# Patient Record
Sex: MALE | Race: WHITE | NOT HISPANIC OR LATINO | Employment: OTHER | URBAN - METROPOLITAN AREA
[De-identification: names, ages, dates, MRNs, and addresses within clinical notes are randomized per-mention and may not be internally consistent; named-entity substitution may affect disease eponyms.]

---

## 2017-01-25 ENCOUNTER — HOSPITAL ENCOUNTER (OUTPATIENT)
Dept: RADIOLOGY | Facility: MEDICAL CENTER | Age: 82
End: 2017-01-25
Attending: FAMILY MEDICINE
Payer: MEDICARE

## 2017-01-25 DIAGNOSIS — M79.671 RIGHT FOOT PAIN: ICD-10-CM

## 2017-01-25 PROCEDURE — 73630 X-RAY EXAM OF FOOT: CPT | Mod: RT

## 2017-02-08 ENCOUNTER — HOSPITAL ENCOUNTER (OUTPATIENT)
Dept: LAB | Facility: MEDICAL CENTER | Age: 82
End: 2017-02-08
Attending: NURSE PRACTITIONER
Payer: MEDICARE

## 2017-02-08 ENCOUNTER — HOSPITAL ENCOUNTER (OUTPATIENT)
Dept: LAB | Facility: MEDICAL CENTER | Age: 82
End: 2017-02-08
Attending: FAMILY MEDICINE
Payer: MEDICARE

## 2017-02-08 LAB
25(OH)D3 SERPL-MCNC: 34 NG/ML (ref 30–100)
25(OH)D3 SERPL-MCNC: 36 NG/ML (ref 30–100)
ALBUMIN SERPL BCP-MCNC: 3.9 G/DL (ref 3.2–4.9)
ALBUMIN SERPL BCP-MCNC: 3.9 G/DL (ref 3.2–4.9)
ALBUMIN/GLOB SERPL: 1.1 G/DL
ALP SERPL-CCNC: 61 U/L (ref 30–99)
ALT SERPL-CCNC: 12 U/L (ref 2–50)
ANION GAP SERPL CALC-SCNC: 9 MMOL/L (ref 0–11.9)
APPEARANCE UR: CLEAR
AST SERPL-CCNC: 14 U/L (ref 12–45)
BASOPHILS # BLD AUTO: 0.02 K/UL (ref 0–0.12)
BASOPHILS # BLD AUTO: 0.03 K/UL (ref 0–0.12)
BASOPHILS NFR BLD AUTO: 0.3 % (ref 0–1.8)
BASOPHILS NFR BLD AUTO: 0.5 % (ref 0–1.8)
BILIRUB SERPL-MCNC: 0.6 MG/DL (ref 0.1–1.5)
BILIRUB UR QL STRIP.AUTO: NEGATIVE
BUN SERPL-MCNC: 43 MG/DL (ref 8–22)
BUN SERPL-MCNC: 44 MG/DL (ref 8–22)
CALCIUM SERPL-MCNC: 8.7 MG/DL (ref 8.5–10.5)
CALCIUM SERPL-MCNC: 8.7 MG/DL (ref 8.5–10.5)
CHLORIDE SERPL-SCNC: 109 MMOL/L (ref 96–112)
CHLORIDE SERPL-SCNC: 110 MMOL/L (ref 96–112)
CHOLEST SERPL-MCNC: 111 MG/DL (ref 100–199)
CO2 SERPL-SCNC: 19 MMOL/L (ref 20–33)
CO2 SERPL-SCNC: 20 MMOL/L (ref 20–33)
COLOR UR AUTO: ABNORMAL
CREAT SERPL-MCNC: 2.29 MG/DL (ref 0.5–1.4)
CREAT SERPL-MCNC: 2.3 MG/DL (ref 0.5–1.4)
CREAT UR-MCNC: 104.4 MG/DL
EOSINOPHIL # BLD: 0.16 K/UL (ref 0–0.51)
EOSINOPHIL # BLD: 0.16 K/UL (ref 0–0.51)
EOSINOPHIL NFR BLD AUTO: 2.7 % (ref 0–6.9)
EOSINOPHIL NFR BLD AUTO: 2.7 % (ref 0–6.9)
EPITHELIAL CELLS 1715: NORMAL /HPF
ERYTHROCYTE [DISTWIDTH] IN BLOOD BY AUTOMATED COUNT: 50.4 FL (ref 35.9–50)
ERYTHROCYTE [DISTWIDTH] IN BLOOD BY AUTOMATED COUNT: 51 FL (ref 35.9–50)
GLOBULIN SER CALC-MCNC: 3.4 G/DL (ref 1.9–3.5)
GLUCOSE SERPL-MCNC: 118 MG/DL (ref 65–99)
GLUCOSE SERPL-MCNC: 120 MG/DL (ref 65–99)
GLUCOSE UR STRIP.AUTO-MCNC: NEGATIVE MG/DL
HCT VFR BLD AUTO: 36.2 % (ref 42–52)
HCT VFR BLD AUTO: 36.3 % (ref 42–52)
HDLC SERPL-MCNC: 29 MG/DL
HGB BLD-MCNC: 11.3 G/DL (ref 14–18)
HGB BLD-MCNC: 11.5 G/DL (ref 14–18)
IMM GRANULOCYTES # BLD AUTO: 0.02 K/UL (ref 0–0.11)
IMM GRANULOCYTES # BLD AUTO: 0.03 K/UL (ref 0–0.11)
IMM GRANULOCYTES NFR BLD AUTO: 0.3 % (ref 0–0.9)
IMM GRANULOCYTES NFR BLD AUTO: 0.5 % (ref 0–0.9)
KETONES UR STRIP.AUTO-MCNC: NEGATIVE MG/DL
LDLC SERPL CALC-MCNC: 57 MG/DL
LEUKOCYTE ESTERASE UR QL STRIP.AUTO: NEGATIVE
LYMPHOCYTES # BLD: 0.93 K/UL (ref 1–4.8)
LYMPHOCYTES # BLD: 0.94 K/UL (ref 1–4.8)
LYMPHOCYTES NFR BLD AUTO: 15.5 % (ref 22–41)
LYMPHOCYTES NFR BLD AUTO: 15.9 % (ref 22–41)
MCH RBC QN AUTO: 30.1 PG (ref 27–33)
MCH RBC QN AUTO: 30.3 PG (ref 27–33)
MCHC RBC AUTO-ENTMCNC: 31.2 G/DL (ref 33.7–35.3)
MCHC RBC AUTO-ENTMCNC: 31.7 G/DL (ref 33.7–35.3)
MCV RBC AUTO: 95.8 FL (ref 81.4–97.8)
MCV RBC AUTO: 96.3 FL (ref 81.4–97.8)
MICRO URNS: ABNORMAL
MONOCYTES # BLD: 0.46 K/UL (ref 0–0.85)
MONOCYTES # BLD: 0.48 K/UL (ref 0–0.85)
MONOCYTES NFR BLD AUTO: 7.7 % (ref 0–13.4)
MONOCYTES NFR BLD AUTO: 8.1 % (ref 0–13.4)
NEUTROPHILS # BLD: 4.29 K/UL (ref 1.82–7.42)
NEUTROPHILS # BLD: 4.4 K/UL (ref 1.82–7.42)
NEUTROPHILS NFR BLD AUTO: 72.5 % (ref 44–72)
NEUTROPHILS NFR BLD AUTO: 73.3 % (ref 44–72)
NITRITE UR QL STRIP.AUTO: NEGATIVE
NRBC # BLD AUTO: 0 K/UL
NRBC # BLD AUTO: 0 K/UL
NRBC BLD-RTO: 0 /100 WBC
NRBC BLD-RTO: 0 /100 WBC
PH UR: 6 [PH]
PHOSPHATE SERPL-MCNC: 3.4 MG/DL (ref 2.5–4.5)
PLATELET # BLD AUTO: 123 K/UL (ref 164–446)
PLATELET # BLD AUTO: 125 K/UL (ref 164–446)
PMV BLD AUTO: 11.2 FL (ref 9–12.9)
PMV BLD AUTO: 11.4 FL (ref 9–12.9)
POTASSIUM SERPL-SCNC: 4.8 MMOL/L (ref 3.6–5.5)
POTASSIUM SERPL-SCNC: 4.8 MMOL/L (ref 3.6–5.5)
PROT 24H UR-MRATE: 43.1 MG/DL (ref 0–15)
PROT SERPL-MCNC: 7.3 G/DL (ref 6–8.2)
PROT UR QL STRIP: 30 MG/DL
PROT/CREAT UR: 413 MG/G (ref 15–68)
RBC # BLD AUTO: 3.76 M/UL (ref 4.7–6.1)
RBC # BLD AUTO: 3.79 M/UL (ref 4.7–6.1)
RBC UR QL AUTO: NEGATIVE
SODIUM SERPL-SCNC: 138 MMOL/L (ref 135–145)
SODIUM SERPL-SCNC: 139 MMOL/L (ref 135–145)
SP GR UR STRIP.AUTO: 1.01
TRIGL SERPL-MCNC: 124 MG/DL (ref 0–149)
TSH SERPL DL<=0.005 MIU/L-ACNC: 2.51 UIU/ML (ref 0.3–3.7)
URATE SERPL-MCNC: 7.7 MG/DL (ref 2.5–8.3)
WBC # BLD AUTO: 5.9 K/UL (ref 4.8–10.8)
WBC # BLD AUTO: 6 K/UL (ref 4.8–10.8)

## 2017-02-08 PROCEDURE — 84156 ASSAY OF PROTEIN URINE: CPT

## 2017-02-08 PROCEDURE — 36415 COLL VENOUS BLD VENIPUNCTURE: CPT

## 2017-02-08 PROCEDURE — 84550 ASSAY OF BLOOD/URIC ACID: CPT

## 2017-02-08 PROCEDURE — 82570 ASSAY OF URINE CREATININE: CPT

## 2017-02-08 PROCEDURE — 85025 COMPLETE CBC W/AUTO DIFF WBC: CPT

## 2017-02-08 PROCEDURE — 80069 RENAL FUNCTION PANEL: CPT

## 2017-02-08 PROCEDURE — 81001 URINALYSIS AUTO W/SCOPE: CPT

## 2017-02-08 PROCEDURE — 82306 VITAMIN D 25 HYDROXY: CPT

## 2017-04-27 ENCOUNTER — RESOLUTE PROFESSIONAL BILLING HOSPITAL PROF FEE (OUTPATIENT)
Dept: HOSPITALIST | Facility: MEDICAL CENTER | Age: 82
End: 2017-04-27
Payer: MEDICARE

## 2017-04-27 ENCOUNTER — APPOINTMENT (OUTPATIENT)
Dept: RADIOLOGY | Facility: MEDICAL CENTER | Age: 82
DRG: 392 | End: 2017-04-27
Attending: EMERGENCY MEDICINE
Payer: MEDICARE

## 2017-04-27 ENCOUNTER — APPOINTMENT (OUTPATIENT)
Dept: RADIOLOGY | Facility: MEDICAL CENTER | Age: 82
DRG: 392 | End: 2017-04-27
Attending: HOSPITALIST
Payer: MEDICARE

## 2017-04-27 ENCOUNTER — HOSPITAL ENCOUNTER (INPATIENT)
Facility: MEDICAL CENTER | Age: 82
LOS: 4 days | DRG: 392 | End: 2017-05-01
Attending: EMERGENCY MEDICINE | Admitting: HOSPITALIST
Payer: MEDICARE

## 2017-04-27 PROBLEM — N17.9 ACUTE ON CHRONIC RENAL FAILURE (HCC): Status: ACTIVE | Noted: 2017-04-27

## 2017-04-27 PROBLEM — K52.9 ACUTE COLITIS: Status: ACTIVE | Noted: 2017-04-27

## 2017-04-27 PROBLEM — R79.89 TROPONIN LEVEL ELEVATED: Status: ACTIVE | Noted: 2017-04-27

## 2017-04-27 PROBLEM — N18.9 ACUTE ON CHRONIC RENAL FAILURE (HCC): Status: ACTIVE | Noted: 2017-04-27

## 2017-04-27 LAB
ALBUMIN SERPL BCP-MCNC: 3.9 G/DL (ref 3.2–4.9)
ALBUMIN/GLOB SERPL: 1.3 G/DL
ALP SERPL-CCNC: 53 U/L (ref 30–99)
ALT SERPL-CCNC: 8 U/L (ref 2–50)
ANION GAP SERPL CALC-SCNC: 12 MMOL/L (ref 0–11.9)
ANISOCYTOSIS BLD QL SMEAR: ABNORMAL
APTT PPP: 28.3 SEC (ref 24.7–36)
AST SERPL-CCNC: 11 U/L (ref 12–45)
BASOPHILS # BLD AUTO: 0 % (ref 0–1.8)
BASOPHILS # BLD: 0 K/UL (ref 0–0.12)
BILIRUB SERPL-MCNC: 0.6 MG/DL (ref 0.1–1.5)
BNP SERPL-MCNC: 1912 PG/ML (ref 0–100)
BUN SERPL-MCNC: 49 MG/DL (ref 8–22)
BURR CELLS BLD QL SMEAR: NORMAL
CALCIUM SERPL-MCNC: 8.9 MG/DL (ref 8.5–10.5)
CHLORIDE SERPL-SCNC: 109 MMOL/L (ref 96–112)
CO2 SERPL-SCNC: 17 MMOL/L (ref 20–33)
CREAT SERPL-MCNC: 2.55 MG/DL (ref 0.5–1.4)
EKG IMPRESSION: NORMAL
EOSINOPHIL # BLD AUTO: 0 K/UL (ref 0–0.51)
EOSINOPHIL NFR BLD: 0 % (ref 0–6.9)
ERYTHROCYTE [DISTWIDTH] IN BLOOD BY AUTOMATED COUNT: 52.9 FL (ref 35.9–50)
GFR SERPL CREATININE-BSD FRML MDRD: 24 ML/MIN/1.73 M 2
GLOBULIN SER CALC-MCNC: 3.1 G/DL (ref 1.9–3.5)
GLUCOSE SERPL-MCNC: 179 MG/DL (ref 65–99)
HCT VFR BLD AUTO: 25.2 % (ref 42–52)
HGB BLD-MCNC: 7.9 G/DL (ref 14–18)
INR PPP: 1.15 (ref 0.87–1.13)
LACTATE BLD-SCNC: 2.8 MMOL/L (ref 0.5–2)
LIPASE SERPL-CCNC: 11 U/L (ref 11–82)
LYMPHOCYTES # BLD AUTO: 0.74 K/UL (ref 1–4.8)
LYMPHOCYTES NFR BLD: 2.6 % (ref 22–41)
MANUAL DIFF BLD: NORMAL
MCH RBC QN AUTO: 29.9 PG (ref 27–33)
MCHC RBC AUTO-ENTMCNC: 31.3 G/DL (ref 33.7–35.3)
MCV RBC AUTO: 95.5 FL (ref 81.4–97.8)
MICROCYTES BLD QL SMEAR: ABNORMAL
MONOCYTES # BLD AUTO: 1 K/UL (ref 0–0.85)
MONOCYTES NFR BLD AUTO: 3.5 % (ref 0–13.4)
MORPHOLOGY BLD-IMP: NORMAL
NEUTROPHILS # BLD AUTO: 26.76 K/UL (ref 1.82–7.42)
NEUTROPHILS NFR BLD: 93.9 % (ref 44–72)
NRBC # BLD AUTO: 0 K/UL
NRBC BLD AUTO-RTO: 0 /100 WBC
OVALOCYTES BLD QL SMEAR: NORMAL
PLATELET # BLD AUTO: 215 K/UL (ref 164–446)
PLATELET BLD QL SMEAR: NORMAL
PMV BLD AUTO: 10.3 FL (ref 9–12.9)
POIKILOCYTOSIS BLD QL SMEAR: NORMAL
POTASSIUM SERPL-SCNC: 4.9 MMOL/L (ref 3.6–5.5)
PROT SERPL-MCNC: 7 G/DL (ref 6–8.2)
PROTHROMBIN TIME: 15.1 SEC (ref 12–14.6)
RBC # BLD AUTO: 2.64 M/UL (ref 4.7–6.1)
RBC BLD AUTO: PRESENT
SODIUM SERPL-SCNC: 138 MMOL/L (ref 135–145)
TROPONIN I SERPL-MCNC: 0.41 NG/ML (ref 0–0.04)
WBC # BLD AUTO: 28.5 K/UL (ref 4.8–10.8)

## 2017-04-27 PROCEDURE — 83690 ASSAY OF LIPASE: CPT

## 2017-04-27 PROCEDURE — 85007 BL SMEAR W/DIFF WBC COUNT: CPT

## 2017-04-27 PROCEDURE — A9270 NON-COVERED ITEM OR SERVICE: HCPCS | Performed by: HOSPITALIST

## 2017-04-27 PROCEDURE — 83880 ASSAY OF NATRIURETIC PEPTIDE: CPT

## 2017-04-27 PROCEDURE — 96365 THER/PROPH/DIAG IV INF INIT: CPT

## 2017-04-27 PROCEDURE — 93005 ELECTROCARDIOGRAM TRACING: CPT | Performed by: EMERGENCY MEDICINE

## 2017-04-27 PROCEDURE — 99285 EMERGENCY DEPT VISIT HI MDM: CPT

## 2017-04-27 PROCEDURE — 93005 ELECTROCARDIOGRAM TRACING: CPT

## 2017-04-27 PROCEDURE — 700111 HCHG RX REV CODE 636 W/ 250 OVERRIDE (IP): Performed by: HOSPITALIST

## 2017-04-27 PROCEDURE — 700105 HCHG RX REV CODE 258: Performed by: EMERGENCY MEDICINE

## 2017-04-27 PROCEDURE — 80053 COMPREHEN METABOLIC PANEL: CPT

## 2017-04-27 PROCEDURE — 85730 THROMBOPLASTIN TIME PARTIAL: CPT

## 2017-04-27 PROCEDURE — 84484 ASSAY OF TROPONIN QUANT: CPT

## 2017-04-27 PROCEDURE — 96367 TX/PROPH/DG ADDL SEQ IV INF: CPT

## 2017-04-27 PROCEDURE — A9270 NON-COVERED ITEM OR SERVICE: HCPCS | Performed by: EMERGENCY MEDICINE

## 2017-04-27 PROCEDURE — 85610 PROTHROMBIN TIME: CPT

## 2017-04-27 PROCEDURE — 770020 HCHG ROOM/CARE - TELE (206)

## 2017-04-27 PROCEDURE — 71010 DX-CHEST-PORTABLE (1 VIEW): CPT

## 2017-04-27 PROCEDURE — C9113 INJ PANTOPRAZOLE SODIUM, VIA: HCPCS | Performed by: INTERNAL MEDICINE

## 2017-04-27 PROCEDURE — 700102 HCHG RX REV CODE 250 W/ 637 OVERRIDE(OP): Performed by: EMERGENCY MEDICINE

## 2017-04-27 PROCEDURE — 83605 ASSAY OF LACTIC ACID: CPT

## 2017-04-27 PROCEDURE — 74176 CT ABD & PELVIS W/O CONTRAST: CPT

## 2017-04-27 PROCEDURE — 700111 HCHG RX REV CODE 636 W/ 250 OVERRIDE (IP): Performed by: EMERGENCY MEDICINE

## 2017-04-27 PROCEDURE — 96361 HYDRATE IV INFUSION ADD-ON: CPT

## 2017-04-27 PROCEDURE — 700101 HCHG RX REV CODE 250: Performed by: EMERGENCY MEDICINE

## 2017-04-27 PROCEDURE — 700111 HCHG RX REV CODE 636 W/ 250 OVERRIDE (IP): Performed by: INTERNAL MEDICINE

## 2017-04-27 PROCEDURE — 85027 COMPLETE CBC AUTOMATED: CPT

## 2017-04-27 PROCEDURE — 700102 HCHG RX REV CODE 250 W/ 637 OVERRIDE(OP): Performed by: HOSPITALIST

## 2017-04-27 PROCEDURE — 700105 HCHG RX REV CODE 258: Performed by: HOSPITALIST

## 2017-04-27 PROCEDURE — 99223 1ST HOSP IP/OBS HIGH 75: CPT | Performed by: HOSPITALIST

## 2017-04-27 PROCEDURE — 36415 COLL VENOUS BLD VENIPUNCTURE: CPT

## 2017-04-27 RX ORDER — ALLOPURINOL 100 MG/1
50 TABLET ORAL DAILY
Status: DISCONTINUED | OUTPATIENT
Start: 2017-04-28 | End: 2017-05-01 | Stop reason: HOSPADM

## 2017-04-27 RX ORDER — FERROUS SULFATE 325(65) MG
325 TABLET ORAL
Status: DISCONTINUED | OUTPATIENT
Start: 2017-04-28 | End: 2017-04-27

## 2017-04-27 RX ORDER — CLOPIDOGREL BISULFATE 75 MG/1
75 TABLET ORAL EVERY MORNING
COMMUNITY
End: 2017-11-21 | Stop reason: SDUPTHER

## 2017-04-27 RX ORDER — SODIUM CHLORIDE 9 MG/ML
500 INJECTION, SOLUTION INTRAVENOUS
Status: DISCONTINUED | OUTPATIENT
Start: 2017-04-27 | End: 2017-05-01 | Stop reason: HOSPADM

## 2017-04-27 RX ORDER — OMEPRAZOLE 20 MG/1
20 CAPSULE, DELAYED RELEASE ORAL DAILY
Status: DISCONTINUED | OUTPATIENT
Start: 2017-04-27 | End: 2017-04-27

## 2017-04-27 RX ORDER — AMOXICILLIN 250 MG
2 CAPSULE ORAL 2 TIMES DAILY
Status: DISCONTINUED | OUTPATIENT
Start: 2017-04-27 | End: 2017-05-01 | Stop reason: HOSPADM

## 2017-04-27 RX ORDER — POLYETHYLENE GLYCOL 3350 17 G/17G
1 POWDER, FOR SOLUTION ORAL
Status: DISCONTINUED | OUTPATIENT
Start: 2017-04-27 | End: 2017-05-01 | Stop reason: HOSPADM

## 2017-04-27 RX ORDER — SODIUM CHLORIDE 9 MG/ML
30 INJECTION, SOLUTION INTRAVENOUS
Status: DISCONTINUED | OUTPATIENT
Start: 2017-04-27 | End: 2017-05-01 | Stop reason: HOSPADM

## 2017-04-27 RX ORDER — ATORVASTATIN CALCIUM 10 MG/1
5 TABLET, FILM COATED ORAL DAILY
Status: DISCONTINUED | OUTPATIENT
Start: 2017-04-28 | End: 2017-05-01 | Stop reason: HOSPADM

## 2017-04-27 RX ORDER — SODIUM CHLORIDE 9 MG/ML
1000 INJECTION, SOLUTION INTRAVENOUS ONCE
Status: COMPLETED | OUTPATIENT
Start: 2017-04-27 | End: 2017-04-27

## 2017-04-27 RX ORDER — ONDANSETRON 2 MG/ML
4 INJECTION INTRAMUSCULAR; INTRAVENOUS EVERY 4 HOURS PRN
Status: DISCONTINUED | OUTPATIENT
Start: 2017-04-27 | End: 2017-05-01 | Stop reason: HOSPADM

## 2017-04-27 RX ORDER — ENALAPRIL MALEATE 10 MG/1
10 TABLET ORAL DAILY
Status: DISCONTINUED | OUTPATIENT
Start: 2017-04-28 | End: 2017-05-01 | Stop reason: HOSPADM

## 2017-04-27 RX ORDER — AZITHROMYCIN 500 MG/1
500 INJECTION, POWDER, LYOPHILIZED, FOR SOLUTION INTRAVENOUS ONCE
Status: COMPLETED | OUTPATIENT
Start: 2017-04-27 | End: 2017-04-27

## 2017-04-27 RX ORDER — CEFTRIAXONE 2 G/1
2 INJECTION, POWDER, FOR SOLUTION INTRAMUSCULAR; INTRAVENOUS ONCE
Status: COMPLETED | OUTPATIENT
Start: 2017-04-27 | End: 2017-04-27

## 2017-04-27 RX ORDER — TERAZOSIN 5 MG/1
5 CAPSULE ORAL DAILY
Status: DISCONTINUED | OUTPATIENT
Start: 2017-04-28 | End: 2017-05-01 | Stop reason: HOSPADM

## 2017-04-27 RX ORDER — BISACODYL 10 MG
10 SUPPOSITORY, RECTAL RECTAL
Status: DISCONTINUED | OUTPATIENT
Start: 2017-04-27 | End: 2017-05-01 | Stop reason: HOSPADM

## 2017-04-27 RX ORDER — SODIUM CHLORIDE 9 MG/ML
INJECTION, SOLUTION INTRAVENOUS CONTINUOUS
Status: DISCONTINUED | OUTPATIENT
Start: 2017-04-27 | End: 2017-05-01 | Stop reason: HOSPADM

## 2017-04-27 RX ORDER — PANTOPRAZOLE SODIUM 40 MG/10ML
40 INJECTION, POWDER, LYOPHILIZED, FOR SOLUTION INTRAVENOUS 2 TIMES DAILY
Status: DISCONTINUED | OUTPATIENT
Start: 2017-04-27 | End: 2017-05-01 | Stop reason: HOSPADM

## 2017-04-27 RX ORDER — ONDANSETRON 4 MG/1
4 TABLET, ORALLY DISINTEGRATING ORAL EVERY 4 HOURS PRN
Status: DISCONTINUED | OUTPATIENT
Start: 2017-04-27 | End: 2017-05-01 | Stop reason: HOSPADM

## 2017-04-27 RX ADMIN — CEFTRIAXONE SODIUM 2 G: 2 INJECTION, POWDER, FOR SOLUTION INTRAMUSCULAR; INTRAVENOUS at 16:10

## 2017-04-27 RX ADMIN — STANDARDIZED SENNA CONCENTRATE AND DOCUSATE SODIUM 2 TABLET: 8.6; 5 TABLET, FILM COATED ORAL at 20:35

## 2017-04-27 RX ADMIN — ASPIRIN 325 MG: 325 TABLET, DELAYED RELEASE ORAL at 15:00

## 2017-04-27 RX ADMIN — PIPERACILLIN SODIUM AND TAZOBACTAM SODIUM 3.38 G: 3; .375 INJECTION, POWDER, FOR SOLUTION INTRAVENOUS at 23:36

## 2017-04-27 RX ADMIN — AZITHROMYCIN FOR INJECTION INJECTION, POWDER, LYOPHILIZED, FOR SOLUTION 500 MG: 500 INJECTION INTRAVENOUS at 17:00

## 2017-04-27 RX ADMIN — SODIUM CHLORIDE: 9 INJECTION, SOLUTION INTRAVENOUS at 18:00

## 2017-04-27 RX ADMIN — OMEPRAZOLE 20 MG: 20 CAPSULE, DELAYED RELEASE ORAL at 20:34

## 2017-04-27 RX ADMIN — METRONIDAZOLE 500 MG: 500 INJECTION, SOLUTION INTRAVENOUS at 18:45

## 2017-04-27 RX ADMIN — PANTOPRAZOLE SODIUM 40 MG: 40 INJECTION, POWDER, FOR SOLUTION INTRAVENOUS at 23:36

## 2017-04-27 RX ADMIN — PIPERACILLIN SODIUM AND TAZOBACTAM SODIUM 3.38 G: 3; .375 INJECTION, POWDER, FOR SOLUTION INTRAVENOUS at 20:35

## 2017-04-27 RX ADMIN — SODIUM CHLORIDE: 9 INJECTION, SOLUTION INTRAVENOUS at 20:35

## 2017-04-27 RX ADMIN — SODIUM CHLORIDE 1000 ML: 9 INJECTION, SOLUTION INTRAVENOUS at 14:15

## 2017-04-27 ASSESSMENT — COPD QUESTIONNAIRES
HAVE YOU SMOKED AT LEAST 100 CIGARETTES IN YOUR ENTIRE LIFE: YES
COPD SCREENING SCORE: 5
DO YOU EVER COUGH UP ANY MUCUS OR PHLEGM?: NO/ONLY WITH OCCASIONAL COLDS OR INFECTIONS
DURING THE PAST 4 WEEKS HOW MUCH DID YOU FEEL SHORT OF BREATH: SOME OF THE TIME

## 2017-04-27 ASSESSMENT — LIFESTYLE VARIABLES: EVER_SMOKED: YES

## 2017-04-27 NOTE — IP AVS SNAPSHOT
Rep Access Code: Activation code not generated  Current Rep Status: Active    [facility] [employer] [facility]  A secure, online tool to manage your health information     [employer]® is a secure, online tool that connects you to your personalized health information from the privacy of your home -- day or night - making it very easy for you to manage your healthcare. Once the activation process is completed, you can even access your medical information using the Rep tiffany, which is available for free in the Apple Tiffany store or Google Play store.     Rep provides the following levels of access (as shown below):   My Chart Features   [facility] Primary Care Doctor [facility]  Specialists [facility]  Urgent  Care Non-[facility]  Primary Care  Doctor   Email your healthcare team securely and privately 24/7 X X X X   Manage appointments: schedule your next appointment; view details of past/upcoming appointments X      Request prescription refills. X      View recent personal medical records, including lab and immunizations X X X X   View health record, including health history, allergies, medications X X X X   Read reports about your outpatient visits, procedures, consult and ER notes X X X X   See your discharge summary, which is a recap of your hospital and/or ER visit that includes your diagnosis, lab results, and care plan. X X       How to register for Rep:  1. Go to  [email or web address].  2. Click on the Sign Up Now box, which takes you to the New Member Sign Up page. You will need to provide the following information:  a. Enter your Rep Access Code exactly as it appears at the top of this page. (You will not need to use this code after you’ve completed the sign-up process. If you do not sign up before the expiration date, you must request a new code.)   b. Enter your date of birth.   c. Enter your home email address.   d. Click Submit, and follow the next screen’s instructions.  3. Create a Rep ID. This will  be your [employer] login ID and cannot be changed, so think of one that is secure and easy to remember.  4. Create a [employer] password. You can change your password at any time.  5. Enter your Password Reset Question and Answer. This can be used at a later time if you forget your password.   6. Enter your e-mail address. This allows you to receive e-mail notifications when new information is available in [employer].  7. Click Sign Up. You can now view your health information.    For assistance activating your [employer] account, call [phone]

## 2017-04-27 NOTE — IP AVS SNAPSHOT
" <p align=\"LEFT\"><IMG SRC=\"//[email or web address]$/[email or web address]\" alt=\"Image\" WIDTH=\"50%\" HEIGHT=\"200\" BORDER=\"\"></p>                   Name:Lex Harden  Medical Record Number:[id number]  CSN: [id number]    YOB: 1933   Age: 83 y.o.  Sex: male  HT:1.753 m (5' 9\") WT: 85.4 kg (188 lb 4.4 oz)          Admit Date: 4/27/2017     Discharge Date:   Today's Date: 5/1/2017  Attending Doctor:  Lisa Shin M.D.                  Allergies:  Nkda          Your appointments     May 11, 2017  9:45 AM   Heart Failure New with Donny Castillo M.D.   [facility] (--)    [address], [address]  [facility] [id number] [phone]              Follow-up Information     1. Follow up with Kirill Fabian D.O.. Go on 5/3/2017.    Specialty:  Family Medicine    Why:  Please arrive at 1:30 pm for an appointment. Thank you.     Contact information    [address]  [address]  [facility] [address]  [phone]           Medication List      Take these Medications        Instructions    allopurinol 100 MG Tabs   Commonly known as:  ZYLOPRIM    Take 100 mg by mouth every day.   Dose:  100 mg       atorvastatin 10 MG Tabs   Commonly known as:  LIPITOR    Take 5 mg by mouth every day.   Dose:  5 mg       carvedilol 3.125 MG Tabs   Commonly known as:  COREG    Take 1 Tab by mouth 2 times a day, with meals. Check BP before taking medication. Hold if SBP < 100, DBP < 60, HR < 55   Dose:  3.125 mg       ciprofloxacin 250 MG Tabs   Commonly known as:  CIPRO    Take 1 Tab by mouth every 24 hours for 6 days.   Dose:  250 mg       clopidogrel 75 MG Tabs   Commonly known as:  PLAVIX    Take 75 mg by mouth every morning.   Dose:  75 mg       enalapril 10 MG Tabs   Commonly known as:  VASOTEC    Take 10 mg by mouth every day.   Dose:  10 mg       Iron 325 (65 FE) MG Tabs    Take 1 Tab by mouth every morning.   Dose:  1 Tab       metronidazole 500 MG Tabs   Commonly known as:  FLAGYL    Take 1 Tab by mouth every 8 hours " for 6 days.   Dose:  500 mg       terazosin 5 MG Caps   Commonly known as:  HYTRIN    Take 5 mg by mouth every day.   Dose:  5 mg       verapamil  MG Tbcr   Commonly known as:  CALAN-SR    Take 180 mg by mouth every day.   Dose:  180 mg       vitamin D 2000 UNIT Tabs    Take 4,000 Units by mouth every day.   Dose:  4000 Units

## 2017-04-27 NOTE — CONSULTS
Cardiology Consult Note:    LatishaJaileneAditi Lopez  Date & Time note created:    4/27/2017   4:22 PM     Referring MD:  Dr. Bailey/ER    Patient ID:   Name:             Lex Harden     YOB: 1933  Age:                 83 y.o.  male   MRN:               [id number]                                                             Chief Complaint:      Elevated Trop    History of Present Illness:    82 y/o retired  with long EtOH (recently quitted) and 40 yr smoking (quitted 30 yrs ago, ave 1-1.5 ppd) c/o not feeling well, weakness and found to have leukocytosis, infection and elevated Trop 0.41; Denies CP, palpitation but increased SOB and MARMOLEJO; Poor memory attributed to his older age; h/o DM--not tx'd, TIA    Review of Systems:      Constitutional: Denies fevers, Denies weight changes  Eyes: Denies changes in vision, no eye pain  Ears/Nose/Throat/Mouth: Denies nasal congestion or sore throat   Cardiovascular: - chest pain, - palpitations   Respiratory: + shortness of breath , Denies cough  Gastrointestinal/Hepatic: Denies abdominal pain, nausea, vomiting, diarrhea, constipation or GI bleeding   Genitourinary: Denies dysuria or frequency  Musculoskeletal/Rheum: Denies  joint pain and swelling   Skin: Denies rash  Neurological: Denies headache, confusion, memory loss or focal weakness/parasthesias  Psychiatric: denies mood disorder   Endocrine: Alva thyroid problems  Heme/Oncology/Lymph Nodes: Denies enlarged lymph nodes, denies brusing or known bleeding disorder  All other systems were reviewed and are negative (AMA/CMS criteria)                Past Medical History:   Past Medical History   Diagnosis Date   • Hypercholesteremia    • Diabetes    • Hypertension    • PVD (peripheral vascular disease) (CMS-HCC)    • AVM (arteriovenous malformation) of colon    • Other and unspecified angina pectoris (CMS-HCC)    • Back pain    • Unspecified cataract      right eye    • Chronic airway obstruction, not  elsewhere classified (CMS-HCC)    • Infectious disease      There are no hospital problems to display for this patient.      Past Surgical History:  Past Surgical History   Procedure Laterality Date   • Colonoscopy with apc  9/28/2010     Performed by EDMUND CHENG at [facility]   • Gastroscopy with apc  9/28/2010     Performed by EDMUND CHENG at [facility]   • Colonoscopy with apc  4/21/2011     Performed by EDMUND CHENG at [facility]   • Gastroscopy-endo  4/21/2011     Performed by EDMUND CHENG at [facility]   • Gastroscopy-endo  5/1/2011     Performed by HOLLY VASQUEZ at ENDOSCOPY [facility]   • Colonoscopy - endo  5/1/2011     Performed by HOLLY VASQUEZ at ENDOSCOPY [facility]   • 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 at ENDOSCOPY [facility]   • Colonoscopy with apc  11/1/2014     Performed by Eddie Levin M.D. at ENDOSCOPY [facility]   • Colonoscopy  6/17/2015     Procedure: COLONOSCOPY;  Surgeon: Eddie Levin M.D.;  Location: SURGERY [facility];  Service:    • Gastroscopy  6/17/2015     Procedure: GASTROSCOPY W/APC;  Surgeon: Eddie Levin M.D.;  Location: [facility];  Service:        Hospital Medications:    Current facility-administered medications:   •  cefTRIAXone (ROCEPHIN) injection 2 g, 2 g, Intravenous, Once, Jose Juan Alarcon M.D., 2 g at 04/27/17 1610  •  azithromycin (ZITHROMAX) injection 500 mg, 500 mg, Intravenous, Once, Jose Juan Alarcon M.D.  •  metronidazole (FLAGYL) IVPB 500 mg, 500 mg, Intravenous, Once, Jose Juan Alarcon M.D.    Current outpatient prescriptions:   •  enalapril (VASOTEC) 10 MG TABS, Take 10 mg by mouth 2 times a day., Disp: , Rfl:   •  verapamil ER (CALAN-SR) 180 MG TBCR, Take 180 mg by mouth every day., Disp: , Rfl:   •  allopurinol  "(ZYLOPRIM) 100 MG TABS, Take 100 mg by mouth every day., Disp: , Rfl:   •  atorvastatin (LIPITOR) 10 MG TABS, Take 5 mg by mouth every evening., Disp: , Rfl:   •  Cholecalciferol (VITAMIN D) 2000 UNIT TABS, Take 2,000 Units by mouth every day., Disp: , Rfl:   •  Ferrous Sulfate (IRON) 325 (65 FE) MG TABS, Take 1 Tab by mouth 2 Times a Day., Disp: , Rfl:   •  terazosin (HYTRIN) 5 MG CAPS, Take 5 mg by mouth every evening., Disp: , Rfl:   •  omeprazole (PRILOSEC) 20 MG CPDR, Take 1 Cap by mouth 2 times a day. daily, Disp: 30 Cap, Rfl: 0    Current Outpatient Medications:    (Not in a hospital admission)    Medication Allergy:  Allergies   Allergen Reactions   • Nkda [No Known Drug Allergy]        Family History:  Family History   Problem Relation Age of Onset   • Non-contributory Neg Hx      \"unknown\"       Social History:  Social History     Social History   • Marital Status: Single     Spouse Name: N/A   • Number of Children: N/A   • Years of Education: N/A     Occupational History   • Not on file.     Social History Main Topics   • Smoking status: Former Smoker   • Smokeless tobacco: Not on file      Comment: Hx smoking x 40 yrs. Quit 1983   • Alcohol Use: 7.0 oz/week     14 drink(s) per week      Comment: Daily   • Drug Use: No   • Sexual Activity: Not on file     Other Topics Concern   • Not on file     Social History Narrative         Physical Exam:  Vitals  Weight/BMI: There is no weight on file to calculate BMI.  Blood pressure 121/53, pulse 81, temperature 35.9 °C (96.6 °F), resp. rate 18, height 1.753 m (5' 9\"), SpO2 94 %.  Filed Vitals:    04/27/17 1430 04/27/17 1500 04/27/17 1530 04/27/17 1600   BP:       Pulse: 85 87 85 81   Temp:       Resp: 19 18 20 18   Height:       SpO2: 95% 96% 94% 94%     Oxygen Therapy:  Pulse Oximetry: 94 %, O2 Delivery: None (Room Air)  General Appearance:  Obese;  Well developed, Well nourished, No acute distress, Non-toxic appearance.   HENT:  Normocephalic, Atraumatic, " Oropharynx moist mucous membranes, Dentition: , Nose normal.    Eyes:  PERRLA, EOMI, Conjunctiva normal, No discharge.  Neck:  Normal range of motion, No cervical tenderness, Supple, No stridor, no JVD .  No thyromegaly.  No carotid bruit.  Cardiovascular:  Normal heart rate, Normal rhythm,  S1, S2, no S3,  S4; No gallops; No murmurs, No rubs, .   Extremitites with intact distal pulses, no cyanosis, clubbing or edema.  No heaves, thrills, HJR;  Peripheral pulses: carotid 2+, brachial 2+, radial 2+, ulnar 2+, femoral 2+, popliteal 2+, PT 2+, DP 2+;  Lungs:  Respiratory effort is normal. Normal breath sounds, breath sounds clear to auscultation bilaterally,  no rales, no rhonchi, no wheezing.   Abdomen: Bowel sounds normal, Soft, No tenderness, No guarding, No rebound, No masses, No hepatosplenomegaly.  Skin: Warm, Dry, No erythema, No rash, no induration or crepitus.  Neurologic: Alert & oriented x 3, Normal motor function, Normal sensory function, No focal deficits noted, cranial nerves II through XII are normal,  Poor recent memory  Psychiatric: Affect normal, Judgment normal, Mood normal.      MDM (Data Review):     Records reviewed and summarized in current documentation    Lab Data Review:  Recent Results (from the past 24 hour(s))   EKG (NOW)    Collection Time: 17 12:05 PM   Result Value Ref Range    Report       [facility] Emergency Dept.    Test Date:  2017  Pt Name:    MAHIN CENTENO                 Department: ER  MRN:        [id number]                      Room:  Gender:     M                            Technician: [id number]  :        1933                   Requested By:ER TRIAGE PROTOCOL  Order #:    [id number]                    Reading MD: EDGAR LUTZ MD    Measurements  Intervals                                Axis  Rate:       92                           P:          0  KS:         150                          QRS:        -10  QRSD:       160                           T:          98  QT:         420  QTc:        520    Interpretive Statements  SINUS RHYTHM  LEFT BUNDLE BRANCH BLOCK  ARTIFACT IN LEAD(S) II,III,aVR,aVL,aVF  Compared to ECG 07/27/2015 13:14:54  Sinus bradycardia no longer present    Electronically Signed On 4- 14:41:49 PDT by EDGAR LUTZ MD     CBC WITH DIFFERENTIAL    Collection Time: 04/27/17  1:51 PM   Result Value Ref Range    WBC 28.5 (H) 4.8 - 10.8 K/uL    RBC 2.64 (L) 4.70 - 6.10 M/uL    Hemoglobin 7.9 (L) 14.0 - 18.0 g/dL    Hematocrit 25.2 (L) 42.0 - 52.0 %    MCV 95.5 81.4 - 97.8 fL    MCH 29.9 27.0 - 33.0 pg    MCHC 31.3 (L) 33.7 - 35.3 g/dL    RDW 52.9 (H) 35.9 - 50.0 fL    Platelet Count 215 164 - 446 K/uL    MPV 10.3 9.0 - 12.9 fL    Nucleated RBC 0.00 /100 WBC    NRBC (Absolute) 0.00 K/uL    Neutrophils-Polys 93.90 (H) 44.00 - 72.00 %    Lymphocytes 2.60 (L) 22.00 - 41.00 %    Monocytes 3.50 0.00 - 13.40 %    Eosinophils 0.00 0.00 - 6.90 %    Basophils 0.00 0.00 - 1.80 %    Neutrophils (Absolute) 26.76 (H) 1.82 - 7.42 K/uL    Lymphs (Absolute) 0.74 (L) 1.00 - 4.80 K/uL    Monos (Absolute) 1.00 (H) 0.00 - 0.85 K/uL    Eos (Absolute) 0.00 0.00 - 0.51 K/uL    Baso (Absolute) 0.00 0.00 - 0.12 K/uL    Anisocytosis 1+     Microcytosis 1+    COMP METABOLIC PANEL    Collection Time: 04/27/17  1:51 PM   Result Value Ref Range    Sodium 138 135 - 145 mmol/L    Potassium 4.9 3.6 - 5.5 mmol/L    Chloride 109 96 - 112 mmol/L    Co2 17 (L) 20 - 33 mmol/L    Anion Gap 12.0 (H) 0.0 - 11.9    Glucose 179 (H) 65 - 99 mg/dL    Bun 49 (H) 8 - 22 mg/dL    Creatinine 2.55 (H) 0.50 - 1.40 mg/dL    Calcium 8.9 8.5 - 10.5 mg/dL    AST(SGOT) 11 (L) 12 - 45 U/L    ALT(SGPT) 8 2 - 50 U/L    Alkaline Phosphatase 53 30 - 99 U/L    Total Bilirubin 0.6 0.1 - 1.5 mg/dL    Albumin 3.9 3.2 - 4.9 g/dL    Total Protein 7.0 6.0 - 8.2 g/dL    Globulin 3.1 1.9 - 3.5 g/dL    A-G Ratio 1.3 g/dL   LIPASE    Collection Time: 04/27/17  1:51 PM   Result Value Ref Range    Lipase 11 11  - 82 U/L   TROPONIN    Collection Time: 04/27/17  1:51 PM   Result Value Ref Range    Troponin I 0.41 (H) 0.00 - 0.04 ng/mL   PROTHROMBIN TIME    Collection Time: 04/27/17  1:51 PM   Result Value Ref Range    PT 15.1 (H) 12.0 - 14.6 sec    INR 1.15 (H) 0.87 - 1.13   APTT    Collection Time: 04/27/17  1:51 PM   Result Value Ref Range    APTT 28.3 24.7 - 36.0 sec   BTYPE NATRIURETIC PEPTIDE    Collection Time: 04/27/17  1:51 PM   Result Value Ref Range    B Natriuretic Peptide 1912 (H) 0 - 100 pg/mL   ESTIMATED GFR    Collection Time: 04/27/17  1:51 PM   Result Value Ref Range    GFR If  29 (A) >60 mL/min/1.73 m 2    GFR If Non  24 (A) >60 mL/min/1.73 m 2   DIFFERENTIAL MANUAL    Collection Time: 04/27/17  1:51 PM   Result Value Ref Range    Manual Diff Status PERFORMED    PERIPHERAL SMEAR REVIEW    Collection Time: 04/27/17  1:51 PM   Result Value Ref Range    Peripheral Smear Review see below    PLATELET ESTIMATE    Collection Time: 04/27/17  1:51 PM   Result Value Ref Range    Plt Estimation Normal    MORPHOLOGY    Collection Time: 04/27/17  1:51 PM   Result Value Ref Range    RBC Morphology Present     Poikilocytosis 1+     Ovalocytes 1+     Echinocytes 1+        Imaging/Procedures Review:    Chest Xray:  Reviewed  11/16/2014 Ech:   Technically difficult study.   Normal left ventricular systolic function.  Left ventricular ejection fraction is 60% to 65%.  Diastolic function is present.  Mild mitral regurgitation.  Mild aortic stenosis.  Mild tricuspid regurgitation.  EKG:   As in HPI. SR LBBB and lateral minima; ST depression    MDM (Assessment and Plan):     Elevated Trop likely from infection and increased demand  LBBB is not new but lateral ST depression noted  Rec: Echo and consider ischemia w/u when infection is treated  CV risks discussed with him  Pls trend EKG and Trop  Will follow  Thx

## 2017-04-27 NOTE — IP AVS SNAPSHOT
" Home Care Instructions                                                                                                                  Name:Lex Harden  Medical Record Number:[id number]  CSN: [id number]    YOB: 1933   Age: 83 y.o.  Sex: male  HT:1.753 m (5' 9\") WT: 85.4 kg (188 lb 4.4 oz)          Admit Date: 4/27/2017     Discharge Date:   Today's Date: 5/1/2017  Attending Doctor:  Lisa Shin M.D.                  Allergies:  Nkda            Discharge Instructions       Discharge Instructions    Discharged to home by car with friend. Discharged via wheelchair, hospital escort: Yes.  Special equipment needed: Not Applicable    Be sure to schedule a follow-up appointment with your primary care doctor or any specialists as instructed.     Discharge Plan:   Influenza Vaccine Indication: Patient Refuses    I understand that a diet low in cholesterol, fat, and sodium is recommended for good health. Unless I have been given specific instructions below for another diet, I accept this instruction as my diet prescription.   Other diet: Diabetic diet    Special Instructions:   HF Patient Discharge Instructions  · Monitor your weight daily, and maintain a weight chart, to track your weight changes.   · Activity as tolerated, unless your Doctor has ordered otherwise. Other activity order: Activity as tolerated.  · Follow a low fat, low cholesterol, low salt diet unless instructed otherwise by your Doctor. Read the labels on the back of food products and track your intake of fat, cholesterol and salt.   · Fluid Restriction No. If a Fluid Restriction has been ordered by your Doctor, measure fluids with a measuring cup to ensure that you are not exceeding the restriction.   · No smoking.  · Oxygen No. If your Doctor has ordered that you wear Oxygen at home, it is important to wear it as ordered.  · Did you receive an explanation from staff on the importance of taking each of your medications and why it is necessary to " keep taking them unless your doctor says to stop? Yes  · Were all of your questions answered about how to manage your heart failure and what to do if you have increased signs and symptoms after you go home? Yes  · Do you feel like your heart failure care team involved you in the care treatment plan and allowed you to make decisions regarding your care while in the hospital and addressed any discharge needs you might have? Yes    See the educational handout provided at discharge for more information on monitoring your daily weight, activity and diet. This also explains more about Heart Failure, symptoms of a flare-up and some of the tests that you have undergone.     Warning Signs of a Flare-Up include:  · Swelling in the ankles or lower legs.  · Shortness of breath, while at rest, or while doing normal activities.   · Shortness of breath at night when in bed, or coughing in bed.   · Requiring more pillows to sleep at night, or needing to sit up at night to sleep.  · Feeling weak, dizzy or fatigued.     When to call your Doctor:  · Call [facility] seven days a week from 8:00 a.m. to 8:00 p.m. for medical questions [phone].  · Call your Primary Care Physician or Cardiologist if:   · You experience any pain radiating to your jaw or neck.  · You have any difficulty breathing.  · You experience weight gain of 3 lbs in a day or 5 lbs in a week.   · You feel any palpitations or irregular heartbeats.  · You become dizzy or lose consciousness.   If you have had an angiogram or had a pacemaker or AICD placed, and experience:  · Bleeding, drainage or swelling at the surgical / puncture site.  · Fever greater than 100.0 F  · Shock from internal defibrillator.  · Cool and / or numb extremities.      · Is patient discharged on Warfarin / Coumadin?   No     · Is patient Post Blood Transfusion?  Yes  POST BLOOD TRANSFUSION INFORMATION (ADULT)    The purpose of blood transfusion is to correct anemia, low levels of  blood clotting factors or to correct acute blood loss.   Blood transfusion is very safe but occasionally unexpected adverse reactions do occur. Most adverse reactions occur during transfusion, within one to two days following transfusion or one to two weeks following transfusion. Some adverse reactions can occur one to six months after transfusion and some even years later.             If any of the symptoms listed below happen to you during your transfusion,                                 please notify your nurse immediately.   · Fever or Chills  · Flushing of the Face  · Hives, rash or itching  · Difficulty in breathing or shortness of breath  · Pain or oozing of blood from the IV needle site  · Low back pain  · Nausea or vomiting  · Weakness or fainting  · Chest pain  · Blood in the urine  · Decreased frequency of urination    The above symptoms may also occur within 24 to 48 after transfusion; if so, notify your physician.     · Yellowing of the skin    This can happen one to six months after transfusion; if so, notify your physician    Depression / Suicide Risk    As you are discharged from this [facility] facility, it is important to learn how to keep safe from harming yourself.    Recognize the warning signs:  · Abrupt changes in personality, positive or negative- including increase in energy   · Giving away possessions  · Change in eating patterns- significant weight changes-  positive or negative  · Change in sleeping patterns- unable to sleep or sleeping all the time   · Unwillingness or inability to communicate  · Depression  · Unusual sadness, discouragement and loneliness  · Talk of wanting to die  · Neglect of personal appearance   · Rebelliousness- reckless behavior  · Withdrawal from people/activities they love  · Confusion- inability to concentrate     If you or a loved one observes any of these behaviors or has concerns about self-harm, here's what you can do:  · Talk about it- your feelings  and reasons for harming yourself  · Remove any means that you might use to hurt yourself (examples: pills, rope, extension cords, firearm)  · Get professional help from the community (Mental Health, Substance Abuse, psychological counseling)  · Do not be alone:Call your Safe Contact- someone whom you trust who will be there for you.  · Call your local CRISIS HOTLINE 276-1304 or 650-612-9613  · Call your local Children's Mobile Crisis Response Team [place] (282) 141-7243 or wwwGolden Gekko  · Call the toll free National Suicide Prevention Hotlines   · National Suicide Prevention Lifeline 782-740-ZXTK (1561)  · [employer] Hope Line Network 800-SUICIDE (125-1085)              Heart Failure  Heart failure is a condition in which the heart has trouble pumping blood. This means your heart does not pump blood efficiently for your body to work well. In some cases of heart failure, fluid may back up into your lungs or you may have swelling (edema) in your lower legs. Heart failure is usually a long-term (chronic) condition. It is important for you to take good care of yourself and follow your health care provider's treatment plan.  CAUSES   Some health conditions can cause heart failure. Those health conditions include:  · High blood pressure (hypertension). Hypertension causes the heart muscle to work harder than normal. When pressure in the blood vessels is high, the heart needs to pump (contract) with more force in order to circulate blood throughout the body. High blood pressure eventually causes the heart to become stiff and weak.  · Coronary artery disease (CAD). CAD is the buildup of cholesterol and fat (plaque) in the arteries of the heart. The blockage in the arteries deprives the heart muscle of oxygen and blood. This can cause chest pain and may lead to a heart attack. High blood pressure can also contribute to CAD.  · Heart attack (myocardial infarction). A heart attack occurs when one or more arteries  in the heart become blocked. The loss of oxygen damages the muscle tissue of the heart. When this happens, part of the heart muscle dies. The injured tissue does not contract as well and weakens the heart's ability to pump blood.  · Abnormal heart valves. When the heart valves do not open and close properly, it can cause heart failure. This makes the heart muscle pump harder to keep the blood flowing.  · Heart muscle disease (cardiomyopathy or myocarditis). Heart muscle disease is damage to the heart muscle from a variety of causes. These can include drug or alcohol abuse, infections, or unknown reasons. These can increase the risk of heart failure.  · Lung disease. Lung disease makes the heart work harder because the lungs do not work properly. This can cause a strain on the heart, leading it to fail.  · Diabetes. Diabetes increases the risk of heart failure. High blood sugar contributes to high fat (lipid) levels in the blood. Diabetes can also cause slow damage to tiny blood vessels that carry important nutrients to the heart muscle. When the heart does not get enough oxygen and food, it can cause the heart to become weak and stiff. This leads to a heart that does not contract efficiently.  · Other conditions can contribute to heart failure. These include abnormal heart rhythms, thyroid problems, and low blood counts (anemia).  Certain unhealthy behaviors can increase the risk of heart failure, including:  · Being overweight.  · Smoking or chewing tobacco.  · Eating foods high in fat and cholesterol.  · Abusing illicit drugs or alcohol.  · Lacking physical activity.  SYMPTOMS   Heart failure symptoms may vary and can be hard to detect. Symptoms may include:  · Shortness of breath with activity, such as climbing stairs.  · Persistent cough.  · Swelling of the feet, ankles, legs, or abdomen.  · Unexplained weight gain.  · Difficulty breathing when lying flat (orthopnea).  · Waking from sleep because of the need to  sit up and get more air.  · Rapid heartbeat.  · Fatigue and loss of energy.  · Feeling light-headed, dizzy, or close to fainting.  · Loss of appetite.  · Nausea.  · Increased urination during the night (nocturia).  DIAGNOSIS   A diagnosis of heart failure is based on your history, symptoms, physical examination, and diagnostic tests. Diagnostic tests for heart failure may include:  · Echocardiography.  · Electrocardiography.  · Chest X-ray.  · Blood tests.  · Exercise stress test.  · Cardiac angiography.  · Radionuclide scans.  TREATMENT   Treatment is aimed at managing the symptoms of heart failure. Medicines, behavioral changes, or surgical intervention may be necessary to treat heart failure.  · Medicines to help treat heart failure may include:  · Angiotensin-converting enzyme (ACE) inhibitors. This type of medicine blocks the effects of a blood protein called angiotensin-converting enzyme. ACE inhibitors relax (dilate) the blood vessels and help lower blood pressure.  · Angiotensin receptor blockers (ARBs). This type of medicine blocks the actions of a blood protein called angiotensin. Angiotensin receptor blockers dilate the blood vessels and help lower blood pressure.  · Water pills (diuretics). Diuretics cause the kidneys to remove salt and water from the blood. The extra fluid is removed through urination. This loss of extra fluid lowers the volume of blood the heart pumps.  · Beta blockers. These prevent the heart from beating too fast and improve heart muscle strength.  · Digitalis. This increases the force of the heartbeat.  · Healthy behavior changes include:  · Obtaining and maintaining a healthy weight.  · Stopping smoking or chewing tobacco.  · Eating heart-healthy foods.  · Limiting or avoiding alcohol.  · Stopping illicit drug use.  · Physical activity as directed by your health care provider.  · Surgical treatment for heart failure may include:  · A procedure to open blocked arteries, repair  damaged heart valves, or remove damaged heart muscle tissue.  · A pacemaker to improve heart muscle function and control certain abnormal heart rhythms.  · An internal cardioverter defibrillator to treat certain serious abnormal heart rhythms.  · A left ventricular assist device (LVAD) to assist the pumping ability of the heart.  HOME CARE INSTRUCTIONS   · Take medicines only as directed by your health care provider. Medicines are important in reducing the workload of your heart, slowing the progression of heart failure, and improving your symptoms.  · Do not stop taking your medicine unless directed by your health care provider.  · Do not skip any dose of medicine.  · Refill your prescriptions before you run out of medicine. Your medicines are needed every day.  · Engage in moderate physical activity if directed by your health care provider. Moderate physical activity can benefit some people. The elderly and people with severe heart failure should consult with a health care provider for physical activity recommendations.  · Eat heart-healthy foods. Food choices should be free of trans fat and low in saturated fat, cholesterol, and salt (sodium). Healthy choices include fresh or frozen fruits and vegetables, fish, lean meats, legumes, fat-free or low-fat dairy products, and whole grain or high fiber foods. Talk to a dietitian to learn more about heart-healthy foods.  · Limit sodium if directed by your health care provider. Sodium restriction may reduce symptoms of heart failure in some people. Talk to a dietitian to learn more about heart-healthy seasonings.  · Use healthy cooking methods. Healthy cooking methods include roasting, grilling, broiling, baking, poaching, steaming, or stir-frying. Talk to a dietitian to learn more about healthy cooking methods.  · Limit fluids if directed by your health care provider. Fluid restriction may reduce symptoms of heart failure in some people.  · Weigh yourself every day.  Daily weights are important in the early recognition of excess fluid. You should weigh yourself every morning after you urinate and before you eat breakfast. Wear the same amount of clothing each time you weigh yourself. Record your daily weight. Provide your health care provider with your weight record.  · Monitor and record your blood pressure if directed by your health care provider.  · Check your pulse if directed by your health care provider.  · Lose weight if directed by your health care provider. Weight loss may reduce symptoms of heart failure in some people.  · Stop smoking or chewing tobacco. Nicotine makes your heart work harder by causing your blood vessels to constrict. Do not use nicotine gum or patches before talking to your health care provider.  · Keep all follow-up visits as directed by your health care provider. This is important.  · Limit alcohol intake to no more than 1 drink per day for nonpregnant women and 2 drinks per day for men. One drink equals 12 ounces of beer, 5 ounces of wine, or 1½ ounces of hard liquor. Drinking more than that is harmful to your heart. Tell your health care provider if you drink alcohol several times a week. Talk with your health care provider about whether alcohol is safe for you. If your heart has already been damaged by alcohol or you have severe heart failure, drinking alcohol should be stopped completely.  · Stop illicit drug use.  · Stay up-to-date with immunizations. It is especially important to prevent respiratory infections through current pneumococcal and influenza immunizations.  · Manage other health conditions such as hypertension, diabetes, thyroid disease, or abnormal heart rhythms as directed by your health care provider.  · Learn to manage stress.  · Plan rest periods when fatigued.  · Learn strategies to manage high temperatures. If the weather is extremely hot:  · Avoid vigorous physical activity.  · Use air conditioning or fans or seek a cooler  location.  · Avoid caffeine and alcohol.  · Wear loose-fitting, lightweight, and light-colored clothing.  · Learn strategies to manage cold temperatures. If the weather is extremely cold:  · Avoid vigorous physical activity.  · Layer clothes.  · Wear mittens or gloves, a hat, and a scarf when going outside.  · Avoid alcohol.  · Obtain ongoing education and support as needed.  · Participate in or seek rehabilitation as needed to maintain or improve independence and quality of life.  SEEK MEDICAL CARE IF:   · You have a rapid weight gain.  · You have increasing shortness of breath that is unusual for you.  · You are unable to participate in your usual physical activities.  · You tire easily.  · You cough more than normal, especially with physical activity.  · You have any or more swelling in areas such as your hands, feet, ankles, or abdomen.  · You are unable to sleep because it is hard to breathe.  · You feel like your heart is beating fast (palpitations).  · You become dizzy or light-headed upon standing up.  SEEK IMMEDIATE MEDICAL CARE IF:   · You have difficulty breathing.  · There is a change in mental status such as decreased alertness or difficulty with concentration.  · You have a pain or discomfort in your chest.  · You have an episode of fainting (syncope).  MAKE SURE YOU:   · Understand these instructions.  · Will watch your condition.  · Will get help right away if you are not doing well or get worse.     This information is not intended to replace advice given to you by your health care provider. Make sure you discuss any questions you have with your health care provider.     Document Released: 12/18/2006 Document Revised: 05/03/2016 Document Reviewed: 01/17/2014  [employer] Interactive Patient Education ©2016 [employer]              Colitis  Colitis is inflammation of the colon. Colitis can be a short-term or long-standing (chronic) illness. Crohn's disease and ulcerative colitis are 2 types of colitis  which are chronic. They usually require lifelong treatment.  CAUSES   There are many different causes of colitis, including:  · Viruses.  · Germs (bacteria).  · Medicine reactions.  SYMPTOMS   · Diarrhea.  · Intestinal bleeding.  · Pain.  · Fever.  · Throwing up (vomiting).  · Tiredness (fatigue).  · Weight loss.  · Bowel blockage.  DIAGNOSIS   The diagnosis of colitis is based on examination and stool or blood tests. X-rays, CT scan, and colonoscopy may also be needed.  TREATMENT   Treatment may include:  · Fluids given through the vein (intravenously).  · Bowel rest (nothing to eat or drink for a period of time).  · Medicine for pain and diarrhea.  · Medicines (antibiotics) that kill germs.  · Cortisone medicines.  · Surgery.  HOME CARE INSTRUCTIONS   · Get plenty of rest.  · Drink enough water and fluids to keep your urine clear or pale yellow.  · Eat a well-balanced diet.  · Call your caregiver for follow-up as recommended.  SEEK IMMEDIATE MEDICAL CARE IF:   · You develop chills.  · You have an oral temperature above 102° F (38.9° C), not controlled by medicine.  · You have extreme weakness, fainting, or dehydration.  · You have repeated vomiting.  · You develop severe belly (abdominal) pain or are passing bloody or tarry stools.  MAKE SURE YOU:   · Understand these instructions.  · Will watch your condition.  · Will get help right away if you are not doing well or get worse.     This information is not intended to replace advice given to you by your health care provider. Make sure you discuss any questions you have with your health care provider.     Document Released: 01/25/2006 Document Revised: 03/11/2013 Document Reviewed: 04/11/2016  [employer] Interactive Patient Education ©2016 [employer]      Your appointments     May 11, 2017  9:45 AM   Heart Failure New with Donny Castillo M.D.   [facility] (--)    [address], [facility] [address]  [place] [address] [phone]               Follow-up Information     1. Follow up with Kirill Fabian D.O.. Go on 5/3/2017.    Specialty:  Family Medicine    Why:  Please arrive at 1:30 pm for an appointment. Thank you.     Contact information    [address]  [address]  [place] [address]  [phone]           Discharge Medication Instructions:    Below are the medications your physician expects you to take upon discharge:    Review all your home medications and newly ordered medications with your doctor and/or pharmacist. Follow medication instructions as directed by your doctor and/or pharmacist.    Please keep your medication list with you and share with your physician.               Medication List      START taking these medications        Instructions    Morning Afternoon Evening Bedtime    carvedilol 3.125 MG Tabs   Commonly known as:  COREG        Take 1 Tab by mouth 2 times a day, with meals. Check BP before taking medication. Hold if SBP < 100, DBP < 60, HR < 55   Dose:  3.125 mg                        ciprofloxacin 250 MG Tabs   Last time this was given:  250 mg on 5/1/2017  9:22 AM   Commonly known as:  CIPRO        Take 1 Tab by mouth every 24 hours for 6 days.   Dose:  250 mg                        metronidazole 500 MG Tabs   Last time this was given:  500 mg on 5/1/2017  6:02 AM   Commonly known as:  FLAGYL        Take 1 Tab by mouth every 8 hours for 6 days.   Dose:  500 mg                          CONTINUE taking these medications        Instructions    Morning Afternoon Evening Bedtime    allopurinol 100 MG Tabs   Last time this was given:  50 mg on 5/1/2017  9:21 AM   Commonly known as:  ZYLOPRIM        Take 100 mg by mouth every day.   Dose:  100 mg                        atorvastatin 10 MG Tabs   Last time this was given:  5 mg on 5/1/2017  9:20 AM   Commonly known as:  LIPITOR        Take 5 mg by mouth every day.   Dose:  5 mg                        clopidogrel 75 MG Tabs   Commonly known as:  PLAVIX        Take 75 mg by  mouth every morning.   Dose:  75 mg                        enalapril 10 MG Tabs   Last time this was given:  10 mg on 5/1/2017  9:20 AM   Commonly known as:  VASOTEC        Take 10 mg by mouth every day.   Dose:  10 mg                        Iron 325 (65 FE) MG Tabs        Take 1 Tab by mouth every morning.   Dose:  1 Tab                        terazosin 5 MG Caps   Last time this was given:  5 mg on 5/1/2017  9:19 AM   Commonly known as:  HYTRIN        Take 5 mg by mouth every day.   Dose:  5 mg                        verapamil  MG Tbcr   Last time this was given:  180 mg on 5/1/2017  9:21 AM   Commonly known as:  CALAN-SR        Take 180 mg by mouth every day.   Dose:  180 mg                        vitamin D 2000 UNIT Tabs        Take 4,000 Units by mouth every day.   Dose:  4000 Units                             Where to Get Your Medications      These medications were sent to [id number] - [place] - [address] [address]     Phone:  [phone]    - carvedilol 3.125 MG Tabs  - ciprofloxacin 250 MG Tabs  - metronidazole 500 MG Tabs            Instructions           Diet / Nutrition:    Follow any diet instructions given to you by your doctor or the dietician, including how much salt (sodium) you are allowed each day.    If you are overweight, talk to your doctor about a weight reduction plan.    Activity:    Remain physically active following your doctor's instructions about exercise and activity.    Rest often.     Any time you become even a little tired or short of breath, SIT DOWN and rest.    Worsening Symptoms:    Report any of the following signs and symptoms to the doctor's office immediately:    *Pain of jaw, arm, or neck  *Chest pain not relieved by medication                               *Dizziness or loss of consciousness  *Difficulty breathing even when at rest   *More tired than usual                                       *Bleeding drainage or swelling of  surgical site  *Swelling of feet, ankles, legs or stomach                 *Fever (>100ºF)  *Pink or blood tinged sputum  *Weight gain (3lbs/day or 5lbs /week)           *Shock from internal defibrillator (if applicable)  *Palpitations or irregular heartbeats                *Cool and/or numb extremities    Stroke Awareness    Common Risk Factors for Stroke include:    Age  Atrial Fibrillation  Carotid Artery Stenosis  Diabetes Mellitus  Excessive alcohol consumption  High blood pressure  Overweight   Physical inactivity  Smoking    Warning signs and symptoms of a stroke include:    *Sudden numbness or weakness of the face, arm or leg (especially on one side of the body).  *Sudden confusion, trouble speaking or understanding.  *Sudden trouble seeing in one or both eyes.  *Sudden trouble walking, dizziness, loss of balance or coordination.Sudden severe headache with no known cause.    It is very important to get treatment quickly when a stroke occurs. If you experience any of the above warning signs, call 911 immediately.                   Disclaimer         Quit Smoking / Tobacco Use:    I understand the use of any tobacco products increases my chance of suffering from future heart disease or stroke and could cause other illnesses which may shorten my life. Quitting the use of tobacco products is the single most important thing I can do to improve my health. For further information on smoking / tobacco cessation call a Toll Free Quit Line at 1-159.129.6464 (*[employer] [place]) or 1-394.484.8344 (American [employer] Association) or you can access the web based program at www.lungusa.org.    [place] Tobacco Users Help Line:  (792) 955-3987       Toll Free: 1-860.459.4391  Quit Tobacco Program [facility] (101)206-6890    Crisis Hotline:    [place] Crisis Hotline:  4-363-YABPEEK or 1-166.415.5803    [place] Crisis Hotline:    1-752.325.1541 or 093-261-1711    Discharge Survey:   Thank you for  choosing [facility]. We hope we did everything we could to make your hospital stay a pleasant one. You may be receiving a phone survey and we would appreciate your time and participation in answering the questions. Your input is very valuable to us in our efforts to improve our service to our patients and their families.        My signature on this form indicates that:    1. I have reviewed and understand the above information.  2. My questions regarding this information have been answered to my satisfaction.  3. I have formulated a plan with my discharge nurse to obtain my prescribed medications for home.                  Disclaimer         __________________________________                     __________       ________                       Patient Signature                                                 Date                    Time

## 2017-04-27 NOTE — ED NOTES
"Pt to triage c/o SOB \"for a while\" and N/V. Pt now with increased weakness and non radiating chest pain. Pt placed on new home medication with no improvement. Pt able to speak in full sentences.   Pt advised to return to triage nurse for any changes or concerns.   "

## 2017-04-27 NOTE — IP AVS SNAPSHOT
5/1/2017    Lex Harden  [address]  [address]  [facility] [address]    Dear Lex:    [facility] wants to ensure your discharge home is safe and you or your loved ones have had all of your questions answered regarding your care after you leave the hospital.    Below is a list of resources and contact information should you have any questions regarding your hospital stay, follow-up instructions, or active medical symptoms.    Questions or Concerns Regarding… Contact   Medical Questions Related to Your Discharge  (7 days a week, 8am-5pm) Contact a Nurse Care Coordinator   [phone]   Medical Questions Not Related to Your Discharge  (24 hours a day / 7 days a week)  Contact the Nurse Health Line   [phone]    Medications or Discharge Instructions Refer to your discharge packet   or contact your [facility] Primary Care Provider   [phone]   Follow-up Appointment(s) Schedule your appointment via [employer]   or contact Scheduling [phone]   Billing Review your statement via [employer]  or contact Billing [phone]   Medical Records Review your records via [employer]   or contact Medical Records [phone]     You may receive a telephone call within two days of discharge. This call is to make certain you understand your discharge instructions and have the opportunity to have any questions answered. You can also easily access your medical information, test results and upcoming appointments via the [employer] free online health management tool. You can learn more and sign up at [employer]. For assistance setting up your [employer] account, please call [phone].    Once again, we want to ensure your discharge home is safe and that you have a clear understanding of any next steps in your care. If you have any questions or concerns, please do not hesitate to contact us, we are here for you. Thank you for choosing [facility] for your healthcare needs.    Sincerely,    Your [facility] Healthcare Team

## 2017-04-27 NOTE — ED NOTES
Family states they are going home and will check back in on patient. PT resting in bed watching tv. Antibiotics infusing. No distress observed. Denies needs at this time.

## 2017-04-27 NOTE — ED PROVIDER NOTES
ED Provider Note    CHIEF COMPLAINT  Chief Complaint   Patient presents with   • Shortness of Breath   • Chest Pain       HPI  Lex Harden is a 83 y.o. male who presents for evaluation of several symptoms including chest discomfort, abdominal pain primarily right mid abdomen with nausea. The patient has been accepted past medical history as listed below including hypertension diabetes. He has chronic renal insufficiency as well. He is followed by Dr. Barbosa. He is no history of any known coronary artery disease or abdominal surgical history. He has not been feeling well for about 24 hours. He primarily reports nausea, right mid abdominal pain. He reports some chest pressure as well. Nonradiating. He has prior history of left bundle branch block. Nothing seems to make it better or worse    REVIEW OF SYSTEMS  See HPI for further details. Positive for abdominal pain nausea chest pain All other systems are negative.     PAST MEDICAL HISTORY  Past Medical History   Diagnosis Date   • Hypercholesteremia    • Diabetes    • Hypertension    • PVD (peripheral vascular disease) (CMS-[facility])    • AVM (arteriovenous malformation) of colon    • Other and unspecified angina pectoris (CMS-HCC)    • Back pain    • Unspecified cataract      right eye    • Chronic airway obstruction, not elsewhere classified (CMS-HCC)    • Infectious disease        FAMILY HISTORY  No history of bleeding disorder    SOCIAL HISTORY  Social History     Social History   • Marital Status: Single     Spouse Name: N/A   • Number of Children: N/A   • Years of Education: N/A     Social History Main Topics   • Smoking status: Former Smoker   • Smokeless tobacco: None      Comment: Hx smoking x 40 yrs. Quit 1983   • Alcohol Use: 7.0 oz/week     14 drink(s) per week      Comment: Daily   • Drug Use: No   • Sexual Activity: Not Asked     Other Topics Concern   • None     Social History Narrative    former smoker no IV drugs    SURGICAL HISTORY  Past Surgical  History   Procedure Laterality Date   • Colonoscopy with apc  9/28/2010     Performed by EDMUND CHENG at [facility]   • Gastroscopy with apc  9/28/2010     Performed by EDMUND CHENG at [facility]   • Colonoscopy with apc  4/21/2011     Performed by EDMUND CHENG at [facility]   • Gastroscopy-endo  4/21/2011     Performed by EDMUND CHENG at [facility]   • Gastroscopy-endo  5/1/2011     Performed by HOLLY VASQUEZ at ENDOSCOPY [facility]   • Colonoscopy - endo  5/1/2011     Performed by HOLLY VASQUEZ at ENDOSCOPY [facility]S   • 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 at ENDOSCOPY [facility]   • Colonoscopy with apc  11/1/2014     Performed by Eddie Levin M.D. at ENDOSCOPY [facility]   • Colonoscopy  6/17/2015     Procedure: COLONOSCOPY;  Surgeon: Eddie Levin M.D.;  Location: SURGERY [facility];  Service:    • Gastroscopy  6/17/2015     Procedure: GASTROSCOPY W/APC;  Surgeon: Eddie Levin M.D.;  Location: SURGERY [facility];  Service:        CURRENT MEDICATIONS  Home Medications     Reviewed by Daniella Lantigua R.N. (Registered Nurse) on 04/27/17 at 1618  Med List Status: Partial    Medication Last Dose Status    allopurinol (ZYLOPRIM) 100 MG TABS 7/27/2015 Active    atorvastatin (LIPITOR) 10 MG TABS 7/27/2015 Active    Cholecalciferol (VITAMIN D) 2000 UNIT TABS 7/27/2015 Active    enalapril (VASOTEC) 10 MG TABS 7/27/2015 Active    Ferrous Sulfate (IRON) 325 (65 FE) MG TABS 7/27/2015 Active    omeprazole (PRILOSEC) 20 MG CPDR 7/27/2015 Active    terazosin (HYTRIN) 5 MG CAPS 7/27/2015 Active    verapamil ER (CALAN-SR) 180 MG TBCR 7/27/2015 Active              Current facility-administered medications:   •  cefTRIAXone (ROCEPHIN) injection 2 g, 2 g, Intravenous, Once, Jose Juan Alarcon,  "M.D., 2 g at 04/27/17 1610  •  azithromycin (ZITHROMAX) injection 500 mg, 500 mg, Intravenous, Once, Jose Juan Alarcon M.D.  •  metronidazole (FLAGYL) IVPB 500 mg, 500 mg, Intravenous, Once, Jose Juan Alarcon M.D.    Current outpatient prescriptions:   •  enalapril (VASOTEC) 10 MG TABS, Take 10 mg by mouth 2 times a day., Disp: , Rfl:   •  verapamil ER (CALAN-SR) 180 MG TBCR, Take 180 mg by mouth every day., Disp: , Rfl:   •  allopurinol (ZYLOPRIM) 100 MG TABS, Take 100 mg by mouth every day., Disp: , Rfl:   •  atorvastatin (LIPITOR) 10 MG TABS, Take 5 mg by mouth every evening., Disp: , Rfl:   •  Cholecalciferol (VITAMIN D) 2000 UNIT TABS, Take 2,000 Units by mouth every day., Disp: , Rfl:   •  Ferrous Sulfate (IRON) 325 (65 FE) MG TABS, Take 1 Tab by mouth 2 Times a Day., Disp: , Rfl:   •  terazosin (HYTRIN) 5 MG CAPS, Take 5 mg by mouth every evening., Disp: , Rfl:   •  omeprazole (PRILOSEC) 20 MG CPDR, Take 1 Cap by mouth 2 times a day. daily, Disp: 30 Cap, Rfl: 0      ALLERGIES  Allergies   Allergen Reactions   • Nkda [No Known Drug Allergy]        PHYSICAL EXAM  VITAL SIGNS: /53 mmHg  Pulse 81  Temp(Src) 35.9 °C (96.6 °F)  Resp 18  Ht 1.753 m (5' 9\")  SpO2 94% Room air O2: 98    Constitutional: Ill-appearing  HENT: Normocephalic, Atraumatic, Bilateral external ears normal, Oropharynx moist, No oral exudates, Nose normal.   Eyes: PERRLA, EOMI, Conjunctiva normal, No discharge.   Neck: Normal range of motion, No tenderness, Supple, No stridor.   Cardiovascular: Normal heart rate, Normal rhythm, No murmurs, No rubs, No gallops.   Thorax & Lungs: Bibasilar rhonchi, No chest tenderness.   Abdomen: Bowel sounds normal, Soft, diffuse mid epigastric and slightly right-sided abdominal tenderness rectal exam is negative for any lesions there is black stool strongly Hemoccult positive  Skin: Warm, Dry, No erythema, No rash.   Back: No tenderness, No CVA tenderness.   Extremities: Intact distal pulses, No edema, No " tenderness, No cyanosis, No clubbing.   Musculoskeletal: Good range of motion in all major joints. No tenderness to palpation or major deformities noted.   Neurologic: Alert & oriented x 3, Normal motor function, Normal sensory function, No focal deficits noted.   Psychiatric: Affect normal, Judgment normal, Mood normal.     EKG  Interpretation by me left bundle branch block which has been seen on prior tracings. There is some new subtle ST depression in the lateral leads. No acute ST segment elevation. No pathological T-wave inversions     RADIOLOGY/PROCEDURES  CT-ABDOMEN-PELVIS W/O   Final Result         1. Long segment of wall thickening from the ascending colon to the hepatic flexure, concerning for colitis.      2. Mild to moderate bilateral pleural effusions, right more than left.      3. Nodular opacity in the right lower lobe, measuring 1.4 cm, could relate to infection or malignancy. Surrounding patchy opacities in the right lower lobe could relate to infection or lymphangitic spread. Further evaluation with PET/CT after treatment    is recommended.      4. Bilateral pleural plaques.      DX-CHEST-PORTABLE (1 VIEW)   Final Result         1. Low lung volume with hypoventilatory change.        Results for orders placed or performed during the hospital encounter of 04/27/17   CBC WITH DIFFERENTIAL   Result Value Ref Range    WBC 28.5 (H) 4.8 - 10.8 K/uL    RBC 2.64 (L) 4.70 - 6.10 M/uL    Hemoglobin 7.9 (L) 14.0 - 18.0 g/dL    Hematocrit 25.2 (L) 42.0 - 52.0 %    MCV 95.5 81.4 - 97.8 fL    MCH 29.9 27.0 - 33.0 pg    MCHC 31.3 (L) 33.7 - 35.3 g/dL    RDW 52.9 (H) 35.9 - 50.0 fL    Platelet Count 215 164 - 446 K/uL    MPV 10.3 9.0 - 12.9 fL    Nucleated RBC 0.00 /100 WBC    NRBC (Absolute) 0.00 K/uL    Neutrophils-Polys 93.90 (H) 44.00 - 72.00 %    Lymphocytes 2.60 (L) 22.00 - 41.00 %    Monocytes 3.50 0.00 - 13.40 %    Eosinophils 0.00 0.00 - 6.90 %    Basophils 0.00 0.00 - 1.80 %    Neutrophils (Absolute)  26.76 (H) 1.82 - 7.42 K/uL    Lymphs (Absolute) 0.74 (L) 1.00 - 4.80 K/uL    Monos (Absolute) 1.00 (H) 0.00 - 0.85 K/uL    Eos (Absolute) 0.00 0.00 - 0.51 K/uL    Baso (Absolute) 0.00 0.00 - 0.12 K/uL    Anisocytosis 1+     Microcytosis 1+    COMP METABOLIC PANEL   Result Value Ref Range    Sodium 138 135 - 145 mmol/L    Potassium 4.9 3.6 - 5.5 mmol/L    Chloride 109 96 - 112 mmol/L    Co2 17 (L) 20 - 33 mmol/L    Anion Gap 12.0 (H) 0.0 - 11.9    Glucose 179 (H) 65 - 99 mg/dL    Bun 49 (H) 8 - 22 mg/dL    Creatinine 2.55 (H) 0.50 - 1.40 mg/dL    Calcium 8.9 8.5 - 10.5 mg/dL    AST(SGOT) 11 (L) 12 - 45 U/L    ALT(SGPT) 8 2 - 50 U/L    Alkaline Phosphatase 53 30 - 99 U/L    Total Bilirubin 0.6 0.1 - 1.5 mg/dL    Albumin 3.9 3.2 - 4.9 g/dL    Total Protein 7.0 6.0 - 8.2 g/dL    Globulin 3.1 1.9 - 3.5 g/dL    A-G Ratio 1.3 g/dL   LIPASE   Result Value Ref Range    Lipase 11 11 - 82 U/L   TROPONIN   Result Value Ref Range    Troponin I 0.41 (H) 0.00 - 0.04 ng/mL   PROTHROMBIN TIME   Result Value Ref Range    PT 15.1 (H) 12.0 - 14.6 sec    INR 1.15 (H) 0.87 - 1.13   APTT   Result Value Ref Range    APTT 28.3 24.7 - 36.0 sec   BTYPE NATRIURETIC PEPTIDE   Result Value Ref Range    B Natriuretic Peptide 1912 (H) 0 - 100 pg/mL   ESTIMATED GFR   Result Value Ref Range    GFR If  29 (A) >60 mL/min/1.73 m 2    GFR If Non  24 (A) >60 mL/min/1.73 m 2   DIFFERENTIAL MANUAL   Result Value Ref Range    Manual Diff Status PERFORMED    PERIPHERAL SMEAR REVIEW   Result Value Ref Range    Peripheral Smear Review see below    PLATELET ESTIMATE   Result Value Ref Range    Plt Estimation Normal    MORPHOLOGY   Result Value Ref Range    RBC Morphology Present     Poikilocytosis 1+     Ovalocytes 1+     Echinocytes 1+    EKG (NOW)   Result Value Ref Range    Report       [facility]    Test Date:  2017-04-27  Pt Name:    MAHIN CENTENO                 Department: ER  MRN:         [id number]                      Room:  Gender:     M                            Technician: [id number]  :        [id number]                   Requested By:ER TRIAGE PROTOCOL  Order #:    [id number]                    Reading MD: EDGAR LUTZ MD    Measurements  Intervals                                Axis  Rate:       92                           P:          0  FL:         150                          QRS:        -10  QRSD:       160                          T:          98  QT:         420  QTc:        520    Interpretive Statements  SINUS RHYTHM  LEFT BUNDLE BRANCH BLOCK  ARTIFACT IN LEAD(S) II,III,aVR,aVL,aVF  Compared to ECG 2015 13:14:54  Sinus bradycardia no longer present    Electronically Signed On 2017 14:41:49 PDT by EDGAR LUTZ MD          COURSE & MEDICAL DECISION MAKING  Pertinent Labs & Imaging studies reviewed. (See chart for details)  Patient presents here ill appearing. Numerous complaints including shortness of breath cough chest pain and abdominal pain. Extensive workup was performed including blood tests chest x-ray CT scan. Although his workup here strongly suggests that he has colitis as well as possible early pneumonia. He also had chest pain and has an indeterminate troponin with slight elevation as well as some new EKG changes. Emergent consultation with cardiology was obtained with Dr. Lopez. She was given aspirin as well as broad-spectrum IV antibiotics to cover for pulmonary as well as intra-abdominal source of infection. He has leukocytosis with bandemia. The patient is in guarded condition. He was given IV fluid resuscitation. He has stable chronic kidney disease and likely this is not worsened. He'll be admitted to internal medicine with cardiology consultation. No clear indication for surgical consultation for the colitis at this time as he does not have evidence of peritonitis or a surgical lesion on CT scan    CRITICAL CARE TIME:    The patient required approximately 33  minutes worth of critical care time. This excludes any procedures. This includes time spent directly at caring for the patient, making critical medical decisions, involving consultants and speaking with the family.    FINAL IMPRESSION  1. Chest pain with indeterminate troponin  2. Acute colitis, infective  3. Chronic kidney disease  4. Pleural effusions possible pneumonia  5. GI bleed  6. Anemia    Electronically signed by: Jose Juan Alarcon, 4/27/2017 2:06 PM

## 2017-04-28 ENCOUNTER — APPOINTMENT (OUTPATIENT)
Dept: RADIOLOGY | Facility: MEDICAL CENTER | Age: 82
DRG: 392 | End: 2017-04-28
Attending: HOSPITALIST
Payer: MEDICARE

## 2017-04-28 PROBLEM — E11.9 TYPE 2 DIABETES MELLITUS WITHOUT COMPLICATION (HCC): Status: ACTIVE | Noted: 2017-04-28

## 2017-04-28 PROBLEM — N40.0 BPH (BENIGN PROSTATIC HYPERPLASIA): Status: ACTIVE | Noted: 2017-04-28

## 2017-04-28 PROBLEM — D72.829 LEUKOCYTOSIS: Status: ACTIVE | Noted: 2017-04-28

## 2017-04-28 LAB
ABO GROUP BLD: NORMAL
ANION GAP SERPL CALC-SCNC: 8 MMOL/L (ref 0–11.9)
BARCODED ABORH UBTYP: 9500
BARCODED PRD CODE UBPRD: NORMAL
BARCODED UNIT NUM UBUNT: NORMAL
BLD GP AB SCN SERPL QL: NORMAL
BUN SERPL-MCNC: 49 MG/DL (ref 8–22)
CALCIUM SERPL-MCNC: 7.9 MG/DL (ref 8.5–10.5)
CHLORIDE SERPL-SCNC: 113 MMOL/L (ref 96–112)
CO2 SERPL-SCNC: 17 MMOL/L (ref 20–33)
COMPONENT R 8504R: NORMAL
CREAT SERPL-MCNC: 2.62 MG/DL (ref 0.5–1.4)
ERYTHROCYTE [DISTWIDTH] IN BLOOD BY AUTOMATED COUNT: 52.4 FL (ref 35.9–50)
ERYTHROCYTE [DISTWIDTH] IN BLOOD BY AUTOMATED COUNT: 54.1 FL (ref 35.9–50)
EST. AVERAGE GLUCOSE BLD GHB EST-MCNC: 103 MG/DL
GFR SERPL CREATININE-BSD FRML MDRD: 23 ML/MIN/1.73 M 2
GLUCOSE BLD-MCNC: 101 MG/DL (ref 65–99)
GLUCOSE BLD-MCNC: 117 MG/DL (ref 65–99)
GLUCOSE BLD-MCNC: 121 MG/DL (ref 65–99)
GLUCOSE SERPL-MCNC: 126 MG/DL (ref 65–99)
HBA1C MFR BLD: 5.2 % (ref 0–5.6)
HCT VFR BLD AUTO: 21.9 % (ref 42–52)
HCT VFR BLD AUTO: 25.7 % (ref 42–52)
HCT VFR BLD AUTO: 27 % (ref 42–52)
HGB BLD-MCNC: 6.7 G/DL (ref 14–18)
HGB BLD-MCNC: 8.1 G/DL (ref 14–18)
HGB BLD-MCNC: 8.5 G/DL (ref 14–18)
LACTATE BLD-SCNC: 1.9 MMOL/L (ref 0.5–2)
LV EJECT FRACT  99904: 30
LV EJECT FRACT MOD 4C 99902: 30.77
MCH RBC QN AUTO: 29.4 PG (ref 27–33)
MCH RBC QN AUTO: 30.4 PG (ref 27–33)
MCHC RBC AUTO-ENTMCNC: 30.6 G/DL (ref 33.7–35.3)
MCHC RBC AUTO-ENTMCNC: 31.5 G/DL (ref 33.7–35.3)
MCV RBC AUTO: 96.1 FL (ref 81.4–97.8)
MCV RBC AUTO: 96.4 FL (ref 81.4–97.8)
PLATELET # BLD AUTO: 177 K/UL (ref 164–446)
PLATELET # BLD AUTO: 187 K/UL (ref 164–446)
PMV BLD AUTO: 10.1 FL (ref 9–12.9)
PMV BLD AUTO: 10.5 FL (ref 9–12.9)
POTASSIUM SERPL-SCNC: 4.6 MMOL/L (ref 3.6–5.5)
PRODUCT TYPE UPROD: NORMAL
RBC # BLD AUTO: 2.28 M/UL (ref 4.7–6.1)
RBC # BLD AUTO: 2.8 M/UL (ref 4.7–6.1)
RH BLD: NORMAL
SODIUM SERPL-SCNC: 138 MMOL/L (ref 135–145)
TROPONIN I SERPL-MCNC: 2.31 NG/ML (ref 0–0.04)
TROPONIN I SERPL-MCNC: 2.47 NG/ML (ref 0–0.04)
TROPONIN I SERPL-MCNC: 2.73 NG/ML (ref 0–0.04)
UNIT STATUS USTAT: NORMAL
WBC # BLD AUTO: 20.7 K/UL (ref 4.8–10.8)
WBC # BLD AUTO: 20.8 K/UL (ref 4.8–10.8)

## 2017-04-28 PROCEDURE — 85014 HEMATOCRIT: CPT

## 2017-04-28 PROCEDURE — 84484 ASSAY OF TROPONIN QUANT: CPT | Mod: 91

## 2017-04-28 PROCEDURE — 700101 HCHG RX REV CODE 250: Performed by: INTERNAL MEDICINE

## 2017-04-28 PROCEDURE — 82962 GLUCOSE BLOOD TEST: CPT | Mod: 91

## 2017-04-28 PROCEDURE — 86850 RBC ANTIBODY SCREEN: CPT

## 2017-04-28 PROCEDURE — 86923 COMPATIBILITY TEST ELECTRIC: CPT

## 2017-04-28 PROCEDURE — 93306 TTE W/DOPPLER COMPLETE: CPT | Mod: 26 | Performed by: INTERNAL MEDICINE

## 2017-04-28 PROCEDURE — C9113 INJ PANTOPRAZOLE SODIUM, VIA: HCPCS | Performed by: INTERNAL MEDICINE

## 2017-04-28 PROCEDURE — 86901 BLOOD TYPING SEROLOGIC RH(D): CPT

## 2017-04-28 PROCEDURE — 700102 HCHG RX REV CODE 250 W/ 637 OVERRIDE(OP): Performed by: INTERNAL MEDICINE

## 2017-04-28 PROCEDURE — A9270 NON-COVERED ITEM OR SERVICE: HCPCS | Performed by: HOSPITALIST

## 2017-04-28 PROCEDURE — 86900 BLOOD TYPING SEROLOGIC ABO: CPT

## 2017-04-28 PROCEDURE — 83605 ASSAY OF LACTIC ACID: CPT

## 2017-04-28 PROCEDURE — 30233N1 TRANSFUSION OF NONAUTOLOGOUS RED BLOOD CELLS INTO PERIPHERAL VEIN, PERCUTANEOUS APPROACH: ICD-10-PCS | Performed by: INTERNAL MEDICINE

## 2017-04-28 PROCEDURE — 80048 BASIC METABOLIC PNL TOTAL CA: CPT

## 2017-04-28 PROCEDURE — 700102 HCHG RX REV CODE 250 W/ 637 OVERRIDE(OP): Performed by: HOSPITALIST

## 2017-04-28 PROCEDURE — 85027 COMPLETE CBC AUTOMATED: CPT

## 2017-04-28 PROCEDURE — 36415 COLL VENOUS BLD VENIPUNCTURE: CPT

## 2017-04-28 PROCEDURE — 700111 HCHG RX REV CODE 636 W/ 250 OVERRIDE (IP): Performed by: INTERNAL MEDICINE

## 2017-04-28 PROCEDURE — 770020 HCHG ROOM/CARE - TELE (206)

## 2017-04-28 PROCEDURE — P9016 RBC LEUKOCYTES REDUCED: HCPCS

## 2017-04-28 PROCEDURE — 36430 TRANSFUSION BLD/BLD COMPNT: CPT

## 2017-04-28 PROCEDURE — 93306 TTE W/DOPPLER COMPLETE: CPT

## 2017-04-28 PROCEDURE — 51798 US URINE CAPACITY MEASURE: CPT

## 2017-04-28 PROCEDURE — 99232 SBSQ HOSP IP/OBS MODERATE 35: CPT | Performed by: INTERNAL MEDICINE

## 2017-04-28 PROCEDURE — 85018 HEMOGLOBIN: CPT

## 2017-04-28 PROCEDURE — 700105 HCHG RX REV CODE 258: Performed by: HOSPITALIST

## 2017-04-28 PROCEDURE — 83036 HEMOGLOBIN GLYCOSYLATED A1C: CPT

## 2017-04-28 PROCEDURE — A9270 NON-COVERED ITEM OR SERVICE: HCPCS | Performed by: INTERNAL MEDICINE

## 2017-04-28 RX ORDER — SODIUM CHLORIDE 9 MG/ML
INJECTION, SOLUTION INTRAVENOUS
Status: ACTIVE
Start: 2017-04-28 | End: 2017-04-28

## 2017-04-28 RX ORDER — METRONIDAZOLE 500 MG/1
500 TABLET ORAL EVERY 8 HOURS
Status: DISCONTINUED | OUTPATIENT
Start: 2017-04-28 | End: 2017-05-01 | Stop reason: HOSPADM

## 2017-04-28 RX ORDER — CIPROFLOXACIN 2 MG/ML
200 INJECTION, SOLUTION INTRAVENOUS EVERY 24 HOURS
Status: DISCONTINUED | OUTPATIENT
Start: 2017-04-28 | End: 2017-04-28

## 2017-04-28 RX ORDER — TAMSULOSIN HYDROCHLORIDE 0.4 MG/1
0.4 CAPSULE ORAL
Status: DISCONTINUED | OUTPATIENT
Start: 2017-04-28 | End: 2017-04-29

## 2017-04-28 RX ORDER — ALPRAZOLAM 0.25 MG/1
0.25 TABLET ORAL
Status: DISCONTINUED | OUTPATIENT
Start: 2017-04-28 | End: 2017-05-01 | Stop reason: HOSPADM

## 2017-04-28 RX ORDER — CIPROFLOXACIN 500 MG/1
250 TABLET, FILM COATED ORAL EVERY 24 HOURS
Status: DISCONTINUED | OUTPATIENT
Start: 2017-04-29 | End: 2017-05-01 | Stop reason: HOSPADM

## 2017-04-28 RX ADMIN — INSULIN LISPRO 1 UNITS: 100 INJECTION, SOLUTION INTRAVENOUS; SUBCUTANEOUS at 21:05

## 2017-04-28 RX ADMIN — TAMSULOSIN HYDROCHLORIDE 0.4 MG: 0.4 CAPSULE ORAL at 08:53

## 2017-04-28 RX ADMIN — CIPROFLOXACIN 200 MG: 2 INJECTION, SOLUTION INTRAVENOUS at 09:56

## 2017-04-28 RX ADMIN — VERAPAMIL HYDROCHLORIDE 180 MG: 180 TABLET, FILM COATED, EXTENDED RELEASE ORAL at 13:13

## 2017-04-28 RX ADMIN — METRONIDAZOLE 500 MG: 500 INJECTION, SOLUTION INTRAVENOUS at 13:18

## 2017-04-28 RX ADMIN — METRONIDAZOLE 500 MG: 500 INJECTION, SOLUTION INTRAVENOUS at 08:50

## 2017-04-28 RX ADMIN — PANTOPRAZOLE SODIUM 40 MG: 40 INJECTION, POWDER, FOR SOLUTION INTRAVENOUS at 08:53

## 2017-04-28 RX ADMIN — STANDARDIZED SENNA CONCENTRATE AND DOCUSATE SODIUM 2 TABLET: 8.6; 5 TABLET, FILM COATED ORAL at 20:59

## 2017-04-28 RX ADMIN — ATORVASTATIN CALCIUM 5 MG: 10 TABLET, FILM COATED ORAL at 08:54

## 2017-04-28 RX ADMIN — ALLOPURINOL 50 MG: 100 TABLET ORAL at 08:53

## 2017-04-28 RX ADMIN — SODIUM CHLORIDE: 9 INJECTION, SOLUTION INTRAVENOUS at 13:18

## 2017-04-28 RX ADMIN — PANTOPRAZOLE SODIUM 40 MG: 40 INJECTION, POWDER, FOR SOLUTION INTRAVENOUS at 20:58

## 2017-04-28 RX ADMIN — TERAZOSIN HYDROCHLORIDE ANHYDROUS 5 MG: 5 CAPSULE ORAL at 08:53

## 2017-04-28 RX ADMIN — METRONIDAZOLE 500 MG: 500 TABLET ORAL at 20:59

## 2017-04-28 RX ADMIN — ENALAPRIL MALEATE 10 MG: 10 TABLET ORAL at 13:14

## 2017-04-28 ASSESSMENT — ENCOUNTER SYMPTOMS
PALPITATIONS: 0
FEVER: 0
DEPRESSION: 0
GASTROINTESTINAL NEGATIVE: 1
ABDOMINAL PAIN: 0
VOMITING: 0
BLOOD IN STOOL: 0
HEADACHES: 0
COUGH: 0
DIARRHEA: 0
WEAKNESS: 1
CHILLS: 0
CARDIOVASCULAR NEGATIVE: 1
NAUSEA: 0
CONSTITUTIONAL NEGATIVE: 1
RESPIRATORY NEGATIVE: 1
CONSTIPATION: 0
SHORTNESS OF BREATH: 0
SPUTUM PRODUCTION: 0

## 2017-04-28 ASSESSMENT — PAIN SCALES - GENERAL
PAINLEVEL_OUTOF10: 0

## 2017-04-28 ASSESSMENT — LIFESTYLE VARIABLES
ALCOHOL_USE: NO
DO YOU DRINK ALCOHOL: NO

## 2017-04-28 NOTE — CONSULTS
DATE OF SERVICE:  04/27/2017    REFERRING PHYSICIAN:  Jose Juan Alarcon MD    CONSULTING PHYSICIAN:  Dr. Kenan Erickson.    HISTORY OF PRESENT ILLNESS:  This patient developed problems with chest pain   and abdominal pain and sought attention in the emergency room.  He has a prior   history of GI bleeding and has had treatment multiple times, the last one in   2015 with both a colonoscopy and an upper endoscopy with argon plasma   coagulator treatment of arteriovenous malformations.  The patient is a poor   historian.  Per the emergency room doctor he has been having black tarry bowel   movements, but the patient has been taking oral iron.  The patient denies any   overt GI bleeding upon my questioning.  He has felt nauseated, but has not   vomited.  He developed chest pain and pain in the right lower quadrant today   and sought attention in the emergency room.  He denies any significant change   in his bowel habits and his weight has been stable.  The patient does have a   history of peripheral vascular disease and used to smoke a pack and a half a   day, but stopped 30 years ago.  He has a prior history of alcoholism, but   states he recently stopped drinking.    ALLERGIES:  No known allergies.    MEDICATIONS:  Zosyn, Prilosec, iron, Lipitor, Vasotec, Hytrin and verapamil.    PAST MEDICAL HISTORY:  1.  Elevated cholesterol level.  2.  Diabetes mellitus.  3.  Hypertension.  4.  Peripheral vascular disease.  5.  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  6.  Chronic obstructive pulmonary disease.  7.  Chronic renal insufficiency.    SOCIAL HISTORY:  The patient has a history of heavy alcohol usage.  He states   he recently stopped.  He used to smoke a pack and a half a day of cigarettes,   but stopped this approximately 30 years ago.    FAMILY HISTORY:  Noncontributory.    REVIEW OF SYSTEMS MEDICAL HISTORY:  Noncontributory.    PHYSICAL  EXAMINATION:  VITAL SIGNS:  Blood pressure 140/90, pulse is 88, respiratory rate is 18, and   temperature is afebrile.  SKIN:  No stigmata of chronic liver disease.  HEENT:  Head normocephalic.  Eyes are nonicteric.  NECK:  Supple.  LYMPH NODES:  Negative supraclavicular or cervical and axillary.  CARDIOVASCULAR:  Regular rate and rhythm.  LUNGS:  Occasional rhonchi with moderate air movement, decreased breath sounds   at both bases.  ABDOMEN:  Normoactive bowel sounds.  Mildly tender in the right lower quadrant   without rebound tenderness or guarding and there is no organomegaly.  EXTREMITIES:  Moderate degenerative changes.    LABORATORY DATA:  A B natriuretic peptide is 1912, lactic acid elevated at   2.8.  Hematocrit is 25.2, white blood cell count 28.5, platelet count is   215,000.  Troponin 0.41.  Sodium 138, potassium 4.9, creatinine 2.55, glucose   179.  Liver function tests are within normal limits.  Lipase is 11, INR is   1.15, PTT is 28.3.    IMAGING:  A noncontrast CT scan shows a distended gallbladder and   diverticulosis and a significant length of this colon is thickened from the   ascending colon extending into when including the hepatic flexure as well as   severe atherosclerotic disease of the abdominal aorta and its branches,   bilateral moderate pleural effusions and a 1.4 nodular opacity in the right   lower lobe.    IMPRESSION AND RECOMMENDATIONS:  1.  Abdominal pain, he is having pain in the right lower quadrant which   corresponds roughly to the area involved on the CT scan.  The potential of   ischemia needs to be entertained.  That location could indicate an embolism to   a branch of his superior mesenteric artery, given his significant peripheral   vascular disease as well as the severe atherosclerosis noted in his aorta and   its peripheral branches.  The patient has been placed on antibiotics to cover   that possibility.  However, at this point in time, he seems to be doing well,    contrast evaluation of his blood vessels is not possible given his compromised   renal function and he is improving symptomatically.  2.  Questionable gastrointestinal bleed.  He has a history of arteriovenous   malformations and he is sick and then I ruled out myocardial infarction   protocol.  We will place him on IV Protonix and stop his iron so as to not   confuse the picture with black stools and see how he does.  He is too ill to   consider endoscopic evaluation at this point in time.  3.  Anemia, possibly a component due to acute gastrointestinal blood loss.  We   will see how this plays out.  4.  Leukocytosis.  This could most likely be compatible with ischemic bowel   disease, although an infectious etiology, particularly given the lung   abnormality needs to be considered.  5.  Peripheral vascular disease, consideration of ischemic bowel is strong.  6.  Chest pain for which he has had an elevated lactic acid level and B   natriuretic peptide and is currently in a rule out myocardial infarction   protocol.  7.  History of colonic and gastric arteriovenous malformations.  If he is   actively bleeding these could be playing a significant component as to the   origin of bleeding.       ____________________________________     Kenan Erickson MD    JSTUART / DILLON    DD:  04/27/2017 21:59:25  DT:  04/27/2017 22:23:49    D#:  [id number]  Job#:  [id number]

## 2017-04-28 NOTE — DISCHARGE PLANNING
Care Transition Team Assessment    Information Source  Orientation : Oriented x 4  Information Given By: Patient  Informant's Name: Lex  Who is responsible for making decisions for patient? : Patient    Elopement Risk  Legal Hold: No  Ambulatory or Self Mobile in Wheelchair: Yes  Disoriented: No  Psychiatric Symptoms: None  History of Wandering: No  Elopement this Admit: No  Vocalizing Wanting to Leave: No  Displays Behaviors, Body Language Wanting to Leave: No-Not at Risk for Elopement  Elopement Risk: Not at Risk for Elopement    Interdisciplinary Discharge Planning  Primary Care Physician: Dr. Fabian  Lives with - Patient's Self Care Capacity: Alone and Able to Care For Self  Support Systems: Family Member(s)  Do You Take your Prescribed Medications Regularly: Yes  Able to Return to Previous ADL's: Yes  Mobility Issues: No  Prior Services: Housekeeping / Homemaker Services  Patient Expects to be Discharged to:: Home  Assistance Needed: No  Durable Medical Equipment: Not Applicable    Discharge Preparedness  What is your plan after discharge?: Home with help  What are your discharge supports?:  (Step Daughter)  Prior Functional Level: Ambulatory, Drives Self, Independent with Activities of Daily Living, Independent with Medication Management  Difficulity with ADLs: None  Difficulity with IADLs: None    Functional Assesment  Prior Functional Level: Ambulatory, Drives Self, Independent with Activities of Daily Living, Independent with Medication Management    Finances  Financial Barriers to Discharge: No  Prescription Coverage: Yes (Mail order or [facility] on [address])    Psychological Assessment  History of Substance Abuse: None    Discharge Risks or Barriers  Discharge risks or barriers?: No    Anticipated Discharge Information  Anticipated discharge disposition: Home  Discharge Address: [address] [place] [address] Demian [place] [id number]  Discharge Contact Phone Number: [phone]

## 2017-04-28 NOTE — PROGRESS NOTES
Gastroenterology Progress Note     Author: Jose Laguerre   Date & Time Created: 4/28/2017 2:14 PM    Interval History:  Problem: RLQ pain and colitis on CT scan.  S: Denies abd pain currently. States it only hurts when someone pushes on his abdomen. His memory is poor but he says he came in last night for chest pain, back pain and abdominal pain. His VS look good.     Review of Systems:  Review of Systems   Constitutional: Negative.    Respiratory: Negative.    Cardiovascular: Negative.    Gastrointestinal: Negative.        Physical Exam:  Physical Exam   Constitutional: He appears well-developed and well-nourished.   Cardiovascular: Normal rate, regular rhythm and normal heart sounds.    Pulmonary/Chest: Effort normal and breath sounds normal.   Abdominal: Soft. Bowel sounds are normal. He exhibits no distension. There is tenderness. There is no rebound and no guarding.   Mild RLQ tenderness. No guarding or rebound.    Musculoskeletal: Normal range of motion. He exhibits no edema or tenderness.   Skin: Skin is warm and dry.   Psychiatric: He has a normal mood and affect.   Poor memory        Labs:        Invalid input(s): [id number]  Recent Labs      04/27/17   1351  04/28/17   0816  04/28/17   1303   TROPONINI  0.41*  2.47*  2.73*   BNPBTYPENAT  1912*   --    --      Recent Labs      04/27/17   1351  04/28/17   0020   SODIUM  138  138   POTASSIUM  4.9  4.6   CHLORIDE  109  113*   CO2  17*  17*   BUN  49*  49*   CREATININE  2.55*  2.62*   CALCIUM  8.9  7.9*     Recent Labs      04/27/17   1351  04/28/17   0020   ALTSGPT  8   --    ASTSGOT  11*   --    ALKPHOSPHAT  53   --    TBILIRUBIN  0.6   --    LIPASE  11   --    GLUCOSE  179*  126*     Recent Labs      04/27/17   1351  04/28/17   0020  04/28/17   0816   RBC  2.64*  2.28*  2.80*   HEMOGLOBIN  7.9*  6.7*  8.5*   HEMATOCRIT  25.2*  21.9*  27.0*   PLATELETCT  215  187  177   PROTHROMBTM  15.1*   --    --    APTT  28.3   --    --    INR  1.15*   --    --       Recent Labs      17   1351  17   0020  17   0816   WBC  28.5*  20.8*  20.7*   NEUTSPOLYS  93.90*   --    --    LYMPHOCYTES  2.60*   --    --    MONOCYTES  3.50   --    --    EOSINOPHILS  0.00   --    --    BASOPHILS  0.00   --    --    ASTSGOT  11*   --    --    ALTSGPT  8   --    --    ALKPHOSPHAT  53   --    --    TBILIRUBIN  0.6   --    --      Hemodynamics:  Temp (24hrs), Av.4 °C (97.6 °F), Min:36.1 °C (96.9 °F), Max:36.7 °C (98.1 °F)  Temperature: 36.3 °C (97.3 °F)  Pulse  Av.2  Min: 77  Max: 92Heart Rate (Monitored): 82  Blood Pressure : 128/58 mmHg, NIBP: (!) 95/49 mmHg     Respiratory:    Respiration: 18, Pulse Oximetry: 93 %, O2 Daily Delivery Respiratory : Nasal Cannula     Work Of Breathing / Effort: Mild  RUL Breath Sounds: Clear, RML Breath Sounds: Diminished, RLL Breath Sounds: Diminished, MILES Breath Sounds: Clear, LLL Breath Sounds: Diminished  Fluids:    Intake/Output Summary (Last 24 hours) at 17 1414  Last data filed at 17 0800   Gross per 24 hour   Intake    280 ml   Output   1050 ml   Net   -770 ml     Weight: 79.2 kg (174 lb 9.7 oz)  GI/Nutrition:  Orders Placed This Encounter   Procedures   • Diet Order     Standing Status: Standing      Number of Occurrences: 1      Standing Expiration Date:      Order Specific Question:  Diet:     Answer:  Clear Liquid [10]     Medical Decision Making, by Problem:  Active Hospital Problems    Diagnosis   • COPD (chronic obstructive pulmonary disease) (CMS-HCC) [J44.9]   • HTN (hypertension) [I10]   • Dyslipidemia [E78.5]   • Leukocytosis [D72.829]   • Type 2 diabetes mellitus without complication (HCC) [E11.9]   • BPH (benign prostatic hyperplasia) [N40.0]   • Acute on chronic renal failure (CMS-HCC) [N17.9, N18.9]   • Troponin level elevated [R79.89]   • Acute colitis [K52.9]   Impression:  1. RLQ pain and asc colon colitis on CT. Given his vascular disease he may have had some ischemia or an embolism.   2. Vascular  disease  3. Leukocytosis  4. CAD  5. COPD   6. Dementia  7. CKD    Plan:  1. Reg diet and observe. CT angio not an option due to CKD. If he gets more symptomatic MRA would be an option.   Will follow.   Labs reviewed and Medications reviewed

## 2017-04-28 NOTE — PROGRESS NOTES
2RN SKIN CHECK    Completed skin check with ESTRADA Wilson. All skin is intact, no concerns. Pt able to change position on his own.

## 2017-04-28 NOTE — ED NOTES
Pt requesting juice, phone, magazines, assisted turning tv on. Pt inquiring about when he will get bed. Pt informed time unknown of when bed will be assigned.

## 2017-04-28 NOTE — PROGRESS NOTES
Lab called with critical result of 2.47 troponin at 0915. Critical lab result read back to lab.   Asuncion Thompson, cardiology APN, notified of critical lab result at 0920.

## 2017-04-28 NOTE — PROGRESS NOTES
"Interval History:  82 y/o retired  with long EtOH (recently quitted) and 40 yr smoking (quitted 30 yrs ago, ave 1-1.5 ppd) c/o not feeling well, weakness and found to have leukocytosis, infection and elevated Trop 0.41; Denies CP, palpitation but increased SOB and MARMOLEJO; Poor memory attributed to his older age; h/o DM--not tx'd, TIA  Likely demand Trop elevation  He remembers my name today!    Physical Exam   Blood pressure 128/58, pulse 77, temperature 36.3 °C (97.3 °F), resp. rate 18, height 1.753 m (5' 9\"), weight 79.2 kg (174 lb 9.7 oz), SpO2 93 %.    Constitutional:  He appears well-developed.   HENT: Normocephalic and atraumatic. No scleral icterus.   Neck: No JVD present.   Cardiovascular: Normal rate. Exam reveals no gallop and no friction rub. No murmur heard.   Pulmonary/Chest: CTAB coarse   Abdominal: S/NT/ND BS+   Musculoskeletal: He exhibits no edema. Pulses present.  Skin: Skin is warm and dry.       Intake/Output Summary (Last 24 hours) at 04/28/17 1253  Last data filed at 04/28/17 0800   Gross per 24 hour   Intake    280 ml   Output   1050 ml   Net   -770 ml       LABS:  Lab Results   Component Value Date/Time    CHOLESTEROL, 02/08/2017 07:49 AM    LDL 57 02/08/2017 07:49 AM    HDL 29* 02/08/2017 07:49 AM    TRIGLYCERIDES 124 02/08/2017 07:49 AM       Lab Results   Component Value Date/Time    WBC 20.7* 04/28/2017 08:16 AM    RBC 2.80* 04/28/2017 08:16 AM    HEMOGLOBIN 8.5* 04/28/2017 08:16 AM    HEMATOCRIT 27.0* 04/28/2017 08:16 AM    MCV 96.4 04/28/2017 08:16 AM    NEUTROPHILS-POLYS 93.90* 04/27/2017 01:51 PM    LYMPHOCYTES 2.60* 04/27/2017 01:51 PM    MONOCYTES 3.50 04/27/2017 01:51 PM    EOSINOPHILS 0.00 04/27/2017 01:51 PM    BASOPHILS 0.00 04/27/2017 01:51 PM    HYPOCHROMIA 1+ 04/30/2011 12:45 PM    ANISOCYTOSIS 1+ 04/27/2017 01:51 PM     Lab Results   Component Value Date/Time    SODIUM 138 04/28/2017 12:20 AM    POTASSIUM 4.6 04/28/2017 12:20 AM    CHLORIDE 113* 04/28/2017 " 12:20 AM    CO2 17* 04/28/2017 12:20 AM    GLUCOSE 126* 04/28/2017 12:20 AM    BUN 49* 04/28/2017 12:20 AM    CREATININE 2.62* 04/28/2017 12:20 AM     Lab Results   Component Value Date    HBA1C 6.4* 07/11/2016      Lab Results   Component Value Date/Time    ALKALINE PHOSPHATASE 53 04/27/2017 01:51 PM    AST(SGOT) 11* 04/27/2017 01:51 PM    ALT(SGPT) 8 04/27/2017 01:51 PM    TOTAL BILIRUBIN 0.6 04/27/2017 01:51 PM      Lab Results   Component Value Date/Time    B NATRIURETIC PEPTIDE 1912* 04/27/2017 01:51 PM      No results found for: TSH  Lab Results   Component Value Date/Time    PT 15.1* 04/27/2017 01:51 PM    INR 1.15* 04/27/2017 01:51 PM    Results for TARYN MAHIN (MRN [id number]) as of 4/28/2017 12:56   Ref. Range 4/27/2017 13:51 4/27/2017 14:14 4/27/2017 15:21 4/27/2017 20:10 4/28/2017 00:16 4/28/2017 00:20 4/28/2017 05:34 4/28/2017 08:16   Lactic Acid Latest Ref Range: 0.5-2.0 mmol/L    2.8 (H)  1.9     Troponin I Latest Ref Range: 0.00-0.04 ng/mL 0.41 (H)       2.47 (H)   B Natriuretic Peptide Latest Ref Range: 0-100 pg/mL 1912 (H)          Glucose - Accu-Ck Latest Ref Range: 65-99 mg/dL       121 (H)        Medications reviewed    Imaging reviewed    ECHO(pdg):    Impressions:Recommendations:  Elevated Trop likely from infection and increased demand; with colitis on flagyl/cipro  LBBB is not new but lateral ST depression noted  Rec: Echo and consider ischemia w/u when infection is treated  CV risks discussed with him  Pls trend EKG and Trop  Will follow  Thx

## 2017-04-28 NOTE — PROGRESS NOTES
Assessment completed. Patient A&O x 4. Pt has no complaints of pain at this time. POC discussed, HGB back to normal at 8.5 after one unit of blood last night. IV ABX and fluids infusing. Pt tolerating clear liquids. Abd firm and distended, pt had to be straight cathed last night for >500 mL of urine retained. Bladder scan order received from Dr. Shin, pending scan. Family at bedside. Call light & bedside table within reach. Bed locked and in lowest position. Bed alarm on and fall precautions in place. Hourly rounding in place.

## 2017-04-28 NOTE — PROGRESS NOTES
Report received from ED. VSS. Pt transferred to bed and explained unit protocols. Meds administered. Call bell within reach. Bed alarm on. Will continue to monitor.

## 2017-04-28 NOTE — RESPIRATORY CARE
COPD EDUCATION by COPD CLINICAL EDUCATOR  4/28/2017 at 6:39 AM by Rachel Paul     Patient reviewed by COPD education team. Patient does not qualify for COPD program.

## 2017-04-28 NOTE — PROGRESS NOTES
Hospital Medicine Progress Note, Adult, Complex               Author: Lisa Shin Date & Time created: 4/28/2017  9:23 AM     Interval History:  ID: 84 y/o M with PMHx: BPH, gout, DM type II here with colitis on flagyl/cipro, elevated trop likely secondary to demand ischemia    4/28 - Pt's hb dropped overnight and transfused 1 U. Hb 8.5 after transfusion. Patient's troponin increased, cardiology aware.     Review of Systems:  Review of Systems   Constitutional: Negative for fever and chills.   Respiratory: Negative for cough, sputum production and shortness of breath.    Cardiovascular: Negative for chest pain, palpitations and leg swelling.   Gastrointestinal: Negative for nausea, vomiting, abdominal pain, diarrhea, constipation and blood in stool.   Genitourinary: Negative for dysuria, urgency and hematuria.   Neurological: Positive for weakness. Negative for headaches.   Psychiatric/Behavioral: Negative for depression.       Physical Exam:  Physical Exam   Constitutional: He is oriented to person, place, and time. He appears well-developed and well-nourished.   HENT:   Head: Normocephalic and atraumatic.   Eyes: Conjunctivae and EOM are normal.   Neck: Normal range of motion. Neck supple.   Cardiovascular: Normal rate.  Exam reveals no gallop and no friction rub.    No murmur heard.  Pulmonary/Chest: Effort normal. No respiratory distress. He has no wheezes. He has no rales.   Decreased breath sounds bilateral bases   Abdominal: Soft. Bowel sounds are normal. He exhibits no distension. There is no tenderness. There is no rebound.   Musculoskeletal: He exhibits no edema.   Lymphadenopathy:     He has no cervical adenopathy.   Neurological: He is alert and oriented to person, place, and time.   Skin: There is pallor.   Nursing note and vitals reviewed.      Labs:        Invalid input(s): [id number]  Recent Labs      04/27/17   1351  04/28/17   0816   TROPONINI  0.41*  2.47*   BNPBTYPENAT  1912*   --      Recent  Labs      17   1351  17   0020   SODIUM  138  138   POTASSIUM  4.9  4.6   CHLORIDE  109  113*   CO2  17*  17*   BUN  49*  49*   CREATININE  2.55*  2.62*   CALCIUM  8.9  7.9*     Recent Labs      17   1351  17   0020   ALTSGPT  8   --    ASTSGOT  11*   --    ALKPHOSPHAT  53   --    TBILIRUBIN  0.6   --    LIPASE  11   --    GLUCOSE  179*  126*     Recent Labs      17   1351  17   0020  17   0816   RBC  2.64*  2.28*  2.80*   HEMOGLOBIN  7.9*  6.7*  8.5*   HEMATOCRIT  25.2*  21.9*  27.0*   PLATELETCT  215  187  177   PROTHROMBTM  15.1*   --    --    APTT  28.3   --    --    INR  1.15*   --    --      Recent Labs      17   1351  17   0020  17   0816   WBC  28.5*  20.8*  20.7*   NEUTSPOLYS  93.90*   --    --    LYMPHOCYTES  2.60*   --    --    MONOCYTES  3.50   --    --    EOSINOPHILS  0.00   --    --    BASOPHILS  0.00   --    --    ASTSGOT  11*   --    --    ALTSGPT  8   --    --    ALKPHOSPHAT  53   --    --    TBILIRUBIN  0.6   --    --            Hemodynamics:  Temp (24hrs), Av.4 °C (97.5 °F), Min:35.9 °C (96.6 °F), Max:36.7 °C (98.1 °F)  Temperature: 36.1 °C (96.9 °F)  Pulse  Av.5  Min: 77  Max: 92Heart Rate (Monitored): 82  Blood Pressure : 138/65 mmHg, NIBP: (!) 95/49 mmHg     Respiratory:    Respiration: 20, Pulse Oximetry: 97 %, O2 Daily Delivery Respiratory : Nasal Cannula     Work Of Breathing / Effort: Mild  RUL Breath Sounds: Clear, RML Breath Sounds: Diminished, RLL Breath Sounds: Diminished, MILES Breath Sounds: Clear, LLL Breath Sounds: Diminished  Fluids:    Intake/Output Summary (Last 24 hours) at 17  Last data filed at 17 0800   Gross per 24 hour   Intake    280 ml   Output   1050 ml   Net   -770 ml     Weight: 79.2 kg (174 lb 9.7 oz)  GI/Nutrition:  Orders Placed This Encounter   Procedures   • Diet Order     Standing Status: Standing      Number of Occurrences: 1      Standing Expiration Date:      Order Specific  Question:  Diet:     Answer:  Clear Liquid [10]     Medical Decision Making, by Problem:  Active Hospital Problems    Diagnosis   • COPD (chronic obstructive pulmonary disease) (CMS-HCC) [J44.9]  - no acute exacerbation  - on RT protocol     • HTN (hypertension) [I10]  - cont vasotec, verapamil     • Dyslipidemia [E78.5]  - cont lipitor     • Leukocytosis [D72.829]  - improving, 2/2 to colitis      • Type 2 diabetes mellitus without complication (HCC) [E11.9]  - A1c 6.4 July 2016, repeat ordered  - pt on ISS      • BPH (benign prostatic hyperplasia) [N40.0]  - cont terazosin and flomax     • Acute on chronic renal failure (CMS-HCC) [N17.9, N18.9]  - case discussed with Dr. Rae, who recommends monitoring at present time  - avoid nephrotoxins     • Troponin level elevated [R79.89] - increasing   - cards following  - monitor on tele   - likely 2/2 to demand ischemia     • Acute colitis [K52.9]  - on cipro/flagyl, dc zosyn  - blood cxs pending   - GI following       Gout  - cont allopurinol    GERD  - cont protonix    Normocytic Anemia  - s/p IU PRBC, monitor H&H closely   - transfuse PRN if Hb < 7     Plan of care discussed with patient, bedside RN and at multidisciplinary rounds    Labs reviewed and Medications reviewed  Owens catheter: No Owens      DVT Prophylaxis: Contraindicated - High bleeding risk    Ulcer prophylaxis: Yes  Antibiotics: Treating active infection/contamination beyond 24 hours perioperative coverage

## 2017-04-28 NOTE — H&P
PRIMARY CARE PHYSICIAN:  Dr. Fabian.    REASON FOR EVALUATION:  Shortness of breath, abdominal pain, nausea, vomiting.    CONSULTANTS ON CASE:  Dr Lopez of cardiology and GI consultant.    HISTORY OF PRESENT ILLNESS:  An 83-year old male came in with multiple   complaints including shortness of breath, worse with exertion.  He said he has   not been feeling well for the last 24 hours.  He has been short of breath and   shortness of breath worse with exertion.  He denied chest pain to me.  He is   also nauseous, but no vomiting.  He is also complaining of abdominal pain in   the right lower quadrant, intensity of 5/10, a dull pain, intermittent, not   affected by food intake or by bowel movements.  Denies any fever or chills.    Patient was seen and evaluated in the emergency room and his workup revealed   evidence of colitis and bilateral pleural effusions.  He does have history of   chronic kidney disease.  Patient referred to me for further care and   management.  Cardiology has been consulted.  GI has been consulted.    PAST MEDICAL HISTORY:  Includes hyperlipidemia, diabetes, peripheral vascular   disease, AVM.    PAST SURGICAL HISTORY:  He has had repeated colonoscopies in the past.    SOCIAL HISTORY:  Ex-smoker, does not drink, does not use illicit drugs.  Lives   here in [place].    FAMILY HISTORY:  No family history of cancer or stroke in the family.    ALLERGIES:  None.    OUTPATIENT MEDICATIONS:  Allopurinol 100 mg daily, Lipitor 10 mg daily,   vitamin D 2000 units daily, Vasotec 10 mg daily, ferrous sulfate 325 daily,   Prilosec 20 mg daily, _____ 5 mg daily, Calan  mg daily.    REVIEW OF SYSTEMS:  Positive for nausea, but no vomiting.  No fever, no   chills, generalized malaise.  Shortness of breath, no chest pain.  Abdominal   pain as above.  No diarrhea, constipation.  No leg swelling.  No focal   deficits.  No skin rash.  Other review of systems otherwise negative.    PHYSICAL EXAMINATION:  VITAL  SIGNS:  Blood pressure 121/53, respiration is 18, pulse is 81,   temperature 36.  GENERAL:  He is an elderly male, looks his stated age, not in acute distress.  HEENT:  Sclerae anicteric.  NECK:  No JVD noted.  Neck is supple.  HEART:  S1, S2, regular rate, no murmurs appreciated.  LUNGS:  He has decreased breath sounds at the bases bilaterally.  No rales or   wheezing noted.  ABDOMEN:  Soft, nondistended.  Some mild tenderness noted in the right lower   quadrant.  No masses appreciated.  Positive bowel sounds.  EXTREMITIES:  No edema noted.  No cyanosis, clubbing.  NEUROLOGIC:  Alert, awake, oriented.  No focal deficits.    LABORATORY DATA:  White count of 28, hemoglobin of 7.9, hematocrit 25,   platelets 215.  Sodium 138, potassium 4.9, glucose 179, BUN of 49, creatinine   of 2.8.  BNP is 1900.  Troponin I 0.41.  CAT scan of abdomen and pelvis   showing a large segment of ascending colon, diffuse thickening,   mild-to-moderate bilateral pleural effusions, right greater than left, nodular   opacity in right lower lobe 1.4 cm.  EKG reviewed by me shows left   bundle-branch block, normal sinus rhythm.    IMPRESSION:  1.  Acute colitis.  2.  Diabetes mellitus type 2.  3.  Hypertension.  4.  Acute on chronic kidney disease.  5.  Bilateral pleural effusions.  6.  Abnormal EKG.  7.  Abnormal troponin.  8.  Acute gastrointestinal bleed.  9.  Pulmonary nodule.    PLAN:  Patient will be admitted, monitored on telemetry, troponins x3.    Cardiology has been consulted.  Continue patient's holding aspirin and NSAIDs   because the patient is actively bleeding.  Patient has got evidence of anemia.    If hemoglobin drops less than 7, we will transfuse PRBCs.  Hydration with   saline.  Repeat labs in a.m., CBC, BMP.  If patient's kidney function gets   worse, Dr. Barbosa of nephrology should be consulted.  Patient will be on a   clear liquid diet and fingersticks with sliding scale insulin.  Antibiotics of   Zosyn 3 g q. 6 hours.   GI prophylaxis with Protonix 40 mg q. 12 hours.    Thoracentesis of the right pleural effusion has been ordered to make sure that   it is without any evidence of malignant cells.  Patient should have a   followup x-ray, status post paracentesis and will need to follow up in regards   to the nodule in his lung.  We anticipated 2 midnights stay for this patient.       ____________________________________     MD PENNY BLAKE / DILLON    DD:  04/27/2017 21:46:51  DT:  04/27/2017 22:50:13    D#:  [id number]  Job#:  [id number]

## 2017-04-29 LAB
ANION GAP SERPL CALC-SCNC: 9 MMOL/L (ref 0–11.9)
BASOPHILS # BLD AUTO: 0.2 % (ref 0–1.8)
BASOPHILS # BLD: 0.03 K/UL (ref 0–0.12)
BUN SERPL-MCNC: 53 MG/DL (ref 8–22)
CALCIUM SERPL-MCNC: 7.6 MG/DL (ref 8.5–10.5)
CHLORIDE SERPL-SCNC: 115 MMOL/L (ref 96–112)
CO2 SERPL-SCNC: 16 MMOL/L (ref 20–33)
CREAT SERPL-MCNC: 2.83 MG/DL (ref 0.5–1.4)
EOSINOPHIL # BLD AUTO: 0.08 K/UL (ref 0–0.51)
EOSINOPHIL NFR BLD: 0.6 % (ref 0–6.9)
ERYTHROCYTE [DISTWIDTH] IN BLOOD BY AUTOMATED COUNT: 53.8 FL (ref 35.9–50)
GFR SERPL CREATININE-BSD FRML MDRD: 21 ML/MIN/1.73 M 2
GLUCOSE BLD-MCNC: 107 MG/DL (ref 65–99)
GLUCOSE BLD-MCNC: 108 MG/DL (ref 65–99)
GLUCOSE BLD-MCNC: 115 MG/DL (ref 65–99)
GLUCOSE BLD-MCNC: 134 MG/DL (ref 65–99)
GLUCOSE BLD-MCNC: 184 MG/DL (ref 65–99)
GLUCOSE SERPL-MCNC: 125 MG/DL (ref 65–99)
HCT VFR BLD AUTO: 25.3 % (ref 42–52)
HGB BLD-MCNC: 8 G/DL (ref 14–18)
IMM GRANULOCYTES # BLD AUTO: 0.07 K/UL (ref 0–0.11)
IMM GRANULOCYTES NFR BLD AUTO: 0.5 % (ref 0–0.9)
LYMPHOCYTES # BLD AUTO: 0.79 K/UL (ref 1–4.8)
LYMPHOCYTES NFR BLD: 5.6 % (ref 22–41)
MCH RBC QN AUTO: 30.4 PG (ref 27–33)
MCHC RBC AUTO-ENTMCNC: 31.6 G/DL (ref 33.7–35.3)
MCV RBC AUTO: 96.2 FL (ref 81.4–97.8)
MONOCYTES # BLD AUTO: 0.71 K/UL (ref 0–0.85)
MONOCYTES NFR BLD AUTO: 5.1 % (ref 0–13.4)
NEUTROPHILS # BLD AUTO: 12.33 K/UL (ref 1.82–7.42)
NEUTROPHILS NFR BLD: 88 % (ref 44–72)
NRBC # BLD AUTO: 0 K/UL
NRBC BLD AUTO-RTO: 0 /100 WBC
PLATELET # BLD AUTO: 167 K/UL (ref 164–446)
PMV BLD AUTO: 10 FL (ref 9–12.9)
POTASSIUM SERPL-SCNC: 4.3 MMOL/L (ref 3.6–5.5)
RBC # BLD AUTO: 2.63 M/UL (ref 4.7–6.1)
SODIUM SERPL-SCNC: 140 MMOL/L (ref 135–145)
WBC # BLD AUTO: 14 K/UL (ref 4.8–10.8)

## 2017-04-29 PROCEDURE — 36415 COLL VENOUS BLD VENIPUNCTURE: CPT

## 2017-04-29 PROCEDURE — 770020 HCHG ROOM/CARE - TELE (206)

## 2017-04-29 PROCEDURE — 700111 HCHG RX REV CODE 636 W/ 250 OVERRIDE (IP): Performed by: INTERNAL MEDICINE

## 2017-04-29 PROCEDURE — 80048 BASIC METABOLIC PNL TOTAL CA: CPT

## 2017-04-29 PROCEDURE — 700102 HCHG RX REV CODE 250 W/ 637 OVERRIDE(OP): Performed by: INTERNAL MEDICINE

## 2017-04-29 PROCEDURE — 99233 SBSQ HOSP IP/OBS HIGH 50: CPT | Performed by: INTERNAL MEDICINE

## 2017-04-29 PROCEDURE — 51798 US URINE CAPACITY MEASURE: CPT

## 2017-04-29 PROCEDURE — 85025 COMPLETE CBC W/AUTO DIFF WBC: CPT

## 2017-04-29 PROCEDURE — A9270 NON-COVERED ITEM OR SERVICE: HCPCS | Performed by: HOSPITALIST

## 2017-04-29 PROCEDURE — 82962 GLUCOSE BLOOD TEST: CPT | Mod: 91

## 2017-04-29 PROCEDURE — 700102 HCHG RX REV CODE 250 W/ 637 OVERRIDE(OP): Performed by: HOSPITALIST

## 2017-04-29 PROCEDURE — A9270 NON-COVERED ITEM OR SERVICE: HCPCS | Performed by: INTERNAL MEDICINE

## 2017-04-29 PROCEDURE — 700105 HCHG RX REV CODE 258: Performed by: HOSPITALIST

## 2017-04-29 PROCEDURE — C9113 INJ PANTOPRAZOLE SODIUM, VIA: HCPCS | Performed by: INTERNAL MEDICINE

## 2017-04-29 RX ORDER — TAMSULOSIN HYDROCHLORIDE 0.4 MG/1
0.8 CAPSULE ORAL
Status: DISCONTINUED | OUTPATIENT
Start: 2017-04-30 | End: 2017-05-01 | Stop reason: HOSPADM

## 2017-04-29 RX ADMIN — TAMSULOSIN HYDROCHLORIDE 0.4 MG: 0.4 CAPSULE ORAL at 08:00

## 2017-04-29 RX ADMIN — ATORVASTATIN CALCIUM 5 MG: 10 TABLET, FILM COATED ORAL at 07:59

## 2017-04-29 RX ADMIN — METRONIDAZOLE 500 MG: 500 TABLET ORAL at 13:52

## 2017-04-29 RX ADMIN — METRONIDAZOLE 500 MG: 500 TABLET ORAL at 21:46

## 2017-04-29 RX ADMIN — PANTOPRAZOLE SODIUM 40 MG: 40 INJECTION, POWDER, FOR SOLUTION INTRAVENOUS at 20:28

## 2017-04-29 RX ADMIN — VERAPAMIL HYDROCHLORIDE 180 MG: 180 TABLET, FILM COATED, EXTENDED RELEASE ORAL at 08:00

## 2017-04-29 RX ADMIN — METRONIDAZOLE 500 MG: 500 TABLET ORAL at 06:02

## 2017-04-29 RX ADMIN — SODIUM CHLORIDE: 9 INJECTION, SOLUTION INTRAVENOUS at 11:50

## 2017-04-29 RX ADMIN — SODIUM CHLORIDE: 9 INJECTION, SOLUTION INTRAVENOUS at 00:30

## 2017-04-29 RX ADMIN — CIPROFLOXACIN 250 MG: 500 TABLET, FILM COATED ORAL at 08:00

## 2017-04-29 RX ADMIN — ALLOPURINOL 50 MG: 100 TABLET ORAL at 08:00

## 2017-04-29 RX ADMIN — PANTOPRAZOLE SODIUM 40 MG: 40 INJECTION, POWDER, FOR SOLUTION INTRAVENOUS at 07:59

## 2017-04-29 RX ADMIN — TERAZOSIN HYDROCHLORIDE ANHYDROUS 5 MG: 5 CAPSULE ORAL at 08:00

## 2017-04-29 RX ADMIN — ENALAPRIL MALEATE 10 MG: 10 TABLET ORAL at 08:00

## 2017-04-29 ASSESSMENT — ENCOUNTER SYMPTOMS
FEVER: 0
HEADACHES: 0
ABDOMINAL PAIN: 0
CARDIOVASCULAR NEGATIVE: 1
SHORTNESS OF BREATH: 0
COUGH: 0
BLOOD IN STOOL: 0
VOMITING: 0
CONSTITUTIONAL NEGATIVE: 1
DEPRESSION: 0
GASTROINTESTINAL NEGATIVE: 1
DIARRHEA: 0
CONSTIPATION: 0
SPUTUM PRODUCTION: 0
CHILLS: 0
NAUSEA: 0
WEAKNESS: 1
PALPITATIONS: 0
RESPIRATORY NEGATIVE: 1

## 2017-04-29 ASSESSMENT — LIFESTYLE VARIABLES
EVER_SMOKED: YES
ALCOHOL_USE: NO

## 2017-04-29 ASSESSMENT — PAIN SCALES - GENERAL
PAINLEVEL_OUTOF10: 0

## 2017-04-29 NOTE — PROGRESS NOTES
Gastroenterology Progress Note     Author: Jose Laguerre   Date & Time Created: 4/29/2017 2:53 PM    Interval History:  Problem: recent lower abdominal pain and colitis on CT  S: Denies pain today. Tolerating regular diet.     Review of Systems:  Review of Systems   Constitutional: Negative.    Respiratory: Negative.    Cardiovascular: Negative.    Gastrointestinal: Negative.        Physical Exam:  Physical Exam   Constitutional: He is oriented to person, place, and time. He appears well-developed and well-nourished.   Cardiovascular: Normal rate, regular rhythm and normal heart sounds.    Pulmonary/Chest: Effort normal and breath sounds normal.   Abdominal: Soft. Bowel sounds are normal.   Musculoskeletal: Normal range of motion. He exhibits no edema or tenderness.   Neurological: He is alert and oriented to person, place, and time.   Skin: Skin is warm and dry.   Psychiatric: He has a normal mood and affect.       Labs:        Invalid input(s): [id number]  Recent Labs      04/27/17   1351  04/28/17   0816  04/28/17   1303  04/28/17   2037   TROPONINI  0.41*  2.47*  2.73*  2.31*   BNPBTYPENAT  1912*   --    --    --      Recent Labs      04/27/17   1351  04/28/17   0020  04/29/17   0042   SODIUM  138  138  140   POTASSIUM  4.9  4.6  4.3   CHLORIDE  109  113*  115*   CO2  17*  17*  16*   BUN  49*  49*  53*   CREATININE  2.55*  2.62*  2.83*   CALCIUM  8.9  7.9*  7.6*     Recent Labs      04/27/17   1351  04/28/17   0020  04/29/17   0042   ALTSGPT  8   --    --    ASTSGOT  11*   --    --    ALKPHOSPHAT  53   --    --    TBILIRUBIN  0.6   --    --    LIPASE  11   --    --    GLUCOSE  179*  126*  125*     Recent Labs      04/27/17   1351  04/28/17   0020  04/28/17   0816  04/28/17   1703  04/29/17   0042   RBC  2.64*  2.28*  2.80*   --   2.63*   HEMOGLOBIN  7.9*  6.7*  8.5*  8.1*  8.0*   HEMATOCRIT  25.2*  21.9*  27.0*  25.7*  25.3*   PLATELETCT  215  187  177   --   167   PROTHROMBTM  15.1*   --    --    --     --    APTT  28.3   --    --    --    --    INR  1.15*   --    --    --    --      Recent Labs      17   1351  17   0020  17   0816  17   0042   WBC  28.5*  20.8*  20.7*  14.0*   NEUTSPOLYS  93.90*   --    --   88.00*   LYMPHOCYTES  2.60*   --    --   5.60*   MONOCYTES  3.50   --    --   5.10   EOSINOPHILS  0.00   --    --   0.60   BASOPHILS  0.00   --    --   0.20   ASTSGOT  11*   --    --    --    ALTSGPT  8   --    --    --    ALKPHOSPHAT  53   --    --    --    TBILIRUBIN  0.6   --    --    --      Hemodynamics:  Temp (24hrs), Av.8 °C (98.2 °F), Min:36.2 °C (97.2 °F), Max:37.1 °C (98.7 °F)  Temperature: 36.6 °C (97.8 °F)  Pulse  Av.7  Min: 56  Max: 92   Blood Pressure : 132/70 mmHg     Respiratory:    Respiration: 16, Pulse Oximetry: 95 %     Work Of Breathing / Effort: Mild  RUL Breath Sounds: Clear, RML Breath Sounds: Diminished, RLL Breath Sounds: Diminished, MILES Breath Sounds: Clear, LLL Breath Sounds: Diminished  Fluids:    Intake/Output Summary (Last 24 hours) at 17 1453  Last data filed at 17 0600   Gross per 24 hour   Intake   1340 ml   Output    425 ml   Net    915 ml     Weight: 85 kg (187 lb 6.3 oz)  GI/Nutrition:  Orders Placed This Encounter   Procedures   • Diet Order     Standing Status: Standing      Number of Occurrences: 1      Standing Expiration Date:      Order Specific Question:  Diet:     Answer:  Diabetic [3]     Medical Decision Making, by Problem:  Active Hospital Problems    Diagnosis   • COPD (chronic obstructive pulmonary disease) (CMS-HCC) [J44.9]   • HTN (hypertension) [I10]   • Dyslipidemia [E78.5]   • Leukocytosis [D72.829]   • Type 2 diabetes mellitus without complication (HCC) [E11.9]   • BPH (benign prostatic hyperplasia) [N40.0]   • Acute on chronic renal failure (CMS-HCC) [N17.9, N18.9]   • Troponin level elevated [R79.89]   • Acute colitis [K52.9]   Impression:  1. Right sided colitis. Suspect he suffered an ischemic event. No sx  currently.     Plan:  GI signing off. Call PRN.     Labs reviewed and Medications reviewed

## 2017-04-29 NOTE — PROGRESS NOTES
Hospital Medicine Progress Note, Adult, Complex               Author: Lisa Shin Date & Time created: 4/29/2017  9:40 AM     Interval History:  ID: 84 y/o M with PMHx: BPH, gout, DM type II here with colitis on flagyl/cipro, elevated trop likely secondary to demand ischemia    4/28 - Pt's hb dropped overnight and transfused 1 U. Hb 8.5 after transfusion. Patient's troponin increased, cardiology aware.     4/29 - Pt up in bed this am. Patient retained urine yesterday evening 400cc and was straight cath once.     Review of Systems:  Review of Systems   Constitutional: Negative for fever and chills.   Respiratory: Negative for cough, sputum production and shortness of breath.    Cardiovascular: Negative for chest pain, palpitations and leg swelling.   Gastrointestinal: Negative for nausea, vomiting, abdominal pain, diarrhea, constipation and blood in stool.   Genitourinary: Negative for dysuria, urgency and hematuria.   Neurological: Positive for weakness. Negative for headaches.   Psychiatric/Behavioral: Negative for depression.       Physical Exam:  Physical Exam   Constitutional: He is oriented to person, place, and time. He appears well-developed and well-nourished.   HENT:   Head: Normocephalic and atraumatic.   Eyes: Conjunctivae and EOM are normal.   Neck: Normal range of motion. Neck supple.   Cardiovascular: Normal rate.  Exam reveals no gallop and no friction rub.    No murmur heard.  Pulmonary/Chest: Effort normal. No respiratory distress. He has no wheezes. He has no rales.   Decreased breath sounds bilateral bases   Abdominal: Soft. Bowel sounds are normal. He exhibits no distension. There is no tenderness. There is no rebound.   Musculoskeletal: He exhibits no edema.   Lymphadenopathy:     He has no cervical adenopathy.   Neurological: He is alert and oriented to person, place, and time.   Skin: There is pallor.   Nursing note and vitals reviewed.      Labs:        Invalid input(s): [id number]  Recent  Labs      17   1351  17   0816  17   1303  17   2037   TROPONINI  0.41*  2.47*  2.73*  2.31*   BNPBTYPENAT  1912*   --    --    --      Recent Labs      17   1351  17   0020  17   0042   SODIUM  138  138  140   POTASSIUM  4.9  4.6  4.3   CHLORIDE  109  113*  115*   CO2  17*  17*  16*   BUN  49*  49*  53*   CREATININE  2.55*  2.62*  2.83*   CALCIUM  8.9  7.9*  7.6*     Recent Labs      17   13517   0020  17   0042   ALTSGPT  8   --    --    ASTSGOT  11*   --    --    ALKPHOSPHAT  53   --    --    TBILIRUBIN  0.6   --    --    LIPASE  11   --    --    GLUCOSE  179*  126*  125*     Recent Labs      17   1351  17   0020  17   0816  17   1703  17   0042   RBC  2.64*  2.28*  2.80*   --   2.63*   HEMOGLOBIN  7.9*  6.7*  8.5*  8.1*  8.0*   HEMATOCRIT  25.2*  21.9*  27.0*  25.7*  25.3*   PLATELETCT  215  187  177   --   167   PROTHROMBTM  15.1*   --    --    --    --    APTT  28.3   --    --    --    --    INR  1.15*   --    --    --    --      Recent Labs      17   13517   0020  17   0816  17   0042   WBC  28.5*  20.8*  20.7*  14.0*   NEUTSPOLYS  93.90*   --    --   88.00*   LYMPHOCYTES  2.60*   --    --   5.60*   MONOCYTES  3.50   --    --   5.10   EOSINOPHILS  0.00   --    --   0.60   BASOPHILS  0.00   --    --   0.20   ASTSGOT  11*   --    --    --    ALTSGPT  8   --    --    --    ALKPHOSPHAT  53   --    --    --    TBILIRUBIN  0.6   --    --    --            Hemodynamics:  Temp (24hrs), Av.7 °C (98.1 °F), Min:36.2 °C (97.2 °F), Max:37.1 °C (98.7 °F)  Temperature: 36.2 °C (97.2 °F)  Pulse  Av.8  Min: 56  Max: 92   Blood Pressure : 133/68 mmHg     Respiratory:    Respiration: 18, Pulse Oximetry: 96 %     Work Of Breathing / Effort: Mild  RUL Breath Sounds: Clear, RML Breath Sounds: Diminished, RLL Breath Sounds: Diminished, MILES Breath Sounds: Clear, LLL Breath Sounds:  Diminished  Fluids:    Intake/Output Summary (Last 24 hours) at 04/29/17 0940  Last data filed at 04/29/17 0600   Gross per 24 hour   Intake   1340 ml   Output    425 ml   Net    915 ml     Weight: 85 kg (187 lb 6.3 oz)  GI/Nutrition:  Orders Placed This Encounter   Procedures   • Diet Order     Standing Status: Standing      Number of Occurrences: 1      Standing Expiration Date:      Order Specific Question:  Diet:     Answer:  Clear Liquid [10]     Medical Decision Making, by Problem:  Active Hospital Problems    Diagnosis   • COPD (chronic obstructive pulmonary disease) (CMS-HCC) [J44.9]  - no acute exacerbation  - on RT protocol     • HTN (hypertension) [I10]  - cont vasotec, verapamil     • Dyslipidemia [E78.5]  - cont lipitor     • Leukocytosis [D72.829]  - improving, 2/2 to colitis      • Type 2 diabetes mellitus without complication (HCC) [E11.9]  - A1c 6.4 July 2016, A1c pending   - pt on ISS      • BPH (benign prostatic hyperplasia) [N40.0]  - cont terazosin and flomax     • Acute on chronic renal failure (CMS-HCC) [N17.9, N18.9]  - case discussed with Dr. Rae, who recommends monitoring at present time  - avoid nephrotoxins       • Troponin level elevated [R79.89] - increasing   - cards following  - monitor on tele   - likely 2/2 to demand ischemia  - will need cardiac work up once infection resolved      • Acute colitis [K52.9]  - on cipro/flagyl, dc zosyn  - blood cxs NGTD   - GI following  - advance diet as tolerated       Gout  - cont allopurinol    GERD  - cont protonix    Normocytic Anemia - stable   - s/p IU PRBC, monitor H&H closely   - transfuse PRN if Hb < 7     Right Lung Nodule  - CT abd showed 1.5 cm RLL lung nodule, will need further work up once renal function improves     Plan of care discussed with patient, bedside RN.     Labs reviewed and Medications reviewed  Owens catheter: No Owens      DVT Prophylaxis: Contraindicated - High bleeding risk    Ulcer prophylaxis: Yes  Antibiotics:  Treating active infection/contamination beyond 24 hours perioperative coverage

## 2017-04-29 NOTE — PROGRESS NOTES
"Interval History:  84 y/o retired  with long EtOH (recently quitted) and 40 yr smoking (quitted 30 yrs ago, ave 1-1.5 ppd) c/o not feeling well, weakness and found to have leukocytosis, infection and elevated Trop 0.41; Denies CP, palpitation but increased SOB and MARMOLEJO; Poor memory attributed to his older age; h/o DM--not tx'd, TIA  Likely demand Trop elevation    4/28/2017 Echo:   Severe biventricular dysfunction. Left ventricular ejection fraction is   visually estimated to be 30%.  Global hypokinesis.  Reduced right ventricular systolic function.  Biatrial enlargement.  Right ventricular systolic pressure is estimated to be 35 mmHg.  Moderate mitral regurgitation due to annular dilation.  Cardiologist on case notified at time of reading.  There has been a   decrease in the LVEF from the study of 2014.    Physical Exam   Blood pressure 123/56, pulse 76, temperature 37 °C (98.6 °F), resp. rate 18, height 1.753 m (5' 9\"), weight 79.2 kg (174 lb 9.7 oz), SpO2 91 %.    Constitutional:  He appears well-developed.   HENT: Normocephalic and atraumatic. No scleral icterus.   Neck: No JVD present.   Cardiovascular: Normal rate. RRR; Exam reveals no gallop and no friction rub. 1-2/6 SE murmur heard at MLSB.   Pulmonary/Chest: CTAB coarse   Abdominal: S/NT/ND BS+   Musculoskeletal: He exhibits no edema. Pulses present.  Skin: Skin is warm and dry.       Intake/Output Summary (Last 24 hours) at 04/28/17 2000  Last data filed at 04/28/17 1700   Gross per 24 hour   Intake    280 ml   Output   1475 ml   Net  -1195 ml       LABS:  Lab Results   Component Value Date/Time    CHOLESTEROL, 02/08/2017 07:49 AM    LDL 57 02/08/2017 07:49 AM    HDL 29* 02/08/2017 07:49 AM    TRIGLYCERIDES 124 02/08/2017 07:49 AM       Lab Results   Component Value Date/Time    WBC 20.7* 04/28/2017 08:16 AM    RBC 2.80* 04/28/2017 08:16 AM    HEMOGLOBIN 8.1* 04/28/2017 05:03 PM    HEMATOCRIT 25.7* 04/28/2017 05:03 PM    MCV 96.4 " 04/28/2017 08:16 AM    NEUTROPHILS-POLYS 93.90* 04/27/2017 01:51 PM    LYMPHOCYTES 2.60* 04/27/2017 01:51 PM    MONOCYTES 3.50 04/27/2017 01:51 PM    EOSINOPHILS 0.00 04/27/2017 01:51 PM    BASOPHILS 0.00 04/27/2017 01:51 PM    HYPOCHROMIA 1+ 04/30/2011 12:45 PM    ANISOCYTOSIS 1+ 04/27/2017 01:51 PM     Lab Results   Component Value Date/Time    SODIUM 138 04/28/2017 12:20 AM    POTASSIUM 4.6 04/28/2017 12:20 AM    CHLORIDE 113* 04/28/2017 12:20 AM    CO2 17* 04/28/2017 12:20 AM    GLUCOSE 126* 04/28/2017 12:20 AM    BUN 49* 04/28/2017 12:20 AM    CREATININE 2.62* 04/28/2017 12:20 AM     Lab Results   Component Value Date    HBA1C 5.2 04/28/2017      Lab Results   Component Value Date/Time    ALKALINE PHOSPHATASE 53 04/27/2017 01:51 PM    AST(SGOT) 11* 04/27/2017 01:51 PM    ALT(SGPT) 8 04/27/2017 01:51 PM    TOTAL BILIRUBIN 0.6 04/27/2017 01:51 PM      Lab Results   Component Value Date/Time    B NATRIURETIC PEPTIDE 1912* 04/27/2017 01:51 PM      No results found for: TSH  Lab Results   Component Value Date/Time    PT 15.1* 04/27/2017 01:51 PM    INR 1.15* 04/27/2017 01:51 PM        Medications reviewed    Imaging reviewed    ECHO(see above):    Impressions:Recommendations:  Elevated Trop likely from infection and increased demand; with colitis on flagyl/cipro  LBBB is not new but lateral ST depression noted  Abn Echo : CM, LVEF 30%,  consider ischemia w/u when infection is treated  CV risks discussed with him  Pls trend EKG and Trop  Ischemic work up when condition improves  Will follow  Thx

## 2017-04-29 NOTE — CARE PLAN
Problem: Safety  Goal: Will remain free from falls  Outcome: PROGRESSING AS EXPECTED  High fall risks in place, call light in reach, frequent checks    Problem: Knowledge Deficit  Goal: Knowledge of disease process/condition, treatment plan, diagnostic tests, and medications will improve  Outcome: PROGRESSING AS EXPECTED  Update white board each shift, include patient in poc, reorient as needed

## 2017-04-29 NOTE — PROGRESS NOTES
Updated Salty Shiv APN of patient being SOB. lungs clear,02 sats 91% on RA, placed on 1liter via nc for comfort sats up to 93-94%. Also /53. trops 2.31, and HBG of 8.1.  New orders given.

## 2017-04-29 NOTE — PROGRESS NOTES
Assessment completed. Patient A&O x 4. Pt has no complaints of pain at this time. POC discussed, NS infusing at 100 mL/hr. Per Dr. Shin, okay to advance diet as tolerated. Call light & bedside table within reach. Bed locked and in lowest position. Bed alarm on and fall precautions in place. Hourly rounding in place.

## 2017-04-30 LAB
ANION GAP SERPL CALC-SCNC: 10 MMOL/L (ref 0–11.9)
BASOPHILS # BLD AUTO: 0.2 % (ref 0–1.8)
BASOPHILS # BLD: 0.02 K/UL (ref 0–0.12)
BUN SERPL-MCNC: 52 MG/DL (ref 8–22)
C DIFF DNA SPEC QL NAA+PROBE: NEGATIVE
C DIFF TOX GENS STL QL NAA+PROBE: NEGATIVE
CALCIUM SERPL-MCNC: 7.3 MG/DL (ref 8.5–10.5)
CHLORIDE SERPL-SCNC: 115 MMOL/L (ref 96–112)
CO2 SERPL-SCNC: 15 MMOL/L (ref 20–33)
CREAT SERPL-MCNC: 2.86 MG/DL (ref 0.5–1.4)
EOSINOPHIL # BLD AUTO: 0.28 K/UL (ref 0–0.51)
EOSINOPHIL NFR BLD: 2.9 % (ref 0–6.9)
ERYTHROCYTE [DISTWIDTH] IN BLOOD BY AUTOMATED COUNT: 54.4 FL (ref 35.9–50)
GFR SERPL CREATININE-BSD FRML MDRD: 21 ML/MIN/1.73 M 2
GLUCOSE BLD-MCNC: 100 MG/DL (ref 65–99)
GLUCOSE BLD-MCNC: 108 MG/DL (ref 65–99)
GLUCOSE BLD-MCNC: 111 MG/DL (ref 65–99)
GLUCOSE BLD-MCNC: 95 MG/DL (ref 65–99)
GLUCOSE SERPL-MCNC: 95 MG/DL (ref 65–99)
HCT VFR BLD AUTO: 26.9 % (ref 42–52)
HGB BLD-MCNC: 7.9 G/DL (ref 14–18)
IMM GRANULOCYTES # BLD AUTO: 0.04 K/UL (ref 0–0.11)
IMM GRANULOCYTES NFR BLD AUTO: 0.4 % (ref 0–0.9)
LYMPHOCYTES # BLD AUTO: 0.81 K/UL (ref 1–4.8)
LYMPHOCYTES NFR BLD: 8.4 % (ref 22–41)
MCH RBC QN AUTO: 29 PG (ref 27–33)
MCHC RBC AUTO-ENTMCNC: 29.4 G/DL (ref 33.7–35.3)
MCV RBC AUTO: 98.9 FL (ref 81.4–97.8)
MONOCYTES # BLD AUTO: 0.57 K/UL (ref 0–0.85)
MONOCYTES NFR BLD AUTO: 5.9 % (ref 0–13.4)
NEUTROPHILS # BLD AUTO: 7.97 K/UL (ref 1.82–7.42)
NEUTROPHILS NFR BLD: 82.2 % (ref 44–72)
NRBC # BLD AUTO: 0 K/UL
NRBC BLD AUTO-RTO: 0 /100 WBC
PLATELET # BLD AUTO: 183 K/UL (ref 164–446)
PMV BLD AUTO: 10.5 FL (ref 9–12.9)
POTASSIUM SERPL-SCNC: 4.1 MMOL/L (ref 3.6–5.5)
RBC # BLD AUTO: 2.72 M/UL (ref 4.7–6.1)
SODIUM SERPL-SCNC: 140 MMOL/L (ref 135–145)
WBC # BLD AUTO: 9.7 K/UL (ref 4.8–10.8)

## 2017-04-30 PROCEDURE — 80048 BASIC METABOLIC PNL TOTAL CA: CPT

## 2017-04-30 PROCEDURE — A9270 NON-COVERED ITEM OR SERVICE: HCPCS | Performed by: HOSPITALIST

## 2017-04-30 PROCEDURE — A9270 NON-COVERED ITEM OR SERVICE: HCPCS | Performed by: INTERNAL MEDICINE

## 2017-04-30 PROCEDURE — 85025 COMPLETE CBC W/AUTO DIFF WBC: CPT

## 2017-04-30 PROCEDURE — 770020 HCHG ROOM/CARE - TELE (206)

## 2017-04-30 PROCEDURE — 700111 HCHG RX REV CODE 636 W/ 250 OVERRIDE (IP): Performed by: INTERNAL MEDICINE

## 2017-04-30 PROCEDURE — 700102 HCHG RX REV CODE 250 W/ 637 OVERRIDE(OP): Performed by: HOSPITALIST

## 2017-04-30 PROCEDURE — 51798 US URINE CAPACITY MEASURE: CPT

## 2017-04-30 PROCEDURE — 82962 GLUCOSE BLOOD TEST: CPT | Mod: 91

## 2017-04-30 PROCEDURE — 87493 C DIFF AMPLIFIED PROBE: CPT

## 2017-04-30 PROCEDURE — 36415 COLL VENOUS BLD VENIPUNCTURE: CPT

## 2017-04-30 PROCEDURE — C9113 INJ PANTOPRAZOLE SODIUM, VIA: HCPCS | Performed by: INTERNAL MEDICINE

## 2017-04-30 PROCEDURE — 700102 HCHG RX REV CODE 250 W/ 637 OVERRIDE(OP): Performed by: INTERNAL MEDICINE

## 2017-04-30 PROCEDURE — 99232 SBSQ HOSP IP/OBS MODERATE 35: CPT | Performed by: INTERNAL MEDICINE

## 2017-04-30 RX ADMIN — PANTOPRAZOLE SODIUM 40 MG: 40 INJECTION, POWDER, FOR SOLUTION INTRAVENOUS at 21:39

## 2017-04-30 RX ADMIN — PANTOPRAZOLE SODIUM 40 MG: 40 INJECTION, POWDER, FOR SOLUTION INTRAVENOUS at 08:14

## 2017-04-30 RX ADMIN — TAMSULOSIN HYDROCHLORIDE 0.8 MG: 0.4 CAPSULE ORAL at 08:09

## 2017-04-30 RX ADMIN — METRONIDAZOLE 500 MG: 500 TABLET ORAL at 21:38

## 2017-04-30 RX ADMIN — ALLOPURINOL 50 MG: 100 TABLET ORAL at 08:12

## 2017-04-30 RX ADMIN — METRONIDAZOLE 500 MG: 500 TABLET ORAL at 06:06

## 2017-04-30 RX ADMIN — CIPROFLOXACIN 250 MG: 500 TABLET, FILM COATED ORAL at 08:11

## 2017-04-30 RX ADMIN — ENALAPRIL MALEATE 10 MG: 10 TABLET ORAL at 08:11

## 2017-04-30 RX ADMIN — ATORVASTATIN CALCIUM 5 MG: 10 TABLET, FILM COATED ORAL at 08:10

## 2017-04-30 RX ADMIN — TERAZOSIN HYDROCHLORIDE ANHYDROUS 5 MG: 5 CAPSULE ORAL at 08:11

## 2017-04-30 RX ADMIN — VERAPAMIL HYDROCHLORIDE 180 MG: 180 TABLET, FILM COATED, EXTENDED RELEASE ORAL at 08:12

## 2017-04-30 RX ADMIN — METRONIDAZOLE 500 MG: 500 TABLET ORAL at 15:45

## 2017-04-30 ASSESSMENT — ENCOUNTER SYMPTOMS
CONSTIPATION: 0
COUGH: 0
SHORTNESS OF BREATH: 0
FEVER: 0
ABDOMINAL PAIN: 0
CHILLS: 0
DIARRHEA: 0
WEAKNESS: 1
VOMITING: 0
DEPRESSION: 0
NAUSEA: 0
HEADACHES: 0
PALPITATIONS: 0
SPUTUM PRODUCTION: 0
BLOOD IN STOOL: 0

## 2017-04-30 ASSESSMENT — PAIN SCALES - GENERAL
PAINLEVEL_OUTOF10: 0

## 2017-04-30 NOTE — PROGRESS NOTES
"Interval History:  84 y/o retired  with long EtOH (recently quitted) and 40 yr smoking (quitted 30 yrs ago, ave 1-1.5 ppd) c/o not feeling well, weakness and found to have leukocytosis, infection and elevated Trop 0.41; Denies CP, palpitation but increased SOB and MARMOLEJO; Poor memory attributed to his older age; h/o DM--not tx'd, TIA  Likely demand Trop elevation    4/28/2017 Echo:    Severe biventricular dysfunction. Left ventricular ejection fraction is   visually estimated to be 30%.  Global hypokinesis.  Reduced right ventricular systolic function.  Biatrial enlargement.  Right ventricular systolic pressure is estimated to be 35 mmHg.  Moderate mitral regurgitation due to annular dilation.  Cardiologist on case notified at time of reading.  There has been a   decrease in the LVEF from the study of 2014.    Physical Exam   Blood pressure 133/68, pulse 70, temperature 36.6 °C (97.9 °F), resp. rate 20, height 1.753 m (5' 9\"), weight 84 kg (185 lb 3 oz), SpO2 96 %.    Constitutional:  He appears well-developed.   HENT: Normocephalic and atraumatic. No scleral icterus.   Neck: No JVD present.   Cardiovascular: Normal rate. RRR; Exam reveals no gallop and no friction rub. 1-2/6 SE murmur heard at MLSB.     Pulmonary/Chest: CTAB coarse   Abdominal: S/NT/ND BS+   Musculoskeletal: He exhibits no edema. Pulses present.  Skin: Skin is warm and dry.       Intake/Output Summary (Last 24 hours) at 04/30/17 0852  Last data filed at 04/29/17 1800   Gross per 24 hour   Intake      0 ml   Output    500 ml   Net   -500 ml       LABS:  Lab Results   Component Value Date/Time    CHOLESTEROL, 02/08/2017 07:49 AM    LDL 57 02/08/2017 07:49 AM    HDL 29* 02/08/2017 07:49 AM    TRIGLYCERIDES 124 02/08/2017 07:49 AM       Lab Results   Component Value Date/Time    WBC 9.7 04/30/2017 02:23 AM    RBC 2.72* 04/30/2017 02:23 AM    HEMOGLOBIN 7.9* 04/30/2017 02:23 AM    HEMATOCRIT 26.9* 04/30/2017 02:23 AM    MCV 98.9* " 04/30/2017 02:23 AM    NEUTROPHILS-POLYS 82.20* 04/30/2017 02:23 AM    LYMPHOCYTES 8.40* 04/30/2017 02:23 AM    MONOCYTES 5.90 04/30/2017 02:23 AM    EOSINOPHILS 2.90 04/30/2017 02:23 AM    BASOPHILS 0.20 04/30/2017 02:23 AM    HYPOCHROMIA 1+ 04/30/2011 12:45 PM    ANISOCYTOSIS 1+ 04/27/2017 01:51 PM     Lab Results   Component Value Date/Time    SODIUM 140 04/30/2017 02:24 AM    POTASSIUM 4.1 04/30/2017 02:24 AM    CHLORIDE 115* 04/30/2017 02:24 AM    CO2 15* 04/30/2017 02:24 AM    GLUCOSE 95 04/30/2017 02:24 AM    BUN 52* 04/30/2017 02:24 AM    CREATININE 2.86* 04/30/2017 02:24 AM     Lab Results   Component Value Date    HBA1C 5.2 04/28/2017      Lab Results   Component Value Date/Time    ALKALINE PHOSPHATASE 53 04/27/2017 01:51 PM    AST(SGOT) 11* 04/27/2017 01:51 PM    ALT(SGPT) 8 04/27/2017 01:51 PM    TOTAL BILIRUBIN 0.6 04/27/2017 01:51 PM      Lab Results   Component Value Date/Time    B NATRIURETIC PEPTIDE 1912* 04/27/2017 01:51 PM      No results found for: TSH  Lab Results   Component Value Date/Time    PT 15.1* 04/27/2017 01:51 PM    INR 1.15* 04/27/2017 01:51 PM        Medications reviewed    Imaging reviewed    ECHO(see above):    Impressions:Recommendations:  Elevated Trop likely from infection and increased demand; with colitis on flagyl/cipro  LBBB is not new but lateral ST depression noted  Abn Echo : CM, LVEF 30%,  consider ischemia w/u when infection is treated  CV risks discussed with him  He also agrees to [place] study screening    Ischemic work up when condition improves or as outpatient as he prefers  Will follow  Thx

## 2017-04-30 NOTE — PROGRESS NOTES
Hospital Medicine Progress Note, Adult, Complex               Author: Lisa Shin Date & Time created: 4/30/2017  9:39 AM     Interval History:  ID: 84 y/o M with PMHx: BPH, gout, DM type II here with colitis on flagyl/cipro, elevated trop likely secondary to demand ischemia    4/28 - Pt's hb dropped overnight and transfused 1 U. Hb 8.5 after transfusion. Patient's troponin increased, cardiology aware.     4/29 - Pt up in bed this am. Patient retained urine yesterday evening 400cc and was straight cath once.     4/30 - Pt states he had loose watery stool yesterday evening. Pt also states he does not have a lot of appetite this morning and ate only 1/2 of his english muffin and coffee this morning. Pt informed to let nursing staff know if he has diarrhea today, will order C. Diff PCR.     Review of Systems:  Review of Systems   Constitutional: Negative for fever and chills.   Respiratory: Negative for cough, sputum production and shortness of breath.    Cardiovascular: Negative for chest pain, palpitations and leg swelling.   Gastrointestinal: Negative for nausea, vomiting, abdominal pain, diarrhea, constipation and blood in stool.   Genitourinary: Negative for dysuria, urgency and hematuria.   Neurological: Positive for weakness. Negative for headaches.   Psychiatric/Behavioral: Negative for depression.       Physical Exam:  Physical Exam   Constitutional: He is oriented to person, place, and time. He appears well-developed and well-nourished.   HENT:   Head: Normocephalic and atraumatic.   Eyes: Conjunctivae and EOM are normal.   Neck: Normal range of motion. Neck supple.   Cardiovascular: Normal rate.  Exam reveals no gallop and no friction rub.    No murmur heard.  Pulmonary/Chest: Effort normal. No respiratory distress. He has no wheezes. He has no rales.   Decreased breath sounds bilateral bases   Abdominal: Soft. Bowel sounds are normal. He exhibits no distension. There is no tenderness. There is no rebound.    Musculoskeletal: He exhibits no edema.   Lymphadenopathy:     He has no cervical adenopathy.   Neurological: He is alert and oriented to person, place, and time.   Skin: There is pallor.   Nursing note and vitals reviewed.      Labs:        Invalid input(s): [id number]  Recent Labs      04/27/17   1351  04/28/17   0816  04/28/17   1303  04/28/17   2037   TROPONINI  0.41*  2.47*  2.73*  2.31*   BNPBTYPENAT  1912*   --    --    --      Recent Labs      04/28/17   0020  04/29/17 0042 04/30/17 0224   SODIUM  138  140  140   POTASSIUM  4.6  4.3  4.1   CHLORIDE  113*  115*  115*   CO2  17*  16*  15*   BUN  49*  53*  52*   CREATININE  2.62*  2.83*  2.86*   CALCIUM  7.9*  7.6*  7.3*     Recent Labs      04/27/17   1351  04/28/17   0020  04/29/17 0042 04/30/17 0224   ALTSGPT  8   --    --    --    ASTSGOT  11*   --    --    --    ALKPHOSPHAT  53   --    --    --    TBILIRUBIN  0.6   --    --    --    LIPASE  11   --    --    --    GLUCOSE  179*  126*  125*  95     Recent Labs      04/27/17   1351   04/28/17   0816  04/28/17   1703  04/29/17 0042 04/30/17 0223   RBC  2.64*   < >  2.80*   --   2.63*  2.72*   HEMOGLOBIN  7.9*   < >  8.5*  8.1*  8.0*  7.9*   HEMATOCRIT  25.2*   < >  27.0*  25.7*  25.3*  26.9*   PLATELETCT  215   < >  177   --   167  183   PROTHROMBTM  15.1*   --    --    --    --    --    APTT  28.3   --    --    --    --    --    INR  1.15*   --    --    --    --    --     < > = values in this interval not displayed.     Recent Labs      04/27/17   1351   04/28/17   0816  04/29/17 0042 04/30/17 0223   WBC  28.5*   < >  20.7*  14.0*  9.7   NEUTSPOLYS  93.90*   --    --   88.00*  82.20*   LYMPHOCYTES  2.60*   --    --   5.60*  8.40*   MONOCYTES  3.50   --    --   5.10  5.90   EOSINOPHILS  0.00   --    --   0.60  2.90   BASOPHILS  0.00   --    --   0.20  0.20   ASTSGOT  11*   --    --    --    --    ALTSGPT  8   --    --    --    --    ALKPHOSPHAT  53   --    --    --    --    TBILIRUBIN   0.6   --    --    --    --     < > = values in this interval not displayed.           Hemodynamics:  Temp (24hrs), Av.7 °C (98.1 °F), Min:36.5 °C (97.7 °F), Max:36.9 °C (98.4 °F)  Temperature: 36.7 °C (98.1 °F)  Pulse  Av.8  Min: 55  Max: 92   Blood Pressure : 136/74 mmHg     Respiratory:    Respiration: 18, Pulse Oximetry: 92 %     Work Of Breathing / Effort: Mild  RUL Breath Sounds: Clear, RML Breath Sounds: Clear, RLL Breath Sounds: Clear, MILES Breath Sounds: Clear, LLL Breath Sounds: Clear  Fluids:    Intake/Output Summary (Last 24 hours) at 17 0939  Last data filed at 17 1800   Gross per 24 hour   Intake      0 ml   Output    500 ml   Net   -500 ml     Weight: 84 kg (185 lb 3 oz)  GI/Nutrition:  Orders Placed This Encounter   Procedures   • Diet Order     Standing Status: Standing      Number of Occurrences: 1      Standing Expiration Date:      Order Specific Question:  Diet:     Answer:  Diabetic [3]     Medical Decision Making, by Problem:  Active Hospital Problems    Diagnosis   • COPD (chronic obstructive pulmonary disease) (CMS-HCC) [J44.9]  - no acute exacerbation  - on RT protocol     • HTN (hypertension) [I10]  - cont vasotec, verapamil     • Dyslipidemia [E78.5]  - cont lipitor     • Leukocytosis [D72.829]  - improving, 2/2 to colitis      • Type 2 diabetes mellitus without complication (HCC) [E11.9]  - A1c 6.2016, A1c pending   - pt on ISS      • BPH (benign prostatic hyperplasia) [N40.0]  - cont terazosin and flomax     • Acute on chronic renal failure (CMS-HCC) [N17.9, N18.9]  - case discussed with Dr. Rae, who recommends monitoring at present time  - avoid nephrotoxins       • Troponin level elevated [R79.89]   - cards following  - monitor on tele   - likely 2/2 to demand ischemia  - will need cardiac work up once infection resolved      • Acute colitis [K52.9]  - on cipro/flagyl, dc zosyn  - blood cxs NGTD   - GI signed off   - advance diet as tolerated        Gout  - cont allopurinol    GERD  - cont protonix    Normocytic Anemia - stable   - s/p IU PRBC, monitor H&H closely   - transfuse PRN if Hb < 7     Right Lung Nodule  - CT abd showed 1.5 cm RLL lung nodule, will need further work up once renal function improves     Diarrhea   - 1 episode yesterday, if continues to have further episodes today, will place pt on contact precautions and order C. Diff PCR.     Plan of care discussed with patient, bedside RN.     Labs reviewed and Medications reviewed  Owens catheter: No Owens      DVT Prophylaxis: Contraindicated - High bleeding risk    Ulcer prophylaxis: Yes  Antibiotics: Treating active infection/contamination beyond 24 hours perioperative coverage

## 2017-04-30 NOTE — CARE PLAN
Problem: Safety  Goal: Will remain free from injury  Outcome: PROGRESSING AS EXPECTED  Pt uses call bell and accepts assistance appropriately. Treaded socks on, bed in low locked position, call bell in reach.    Problem: Urinary Elimination:  Goal: Ability to reestablish a normal urinary elimination pattern will improve  Outcome: PROGRESSING AS EXPECTED  Q6 bladder scans and I&O cath as needed. Pt states that he has retention issues at home and often self-caths at home. Per MD note, pt will need outpatient urology followup. Already on tamulosin.

## 2017-04-30 NOTE — PROGRESS NOTES
Bedside report completed. Pt resting quietly in bed. Denies complaints. Bed in low, locked position, call bell in reach. Discussed importance of calling for help before ambulating. Pt verbalizes understanding.

## 2017-05-01 ENCOUNTER — PATIENT OUTREACH (OUTPATIENT)
Dept: HEALTH INFORMATION MANAGEMENT | Facility: OTHER | Age: 82
End: 2017-05-01

## 2017-05-01 VITALS
TEMPERATURE: 98.3 F | DIASTOLIC BLOOD PRESSURE: 80 MMHG | HEIGHT: 69 IN | BODY MASS INDEX: 27.89 KG/M2 | HEART RATE: 86 BPM | OXYGEN SATURATION: 95 % | SYSTOLIC BLOOD PRESSURE: 152 MMHG | RESPIRATION RATE: 18 BRPM | WEIGHT: 188.27 LBS

## 2017-05-01 PROBLEM — N18.9 ACUTE ON CHRONIC RENAL FAILURE (HCC): Status: RESOLVED | Noted: 2017-04-27 | Resolved: 2017-05-01

## 2017-05-01 PROBLEM — N17.9 ACUTE ON CHRONIC RENAL FAILURE (HCC): Status: RESOLVED | Noted: 2017-04-27 | Resolved: 2017-05-01

## 2017-05-01 PROBLEM — R79.89 TROPONIN LEVEL ELEVATED: Status: RESOLVED | Noted: 2017-04-27 | Resolved: 2017-05-01

## 2017-05-01 PROBLEM — K52.9 ACUTE COLITIS: Status: RESOLVED | Noted: 2017-04-27 | Resolved: 2017-05-01

## 2017-05-01 PROBLEM — D72.829 LEUKOCYTOSIS: Status: RESOLVED | Noted: 2017-04-28 | Resolved: 2017-05-01

## 2017-05-01 LAB
ANION GAP SERPL CALC-SCNC: 8 MMOL/L (ref 0–11.9)
BASOPHILS # BLD AUTO: 0.4 % (ref 0–1.8)
BASOPHILS # BLD: 0.03 K/UL (ref 0–0.12)
BUN SERPL-MCNC: 51 MG/DL (ref 8–22)
CALCIUM SERPL-MCNC: 7.8 MG/DL (ref 8.5–10.5)
CHLORIDE SERPL-SCNC: 115 MMOL/L (ref 96–112)
CO2 SERPL-SCNC: 16 MMOL/L (ref 20–33)
CREAT SERPL-MCNC: 2.7 MG/DL (ref 0.5–1.4)
EOSINOPHIL # BLD AUTO: 0.18 K/UL (ref 0–0.51)
EOSINOPHIL NFR BLD: 2.3 % (ref 0–6.9)
ERYTHROCYTE [DISTWIDTH] IN BLOOD BY AUTOMATED COUNT: 51.3 FL (ref 35.9–50)
GFR SERPL CREATININE-BSD FRML MDRD: 23 ML/MIN/1.73 M 2
GLUCOSE BLD-MCNC: 92 MG/DL (ref 65–99)
GLUCOSE BLD-MCNC: 98 MG/DL (ref 65–99)
GLUCOSE SERPL-MCNC: 96 MG/DL (ref 65–99)
HCT VFR BLD AUTO: 26 % (ref 42–52)
HGB BLD-MCNC: 8.1 G/DL (ref 14–18)
IMM GRANULOCYTES # BLD AUTO: 0.06 K/UL (ref 0–0.11)
IMM GRANULOCYTES NFR BLD AUTO: 0.8 % (ref 0–0.9)
LYMPHOCYTES # BLD AUTO: 0.67 K/UL (ref 1–4.8)
LYMPHOCYTES NFR BLD: 8.6 % (ref 22–41)
MCH RBC QN AUTO: 29.7 PG (ref 27–33)
MCHC RBC AUTO-ENTMCNC: 31.2 G/DL (ref 33.7–35.3)
MCV RBC AUTO: 95.2 FL (ref 81.4–97.8)
MONOCYTES # BLD AUTO: 0.51 K/UL (ref 0–0.85)
MONOCYTES NFR BLD AUTO: 6.6 % (ref 0–13.4)
NEUTROPHILS # BLD AUTO: 6.3 K/UL (ref 1.82–7.42)
NEUTROPHILS NFR BLD: 81.3 % (ref 44–72)
NRBC # BLD AUTO: 0 K/UL
NRBC BLD AUTO-RTO: 0 /100 WBC
PLATELET # BLD AUTO: 183 K/UL (ref 164–446)
PMV BLD AUTO: 10 FL (ref 9–12.9)
POTASSIUM SERPL-SCNC: 4.1 MMOL/L (ref 3.6–5.5)
RBC # BLD AUTO: 2.73 M/UL (ref 4.7–6.1)
SODIUM SERPL-SCNC: 139 MMOL/L (ref 135–145)
WBC # BLD AUTO: 7.8 K/UL (ref 4.8–10.8)

## 2017-05-01 PROCEDURE — 36415 COLL VENOUS BLD VENIPUNCTURE: CPT

## 2017-05-01 PROCEDURE — 82962 GLUCOSE BLOOD TEST: CPT | Mod: 91

## 2017-05-01 PROCEDURE — C9113 INJ PANTOPRAZOLE SODIUM, VIA: HCPCS | Performed by: INTERNAL MEDICINE

## 2017-05-01 PROCEDURE — A9270 NON-COVERED ITEM OR SERVICE: HCPCS | Performed by: HOSPITALIST

## 2017-05-01 PROCEDURE — 51798 US URINE CAPACITY MEASURE: CPT

## 2017-05-01 PROCEDURE — 85025 COMPLETE CBC W/AUTO DIFF WBC: CPT

## 2017-05-01 PROCEDURE — 700102 HCHG RX REV CODE 250 W/ 637 OVERRIDE(OP): Performed by: HOSPITALIST

## 2017-05-01 PROCEDURE — A9270 NON-COVERED ITEM OR SERVICE: HCPCS | Performed by: INTERNAL MEDICINE

## 2017-05-01 PROCEDURE — 700102 HCHG RX REV CODE 250 W/ 637 OVERRIDE(OP): Performed by: INTERNAL MEDICINE

## 2017-05-01 PROCEDURE — 80048 BASIC METABOLIC PNL TOTAL CA: CPT

## 2017-05-01 PROCEDURE — 99239 HOSP IP/OBS DSCHRG MGMT >30: CPT | Performed by: INTERNAL MEDICINE

## 2017-05-01 PROCEDURE — 700111 HCHG RX REV CODE 636 W/ 250 OVERRIDE (IP): Performed by: INTERNAL MEDICINE

## 2017-05-01 RX ORDER — METRONIDAZOLE 500 MG/1
500 TABLET ORAL EVERY 8 HOURS
Qty: 18 TAB | Refills: 0 | Status: SHIPPED | OUTPATIENT
Start: 2017-05-01 | End: 2017-05-07

## 2017-05-01 RX ORDER — CIPROFLOXACIN 250 MG/1
250 TABLET, FILM COATED ORAL EVERY 24 HOURS
Qty: 6 TAB | Refills: 0 | Status: SHIPPED | OUTPATIENT
Start: 2017-05-01 | End: 2017-05-07

## 2017-05-01 RX ORDER — CARVEDILOL 3.12 MG/1
3.12 TABLET ORAL 2 TIMES DAILY WITH MEALS
Qty: 60 TAB | Refills: 0 | Status: SHIPPED | OUTPATIENT
Start: 2017-05-01 | End: 2017-05-11

## 2017-05-01 RX ADMIN — ALLOPURINOL 50 MG: 100 TABLET ORAL at 09:21

## 2017-05-01 RX ADMIN — METRONIDAZOLE 500 MG: 500 TABLET ORAL at 06:02

## 2017-05-01 RX ADMIN — VERAPAMIL HYDROCHLORIDE 180 MG: 180 TABLET, FILM COATED, EXTENDED RELEASE ORAL at 09:21

## 2017-05-01 RX ADMIN — ATORVASTATIN CALCIUM 5 MG: 10 TABLET, FILM COATED ORAL at 09:20

## 2017-05-01 RX ADMIN — PANTOPRAZOLE SODIUM 40 MG: 40 INJECTION, POWDER, FOR SOLUTION INTRAVENOUS at 09:00

## 2017-05-01 RX ADMIN — ENALAPRIL MALEATE 10 MG: 10 TABLET ORAL at 09:20

## 2017-05-01 RX ADMIN — TAMSULOSIN HYDROCHLORIDE 0.8 MG: 0.4 CAPSULE ORAL at 09:21

## 2017-05-01 RX ADMIN — CIPROFLOXACIN 250 MG: 500 TABLET, FILM COATED ORAL at 09:22

## 2017-05-01 RX ADMIN — TERAZOSIN HYDROCHLORIDE ANHYDROUS 5 MG: 5 CAPSULE ORAL at 09:19

## 2017-05-01 ASSESSMENT — PAIN SCALES - GENERAL
PAINLEVEL_OUTOF10: 0

## 2017-05-01 NOTE — CARE PLAN
Problem: Safety  Goal: Will remain free from falls  Outcome: PROGRESSING AS EXPECTED  Pt calls appropriately, fall precautions in place, bed alarm on, call light within reach, treaded slipper socks on pt, bed locked and in lowest position.    Problem: Knowledge Deficit  Goal: Knowledge of disease process/condition, treatment plan, diagnostic tests, and medications will improve  Outcome: PROGRESSING AS EXPECTED  POC discussed, all questions answered, no other concerns at this time.

## 2017-05-01 NOTE — PROGRESS NOTES
Assumed care of patient. Bedside report received from ESTRADA Nj. Updated on POC, call light within reach and fall precautions in place. Bed locked and in lowest position. Patient instructed to call for assistance before getting out of bed. All questions answered, no other needs at this time. MAR, labs, orders reviewed.

## 2017-05-01 NOTE — CARE PLAN
Problem: Knowledge Deficit  Goal: Knowledge of disease process/condition, treatment plan, diagnostic tests, and medications will improve  Outcome: PROGRESSING AS EXPECTED  Patient educated regarding medications, procedures and plan of care. Education provided on CHF along with education packet for CHF.

## 2017-05-01 NOTE — DISCHARGE SUMMARY
CHIEF COMPLAINT ON ADMISSION  Chief Complaint   Patient presents with   • Shortness of Breath   • Chest Pain       CODE STATUS  Full Code    HPI & HOSPITAL COURSE  Please refer to H&P dictated by Dr. Blood. This is a 83 y.o. year old male here with colitis. Patient was started on Ciprofloxacin and Flagyl.  Patient's CT abdomen showed long segment of wall thickening from the ascending colon to the hepatic flexure.  He was noted to have elevated troponin and admitted to telemetry floor. Patient did not have any acute events on tele monitor. Cardiology (Dr. Lopez) was consulted and recommends outpatient work up for his elevated troponin after resolution of his infection. Patient had an echocardiogram which showed ejection fraction of 30%, global hypokinesis, RVSP 35 mmHg, moderate mitral regurgitation. Patient was continued on vasotec, started on coreg 3.125 mg BID. Patient was provided with heart failure education and scheduled to follow up in heart failure clinic. Patient was screened for pioneer study by cardiology and did not meet criteria. Patient on CT abdomen was also found to have 1.4 cm right lower lobe lung nodule. Patient was informed of this finding and was advised to follow up as outpatient for further imaging study. Patient also had acute on chronic renal failure which improved during his hospital course.     Therefore, he is discharged in good and stable condition with close outpatient follow-up.    SPECIFIC OUTPATIENT FOLLOW-UP  PCP in 1-2 weeks.  Cardiology in 2-3 weeks     DISCHARGE PROBLEM LIST  Active Problems:    HTN (hypertension) POA: Yes    COPD (chronic obstructive pulmonary disease) (CMS-HCC) POA: Yes    Dyslipidemia POA: Yes    Type 2 diabetes mellitus without complication (HCC) POA: Yes    BPH (benign prostatic hyperplasia) POA: Yes  Resolved Problems:    Acute on chronic renal failure (CMS-HCC) POA: Yes    Troponin level elevated POA: Yes    Acute colitis POA: Yes    Leukocytosis POA:  Yes      FOLLOW UP    Kirill Fabian D.O.  [address]  [address]  [place] [address]  [phone]    Schedule an appointment as soon as possible for a visit in 2 weeks  Hospital follow-up appointment with PCP      MEDICATIONS ON DISCHARGE   Lex Harden   Home Medication Instructions SOM:[id number]    Printed on:05/01/17 0983   Medication Information                      allopurinol (ZYLOPRIM) 100 MG TABS  Take 100 mg by mouth every day.             atorvastatin (LIPITOR) 10 MG TABS  Take 5 mg by mouth every day.             Cholecalciferol (VITAMIN D) 2000 UNIT TABS  Take 4,000 Units by mouth every day.             ciprofloxacin (CIPRO) 250 MG Tab  Take 1 Tab by mouth every 24 hours for 6 days.             clopidogrel (PLAVIX) 75 MG Tab  Take 75 mg by mouth every morning.             enalapril (VASOTEC) 10 MG TABS  Take 10 mg by mouth every day.             Ferrous Sulfate (IRON) 325 (65 FE) MG TABS  Take 1 Tab by mouth every morning.             metronidazole (FLAGYL) 500 MG Tab  Take 1 Tab by mouth every 8 hours for 6 days.             terazosin (HYTRIN) 5 MG CAPS  Take 5 mg by mouth every day.             verapamil ER (CALAN-SR) 180 MG TBCR  Take 180 mg by mouth every day.                 DIET  Orders Placed This Encounter   Procedures   • Diet Order     Standing Status: Standing      Number of Occurrences: 1      Standing Expiration Date:      Order Specific Question:  Diet:     Answer:  Diabetic [3]       ACTIVITY  As tolerated.      CONSULTATIONS  Dr. Lopez - Cardiology    PROCEDURES  None     LABORATORY  Lab Results   Component Value Date/Time    SODIUM 139 05/01/2017 02:01 AM    POTASSIUM 4.1 05/01/2017 02:01 AM    CHLORIDE 115* 05/01/2017 02:01 AM    CO2 16* 05/01/2017 02:01 AM    GLUCOSE 96 05/01/2017 02:01 AM    BUN 51* 05/01/2017 02:01 AM    CREATININE 2.70* 05/01/2017 02:01 AM        Lab Results   Component Value Date/Time    WBC 7.8 05/01/2017 02:01 AM    HEMOGLOBIN 8.1* 05/01/2017 02:01 AM     HEMATOCRIT 26.0* 05/01/2017 02:01 AM    PLATELET COUNT 183 05/01/2017 02:01 AM        Total time of the discharge process exceeds 38 minutes.

## 2017-05-01 NOTE — DISCHARGE INSTRUCTIONS
Discharge Instructions    Discharged to home by car with friend. Discharged via wheelchair, hospital escort: Yes.  Special equipment needed: Not Applicable    Be sure to schedule a follow-up appointment with your primary care doctor or any specialists as instructed.     Discharge Plan:   Influenza Vaccine Indication: Patient Refuses    I understand that a diet low in cholesterol, fat, and sodium is recommended for good health. Unless I have been given specific instructions below for another diet, I accept this instruction as my diet prescription.   Other diet: Diabetic diet    Special Instructions:   HF Patient Discharge Instructions  · Monitor your weight daily, and maintain a weight chart, to track your weight changes.   · Activity as tolerated, unless your Doctor has ordered otherwise. Other activity order: Activity as tolerated.  · Follow a low fat, low cholesterol, low salt diet unless instructed otherwise by your Doctor. Read the labels on the back of food products and track your intake of fat, cholesterol and salt.   · Fluid Restriction No. If a Fluid Restriction has been ordered by your Doctor, measure fluids with a measuring cup to ensure that you are not exceeding the restriction.   · No smoking.  · Oxygen No. If your Doctor has ordered that you wear Oxygen at home, it is important to wear it as ordered.  · Did you receive an explanation from staff on the importance of taking each of your medications and why it is necessary to keep taking them unless your doctor says to stop? Yes  · Were all of your questions answered about how to manage your heart failure and what to do if you have increased signs and symptoms after you go home? Yes  · Do you feel like your heart failure care team involved you in the care treatment plan and allowed you to make decisions regarding your care while in the hospital and addressed any discharge needs you might have? Yes    See the educational handout provided at discharge for  more information on monitoring your daily weight, activity and diet. This also explains more about Heart Failure, symptoms of a flare-up and some of the tests that you have undergone.     Warning Signs of a Flare-Up include:  · Swelling in the ankles or lower legs.  · Shortness of breath, while at rest, or while doing normal activities.   · Shortness of breath at night when in bed, or coughing in bed.   · Requiring more pillows to sleep at night, or needing to sit up at night to sleep.  · Feeling weak, dizzy or fatigued.     When to call your Doctor:  · Call [facility] seven days a week from 8:00 a.m. to 8:00 p.m. for medical questions [phone].  · Call your Primary Care Physician or Cardiologist if:   · You experience any pain radiating to your jaw or neck.  · You have any difficulty breathing.  · You experience weight gain of 3 lbs in a day or 5 lbs in a week.   · You feel any palpitations or irregular heartbeats.  · You become dizzy or lose consciousness.   If you have had an angiogram or had a pacemaker or AICD placed, and experience:  · Bleeding, drainage or swelling at the surgical / puncture site.  · Fever greater than 100.0 F  · Shock from internal defibrillator.  · Cool and / or numb extremities.      · Is patient discharged on Warfarin / Coumadin?   No     · Is patient Post Blood Transfusion?  Yes  POST BLOOD TRANSFUSION INFORMATION (ADULT)    The purpose of blood transfusion is to correct anemia, low levels of blood clotting factors or to correct acute blood loss.   Blood transfusion is very safe but occasionally unexpected adverse reactions do occur. Most adverse reactions occur during transfusion, within one to two days following transfusion or one to two weeks following transfusion. Some adverse reactions can occur one to six months after transfusion and some even years later.             If any of the symptoms listed below happen to you during your transfusion,                                  please notify your nurse immediately.   · Fever or Chills  · Flushing of the Face  · Hives, rash or itching  · Difficulty in breathing or shortness of breath  · Pain or oozing of blood from the IV needle site  · Low back pain  · Nausea or vomiting  · Weakness or fainting  · Chest pain  · Blood in the urine  · Decreased frequency of urination    The above symptoms may also occur within 24 to 48 after transfusion; if so, notify your physician.     · Yellowing of the skin    This can happen one to six months after transfusion; if so, notify your physician    Depression / Suicide Risk    As you are discharged from this [facility] facility, it is important to learn how to keep safe from harming yourself.    Recognize the warning signs:  · Abrupt changes in personality, positive or negative- including increase in energy   · Giving away possessions  · Change in eating patterns- significant weight changes-  positive or negative  · Change in sleeping patterns- unable to sleep or sleeping all the time   · Unwillingness or inability to communicate  · Depression  · Unusual sadness, discouragement and loneliness  · Talk of wanting to die  · Neglect of personal appearance   · Rebelliousness- reckless behavior  · Withdrawal from people/activities they love  · Confusion- inability to concentrate     If you or a loved one observes any of these behaviors or has concerns about self-harm, here's what you can do:  · Talk about it- your feelings and reasons for harming yourself  · Remove any means that you might use to hurt yourself (examples: pills, rope, extension cords, firearm)  · Get professional help from the community (Mental Health, Substance Abuse, psychological counseling)  · Do not be alone:Call your Safe Contact- someone whom you trust who will be there for you.  · Call your local CRISIS HOTLINE 152-9686 or 929-136-3243  · Call your local Children's Mobile Crisis Response Team [place] (856) 267-6957 or  www.1Mind  · Call the toll free National Suicide Prevention Hotlines   · National Suicide Prevention Lifeline 507-666-TQED (4119)  · [employer] Interactive Hope Line Network 800-SUICIDE (617-9881)              Heart Failure  Heart failure is a condition in which the heart has trouble pumping blood. This means your heart does not pump blood efficiently for your body to work well. In some cases of heart failure, fluid may back up into your lungs or you may have swelling (edema) in your lower legs. Heart failure is usually a long-term (chronic) condition. It is important for you to take good care of yourself and follow your health care provider's treatment plan.  CAUSES   Some health conditions can cause heart failure. Those health conditions include:  · High blood pressure (hypertension). Hypertension causes the heart muscle to work harder than normal. When pressure in the blood vessels is high, the heart needs to pump (contract) with more force in order to circulate blood throughout the body. High blood pressure eventually causes the heart to become stiff and weak.  · Coronary artery disease (CAD). CAD is the buildup of cholesterol and fat (plaque) in the arteries of the heart. The blockage in the arteries deprives the heart muscle of oxygen and blood. This can cause chest pain and may lead to a heart attack. High blood pressure can also contribute to CAD.  · Heart attack (myocardial infarction). A heart attack occurs when one or more arteries in the heart become blocked. The loss of oxygen damages the muscle tissue of the heart. When this happens, part of the heart muscle dies. The injured tissue does not contract as well and weakens the heart's ability to pump blood.  · Abnormal heart valves. When the heart valves do not open and close properly, it can cause heart failure. This makes the heart muscle pump harder to keep the blood flowing.  · Heart muscle disease (cardiomyopathy or myocarditis). Heart muscle disease is  damage to the heart muscle from a variety of causes. These can include drug or alcohol abuse, infections, or unknown reasons. These can increase the risk of heart failure.  · Lung disease. Lung disease makes the heart work harder because the lungs do not work properly. This can cause a strain on the heart, leading it to fail.  · Diabetes. Diabetes increases the risk of heart failure. High blood sugar contributes to high fat (lipid) levels in the blood. Diabetes can also cause slow damage to tiny blood vessels that carry important nutrients to the heart muscle. When the heart does not get enough oxygen and food, it can cause the heart to become weak and stiff. This leads to a heart that does not contract efficiently.  · Other conditions can contribute to heart failure. These include abnormal heart rhythms, thyroid problems, and low blood counts (anemia).  Certain unhealthy behaviors can increase the risk of heart failure, including:  · Being overweight.  · Smoking or chewing tobacco.  · Eating foods high in fat and cholesterol.  · Abusing illicit drugs or alcohol.  · Lacking physical activity.  SYMPTOMS   Heart failure symptoms may vary and can be hard to detect. Symptoms may include:  · Shortness of breath with activity, such as climbing stairs.  · Persistent cough.  · Swelling of the feet, ankles, legs, or abdomen.  · Unexplained weight gain.  · Difficulty breathing when lying flat (orthopnea).  · Waking from sleep because of the need to sit up and get more air.  · Rapid heartbeat.  · Fatigue and loss of energy.  · Feeling light-headed, dizzy, or close to fainting.  · Loss of appetite.  · Nausea.  · Increased urination during the night (nocturia).  DIAGNOSIS   A diagnosis of heart failure is based on your history, symptoms, physical examination, and diagnostic tests. Diagnostic tests for heart failure may include:  · Echocardiography.  · Electrocardiography.  · Chest X-ray.  · Blood tests.  · Exercise stress  test.  · Cardiac angiography.  · Radionuclide scans.  TREATMENT   Treatment is aimed at managing the symptoms of heart failure. Medicines, behavioral changes, or surgical intervention may be necessary to treat heart failure.  · Medicines to help treat heart failure may include:  · Angiotensin-converting enzyme (ACE) inhibitors. This type of medicine blocks the effects of a blood protein called angiotensin-converting enzyme. ACE inhibitors relax (dilate) the blood vessels and help lower blood pressure.  · Angiotensin receptor blockers (ARBs). This type of medicine blocks the actions of a blood protein called angiotensin. Angiotensin receptor blockers dilate the blood vessels and help lower blood pressure.  · Water pills (diuretics). Diuretics cause the kidneys to remove salt and water from the blood. The extra fluid is removed through urination. This loss of extra fluid lowers the volume of blood the heart pumps.  · Beta blockers. These prevent the heart from beating too fast and improve heart muscle strength.  · Digitalis. This increases the force of the heartbeat.  · Healthy behavior changes include:  · Obtaining and maintaining a healthy weight.  · Stopping smoking or chewing tobacco.  · Eating heart-healthy foods.  · Limiting or avoiding alcohol.  · Stopping illicit drug use.  · Physical activity as directed by your health care provider.  · Surgical treatment for heart failure may include:  · A procedure to open blocked arteries, repair damaged heart valves, or remove damaged heart muscle tissue.  · A pacemaker to improve heart muscle function and control certain abnormal heart rhythms.  · An internal cardioverter defibrillator to treat certain serious abnormal heart rhythms.  · A left ventricular assist device (LVAD) to assist the pumping ability of the heart.  HOME CARE INSTRUCTIONS   · Take medicines only as directed by your health care provider. Medicines are important in reducing the workload of your heart,  slowing the progression of heart failure, and improving your symptoms.  · Do not stop taking your medicine unless directed by your health care provider.  · Do not skip any dose of medicine.  · Refill your prescriptions before you run out of medicine. Your medicines are needed every day.  · Engage in moderate physical activity if directed by your health care provider. Moderate physical activity can benefit some people. The elderly and people with severe heart failure should consult with a health care provider for physical activity recommendations.  · Eat heart-healthy foods. Food choices should be free of trans fat and low in saturated fat, cholesterol, and salt (sodium). Healthy choices include fresh or frozen fruits and vegetables, fish, lean meats, legumes, fat-free or low-fat dairy products, and whole grain or high fiber foods. Talk to a dietitian to learn more about heart-healthy foods.  · Limit sodium if directed by your health care provider. Sodium restriction may reduce symptoms of heart failure in some people. Talk to a dietitian to learn more about heart-healthy seasonings.  · Use healthy cooking methods. Healthy cooking methods include roasting, grilling, broiling, baking, poaching, steaming, or stir-frying. Talk to a dietitian to learn more about healthy cooking methods.  · Limit fluids if directed by your health care provider. Fluid restriction may reduce symptoms of heart failure in some people.  · Weigh yourself every day. Daily weights are important in the early recognition of excess fluid. You should weigh yourself every morning after you urinate and before you eat breakfast. Wear the same amount of clothing each time you weigh yourself. Record your daily weight. Provide your health care provider with your weight record.  · Monitor and record your blood pressure if directed by your health care provider.  · Check your pulse if directed by your health care provider.  · Lose weight if directed by your  health care provider. Weight loss may reduce symptoms of heart failure in some people.  · Stop smoking or chewing tobacco. Nicotine makes your heart work harder by causing your blood vessels to constrict. Do not use nicotine gum or patches before talking to your health care provider.  · Keep all follow-up visits as directed by your health care provider. This is important.  · Limit alcohol intake to no more than 1 drink per day for nonpregnant women and 2 drinks per day for men. One drink equals 12 ounces of beer, 5 ounces of wine, or 1½ ounces of hard liquor. Drinking more than that is harmful to your heart. Tell your health care provider if you drink alcohol several times a week. Talk with your health care provider about whether alcohol is safe for you. If your heart has already been damaged by alcohol or you have severe heart failure, drinking alcohol should be stopped completely.  · Stop illicit drug use.  · Stay up-to-date with immunizations. It is especially important to prevent respiratory infections through current pneumococcal and influenza immunizations.  · Manage other health conditions such as hypertension, diabetes, thyroid disease, or abnormal heart rhythms as directed by your health care provider.  · Learn to manage stress.  · Plan rest periods when fatigued.  · Learn strategies to manage high temperatures. If the weather is extremely hot:  · Avoid vigorous physical activity.  · Use air conditioning or fans or seek a cooler location.  · Avoid caffeine and alcohol.  · Wear loose-fitting, lightweight, and light-colored clothing.  · Learn strategies to manage cold temperatures. If the weather is extremely cold:  · Avoid vigorous physical activity.  · Layer clothes.  · Wear mittens or gloves, a hat, and a scarf when going outside.  · Avoid alcohol.  · Obtain ongoing education and support as needed.  · Participate in or seek rehabilitation as needed to maintain or improve independence and quality of  life.  SEEK MEDICAL CARE IF:   · You have a rapid weight gain.  · You have increasing shortness of breath that is unusual for you.  · You are unable to participate in your usual physical activities.  · You tire easily.  · You cough more than normal, especially with physical activity.  · You have any or more swelling in areas such as your hands, feet, ankles, or abdomen.  · You are unable to sleep because it is hard to breathe.  · You feel like your heart is beating fast (palpitations).  · You become dizzy or light-headed upon standing up.  SEEK IMMEDIATE MEDICAL CARE IF:   · You have difficulty breathing.  · There is a change in mental status such as decreased alertness or difficulty with concentration.  · You have a pain or discomfort in your chest.  · You have an episode of fainting (syncope).  MAKE SURE YOU:   · Understand these instructions.  · Will watch your condition.  · Will get help right away if you are not doing well or get worse.     This information is not intended to replace advice given to you by your health care provider. Make sure you discuss any questions you have with your health care provider.     Document Released: 12/18/2006 Document Revised: 05/03/2016 Document Reviewed: 01/17/2014  [employer] Patient Education ©2016 [employer]              Colitis  Colitis is inflammation of the colon. Colitis can be a short-term or long-standing (chronic) illness. Crohn's disease and ulcerative colitis are 2 types of colitis which are chronic. They usually require lifelong treatment.  CAUSES   There are many different causes of colitis, including:  · Viruses.  · Germs (bacteria).  · Medicine reactions.  SYMPTOMS   · Diarrhea.  · Intestinal bleeding.  · Pain.  · Fever.  · Throwing up (vomiting).  · Tiredness (fatigue).  · Weight loss.  · Bowel blockage.  DIAGNOSIS   The diagnosis of colitis is based on examination and stool or blood tests. X-rays, CT scan, and colonoscopy may also be  needed.  TREATMENT   Treatment may include:  · Fluids given through the vein (intravenously).  · Bowel rest (nothing to eat or drink for a period of time).  · Medicine for pain and diarrhea.  · Medicines (antibiotics) that kill germs.  · Cortisone medicines.  · Surgery.  HOME CARE INSTRUCTIONS   · Get plenty of rest.  · Drink enough water and fluids to keep your urine clear or pale yellow.  · Eat a well-balanced diet.  · Call your caregiver for follow-up as recommended.  SEEK IMMEDIATE MEDICAL CARE IF:   · You develop chills.  · You have an oral temperature above 102° F (38.9° C), not controlled by medicine.  · You have extreme weakness, fainting, or dehydration.  · You have repeated vomiting.  · You develop severe belly (abdominal) pain or are passing bloody or tarry stools.  MAKE SURE YOU:   · Understand these instructions.  · Will watch your condition.  · Will get help right away if you are not doing well or get worse.     This information is not intended to replace advice given to you by your health care provider. Make sure you discuss any questions you have with your health care provider.     Document Released: 01/25/2006 Document Revised: 03/11/2013 Document Reviewed: 04/11/2016  [employer] Interactive Patient Education ©2016 [employer]

## 2017-05-02 ENCOUNTER — PATIENT OUTREACH (OUTPATIENT)
Dept: HEALTH INFORMATION MANAGEMENT | Facility: OTHER | Age: 82
End: 2017-05-02

## 2017-05-02 NOTE — PROGRESS NOTES
· Chart reviewed.  Patient was discharged from [facility] on 5/1/17 and will be followed by [facility] s/p hospital discharge.  No discharge outreach phone call made to patient.

## 2017-05-03 ENCOUNTER — PATIENT OUTREACH (OUTPATIENT)
Dept: HEALTH INFORMATION MANAGEMENT | Facility: OTHER | Age: 82
End: 2017-05-03

## 2017-05-08 ENCOUNTER — HOSPITAL ENCOUNTER (OUTPATIENT)
Dept: LAB | Facility: MEDICAL CENTER | Age: 82
End: 2017-05-08
Attending: FAMILY MEDICINE
Payer: MEDICARE

## 2017-05-08 LAB
ALBUMIN SERPL BCP-MCNC: 3.3 G/DL (ref 3.2–4.9)
ALBUMIN/GLOB SERPL: 1.1 G/DL
ALP SERPL-CCNC: 44 U/L (ref 30–99)
ALT SERPL-CCNC: 9 U/L (ref 2–50)
ANION GAP SERPL CALC-SCNC: 10 MMOL/L (ref 0–11.9)
AST SERPL-CCNC: 13 U/L (ref 12–45)
BASOPHILS # BLD AUTO: 0.4 % (ref 0–1.8)
BASOPHILS # BLD: 0.03 K/UL (ref 0–0.12)
BILIRUB SERPL-MCNC: 0.6 MG/DL (ref 0.1–1.5)
BUN SERPL-MCNC: 31 MG/DL (ref 8–22)
CALCIUM SERPL-MCNC: 8.8 MG/DL (ref 8.5–10.5)
CHLORIDE SERPL-SCNC: 112 MMOL/L (ref 96–112)
CO2 SERPL-SCNC: 18 MMOL/L (ref 20–33)
CREAT SERPL-MCNC: 2.35 MG/DL (ref 0.5–1.4)
EOSINOPHIL # BLD AUTO: 0.09 K/UL (ref 0–0.51)
EOSINOPHIL NFR BLD: 1.2 % (ref 0–6.9)
ERYTHROCYTE [DISTWIDTH] IN BLOOD BY AUTOMATED COUNT: 52.4 FL (ref 35.9–50)
GFR SERPL CREATININE-BSD FRML MDRD: 27 ML/MIN/1.73 M 2
GLOBULIN SER CALC-MCNC: 3.1 G/DL (ref 1.9–3.5)
GLUCOSE SERPL-MCNC: 126 MG/DL (ref 65–99)
HCT VFR BLD AUTO: 31 % (ref 42–52)
HGB BLD-MCNC: 9.5 G/DL (ref 14–18)
IMM GRANULOCYTES # BLD AUTO: 0.03 K/UL (ref 0–0.11)
IMM GRANULOCYTES NFR BLD AUTO: 0.4 % (ref 0–0.9)
LYMPHOCYTES # BLD AUTO: 0.66 K/UL (ref 1–4.8)
LYMPHOCYTES NFR BLD: 8.9 % (ref 22–41)
MCH RBC QN AUTO: 29 PG (ref 27–33)
MCHC RBC AUTO-ENTMCNC: 30.6 G/DL (ref 33.7–35.3)
MCV RBC AUTO: 94.5 FL (ref 81.4–97.8)
MONOCYTES # BLD AUTO: 0.53 K/UL (ref 0–0.85)
MONOCYTES NFR BLD AUTO: 7.1 % (ref 0–13.4)
NEUTROPHILS # BLD AUTO: 6.1 K/UL (ref 1.82–7.42)
NEUTROPHILS NFR BLD: 82 % (ref 44–72)
NRBC # BLD AUTO: 0 K/UL
NRBC BLD AUTO-RTO: 0 /100 WBC
PLATELET # BLD AUTO: 167 K/UL (ref 164–446)
PMV BLD AUTO: 11 FL (ref 9–12.9)
POTASSIUM SERPL-SCNC: 4.6 MMOL/L (ref 3.6–5.5)
PROT SERPL-MCNC: 6.4 G/DL (ref 6–8.2)
RBC # BLD AUTO: 3.28 M/UL (ref 4.7–6.1)
SODIUM SERPL-SCNC: 140 MMOL/L (ref 135–145)
WBC # BLD AUTO: 7.4 K/UL (ref 4.8–10.8)

## 2017-05-08 PROCEDURE — 36415 COLL VENOUS BLD VENIPUNCTURE: CPT

## 2017-05-08 PROCEDURE — 85025 COMPLETE CBC W/AUTO DIFF WBC: CPT

## 2017-05-08 PROCEDURE — 80053 COMPREHEN METABOLIC PANEL: CPT

## 2017-05-11 ENCOUNTER — OFFICE VISIT (OUTPATIENT)
Dept: CARDIOLOGY | Facility: MEDICAL CENTER | Age: 82
End: 2017-05-11
Payer: MEDICARE

## 2017-05-11 VITALS
HEIGHT: 69 IN | HEART RATE: 62 BPM | DIASTOLIC BLOOD PRESSURE: 60 MMHG | BODY MASS INDEX: 26.72 KG/M2 | WEIGHT: 180.4 LBS | OXYGEN SATURATION: 92 % | SYSTOLIC BLOOD PRESSURE: 116 MMHG

## 2017-05-11 DIAGNOSIS — R06.09 DYSPNEA ON EXERTION: ICD-10-CM

## 2017-05-11 DIAGNOSIS — I50.9 HEART FAILURE, NYHA CLASS 3 (HCC): ICD-10-CM

## 2017-05-11 DIAGNOSIS — I73.9 PAD (PERIPHERAL ARTERY DISEASE) (HCC): ICD-10-CM

## 2017-05-11 DIAGNOSIS — E78.5 DYSLIPIDEMIA: ICD-10-CM

## 2017-05-11 DIAGNOSIS — N18.30 CKD (CHRONIC KIDNEY DISEASE) STAGE 3, GFR 30-59 ML/MIN (HCC): ICD-10-CM

## 2017-05-11 DIAGNOSIS — I50.20 ACC/AHA STAGE C SYSTOLIC HEART FAILURE (HCC): ICD-10-CM

## 2017-05-11 PROCEDURE — 4040F PNEUMOC VAC/ADMIN/RCVD: CPT | Mod: 8P | Performed by: INTERNAL MEDICINE

## 2017-05-11 PROCEDURE — G8598 ASA/ANTIPLAT THER USED: HCPCS | Performed by: INTERNAL MEDICINE

## 2017-05-11 PROCEDURE — 99214 OFFICE O/P EST MOD 30 MIN: CPT | Mod: 25 | Performed by: INTERNAL MEDICINE

## 2017-05-11 PROCEDURE — G8432 DEP SCR NOT DOC, RNG: HCPCS | Performed by: INTERNAL MEDICINE

## 2017-05-11 PROCEDURE — 1036F TOBACCO NON-USER: CPT | Performed by: INTERNAL MEDICINE

## 2017-05-11 PROCEDURE — 94620 PR PULMONARY STRESS TESTING,SIMPLE: CPT | Performed by: INTERNAL MEDICINE

## 2017-05-11 PROCEDURE — G8419 CALC BMI OUT NRM PARAM NOF/U: HCPCS | Performed by: INTERNAL MEDICINE

## 2017-05-11 PROCEDURE — 1111F DSCHRG MED/CURRENT MED MERGE: CPT | Performed by: INTERNAL MEDICINE

## 2017-05-11 PROCEDURE — 1101F PT FALLS ASSESS-DOCD LE1/YR: CPT | Mod: 8P | Performed by: INTERNAL MEDICINE

## 2017-05-11 RX ORDER — CARVEDILOL 6.25 MG/1
6.25 TABLET ORAL 2 TIMES DAILY WITH MEALS
Qty: 60 TAB | Refills: 11 | Status: SHIPPED | OUTPATIENT
Start: 2017-05-11 | End: 2017-05-19 | Stop reason: SDUPTHER

## 2017-05-11 RX ORDER — VERAPAMIL HYDROCHLORIDE 200 MG/1
CAPSULE, EXTENDED RELEASE ORAL
COMMUNITY
Start: 2017-04-12 | End: 2017-05-11

## 2017-05-11 RX ORDER — TIOTROPIUM BROMIDE 18 UG/1
CAPSULE ORAL; RESPIRATORY (INHALATION)
COMMUNITY
Start: 2017-05-08 | End: 2017-11-15

## 2017-05-11 ASSESSMENT — ENCOUNTER SYMPTOMS
PALPITATIONS: 0
CLAUDICATION: 1
MYALGIAS: 0
WEIGHT LOSS: 0
DIZZINESS: 0
BLURRED VISION: 0
COUGH: 0
LOSS OF CONSCIOUSNESS: 0
EYE DISCHARGE: 0
BRUISES/BLEEDS EASILY: 0
SHORTNESS OF BREATH: 1
BLOOD IN STOOL: 0
ORTHOPNEA: 0
VOMITING: 0
PND: 0
FEVER: 0
FALLS: 0
DEPRESSION: 0
HALLUCINATIONS: 0
EYE PAIN: 0
CHILLS: 0
DOUBLE VISION: 0
SENSORY CHANGE: 0
HEADACHES: 0
SPEECH CHANGE: 0
NAUSEA: 0
ABDOMINAL PAIN: 0

## 2017-05-11 ASSESSMENT — MINNESOTA LIVING WITH HEART FAILURE QUESTIONNAIRE (MLHF)
DIFFICULTY GOING AWAY FROM HOME: 2
DIFFICULTY WORKING TO EARN A LIVING: 0
FEELING LIKE A BURDEN TO FAMILY AND FRIENDS: 2
MAKING YOU SHORT OF BREATH: 5
DIFFICULTY TO CONCENTRATE OR REMEMBERING THINGS: 1
COSTING YOU MONEY FOR MEDICAL CARE: 3
TOTAL_SCORE: 42
EATING LESS FOODS YOU LIKE: 4
MAKING YOU FEEL DEPRESSED: 3
WALKING ABOUT OR CLIMBING STAIRS DIFFICULT: 2
DIFFICULTY SOCIALIZING WITH FAMILY OR FRIENDS: 3
SWELLING IN ANKLES OR LEGS: 0
LOSS OF SELF CONTROL IN YOUR LIFE: 4
HAVING TO SIT OR LIE DOWN DURING THE DAY: 1
GIVING YOU SIDE EFFECTS FROM TREATMENTS: 0
DIFFICULTY WITH RECREATIONAL PASTIMES, SPORTS, HOBBIES: 2
MAKING YOU STAY IN A HOSPITAL: 0
WORKING AROUND THE HOUSE OR YARD DIFFICULT: 2
TIRED, FATIGUED OR LOW ON ENERGY: 5
DIFFICULTY SLEEPING WELL AT NIGHT: 3
DIFFICULTY WITH SEXUAL ACTIVITIES: 0
MAKING YOU WORRY: 0

## 2017-05-11 ASSESSMENT — NEW YORK HEART ASSOCIATION (NYHA) CLASSIFICATION: NYHA FUNCTIONAL CLASS: CLASS III

## 2017-05-11 ASSESSMENT — 6 MINUTE WALK TEST (6MWT): TOTAL DISTANCE WALKED (METERS): 76.2

## 2017-05-11 NOTE — Clinical Note
[facility]   [address], [address]  [place] [address]  Phone: [phone]  Fax: [phone]              Lex Harden  7/21/1933    Encounter Date: 5/11/2017    Donny Castillo M.D.          PROGRESS NOTE:  Subjective:   Lex Harden is a 83 y.o. male who presents today for cardiac care and evaluation in our heart failure clinic after his recent hospital stay. Patient was in the hospital because of colitis. He was incidentally found to have left ventricular systolic function of 30% on his transthoracic echocardiogram. He was followed in our practice in the past with Dr. Hong.    Patient was able to complete 76 m during his 6 minute walk test. his O2 saturation at baseline was 92% and at the end of the test, the O2 saturation was 92%. he reported 2 level of dyspnea on Pedro scale.    He does not have symptom at rest but does get winded with daily living activities. He also has claudication.    His creatinine is 2.35.    Past Medical History   Diagnosis Date   • Hypercholesteremia    • Diabetes    • Hypertension    • PVD (peripheral vascular disease) (CMS-HCC)    • AVM (arteriovenous malformation) of colon    • Other and unspecified angina pectoris    • Back pain    • Unspecified cataract      right eye    • Chronic airway obstruction, not elsewhere classified    • Infectious disease      Past Surgical History   Procedure Laterality Date   • Colonoscopy with apc  9/28/2010     Performed by EDMUND CHENG at SURGERY [facility]   • Gastroscopy with apc  9/28/2010     Performed by EDMUND CHENG at SURGERY [facility]   • Colonoscopy with apc  4/21/2011     Performed by EDMUND CHENG at SURGERY [facility]   • Gastroscopy-endo  4/21/2011     Performed by EDMUND CHENG at SURGERY [facility]   • Gastroscopy-endo  5/1/2011     Performed by HOLLY VASQUEZ at ENDOSCOPY [facility] ORS   • Colonoscopy - endo  5/1/2011    "Performed by HOLLY VASQUEZ at ENDOSCOPY [facility]   • 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 at ENDOSCOPY [facility]   • Colonoscopy with apc  11/1/2014     Performed by Eddie Levin M.D. at ENDOSCOPY [facility]   • Colonoscopy  6/17/2015     Procedure: COLONOSCOPY;  Surgeon: Eddie Levin M.D.;  Location: SURGERY [facility];  Service:    • Gastroscopy  6/17/2015     Procedure: GASTROSCOPY W/APC;  Surgeon: Eddie Levin M.D.;  Location: SURGERY [facility];  Service:      Family History   Problem Relation Age of Onset   • Non-contributory Neg Hx      \"unknown\"     History   Smoking status   • Former Smoker   Smokeless tobacco   • Not on file     Comment: Hx smoking x 40 yrs. Quit 1983     Allergies   Allergen Reactions   • Nkda [No Known Drug Allergy]      Outpatient Encounter Prescriptions as of 5/11/2017   Medication Sig Dispense Refill   • SPIRIVA HANDIHALER 18 MCG Cap      • carvedilol (COREG) 6.25 MG Tab Take 1 Tab by mouth 2 times a day, with meals. 60 Tab 11   • clopidogrel (PLAVIX) 75 MG Tab Take 75 mg by mouth every morning.     • enalapril (VASOTEC) 10 MG TABS Take 10 mg by mouth every day.     • allopurinol (ZYLOPRIM) 100 MG TABS Take 100 mg by mouth every day.     • atorvastatin (LIPITOR) 10 MG TABS Take 5 mg by mouth every day.     • Cholecalciferol (VITAMIN D) 2000 UNIT TABS Take 4,000 Units by mouth every day.     • Ferrous Sulfate (IRON) 325 (65 FE) MG TABS Take 1 Tab by mouth every morning.     • terazosin (HYTRIN) 5 MG CAPS Take 5 mg by mouth every day.     • cimetidine (TAGAMET) 200 MG tablet      • [DISCONTINUED] Verapamil HCl  MG CAPSULE SR 24 HR      • [DISCONTINUED] carvedilol (COREG) 3.125 MG Tab Take 1 Tab by mouth 2 times a day, with meals. Check BP before taking medication. Hold if SBP < 100, DBP < 60, HR < 55 60 Tab 0   • [DISCONTINUED] verapamil " "ER (CALAN-SR) 180 MG TBCR Take 180 mg by mouth every day.       No facility-administered encounter medications on file as of 5/11/2017.     Review of Systems   Constitutional: Negative for fever, chills, weight loss and malaise/fatigue.   HENT: Negative for ear discharge, ear pain, hearing loss and nosebleeds.    Eyes: Negative for blurred vision, double vision, pain and discharge.   Respiratory: Positive for shortness of breath. Negative for cough.    Cardiovascular: Positive for claudication. Negative for chest pain, palpitations, orthopnea, leg swelling and PND.   Gastrointestinal: Negative for nausea, vomiting, abdominal pain, blood in stool and melena.   Genitourinary: Negative for dysuria and hematuria.   Musculoskeletal: Negative for myalgias, joint pain and falls.   Skin: Negative for itching and rash.   Neurological: Negative for dizziness, sensory change, speech change, loss of consciousness and headaches.   Endo/Heme/Allergies: Negative for environmental allergies. Does not bruise/bleed easily.   Psychiatric/Behavioral: Negative for depression, suicidal ideas and hallucinations.        Objective:   /60 mmHg  Pulse 62  Ht 1.753 m (5' 9.02\")  Wt 81.829 kg (180 lb 6.4 oz)  BMI 26.63 kg/m2  SpO2 92%    Physical Exam   Constitutional: He is oriented to person, place, and time. No distress.   HENT:   Head: Normocephalic and atraumatic.   Eyes: EOM are normal.   Neck: Normal range of motion. No JVD present.   Cardiovascular: Normal rate, regular rhythm, normal heart sounds and intact distal pulses.  Exam reveals no gallop and no friction rub.    No murmur heard.  Bilateral femoral pulses are 2+, bilateral dorsalis pedis pulses are 2+, bilateral posterior tibialis pulses are 2+.   Pulmonary/Chest: No respiratory distress. He has no wheezes. He has no rales. He exhibits no tenderness.   Abdominal: Soft. Bowel sounds are normal. There is no tenderness. There is no rebound and no guarding.   The is no " presence of abdominal bruits   Musculoskeletal: Normal range of motion. He exhibits no edema or tenderness.   Neurological: He is alert and oriented to person, place, and time.   Skin: Skin is warm and dry.   Psychiatric: He has a normal mood and affect.   Nursing note and vitals reviewed.      Assessment:     1. ACC/AHA stage C systolic heart failure (CMS-[facility], [facility])  CT PULMONARY STRESS TESTING,SIMPLE    carvedilol (COREG) 6.25 MG Tab   2. Heart failure, NYHA class 3 (CMS-HCC)  CT PULMONARY STRESS TESTING,SIMPLE    carvedilol (COREG) 6.25 MG Tab   3. PAD (peripheral artery disease) (CMS-HCC)  CT PULMONARY STRESS TESTING,SIMPLE    carvedilol (COREG) 6.25 MG Tab   4. Dyslipidemia  CT PULMONARY STRESS TESTING,SIMPLE    carvedilol (COREG) 6.25 MG Tab   5. CKD (chronic kidney disease) stage 3, GFR 30-59 ml/min  CT PULMONARY STRESS TESTING,SIMPLE    carvedilol (COREG) 6.25 MG Tab   6. Dyspnea on exertion  CT PULMONARY STRESS TESTING,SIMPLE    carvedilol (COREG) 6.25 MG Tab       Medical Decision Making:  Today's Assessment / Status / Plan:     Today, based on physical examination findings, patient is euvolemic. No JVD, lungs are clear to auscultation, no pitting edema in bilateral lower extremities, no ascites.    At this time, patient has multiple comorbidities. I did go over the overall prognosis along with treatment plan. I presented to the patient that at this time, the next step would be to do an invasive coronary angiogram to assess for coronary ischemia. However, with his current renal function being not optimal, he will be at risk of developing permanent renal damage and dialysis. Therefore, after talking about risk and benefits, patient has agreed to approach conservative medical management at this time. No invasive coronary angiogram is going to be done for now.    We will increase carvedilol to 6.25 twice a day. Continue enalapril 10 mg. No spironolactone because of renal insufficiency and risks of  hyperkalemia.    He will come back to our heart failure clinic and be part of our program in one week for follow-up and meds titration. Consider increasing his carvedilol to 12.5 twice a day at next visit if it's okay.      Kirill Fabian D.O.  [address]   [address]  [place] [address]  VIA Facsimile: [phone]

## 2017-05-11 NOTE — MR AVS SNAPSHOT
"Lex Harden   2017 9:45 AM   Office Visit   MRN: [id number]    Department:  [facility]   Dept Phone:  [phone]    Description:  Male : 1933   Provider:  Donny Castillo M.D.           Reason for Visit     New Patient           Allergies as of 2017     Allergen Noted Reactions    Nkda [No Known Drug Allergy] 2007         You were diagnosed with     ACC/AHA stage C systolic heart failure (CMS-HCC)   [4178569]       Heart failure, NYHA class 3 (CMS-HCC)   [038088]       PAD (peripheral artery disease) (CMS-HCC)   [307755]       Dyslipidemia   [320470]       CKD (chronic kidney disease) stage 3, GFR 30-59 ml/min   [303954]       Dyspnea on exertion   [152485]         Vital Signs     Blood Pressure Pulse Height Weight Body Mass Index Oxygen Saturation    116/60 mmHg 62 1.753 m (5' 9.02\") 81.829 kg (180 lb 6.4 oz) 26.63 kg/m2 92%    Smoking Status                   Former Smoker           Basic Information     Date Of Birth Sex Race Ethnicity Preferred Language    1933 Male White Non- English      Problem List              ICD-10-CM Priority Class Noted - Resolved    Acute on Chronic blood loss anemia D50.0 High  2011 - Present    HTN (hypertension) I10 Medium  2011 - Present    Dyslipidemia E78.5 Low  2011 - Present    DM (diabetes mellitus) (CMS-HCC) E11.9 Medium  2011 - Present    HEMORRHOIDS    2011 - Present    Diverticulosis K57.90   2011 - Present    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 Q27.33 High  10/31/2014 - Present    CKD (chronic kidney disease) stage 3, GFR 30-59 ml/min N18.3 Medium  10/31/2014 - Present    COPD (chronic obstructive pulmonary disease) (CMS-HCC) J44.9 Medium  10/31/2014 - Present    Peripheral vascular disease (CMS-HCC) I73.9   10/31/2014 - Present    Angina pectoris (CMS-HCC) I20.9 High  2014 - Present    Left carotid stenosis I65.22   2014 - Present    Subclavian artery stenosis, " left (CMS-HCC) I77.1   11/5/2014 - Present    Aortic valve stenosis I35.0   11/5/2014 - Present    GIB (gastrointestinal bleeding) K92.2   6/16/2015 - Present    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   6/16/2015 - Present    Hypotension I95.9   6/16/2015 - Present    Acute blood loss anemia D62   6/17/2015 - Present    Type 2 diabetes mellitus without complication (HCC) E11.9   4/28/2017 - Present    BPH (benign prostatic hyperplasia) N40.0   4/28/2017 - Present      Health Maintenance        Date Due Completion Dates    DIABETES MONOFILAMENT / LE EXAM 1/21/1934 ---    RETINAL SCREENING 7/21/1951 ---    IMM DTaP/Tdap/Td Vaccine (1 - Tdap) 7/21/1952 ---    IMM ZOSTER VACCINE 7/21/1993 ---    IMM PNEUMOCOCCAL 65+ (ADULT) HIGH/HIGHEST RISK SERIES (1 of 2 - PCV13) 7/21/1998 ---    URINE ACR / MICROALBUMIN 8/15/2014 8/15/2013    A1C SCREENING 10/28/2017 4/28/2017, 7/11/2016, 2/21/2014, 7/19/2012, 9/10/2011, 12/31/2007    FASTING LIPID PROFILE 2/8/2018 2/8/2017, 1/12/2015, 7/10/2013, 9/10/2011, 12/31/2007    SERUM CREATININE 5/8/2018 5/8/2017, 5/1/2017, 4/30/2017, 4/29/2017, 4/28/2017, 4/27/2017, 2/8/2017, 2/8/2017, 10/19/2016, 7/14/2016, 7/11/2016, 2/23/2016, 11/2/2015, 8/5/2015, 7/27/2015, 6/23/2015, 6/19/2015, 6/18/2015, 6/17/2015, 6/16/2015, 6/10/2015, 4/8/2015, 1/9/2015, 11/19/2014, 11/18/2014, 11/17/2014, 11/16/2014, 11/15/2014, 11/2/2014, 11/1/2014, 10/31/2014, 10/28/2014, 6/3/2014, 4/17/2014, 2/21/2014, 2/19/2014, 12/31/2013, 12/18/2013, 11/13/2013, 9/30/2013, 8/15/2013, 7/15/2013, 7/10/2013, 7/19/2012, 9/10/2011, 5/2/2011, 4/30/2011, 4/30/2011, 4/21/2011, 2/16/2011, 8/27/2010, 12/31/2007    COLONOSCOPY 6/17/2025 6/17/2015, 11/1/2014, 5/1/2011, 4/21/2011, 9/28/2010            Current Immunizations     No immunizations on file.      Below and/or attached are the medications your provider expects you to take. Review all of your home medications and newly ordered medications with your provider and/or  pharmacist. Follow medication instructions as directed by your provider and/or pharmacist. Please keep your medication list with you and share with your provider. Update the information when medications are discontinued, doses are changed, or new medications (including over-the-counter products) are added; and carry medication information at all times in the event of emergency situations     Allergies:  NKDA - (reactions not documented)               Medications  Valid as of: May 11, 2017 - 10:31 AM    Generic Name Brand Name Tablet Size Instructions for use    Allopurinol (Tab) ZYLOPRIM 100 MG Take 100 mg by mouth every day.        Atorvastatin Calcium (Tab) LIPITOR 10 MG Take 5 mg by mouth every day.        Carvedilol (Tab) COREG 6.25 MG Take 1 Tab by mouth 2 times a day, with meals.        Cholecalciferol (Tab) vitamin D 2000 UNIT Take 4,000 Units by mouth every day.        Cimetidine (Tab) TAGAMET 200 MG         Clopidogrel Bisulfate (Tab) PLAVIX 75 MG Take 75 mg by mouth every morning.        Enalapril Maleate (Tab) VASOTEC 10 MG Take 10 mg by mouth every day.        Ferrous Sulfate (Tab) Iron 325 (65 FE) MG Take 1 Tab by mouth every morning.        Terazosin HCl (Cap) HYTRIN 5 MG Take 5 mg by mouth every day.        Tiotropium Bromide Monohydrate (Cap) SPIRIVA HANDIHALER 18 MCG         .                 Medicines prescribed today were sent to:     [facility] [address] [place] [address] - [address] [facility]    [address] [place] [address]    Phone: [phone] Fax: [phone]    Open 24 Hours?: No      Medication refill instructions:       If your prescription bottle indicates you have medication refills left, it is not necessary to call your provider’s office. Please contact your pharmacy and they will refill your medication.    If your prescription bottle indicates you do not have any refills left, you may request refills at any time through one of the following ways: The online [employer] system (except Urgent  Care), by calling your provider’s office, or by asking your pharmacy to contact your provider’s office with a refill request. Medication refills are processed only during regular business hours and may not be available until the next business day. Your provider may request additional information or to have a follow-up visit with you prior to refilling your medication.   *Please Note: Medication refills are assigned a new Rx number when refilled electronically. Your pharmacy may indicate that no refills were authorized even though a new prescription for the same medication is available at the pharmacy. Please request the medicine by name with the pharmacy before contacting your provider for a refill.           [employer] Access Code: Activation code not generated  Current [employer] Status: Active

## 2017-05-11 NOTE — PROGRESS NOTES
Subjective:   Lex Harden is a 83 y.o. male who presents today for cardiac care and evaluation in our heart failure clinic after his recent hospital stay. Patient was in the hospital because of colitis. He was incidentally found to have left ventricular systolic function of 30% on his transthoracic echocardiogram. He was followed in our practice in the past with Dr. Hong.    Patient was able to complete 76 m during his 6 minute walk test. his O2 saturation at baseline was 92% and at the end of the test, the O2 saturation was 92%. he reported 2 level of dyspnea on Pedro scale.    He does not have symptom at rest but does get winded with daily living activities. He also has claudication.    His creatinine is 2.35.    Past Medical History   Diagnosis Date   • Hypercholesteremia    • Diabetes    • Hypertension    • PVD (peripheral vascular disease) (CMS-[facility])    • AVM (arteriovenous malformation) of colon    • Other and unspecified angina pectoris    • Back pain    • Unspecified cataract      right eye    • Chronic airway obstruction, not elsewhere classified    • Infectious disease      Past Surgical History   Procedure Laterality Date   • Colonoscopy with apc  9/28/2010     Performed by EDMUND CHENG at SURGERY [facility]   • Gastroscopy with apc  9/28/2010     Performed by EDMUND CHENG at SURGERY [facility]   • Colonoscopy with apc  4/21/2011     Performed by EDMUND CHENG at SURGERY [facility]   • Gastroscopy-endo  4/21/2011     Performed by EDMUND CHENG at SURGERY [facility]   • Gastroscopy-endo  5/1/2011     Performed by HOLLY VASQUEZ at ENDOSCOPY [facility]   • Colonoscopy - endo  5/1/2011     Performed by HOLLY VASQUEZ at ENDOSCOPY [facility]   • 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 at ENDOSCOPY [facility]   • Colonoscopy with apc  11/1/2014      "Performed by Eddie Levin M.D. at ENDOSCOPY [facility]   • Colonoscopy  6/17/2015     Procedure: COLONOSCOPY;  Surgeon: Eddie Levin M.D.;  Location: SURGERY [facility];  Service:    • Gastroscopy  6/17/2015     Procedure: GASTROSCOPY W/APC;  Surgeon: Eddie Levin M.D.;  Location: SURGERY [facility];  Service:      Family History   Problem Relation Age of Onset   • Non-contributory Neg Hx      \"unknown\"     History   Smoking status   • Former Smoker   Smokeless tobacco   • Not on file     Comment: Hx smoking x 40 yrs. Quit 1983     Allergies   Allergen Reactions   • Nkda [No Known Drug Allergy]      Outpatient Encounter Prescriptions as of 5/11/2017   Medication Sig Dispense Refill   • SPIRIVA HANDIHALER 18 MCG Cap      • carvedilol (COREG) 6.25 MG Tab Take 1 Tab by mouth 2 times a day, with meals. 60 Tab 11   • clopidogrel (PLAVIX) 75 MG Tab Take 75 mg by mouth every morning.     • enalapril (VASOTEC) 10 MG TABS Take 10 mg by mouth every day.     • allopurinol (ZYLOPRIM) 100 MG TABS Take 100 mg by mouth every day.     • atorvastatin (LIPITOR) 10 MG TABS Take 5 mg by mouth every day.     • Cholecalciferol (VITAMIN D) 2000 UNIT TABS Take 4,000 Units by mouth every day.     • Ferrous Sulfate (IRON) 325 (65 FE) MG TABS Take 1 Tab by mouth every morning.     • terazosin (HYTRIN) 5 MG CAPS Take 5 mg by mouth every day.     • cimetidine (TAGAMET) 200 MG tablet      • [DISCONTINUED] Verapamil HCl  MG CAPSULE SR 24 HR      • [DISCONTINUED] carvedilol (COREG) 3.125 MG Tab Take 1 Tab by mouth 2 times a day, with meals. Check BP before taking medication. Hold if SBP < 100, DBP < 60, HR < 55 60 Tab 0   • [DISCONTINUED] verapamil ER (CALAN-SR) 180 MG TBCR Take 180 mg by mouth every day.       No facility-administered encounter medications on file as of 5/11/2017.     Review of Systems   Constitutional: Negative for fever, chills, weight loss and malaise/fatigue.   HENT: Negative for " "ear discharge, ear pain, hearing loss and nosebleeds.    Eyes: Negative for blurred vision, double vision, pain and discharge.   Respiratory: Positive for shortness of breath. Negative for cough.    Cardiovascular: Positive for claudication. Negative for chest pain, palpitations, orthopnea, leg swelling and PND.   Gastrointestinal: Negative for nausea, vomiting, abdominal pain, blood in stool and melena.   Genitourinary: Negative for dysuria and hematuria.   Musculoskeletal: Negative for myalgias, joint pain and falls.   Skin: Negative for itching and rash.   Neurological: Negative for dizziness, sensory change, speech change, loss of consciousness and headaches.   Endo/Heme/Allergies: Negative for environmental allergies. Does not bruise/bleed easily.   Psychiatric/Behavioral: Negative for depression, suicidal ideas and hallucinations.        Objective:   /60 mmHg  Pulse 62  Ht 1.753 m (5' 9.02\")  Wt 81.829 kg (180 lb 6.4 oz)  BMI 26.63 kg/m2  SpO2 92%    Physical Exam   Constitutional: He is oriented to person, place, and time. No distress.   HENT:   Head: Normocephalic and atraumatic.   Eyes: EOM are normal.   Neck: Normal range of motion. No JVD present.   Cardiovascular: Normal rate, regular rhythm, normal heart sounds and intact distal pulses.  Exam reveals no gallop and no friction rub.    No murmur heard.  Bilateral femoral pulses are 2+, bilateral dorsalis pedis pulses are 2+, bilateral posterior tibialis pulses are 2+.   Pulmonary/Chest: No respiratory distress. He has no wheezes. He has no rales. He exhibits no tenderness.   Abdominal: Soft. Bowel sounds are normal. There is no tenderness. There is no rebound and no guarding.   The is no presence of abdominal bruits   Musculoskeletal: Normal range of motion. He exhibits no edema or tenderness.   Neurological: He is alert and oriented to person, place, and time.   Skin: Skin is warm and dry.   Psychiatric: He has a normal mood and affect. "   Nursing note and vitals reviewed.      Assessment:     1. ACC/AHA stage C systolic heart failure (CMS-HCC)  NC PULMONARY STRESS TESTING,SIMPLE    carvedilol (COREG) 6.25 MG Tab   2. Heart failure, NYHA class 3 (CMS-HCC)  NC PULMONARY STRESS TESTING,SIMPLE    carvedilol (COREG) 6.25 MG Tab   3. PAD (peripheral artery disease) (CMS-HCC)  NC PULMONARY STRESS TESTING,SIMPLE    carvedilol (COREG) 6.25 MG Tab   4. Dyslipidemia  NC PULMONARY STRESS TESTING,SIMPLE    carvedilol (COREG) 6.25 MG Tab   5. CKD (chronic kidney disease) stage 3, GFR 30-59 ml/min  NC PULMONARY STRESS TESTING,SIMPLE    carvedilol (COREG) 6.25 MG Tab   6. Dyspnea on exertion  NC PULMONARY STRESS TESTING,SIMPLE    carvedilol (COREG) 6.25 MG Tab       Medical Decision Making:  Today's Assessment / Status / Plan:     Today, based on physical examination findings, patient is euvolemic. No JVD, lungs are clear to auscultation, no pitting edema in bilateral lower extremities, no ascites.    At this time, patient has multiple comorbidities. I did go over the overall prognosis along with treatment plan. I presented to the patient that at this time, the next step would be to do an invasive coronary angiogram to assess for coronary ischemia. However, with his current renal function being not optimal, he will be at risk of developing permanent renal damage and dialysis. Therefore, after talking about risk and benefits, patient has agreed to approach conservative medical management at this time. No invasive coronary angiogram is going to be done for now.    We will increase carvedilol to 6.25 twice a day. Continue enalapril 10 mg. No spironolactone because of renal insufficiency and risks of hyperkalemia.    He will come back to our heart failure clinic and be part of our program in one week for follow-up and meds titration. Consider increasing his carvedilol to 12.5 twice a day at next visit if it's okay.

## 2017-05-12 NOTE — PROGRESS NOTES
"Heart Failure New Appointment     6MWT- 76.2 meters, stopped after 2:04 due to extreme SOB and musculoskeletal pain.  MLWHF- 42    OP Heart Failure  Vitals  Appointment Type: Heart Failure New  Weight: 81.829 kg (180 lb 6.4 oz)  How Weight Obtained: Stand Up Scale  Height: 175.3 cm (5' 9.02\")  BMI (Calculated): 26.63  Blood Pressure : 116/60 mmHg  Pulse: 62    System Assessment  NYHA Functional Class Assessment: Class III  ACC/AHA HF Stage: C    Smoking Hx  Have you Ever Smoked: Yes  Have you Smoked in the Last 12 Mos: No  Confirm Quit Date:  (Patient unsure-\"more than 20 years\")     Alcohol Hx  Do you Drink?: No    Illicit Drug Hx  Illicit Drug History: No    Social Hx  Social History: Lives with Spouse  Level of Support: Excellent  Advance Directives: Began discussion, Paperwork provided    Education  Symptoms: Verbalizes understanding  Weighing: Needs Reinforcement  Weight Gain Response: Verbalizes Understanding   Recording Data: Verbalizes understanding  Teach Back Failures: Teach Back Successful  Compliance: Patient is Compliant     Medications  Medication Reconciliation : Complete  Medication Counseling Provided: Verbalizes Understanding  Able to Accurately Identify Medication Indications: Yes  Medication Discrepancies: None  Is Patient on an Evidence Based Beta Blocker: Yes  Is Patient on ACE-1 or ARB: Yes    Dietary Assessment  Food Labels: Verbalizes Understanding  Foods High in Sodium: Verbalizes Understanding  Daily Sodium Intake: Verbalizes Understanding  Diet: Verbalizes Understanding  Food Preparation: Verbalizes Understanding  Eating Out Plan: Verbalizes understanding  Healthier Options: Verbalizes Understanding  Fluid Restriction: Not Applicable    MN Living with Heart Failure  Swelling in Ankles or Legs: 0  Having to Sit or Lie Down During the Day: 1  Walking About or Climbing Stairs Difficult: 2  Working Around the House or Yard Difficult: 2  Difficulty Going Away from Home: 2  Difficulty Sleeping " Well at Night: 3  Difficulty Socializing with Family or Friends: 3  Difficulty Working to Earn a Livin  Difficulty with Recreational Pastimes, Sports, Hobbies: 2  Difficulty with Sexual Activities: 0  Eating Less Foods You Like: 4  Making you Short of Breath: 5  Tired, Fatigued or Low on Energy: 5  Making you Stay in a Hospital: 0  Costing you Money for Medical Care?: 3  Giving you Side Effects from Treatments: 0  Feeling like a [place] to Family and Friends: 2  Loss of Self Control in your Life: 4  Making You Worry: 0  Difficulty to Concentrate or Remembering Things: 1  Making you Feel Depressed: 3  MLHF Total Score : 42    6 Minute Walk Test  Baseline to end of test: 2.04  Total meters walked: 76.2     Education Narrative  Reviewed anatomy and physiology of heart failure with patient, wife, and daughter. Went over heart failure worksheet and patient's individual HF diagnosis, EF, risk factors, general medication classes and indications, as well as personal goals.  Goals: Patient's personal goal is to increase his exercise tolerance.    Discussed daily weights, sodium restriction, worsening signs and symptoms to report to physician, heart medications, and importance of adherence to medication regimen. Patient reports he eats very well and did engage in a walking regimen before he went into the hospital. He was only able to walk for 2:08 minutes. We discussed safely increasing his exercise by walking around the house, starting with two minutes-his tolerance during the walk test. Patient and family v/u.    Reviewed dietary handouts, advance directive planning handout, and informed patient of HF new appointment follow-up phone call. Patient states he does not cook with salt and eats very healthy food-his family members endorse his good eating habits.     Invited patient and family members/friends to HF support group and encouraged patient to call Heart Failure clinic during normal business hours with any  questions.        Patient and family members state full understanding of all information given.     ADITI Vang RN  [id number]

## 2017-05-19 ENCOUNTER — OFFICE VISIT (OUTPATIENT)
Dept: CARDIOLOGY | Facility: MEDICAL CENTER | Age: 82
End: 2017-05-19
Payer: MEDICARE

## 2017-05-19 VITALS
HEIGHT: 69 IN | DIASTOLIC BLOOD PRESSURE: 62 MMHG | BODY MASS INDEX: 26.51 KG/M2 | HEART RATE: 68 BPM | WEIGHT: 179 LBS | SYSTOLIC BLOOD PRESSURE: 122 MMHG | OXYGEN SATURATION: 94 %

## 2017-05-19 DIAGNOSIS — I73.9 PAD (PERIPHERAL ARTERY DISEASE) (HCC): ICD-10-CM

## 2017-05-19 DIAGNOSIS — E78.5 DYSLIPIDEMIA: ICD-10-CM

## 2017-05-19 DIAGNOSIS — I50.20 ACC/AHA STAGE C SYSTOLIC HEART FAILURE (HCC): ICD-10-CM

## 2017-05-19 DIAGNOSIS — I50.9 HEART FAILURE, NYHA CLASS 3 (HCC): ICD-10-CM

## 2017-05-19 DIAGNOSIS — N18.30 CKD (CHRONIC KIDNEY DISEASE) STAGE 3, GFR 30-59 ML/MIN (HCC): ICD-10-CM

## 2017-05-19 PROCEDURE — 99214 OFFICE O/P EST MOD 30 MIN: CPT | Performed by: NURSE PRACTITIONER

## 2017-05-19 PROCEDURE — G8432 DEP SCR NOT DOC, RNG: HCPCS | Performed by: NURSE PRACTITIONER

## 2017-05-19 PROCEDURE — 1111F DSCHRG MED/CURRENT MED MERGE: CPT | Performed by: NURSE PRACTITIONER

## 2017-05-19 PROCEDURE — 4040F PNEUMOC VAC/ADMIN/RCVD: CPT | Mod: 8P | Performed by: NURSE PRACTITIONER

## 2017-05-19 PROCEDURE — G8598 ASA/ANTIPLAT THER USED: HCPCS | Performed by: NURSE PRACTITIONER

## 2017-05-19 PROCEDURE — 1101F PT FALLS ASSESS-DOCD LE1/YR: CPT | Mod: 8P | Performed by: NURSE PRACTITIONER

## 2017-05-19 PROCEDURE — G8419 CALC BMI OUT NRM PARAM NOF/U: HCPCS | Performed by: NURSE PRACTITIONER

## 2017-05-19 PROCEDURE — 1036F TOBACCO NON-USER: CPT | Performed by: NURSE PRACTITIONER

## 2017-05-19 RX ORDER — CARVEDILOL 12.5 MG/1
12.5 TABLET ORAL 2 TIMES DAILY WITH MEALS
Qty: 60 TAB | Refills: 11 | Status: SHIPPED | OUTPATIENT
Start: 2017-05-19 | End: 2017-07-06 | Stop reason: SDUPTHER

## 2017-05-19 RX ORDER — CARVEDILOL 3.12 MG/1
TABLET ORAL
COMMUNITY
Start: 2017-05-01 | End: 2017-05-19

## 2017-05-19 ASSESSMENT — ENCOUNTER SYMPTOMS
DIZZINESS: 1
SHORTNESS OF BREATH: 1
FEVER: 0
ABDOMINAL PAIN: 0
PND: 1
CLAUDICATION: 0
MYALGIAS: 0
PALPITATIONS: 0
ORTHOPNEA: 0
COUGH: 0

## 2017-05-19 NOTE — Clinical Note
[facility]   [address], [facility] [address]  [place] [address]  Phone: [phone]  Fax: [phone]              Lex Harden  7/21/1933    Encounter Date: 5/19/2017    NAILA Read          PROGRESS NOTE:  Subjective:   Lex Harden is a 83 y.o. male who presents today for follow up on his heart failure.    Patient of Dr. Castillo. Patient was last seen 5/11/17. His medications were adjusted. Carvedilol was increased to 6.25 mg BID. Pt was continued on Enalapril 20 mg daily. Pt  Has been tolerating the dose increase. Pt continues to have SOB with ADLs and exertion. Pt did have an episode of PND last night. Otherwise, pt denies chest pain, shortness of breath at rest, palpitations, dizziness/lightheadedness, orthopnea, or Edema. Pt does not weigh himself regularly.     Additonally, patient has the following medical problems:    -HTN: on enalapril 20 mg daily, Carvedilol 6.25 mg daily    -DLD: taking Atorvastatin    -CKD stage 3: Followed by Neph    -COPD: using inhalers    Past Medical History   Diagnosis Date   • Hypercholesteremia    • Diabetes    • Hypertension    • PVD (peripheral vascular disease) (CMS-HCC)    • AVM (arteriovenous malformation) of colon    • Other and unspecified angina pectoris    • Back pain    • Unspecified cataract      right eye    • Chronic airway obstruction, not elsewhere classified    • Infectious disease      Past Surgical History   Procedure Laterality Date   • Colonoscopy with apc  9/28/2010     Performed by EDMUND CHENG at SURGERY [facility]   • Gastroscopy with apc  9/28/2010     Performed by EDMUND CHENG at SURGERY [facility] ORS   • Colonoscopy with apc  4/21/2011     Performed by EDMUND CHENG at SURGERY [facility] ORS   • Gastroscopy-endo  4/21/2011     Performed by EDMUND CHENG at SURGERY [facility]   • Gastroscopy-endo  5/1/2011     Performed by HOLLY VASQUEZ at ENDOSCOPY  "[facility] ORS   • Colonoscopy - endo  5/1/2011     Performed by HOLLY VASQUEZ at ENDOSCOPY [facility]   • 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 at ENDOSCOPY [facility]   • Colonoscopy with apc  11/1/2014     Performed by Eddie Levin M.D. at ENDOSCOPY [facility]   • Colonoscopy  6/17/2015     Procedure: COLONOSCOPY;  Surgeon: Eddie Levin M.D.;  Location: SURGERY [facility];  Service:    • Gastroscopy  6/17/2015     Procedure: GASTROSCOPY W/APC;  Surgeon: Eddie Levin M.D.;  Location: SURGERY [facility];  Service:      Family History   Problem Relation Age of Onset   • Non-contributory Neg Hx      \"unknown\"     History   Smoking status   • Former Smoker   Smokeless tobacco   • Not on file     Comment: Hx smoking x 40 yrs. Quit 1983     Allergies   Allergen Reactions   • Nkda [No Known Drug Allergy]      Outpatient Encounter Prescriptions as of 5/19/2017   Medication Sig Dispense Refill   • SPIRIVA HANDIHALER 18 MCG Cap      • carvedilol (COREG) 6.25 MG Tab Take 1 Tab by mouth 2 times a day, with meals. 60 Tab 11   • clopidogrel (PLAVIX) 75 MG Tab Take 75 mg by mouth every morning.     • enalapril (VASOTEC) 10 MG TABS Take 10 mg by mouth every day.     • allopurinol (ZYLOPRIM) 100 MG TABS Take 100 mg by mouth every day.     • atorvastatin (LIPITOR) 10 MG TABS Take 5 mg by mouth every day.     • Cholecalciferol (VITAMIN D) 2000 UNIT TABS Take 4,000 Units by mouth every day.     • Ferrous Sulfate (IRON) 325 (65 FE) MG TABS Take 1 Tab by mouth every morning.     • terazosin (HYTRIN) 5 MG CAPS Take 5 mg by mouth every day.     • carvedilol (COREG) 3.125 MG Tab      • cimetidine (TAGAMET) 200 MG tablet        No facility-administered encounter medications on file as of 5/19/2017.     Review of Systems   Constitutional: Negative for fever and malaise/fatigue.   Respiratory: " "Positive for shortness of breath. Negative for cough.    Cardiovascular: Positive for PND. Negative for chest pain, palpitations, orthopnea, claudication and leg swelling.   Gastrointestinal: Negative for abdominal pain.   Musculoskeletal: Negative for myalgias.   Neurological: Positive for dizziness (Occ ).   All other systems reviewed and are negative.       Objective:   /62 mmHg  Pulse 68  Ht 1.753 m (5' 9.02\")  Wt 81.194 kg (179 lb)  BMI 26.42 kg/m2  SpO2 94%    Physical Exam   Constitutional: He is oriented to person, place, and time. He appears well-developed and well-nourished.   HENT:   Head: Normocephalic and atraumatic.   Eyes: EOM are normal.   Neck: Normal range of motion. Neck supple. No JVD present.   Cardiovascular: Normal rate, regular rhythm, normal heart sounds and intact distal pulses.    No murmur heard.  Pulmonary/Chest: Effort normal and breath sounds normal. No respiratory distress. He has no wheezes. He has no rales.   Abdominal: Soft. Bowel sounds are normal.   Musculoskeletal: Normal range of motion. He exhibits no edema.   Neurological: He is alert and oriented to person, place, and time.   Skin: Skin is warm and dry.   Psychiatric: He has a normal mood and affect.   Nursing note and vitals reviewed.    Lab Results   Component Value Date/Time    CHOLESTEROL, 02/08/2017 07:49 AM    LDL 57 02/08/2017 07:49 AM    HDL 29* 02/08/2017 07:49 AM    TRIGLYCERIDES 124 02/08/2017 07:49 AM       Lab Results   Component Value Date/Time    SODIUM 140 05/08/2017 07:44 AM    POTASSIUM 4.6 05/08/2017 07:44 AM    CHLORIDE 112 05/08/2017 07:44 AM    CO2 18* 05/08/2017 07:44 AM    GLUCOSE 126* 05/08/2017 07:44 AM    BUN 31* 05/08/2017 07:44 AM    CREATININE 2.35* 05/08/2017 07:44 AM     Lab Results   Component Value Date/Time    ALKALINE PHOSPHATASE 44 05/08/2017 07:44 AM    AST(SGOT) 13 05/08/2017 07:44 AM    ALT(SGPT) 9 05/08/2017 07:44 AM    TOTAL BILIRUBIN 0.6 05/08/2017 07:44 AM      "   Transthoracic Echo Report 11/16/14  Technically difficult study.   Normal left ventricular systolic function.  Left ventricular ejection fraction is 60% to 65%.  Diastolic function is present.  Mild mitral regurgitation.  Mild aortic stenosis.  Mild tricuspid regurgitation.      Transthoracic Echo Report 4/28/17  Severe biventricular dysfunction. Left ventricular ejection fraction is   visually estimated to be 30%.  Global hypokinesis.  Reduced right ventricular systolic function.  Biatrial enlargement.  Right ventricular systolic pressure is estimated to be 35 mmHg.  Moderate mitral regurgitation due to annular dilation.  Cardiologist on case notified at time of reading.  There has been a   decrease in the LVEF from the study of 2014.      Assessment:     1. ACC/AHA stage C systolic heart failure (CMS-HCC)  carvedilol (COREG) 12.5 MG Tab   2. Heart failure, NYHA class 3 (CMS-HCC)  carvedilol (COREG) 12.5 MG Tab   3. Dyslipidemia  carvedilol (COREG) 12.5 MG Tab   4. CKD (chronic kidney disease) stage 3, GFR 30-59 ml/min  carvedilol (COREG) 12.5 MG Tab   5. PAD (peripheral artery disease) (CMS-HCC)  carvedilol (COREG) 12.5 MG Tab     Medical Decision Making:  Today's Assessment / Status / Plan:   1. HFrEF, Stage C ,class 3, EF 30%: Based on physical examination findings, patient is euvolemic. No JVD, lungs are clear to auscultation, no pitting edema in bilateral lower extremities, no ascites.  -Increase carvedilol to 12.5 mg BID  -Continue enalapril 20 mg Daily  -No kevyn due kidney disease  -Reinforced s/sx of worsening heart failure with patient and weight monitoring. Pt verbalizes understanding. Pt to call office or RTC if present.   -Patient to monitor weights at home daily. Pt to call office if weight increasing >3 lbs in 1 day or >5 lbs in 1 week.     2. DLD: LDL 57 2/8/17  -Continue Atorvastatin 5 mg daily    3. CKD:  -Continue follow-up with nephrology    4. PVD:  -Continue atorvastatin  -Continue walking  as tolerated    FU in clinic in one week. Sooner if needed.    Patient verbalizes understanding and agrees with the plan of care.     Collaborating MD: MD Kirill Arzola D.O.  [address]   [address]  [place] [address]  VIA Facsimile: [phone]

## 2017-05-19 NOTE — MR AVS SNAPSHOT
"Lex Harden   2017 9:20 AM   Office Visit   MRN: [id number]    Department:  [facility]   Dept Phone:  [phone]    Description:  Male : 1933   Provider:  NAILA Read           Reason for Visit     Follow-Up           Allergies as of 2017     Allergen Noted Reactions    Nkda [No Known Drug Allergy] 2007         You were diagnosed with     ACC/AHA stage C systolic heart failure (CMS-HCC)   [9131650]       Heart failure, NYHA class 3 (CMS-HCC)   [658660]       PAD (peripheral artery disease) (CMS-HCC)   [840537]       Dyslipidemia   [695058]       CKD (chronic kidney disease) stage 3, GFR 30-59 ml/min   [738277]       Dyspnea on exertion   [920816]         Vital Signs     Blood Pressure Pulse Height Weight Body Mass Index Oxygen Saturation    122/62 mmHg 68 1.753 m (5' 9.02\") 81.194 kg (179 lb) 26.42 kg/m2 94%    Smoking Status                   Former Smoker           Basic Information     Date Of Birth Sex Race Ethnicity Preferred Language    1933 Male White Non- English      Your appointments     May 30, 2017  8:20 AM   Heart Failure Established with NAILA Read   [facility] (--)    [address]  [facility] [address] [phone]              Problem List              ICD-10-CM Priority Class Noted - Resolved    Acute on Chronic blood loss anemia D50.0 High  2011 - Present    HTN (hypertension) I10 Medium  2011 - Present    Dyslipidemia E78.5 Low  2011 - Present    DM (diabetes mellitus) (CMS-HCC) E11.9 Medium  2011 - Present    HEMORRHOIDS    2011 - Present    Diverticulosis K57.90   2011 - Present    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 Q27.33 High  10/31/2014 - Present    CKD (chronic kidney disease) stage 3, GFR 30-59 ml/min N18.3 Medium  10/31/2014 - Present    COPD (chronic obstructive pulmonary disease) (CMS-HCC) J44.9 Medium  10/31/2014 - " Present    Peripheral vascular disease (CMS-HCC) I73.9   10/31/2014 - Present    Angina pectoris (CMS-HCC) I20.9 High  11/5/2014 - Present    Left carotid stenosis I65.22   11/5/2014 - Present    Subclavian artery stenosis, left (CMS-HCC) I77.1   11/5/2014 - Present    Aortic valve stenosis I35.0   11/5/2014 - Present    GIB (gastrointestinal bleeding) K92.2   6/16/2015 - Present    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   6/16/2015 - Present    Hypotension I95.9   6/16/2015 - Present    Acute blood loss anemia D62   6/17/2015 - Present    Type 2 diabetes mellitus without complication (HCC) E11.9   4/28/2017 - Present    BPH (benign prostatic hyperplasia) N40.0   4/28/2017 - Present      Health Maintenance        Date Due Completion Dates    DIABETES MONOFILAMENT / LE EXAM 1/[birth year]/1934 ---    RETINAL SCREENING 7/21/1951 ---    IMM DTaP/Tdap/Td Vaccine (1 - Tdap) 7/21/1952 ---    IMM ZOSTER VACCINE 7/21/1993 ---    IMM PNEUMOCOCCAL 65+ (ADULT) HIGH/HIGHEST RISK SERIES (1 of 2 - PCV13) 7/21/1998 ---    URINE ACR / MICROALBUMIN 8/15/2014 8/15/2013    A1C SCREENING 10/28/2017 4/28/2017, 7/11/2016, 2/21/2014, 7/19/2012, 9/10/2011, 12/31/2007    FASTING LIPID PROFILE 2/8/2018 2/8/2017, 1/12/2015, 7/10/2013, 9/10/2011, 12/31/2007    SERUM CREATININE 5/8/2018 5/8/2017, 5/1/2017, 4/30/2017, 4/29/2017, 4/28/2017, 4/27/2017, 2/8/2017, 2/8/2017, 10/19/2016, 7/14/2016, 7/11/2016, 2/23/2016, 11/2/2015, 8/5/2015, 7/27/2015, 6/23/2015, 6/19/2015, 6/18/2015, 6/17/2015, 6/16/2015, 6/10/2015, 4/8/2015, 1/9/2015, 11/19/2014, 11/18/2014, 11/17/2014, 11/16/2014, 11/15/2014, 11/2/2014, 11/1/2014, 10/31/2014, 10/28/2014, 6/3/2014, 4/17/2014, 2/21/2014, 2/19/2014, 12/31/2013, 12/18/2013, 11/13/2013, 9/30/2013, 8/15/2013, 7/15/2013, 7/10/2013, 7/19/2012, 9/10/2011, 5/2/2011, 4/30/2011, 4/30/2011, 4/21/2011, 2/16/2011, 8/27/2010, 12/31/2007    COLONOSCOPY 6/17/2025 6/17/2015, 11/1/2014, 5/1/2011, 4/21/2011, 9/28/2010            Current  Immunizations     No immunizations on file.      Below and/or attached are the medications your provider expects you to take. Review all of your home medications and newly ordered medications with your provider and/or pharmacist. Follow medication instructions as directed by your provider and/or pharmacist. Please keep your medication list with you and share with your provider. Update the information when medications are discontinued, doses are changed, or new medications (including over-the-counter products) are added; and carry medication information at all times in the event of emergency situations     Allergies:  NKDA - (reactions not documented)               Medications  Valid as of: May 19, 2017 -  9:52 AM    Generic Name Brand Name Tablet Size Instructions for use    Allopurinol (Tab) ZYLOPRIM 100 MG Take 100 mg by mouth every day.        Atorvastatin Calcium (Tab) LIPITOR 10 MG Take 5 mg by mouth every day.        Carvedilol (Tab) COREG 12.5 MG Take 1 Tab by mouth 2 times a day, with meals.        Cholecalciferol (Tab) vitamin D 2000 UNIT Take 4,000 Units by mouth every day.        Cimetidine (Tab) TAGAMET 200 MG         Clopidogrel Bisulfate (Tab) PLAVIX 75 MG Take 75 mg by mouth every morning.        Enalapril Maleate (Tab) VASOTEC 10 MG Take 20 mg by mouth every day.        Ferrous Sulfate (Tab) Iron 325 (65 FE) MG Take 1 Tab by mouth every morning.        Terazosin HCl (Cap) HYTRIN 5 MG Take 5 mg by mouth every day.        Tiotropium Bromide Monohydrate (Cap) SPIRIVA HANDIHALER 18 MCG         .                 Medicines prescribed today were sent to:     [facility] # [facility] [place] - [address] [facility]    [address] [place] [address]    Phone: [phone] Fax: [phone]    Open 24 Hours?: No      Medication refill instructions:       If your prescription bottle indicates you have medication refills left, it is not necessary to call your provider’s office. Please contact your pharmacy and they  will refill your medication.    If your prescription bottle indicates you do not have any refills left, you may request refills at any time through one of the following ways: The online [employer] system (except Urgent Care), by calling your provider’s office, or by asking your pharmacy to contact your provider’s office with a refill request. Medication refills are processed only during regular business hours and may not be available until the next business day. Your provider may request additional information or to have a follow-up visit with you prior to refilling your medication.   *Please Note: Medication refills are assigned a new Rx number when refilled electronically. Your pharmacy may indicate that no refills were authorized even though a new prescription for the same medication is available at the pharmacy. Please request the medicine by name with the pharmacy before contacting your provider for a refill.        Instructions    Increasing carvedilol to 12.5 Mg twice a day          Mendor Access Code: Activation code not generated  Current Mendor Status: Active

## 2017-05-19 NOTE — PROGRESS NOTES
Subjective:   Lex Harden is a 83 y.o. male who presents today for follow up on his heart failure.    Patient of Dr. Castillo. Patient was last seen 5/11/17. His medications were adjusted. Carvedilol was increased to 6.25 mg BID. Pt was continued on Enalapril 20 mg daily. Pt  Has been tolerating the dose increase. Pt continues to have SOB with ADLs and exertion. Pt did have an episode of PND last night. Otherwise, pt denies chest pain, shortness of breath at rest, palpitations, dizziness/lightheadedness, orthopnea, or Edema. Pt does not weigh himself regularly.     Additonally, patient has the following medical problems:    -HTN: on enalapril 20 mg daily, Carvedilol 6.25 mg daily    -DLD: taking Atorvastatin    -CKD stage 3: Followed by Neph    -COPD: using inhalers    Past Medical History   Diagnosis Date   • Hypercholesteremia    • Diabetes    • Hypertension    • PVD (peripheral vascular disease) (CMS-HCC)    • AVM (arteriovenous malformation) of colon    • Other and unspecified angina pectoris    • Back pain    • Unspecified cataract      right eye    • Chronic airway obstruction, not elsewhere classified    • Infectious disease      Past Surgical History   Procedure Laterality Date   • Colonoscopy with apc  9/28/2010     Performed by EDMUND CHENG at SURGERY [facility]S   • Gastroscopy with apc  9/28/2010     Performed by EDMUND CHENG at SURGERY [facility]   • Colonoscopy with apc  4/21/2011     Performed by EDMUND CHENG at SURGERY [facility]   • Gastroscopy-endo  4/21/2011     Performed by EDMUND CHENG at SURGERY [facility]   • Gastroscopy-endo  5/1/2011     Performed by HOLLY VASQUEZ at ENDOSCOPY [facility] ORS   • Colonoscopy - endo  5/1/2011     Performed by HOLLY VASQUEZ at ENDOSCOPY [facility] ORS   • 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 at ENDOSCOPY [facility]   • Colonoscopy with apc  11/1/2014     Performed by Eddie Levin M.D. at ENDOSCOPY [facility]   • Colonoscopy  6/17/2015     Procedure: COLONOSCOPY;  Surgeon: Eddie Levin M.D.;  Location: SURGERY [facility];  Service:    • Gastroscopy  6/17/2015     Procedure: GASTROSCOPY W/APC;  Surgeon: Eddie Levin M.D.;  Location: SURGERY [facility];  Service:      Family History   Problem Relation Age of Onset   • Non-contributory Neg Hx      \"unknown\"     History   Smoking status   • Former Smoker   Smokeless tobacco   • Not on file     Comment: Hx smoking x 40 yrs. Quit 1983     Allergies   Allergen Reactions   • Nkda [No Known Drug Allergy]      Outpatient Encounter Prescriptions as of 5/19/2017   Medication Sig Dispense Refill   • SPIRIVA HANDIHALER 18 MCG Cap      • carvedilol (COREG) 6.25 MG Tab Take 1 Tab by mouth 2 times a day, with meals. 60 Tab 11   • clopidogrel (PLAVIX) 75 MG Tab Take 75 mg by mouth every morning.     • enalapril (VASOTEC) 10 MG TABS Take 10 mg by mouth every day.     • allopurinol (ZYLOPRIM) 100 MG TABS Take 100 mg by mouth every day.     • atorvastatin (LIPITOR) 10 MG TABS Take 5 mg by mouth every day.     • Cholecalciferol (VITAMIN D) 2000 UNIT TABS Take 4,000 Units by mouth every day.     • Ferrous Sulfate (IRON) 325 (65 FE) MG TABS Take 1 Tab by mouth every morning.     • terazosin (HYTRIN) 5 MG CAPS Take 5 mg by mouth every day.     • carvedilol (COREG) 3.125 MG Tab      • cimetidine (TAGAMET) 200 MG tablet        No facility-administered encounter medications on file as of 5/19/2017.     Review of Systems   Constitutional: Negative for fever and malaise/fatigue.   Respiratory: Positive for shortness of breath. Negative for cough.    Cardiovascular: Positive for PND. Negative for chest pain, palpitations, orthopnea, claudication and leg swelling.   Gastrointestinal: Negative for abdominal pain.   Musculoskeletal: Negative for myalgias. " "  Neurological: Positive for dizziness (Occ ).   All other systems reviewed and are negative.       Objective:   /62 mmHg  Pulse 68  Ht 1.753 m (5' 9.02\")  Wt 81.194 kg (179 lb)  BMI 26.42 kg/m2  SpO2 94%    Physical Exam   Constitutional: He is oriented to person, place, and time. He appears well-developed and well-nourished.   HENT:   Head: Normocephalic and atraumatic.   Eyes: EOM are normal.   Neck: Normal range of motion. Neck supple. No JVD present.   Cardiovascular: Normal rate, regular rhythm, normal heart sounds and intact distal pulses.    No murmur heard.  Pulmonary/Chest: Effort normal and breath sounds normal. No respiratory distress. He has no wheezes. He has no rales.   Abdominal: Soft. Bowel sounds are normal.   Musculoskeletal: Normal range of motion. He exhibits no edema.   Neurological: He is alert and oriented to person, place, and time.   Skin: Skin is warm and dry.   Psychiatric: He has a normal mood and affect.   Nursing note and vitals reviewed.    Lab Results   Component Value Date/Time    CHOLESTEROL, 02/08/2017 07:49 AM    LDL 57 02/08/2017 07:49 AM    HDL 29* 02/08/2017 07:49 AM    TRIGLYCERIDES 124 02/08/2017 07:49 AM       Lab Results   Component Value Date/Time    SODIUM 140 05/08/2017 07:44 AM    POTASSIUM 4.6 05/08/2017 07:44 AM    CHLORIDE 112 05/08/2017 07:44 AM    CO2 18* 05/08/2017 07:44 AM    GLUCOSE 126* 05/08/2017 07:44 AM    BUN 31* 05/08/2017 07:44 AM    CREATININE 2.35* 05/08/2017 07:44 AM     Lab Results   Component Value Date/Time    ALKALINE PHOSPHATASE 44 05/08/2017 07:44 AM    AST(SGOT) 13 05/08/2017 07:44 AM    ALT(SGPT) 9 05/08/2017 07:44 AM    TOTAL BILIRUBIN 0.6 05/08/2017 07:44 AM      Transthoracic Echo Report 11/16/14  Technically difficult study.   Normal left ventricular systolic function.  Left ventricular ejection fraction is 60% to 65%.  Diastolic function is present.  Mild mitral regurgitation.  Mild aortic stenosis.  Mild tricuspid " regurgitation.      Transthoracic Echo Report 4/28/17  Severe biventricular dysfunction. Left ventricular ejection fraction is   visually estimated to be 30%.  Global hypokinesis.  Reduced right ventricular systolic function.  Biatrial enlargement.  Right ventricular systolic pressure is estimated to be 35 mmHg.  Moderate mitral regurgitation due to annular dilation.  Cardiologist on case notified at time of reading.  There has been a   decrease in the LVEF from the study of 2014.      Assessment:     1. ACC/AHA stage C systolic heart failure (CMS-[facility])  carvedilol (COREG) 12.5 MG Tab   2. Heart failure, NYHA class 3 (CMS-[facility])  carvedilol (COREG) 12.5 MG Tab   3. Dyslipidemia  carvedilol (COREG) 12.5 MG Tab   4. CKD (chronic kidney disease) stage 3, GFR 30-59 ml/min  carvedilol (COREG) 12.5 MG Tab   5. PAD (peripheral artery disease) (CMS-HCC)  carvedilol (COREG) 12.5 MG Tab     Medical Decision Making:  Today's Assessment / Status / Plan:   1. HFrEF, Stage C ,class 3, EF 30%: Based on physical examination findings, patient is euvolemic. No JVD, lungs are clear to auscultation, no pitting edema in bilateral lower extremities, no ascites.  -Increase carvedilol to 12.5 mg BID  -Continue enalapril 20 mg Daily  -No kevyn due kidney disease  -Reinforced s/sx of worsening heart failure with patient and weight monitoring. Pt verbalizes understanding. Pt to call office or RTC if present.   -Patient to monitor weights at home daily. Pt to call office if weight increasing >3 lbs in 1 day or >5 lbs in 1 week.     2. DLD: LDL 57 2/8/17  -Continue Atorvastatin 5 mg daily    3. CKD:  -Continue follow-up with nephrology    4. PVD:  -Continue atorvastatin  -Continue walking as tolerated    FU in clinic in one week. Sooner if needed.    Patient verbalizes understanding and agrees with the plan of care.     Collaborating MD: Toro Castillo MD

## 2017-05-22 ENCOUNTER — TELEPHONE (OUTPATIENT)
Dept: CARDIOLOGY | Facility: MEDICAL CENTER | Age: 82
End: 2017-05-22

## 2017-05-22 NOTE — TELEPHONE ENCOUNTER
Spoke with patient who reports that his right ankle edema has resolved.  He reports weight at 177#, not on diuretics.  SOB after his spiriva wears off late in the day.  Has not SOB currently.  He will monitor his symptoms and call if returns.  To maintain follow up with Shahida Serna as scheduled 5/30/17.

## 2017-05-22 NOTE — TELEPHONE ENCOUNTER
----- Message from Yoselin Ferrari sent at 5/22/2017  8:20 AM PDT -----  Contact: [phone]  Patient is having some right ankle swelling and some shortness of breath. Is in no pain. Would like to know what to do.

## 2017-05-30 ENCOUNTER — OFFICE VISIT (OUTPATIENT)
Dept: CARDIOLOGY | Facility: MEDICAL CENTER | Age: 82
End: 2017-05-30
Payer: MEDICARE

## 2017-05-30 VITALS
HEIGHT: 69 IN | DIASTOLIC BLOOD PRESSURE: 80 MMHG | WEIGHT: 175 LBS | OXYGEN SATURATION: 97 % | BODY MASS INDEX: 25.92 KG/M2 | HEART RATE: 59 BPM | SYSTOLIC BLOOD PRESSURE: 116 MMHG

## 2017-05-30 DIAGNOSIS — I10 ESSENTIAL HYPERTENSION: ICD-10-CM

## 2017-05-30 DIAGNOSIS — N18.30 CKD (CHRONIC KIDNEY DISEASE) STAGE 3, GFR 30-59 ML/MIN (HCC): ICD-10-CM

## 2017-05-30 DIAGNOSIS — I50.9 HEART FAILURE, NYHA CLASS 2 (HCC): ICD-10-CM

## 2017-05-30 DIAGNOSIS — I73.9 PERIPHERAL VASCULAR DISEASE (HCC): ICD-10-CM

## 2017-05-30 DIAGNOSIS — E78.5 DYSLIPIDEMIA: ICD-10-CM

## 2017-05-30 DIAGNOSIS — I50.20 ACC/AHA STAGE C SYSTOLIC HEART FAILURE (HCC): ICD-10-CM

## 2017-05-30 PROCEDURE — 1036F TOBACCO NON-USER: CPT | Performed by: NURSE PRACTITIONER

## 2017-05-30 PROCEDURE — 1111F DSCHRG MED/CURRENT MED MERGE: CPT | Performed by: NURSE PRACTITIONER

## 2017-05-30 PROCEDURE — 99214 OFFICE O/P EST MOD 30 MIN: CPT | Performed by: NURSE PRACTITIONER

## 2017-05-30 PROCEDURE — G8598 ASA/ANTIPLAT THER USED: HCPCS | Performed by: NURSE PRACTITIONER

## 2017-05-30 PROCEDURE — 4040F PNEUMOC VAC/ADMIN/RCVD: CPT | Mod: 8P | Performed by: NURSE PRACTITIONER

## 2017-05-30 PROCEDURE — G8419 CALC BMI OUT NRM PARAM NOF/U: HCPCS | Performed by: NURSE PRACTITIONER

## 2017-05-30 PROCEDURE — G8432 DEP SCR NOT DOC, RNG: HCPCS | Performed by: NURSE PRACTITIONER

## 2017-05-30 PROCEDURE — 1101F PT FALLS ASSESS-DOCD LE1/YR: CPT | Mod: 8P | Performed by: NURSE PRACTITIONER

## 2017-05-30 RX ORDER — FUROSEMIDE 20 MG/1
10 TABLET ORAL
Qty: 30 TAB | Refills: 1 | Status: ON HOLD | OUTPATIENT
Start: 2017-05-30 | End: 2017-12-13

## 2017-05-30 RX ORDER — FINASTERIDE 5 MG/1
TABLET, FILM COATED ORAL
COMMUNITY
Start: 2017-05-24 | End: 2017-12-11

## 2017-05-30 ASSESSMENT — ENCOUNTER SYMPTOMS
SHORTNESS OF BREATH: 0
ABDOMINAL PAIN: 0
FEVER: 0
CLAUDICATION: 1
MYALGIAS: 0
PND: 0
PALPITATIONS: 0
COUGH: 0
ORTHOPNEA: 1
DIZZINESS: 0

## 2017-05-30 NOTE — Clinical Note
[facility]   [address]  [place] [address]  Phone: [phone]  Fax: [phone]              Lex Harden  7/21/1933    Encounter Date: 5/30/2017    NAILA Read          PROGRESS NOTE:  Subjective:   Lex Harden is a 83 y.o. male who presents today for follow up on his heart failure.    Patient of Dr. Castillo. Patient was last seen 5/19/17. His medications were adjusted. Carvedilol was increased to 12.5 mg BID. Pt was continued on Enalapril 20 mg daily. Pt  Has been tolerating the dose increase. Pt has had a decrease of SOB with ADLs, but continues to have shortness of breath with exertion. Patient does complain of an occasional dry cough and has had to add A pillow at night for sleep. Otherwise, pt denies chest pain, shortness of breath at rest, palpitations, dizziness/lightheadedness, PND. Patient has started weighing himself and his weight is approximately 174 pounds.     Additonally, patient has the following medical problems:    -HTN: on enalapril 20 mg daily, Carvedilol 12.5 mg BID    -DLD: taking Atorvastatin    -CKD stage 3: Followed by Neph    -COPD: using inhalers    Past Medical History   Diagnosis Date   • Hypercholesteremia    • Diabetes    • Hypertension    • PVD (peripheral vascular disease) (CMS-HCC)    • AVM (arteriovenous malformation) of colon    • Other and unspecified angina pectoris    • Back pain    • Unspecified cataract      right eye    • Chronic airway obstruction, not elsewhere classified    • Infectious disease      Past Surgical History   Procedure Laterality Date   • Colonoscopy with apc  9/28/2010     Performed by EDMUND CHENG at SURGERY [facility]   • Gastroscopy with apc  9/28/2010     Performed by EDMUND CHENG at SURGERY [facility]   • Colonoscopy with apc  4/21/2011     Performed by EDMUND CHENG at [facility] ORS   • Gastroscopy-endo  4/21/2011     Performed by  "EDMUND CHENG at SURGERY [facility] ORS   • Gastroscopy-endo  5/1/2011     Performed by HOLLY VASQUEZ at ENDOSCOPY [facility]   • Colonoscopy - endo  5/1/2011     Performed by HOLLY VASQUEZ at ENDOSCOPY [facility]   • 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 at ENDOSCOPY [facility]   • Colonoscopy with apc  11/1/2014     Performed by Eddie Levin M.D. at ENDOSCOPY [facility]   • Colonoscopy  6/17/2015     Procedure: COLONOSCOPY;  Surgeon: Eddie Levin M.D.;  Location: SURGERY [facility];  Service:    • Gastroscopy  6/17/2015     Procedure: GASTROSCOPY W/APC;  Surgeon: Eddie Levin M.D.;  Location: SURGERY [facility];  Service:      Family History   Problem Relation Age of Onset   • Non-contributory Neg Hx      \"unknown\"     History   Smoking status   • Former Smoker   • Quit date: 01/01/1982   Smokeless tobacco   • Not on file     Comment: Hx smoking x 40 yrs. Quit 1983     Allergies   Allergen Reactions   • Nkda [No Known Drug Allergy]      Outpatient Encounter Prescriptions as of 5/30/2017   Medication Sig Dispense Refill   • finasteride (PROSCAR) 5 MG Tab      • carvedilol (COREG) 12.5 MG Tab Take 1 Tab by mouth 2 times a day, with meals. 60 Tab 11   • SPIRIVA HANDIHALER 18 MCG Cap      • clopidogrel (PLAVIX) 75 MG Tab Take 75 mg by mouth every morning.     • enalapril (VASOTEC) 10 MG TABS Take 20 mg by mouth every day.     • allopurinol (ZYLOPRIM) 100 MG TABS Take 100 mg by mouth every day.     • atorvastatin (LIPITOR) 10 MG TABS Take 5 mg by mouth every day.     • Cholecalciferol (VITAMIN D) 2000 UNIT TABS Take 4,000 Units by mouth every day.     • Ferrous Sulfate (IRON) 325 (65 FE) MG TABS Take 1 Tab by mouth every morning.     • terazosin (HYTRIN) 5 MG CAPS Take 5 mg by mouth every day.     • Ciprofloxacin (CIPRO PO) Take  by mouth.     • cimetidine " "(TAGAMET) 200 MG tablet        No facility-administered encounter medications on file as of 5/30/2017.     Review of Systems   Constitutional: Negative for fever and malaise/fatigue.   Respiratory: Negative for cough and shortness of breath.    Cardiovascular: Positive for orthopnea (2 pillows), claudication and leg swelling. Negative for chest pain, palpitations and PND.   Gastrointestinal: Negative for abdominal pain.   Musculoskeletal: Negative for myalgias.   Neurological: Negative for dizziness.   All other systems reviewed and are negative.       Objective:   /80 mmHg  Pulse 59  Ht 1.753 m (5' 9.02\")  Wt 79.379 kg (175 lb)  BMI 25.83 kg/m2  SpO2 97%    Physical Exam   Constitutional: He is oriented to person, place, and time. He appears well-developed and well-nourished.   HENT:   Head: Normocephalic and atraumatic.   Eyes: EOM are normal.   Neck: Normal range of motion. Neck supple. JVD (mild) present.   Cardiovascular: Normal rate, regular rhythm, normal heart sounds and intact distal pulses.    No murmur heard.  Pulmonary/Chest: Effort normal and breath sounds normal. No respiratory distress. He has no wheezes. He has no rales.   Abdominal: Soft. Bowel sounds are normal.   Musculoskeletal: Normal range of motion. He exhibits edema (2+ Right Ankle Edema, Trace Left Ankle edema).   Neurological: He is alert and oriented to person, place, and time.   Skin: Skin is warm and dry.   Psychiatric: He has a normal mood and affect.   Nursing note and vitals reviewed.    Lab Results   Component Value Date/Time    CHOLESTEROL, 02/08/2017 07:49 AM    LDL 57 02/08/2017 07:49 AM    HDL 29* 02/08/2017 07:49 AM    TRIGLYCERIDES 124 02/08/2017 07:49 AM       Lab Results   Component Value Date/Time    SODIUM 140 05/08/2017 07:44 AM    POTASSIUM 4.6 05/08/2017 07:44 AM    CHLORIDE 112 05/08/2017 07:44 AM    CO2 18* 05/08/2017 07:44 AM    GLUCOSE 126* 05/08/2017 07:44 AM    BUN 31* 05/08/2017 07:44 AM    " CREATININE 2.35* 05/08/2017 07:44 AM     Lab Results   Component Value Date/Time    ALKALINE PHOSPHATASE 44 05/08/2017 07:44 AM    AST(SGOT) 13 05/08/2017 07:44 AM    ALT(SGPT) 9 05/08/2017 07:44 AM    TOTAL BILIRUBIN 0.6 05/08/2017 07:44 AM      Transthoracic Echo Report 11/16/14  Technically difficult study.   Normal left ventricular systolic function.  Left ventricular ejection fraction is 60% to 65%.  Diastolic function is present.  Mild mitral regurgitation.  Mild aortic stenosis.  Mild tricuspid regurgitation.      Transthoracic Echo Report 4/28/17  Severe biventricular dysfunction. Left ventricular ejection fraction is   visually estimated to be 30%.  Global hypokinesis.  Reduced right ventricular systolic function.  Biatrial enlargement.  Right ventricular systolic pressure is estimated to be 35 mmHg.  Moderate mitral regurgitation due to annular dilation.  Cardiologist on case notified at time of reading.  There has been a   decrease in the LVEF from the study of 2014.      Assessment:     1. ACC/AHA stage C systolic heart failure (CMS-[facility])  BASIC METABOLIC PANEL    furosemide (LASIX) 20 MG Tab   2. Heart failure, NYHA class 2 (CMS-[facility])  BASIC METABOLIC PANEL    furosemide (LASIX) 20 MG Tab   3. Essential hypertension     4. Dyslipidemia     5. Peripheral vascular disease (CMS-[facility])     6. CKD (chronic kidney disease) stage 3, GFR 30-59 ml/min       Medical Decision Making:  Today's Assessment / Status / Plan:   1. HFrEF, Stage C ,class 2, EF 30%: Pt does have some mild volume overload.  -Take one dose of furosemide 10 mg today, and wear compression stockings, elevate LE  -Continue carvedilol 12.5 mg BID  -Continue enalapril 20 mg Daily  -BMP in 1 week  -No kevyn due kidney disease  -Reinforced s/sx of worsening heart failure with patient and weight monitoring. Pt verbalizes understanding. Pt to call office or RTC if present.   -Patient to monitor weights at home daily. Pt to call office if weight increasing  >3 lbs in 1 day or >5 lbs in 1 week.     2. Hypertension: Stable  -Per above    3. DLD: LDL 57 2/8/17  -Continue Atorvastatin 5 mg daily    4. PVD:  -Continue atorvastatin, plavix  -Continue walking as tolerated    5. CKD:  -Continue follow-up with nephrology    FU in clinic in one week with BMP. Sooner if needed.    Patient verbalizes understanding and agrees with the plan of care.     Collaborating MD: MD Kirill Anderson, D.OGino  [address]  [facility] [address]  VIA Facsimile: [phone]

## 2017-05-30 NOTE — MR AVS SNAPSHOT
"Lex Harden   2017 8:20 AM   Office Visit   MRN: [id number]    Department:  [facility]   Dept Phone:  [phone]    Description:  Male : 1933   Provider:  NAILA Read           Reason for Visit     Follow-Up           Allergies as of 2017     Allergen Noted Reactions    Nkda [No Known Drug Allergy] 2007         You were diagnosed with     ACC/AHA stage C systolic heart failure (CMS-HCC)   [7591298]       Heart failure, NYHA class 2 (CMS-HCC)   [531101]         Vital Signs     Blood Pressure Pulse Height Weight Body Mass Index Oxygen Saturation    116/80 mmHg 59 1.753 m (5' 9.02\") 79.379 kg (175 lb) 25.83 kg/m2 97%    Smoking Status                   Former Smoker           Basic Information     Date Of Birth Sex Race Ethnicity Preferred Language    1933 Male White Non- English      Problem List              ICD-10-CM Priority Class Noted - Resolved    Acute on Chronic blood loss anemia D50.0 High  2011 - Present    HTN (hypertension) I10 Medium  2011 - Present    Dyslipidemia E78.5 Low  2011 - Present    DM (diabetes mellitus) (CMS-HCC) E11.9 Medium  2011 - Present    HEMORRHOIDS    2011 - Present    Diverticulosis K57.90   2011 - Present    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 Q27.33 High  10/31/2014 - Present    CKD (chronic kidney disease) stage 3, GFR 30-59 ml/min N18.3 Medium  10/31/2014 - Present    COPD (chronic obstructive pulmonary disease) (CMS-HCC) J44.9 Medium  10/31/2014 - Present    Peripheral vascular disease (CMS-HCC) I73.9   10/31/2014 - Present    Angina pectoris (CMS-HCC) I20.9 High  2014 - Present    Left carotid stenosis I65.22   2014 - Present    Subclavian artery stenosis, left (CMS-HCC) I77.1   2014 - Present    Aortic valve stenosis I35.0   2014 - Present    GIB (gastrointestinal bleeding) K92.2   2015 - Present    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  "  6/16/2015 - Present    Hypotension I95.9   6/16/2015 - Present    Acute blood loss anemia D62   6/17/2015 - Present    Type 2 diabetes mellitus without complication (HCC) E11.9   4/28/2017 - Present    BPH (benign prostatic hyperplasia) N40.0   4/28/2017 - Present      Health Maintenance        Date Due Completion Dates    DIABETES MONOFILAMENT / LE EXAM 1/21/1934 ---    RETINAL SCREENING 7/21/1951 ---    IMM DTaP/Tdap/Td Vaccine (1 - Tdap) 7/21/1952 ---    IMM ZOSTER VACCINE 7/21/1993 ---    IMM PNEUMOCOCCAL 65+ (ADULT) HIGH/HIGHEST RISK SERIES (1 of 2 - PCV13) 7/21/1998 ---    URINE ACR / MICROALBUMIN 8/15/2014 8/15/2013    A1C SCREENING 10/28/2017 4/28/2017, 7/11/2016, 2/21/2014, 7/19/2012, 9/10/2011, 12/31/2007    FASTING LIPID PROFILE 2/8/2018 2/8/2017, 1/12/2015, 7/10/2013, 9/10/2011, 12/31/2007    SERUM CREATININE 5/8/2018 5/8/2017, 5/1/2017, 4/30/2017, 4/29/2017, 4/28/2017, 4/27/2017, 2/8/2017, 2/8/2017, 10/19/2016, 7/14/2016, 7/11/2016, 2/23/2016, 11/2/2015, 8/5/2015, 7/27/2015, 6/23/2015, 6/19/2015, 6/18/2015, 6/17/2015, 6/16/2015, 6/10/2015, 4/8/2015, 1/9/2015, 11/19/2014, 11/18/2014, 11/17/2014, 11/16/2014, 11/15/2014, 11/2/2014, 11/1/2014, 10/31/2014, 10/28/2014, 6/3/2014, 4/17/2014, 2/21/2014, 2/19/2014, 12/31/2013, 12/18/2013, 11/13/2013, 9/30/2013, 8/15/2013, 7/15/2013, 7/10/2013, 7/19/2012, 9/10/2011, 5/2/2011, 4/30/2011, 4/30/2011, 4/21/2011, 2/16/2011, 8/27/2010, 12/31/2007    COLONOSCOPY 6/17/2025 6/17/2015, 11/1/2014, 5/1/2011, 4/21/2011, 9/28/2010            Current Immunizations     No immunizations on file.      Below and/or attached are the medications your provider expects you to take. Review all of your home medications and newly ordered medications with your provider and/or pharmacist. Follow medication instructions as directed by your provider and/or pharmacist. Please keep your medication list with you and share with your provider. Update the information when medications are  discontinued, doses are changed, or new medications (including over-the-counter products) are added; and carry medication information at all times in the event of emergency situations     Allergies:  NKDA - (reactions not documented)               Medications  Valid as of: May 30, 2017 -  8:59 AM    Generic Name Brand Name Tablet Size Instructions for use    Allopurinol (Tab) ZYLOPRIM 100 MG Take 100 mg by mouth every day.        Atorvastatin Calcium (Tab) LIPITOR 10 MG Take 5 mg by mouth every day.        Carvedilol (Tab) COREG 12.5 MG Take 1 Tab by mouth 2 times a day, with meals.        Cholecalciferol (Tab) vitamin D 2000 UNIT Take 4,000 Units by mouth every day.        Clopidogrel Bisulfate (Tab) PLAVIX 75 MG Take 75 mg by mouth every morning.        Enalapril Maleate (Tab) VASOTEC 10 MG Take 20 mg by mouth every day.        Ferrous Sulfate (Tab) Iron 325 (65 FE) MG Take 1 Tab by mouth every morning.        Finasteride (Tab) PROSCAR 5 MG         Furosemide (Tab) LASIX 20 MG Take 0.5 Tabs by mouth 1 time daily as needed.        Terazosin HCl (Cap) HYTRIN 5 MG Take 5 mg by mouth every day.        Tiotropium Bromide Monohydrate (Cap) SPIRIVA HANDIHALER 18 MCG         .                 Medicines prescribed today were sent to:     [facility] [address] [place] [address] [address] [facility]    [address] [place] [address]    Phone: [phone] Fax: [phone]    Open 24 Hours?: No      Medication refill instructions:       If your prescription bottle indicates you have medication refills left, it is not necessary to call your provider’s office. Please contact your pharmacy and they will refill your medication.    If your prescription bottle indicates you do not have any refills left, you may request refills at any time through one of the following ways: The online [employer] system (except Urgent Care), by calling your provider’s office, or by asking your pharmacy to contact your provider’s office with a refill request.  Medication refills are processed only during regular business hours and may not be available until the next business day. Your provider may request additional information or to have a follow-up visit with you prior to refilling your medication.   *Please Note: Medication refills are assigned a new Rx number when refilled electronically. Your pharmacy may indicate that no refills were authorized even though a new prescription for the same medication is available at the pharmacy. Please request the medicine by name with the pharmacy before contacting your provider for a refill.        Your To Do List     Future Labs/Procedures Complete By Expires    BASIC METABOLIC PANEL  As directed 5/30/2018      Instructions    Take 10 mg of lasix today x1   Use compression stocking          MyChart Access Code: Activation code not generated  Current [employer] Status: Active

## 2017-05-30 NOTE — Clinical Note
[facility]   [address]  [place] [address]  Phone: [phone]  Fax: [phone]              Lex Harden  7/21/1933    Encounter Date: 5/30/2017    NAILA Read          PROGRESS NOTE:  Subjective:   Lex Harden is a 83 y.o. male who presents today for follow up on his heart failure.    Patient of Dr. Castillo. Patient was last seen 5/19/17. His medications were adjusted. Carvedilol was increased to 12.5 mg BID. Pt was continued on Enalapril 20 mg daily. Pt  Has been tolerating the dose increase. Pt has had a decrease of SOB with ADLs, but continues to have shortness of breath with exertion. Patient does complain of an occasional dry cough and has had to add A pillow at night for sleep. Otherwise, pt denies chest pain, shortness of breath at rest, palpitations, dizziness/lightheadedness, PND. Patient has started weighing himself and his weight is approximately 174 pounds.     Additonally, patient has the following medical problems:    -HTN: on enalapril 20 mg daily, Carvedilol 12.5 mg BID    -DLD: taking Atorvastatin    -CKD stage 3: Followed by Neph    -COPD: using inhalers    Past Medical History   Diagnosis Date   • Hypercholesteremia    • Diabetes    • Hypertension    • PVD (peripheral vascular disease) (CMS-HCC)    • AVM (arteriovenous malformation) of colon    • Other and unspecified angina pectoris    • Back pain    • Unspecified cataract      right eye    • Chronic airway obstruction, not elsewhere classified    • Infectious disease      Past Surgical History   Procedure Laterality Date   • Colonoscopy with apc  9/28/2010     Performed by EDMUND CHENG at SURGERY [facility] ORS   • Gastroscopy with apc  9/28/2010     Performed by EDMUND CHENG at SURGERY [facility] ORS   • Colonoscopy with apc  4/21/2011     Performed by EDMUND CHENG at [facility] ORS   • Gastroscopy-endo  4/21/2011     Performed by  "EDMUND CHENG at SURGERY [facility] ORS   • Gastroscopy-endo  5/1/2011     Performed by HOLLY VASQUEZ at ENDOSCOPY [facility] ORS   • Colonoscopy - endo  5/1/2011     Performed by HOLLY VASQUEZ at ENDOSCOPY [facility] ORS   • 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 at ENDOSCOPY [facility]   • Colonoscopy with apc  11/1/2014     Performed by Eddie Levin M.D. at ENDOSCOPY [facility]   • Colonoscopy  6/17/2015     Procedure: COLONOSCOPY;  Surgeon: Eddie Levin M.D.;  Location: SURGERY [facility];  Service:    • Gastroscopy  6/17/2015     Procedure: GASTROSCOPY W/APC;  Surgeon: Eddie Levin M.D.;  Location: SURGERY [facility];  Service:      Family History   Problem Relation Age of Onset   • Non-contributory Neg Hx      \"unknown\"     History   Smoking status   • Former Smoker   • Quit date: 01/01/1982   Smokeless tobacco   • Not on file     Comment: Hx smoking x 40 yrs. Quit 1983     Allergies   Allergen Reactions   • Nkda [No Known Drug Allergy]      Outpatient Encounter Prescriptions as of 5/30/2017   Medication Sig Dispense Refill   • finasteride (PROSCAR) 5 MG Tab      • carvedilol (COREG) 12.5 MG Tab Take 1 Tab by mouth 2 times a day, with meals. 60 Tab 11   • SPIRIVA HANDIHALER 18 MCG Cap      • clopidogrel (PLAVIX) 75 MG Tab Take 75 mg by mouth every morning.     • enalapril (VASOTEC) 10 MG TABS Take 20 mg by mouth every day.     • allopurinol (ZYLOPRIM) 100 MG TABS Take 100 mg by mouth every day.     • atorvastatin (LIPITOR) 10 MG TABS Take 5 mg by mouth every day.     • Cholecalciferol (VITAMIN D) 2000 UNIT TABS Take 4,000 Units by mouth every day.     • Ferrous Sulfate (IRON) 325 (65 FE) MG TABS Take 1 Tab by mouth every morning.     • terazosin (HYTRIN) 5 MG CAPS Take 5 mg by mouth every day.     • Ciprofloxacin (CIPRO PO) Take  by mouth.     • cimetidine " "(TAGAMET) 200 MG tablet        No facility-administered encounter medications on file as of 5/30/2017.     Review of Systems   Constitutional: Negative for fever and malaise/fatigue.   Respiratory: Negative for cough and shortness of breath.    Cardiovascular: Positive for orthopnea (2 pillows), claudication and leg swelling. Negative for chest pain, palpitations and PND.   Gastrointestinal: Negative for abdominal pain.   Musculoskeletal: Negative for myalgias.   Neurological: Negative for dizziness.   All other systems reviewed and are negative.       Objective:   /80 mmHg  Pulse 59  Ht 1.753 m (5' 9.02\")  Wt 79.379 kg (175 lb)  BMI 25.83 kg/m2  SpO2 97%    Physical Exam   Constitutional: He is oriented to person, place, and time. He appears well-developed and well-nourished.   HENT:   Head: Normocephalic and atraumatic.   Eyes: EOM are normal.   Neck: Normal range of motion. Neck supple. JVD (mild) present.   Cardiovascular: Normal rate, regular rhythm, normal heart sounds and intact distal pulses.    No murmur heard.  Pulmonary/Chest: Effort normal and breath sounds normal. No respiratory distress. He has no wheezes. He has no rales.   Abdominal: Soft. Bowel sounds are normal.   Musculoskeletal: Normal range of motion. He exhibits edema (2+ Right Ankle Edema, Trace Left Ankle edema).   Neurological: He is alert and oriented to person, place, and time.   Skin: Skin is warm and dry.   Psychiatric: He has a normal mood and affect.   Nursing note and vitals reviewed.    Lab Results   Component Value Date/Time    CHOLESTEROL, 02/08/2017 07:49 AM    LDL 57 02/08/2017 07:49 AM    HDL 29* 02/08/2017 07:49 AM    TRIGLYCERIDES 124 02/08/2017 07:49 AM       Lab Results   Component Value Date/Time    SODIUM 140 05/08/2017 07:44 AM    POTASSIUM 4.6 05/08/2017 07:44 AM    CHLORIDE 112 05/08/2017 07:44 AM    CO2 18* 05/08/2017 07:44 AM    GLUCOSE 126* 05/08/2017 07:44 AM    BUN 31* 05/08/2017 07:44 AM    " CREATININE 2.35* 05/08/2017 07:44 AM     Lab Results   Component Value Date/Time    ALKALINE PHOSPHATASE 44 05/08/2017 07:44 AM    AST(SGOT) 13 05/08/2017 07:44 AM    ALT(SGPT) 9 05/08/2017 07:44 AM    TOTAL BILIRUBIN 0.6 05/08/2017 07:44 AM      Transthoracic Echo Report 11/16/14  Technically difficult study.   Normal left ventricular systolic function.  Left ventricular ejection fraction is 60% to 65%.  Diastolic function is present.  Mild mitral regurgitation.  Mild aortic stenosis.  Mild tricuspid regurgitation.      Transthoracic Echo Report 4/28/17  Severe biventricular dysfunction. Left ventricular ejection fraction is   visually estimated to be 30%.  Global hypokinesis.  Reduced right ventricular systolic function.  Biatrial enlargement.  Right ventricular systolic pressure is estimated to be 35 mmHg.  Moderate mitral regurgitation due to annular dilation.  Cardiologist on case notified at time of reading.  There has been a   decrease in the LVEF from the study of 2014.      Assessment:     1. ACC/AHA stage C systolic heart failure (CMS-[facility])  BASIC METABOLIC PANEL    furosemide (LASIX) 20 MG Tab   2. Heart failure, NYHA class 2 (CMS-[facility])  BASIC METABOLIC PANEL    furosemide (LASIX) 20 MG Tab   3. Essential hypertension     4. Dyslipidemia     5. Peripheral vascular disease (CMS-[facility])     6. CKD (chronic kidney disease) stage 3, GFR 30-59 ml/min       Medical Decision Making:  Today's Assessment / Status / Plan:   1. HFrEF, Stage C ,class 2, EF 30%: Pt does have some mild volume overload.  -Take one dose of furosemide 10 mg today, and wear compression stockings, elevate LE  -Continue carvedilol 12.5 mg BID  -Continue enalapril 20 mg Daily  -BMP in 1 week  -No kevyn due kidney disease  -Reinforced s/sx of worsening heart failure with patient and weight monitoring. Pt verbalizes understanding. Pt to call office or RTC if present.   -Patient to monitor weights at home daily. Pt to call office if weight increasing  >3 lbs in 1 day or >5 lbs in 1 week.     2. Hypertension: Stable  -Per above    3. DLD: LDL 57 2/8/17  -Continue Atorvastatin 5 mg daily    4. PVD:  -Continue atorvastatin, plavix  -Continue walking as tolerated    5. CKD:  -Continue follow-up with nephrology    FU in clinic in one week with BMP. Sooner if needed.    Patient verbalizes understanding and agrees with the plan of care.     Collaborating MD: MD Kirill Anderson, D.OGino  [address]  [facility] [place] [address]  VIA Facsimile: [phone]

## 2017-05-30 NOTE — PROGRESS NOTES
Subjective:   Lex Harden is a 83 y.o. male who presents today for follow up on his heart failure.    Patient of Dr. Castillo. Patient was last seen 5/19/17. His medications were adjusted. Carvedilol was increased to 12.5 mg BID. Pt was continued on Enalapril 20 mg daily. Pt  Has been tolerating the dose increase. Pt has had a decrease of SOB with ADLs, but continues to have shortness of breath with exertion. Patient does complain of an occasional dry cough and has had to add A pillow at night for sleep. Otherwise, pt denies chest pain, shortness of breath at rest, palpitations, dizziness/lightheadedness, PND. Patient has started weighing himself and his weight is approximately 174 pounds.     Additonally, patient has the following medical problems:    -HTN: on enalapril 20 mg daily, Carvedilol 12.5 mg BID    -DLD: taking Atorvastatin    -CKD stage 3: Followed by Neph    -COPD: using inhalers    Past Medical History   Diagnosis Date   • Hypercholesteremia    • Diabetes    • Hypertension    • PVD (peripheral vascular disease) (CMS-HCC)    • AVM (arteriovenous malformation) of colon    • Other and unspecified angina pectoris    • Back pain    • Unspecified cataract      right eye    • Chronic airway obstruction, not elsewhere classified    • Infectious disease      Past Surgical History   Procedure Laterality Date   • Colonoscopy with apc  9/28/2010     Performed by EDMUND CHENG at SURGERY [facility] ORS   • Gastroscopy with apc  9/28/2010     Performed by EDMUND CHENG at SURGERY [facility]   • Colonoscopy with apc  4/21/2011     Performed by EDMUND CHENG at SURGERY [facility] ORS   • Gastroscopy-endo  4/21/2011     Performed by EDMUND CHENG at SURGERY [facility] ORS   • Gastroscopy-endo  5/1/2011     Performed by HOLLY VASQUEZ at ENDOSCOPY [facility] ORS   • Colonoscopy - endo  5/1/2011     Performed by HOLLY VASQUEZ at ENDOSCOPY [facility] ORS   • Aortofemoral  "bypass  1991     Both legs   • Gastroscopy with apc  11/1/2014     Performed by Eddie Levin M.D. at ENDOSCOPY [facility]   • Colonoscopy with apc  11/1/2014     Performed by Eddie Levin M.D. at ENDOSCOPY [facility]   • Colonoscopy  6/17/2015     Procedure: COLONOSCOPY;  Surgeon: Eddie Levin M.D.;  Location: SURGERY [facility];  Service:    • Gastroscopy  6/17/2015     Procedure: GASTROSCOPY W/APC;  Surgeon: Eddie Levin M.D.;  Location: SURGERY [facility];  Service:      Family History   Problem Relation Age of Onset   • Non-contributory Neg Hx      \"unknown\"     History   Smoking status   • Former Smoker   • Quit date: 01/01/1982   Smokeless tobacco   • Not on file     Comment: Hx smoking x 40 yrs. Quit 1983     Allergies   Allergen Reactions   • Nkda [No Known Drug Allergy]      Outpatient Encounter Prescriptions as of 5/30/2017   Medication Sig Dispense Refill   • finasteride (PROSCAR) 5 MG Tab      • carvedilol (COREG) 12.5 MG Tab Take 1 Tab by mouth 2 times a day, with meals. 60 Tab 11   • SPIRIVA HANDIHALER 18 MCG Cap      • clopidogrel (PLAVIX) 75 MG Tab Take 75 mg by mouth every morning.     • enalapril (VASOTEC) 10 MG TABS Take 20 mg by mouth every day.     • allopurinol (ZYLOPRIM) 100 MG TABS Take 100 mg by mouth every day.     • atorvastatin (LIPITOR) 10 MG TABS Take 5 mg by mouth every day.     • Cholecalciferol (VITAMIN D) 2000 UNIT TABS Take 4,000 Units by mouth every day.     • Ferrous Sulfate (IRON) 325 (65 FE) MG TABS Take 1 Tab by mouth every morning.     • terazosin (HYTRIN) 5 MG CAPS Take 5 mg by mouth every day.     • Ciprofloxacin (CIPRO PO) Take  by mouth.     • cimetidine (TAGAMET) 200 MG tablet        No facility-administered encounter medications on file as of 5/30/2017.     Review of Systems   Constitutional: Negative for fever and malaise/fatigue.   Respiratory: Negative for cough and shortness of breath.    Cardiovascular: Positive " "for orthopnea (2 pillows), claudication and leg swelling. Negative for chest pain, palpitations and PND.   Gastrointestinal: Negative for abdominal pain.   Musculoskeletal: Negative for myalgias.   Neurological: Negative for dizziness.   All other systems reviewed and are negative.       Objective:   /80 mmHg  Pulse 59  Ht 1.753 m (5' 9.02\")  Wt 79.379 kg (175 lb)  BMI 25.83 kg/m2  SpO2 97%    Physical Exam   Constitutional: He is oriented to person, place, and time. He appears well-developed and well-nourished.   HENT:   Head: Normocephalic and atraumatic.   Eyes: EOM are normal.   Neck: Normal range of motion. Neck supple. JVD (mild) present.   Cardiovascular: Normal rate, regular rhythm, normal heart sounds and intact distal pulses.    No murmur heard.  Pulmonary/Chest: Effort normal and breath sounds normal. No respiratory distress. He has no wheezes. He has no rales.   Abdominal: Soft. Bowel sounds are normal.   Musculoskeletal: Normal range of motion. He exhibits edema (2+ Right Ankle Edema, Trace Left Ankle edema).   Neurological: He is alert and oriented to person, place, and time.   Skin: Skin is warm and dry.   Psychiatric: He has a normal mood and affect.   Nursing note and vitals reviewed.    Lab Results   Component Value Date/Time    CHOLESTEROL, 02/08/2017 07:49 AM    LDL 57 02/08/2017 07:49 AM    HDL 29* 02/08/2017 07:49 AM    TRIGLYCERIDES 124 02/08/2017 07:49 AM       Lab Results   Component Value Date/Time    SODIUM 140 05/08/2017 07:44 AM    POTASSIUM 4.6 05/08/2017 07:44 AM    CHLORIDE 112 05/08/2017 07:44 AM    CO2 18* 05/08/2017 07:44 AM    GLUCOSE 126* 05/08/2017 07:44 AM    BUN 31* 05/08/2017 07:44 AM    CREATININE 2.35* 05/08/2017 07:44 AM     Lab Results   Component Value Date/Time    ALKALINE PHOSPHATASE 44 05/08/2017 07:44 AM    AST(SGOT) 13 05/08/2017 07:44 AM    ALT(SGPT) 9 05/08/2017 07:44 AM    TOTAL BILIRUBIN 0.6 05/08/2017 07:44 AM      Transthoracic Echo Report " 11/16/14  Technically difficult study.   Normal left ventricular systolic function.  Left ventricular ejection fraction is 60% to 65%.  Diastolic function is present.  Mild mitral regurgitation.  Mild aortic stenosis.  Mild tricuspid regurgitation.      Transthoracic Echo Report 4/28/17  Severe biventricular dysfunction. Left ventricular ejection fraction is   visually estimated to be 30%.  Global hypokinesis.  Reduced right ventricular systolic function.  Biatrial enlargement.  Right ventricular systolic pressure is estimated to be 35 mmHg.  Moderate mitral regurgitation due to annular dilation.  Cardiologist on case notified at time of reading.  There has been a   decrease in the LVEF from the study of 2014.      Assessment:     1. ACC/AHA stage C systolic heart failure (CMS-[facility])  BASIC METABOLIC PANEL    furosemide (LASIX) 20 MG Tab   2. Heart failure, NYHA class 2 (CMS-[facility])  BASIC METABOLIC PANEL    furosemide (LASIX) 20 MG Tab   3. Essential hypertension     4. Dyslipidemia     5. Peripheral vascular disease (CMS-[facility])     6. CKD (chronic kidney disease) stage 3, GFR 30-59 ml/min       Medical Decision Making:  Today's Assessment / Status / Plan:   1. HFrEF, Stage C ,class 2, EF 30%: Pt does have some mild volume overload.  -Take one dose of furosemide 10 mg today, and wear compression stockings, elevate LE  -Continue carvedilol 12.5 mg BID  -Continue enalapril 20 mg Daily  -BMP in 1 week  -No kevyn due kidney disease  -Reinforced s/sx of worsening heart failure with patient and weight monitoring. Pt verbalizes understanding. Pt to call office or RTC if present.   -Patient to monitor weights at home daily. Pt to call office if weight increasing >3 lbs in 1 day or >5 lbs in 1 week.     2. Hypertension: Stable  -Per above    3. DLD: LDL 57 2/8/17  -Continue Atorvastatin 5 mg daily    4. PVD:  -Continue atorvastatin, plavix  -Continue walking as tolerated    5. CKD:  -Continue follow-up with nephrology    FU in  clinic in one week with BMP. Sooner if needed.    Patient verbalizes understanding and agrees with the plan of care.     Collaborating MD: Los Bhakta MD

## 2017-06-01 ENCOUNTER — HOSPITAL ENCOUNTER (OUTPATIENT)
Dept: LAB | Facility: MEDICAL CENTER | Age: 82
End: 2017-06-01
Attending: NURSE PRACTITIONER
Payer: MEDICARE

## 2017-06-01 DIAGNOSIS — I50.20 ACC/AHA STAGE C SYSTOLIC HEART FAILURE (HCC): ICD-10-CM

## 2017-06-01 DIAGNOSIS — I50.9 HEART FAILURE, NYHA CLASS 2 (HCC): ICD-10-CM

## 2017-06-01 LAB
ANION GAP SERPL CALC-SCNC: 9 MMOL/L (ref 0–11.9)
BUN SERPL-MCNC: 31 MG/DL (ref 8–22)
CALCIUM SERPL-MCNC: 8.7 MG/DL (ref 8.5–10.5)
CHLORIDE SERPL-SCNC: 107 MMOL/L (ref 96–112)
CO2 SERPL-SCNC: 23 MMOL/L (ref 20–33)
CREAT SERPL-MCNC: 2.02 MG/DL (ref 0.5–1.4)
GFR SERPL CREATININE-BSD FRML MDRD: 32 ML/MIN/1.73 M 2
GLUCOSE SERPL-MCNC: 149 MG/DL (ref 65–99)
POTASSIUM SERPL-SCNC: 5.1 MMOL/L (ref 3.6–5.5)
SODIUM SERPL-SCNC: 139 MMOL/L (ref 135–145)

## 2017-06-01 PROCEDURE — 36415 COLL VENOUS BLD VENIPUNCTURE: CPT

## 2017-06-01 PROCEDURE — 80048 BASIC METABOLIC PNL TOTAL CA: CPT

## 2017-06-06 ENCOUNTER — OFFICE VISIT (OUTPATIENT)
Dept: CARDIOLOGY | Facility: MEDICAL CENTER | Age: 82
End: 2017-06-06
Payer: MEDICARE

## 2017-06-06 VITALS
BODY MASS INDEX: 25.33 KG/M2 | SYSTOLIC BLOOD PRESSURE: 110 MMHG | WEIGHT: 171 LBS | HEART RATE: 56 BPM | HEIGHT: 69 IN | DIASTOLIC BLOOD PRESSURE: 80 MMHG | OXYGEN SATURATION: 96 %

## 2017-06-06 DIAGNOSIS — E78.5 DYSLIPIDEMIA: ICD-10-CM

## 2017-06-06 DIAGNOSIS — I73.9 PERIPHERAL VASCULAR DISEASE (HCC): ICD-10-CM

## 2017-06-06 DIAGNOSIS — I50.20 ACC/AHA STAGE C SYSTOLIC HEART FAILURE (HCC): ICD-10-CM

## 2017-06-06 DIAGNOSIS — I50.9 HEART FAILURE, NYHA CLASS 2 (HCC): ICD-10-CM

## 2017-06-06 DIAGNOSIS — N18.30 CKD (CHRONIC KIDNEY DISEASE) STAGE 3, GFR 30-59 ML/MIN (HCC): ICD-10-CM

## 2017-06-06 DIAGNOSIS — I10 ESSENTIAL HYPERTENSION: ICD-10-CM

## 2017-06-06 PROCEDURE — 1036F TOBACCO NON-USER: CPT | Performed by: NURSE PRACTITIONER

## 2017-06-06 PROCEDURE — G8432 DEP SCR NOT DOC, RNG: HCPCS | Performed by: NURSE PRACTITIONER

## 2017-06-06 PROCEDURE — G8598 ASA/ANTIPLAT THER USED: HCPCS | Performed by: NURSE PRACTITIONER

## 2017-06-06 PROCEDURE — G8419 CALC BMI OUT NRM PARAM NOF/U: HCPCS | Performed by: NURSE PRACTITIONER

## 2017-06-06 PROCEDURE — 99214 OFFICE O/P EST MOD 30 MIN: CPT | Performed by: NURSE PRACTITIONER

## 2017-06-06 PROCEDURE — 4040F PNEUMOC VAC/ADMIN/RCVD: CPT | Mod: 8P | Performed by: NURSE PRACTITIONER

## 2017-06-06 PROCEDURE — 1101F PT FALLS ASSESS-DOCD LE1/YR: CPT | Mod: 8P | Performed by: NURSE PRACTITIONER

## 2017-06-06 ASSESSMENT — ENCOUNTER SYMPTOMS
ABDOMINAL PAIN: 0
SHORTNESS OF BREATH: 0
COUGH: 1
PND: 0
MYALGIAS: 0
PALPITATIONS: 0
CLAUDICATION: 1
FEVER: 0
ORTHOPNEA: 1
DIZZINESS: 0

## 2017-06-06 NOTE — MR AVS SNAPSHOT
"Lex Harden   2017 9:20 AM   Office Visit   MRN: [id number]    Department:  [facility]   Dept Phone:  [phone]    Description:  Male : 1933   Provider:  NAILA Read           Reason for Visit     Follow-Up           Allergies as of 2017     Allergen Noted Reactions    Nkda [No Known Drug Allergy] 2007         Vital Signs     Blood Pressure Pulse Height Weight Body Mass Index Oxygen Saturation    110/80 mmHg 56 1.753 m (5' 9.02\") 77.565 kg (171 lb) 25.24 kg/m2 96%    Smoking Status                   Former Smoker           Basic Information     Date Of Birth Sex Race Ethnicity Preferred Language    1933 Male White Non- English      Your appointments     2017 11:00 AM   Heart Failure Established with NAILA Read   [facility] (--)    [address], [facility] [address] [facility] [address]  [facility] [id number] [phone]              Problem List              ICD-10-CM Priority Class Noted - Resolved    Acute on Chronic blood loss anemia D50.0 High  2011 - Present    HTN (hypertension) I10 Medium  2011 - Present    Dyslipidemia E78.5 Low  2011 - Present    DM (diabetes mellitus) (CMS-HCC) E11.9 Medium  2011 - Present    HEMORRHOIDS    2011 - Present    Diverticulosis K57.90   2011 - Present    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 Q27.33 High  10/31/2014 - Present    CKD (chronic kidney disease) stage 3, GFR 30-59 ml/min N18.3 Medium  10/31/2014 - Present    COPD (chronic obstructive pulmonary disease) (CMS-HCC) J44.9 Medium  10/31/2014 - Present    Peripheral vascular disease (CMS-HCC) I73.9   10/31/2014 - Present    Angina pectoris (CMS-HCC) I20.9 High  2014 - Present    Left carotid stenosis I65.22   2014 - Present    Subclavian artery stenosis, left (CMS-HCC) I77.1   2014 - Present    Aortic valve stenosis I35.0   2014 - Present    GIB (gastrointestinal " bleeding) K92.2   6/16/2015 - Present    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   6/16/2015 - Present    Hypotension I95.9   6/16/2015 - Present    Acute blood loss anemia D62   6/17/2015 - Present    Type 2 diabetes mellitus without complication (HCC) E11.9   4/28/2017 - Present    BPH (benign prostatic hyperplasia) N40.0   4/28/2017 - Present      Health Maintenance        Date Due Completion Dates    DIABETES MONOFILAMENT / LE EXAM 1/21/1934 ---    RETINAL SCREENING 7/21/1951 ---    IMM DTaP/Tdap/Td Vaccine (1 - Tdap) 7/21/1952 ---    IMM ZOSTER VACCINE 7/21/1993 ---    IMM PNEUMOCOCCAL 65+ (ADULT) HIGH/HIGHEST RISK SERIES (1 of 2 - PCV13) 7/21/1998 ---    URINE ACR / MICROALBUMIN 8/15/2014 8/15/2013    A1C SCREENING 10/28/2017 4/28/2017, 7/11/2016, 2/21/2014, 7/19/2012, 9/10/2011, 12/31/2007    FASTING LIPID PROFILE 2/8/2018 2/8/2017, 1/12/2015, 7/10/2013, 9/10/2011, 12/31/2007    SERUM CREATININE 6/1/2018 6/1/2017, 5/8/2017, 5/1/2017, 4/30/2017, 4/29/2017, 4/28/2017, 4/27/2017, 2/8/2017, 2/8/2017, 10/19/2016, 7/14/2016, 7/11/2016, 2/23/2016, 11/2/2015, 8/5/2015, 7/27/2015, 6/23/2015, 6/19/2015, 6/18/2015, 6/17/2015, 6/16/2015, 6/10/2015, 4/8/2015, 1/9/2015, 11/19/2014, 11/18/2014, 11/17/2014, 11/16/2014, 11/15/2014, 11/2/2014, 11/1/2014, 10/31/2014, 10/28/2014, 6/3/2014, 4/17/2014, 2/21/2014, 2/19/2014, 12/31/2013, 12/18/2013, 11/13/2013, 9/30/2013, 8/15/2013, 7/15/2013, 7/10/2013, 7/19/2012, 9/10/2011, 5/2/2011, 4/30/2011, 4/30/2011, 4/21/2011, 2/16/2011, 8/27/2010, 12/31/2007    COLONOSCOPY 6/17/2025 6/17/2015, 11/1/2014, 5/1/2011, 4/21/2011, 9/28/2010            Current Immunizations     No immunizations on file.      Below and/or attached are the medications your provider expects you to take. Review all of your home medications and newly ordered medications with your provider and/or pharmacist. Follow medication instructions as directed by your provider and/or pharmacist. Please keep your medication  list with you and share with your provider. Update the information when medications are discontinued, doses are changed, or new medications (including over-the-counter products) are added; and carry medication information at all times in the event of emergency situations     Allergies:  NKDA - (reactions not documented)               Medications  Valid as of: June 06, 2017 -  9:54 AM    Generic Name Brand Name Tablet Size Instructions for use    Allopurinol (Tab) ZYLOPRIM 100 MG Take 100 mg by mouth every day.        Atorvastatin Calcium (Tab) LIPITOR 10 MG Take 5 mg by mouth every day.        Carvedilol (Tab) COREG 12.5 MG Take 1 Tab by mouth 2 times a day, with meals.        Cholecalciferol (Tab) vitamin D 2000 UNIT Take 4,000 Units by mouth every day.        Clopidogrel Bisulfate (Tab) PLAVIX 75 MG Take 75 mg by mouth every morning.        Enalapril Maleate (Tab) VASOTEC 10 MG Take 20 mg by mouth every day.        Ferrous Sulfate (Tab) Iron 325 (65 FE) MG Take 1 Tab by mouth every morning.        Finasteride (Tab) PROSCAR 5 MG         Furosemide (Tab) LASIX 20 MG Take 0.5 Tabs by mouth 1 time daily as needed.        Terazosin HCl (Cap) HYTRIN 5 MG Take 5 mg by mouth every day.        Tiotropium Bromide Monohydrate (Cap) SPIRIVA HANDIHALER 18 MCG         .                 Medicines prescribed today were sent to:     [facility] [place] - [address]    [address] [address]    Phone: [phone] Fax: [phone]    Open 24 Hours?: No      Medication refill instructions:       If your prescription bottle indicates you have medication refills left, it is not necessary to call your provider’s office. Please contact your pharmacy and they will refill your medication.    If your prescription bottle indicates you do not have any refills left, you may request refills at any time through one of the following ways: The online [employer] system (except Urgent Care), by calling your provider’s  office, or by asking your pharmacy to contact your provider’s office with a refill request. Medication refills are processed only during regular business hours and may not be available until the next business day. Your provider may request additional information or to have a follow-up visit with you prior to refilling your medication.   *Please Note: Medication refills are assigned a new Rx number when refilled electronically. Your pharmacy may indicate that no refills were authorized even though a new prescription for the same medication is available at the pharmacy. Please request the medicine by name with the pharmacy before contacting your provider for a refill.           [address] Access Code: Activation code not generated  Current [address] Status: Active

## 2017-06-06 NOTE — PROGRESS NOTES
Subjective:   Lex Harden is a 83 y.o. male who presents today for his heart failure.    Patient of Dr. Castillo. Patient was last seen 5/30/17. Pt had some mild volume overload and took 1 dose of lasix. His LE edema has improved with lasix and use of his compression stockings. Pt continues on Carvedilol 12.5 mg BID and Enalapril 20 mg daily. Pt continues to feel improvement of SOB with ADLs, but continues to have some shortness of breath with exertion. Patient continues to have a dry cough when he reclines in his chair at night and uses 2 pillows at night for sleep to prevent the cough. Otherwise, pt denies chest pain, shortness of breath at rest, palpitations, dizziness/lightheadedness, PND.     Patient weight has been stable at 174 pounds.     Additonally, patient has the following medical problems:    -HTN: on enalapril 20 mg daily, Carvedilol 12.5 mg BID    -DLD: taking Atorvastatin    -CKD stage 3: Followed by Neph    -COPD: using inhalers    Past Medical History   Diagnosis Date   • Hypercholesteremia    • Diabetes    • Hypertension    • PVD (peripheral vascular disease) (CMS-[facility])    • AVM (arteriovenous malformation) of colon    • Other and unspecified angina pectoris    • Back pain    • Unspecified cataract      right eye    • Chronic airway obstruction, not elsewhere classified    • Infectious disease      Past Surgical History   Procedure Laterality Date   • Colonoscopy with apc  9/28/2010     Performed by EDMUND CHENG at SURGERY [facility] ORS   • Gastroscopy with apc  9/28/2010     Performed by EDMUND CHENG at SURGERY [facility] ORS   • Colonoscopy with apc  4/21/2011     Performed by EDMUND CHENG at SURGERY [facility] ORS   • Gastroscopy-endo  4/21/2011     Performed by EDMUND CHENG at SURGERY [facility] ORS   • Gastroscopy-endo  5/1/2011     Performed by HOLLY VASQUEZ at ENDOSCOPY [facility]S   • Colonoscopy - endo  5/1/2011     Performed by CHRISTINA  "HOLLY BARBER at ENDOSCOPY [facility] ORS   • 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 at ENDOSCOPY [facility]   • Colonoscopy with apc  11/1/2014     Performed by Eddie Levin M.D. at ENDOSCOPY [facility]   • Colonoscopy  6/17/2015     Procedure: COLONOSCOPY;  Surgeon: Eddie Levin M.D.;  Location: SURGERY [facility];  Service:    • Gastroscopy  6/17/2015     Procedure: GASTROSCOPY W/APC;  Surgeon: Eddie Levin M.D.;  Location: SURGERY [facility];  Service:      Family History   Problem Relation Age of Onset   • Non-contributory Neg Hx      \"unknown\"     History   Smoking status   • Former Smoker   • Quit date: 01/01/1982   Smokeless tobacco   • Not on file     Comment: Hx smoking x 40 yrs. Quit 1983     Allergies   Allergen Reactions   • Nkda [No Known Drug Allergy]      Outpatient Encounter Prescriptions as of 6/6/2017   Medication Sig Dispense Refill   • finasteride (PROSCAR) 5 MG Tab      • furosemide (LASIX) 20 MG Tab Take 0.5 Tabs by mouth 1 time daily as needed. 30 Tab 1   • carvedilol (COREG) 12.5 MG Tab Take 1 Tab by mouth 2 times a day, with meals. 60 Tab 11   • SPIRIVA HANDIHALER 18 MCG Cap      • clopidogrel (PLAVIX) 75 MG Tab Take 75 mg by mouth every morning.     • enalapril (VASOTEC) 10 MG TABS Take 20 mg by mouth every day.     • allopurinol (ZYLOPRIM) 100 MG TABS Take 100 mg by mouth every day.     • atorvastatin (LIPITOR) 10 MG TABS Take 5 mg by mouth every day.     • Cholecalciferol (VITAMIN D) 2000 UNIT TABS Take 4,000 Units by mouth every day.     • Ferrous Sulfate (IRON) 325 (65 FE) MG TABS Take 1 Tab by mouth every morning.     • terazosin (HYTRIN) 5 MG CAPS Take 5 mg by mouth every day.       No facility-administered encounter medications on file as of 6/6/2017.     Review of Systems   Constitutional: Negative for fever and malaise/fatigue.   Respiratory: Positive for " "cough (dry). Negative for shortness of breath.    Cardiovascular: Positive for orthopnea (2 pillows) and claudication. Negative for chest pain, palpitations, leg swelling and PND.   Gastrointestinal: Negative for abdominal pain.   Musculoskeletal: Negative for myalgias.   Neurological: Negative for dizziness.   All other systems reviewed and are negative.       Objective:   /80 mmHg  Pulse 56  Ht 1.753 m (5' 9.02\")  Wt 77.565 kg (171 lb)  BMI 25.24 kg/m2  SpO2 96%    Physical Exam   Constitutional: He is oriented to person, place, and time. He appears well-developed and well-nourished.   HENT:   Head: Normocephalic and atraumatic.   Right Ear: Tympanic membrane, external ear and ear canal normal.   Left Ear: Tympanic membrane, external ear and ear canal normal.   Mouth/Throat: No oropharyngeal exudate.   Wears bilateral Hearing aids   Eyes: EOM are normal.   Neck: Normal range of motion. Neck supple. No JVD present.   Cardiovascular: Normal rate, regular rhythm and intact distal pulses.    Murmur heard.  Pulmonary/Chest: Effort normal and breath sounds normal. No respiratory distress. He has no wheezes. He has no rales.   Abdominal: Soft. Bowel sounds are normal.   Musculoskeletal: Normal range of motion. He exhibits edema (Trace Right LE edema-non pitting).   Neurological: He is alert and oriented to person, place, and time.   Skin: Skin is warm and dry.   Psychiatric: He has a normal mood and affect.   Nursing note and vitals reviewed.    Lab Results   Component Value Date/Time    CHOLESTEROL, 02/08/2017 07:49 AM    LDL 57 02/08/2017 07:49 AM    HDL 29* 02/08/2017 07:49 AM    TRIGLYCERIDES 124 02/08/2017 07:49 AM       Lab Results   Component Value Date/Time    SODIUM 139 06/01/2017 10:02 AM    POTASSIUM 5.1 06/01/2017 10:02 AM    CHLORIDE 107 06/01/2017 10:02 AM    CO2 23 06/01/2017 10:02 AM    GLUCOSE 149* 06/01/2017 10:02 AM    BUN 31* 06/01/2017 10:02 AM    CREATININE 2.02* 06/01/2017 10:02 " AM     Lab Results   Component Value Date/Time    ALKALINE PHOSPHATASE 44 05/08/2017 07:44 AM    AST(SGOT) 13 05/08/2017 07:44 AM    ALT(SGPT) 9 05/08/2017 07:44 AM    TOTAL BILIRUBIN 0.6 05/08/2017 07:44 AM      Transthoracic Echo Report 11/16/14  Technically difficult study.   Normal left ventricular systolic function.  Left ventricular ejection fraction is 60% to 65%.  Diastolic function is present.  Mild mitral regurgitation.  Mild aortic stenosis.  Mild tricuspid regurgitation.      Transthoracic Echo Report 4/28/17  Severe biventricular dysfunction. Left ventricular ejection fraction is   visually estimated to be 30%.  Global hypokinesis.  Reduced right ventricular systolic function.  Biatrial enlargement.  Right ventricular systolic pressure is estimated to be 35 mmHg.  Moderate mitral regurgitation due to annular dilation.  Cardiologist on case notified at time of reading.  There has been a   decrease in the LVEF from the study of 2014.      Assessment:     1. ACC/AHA stage C systolic heart failure (CMS-[facility])     2. Heart failure, NYHA class 2 (CMS-[facility])     3. Essential hypertension     4. Dyslipidemia     5. Peripheral vascular disease (CMS-[facility])     6. CKD (chronic kidney disease) stage 3, GFR 30-59 ml/min         Medical Decision Making:  Today's Assessment / Status / Plan:   1. HFrEF, Stage C ,class 2, EF 30%: Based on physical examination findings, patient is euvolemic. No JVD, lungs are clear to auscultation, no pitting edema in bilateral lower extremities, no ascites.  -furosemide 10 mg today as needed and continue to wear compression stockings, elevate LE  -Continue carvedilol 12.5 mg BID  -Continue enalapril 20 mg Daily  -No kevyn due kidney disease  -Reinforced s/sx of worsening heart failure with patient and weight monitoring. Pt verbalizes understanding. Pt to call office or RTC if present.   -Patient to monitor weights at home daily. Pt to call office if weight increasing >3 lbs in 1 day or >5 lbs  in 1 week.     2. Hypertension: Stable  -Per above    3. DLD: LDL 57 2/8/17  -Continue Atorvastatin 5 mg daily    4. PVD:  -Continue atorvastatin, plavix  -Continue walking as tolerated    5. CKD:  -Continue follow-up with nephrology      6. Cough:   -Will have patient follow up with PCP to evaluate for other causes of Cough before considering changing enalapril. Pt states he has been taking this for many years without problems. Pt states cough started after hospital stay.      FU in clinic in 1 month. Sooner if needed.    Patient verbalizes understanding and agrees with the plan of care.     Collaborating MD: Toro Castillo MD

## 2017-06-06 NOTE — Clinical Note
[facility]   [address]  [place] [address]  Phone: [phone]  Fax: [phone]              Lex Harden  7/21/1933    Encounter Date: 6/6/2017    NAILA Read          PROGRESS NOTE:  Subjective:   Lex Harden is a 83 y.o. male who presents today for his heart failure.    Patient of Dr. Castillo. Patient was last seen 5/30/17. Pt had some mild volume overload and took 1 dose of lasix. His LE edema has improved with lasix and use of his compression stockings. Pt continues on Carvedilol 12.5 mg BID and Enalapril 20 mg daily. Pt continues to feel improvement of SOB with ADLs, but continues to have some shortness of breath with exertion. Patient continues to have a dry cough when he reclines in his chair at night and uses 2 pillows at night for sleep to prevent the cough. Otherwise, pt denies chest pain, shortness of breath at rest, palpitations, dizziness/lightheadedness, PND.     Patient weight has been stable at 174 pounds.     Additonally, patient has the following medical problems:    -HTN: on enalapril 20 mg daily, Carvedilol 12.5 mg BID    -DLD: taking Atorvastatin    -CKD stage 3: Followed by Neph    -COPD: using inhalers    Past Medical History   Diagnosis Date   • Hypercholesteremia    • Diabetes    • Hypertension    • PVD (peripheral vascular disease) (CMS-H[facility])    • AVM (arteriovenous malformation) of colon    • Other and unspecified angina pectoris    • Back pain    • Unspecified cataract      right eye    • Chronic airway obstruction, not elsewhere classified    • Infectious disease      Past Surgical History   Procedure Laterality Date   • Colonoscopy with apc  9/28/2010     Performed by EDMUND CHENG at SURGERY [facility]S   • Gastroscopy with apc  9/28/2010     Performed by EDMUND CHENG at SURGERY [facility] ORS   • Colonoscopy with apc  4/21/2011     Performed by EDMUND CHENG at [facility] ORS   •  "Gastroscopy-endo  4/21/2011     Performed by EDMUND CHENG at SURGERY [facility]   • Gastroscopy-endo  5/1/2011     Performed by HOLLY VASQUEZ at ENDOSCOPY [facility] ORS   • Colonoscopy - endo  5/1/2011     Performed by HOLLY VASQUEZ at ENDOSCOPY [facility] ORS   • 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 at ENDOSCOPY [facility]   • Colonoscopy with apc  11/1/2014     Performed by Eddie Levin M.D. at ENDOSCOPY [facility]   • Colonoscopy  6/17/2015     Procedure: COLONOSCOPY;  Surgeon: Eddie Levin M.D.;  Location: [facility];  Service:    • Gastroscopy  6/17/2015     Procedure: GASTROSCOPY W/APC;  Surgeon: Eddie Levin M.D.;  Location: SURGERY [facility];  Service:      Family History   Problem Relation Age of Onset   • Non-contributory Neg Hx      \"unknown\"     History   Smoking status   • Former Smoker   • Quit date: 01/01/1982   Smokeless tobacco   • Not on file     Comment: Hx smoking x 40 yrs. Quit 1983     Allergies   Allergen Reactions   • Nkda [No Known Drug Allergy]      Outpatient Encounter Prescriptions as of 6/6/2017   Medication Sig Dispense Refill   • finasteride (PROSCAR) 5 MG Tab      • furosemide (LASIX) 20 MG Tab Take 0.5 Tabs by mouth 1 time daily as needed. 30 Tab 1   • carvedilol (COREG) 12.5 MG Tab Take 1 Tab by mouth 2 times a day, with meals. 60 Tab 11   • SPIRIVA HANDIHALER 18 MCG Cap      • clopidogrel (PLAVIX) 75 MG Tab Take 75 mg by mouth every morning.     • enalapril (VASOTEC) 10 MG TABS Take 20 mg by mouth every day.     • allopurinol (ZYLOPRIM) 100 MG TABS Take 100 mg by mouth every day.     • atorvastatin (LIPITOR) 10 MG TABS Take 5 mg by mouth every day.     • Cholecalciferol (VITAMIN D) 2000 UNIT TABS Take 4,000 Units by mouth every day.     • Ferrous Sulfate (IRON) 325 (65 FE) MG TABS Take 1 Tab by mouth every " "morning.     • terazosin (HYTRIN) 5 MG CAPS Take 5 mg by mouth every day.       No facility-administered encounter medications on file as of 6/6/2017.     Review of Systems   Constitutional: Negative for fever and malaise/fatigue.   Respiratory: Positive for cough (dry). Negative for shortness of breath.    Cardiovascular: Positive for orthopnea (2 pillows) and claudication. Negative for chest pain, palpitations, leg swelling and PND.   Gastrointestinal: Negative for abdominal pain.   Musculoskeletal: Negative for myalgias.   Neurological: Negative for dizziness.   All other systems reviewed and are negative.       Objective:   /80 mmHg  Pulse 56  Ht 1.753 m (5' 9.02\")  Wt 77.565 kg (171 lb)  BMI 25.24 kg/m2  SpO2 96%    Physical Exam   Constitutional: He is oriented to person, place, and time. He appears well-developed and well-nourished.   HENT:   Head: Normocephalic and atraumatic.   Right Ear: Tympanic membrane, external ear and ear canal normal.   Left Ear: Tympanic membrane, external ear and ear canal normal.   Mouth/Throat: No oropharyngeal exudate.   Wears bilateral Hearing aids   Eyes: EOM are normal.   Neck: Normal range of motion. Neck supple. No JVD present.   Cardiovascular: Normal rate, regular rhythm and intact distal pulses.    Murmur heard.  Pulmonary/Chest: Effort normal and breath sounds normal. No respiratory distress. He has no wheezes. He has no rales.   Abdominal: Soft. Bowel sounds are normal.   Musculoskeletal: Normal range of motion. He exhibits edema (Trace Right LE edema-non pitting).   Neurological: He is alert and oriented to person, place, and time.   Skin: Skin is warm and dry.   Psychiatric: He has a normal mood and affect.   Nursing note and vitals reviewed.    Lab Results   Component Value Date/Time    CHOLESTEROL, 02/08/2017 07:49 AM    LDL 57 02/08/2017 07:49 AM    HDL 29* 02/08/2017 07:49 AM    TRIGLYCERIDES 124 02/08/2017 07:49 AM       Lab Results   Component " Value Date/Time    SODIUM 139 06/01/2017 10:02 AM    POTASSIUM 5.1 06/01/2017 10:02 AM    CHLORIDE 107 06/01/2017 10:02 AM    CO2 23 06/01/2017 10:02 AM    GLUCOSE 149* 06/01/2017 10:02 AM    BUN 31* 06/01/2017 10:02 AM    CREATININE 2.02* 06/01/2017 10:02 AM     Lab Results   Component Value Date/Time    ALKALINE PHOSPHATASE 44 05/08/2017 07:44 AM    AST(SGOT) 13 05/08/2017 07:44 AM    ALT(SGPT) 9 05/08/2017 07:44 AM    TOTAL BILIRUBIN 0.6 05/08/2017 07:44 AM      Transthoracic Echo Report 11/16/14  Technically difficult study.   Normal left ventricular systolic function.  Left ventricular ejection fraction is 60% to 65%.  Diastolic function is present.  Mild mitral regurgitation.  Mild aortic stenosis.  Mild tricuspid regurgitation.      Transthoracic Echo Report 4/28/17  Severe biventricular dysfunction. Left ventricular ejection fraction is   visually estimated to be 30%.  Global hypokinesis.  Reduced right ventricular systolic function.  Biatrial enlargement.  Right ventricular systolic pressure is estimated to be 35 mmHg.  Moderate mitral regurgitation due to annular dilation.  Cardiologist on case notified at time of reading.  There has been a   decrease in the LVEF from the study of 2014.      Assessment:     1. ACC/AHA stage C systolic heart failure (CMS-[facility])     2. Heart failure, NYHA class 2 (CMS-[facility])     3. Essential hypertension     4. Dyslipidemia     5. Peripheral vascular disease (CMS-[facility])     6. CKD (chronic kidney disease) stage 3, GFR 30-59 ml/min         Medical Decision Making:  Today's Assessment / Status / Plan:   1. HFrEF, Stage C ,class 2, EF 30%: Based on physical examination findings, patient is euvolemic. No JVD, lungs are clear to auscultation, no pitting edema in bilateral lower extremities, no ascites.  -furosemide 10 mg today as needed and continue to wear compression stockings, elevate LE  -Continue carvedilol 12.5 mg BID  -Continue enalapril 20 mg Daily  -No kevyn due kidney  disease  -Reinforced s/sx of worsening heart failure with patient and weight monitoring. Pt verbalizes understanding. Pt to call office or RTC if present.   -Patient to monitor weights at home daily. Pt to call office if weight increasing >3 lbs in 1 day or >5 lbs in 1 week.     2. Hypertension: Stable  -Per above    3. DLD: LDL 57 2/8/17  -Continue Atorvastatin 5 mg daily    4. PVD:  -Continue atorvastatin, plavix  -Continue walking as tolerated    5. CKD:  -Continue follow-up with nephrology      6. Cough:   -Will have patient follow up with PCP to evaluate for other causes of Cough before considering changing enalapril. Pt states he has been taking this for many years without problems. Pt states cough started after hospital stay.      FU in clinic in 1 month. Sooner if needed.    Patient verbalizes understanding and agrees with the plan of care.     Collaborating MD: MD Kirill Arzola, D.OGino  [address]  [address]  [place] [place] [address]  VIA Facsimile: [phone]

## 2017-06-09 ENCOUNTER — HOSPITAL ENCOUNTER (OUTPATIENT)
Dept: LAB | Facility: MEDICAL CENTER | Age: 82
End: 2017-06-09
Attending: PHYSICIAN ASSISTANT
Payer: MEDICARE

## 2017-06-09 ENCOUNTER — HOSPITAL ENCOUNTER (OUTPATIENT)
Dept: LAB | Facility: MEDICAL CENTER | Age: 82
End: 2017-06-09
Attending: FAMILY MEDICINE
Payer: MEDICARE

## 2017-06-09 LAB
ALBUMIN SERPL BCP-MCNC: 3.7 G/DL (ref 3.2–4.9)
ALBUMIN/GLOB SERPL: 0.9 G/DL
ALP SERPL-CCNC: 65 U/L (ref 30–99)
ALT SERPL-CCNC: 17 U/L (ref 2–50)
ANION GAP SERPL CALC-SCNC: 9 MMOL/L (ref 0–11.9)
ANISOCYTOSIS BLD QL SMEAR: ABNORMAL
AST SERPL-CCNC: 19 U/L (ref 12–45)
BASOPHILS # BLD AUTO: 0.7 % (ref 0–1.8)
BASOPHILS # BLD: 0.05 K/UL (ref 0–0.12)
BILIRUB SERPL-MCNC: 0.6 MG/DL (ref 0.1–1.5)
BUN SERPL-MCNC: 39 MG/DL (ref 8–22)
CALCIUM SERPL-MCNC: 9 MG/DL (ref 8.5–10.5)
CHLORIDE SERPL-SCNC: 108 MMOL/L (ref 96–112)
CO2 SERPL-SCNC: 24 MMOL/L (ref 20–33)
COMMENT 1642: NORMAL
CREAT SERPL-MCNC: 2.02 MG/DL (ref 0.5–1.4)
EOSINOPHIL # BLD AUTO: 0.18 K/UL (ref 0–0.51)
EOSINOPHIL NFR BLD: 2.5 % (ref 0–6.9)
ERYTHROCYTE [DISTWIDTH] IN BLOOD BY AUTOMATED COUNT: 50.4 FL (ref 35.9–50)
GFR SERPL CREATININE-BSD FRML MDRD: 32 ML/MIN/1.73 M 2
GLOBULIN SER CALC-MCNC: 4 G/DL (ref 1.9–3.5)
GLUCOSE SERPL-MCNC: 121 MG/DL (ref 65–99)
HCT VFR BLD AUTO: 38.4 % (ref 42–52)
HGB BLD-MCNC: 11.5 G/DL (ref 14–18)
IMM GRANULOCYTES # BLD AUTO: 0.03 K/UL (ref 0–0.11)
IMM GRANULOCYTES NFR BLD AUTO: 0.4 % (ref 0–0.9)
LYMPHOCYTES # BLD AUTO: 1.17 K/UL (ref 1–4.8)
LYMPHOCYTES NFR BLD: 16.1 % (ref 22–41)
MCH RBC QN AUTO: 27.2 PG (ref 27–33)
MCHC RBC AUTO-ENTMCNC: 29.9 G/DL (ref 33.7–35.3)
MCV RBC AUTO: 90.8 FL (ref 81.4–97.8)
MONOCYTES # BLD AUTO: 0.55 K/UL (ref 0–0.85)
MONOCYTES NFR BLD AUTO: 7.6 % (ref 0–13.4)
MORPHOLOGY BLD-IMP: NORMAL
NEUTROPHILS # BLD AUTO: 5.29 K/UL (ref 1.82–7.42)
NEUTROPHILS NFR BLD: 72.7 % (ref 44–72)
NRBC # BLD AUTO: 0 K/UL
NRBC BLD AUTO-RTO: 0 /100 WBC
PLATELET # BLD AUTO: 202 K/UL (ref 164–446)
PLATELET BLD QL SMEAR: NORMAL
PMV BLD AUTO: 11.7 FL (ref 9–12.9)
POIKILOCYTOSIS BLD QL SMEAR: NORMAL
POTASSIUM SERPL-SCNC: 4.8 MMOL/L (ref 3.6–5.5)
PROT SERPL-MCNC: 7.7 G/DL (ref 6–8.2)
PSA SERPL-MCNC: 37.32 NG/ML (ref 0–4)
RBC # BLD AUTO: 4.23 M/UL (ref 4.7–6.1)
RBC BLD AUTO: PRESENT
SODIUM SERPL-SCNC: 141 MMOL/L (ref 135–145)
WBC # BLD AUTO: 7.3 K/UL (ref 4.8–10.8)

## 2017-06-09 PROCEDURE — 85025 COMPLETE CBC W/AUTO DIFF WBC: CPT

## 2017-06-09 PROCEDURE — 80053 COMPREHEN METABOLIC PANEL: CPT

## 2017-06-23 ENCOUNTER — HOSPITAL ENCOUNTER (OUTPATIENT)
Dept: LAB | Facility: MEDICAL CENTER | Age: 82
End: 2017-06-23
Attending: PHYSICIAN ASSISTANT
Payer: MEDICARE

## 2017-06-23 PROCEDURE — 84153 ASSAY OF PSA TOTAL: CPT

## 2017-06-23 PROCEDURE — 87186 SC STD MICRODIL/AGAR DIL: CPT

## 2017-06-23 PROCEDURE — 87086 URINE CULTURE/COLONY COUNT: CPT

## 2017-06-23 PROCEDURE — 84154 ASSAY OF PSA FREE: CPT

## 2017-06-23 PROCEDURE — 87077 CULTURE AEROBIC IDENTIFY: CPT

## 2017-06-25 LAB
BACTERIA UR CULT: ABNORMAL
PSA FREE MFR SERPL: 26 %
PSA FREE SERPL-MCNC: 10.5 NG/ML
PSA SERPL-MCNC: 40.6 NG/ML (ref 0–4)
SIGNIFICANT IND 70042: ABNORMAL
SITE SITE: ABNORMAL
SOURCE SOURCE: ABNORMAL

## 2017-07-06 ENCOUNTER — OFFICE VISIT (OUTPATIENT)
Dept: CARDIOLOGY | Facility: MEDICAL CENTER | Age: 82
End: 2017-07-06
Payer: MEDICARE

## 2017-07-06 VITALS
HEIGHT: 69 IN | DIASTOLIC BLOOD PRESSURE: 78 MMHG | HEART RATE: 58 BPM | WEIGHT: 166 LBS | OXYGEN SATURATION: 96 % | SYSTOLIC BLOOD PRESSURE: 120 MMHG | BODY MASS INDEX: 24.59 KG/M2

## 2017-07-06 DIAGNOSIS — N18.30 CKD (CHRONIC KIDNEY DISEASE) STAGE 3, GFR 30-59 ML/MIN (HCC): ICD-10-CM

## 2017-07-06 DIAGNOSIS — I10 ESSENTIAL HYPERTENSION: ICD-10-CM

## 2017-07-06 DIAGNOSIS — I50.20 ACC/AHA STAGE C SYSTOLIC HEART FAILURE (HCC): ICD-10-CM

## 2017-07-06 DIAGNOSIS — I73.9 PAD (PERIPHERAL ARTERY DISEASE) (HCC): ICD-10-CM

## 2017-07-06 DIAGNOSIS — E78.5 DYSLIPIDEMIA: ICD-10-CM

## 2017-07-06 DIAGNOSIS — I50.9 HEART FAILURE, NYHA CLASS 2 (HCC): ICD-10-CM

## 2017-07-06 PROCEDURE — 99214 OFFICE O/P EST MOD 30 MIN: CPT | Performed by: NURSE PRACTITIONER

## 2017-07-06 RX ORDER — CARVEDILOL 12.5 MG/1
12.5 TABLET ORAL 2 TIMES DAILY WITH MEALS
Qty: 180 TAB | Refills: 3 | Status: SHIPPED | OUTPATIENT
Start: 2017-07-06 | End: 2017-11-21 | Stop reason: SDUPTHER

## 2017-07-06 ASSESSMENT — ENCOUNTER SYMPTOMS
FEVER: 0
ABDOMINAL PAIN: 0
SHORTNESS OF BREATH: 0
ORTHOPNEA: 0
COUGH: 0
PALPITATIONS: 0
MYALGIAS: 0
PND: 0
DIZZINESS: 0
CLAUDICATION: 1

## 2017-07-06 NOTE — PROGRESS NOTES
Subjective:   Lex Harden is a 83 y.o. male who presents today for his heart failure with his Step-daughter, Rhiannon and Ex-wife, Kecia.    Patient of Dr. Castillo. Patient was last seen 6/6/17. Since his last visit, his lower extremity swelling, orthopnea and his dry cough have resolved. Patient states he is feeling well, he no longer has shortness of breath with ADLs and on he is able to walk up a flight of stairs without shortness of breath as well. Patient continues to be limited with exertion/ambulating for longer distances due to his claudication. Pt continues on Carvedilol 12.5 mg BID and Enalapril 20 mg daily. Otherwise, pt denies chest pain, shortness of breath at rest, palpitations, dizziness/lightheadedness, PND.     Patient weight has been decreasing now at 166 pounds.     Additonally, patient has the following medical problems:    -HTN: on enalapril 20 mg daily, Carvedilol 12.5 mg BID    -DLD: taking Atorvastatin    -CKD stage 3: Followed by Neph    -COPD: using inhalers    Past Medical History   Diagnosis Date   • Hypercholesteremia    • Diabetes    • Hypertension    • PVD (peripheral vascular disease) (CMS-HCC)    • AVM (arteriovenous malformation) of colon    • Other and unspecified angina pectoris    • Back pain    • Unspecified cataract      right eye    • Chronic airway obstruction, not elsewhere classified    • Infectious disease      Past Surgical History   Procedure Laterality Date   • Colonoscopy with apc  9/28/2010     Performed by EDMUND CHENG at SURGERY [facility] ORS   • Gastroscopy with apc  9/28/2010     Performed by EDMUND CHENG at SURGERY [facility] ORS   • Colonoscopy with apc  4/21/2011     Performed by EDMUND CHENG at SURGERY [facility] ORS   • Gastroscopy-endo  4/21/2011     Performed by EDMUND CHENG at SURGERY [facility] ORS   • Gastroscopy-endo  5/1/2011     Performed by HOLLY VASQUEZ at ENDOSCOPY [facility] ORS   • Colonoscopy - endo   "5/1/2011     Performed by HOLLY VASQUEZ at ENDOSCOPY [facility]   • 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 at ENDOSCOPY [facility]   • Colonoscopy with apc  11/1/2014     Performed by Eddie Levin M.D. at ENDOSCOPY [facility]   • Colonoscopy  6/17/2015     Procedure: COLONOSCOPY;  Surgeon: Eddie Levin M.D.;  Location: SURGERY [facility];  Service:    • Gastroscopy  6/17/2015     Procedure: GASTROSCOPY W/APC;  Surgeon: Eddie Levin M.D.;  Location: SURGERY [facility];  Service:      Family History   Problem Relation Age of Onset   • Non-contributory Neg Hx      \"unknown\"     History   Smoking status   • Former Smoker   • Quit date: 01/01/1982   Smokeless tobacco   • Not on file     Comment: Hx smoking x 40 yrs. Quit 1983     Allergies   Allergen Reactions   • Nkda [No Known Drug Allergy]      Outpatient Encounter Prescriptions as of 7/6/2017   Medication Sig Dispense Refill   • finasteride (PROSCAR) 5 MG Tab      • furosemide (LASIX) 20 MG Tab Take 0.5 Tabs by mouth 1 time daily as needed. 30 Tab 1   • carvedilol (COREG) 12.5 MG Tab Take 1 Tab by mouth 2 times a day, with meals. 60 Tab 11   • SPIRIVA HANDIHALER 18 MCG Cap      • clopidogrel (PLAVIX) 75 MG Tab Take 75 mg by mouth every morning.     • enalapril (VASOTEC) 10 MG TABS Take 20 mg by mouth every day.     • allopurinol (ZYLOPRIM) 100 MG TABS Take 100 mg by mouth every day.     • atorvastatin (LIPITOR) 10 MG TABS Take 5 mg by mouth every day.     • Cholecalciferol (VITAMIN D) 2000 UNIT TABS Take 4,000 Units by mouth every day.     • Ferrous Sulfate (IRON) 325 (65 FE) MG TABS Take 1 Tab by mouth every morning.     • terazosin (HYTRIN) 5 MG CAPS Take 5 mg by mouth every day.       No facility-administered encounter medications on file as of 7/6/2017.     Review of Systems   Constitutional: Negative for fever and " "malaise/fatigue.   Respiratory: Negative for cough and shortness of breath.    Cardiovascular: Positive for claudication. Negative for chest pain, palpitations, orthopnea, leg swelling and PND.   Gastrointestinal: Negative for abdominal pain.   Musculoskeletal: Negative for myalgias.   Neurological: Negative for dizziness.   All other systems reviewed and are negative.       Objective:   /74 mmHg  Pulse 58  Ht 1.753 m (5' 9\")  Wt 75.297 kg (166 lb)  BMI 24.50 kg/m2  SpO2 96%    Physical Exam   Constitutional: He is oriented to person, place, and time. He appears well-developed and well-nourished.   HENT:   Head: Normocephalic and atraumatic.   Right Ear: Tympanic membrane, external ear and ear canal normal.   Left Ear: Tympanic membrane, external ear and ear canal normal.   Mouth/Throat: No oropharyngeal exudate.   Wears bilateral Hearing aids   Eyes: EOM are normal.   Neck: Normal range of motion. Neck supple. No JVD present.   Cardiovascular: Normal rate, regular rhythm and intact distal pulses.    Murmur heard.  Pulmonary/Chest: Effort normal and breath sounds normal. No respiratory distress. He has no wheezes. He has no rales.   Abdominal: Soft. Bowel sounds are normal.   Musculoskeletal: Normal range of motion. He exhibits no edema.   Neurological: He is alert and oriented to person, place, and time.   Skin: Skin is warm and dry.   Psychiatric: He has a normal mood and affect.   Nursing note and vitals reviewed.    Lab Results   Component Value Date/Time    CHOLESTEROL, 02/08/2017 07:49 AM    LDL 57 02/08/2017 07:49 AM    HDL 29* 02/08/2017 07:49 AM    TRIGLYCERIDES 124 02/08/2017 07:49 AM       Lab Results   Component Value Date/Time    SODIUM 141 06/09/2017 02:07 PM    POTASSIUM 4.8 06/09/2017 02:07 PM    CHLORIDE 108 06/09/2017 02:07 PM    CO2 24 06/09/2017 02:07 PM    GLUCOSE 121* 06/09/2017 02:07 PM    BUN 39* 06/09/2017 02:07 PM    CREATININE 2.02* 06/09/2017 02:07 PM     Lab Results "   Component Value Date/Time    ALKALINE PHOSPHATASE 65 06/09/2017 02:07 PM    AST(SGOT) 19 06/09/2017 02:07 PM    ALT(SGPT) 17 06/09/2017 02:07 PM    TOTAL BILIRUBIN 0.6 06/09/2017 02:07 PM      Transthoracic Echo Report 11/16/14  Technically difficult study.   Normal left ventricular systolic function.  Left ventricular ejection fraction is 60% to 65%.  Diastolic function is present.  Mild mitral regurgitation.  Mild aortic stenosis.  Mild tricuspid regurgitation.      Transthoracic Echo Report 4/28/17  Severe biventricular dysfunction. Left ventricular ejection fraction is   visually estimated to be 30%.  Global hypokinesis.  Reduced right ventricular systolic function.  Biatrial enlargement.  Right ventricular systolic pressure is estimated to be 35 mmHg.  Moderate mitral regurgitation due to annular dilation.  Cardiologist on case notified at time of reading.  There has been a   decrease in the LVEF from the study of 2014.      Assessment:     1. ACC/AHA stage C systolic heart failure (CMS-[facility])  carvedilol (COREG) 12.5 MG Tab    ECHOCARDIOGRAM LTD W/O CONT   2. Heart failure, NYHA class 2 (CMS-[facility])  ECHOCARDIOGRAM LTD W/O CONT   3. Essential hypertension     4. Dyslipidemia  carvedilol (COREG) 12.5 MG Tab    ECHOCARDIOGRAM LTD W/O CONT   5. PAD (peripheral artery disease) (CMS-[facility])  carvedilol (COREG) 12.5 MG Tab    ECHOCARDIOGRAM LTD W/O CONT   6. CKD (chronic kidney disease) stage 3, GFR 30-59 ml/min  carvedilol (COREG) 12.5 MG Tab       Medical Decision Making:  Today's Assessment / Status / Plan:   1. HFrEF, Stage C ,class 1-2, EF 30%: Based on physical examination findings, patient is euvolemic. No JVD, lungs are clear to auscultation, no pitting edema in bilateral lower extremities, no ascites.  -furosemide 10 mg today as needed and continue to wear compression stockings, elevate LE  -Continue carvedilol 12.5 mg BID  -Continue enalapril 20 mg Daily  -No kevyn due kidney disease  -Repeat echocardiogram in  one month  -Reinforced s/sx of worsening heart failure with patient and weight monitoring. Pt verbalizes understanding. Pt to call office or RTC if present.   -Patient to monitor weights at home daily. Pt to call office if weight increasing >3 lbs in 1 day or >5 lbs in 1 week.     2. Hypertension: Stable  -Per above    3. DLD: LDL 57 2/8/17  -Continue Atorvastatin 5 mg daily    4. PVD:  -Continue atorvastatin, plavix  -Continue walking as tolerated  -Followed by Dr. Garcia    5. CKD:  -Continue follow-up with nephrology, Dr. Familia IBARRA in clinic in 1 month with Dr. Castillo with Echo. Sooner if needed.    Patient verbalizes understanding and agrees with the plan of care.     Collaborating MD: Toro Castillo MD

## 2017-07-06 NOTE — MR AVS SNAPSHOT
"Lex Harden   2017 11:00 AM   Office Visit   MRN: [id number]    Department:  [facility]   Dept Phone:  [phone]    Description:  Male : 1933   Provider:  NAILA Read           Reason for Visit     Follow-Up           Allergies as of 2017     Allergen Noted Reactions    Nkda [No Known Drug Allergy] 2007         You were diagnosed with     ACC/AHA stage C systolic heart failure (CMS-HCC)   [2822355]       Heart failure, NYHA class 2 (CMS-HCC)   [956306]       Essential hypertension   [4753442]       Dyslipidemia   [341165]       PAD (peripheral artery disease) (CMS-HCC)   [856730]       CKD (chronic kidney disease) stage 3, GFR 30-59 ml/min   [102197]         Vital Signs     Blood Pressure Pulse Height Weight Body Mass Index Oxygen Saturation    120/78 mmHg 58 1.753 m (5' 9.02\") 75.297 kg (166 lb) 24.50 kg/m2 96%    Smoking Status                   Former Smoker           Basic Information     Date Of Birth Sex Race Ethnicity Preferred Language    1933 Male White Non- English      Problem List              ICD-10-CM Priority Class Noted - Resolved    Acute on Chronic blood loss anemia D50.0 High  2011 - Present    HTN (hypertension) I10 Medium  2011 - Present    Dyslipidemia E78.5 Low  2011 - Present    DM (diabetes mellitus) (CMS-HCC) E11.9 Medium  2011 - Present    HEMORRHOIDS    2011 - Present    Diverticulosis K57.90   2011 - Present    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 Q27.33 High  10/31/2014 - Present    CKD (chronic kidney disease) stage 3, GFR 30-59 ml/min N18.3 Medium  10/31/2014 - Present    COPD (chronic obstructive pulmonary disease) (CMS-HCC) J44.9 Medium  10/31/2014 - Present    Peripheral vascular disease (CMS-HCC) I73.9   10/31/2014 - Present    Angina pectoris (CMS-HCC) I20.9 High  2014 - Present    Left carotid stenosis I65.22   2014 - Present    Subclavian artery stenosis, left " (CMS-HCC) I77.1   11/5/2014 - Present    Aortic valve stenosis I35.0   11/5/2014 - Present    GIB (gastrointestinal bleeding) K92.2   6/16/2015 - Present    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   6/16/2015 - Present    Hypotension I95.9   6/16/2015 - Present    Acute blood loss anemia D62   6/17/2015 - Present    Type 2 diabetes mellitus without complication (HCC) E11.9   4/28/2017 - Present    BPH (benign prostatic hyperplasia) N40.0   4/28/2017 - Present      Health Maintenance        Date Due Completion Dates    DIABETES MONOFILAMENT / LE EXAM 1/21/1934 ---    RETINAL SCREENING 7/21/1951 ---    IMM DTaP/Tdap/Td Vaccine (1 - Tdap) 7/21/1952 ---    IMM ZOSTER VACCINE 7/21/1993 ---    IMM PNEUMOCOCCAL 65+ (ADULT) HIGH/HIGHEST RISK SERIES (1 of 2 - PCV13) 7/21/1998 ---    IMM INFLUENZA (1) 9/1/2017 ---    A1C SCREENING 10/28/2017 4/28/2017, 7/11/2016, 2/21/2014, 7/19/2012, 9/10/2011, 12/31/2007    FASTING LIPID PROFILE 2/8/2018 2/8/2017, 1/12/2015, 7/10/2013, 9/10/2011, 12/31/2007    URINE ACR / MICROALBUMIN 2/8/2018 2/8/2017, 10/19/2016, 7/11/2016, 2/23/2016, 11/2/2015, 8/5/2015, 6/10/2015, 2/21/2014, 12/31/2013, 11/13/2013, 9/30/2013, 8/15/2013    SERUM CREATININE 6/9/2018 6/9/2017, 6/1/2017, 5/8/2017, 5/1/2017, 4/30/2017, 4/29/2017, 4/28/2017, 4/27/2017, 2/8/2017, 2/8/2017, 10/19/2016, 7/14/2016, 7/11/2016, 2/23/2016, 11/2/2015, 8/5/2015, 7/27/2015, 6/23/2015, 6/19/2015, 6/18/2015, 6/17/2015, 6/16/2015, 6/10/2015, 4/8/2015, 1/9/2015, 11/19/2014, 11/18/2014, 11/17/2014, 11/16/2014, 11/15/2014, 11/2/2014, 11/1/2014, 10/31/2014, 10/28/2014, 6/3/2014, 4/17/2014, 2/21/2014, 2/19/2014, 12/31/2013, 12/18/2013, 11/13/2013, 9/30/2013, 8/15/2013, 7/15/2013, 7/10/2013, 7/19/2012, 9/10/2011, 5/2/2011, 4/30/2011, 4/30/2011, 4/21/2011, 2/16/2011, 8/27/2010, 12/31/2007    COLONOSCOPY 6/17/2025 6/17/2015, 11/1/2014, 5/1/2011, 4/21/2011, 9/28/2010            Current Immunizations     No immunizations on file.      Below  and/or attached are the medications your provider expects you to take. Review all of your home medications and newly ordered medications with your provider and/or pharmacist. Follow medication instructions as directed by your provider and/or pharmacist. Please keep your medication list with you and share with your provider. Update the information when medications are discontinued, doses are changed, or new medications (including over-the-counter products) are added; and carry medication information at all times in the event of emergency situations     Allergies:  NKDA - (reactions not documented)               Medications  Valid as of: July 06, 2017 -  3:05 PM    Generic Name Brand Name Tablet Size Instructions for use    Allopurinol (Tab) ZYLOPRIM 100 MG Take 100 mg by mouth every day.        Atorvastatin Calcium (Tab) LIPITOR 10 MG Take 5 mg by mouth every day.        Carvedilol (Tab) COREG 12.5 MG Take 1 Tab by mouth 2 times a day, with meals.        Cholecalciferol (Tab) vitamin D 2000 UNIT Take 4,000 Units by mouth every day.        Clopidogrel Bisulfate (Tab) PLAVIX 75 MG Take 75 mg by mouth every morning.        Enalapril Maleate (Tab) VASOTEC 10 MG Take 20 mg by mouth every day.        Ferrous Sulfate (Tab) Iron 325 (65 FE) MG Take 1 Tab by mouth every morning.        Finasteride (Tab) PROSCAR 5 MG         Furosemide (Tab) LASIX 20 MG Take 0.5 Tabs by mouth 1 time daily as needed.        Terazosin HCl (Cap) HYTRIN 5 MG Take 5 mg by mouth every day.        Tiotropium Bromide Monohydrate (Cap) SPIRIVA HANDIHALER 18 MCG         .                 Medicines prescribed today were sent to:     [id number] - [place] - [address]    [address] [address]    Phone: [phone] Fax: [phone]    Open 24 Hours?: No    [id number] - [place], [place] - [address]    [address] [facility] [place] [id number]    Phone: [phone] Fax:  [phone]    Open 24 Hours?: No      Medication refill instructions:       If your prescription bottle indicates you have medication refills left, it is not necessary to call your provider’s office. Please contact your pharmacy and they will refill your medication.    If your prescription bottle indicates you do not have any refills left, you may request refills at any time through one of the following ways: The online [employer] system (except Urgent Care), by calling your provider’s office, or by asking your pharmacy to contact your provider’s office with a refill request. Medication refills are processed only during regular business hours and may not be available until the next business day. Your provider may request additional information or to have a follow-up visit with you prior to refilling your medication.   *Please Note: Medication refills are assigned a new Rx number when refilled electronically. Your pharmacy may indicate that no refills were authorized even though a new prescription for the same medication is available at the pharmacy. Please request the medicine by name with the pharmacy before contacting your provider for a refill.        Your To Do List     Future Labs/Procedures Complete By [employer] W/O CONT  As directed 7/6/2018         [employer] Access Code: Activation code not generated  Current [employer] Status: Active

## 2017-07-06 NOTE — Clinical Note
[facility]   [address], [address]  [place] [address]  Phone: [phone]  Fax: [phone]              Lex Harden  7/21/1933    Encounter Date: 7/6/2017    NAILA Read          PROGRESS NOTE:  Subjective:   Lex Harden is a 83 y.o. male who presents today for his heart failure with his Step-daughter, Rhiannon and Ex-wife, Kecia.    Patient of Dr. Castillo. Patient was last seen 6/6/17. Since his last visit, his lower extremity swelling, orthopnea and his dry cough have resolved. Patient states he is feeling well, he no longer has shortness of breath with ADLs and on he is able to walk up a flight of stairs without shortness of breath as well. Patient continues to be limited with exertion/ambulating for longer distances due to his claudication. Pt continues on Carvedilol 12.5 mg BID and Enalapril 20 mg daily. Otherwise, pt denies chest pain, shortness of breath at rest, palpitations, dizziness/lightheadedness, PND.     Patient weight has been decreasing now at 166 pounds.     Additonally, patient has the following medical problems:    -HTN: on enalapril 20 mg daily, Carvedilol 12.5 mg BID    -DLD: taking Atorvastatin    -CKD stage 3: Followed by Neph    -COPD: using inhalers    Past Medical History   Diagnosis Date   • Hypercholesteremia    • Diabetes    • Hypertension    • PVD (peripheral vascular disease) (CMS-HCC)    • AVM (arteriovenous malformation) of colon    • Other and unspecified angina pectoris    • Back pain    • Unspecified cataract      right eye    • Chronic airway obstruction, not elsewhere classified    • Infectious disease      Past Surgical History   Procedure Laterality Date   • Colonoscopy with apc  9/28/2010     Performed by EDMUND CHENG at SURGERY [facility]S   • Gastroscopy with apc  9/28/2010     Performed by EDMUND CHENG at [facility]   • Colonoscopy with apc  4/21/2011     Performed by PROSPER  "EDMUND SINGH at SURGERY [facility]   • Gastroscopy-endo  4/21/2011     Performed by EDMUND CHENG at SURGERY [facility]   • Gastroscopy-endo  5/1/2011     Performed by HOLLY VASQUEZ at ENDOSCOPY [facility]   • Colonoscopy - endo  5/1/2011     Performed by HOLLY VASQUEZ at ENDOSCOPY [facility]   • 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 at ENDOSCOPY [facility]   • Colonoscopy with apc  11/1/2014     Performed by Eddie Levin M.D. at ENDOSCOPY [facility]   • Colonoscopy  6/17/2015     Procedure: COLONOSCOPY;  Surgeon: Eddie Levin M.D.;  Location: [facility];  Service:    • Gastroscopy  6/17/2015     Procedure: GASTROSCOPY W/APC;  Surgeon: Eddie Levin M.D.;  Location: SURGERY [facility];  Service:      Family History   Problem Relation Age of Onset   • Non-contributory Neg Hx      \"unknown\"     History   Smoking status   • Former Smoker   • Quit date: 01/01/1982   Smokeless tobacco   • Not on file     Comment: Hx smoking x 40 yrs. Quit 1983     Allergies   Allergen Reactions   • Nkda [No Known Drug Allergy]      Outpatient Encounter Prescriptions as of 7/6/2017   Medication Sig Dispense Refill   • finasteride (PROSCAR) 5 MG Tab      • furosemide (LASIX) 20 MG Tab Take 0.5 Tabs by mouth 1 time daily as needed. 30 Tab 1   • carvedilol (COREG) 12.5 MG Tab Take 1 Tab by mouth 2 times a day, with meals. 60 Tab 11   • SPIRIVA HANDIHALER 18 MCG Cap      • clopidogrel (PLAVIX) 75 MG Tab Take 75 mg by mouth every morning.     • enalapril (VASOTEC) 10 MG TABS Take 20 mg by mouth every day.     • allopurinol (ZYLOPRIM) 100 MG TABS Take 100 mg by mouth every day.     • atorvastatin (LIPITOR) 10 MG TABS Take 5 mg by mouth every day.     • Cholecalciferol (VITAMIN D) 2000 UNIT TABS Take 4,000 Units by mouth every day.     • Ferrous Sulfate (IRON) 325 (65 FE) " "MG TABS Take 1 Tab by mouth every morning.     • terazosin (HYTRIN) 5 MG CAPS Take 5 mg by mouth every day.       No facility-administered encounter medications on file as of 7/6/2017.     Review of Systems   Constitutional: Negative for fever and malaise/fatigue.   Respiratory: Negative for cough and shortness of breath.    Cardiovascular: Positive for claudication. Negative for chest pain, palpitations, orthopnea, leg swelling and PND.   Gastrointestinal: Negative for abdominal pain.   Musculoskeletal: Negative for myalgias.   Neurological: Negative for dizziness.   All other systems reviewed and are negative.       Objective:   /74 mmHg  Pulse 58  Ht 1.753 m (5' 9\")  Wt 75.297 kg (166 lb)  BMI 24.50 kg/m2  SpO2 96%    Physical Exam   Constitutional: He is oriented to person, place, and time. He appears well-developed and well-nourished.   HENT:   Head: Normocephalic and atraumatic.   Right Ear: Tympanic membrane, external ear and ear canal normal.   Left Ear: Tympanic membrane, external ear and ear canal normal.   Mouth/Throat: No oropharyngeal exudate.   Wears bilateral Hearing aids   Eyes: EOM are normal.   Neck: Normal range of motion. Neck supple. No JVD present.   Cardiovascular: Normal rate, regular rhythm and intact distal pulses.    Murmur heard.  Pulmonary/Chest: Effort normal and breath sounds normal. No respiratory distress. He has no wheezes. He has no rales.   Abdominal: Soft. Bowel sounds are normal.   Musculoskeletal: Normal range of motion. He exhibits no edema.   Neurological: He is alert and oriented to person, place, and time.   Skin: Skin is warm and dry.   Psychiatric: He has a normal mood and affect.   Nursing note and vitals reviewed.    Lab Results   Component Value Date/Time    CHOLESTEROL, 02/08/2017 07:49 AM    LDL 57 02/08/2017 07:49 AM    HDL 29* 02/08/2017 07:49 AM    TRIGLYCERIDES 124 02/08/2017 07:49 AM       Lab Results   Component Value Date/Time    SODIUM 141 " 06/09/2017 02:07 PM    POTASSIUM 4.8 06/09/2017 02:07 PM    CHLORIDE 108 06/09/2017 02:07 PM    CO2 24 06/09/2017 02:07 PM    GLUCOSE 121* 06/09/2017 02:07 PM    BUN 39* 06/09/2017 02:07 PM    CREATININE 2.02* 06/09/2017 02:07 PM     Lab Results   Component Value Date/Time    ALKALINE PHOSPHATASE 65 06/09/2017 02:07 PM    AST(SGOT) 19 06/09/2017 02:07 PM    ALT(SGPT) 17 06/09/2017 02:07 PM    TOTAL BILIRUBIN 0.6 06/09/2017 02:07 PM      Transthoracic Echo Report 11/16/14  Technically difficult study.   Normal left ventricular systolic function.  Left ventricular ejection fraction is 60% to 65%.  Diastolic function is present.  Mild mitral regurgitation.  Mild aortic stenosis.  Mild tricuspid regurgitation.      Transthoracic Echo Report 4/28/17  Severe biventricular dysfunction. Left ventricular ejection fraction is   visually estimated to be 30%.  Global hypokinesis.  Reduced right ventricular systolic function.  Biatrial enlargement.  Right ventricular systolic pressure is estimated to be 35 mmHg.  Moderate mitral regurgitation due to annular dilation.  Cardiologist on case notified at time of reading.  There has been a   decrease in the LVEF from the study of 2014.      Assessment:     1. ACC/AHA stage C systolic heart failure (CMS-[facility])  carvedilol (COREG) 12.5 MG Tab    ECHOCARDIOGRAM LTD W/O CONT   2. Heart failure, NYHA class 2 (CMS-[facility])  ECHOCARDIOGRAM LTD W/O CONT   3. Essential hypertension     4. Dyslipidemia  carvedilol (COREG) 12.5 MG Tab    ECHOCARDIOGRAM LTD W/O CONT   5. PAD (peripheral artery disease) (CMS-[facility])  carvedilol (COREG) 12.5 MG Tab    ECHOCARDIOGRAM LTD W/O CONT   6. CKD (chronic kidney disease) stage 3, GFR 30-59 ml/min  carvedilol (COREG) 12.5 MG Tab       Medical Decision Making:  Today's Assessment / Status / Plan:   1. HFrEF, Stage C ,class 1-2, EF 30%: Based on physical examination findings, patient is euvolemic. No JVD, lungs are clear to auscultation, no pitting edema in bilateral  lower extremities, no ascites.  -furosemide 10 mg today as needed and continue to wear compression stockings, elevate LE  -Continue carvedilol 12.5 mg BID  -Continue enalapril 20 mg Daily  -No kevyn due kidney disease  -Repeat echocardiogram in one month  -Reinforced s/sx of worsening heart failure with patient and weight monitoring. Pt verbalizes understanding. Pt to call office or RTC if present.   -Patient to monitor weights at home daily. Pt to call office if weight increasing >3 lbs in 1 day or >5 lbs in 1 week.     2. Hypertension: Stable  -Per above    3. DLD: LDL 57 2/8/17  -Continue Atorvastatin 5 mg daily    4. PVD:  -Continue atorvastatin, plavix  -Continue walking as tolerated  -Followed by Dr. Garcia    5. CKD:  -Continue follow-up with nephrology, Dr. Familia IBARRA in clinic in 1 month with Dr. Castillo with Echo. Sooner if needed.    Patient verbalizes understanding and agrees with the plan of care.     Collaborating MD: MD Kirill Arzola, D.OGino  [address]  [address]  [place] [facility] [address]  VIA Facsimile: [phone]

## 2017-07-08 ENCOUNTER — HOSPITAL ENCOUNTER (OUTPATIENT)
Dept: LAB | Facility: MEDICAL CENTER | Age: 82
End: 2017-07-08
Attending: PHYSICIAN ASSISTANT
Payer: MEDICARE

## 2017-07-08 PROCEDURE — 87106 FUNGI IDENTIFICATION YEAST: CPT

## 2017-07-08 PROCEDURE — 87086 URINE CULTURE/COLONY COUNT: CPT

## 2017-07-14 LAB
BACTERIA UR CULT: ABNORMAL
BACTERIA UR CULT: ABNORMAL
SIGNIFICANT IND 70042: ABNORMAL
SOURCE SOURCE: ABNORMAL

## 2017-07-28 ENCOUNTER — HOSPITAL ENCOUNTER (OUTPATIENT)
Dept: LAB | Facility: MEDICAL CENTER | Age: 82
End: 2017-07-28
Attending: INTERNAL MEDICINE
Payer: MEDICARE

## 2017-07-28 ENCOUNTER — HOSPITAL ENCOUNTER (OUTPATIENT)
Dept: CARDIOLOGY | Facility: MEDICAL CENTER | Age: 82
End: 2017-07-28
Attending: NURSE PRACTITIONER
Payer: MEDICARE

## 2017-07-28 DIAGNOSIS — I50.20 ACC/AHA STAGE C SYSTOLIC HEART FAILURE (HCC): ICD-10-CM

## 2017-07-28 DIAGNOSIS — I50.9 HEART FAILURE, NYHA CLASS 2 (HCC): ICD-10-CM

## 2017-07-28 DIAGNOSIS — E78.5 DYSLIPIDEMIA: ICD-10-CM

## 2017-07-28 DIAGNOSIS — I73.9 PAD (PERIPHERAL ARTERY DISEASE) (HCC): ICD-10-CM

## 2017-07-28 LAB
ALBUMIN SERPL BCP-MCNC: 3.3 G/DL (ref 3.2–4.9)
BUN SERPL-MCNC: 35 MG/DL (ref 8–22)
CALCIUM SERPL-MCNC: 9 MG/DL (ref 8.5–10.5)
CHLORIDE SERPL-SCNC: 107 MMOL/L (ref 96–112)
CO2 SERPL-SCNC: 26 MMOL/L (ref 20–33)
CREAT SERPL-MCNC: 2.03 MG/DL (ref 0.5–1.4)
GFR SERPL CREATININE-BSD FRML MDRD: 31 ML/MIN/1.73 M 2
GLUCOSE SERPL-MCNC: 87 MG/DL (ref 65–99)
LV EJECT FRACT  99904: 25
LV EJECT FRACT MOD 2C 99903: 34.53
LV EJECT FRACT MOD 4C 99902: 31.06
LV EJECT FRACT MOD BP 99901: 33.73
PHOSPHATE SERPL-MCNC: 3.4 MG/DL (ref 2.5–4.5)
POTASSIUM SERPL-SCNC: 4.7 MMOL/L (ref 3.6–5.5)
SODIUM SERPL-SCNC: 139 MMOL/L (ref 135–145)

## 2017-07-28 PROCEDURE — 93306 TTE W/DOPPLER COMPLETE: CPT | Mod: 26 | Performed by: INTERNAL MEDICINE

## 2017-07-28 PROCEDURE — 93306 TTE W/DOPPLER COMPLETE: CPT

## 2017-07-31 ENCOUNTER — PATIENT OUTREACH (OUTPATIENT)
Dept: HEALTH INFORMATION MANAGEMENT | Facility: OTHER | Age: 82
End: 2017-07-31

## 2017-07-31 NOTE — PROGRESS NOTES
Lex Harden was discharged 5/01/17 from [facility] with a LACE score of 79 and a diagnosis of Shortness of breath; unspecified abdominal pain; nausea w/ vomiting; hypertension; COPD; hyperlipidemia. IHD patient advocate was able to engage with patient once and then through his support person for the remainder of 30 days as requested by patient. He was discharged home with instructions to follow up with his PCP within 1-2 weeks; and with a cardiologist within 2-3 weeks. Patient admitted understanding and compliance of D/C Summary. Patient lives alone but has a couple who supports him, and he has daily contact with. His meds have been picked up and taken with no adverse reactions. He is able to drive himself most of the time as needed. IHD advocate was able to help patient with appointments and helped him find two dentists to help him with his broken tooth.   Patient appointments as follows:   • 05/03 - Dr. [facility]    • 05/11 - Dr. Donny Castillo - Cardiology - [facility]    • 05/15 - Dr. Kirill [facility] - [facility]    • 05/19 - ELISABETH S[facility] - Cardiology - Kept   • 05/23 - Dr. Kirill [facility] - Kept    • 05/30 - [facility]ABETH [facility] - Cardiology - Kept            06/06 - [facility] - Scheduled

## 2017-08-16 ENCOUNTER — HOSPITAL ENCOUNTER (OUTPATIENT)
Dept: LAB | Facility: MEDICAL CENTER | Age: 82
End: 2017-08-16
Attending: PHYSICIAN ASSISTANT
Payer: MEDICARE

## 2017-08-16 ENCOUNTER — HOSPITAL ENCOUNTER (OUTPATIENT)
Dept: RADIOLOGY | Facility: MEDICAL CENTER | Age: 82
End: 2017-08-16
Attending: FAMILY MEDICINE
Payer: MEDICARE

## 2017-08-16 DIAGNOSIS — J98.4 LUNG DISEASE: ICD-10-CM

## 2017-08-16 PROCEDURE — 87086 URINE CULTURE/COLONY COUNT: CPT

## 2017-08-18 LAB
BACTERIA UR CULT: NORMAL
SIGNIFICANT IND 70042: NORMAL
SITE SITE: NORMAL
SOURCE SOURCE: NORMAL

## 2017-08-23 ENCOUNTER — OFFICE VISIT (OUTPATIENT)
Dept: CARDIOLOGY | Facility: MEDICAL CENTER | Age: 82
End: 2017-08-23
Payer: MEDICARE

## 2017-08-23 VITALS
HEIGHT: 69 IN | SYSTOLIC BLOOD PRESSURE: 150 MMHG | OXYGEN SATURATION: 96 % | WEIGHT: 170.2 LBS | DIASTOLIC BLOOD PRESSURE: 108 MMHG | HEART RATE: 47 BPM | BODY MASS INDEX: 25.21 KG/M2

## 2017-08-23 DIAGNOSIS — I50.20 ACC/AHA STAGE C SYSTOLIC HEART FAILURE (HCC): ICD-10-CM

## 2017-08-23 DIAGNOSIS — I73.9 PERIPHERAL VASCULAR DISEASE (HCC): ICD-10-CM

## 2017-08-23 DIAGNOSIS — I50.23 NYHA CLASS 2 ACUTE ON CHRONIC SYSTOLIC HEART FAILURE (HCC): ICD-10-CM

## 2017-08-23 DIAGNOSIS — I35.0 SEVERE AORTIC STENOSIS: ICD-10-CM

## 2017-08-23 DIAGNOSIS — I77.1 SUBCLAVIAN ARTERY STENOSIS, LEFT (HCC): ICD-10-CM

## 2017-08-23 PROCEDURE — 99497 ADVNCD CARE PLAN 30 MIN: CPT | Performed by: INTERNAL MEDICINE

## 2017-08-23 PROCEDURE — 99215 OFFICE O/P EST HI 40 MIN: CPT | Mod: 25 | Performed by: INTERNAL MEDICINE

## 2017-08-23 RX ORDER — TAMSULOSIN HYDROCHLORIDE 0.4 MG/1
0.4 CAPSULE ORAL
COMMUNITY
End: 2020-01-01

## 2017-08-23 ASSESSMENT — MINNESOTA LIVING WITH HEART FAILURE QUESTIONNAIRE (MLHF)
DIFFICULTY SLEEPING WELL AT NIGHT: 0
HAVING TO SIT OR LIE DOWN DURING THE DAY: 0
FEELING LIKE A BURDEN TO FAMILY AND FRIENDS: 0
MAKING YOU WORRY: 1
DIFFICULTY WITH SEXUAL ACTIVITIES: 0
TIRED, FATIGUED OR LOW ON ENERGY: 0
MAKING YOU FEEL DEPRESSED: 0
SWELLING IN ANKLES OR LEGS: 0
COSTING YOU MONEY FOR MEDICAL CARE: 3
DIFFICULTY TO CONCENTRATE OR REMEMBERING THINGS: 3
DIFFICULTY GOING AWAY FROM HOME: 0
TOTAL_SCORE: 8
DIFFICULTY SOCIALIZING WITH FAMILY OR FRIENDS: 0
GIVING YOU SIDE EFFECTS FROM TREATMENTS: 0
WORKING AROUND THE HOUSE OR YARD DIFFICULT: 0
DIFFICULTY WITH RECREATIONAL PASTIMES, SPORTS, HOBBIES: 0
MAKING YOU STAY IN A HOSPITAL: 0
LOSS OF SELF CONTROL IN YOUR LIFE: 0
WALKING ABOUT OR CLIMBING STAIRS DIFFICULT: 0
DIFFICULTY WORKING TO EARN A LIVING: 0
EATING LESS FOODS YOU LIKE: 0
MAKING YOU SHORT OF BREATH: 1

## 2017-08-23 ASSESSMENT — ENCOUNTER SYMPTOMS
NAUSEA: 0
HEADACHES: 0
CHILLS: 0
EYE DISCHARGE: 0
PND: 0
BRUISES/BLEEDS EASILY: 0
DIZZINESS: 0
DOUBLE VISION: 0
SPEECH CHANGE: 0
WEIGHT LOSS: 0
ORTHOPNEA: 0
VOMITING: 0
SENSORY CHANGE: 0
BLURRED VISION: 0
PALPITATIONS: 0
EYE PAIN: 0
DEPRESSION: 0
FEVER: 0
LOSS OF CONSCIOUSNESS: 0
ABDOMINAL PAIN: 0
COUGH: 0
HALLUCINATIONS: 0
SHORTNESS OF BREATH: 1
CLAUDICATION: 1
MYALGIAS: 0
BLOOD IN STOOL: 0
FALLS: 0

## 2017-08-23 ASSESSMENT — NEW YORK HEART ASSOCIATION (NYHA) CLASSIFICATION: NYHA FUNCTIONAL CLASS: CLASS II

## 2017-08-23 NOTE — MR AVS SNAPSHOT
"Lex Harden   2017 11:15 AM   Office Visit   MRN: [id number]    Department:  [facility]   Dept Phone:  [phone]    Description:  Male : 1933   Provider:  Donny Castillo M.D.           Reason for Visit     Follow-Up pt did not want to do 6 minute walk states has issues with legs      Allergies as of 2017     Allergen Noted Reactions    Nkda [No Known Drug Allergy] 2007         You were diagnosed with     ACC/AHA stage C systolic heart failure (CMS-HCC)   [6961150]       NYHA class 2 acute on chronic systolic heart failure (CMS-HCC)   [3834099]         Vital Signs     Blood Pressure Pulse Height Weight Body Mass Index Oxygen Saturation    150/108 mmHg 47 1.753 m (5' 9.02\") 77.202 kg (170 lb 3.2 oz) 25.12 kg/m2 96%    Smoking Status                   Former Smoker           Basic Information     Date Of Birth Sex Race Ethnicity Preferred Language    1933 Male White Non- English      Your appointments     2017 10:15 AM   Heart Failure Established with Donny Castillo M.D.   [facility] (--)    [address], [address]  [facility] [address] [phone]              Problem List              ICD-10-CM Priority Class Noted - Resolved    Acute on Chronic blood loss anemia D50.0 High  2011 - Present    HTN (hypertension) I10 Medium  2011 - Present    Dyslipidemia E78.5 Low  2011 - Present    DM (diabetes mellitus) (CMS-HCC) E11.9 Medium  2011 - Present    HEMORRHOIDS    2011 - Present    Diverticulosis K57.90   2011 - Present    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 Q27.33 High  10/31/2014 - Present    CKD (chronic kidney disease) stage 3, GFR 30-59 ml/min N18.3 Medium  10/31/2014 - Present    COPD (chronic obstructive pulmonary disease) (CMS-HCC) J44.9 Medium  10/31/2014 - Present    Peripheral vascular disease (CMS-HCC) I73.9   10/31/2014 - Present    Angina pectoris " (CMS-HCC) I20.9 High  11/5/2014 - Present    Left carotid stenosis I65.22   11/5/2014 - Present    Subclavian artery stenosis, left (CMS-HCC) I77.1   11/5/2014 - Present    Aortic valve stenosis I35.0   11/5/2014 - Present    GIB (gastrointestinal bleeding) K92.2   6/16/2015 - Present    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   6/16/2015 - Present    Hypotension I95.9   6/16/2015 - Present    Acute blood loss anemia D62   6/17/2015 - Present    Type 2 diabetes mellitus without complication (HCC) E11.9   4/28/2017 - Present    BPH (benign prostatic hyperplasia) N40.0   4/28/2017 - Present    ACC/AHA stage C systolic heart failure (CMS-HCC) I50.20   8/23/2017 - Present      Health Maintenance        Date Due Completion Dates    DIABETES MONOFILAMENT / LE EXAM 1/[birth year]/1934 ---    RETINAL SCREENING 7/21/1951 ---    IMM DTaP/Tdap/Td Vaccine (1 - Tdap) 7/21/1952 ---    IMM ZOSTER VACCINE 7/21/1993 ---    IMM PNEUMOCOCCAL 65+ (ADULT) HIGH/HIGHEST RISK SERIES (1 of 2 - PCV13) 7/21/1998 ---    IMM INFLUENZA (1) 9/1/2017 ---    A1C SCREENING 10/28/2017 4/28/2017, 7/11/2016, 2/21/2014, 7/19/2012, 9/10/2011, 12/31/2007    FASTING LIPID PROFILE 2/8/2018 2/8/2017, 1/12/2015, 7/10/2013, 7/19/2012, 9/10/2011, 12/31/2007    URINE ACR / MICROALBUMIN 2/8/2018 2/8/2017, 10/19/2016, 7/11/2016, 2/23/2016, 11/2/2015, 8/5/2015, 6/10/2015, 2/21/2014, 12/31/2013, 11/13/2013, 9/30/2013, 8/15/2013    SERUM CREATININE 7/28/2018 7/28/2017, 6/9/2017, 6/1/2017, 5/8/2017, 5/1/2017, 4/30/2017, 4/29/2017, 4/28/2017, 4/27/2017, 2/8/2017, 2/8/2017, 10/19/2016, 7/14/2016, 7/11/2016, 2/23/2016, 11/2/2015, 8/5/2015, 7/27/2015, 6/23/2015, 6/19/2015, 6/18/2015, 6/17/2015, 6/16/2015, 6/10/2015, 4/8/2015, 1/9/2015, 11/19/2014, 11/18/2014, 11/17/2014, 11/16/2014, 11/15/2014, 11/2/2014, 11/1/2014, 10/31/2014, 10/28/2014, 6/3/2014, 4/17/2014, 2/21/2014, 2/19/2014, 12/31/2013, 12/18/2013, 11/13/2013, 9/30/2013, 8/15/2013, 7/15/2013, 7/10/2013, 7/19/2012,  9/10/2011, 5/2/2011, 4/30/2011, 4/30/2011, 4/21/2011, 2/16/2011, 8/27/2010, 12/31/2007    COLONOSCOPY 6/17/2025 6/17/2015, 11/1/2014, 5/1/2011, 4/21/2011, 9/28/2010            Current Immunizations     No immunizations on file.      Below and/or attached are the medications your provider expects you to take. Review all of your home medications and newly ordered medications with your provider and/or pharmacist. Follow medication instructions as directed by your provider and/or pharmacist. Please keep your medication list with you and share with your provider. Update the information when medications are discontinued, doses are changed, or new medications (including over-the-counter products) are added; and carry medication information at all times in the event of emergency situations     Allergies:  NKDA - (reactions not documented)               Medications  Valid as of: August 23, 2017 - 11:48 AM    Generic Name Brand Name Tablet Size Instructions for use    Allopurinol (Tab) ZYLOPRIM 100 MG Take 100 mg by mouth every day.        Atorvastatin Calcium (Tab) LIPITOR 10 MG Take 5 mg by mouth every day.        Carvedilol (Tab) COREG 12.5 MG Take 1 Tab by mouth 2 times a day, with meals.        Cholecalciferol (Tab) vitamin D 2000 UNIT Take 4,000 Units by mouth every day.        Clopidogrel Bisulfate (Tab) PLAVIX 75 MG Take 75 mg by mouth every morning.        Enalapril Maleate (Tab) VASOTEC 10 MG Take 20 mg by mouth every day.        Ferrous Sulfate (Tab) Iron 325 (65 Fe) MG Take 1 Tab by mouth every morning.        Finasteride (Tab) PROSCAR 5 MG         Furosemide (Tab) LASIX 20 MG Take 0.5 Tabs by mouth 1 time daily as needed.        Tamsulosin HCl (Cap) FLOMAX 0.4 MG Take 0.4 mg by mouth ONE-HALF HOUR AFTER BREAKFAST.        Terazosin HCl (Cap) HYTRIN 5 MG Take 5 mg by mouth every day.        Tiotropium Bromide Monohydrate (Cap) SPIRIVA HANDIHALER 18 MCG         .                 Medicines prescribed today were sent  to:     [id number] - [place], [place] - [address]    [address] [place] [address]    Phone: [phone] Fax: [phone]    Open 24 Hours?: No    [facility] [place] [id number] - [place], [place] - [address]    [address] [facility] [facility] [place] [address]    Phone: [phone] Fax: [phone]    Open 24 Hours?: No      Medication refill instructions:       If your prescription bottle indicates you have medication refills left, it is not necessary to call your provider’s office. Please contact your pharmacy and they will refill your medication.    If your prescription bottle indicates you do not have any refills left, you may request refills at any time through one of the following ways: The online [employer] system (except Urgent Care), by calling your provider’s office, or by asking your pharmacy to contact your provider’s office with a refill request. Medication refills are processed only during regular business hours and may not be available until the next business day. Your provider may request additional information or to have a follow-up visit with you prior to refilling your medication.   *Please Note: Medication refills are assigned a new Rx number when refilled electronically. Your pharmacy may indicate that no refills were authorized even though a new prescription for the same medication is available at the pharmacy. Please request the medicine by name with the pharmacy before contacting your provider for a refill.           User[employer] Access Code: Activation code not generated  Current [employer] Status: Active

## 2017-08-23 NOTE — Clinical Note
[facility]   [address], [address]  [place] [address]  Phone: [phone]  Fax: [phone]              Lex Harden  7/21/1933    Encounter Date: 8/23/2017    Donny Castillo M.D.          PROGRESS NOTE:  Subjective:   Lex Harden is a 84 y.o. male who presents today for cardiac care and evaluation in our heart failure clinic after his recent hospital stay. Patient was in the hospital because of colitis. He was incidentally found to have left ventricular systolic function of 30% on his transthoracic echocardiogram. He was followed in our practice in the past with Dr. Hong.    Patient was able to complete 76 m during his 6 minute walk test. his O2 saturation at baseline was 92% and at the end of the test, the O2 saturation was 92%. he reported 2 level of dyspnea on Pedro scale.    His exercise activity is severely limited by claudication. He does have extensive PAD. He does have vascular surgery care.    Transthoracic echocardiogram showed left ventricular systolic function to be lower at 25%. He also has evidence of low flow, low gradient severe AS.    Past Medical History   Diagnosis Date   • Hypercholesteremia    • Diabetes    • Hypertension    • PVD (peripheral vascular disease) (CMS-[facility])    • AVM (arteriovenous malformation) of colon    • Other and unspecified angina pectoris    • Back pain    • Unspecified cataract      right eye    • Chronic airway obstruction, not elsewhere classified    • Infectious disease      Past Surgical History   Procedure Laterality Date   • Colonoscopy with apc  9/28/2010     Performed by EDMUND CHENG at SURGERY [facility] ORS   • Gastroscopy with apc  9/28/2010     Performed by EDMUND CHENG at SURGERY [facility] ORS   • Colonoscopy with apc  4/21/2011     Performed by EDMUND CHENG at [facility] ORS   • Gastroscopy-endo  4/21/2011     Performed by EDMUND CHENG at [facility]  "ORS   • Gastroscopy-endo  5/1/2011     Performed by HOLLY VASQUEZ at ENDOSCOPY [facility] ORS   • Colonoscopy - endo  5/1/2011     Performed by HOLLY VASQUEZ at ENDOSCOPY [facility] ORS   • 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 at ENDOSCOPY [facility] ORS   • Colonoscopy with apc  11/1/2014     Performed by Eddie Levin M.D. at ENDOSCOPY [facility] ORS   • Colonoscopy  6/17/2015     Procedure: COLONOSCOPY;  Surgeon: Eddie Levin M.D.;  Location: SURGERY [facility];  Service:    • Gastroscopy  6/17/2015     Procedure: GASTROSCOPY W/APC;  Surgeon: Eddie Levin M.D.;  Location: SURGERY [facility];  Service:      Family History   Problem Relation Age of Onset   • Non-contributory Neg Hx      \"unknown\"     History   Smoking status   • Former Smoker   • Quit date: 01/01/1982   Smokeless tobacco   • Never Used     Comment: Hx smoking x 40 yrs. Quit 1983     Allergies   Allergen Reactions   • Nkda [No Known Drug Allergy]      Outpatient Encounter Prescriptions as of 8/23/2017   Medication Sig Dispense Refill   • tamsulosin (FLOMAX) 0.4 MG capsule Take 0.4 mg by mouth ONE-HALF HOUR AFTER BREAKFAST.     • carvedilol (COREG) 12.5 MG Tab Take 1 Tab by mouth 2 times a day, with meals. 180 Tab 3   • finasteride (PROSCAR) 5 MG Tab      • furosemide (LASIX) 20 MG Tab Take 0.5 Tabs by mouth 1 time daily as needed. 30 Tab 1   • SPIRIVA HANDIHALER 18 MCG Cap      • clopidogrel (PLAVIX) 75 MG Tab Take 75 mg by mouth every morning.     • enalapril (VASOTEC) 10 MG TABS Take 20 mg by mouth every day.     • allopurinol (ZYLOPRIM) 100 MG TABS Take 100 mg by mouth every day.     • atorvastatin (LIPITOR) 10 MG TABS Take 5 mg by mouth every day.     • Cholecalciferol (VITAMIN D) 2000 UNIT TABS Take 4,000 Units by mouth every day.     • Ferrous Sulfate (IRON) 325 (65 FE) MG TABS Take 1 Tab by mouth every " "morning.     • terazosin (HYTRIN) 5 MG CAPS Take 5 mg by mouth every day.       No facility-administered encounter medications on file as of 8/23/2017.     Review of Systems   Constitutional: Negative for fever, chills, weight loss and malaise/fatigue.   HENT: Negative for ear discharge, ear pain, hearing loss and nosebleeds.    Eyes: Negative for blurred vision, double vision, pain and discharge.   Respiratory: Positive for shortness of breath. Negative for cough.    Cardiovascular: Positive for claudication. Negative for chest pain, palpitations, orthopnea, leg swelling and PND.   Gastrointestinal: Negative for nausea, vomiting, abdominal pain, blood in stool and melena.   Genitourinary: Negative for dysuria and hematuria.   Musculoskeletal: Negative for myalgias, joint pain and falls.   Skin: Negative for itching and rash.   Neurological: Negative for dizziness, sensory change, speech change, loss of consciousness and headaches.   Endo/Heme/Allergies: Negative for environmental allergies. Does not bruise/bleed easily.   Psychiatric/Behavioral: Negative for depression, suicidal ideas and hallucinations.        Objective:   /108 mmHg  Pulse 47  Ht 1.753 m (5' 9.02\")  Wt 77.202 kg (170 lb 3.2 oz)  BMI 25.12 kg/m2  SpO2 96%    Physical Exam   Constitutional: He is oriented to person, place, and time. No distress.   HENT:   Head: Normocephalic and atraumatic.   Eyes: EOM are normal.   Neck: Normal range of motion. No JVD present.   Cardiovascular: Normal rate, regular rhythm, normal heart sounds and intact distal pulses.  Exam reveals no gallop and no friction rub.    No murmur heard.  Bilateral femoral pulses are 2+, bilateral dorsalis pedis pulses are 2+, bilateral posterior tibialis pulses are 2+.   Pulmonary/Chest: No respiratory distress. He has no wheezes. He has no rales. He exhibits no tenderness.   Abdominal: Soft. Bowel sounds are normal. There is no tenderness. There is no rebound and no " guarding.   The is no presence of abdominal bruits   Musculoskeletal: Normal range of motion. He exhibits no edema or tenderness.   Neurological: He is alert and oriented to person, place, and time.   Skin: Skin is warm and dry.   Psychiatric: He has a normal mood and affect.   Nursing note and vitals reviewed.      Assessment:     1. ACC/AHA stage C systolic heart failure (CMS-[facility])     2. NYHA class 2 acute on chronic systolic heart failure (CMS-[facility])     3. Severe aortic stenosis     4. Peripheral vascular disease (CMS-[facility])     5. Subclavian artery stenosis, left (CMS-[facility])         Medical Decision Making:  Today's Assessment / Status / Plan:     Today, based on physical examination findings, patient is euvolemic. No JVD, lungs are clear to auscultation, no pitting edema in bilateral lower extremities, no ascites.    I thoroughly discussed with the patient about the plan of care. I thoroughly discussed the risks and benefits. At this time, patient does not want to have any invasive procedure or surgery to be done. He wants conservative medical management. I informed him about the risks of sudden cardiac death. I informed him that without fixing the aortic valve, most likely his cardiac function were not improved significantly. Patient accepts that. He will fill out his POLST form.    Cont current medications at current dose.     Will monitor volume status.      Kirill Fabian D.O.  [address]   [address]  [place] [address]  VIA Facsimile: [phone]

## 2017-08-23 NOTE — PROGRESS NOTES
Subjective:   Lex Harden is a 84 y.o. male who presents today for cardiac care and evaluation in our heart failure clinic after his recent hospital stay. Patient was in the hospital because of colitis. He was incidentally found to have left ventricular systolic function of 30% on his transthoracic echocardiogram. He was followed in our practice in the past with Dr. Hong.    Patient was able to complete 76 m during his 6 minute walk test. his O2 saturation at baseline was 92% and at the end of the test, the O2 saturation was 92%. he reported 2 level of dyspnea on Pedro scale.    His exercise activity is severely limited by claudication. He does have extensive PAD. He does have vascular surgery care.    Transthoracic echocardiogram showed left ventricular systolic function to be lower at 25%. He also has evidence of low flow, low gradient severe AS.    Past Medical History   Diagnosis Date   • Hypercholesteremia    • Diabetes    • Hypertension    • PVD (peripheral vascular disease) (CMS-[facility])    • AVM (arteriovenous malformation) of colon    • Other and unspecified angina pectoris    • Back pain    • Unspecified cataract      right eye    • Chronic airway obstruction, not elsewhere classified    • Infectious disease      Past Surgical History   Procedure Laterality Date   • Colonoscopy with apc  9/28/2010     Performed by EDMUND CHENG at SURGERY [facility] ORS   • Gastroscopy with apc  9/28/2010     Performed by EDMUND CHENG at SURGERY [facility] ORS   • Colonoscopy with apc  4/21/2011     Performed by EDMUND CHENG at SURGERY [facility] ORS   • Gastroscopy-endo  4/21/2011     Performed by EDMUND CHENG at SURGERY [facility] ORS   • Gastroscopy-endo  5/1/2011     Performed by HOLLY VASQUEZ at ENDOSCOPY [facility] ORS   • Colonoscopy - endo  5/1/2011     Performed by HOLLY VASQUEZ at ENDOSCOPY [facility] ORS   • Aortofemoral bypass  1991     Both legs   •  "Gastroscopy with apc  11/1/2014     Performed by Eddie Levin M.D. at ENDOSCOPY [facility] [place]   • Colonoscopy with apc  11/1/2014     Performed by Eddie Levin M.D. at ENDOSCOPY [facility] [place]   • Colonoscopy  6/17/2015     Procedure: COLONOSCOPY;  Surgeon: Eddie Levin M.D.;  Location: SURGERY [facility];  Service:    • Gastroscopy  6/17/2015     Procedure: GASTROSCOPY W/APC;  Surgeon: Eddie Levin M.D.;  Location: SURGERY [facility];  Service:      Family History   Problem Relation Age of Onset   • Non-contributory Neg Hx      \"unknown\"     History   Smoking status   • Former Smoker   • Quit date: 01/01/1982   Smokeless tobacco   • Never Used     Comment: Hx smoking x 40 yrs. Quit 1983     Allergies   Allergen Reactions   • Nkda [No Known Drug Allergy]      Outpatient Encounter Prescriptions as of 8/23/2017   Medication Sig Dispense Refill   • tamsulosin (FLOMAX) 0.4 MG capsule Take 0.4 mg by mouth ONE-HALF HOUR AFTER BREAKFAST.     • carvedilol (COREG) 12.5 MG Tab Take 1 Tab by mouth 2 times a day, with meals. 180 Tab 3   • finasteride (PROSCAR) 5 MG Tab      • furosemide (LASIX) 20 MG Tab Take 0.5 Tabs by mouth 1 time daily as needed. 30 Tab 1   • SPIRIVA HANDIHALER 18 MCG Cap      • clopidogrel (PLAVIX) 75 MG Tab Take 75 mg by mouth every morning.     • enalapril (VASOTEC) 10 MG TABS Take 20 mg by mouth every day.     • allopurinol (ZYLOPRIM) 100 MG TABS Take 100 mg by mouth every day.     • atorvastatin (LIPITOR) 10 MG TABS Take 5 mg by mouth every day.     • Cholecalciferol (VITAMIN D) 2000 UNIT TABS Take 4,000 Units by mouth every day.     • Ferrous Sulfate (IRON) 325 (65 FE) MG TABS Take 1 Tab by mouth every morning.     • terazosin (HYTRIN) 5 MG CAPS Take 5 mg by mouth every day.       No facility-administered encounter medications on file as of 8/23/2017.     Review of Systems   Constitutional: Negative for fever, chills, weight loss and malaise/fatigue. " "  HENT: Negative for ear discharge, ear pain, hearing loss and nosebleeds.    Eyes: Negative for blurred vision, double vision, pain and discharge.   Respiratory: Positive for shortness of breath. Negative for cough.    Cardiovascular: Positive for claudication. Negative for chest pain, palpitations, orthopnea, leg swelling and PND.   Gastrointestinal: Negative for nausea, vomiting, abdominal pain, blood in stool and melena.   Genitourinary: Negative for dysuria and hematuria.   Musculoskeletal: Negative for myalgias, joint pain and falls.   Skin: Negative for itching and rash.   Neurological: Negative for dizziness, sensory change, speech change, loss of consciousness and headaches.   Endo/Heme/Allergies: Negative for environmental allergies. Does not bruise/bleed easily.   Psychiatric/Behavioral: Negative for depression, suicidal ideas and hallucinations.        Objective:   /108 mmHg  Pulse 47  Ht 1.753 m (5' 9.02\")  Wt 77.202 kg (170 lb 3.2 oz)  BMI 25.12 kg/m2  SpO2 96%    Physical Exam   Constitutional: He is oriented to person, place, and time. No distress.   HENT:   Head: Normocephalic and atraumatic.   Eyes: EOM are normal.   Neck: Normal range of motion. No JVD present.   Cardiovascular: Normal rate, regular rhythm, normal heart sounds and intact distal pulses.  Exam reveals no gallop and no friction rub.    No murmur heard.  Bilateral femoral pulses are 2+, bilateral dorsalis pedis pulses are 2+, bilateral posterior tibialis pulses are 2+.   Pulmonary/Chest: No respiratory distress. He has no wheezes. He has no rales. He exhibits no tenderness.   Abdominal: Soft. Bowel sounds are normal. There is no tenderness. There is no rebound and no guarding.   The is no presence of abdominal bruits   Musculoskeletal: Normal range of motion. He exhibits no edema or tenderness.   Neurological: He is alert and oriented to person, place, and time.   Skin: Skin is warm and dry.   Psychiatric: He has a normal " mood and affect.   Nursing note and vitals reviewed.      Assessment:     1. ACC/AHA stage C systolic heart failure (CMS-[facility])     2. NYHA class 2 acute on chronic systolic heart failure (CMS-[facility])     3. Severe aortic stenosis     4. Peripheral vascular disease (CMS-[facility])     5. Subclavian artery stenosis, left (CMS-[facility])         Medical Decision Making:  Today's Assessment / Status / Plan:     Today, based on physical examination findings, patient is euvolemic. No JVD, lungs are clear to auscultation, no pitting edema in bilateral lower extremities, no ascites.    I thoroughly discussed with the patient about the plan of care. I thoroughly discussed the risks and benefits. At this time, patient does not want to have any invasive procedure or surgery to be done. He wants conservative medical management. I informed him about the risks of sudden cardiac death. I informed him that without fixing the aortic valve, most likely his cardiac function were not improved significantly. Patient accepts that. He will fill out his POLST form. I spent 30 minutes talking about end of life issues with him.    Cont current medications at current dose.     Will monitor volume status.

## 2017-08-24 NOTE — PROGRESS NOTES
"Reevaluation of 6MWT/MLWHF Questionnaire    Date: 2017  6MWT: 76.2 meters  MLWHF: 42    Date: 2017  6MWT: refused to walk, states \"problem with his legs\"  MLWHF: 8    OP Heart Failure  Vitals  Appointment Type: Heart Failure Established  Weight: 77.202 kg (170 lb 3.2 oz)  Height: 175.3 cm (5' 9.02\")  BMI (Calculated): 25.12  Blood Pressure : 150/108 mmHg  Pulse: (!) 47    System Assessment  NYHA Functional Class Assessment: Class II  ACC/AHA HF Stage: C    Smoking Hx  Have you Ever Smoked: Yes  Have you Smoked in the Last 12 Mos: No  Confirm Quit Date: 82     Alcohol Hx  Do you Drink?: No     Illicit Drug Hx  Illicit Drug History: No    [place] Living with Heart Failure  Swelling in Ankles or Legs: 0  Having to Sit or Lie Down During the Day: 0  Walking About or Climbing Stairs Difficult: 0  Working Around the House or Yard Difficult: 0  Difficulty Going Away from Home: 0  Difficulty Sleeping Well at Night: 0  Difficulty Socializing with Family or Friends: 0  Difficulty Working to Earn a Livin  Difficulty with Recreational Pastimes, Sports, Hobbies: 0  Difficulty with Sexual Activities: 0  Eating Less Foods You Like: 0  Making you Short of Breath: 1  Tired, Fatigued or Low on Energy: 0  Making you Stay in a Hospital: 0  Costing you Money for Medical Care?: 3  Giving you Side Effects from Treatments: 0  Feeling like a [place] to Family and Friends: 0  Loss of Self Control in your Life: 0  Making You Worry: 1  Difficulty to Concentrate or Remembering Things: 3  Making you Feel Depressed: 0  MLF Total Score : 8    6 Minute Walk Test  Baseline to end of test:  (refused to walk-\"problem with his legs\")      **Patient wished to fill out the POLST, but wanted to take the paper home so he can look it over before he has the doctor sign it. Gave patient a blank POLST form and gave the instruction page. Patient to fill out the form and bring it to the office to be scanned into his chart.     ADITI Vang " ESTRADA  [phone]

## 2017-10-20 ASSESSMENT — ENCOUNTER SYMPTOMS
CHEST TIGHTNESS: 0
HEMOPTYSIS: 0
SHORTNESS OF BREATH: 0
RESPIRATORY SYMPTOMS COMMENTS: NO
WHEEZING: 0
DYSPNEA AT REST: 0

## 2017-10-26 ENCOUNTER — OFFICE VISIT (OUTPATIENT)
Dept: PULMONOLOGY | Facility: HOSPICE | Age: 82
End: 2017-10-26
Payer: MEDICARE

## 2017-10-26 VITALS
RESPIRATION RATE: 16 BRPM | DIASTOLIC BLOOD PRESSURE: 74 MMHG | WEIGHT: 177.8 LBS | SYSTOLIC BLOOD PRESSURE: 102 MMHG | OXYGEN SATURATION: 96 % | BODY MASS INDEX: 26.33 KG/M2 | HEART RATE: 56 BPM | HEIGHT: 69 IN | TEMPERATURE: 97.7 F

## 2017-10-26 DIAGNOSIS — R91.1 INCIDENTAL LUNG NODULE, GREATER THAN OR EQUAL TO 8MM: ICD-10-CM

## 2017-10-26 PROCEDURE — 99204 OFFICE O/P NEW MOD 45 MIN: CPT | Performed by: INTERNAL MEDICINE

## 2017-10-26 RX ORDER — AMOXICILLIN AND CLAVULANATE POTASSIUM 500; 125 MG/1; MG/1
TABLET, FILM COATED ORAL
COMMUNITY
Start: 2017-08-25 | End: 2017-11-15

## 2017-10-26 RX ORDER — DOXYCYCLINE 100 MG/1
CAPSULE ORAL
COMMUNITY
Start: 2017-09-12 | End: 2017-12-11

## 2017-10-26 RX ORDER — CIPROFLOXACIN 500 MG/1
TABLET, FILM COATED ORAL
COMMUNITY
Start: 2017-10-02 | End: 2017-12-11

## 2017-10-26 RX ORDER — TERAZOSIN 10 MG/1
CAPSULE ORAL
COMMUNITY
Start: 2017-08-21 | End: 2017-12-11

## 2017-10-26 NOTE — PROGRESS NOTES
Lex Harden is a 84 y.o. male here for lung nodule.  Patient was referred by Dr Fabian.    History of Present Illness:    The patient is an 84-year-old male who has history of aortic stenosis, heart failure, diabetes and COPD. He quit smoking in 1984. He smoked 2 packs a day for 40 years. She was found to have a right lower lobe nodule on the CT scan of the chest and comes in for evaluation. He denies any symptoms such as cough or congestion or hemoptysis. He denies any dyspnea on exertion or shortness of breath. She takes a Spiriva inhaler. He denies any wheezing or chest tightness. 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 I reviewed the CT scan of the chest and he has a 2.8 cm peripheral nodule in the right lower lobe and is also a groundglass nodule in the right upper lobe measuring 1.7 cm. Otherwise no evidence of any hilar or mediastinal adenopathy. 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 There does not appear to be any asbestosis or focal thickening to suggest mesothelioma.    Constitutional:  Negative for fever, chills, sweats, and fatigue.  Eyes:  Negative for eye pain and visual changes.  HENT:  Negative for tinnitus and hoarse voice.  Cardiovascular:  Negative for chest pain, leg swelling, syncope and orthopnea.  Respiratory:  See HPI for pertinent negatives  Sleep:  Negative for somnolence, loud snoring, sleep disturbance due to breathing, insomnia.  Gastrointestinal:  Negative for dysphagia, nausea and abdominal pain.  Heme/lymph:  Denies easy bruising, blood clots.  Musculoskeletal:  Negative for arthralgias, sore muscles and back pain.  Skin:  Negative for rash and color change.  Neurological:  Negative for headaches, lightheadedness and weakness.  Psychiatric:  Denies depression.    Current Outpatient Prescriptions   Medication Sig Dispense Refill   • tamsulosin (FLOMAX) 0.4 MG capsule  Take 0.4 mg by mouth ONE-HALF HOUR AFTER BREAKFAST.     • carvedilol (COREG) 12.5 MG Tab Take 1 Tab by mouth 2 times a day, with meals. 180 Tab 3   • finasteride (PROSCAR) 5 MG Tab      • furosemide (LASIX) 20 MG Tab Take 0.5 Tabs by mouth 1 time daily as needed. 30 Tab 1   • SPIRIVA HANDIHALER 18 MCG Cap      • clopidogrel (PLAVIX) 75 MG Tab Take 75 mg by mouth every morning.     • enalapril (VASOTEC) 10 MG TABS Take 20 mg by mouth every day.     • allopurinol (ZYLOPRIM) 100 MG TABS Take 100 mg by mouth every day.     • atorvastatin (LIPITOR) 10 MG TABS Take 5 mg by mouth every day.     • Cholecalciferol (VITAMIN D) 2000 UNIT TABS Take 4,000 Units by mouth every day.     • Ferrous Sulfate (IRON) 325 (65 FE) MG TABS Take 1 Tab by mouth every morning.     • terazosin (HYTRIN) 5 MG CAPS Take 5 mg by mouth every day.     • amoxicillin-clavulanate (AUGMENTIN) 500-125 MG Tab      • ciprofloxacin (CIPRO) 500 MG Tab      • doxycycline (MONODOX) 100 MG capsule      • terazosin (HYTRIN) 10 MG capsule        No current facility-administered medications for this visit.        Social History   Substance Use Topics   • Smoking status: Former Smoker     Packs/day: 2.00     Years: 40.00     Quit date: 1/1/1982   • Smokeless tobacco: Never Used      Comment: Hx smoking x 40 yrs. Quit 1983   • Alcohol use No       Past Medical History:   Diagnosis Date   • AVM (arteriovenous malformation) of colon    • Back pain    • Chickenpox    • Chronic airway obstruction, not elsewhere classified    • Diabetes    • Diphtheria    • Hypercholesteremia    • Hypertension    • Infectious disease    • Other and unspecified angina pectoris    • PVD (peripheral vascular disease) (CMS-HCC)    • Unspecified cataract     right eye        Past Surgical History:   Procedure Laterality Date   • COLONOSCOPY  6/17/2015    Procedure: COLONOSCOPY;  Surgeon: Eddie Levin M.D.;  Location: SURGERY [facility];  Service:    • GASTROSCOPY  6/17/2015     "Procedure: GASTROSCOPY W/APC;  Surgeon: Eddie Levin M.D.;  Location: SURGERY [facility];  Service:    • GASTROSCOPY WITH APC  11/1/2014    Performed by Eddie Levin M.D. at ENDOSCOPY [facility]   • COLONOSCOPY WITH APC  11/1/2014    Performed by Eddie Levin M.D. at ENDOSCOPY [facility]   • GASTROSCOPY-ENDO  5/1/2011    Performed by HOLLY VASQUEZ at ENDOSCOPY [facility]   • COLONOSCOPY - ENDO  5/1/2011    Performed by HOLLY VASQUEZ at ENDOSCOPY [facility]   • COLONOSCOPY WITH APC  4/21/2011    Performed by EDMUND CHENG at [facility]   • GASTROSCOPY-ENDO  4/21/2011    Performed by EDMUND CHENG at [facility]   • COLONOSCOPY WITH APC  9/28/2010    Performed by EDMUND CHENG at [facility]   • GASTROSCOPY WITH APC  9/28/2010    Performed by EDMUND CHENG at [facility]   • AORTOFEMORAL BYPASS  1991    Both legs       Allergies:  Nkda [no known drug allergy]    Family History   Problem Relation Age of Onset   • Non-contributory Neg Hx      \"unknown\"       Physical Examination    Vitals:    10/26/17 1256   Height: 1.753 m (5' 9\")   Weight: 80.6 kg (177 lb 12.8 oz)   Weight % change since last entry.: 0 %   BP: 102/74   Pulse: (!) 56   BMI (Calculated): 26.26   Resp: 16   Temp: 36.5 °C (97.7 °F)   O2 sat % room air: 96 %       Physical Exam:  Constitutional:  Well developed and well nourished.  Head:  Normocephalic and atraumatic.  Nose:  Nose normal.  Mouth/Throat:  Oropharynx is clear and moist, no lesions.    Eyes:  Conjunctivae and EOM are normal.  Pupils are equal, round, and reactive to light.  Neck:  Normal range of motion.  Supple.  No JVD. No tracheal deviation.  No thyromegally  Cardiovascular:  Normal rate, regular rhythm, normal heart sounds and intact distal pulses.  Pulmonary/Chest:  No accessory muscle use.  No wheezing, rales or rhonchi.  No dullness to " percussion, tenderness or deformity.  Abdominal:  Soft.  No ascites.  No Hepatosplenomegally.  Non tender.  Musculoskeletal.  Normal range of motion.  No muscular atrophy.  Lymphadenopathy:  No cervical or supraclavicular adenopathy  Neurological:  Alert and oriented.  Cranial nerves intact.  No focal deficits  Skin:  No rashes or ulcers.  Psyciatric:  Normal mood and affect.      Assessment and Plan:  1. Lung nodule < 6cm on CT  The patient is a 2.8 cm peripheral nodule with irregular borders in the peripheral right lower lobe. I recommended a lung biopsy. This can best be accomplished with a CT-guided needle core biopsy. I have also scheduled a PET scan. We'll plan to see him back in 3 weeks to go over the results.  - CT-NEEDLE BX-LUNG-MEDIASTINUM RIGHT; Future  - GU-WYDWI-XMUCY BASE TO MID-THIGH; Future    2. Incidental lung nodule, greater than or equal to 8mm  The patient has a 1.7 cm groundglass nodule in the right upper lobe. This is concerning for a low-grade adenocarcinoma. We will await the results of the PET scan.    3. COPD  The patient has a history of smoking (80 pack year). He is on a Spiriva inhaler. His lungs are clear on exam today and there is no wheezing. His oxygen saturation is 96% on room air. If the biopsies consistent with cancer then he should have pulmonary function testing.    4. 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          Followup Return in about 3 weeks (around 11/16/2017) for follow up visit with Kostas Conley MD.

## 2017-11-03 ENCOUNTER — TELEPHONE (OUTPATIENT)
Dept: PULMONOLOGY | Facility: HOSPICE | Age: 82
End: 2017-11-03

## 2017-11-03 ENCOUNTER — HOSPITAL ENCOUNTER (OUTPATIENT)
Dept: RADIOLOGY | Facility: MEDICAL CENTER | Age: 82
End: 2017-11-03
Attending: INTERNAL MEDICINE
Payer: MEDICARE

## 2017-11-03 PROCEDURE — A9552 F18 FDG: HCPCS

## 2017-11-03 NOTE — TELEPHONE ENCOUNTER
I called [phone] to get Lex scheduled for his Lung Bx. I was informed that they were trying to schedule him but due to the fact that he takes Plavix they need cardiology clearance for him to be off of it for 5 days. They call me when they schedule the Bx and I will call the Pt to schedule 6 week follow up as close as I can to 11/16/17. Pt is aware of reason for waiting

## 2017-11-15 ENCOUNTER — APPOINTMENT (OUTPATIENT)
Dept: ADMISSIONS | Facility: MEDICAL CENTER | Age: 82
End: 2017-11-15
Attending: INTERNAL MEDICINE
Payer: MEDICARE

## 2017-11-15 ENCOUNTER — HOSPITAL ENCOUNTER (OUTPATIENT)
Dept: LAB | Facility: MEDICAL CENTER | Age: 82
End: 2017-11-15
Attending: FAMILY MEDICINE
Payer: MEDICARE

## 2017-11-15 DIAGNOSIS — Z01.812 PRE-OPERATIVE LABORATORY EXAMINATION: ICD-10-CM

## 2017-11-15 LAB
ALBUMIN SERPL BCP-MCNC: 3.6 G/DL (ref 3.2–4.9)
ALBUMIN/GLOB SERPL: 1 G/DL
ALP SERPL-CCNC: 56 U/L (ref 30–99)
ALT SERPL-CCNC: 19 U/L (ref 2–50)
ANION GAP SERPL CALC-SCNC: 8 MMOL/L (ref 0–11.9)
AST SERPL-CCNC: 18 U/L (ref 12–45)
BASOPHILS # BLD AUTO: 0.4 % (ref 0–1.8)
BASOPHILS # BLD: 0.03 K/UL (ref 0–0.12)
BILIRUB SERPL-MCNC: 0.6 MG/DL (ref 0.1–1.5)
BUN SERPL-MCNC: 57 MG/DL (ref 8–22)
CALCIUM SERPL-MCNC: 9.1 MG/DL (ref 8.5–10.5)
CHLORIDE SERPL-SCNC: 107 MMOL/L (ref 96–112)
CHOLEST SERPL-MCNC: 119 MG/DL (ref 100–199)
CK SERPL-CCNC: 42 U/L (ref 0–154)
CO2 SERPL-SCNC: 22 MMOL/L (ref 20–33)
CREAT SERPL-MCNC: 2.54 MG/DL (ref 0.5–1.4)
EOSINOPHIL # BLD AUTO: 0.18 K/UL (ref 0–0.51)
EOSINOPHIL NFR BLD: 2.5 % (ref 0–6.9)
ERYTHROCYTE [DISTWIDTH] IN BLOOD BY AUTOMATED COUNT: 51.4 FL (ref 35.9–50)
GFR SERPL CREATININE-BSD FRML MDRD: 24 ML/MIN/1.73 M 2
GLOBULIN SER CALC-MCNC: 3.5 G/DL (ref 1.9–3.5)
GLUCOSE SERPL-MCNC: 140 MG/DL (ref 65–99)
HCT VFR BLD AUTO: 38.7 % (ref 42–52)
HDLC SERPL-MCNC: 39 MG/DL
HGB BLD-MCNC: 12.5 G/DL (ref 14–18)
IMM GRANULOCYTES # BLD AUTO: 0.05 K/UL (ref 0–0.11)
IMM GRANULOCYTES NFR BLD AUTO: 0.7 % (ref 0–0.9)
INR PPP: 1.13 (ref 0.87–1.13)
LDLC SERPL CALC-MCNC: 52 MG/DL
LYMPHOCYTES # BLD AUTO: 0.96 K/UL (ref 1–4.8)
LYMPHOCYTES NFR BLD: 13.4 % (ref 22–41)
MCH RBC QN AUTO: 30.4 PG (ref 27–33)
MCHC RBC AUTO-ENTMCNC: 32.3 G/DL (ref 33.7–35.3)
MCV RBC AUTO: 94.2 FL (ref 81.4–97.8)
MONOCYTES # BLD AUTO: 0.63 K/UL (ref 0–0.85)
MONOCYTES NFR BLD AUTO: 8.8 % (ref 0–13.4)
NEUTROPHILS # BLD AUTO: 5.3 K/UL (ref 1.82–7.42)
NEUTROPHILS NFR BLD: 74.2 % (ref 44–72)
NRBC # BLD AUTO: 0 K/UL
NRBC BLD AUTO-RTO: 0 /100 WBC
PLATELET # BLD AUTO: 127 K/UL (ref 164–446)
PMV BLD AUTO: 11.4 FL (ref 9–12.9)
POTASSIUM SERPL-SCNC: 4.6 MMOL/L (ref 3.6–5.5)
PROT SERPL-MCNC: 7.1 G/DL (ref 6–8.2)
PROTHROMBIN TIME: 14.2 SEC (ref 12–14.6)
RBC # BLD AUTO: 4.11 M/UL (ref 4.7–6.1)
SODIUM SERPL-SCNC: 137 MMOL/L (ref 135–145)
TRIGL SERPL-MCNC: 141 MG/DL (ref 0–149)
WBC # BLD AUTO: 7.2 K/UL (ref 4.8–10.8)

## 2017-11-15 PROCEDURE — 80061 LIPID PANEL: CPT

## 2017-11-15 PROCEDURE — 36415 COLL VENOUS BLD VENIPUNCTURE: CPT

## 2017-11-15 PROCEDURE — 85610 PROTHROMBIN TIME: CPT

## 2017-11-15 PROCEDURE — 82550 ASSAY OF CK (CPK): CPT

## 2017-11-15 PROCEDURE — 80053 COMPREHEN METABOLIC PANEL: CPT

## 2017-11-15 PROCEDURE — 85025 COMPLETE CBC W/AUTO DIFF WBC: CPT

## 2017-11-21 ENCOUNTER — OFFICE VISIT (OUTPATIENT)
Dept: CARDIOLOGY | Facility: MEDICAL CENTER | Age: 82
End: 2017-11-21
Payer: MEDICARE

## 2017-11-21 ENCOUNTER — HOSPITAL ENCOUNTER (OUTPATIENT)
Dept: LAB | Facility: MEDICAL CENTER | Age: 82
End: 2017-11-21
Attending: UROLOGY
Payer: MEDICARE

## 2017-11-21 VITALS
HEIGHT: 69 IN | WEIGHT: 180 LBS | BODY MASS INDEX: 26.66 KG/M2 | OXYGEN SATURATION: 98 % | HEART RATE: 55 BPM | DIASTOLIC BLOOD PRESSURE: 84 MMHG | SYSTOLIC BLOOD PRESSURE: 160 MMHG

## 2017-11-21 DIAGNOSIS — I73.9 PAD (PERIPHERAL ARTERY DISEASE) (HCC): ICD-10-CM

## 2017-11-21 DIAGNOSIS — I50.20 ACC/AHA STAGE C SYSTOLIC HEART FAILURE (HCC): ICD-10-CM

## 2017-11-21 DIAGNOSIS — I51.89 LEFT VENTRICULAR SYSTOLIC DYSFUNCTION, NYHA CLASS 2: ICD-10-CM

## 2017-11-21 DIAGNOSIS — N18.30 CKD (CHRONIC KIDNEY DISEASE) STAGE 3, GFR 30-59 ML/MIN (HCC): ICD-10-CM

## 2017-11-21 DIAGNOSIS — E78.5 DYSLIPIDEMIA: ICD-10-CM

## 2017-11-21 LAB — PSA SERPL-MCNC: 27.99 NG/ML (ref 0–4)

## 2017-11-21 PROCEDURE — 99214 OFFICE O/P EST MOD 30 MIN: CPT | Performed by: INTERNAL MEDICINE

## 2017-11-21 PROCEDURE — 36415 COLL VENOUS BLD VENIPUNCTURE: CPT

## 2017-11-21 PROCEDURE — 84153 ASSAY OF PSA TOTAL: CPT

## 2017-11-21 RX ORDER — ENALAPRIL MALEATE 10 MG/1
20 TABLET ORAL 2 TIMES DAILY
Qty: 180 TAB | Refills: 3 | Status: SHIPPED | OUTPATIENT
Start: 2017-11-21 | End: 2018-08-14 | Stop reason: SDUPTHER

## 2017-11-21 RX ORDER — ATORVASTATIN CALCIUM 10 MG/1
5 TABLET, FILM COATED ORAL DAILY
Qty: 90 TAB | Refills: 3 | Status: SHIPPED | OUTPATIENT
Start: 2017-11-21 | End: 2017-12-11

## 2017-11-21 RX ORDER — CARVEDILOL 12.5 MG/1
12.5 TABLET ORAL 2 TIMES DAILY WITH MEALS
Qty: 180 TAB | Refills: 3 | Status: SHIPPED | OUTPATIENT
Start: 2017-11-21 | End: 2018-07-20 | Stop reason: SDUPTHER

## 2017-11-21 RX ORDER — CLOPIDOGREL BISULFATE 75 MG/1
75 TABLET ORAL EVERY MORNING
Qty: 90 TAB | Refills: 3 | Status: ON HOLD | OUTPATIENT
Start: 2017-11-21 | End: 2018-01-02

## 2017-11-21 ASSESSMENT — ENCOUNTER SYMPTOMS
DIZZINESS: 0
SHORTNESS OF BREATH: 1
MEMORY LOSS: 0
PALPITATIONS: 0
BRUISES/BLEEDS EASILY: 0
MYALGIAS: 0
FEVER: 0
COUGH: 0
DIAPHORESIS: 0
SENSORY CHANGE: 0
DEPRESSION: 0
HEADACHES: 0
DOUBLE VISION: 0
ABDOMINAL PAIN: 0
FALLS: 0
BLURRED VISION: 0

## 2017-11-21 NOTE — PROGRESS NOTES
Subjective:   Lex Harden is a 84 y.o. male who presents today for cardiac care and evaluation in our heart failure clinic after his recent hospital stay. Patient was in the hospital because of colitis. He was incidentally found to have left ventricular systolic function of 30% on his transthoracic echocardiogram. He was followed in our practice in the past with Dr. Hong.     Patient was able to complete 76 m during his 6 minute walk test. his O2 saturation at baseline was 92% and at the end of the test, the O2 saturation was 92%. he reported 2 level of dyspnea on Pedro scale.     His exercise activity is severely limited by claudication. He does have extensive PAD. He does have vascular surgery care.     Transthoracic echocardiogram showed left ventricular systolic function to be lower at 25%. He also has evidence of low flow, low gradient severe AS.    At the last visit, patient did not want to have any further invasive cardiac procedures surgery. He is feeling fine overall. He doesn't want to go through the troubles per his request. Therefore, we employed conservative management.    Past Medical History:   Diagnosis Date   • AVM (arteriovenous malformation) of colon    • Back pain    • Chickenpox    • Chronic airway obstruction, not elsewhere classified    • Congestive heart failure (CMS-[facility])    • Diabetes    • Diphtheria    • Emphysema of lung (CMS-[facility])    • Hypercholesteremia    • Hypertension    • Infectious disease    • Other and unspecified angina pectoris    • PVD (peripheral vascular disease) (CMS-[facility])    • Snoring    • Stroke (CMS-[facility]) 2015   • Unspecified cataract     right eye    • Urinary bladder disorder    • Urinary incontinence      Past Surgical History:   Procedure Laterality Date   • COLONOSCOPY  6/17/2015    Procedure: COLONOSCOPY;  Surgeon: Eddie Levin M.D.;  Location: SURGERY [facility];  Service:    • GASTROSCOPY  6/17/2015    Procedure: GASTROSCOPY W/APC;  Surgeon: Eddie AYON  "ROGERIO Levin;  Location: [facility];  Service:    • GASTROSCOPY WITH APC  11/1/2014    Performed by Eddie Levin M.D. at ENDOSCOPY [facility]   • COLONOSCOPY WITH APC  11/1/2014    Performed by Eddie Levin M.D. at ENDOSCOPY [facility]   • GASTROSCOPY-ENDO  5/1/2011    Performed by HOLLY VASQUEZ at ENDOSCOPY [facility]   • COLONOSCOPY - ENDO  5/1/2011    Performed by HOLLY VASQUEZ at ENDOSCOPY [facility]   • COLONOSCOPY WITH APC  4/21/2011    Performed by EDMUND CHENG at SURGERY [facility]   • GASTROSCOPY-ENDO  4/21/2011    Performed by EDMUND CHENG at [facility]   • COLONOSCOPY WITH APC  9/28/2010    Performed by EDMUND CHENG at [facility]   • GASTROSCOPY WITH APC  9/28/2010    Performed by EDMUND CHENG at [facility]   • AORTOFEMORAL BYPASS  1991    Both legs     Family History   Problem Relation Age of Onset   • Non-contributory Neg Hx      \"unknown\"     History   Smoking Status   • Former Smoker   • Packs/day: 2.00   • Years: 40.00   • Quit date: 1/1/1982   Smokeless Tobacco   • Never Used     Comment: Hx smoking x 40 yrs. Quit 1983     Allergies   Allergen Reactions   • Nkda [No Known Drug Allergy]      Outpatient Encounter Prescriptions as of 11/21/2017   Medication Sig Dispense Refill   • Tiotropium Bromide Monohydrate (SPIRIVA HANDIHALER INH) Inhale  by mouth.     • carvedilol (COREG) 12.5 MG Tab Take 1 Tab by mouth 2 times a day, with meals. 180 Tab 3   • enalapril (VASOTEC) 10 MG Tab Take 2 Tabs by mouth 2 times a day. 180 Tab 3   • clopidogrel (PLAVIX) 75 MG Tab Take 1 Tab by mouth every morning. 90 Tab 3   • atorvastatin (LIPITOR) 10 MG Tab Take 0.5 Tabs by mouth every day. 90 Tab 3   • ciprofloxacin (CIPRO) 500 MG Tab      • terazosin (HYTRIN) 10 MG capsule      • tamsulosin (FLOMAX) 0.4 MG capsule Take 0.4 mg by mouth ONE-HALF HOUR AFTER BREAKFAST.     • " "finasteride (PROSCAR) 5 MG Tab      • furosemide (LASIX) 20 MG Tab Take 0.5 Tabs by mouth 1 time daily as needed. 30 Tab 1   • allopurinol (ZYLOPRIM) 100 MG TABS Take 100 mg by mouth every day.     • Cholecalciferol (VITAMIN D) 2000 UNIT TABS Take 4,000 Units by mouth every day.     • Ferrous Sulfate (IRON) 325 (65 FE) MG TABS Take 1 Tab by mouth every morning.     • doxycycline (MONODOX) 100 MG capsule      • [DISCONTINUED] carvedilol (COREG) 12.5 MG Tab Take 1 Tab by mouth 2 times a day, with meals. 180 Tab 3   • [DISCONTINUED] clopidogrel (PLAVIX) 75 MG Tab Take 75 mg by mouth every morning.     • [DISCONTINUED] enalapril (VASOTEC) 10 MG TABS Take 20 mg by mouth 2 times a day.     • [DISCONTINUED] atorvastatin (LIPITOR) 10 MG TABS Take 5 mg by mouth every day.     • terazosin (HYTRIN) 5 MG CAPS Take 5 mg by mouth every day.       No facility-administered encounter medications on file as of 11/21/2017.      Review of Systems   Constitutional: Negative for diaphoresis and fever.   HENT: Negative for nosebleeds.    Eyes: Negative for blurred vision and double vision.   Respiratory: Positive for shortness of breath. Negative for cough.    Cardiovascular: Negative for chest pain and palpitations.   Gastrointestinal: Negative for abdominal pain.   Genitourinary: Negative for dysuria and frequency.   Musculoskeletal: Negative for falls and myalgias.   Skin: Negative for rash.   Neurological: Negative for dizziness, sensory change and headaches.   Endo/Heme/Allergies: Does not bruise/bleed easily.   Psychiatric/Behavioral: Negative for depression and memory loss.        Objective:   /84   Pulse (!) 55   Ht 1.753 m (5' 9\")   Wt 81.6 kg (180 lb)   SpO2 98%   BMI 26.58 kg/m²     Physical Exam   Constitutional: He is oriented to person, place, and time. No distress.   HENT:   Head: Normocephalic and atraumatic.   Eyes: EOM are normal.   Neck: Normal range of motion. No JVD present.   Cardiovascular: Normal rate " and regular rhythm.  Exam reveals no gallop and no friction rub.    Murmur heard.  there is 2-3/6 systolic murmur heard best at the right border of the aortic valve area. The murmur does not radiate to the carotids.     Pulmonary/Chest: No respiratory distress. He has no wheezes. He has no rales. He exhibits no tenderness.   Abdominal: Soft. Bowel sounds are normal. There is no tenderness. There is no rebound and no guarding.   The is no presence of abdominal bruits   Musculoskeletal: Normal range of motion.   Neurological: He is alert and oriented to person, place, and time.   Skin: Skin is warm and dry.   Psychiatric: He has a normal mood and affect.   Nursing note and vitals reviewed.      Assessment:     1. ACC/AHA stage C systolic heart failure (CMS-[facility])  carvedilol (COREG) 12.5 MG Tab   2. PAD (peripheral artery disease) (CMS-[facility])  carvedilol (COREG) 12.5 MG Tab   3. Dyslipidemia  carvedilol (COREG) 12.5 MG Tab   4. CKD (chronic kidney disease) stage 3, GFR 30-59 ml/min  carvedilol (COREG) 12.5 MG Tab   5. Left ventricular systolic dysfunction, NYHA class 2         Medical Decision Making:  Today's Assessment / Status / Plan:   No change in medical therapy.  No further cardiac testing or intervention.  Patient understands his disease process.  He understands the risks.    I will see patient back in clinic with lab tests and studies results in 6 months.    I thank you Dr. Fabian for referring patient to our Cardiology Clinic today.

## 2017-11-21 NOTE — LETTER
[facility]   [address], [address]  [place] [address]  Phone: [phone]  Fax: [phone]              Lex Harden  7/21/1933    Encounter Date: 11/21/2017    Donny Castillo M.D.          PROGRESS NOTE:  Subjective:   Lex Harden is a 84 y.o. male who presents today for cardiac care and evaluation in our heart failure clinic after his recent hospital stay. Patient was in the hospital because of colitis. He was incidentally found to have left ventricular systolic function of 30% on his transthoracic echocardiogram. He was followed in our practice in the past with Dr. Hong.     Patient was able to complete 76 m during his 6 minute walk test. his O2 saturation at baseline was 92% and at the end of the test, the O2 saturation was 92%. he reported 2 level of dyspnea on Pedro scale.     His exercise activity is severely limited by claudication. He does have extensive PAD. He does have vascular surgery care.     Transthoracic echocardiogram showed left ventricular systolic function to be lower at 25%. He also has evidence of low flow, low gradient severe AS.    At the last visit, patient did not want to have any further invasive cardiac procedures surgery. He is feeling fine overall. He doesn't want to go through the troubles per his request. Therefore, we employed conservative management.    Past Medical History:   Diagnosis Date   • AVM (arteriovenous malformation) of colon    • Back pain    • Chickenpox    • Chronic airway obstruction, not elsewhere classified    • Congestive heart failure (CMS-HCC)    • Diabetes    • Diphtheria    • Emphysema of lung (CMS-HCC)    • Hypercholesteremia    • Hypertension    • Infectious disease    • Other and unspecified angina pectoris    • PVD (peripheral vascular disease) (CMS-[facility])    • Snoring    • Stroke (CMS-HCC) 2015   • Unspecified cataract     right eye    • Urinary bladder disorder    • Urinary incontinence      Past  "Surgical History:   Procedure Laterality Date   • COLONOSCOPY  6/17/2015    Procedure: COLONOSCOPY;  Surgeon: Eddie Levin M.D.;  Location: SURGERY [facility];  Service:    • GASTROSCOPY  6/17/2015    Procedure: GASTROSCOPY W/APC;  Surgeon: Eddie Levin M.D.;  Location: SURGERY [facility];  Service:    • GASTROSCOPY WITH APC  11/1/2014    Performed by Eddie Levin M.D. at ENDOSCOPY [facility]   • COLONOSCOPY WITH APC  11/1/2014    Performed by Eddie Levin M.D. at ENDOSCOPY [facility]   • GASTROSCOPY-ENDO  5/1/2011    Performed by HOLLY VASQUEZ at ENDOSCOPY [facility]   • COLONOSCOPY - ENDO  5/1/2011    Performed by HOLLY VASQUEZ at ENDOSCOPY [facility]   • COLONOSCOPY WITH APC  4/21/2011    Performed by EDMUND CHENG at [facility]   • GASTROSCOPY-ENDO  4/21/2011    Performed by EDMUND CHENG at [facility]   • COLONOSCOPY WITH APC  9/28/2010    Performed by EDMUND CHENG at [facility]   • GASTROSCOPY WITH APC  9/28/2010    Performed by EDMUND CHENG at [facility]   • AORTOFEMORAL BYPASS  1991    Both legs     Family History   Problem Relation Age of Onset   • Non-contributory Neg Hx      \"unknown\"     History   Smoking Status   • Former Smoker   • Packs/day: 2.00   • Years: 40.00   • Quit date: 1/1/1982   Smokeless Tobacco   • Never Used     Comment: Hx smoking x 40 yrs. Quit 1983     Allergies   Allergen Reactions   • Nkda [No Known Drug Allergy]      Outpatient Encounter Prescriptions as of 11/21/2017   Medication Sig Dispense Refill   • Tiotropium Bromide Monohydrate (SPIRIVA HANDIHALER INH) Inhale  by mouth.     • carvedilol (COREG) 12.5 MG Tab Take 1 Tab by mouth 2 times a day, with meals. 180 Tab 3   • enalapril (VASOTEC) 10 MG Tab Take 2 Tabs by mouth 2 times a day. 180 Tab 3   • clopidogrel (PLAVIX) 75 MG Tab Take 1 Tab by mouth every morning. 90 " "Tab 3   • atorvastatin (LIPITOR) 10 MG Tab Take 0.5 Tabs by mouth every day. 90 Tab 3   • ciprofloxacin (CIPRO) 500 MG Tab      • terazosin (HYTRIN) 10 MG capsule      • tamsulosin (FLOMAX) 0.4 MG capsule Take 0.4 mg by mouth ONE-HALF HOUR AFTER BREAKFAST.     • finasteride (PROSCAR) 5 MG Tab      • furosemide (LASIX) 20 MG Tab Take 0.5 Tabs by mouth 1 time daily as needed. 30 Tab 1   • allopurinol (ZYLOPRIM) 100 MG TABS Take 100 mg by mouth every day.     • Cholecalciferol (VITAMIN D) 2000 UNIT TABS Take 4,000 Units by mouth every day.     • Ferrous Sulfate (IRON) 325 (65 FE) MG TABS Take 1 Tab by mouth every morning.     • doxycycline (MONODOX) 100 MG capsule      • [DISCONTINUED] carvedilol (COREG) 12.5 MG Tab Take 1 Tab by mouth 2 times a day, with meals. 180 Tab 3   • [DISCONTINUED] clopidogrel (PLAVIX) 75 MG Tab Take 75 mg by mouth every morning.     • [DISCONTINUED] enalapril (VASOTEC) 10 MG TABS Take 20 mg by mouth 2 times a day.     • [DISCONTINUED] atorvastatin (LIPITOR) 10 MG TABS Take 5 mg by mouth every day.     • terazosin (HYTRIN) 5 MG CAPS Take 5 mg by mouth every day.       No facility-administered encounter medications on file as of 11/21/2017.      Review of Systems   Constitutional: Negative for diaphoresis and fever.   HENT: Negative for nosebleeds.    Eyes: Negative for blurred vision and double vision.   Respiratory: Positive for shortness of breath. Negative for cough.    Cardiovascular: Negative for chest pain and palpitations.   Gastrointestinal: Negative for abdominal pain.   Genitourinary: Negative for dysuria and frequency.   Musculoskeletal: Negative for falls and myalgias.   Skin: Negative for rash.   Neurological: Negative for dizziness, sensory change and headaches.   Endo/Heme/Allergies: Does not bruise/bleed easily.   Psychiatric/Behavioral: Negative for depression and memory loss.        Objective:   /84   Pulse (!) 55   Ht 1.753 m (5' 9\")   Wt 81.6 kg (180 lb)   SpO2 " 98%   BMI 26.58 kg/m²      Physical Exam   Constitutional: He is oriented to person, place, and time. No distress.   HENT:   Head: Normocephalic and atraumatic.   Eyes: EOM are normal.   Neck: Normal range of motion. No JVD present.   Cardiovascular: Normal rate and regular rhythm.  Exam reveals no gallop and no friction rub.    Murmur heard.  there is 2-3/6 systolic murmur heard best at the right border of the aortic valve area. The murmur does not radiate to the carotids.     Pulmonary/Chest: No respiratory distress. He has no wheezes. He has no rales. He exhibits no tenderness.   Abdominal: Soft. Bowel sounds are normal. There is no tenderness. There is no rebound and no guarding.   The is no presence of abdominal bruits   Musculoskeletal: Normal range of motion.   Neurological: He is alert and oriented to person, place, and time.   Skin: Skin is warm and dry.   Psychiatric: He has a normal mood and affect.   Nursing note and vitals reviewed.      Assessment:     1. ACC/AHA stage C systolic heart failure (CMS-[facility])  carvedilol (COREG) 12.5 MG Tab   2. PAD (peripheral artery disease) (CMS-[facility])  carvedilol (COREG) 12.5 MG Tab   3. Dyslipidemia  carvedilol (COREG) 12.5 MG Tab   4. CKD (chronic kidney disease) stage 3, GFR 30-59 ml/min  carvedilol (COREG) 12.5 MG Tab   5. Left ventricular systolic dysfunction, NYHA class 2         Medical Decision Making:  Today's Assessment / Status / Plan:   No change in medical therapy.  No further cardiac testing or intervention.  Patient understands his disease process.  He understands the risks.    I will see patient back in clinic with lab tests and studies results in 6 months.    I thank you Dr. Fabian for referring patient to our Cardiology Clinic today.          Kirill Fabian D.O.  [address]  [place] [address]  VIA Facsimile: [phone]

## 2017-11-22 ENCOUNTER — TELEPHONE (OUTPATIENT)
Dept: PULMONOLOGY | Facility: HOSPICE | Age: 82
End: 2017-11-22

## 2017-11-27 ENCOUNTER — HOSPITAL ENCOUNTER (EMERGENCY)
Facility: MEDICAL CENTER | Age: 82
End: 2017-11-27
Payer: MEDICARE

## 2017-11-27 ENCOUNTER — APPOINTMENT (OUTPATIENT)
Dept: RADIOLOGY | Facility: MEDICAL CENTER | Age: 82
End: 2017-11-27
Attending: INTERNAL MEDICINE
Payer: MEDICARE

## 2017-11-27 ENCOUNTER — HOSPITAL ENCOUNTER (OUTPATIENT)
Facility: MEDICAL CENTER | Age: 82
End: 2017-11-27
Attending: INTERNAL MEDICINE | Admitting: INTERNAL MEDICINE
Payer: MEDICARE

## 2017-11-27 ENCOUNTER — APPOINTMENT (OUTPATIENT)
Dept: RADIOLOGY | Facility: MEDICAL CENTER | Age: 82
End: 2017-11-27
Attending: RADIOLOGY
Payer: MEDICARE

## 2017-11-27 VITALS
OXYGEN SATURATION: 100 % | SYSTOLIC BLOOD PRESSURE: 192 MMHG | WEIGHT: 178.57 LBS | HEART RATE: 50 BPM | TEMPERATURE: 96.7 F | BODY MASS INDEX: 26.45 KG/M2 | RESPIRATION RATE: 13 BRPM | HEIGHT: 69 IN | DIASTOLIC BLOOD PRESSURE: 75 MMHG

## 2017-11-27 VITALS
RESPIRATION RATE: 16 BRPM | WEIGHT: 176.37 LBS | BODY MASS INDEX: 26.12 KG/M2 | TEMPERATURE: 96.8 F | HEIGHT: 69 IN | OXYGEN SATURATION: 100 % | SYSTOLIC BLOOD PRESSURE: 100 MMHG | DIASTOLIC BLOOD PRESSURE: 50 MMHG | HEART RATE: 55 BPM

## 2017-11-27 PROCEDURE — 88342 IMHCHEM/IMCYTCHM 1ST ANTB: CPT

## 2017-11-27 PROCEDURE — 88341 IMHCHEM/IMCYTCHM EA ADD ANTB: CPT

## 2017-11-27 PROCEDURE — 160002 HCHG RECOVERY MINUTES (STAT)

## 2017-11-27 PROCEDURE — 88305 TISSUE EXAM BY PATHOLOGIST: CPT

## 2017-11-27 PROCEDURE — 302449 STATCHG TRIAGE ONLY (STATISTIC)

## 2017-11-27 PROCEDURE — 700111 HCHG RX REV CODE 636 W/ 250 OVERRIDE (IP)

## 2017-11-27 PROCEDURE — 71010 DX-CHEST-PORTABLE (1 VIEW): CPT

## 2017-11-27 PROCEDURE — 32405 CT-BIOPSY-LUNG/MEDIASTINUM: CPT | Mod: RT

## 2017-11-27 RX ORDER — MIDAZOLAM HYDROCHLORIDE 1 MG/ML
.5-2 INJECTION INTRAMUSCULAR; INTRAVENOUS PRN
Status: ACTIVE | OUTPATIENT
Start: 2017-11-27 | End: 2017-11-27

## 2017-11-27 RX ORDER — SODIUM CHLORIDE 9 MG/ML
500 INJECTION, SOLUTION INTRAVENOUS
Status: ACTIVE | OUTPATIENT
Start: 2017-11-27 | End: 2017-11-27

## 2017-11-27 RX ORDER — HYDROMORPHONE HYDROCHLORIDE 2 MG/ML
0.5 INJECTION, SOLUTION INTRAMUSCULAR; INTRAVENOUS; SUBCUTANEOUS
Status: DISCONTINUED | OUTPATIENT
Start: 2017-11-27 | End: 2017-11-27 | Stop reason: HOSPADM

## 2017-11-27 RX ORDER — OXYCODONE HYDROCHLORIDE 5 MG/1
5 TABLET ORAL
Status: DISCONTINUED | OUTPATIENT
Start: 2017-11-27 | End: 2017-11-27 | Stop reason: HOSPADM

## 2017-11-27 RX ORDER — MIDAZOLAM HYDROCHLORIDE 1 MG/ML
INJECTION INTRAMUSCULAR; INTRAVENOUS
Status: COMPLETED
Start: 2017-11-27 | End: 2017-11-27

## 2017-11-27 RX ORDER — HYDRALAZINE HYDROCHLORIDE 20 MG/ML
10 INJECTION INTRAMUSCULAR; INTRAVENOUS ONCE
Status: COMPLETED | OUTPATIENT
Start: 2017-11-27 | End: 2017-11-27

## 2017-11-27 RX ORDER — ONDANSETRON 2 MG/ML
4 INJECTION INTRAMUSCULAR; INTRAVENOUS PRN
Status: ACTIVE | OUTPATIENT
Start: 2017-11-27 | End: 2017-11-27

## 2017-11-27 RX ORDER — SODIUM CHLORIDE 9 MG/ML
INJECTION, SOLUTION INTRAVENOUS
Status: DISCONTINUED | OUTPATIENT
Start: 2017-11-27 | End: 2017-11-27 | Stop reason: HOSPADM

## 2017-11-27 RX ORDER — OXYCODONE HYDROCHLORIDE 5 MG/1
10 TABLET ORAL
Status: DISCONTINUED | OUTPATIENT
Start: 2017-11-27 | End: 2017-11-27 | Stop reason: HOSPADM

## 2017-11-27 RX ORDER — NALOXONE HYDROCHLORIDE 0.4 MG/ML
INJECTION, SOLUTION INTRAMUSCULAR; INTRAVENOUS; SUBCUTANEOUS
Status: COMPLETED
Start: 2017-11-27 | End: 2017-11-27

## 2017-11-27 RX ORDER — HYDRALAZINE HYDROCHLORIDE 20 MG/ML
INJECTION INTRAMUSCULAR; INTRAVENOUS
Status: COMPLETED
Start: 2017-11-27 | End: 2017-11-27

## 2017-11-27 RX ADMIN — MIDAZOLAM 1 MG: 1 INJECTION INTRAMUSCULAR; INTRAVENOUS at 11:21

## 2017-11-27 RX ADMIN — HYDRALAZINE HYDROCHLORIDE 10 MG: 20 INJECTION INTRAMUSCULAR; INTRAVENOUS at 11:53

## 2017-11-27 RX ADMIN — MIDAZOLAM HYDROCHLORIDE 1 MG: 1 INJECTION INTRAMUSCULAR; INTRAVENOUS at 11:27

## 2017-11-27 RX ADMIN — MIDAZOLAM HYDROCHLORIDE 1 MG: 1 INJECTION INTRAMUSCULAR; INTRAVENOUS at 11:21

## 2017-11-27 RX ADMIN — SODIUM CHLORIDE: 9 INJECTION, SOLUTION INTRAVENOUS at 09:33

## 2017-11-27 ASSESSMENT — PAIN SCALES - GENERAL
PAINLEVEL_OUTOF10: 0

## 2017-11-27 NOTE — OR SURGEON
Immediate Post- Operative Note        PostOp Diagnosis: RUL LUNG MASS    Procedure(s):CT BX    Estimated Blood Loss: Less than 5 ml        Complications: None            11/27/2017     11:40 AM     Philip Ellis

## 2017-11-27 NOTE — OR NURSING
1159 patient arrived from cath lab s/p right lung biopsy, heimlich valve to right chest side dressing clean, vss, patient denies pain, s.o.b, vss, plan of care discussed with patient agreeable.  1400 patient tolerating oral fluids and snacks, ok to dc patient after 2nd x-ray per Dr garrido, patient to follow up with x-ray tomorrow at 0900.  1452 discharge instructions given to patient, patient verbalize understanding of the orders, iv discontinued tip intact, prior to dc vss, no c/o pain, s.o.b,   1500 patient escorted via w/c with all his personal belongings. Patient and family knows to follow up with x-ray tomorrow at 0900, patient dcd with heimlich valve. Written instructions on how to take care of heimlich given to patient.

## 2017-11-27 NOTE — PROGRESS NOTES
CTCT Procedure Note:    Dr. Ellis performed right lung biopsy.  The pt tolerated the procedure well, vital signs stable; ETCo2 baseline 27, with consistent waveform during the procedure.  percustay  applied to right posterior thorax, CDI and soft.  Report given to Fer DORADO.  Pt transported to PPU.  Cores x 3 from right lung biopsy.     Chest Tube  [employer] APDL 8F x 25cm.  REF: [id number].  LOT:  [id number]

## 2017-11-27 NOTE — DISCHARGE INSTRUCTIONS
ACTIVITY: Rest and take it easy for the first 24 hours.  A responsible adult is recommended to remain with you during that time.  It is normal to feel sleepy.  We encourage you to not do anything that requires balance, judgment or coordination.    MILD FLU-LIKE SYMPTOMS ARE NORMAL. YOU MAY EXPERIENCE GENERALIZED MUSCLE ACHES, THROAT IRRITATION, HEADACHE AND/OR SOME NAUSEA.    FOR 24 HOURS DO NOT:  Drive, operate machinery or run household appliances.  Drink beer or alcoholic beverages.   Make important decisions or sign legal documents.    SPECIAL INSTRUCTIONS: follow up with primary care physician as needed  If you experience chest pain, s.o.b, call 911 return to ER   Follow up with Dr garrido call with any questions [phone] up xray tomorrow at 0900  Continue with your home medication  Heimlich Valve Home Care  A Heimlich valve is a device that helps to drain extra air and fluid from the chest cavity. It connects to a chest tube that is inserted into the area surrounding your lungs (pleural space). The valve lets the extra air out of the chest cavity and stops new air from getting in through the chest tube. The other end of the Heimlich valve is sometimes connected to a drainage bag or container used to collect fluid that drains from the chest cavity.   A Heimlich valve is often used to manage pneumothorax, a condition in which extra air leaks into the pleural space and prevents the lungs from fully expanding. This valve may also be used to help drain any leakage of air or fluid after surgery involving the lungs.   If you are going home with a chest tube and Heimlich valve in place, follow the instructions below.  HOW TO CARE FOR THE VALVE AND CHEST TUBE  · Keep the valve and tube secured to your skin as directed by your health care provider. This prevents kinks from developing in the tube and prevents the tube from being pulled out of place.  · Make sure the valve opening is always clear so that air can  escape. Do not block the opening with anything (such as tape) that would stop the flow of air. If the valve is connected to a drainage bag or container, make sure the air vent for the bag or container does not become blocked.    · Keep in mind that you will hear the valve make noises as air or fluid passes through it.  · Wear loose, comfortable clothing.    · Remove or change bandages (dressings) around the insertion site only as directed by your health care provider.  · Gently clean the skin near the insertion site with mild soap and water as directed by your health care provider. Avoid powders, creams, and strong soaps.  · Do not take baths, swim, or use a hot tub until your health care provider approves. Take showers instead. When you shower, make sure the tube, valve, and dressings are covered with a waterproof covering.  · If you have a drainage bag or container, follow your health care provider's instructions for keeping track of the amount of fluid that drains. You may be asked to write down the day, time, and amount of fluid each time you empty the drainage bag or container.  · Empty the fluid from the drainage bag or container into the toilet as directed by your health care provider.  · Limit activities as directed by your health care provider. You may need to avoid strenuous activity and heavy lifting for several weeks.  · Avoid air travel until your health care provider says it is okay.    · Keep all follow-up visits as directed by your health care provider. This is important.    SEEK MEDICAL CARE IF:  · You are having trouble caring for your chest tube and Heimlich valve.    · Your Heimlich valve becomes disconnected from the chest tube. If this occurs, reconnect the valve and call your health care provider.  · Your chest tube comes loose or gets pulled out.    · You have a fever.    · You have chills.    · You have drainage, redness, swelling, or pain at the tube insertion site.    · You have pain,  pressure, or cramping in the chest.    · You have continued shortness of breath.    · You have other new symptoms.    SEEK IMMEDIATE MEDICAL CARE IF:  · You have sudden, severe chest pain.    · You have trouble breathing.       This information is not intended to replace advice given to you by your health care provider. Make sure you discuss any questions you have with your health care provider.     Document Released: 07/15/2015 Document Reviewed: 07/15/2015  [employer] Interactive Patient Education ©2016 [employer].      DIET: To avoid nausea, slowly advance diet as tolerated, avoiding spicy or greasy foods for the first day.  Add more substantial food to your diet according to your physician's instructions.  Babies can be fed formula or breast milk as soon as they are hungry.  INCREASE FLUIDS AND FIBER TO AVOID CONSTIPATION.    SURGICAL DRESSING/BATHING: keep band aid clean dry intact for 24 hours.    FOLLOW-UP APPOINTMENT:  A follow-up appointment should be arranged with your doctor in [phone]; call to schedule.    You should CALL YOUR PHYSICIAN if you develop:  Fever greater than 101 degrees F.  Pain not relieved by medication, or persistent nausea or vomiting.  Excessive bleeding (blood soaking through dressing) or unexpected drainage from the wound.  Extreme redness or swelling around the incision site, drainage of pus or foul smelling drainage.  Inability to urinate or empty your bladder within 8 hours.  Problems with breathing or chest pain.    You should call 911 if you develop problems with breathing or chest pain.  If you are unable to contact your doctor or surgical center, you should go to the nearest emergency room or urgent care center.  Physician's telephone #: [phone]    If any questions arise, call your doctor.  If your doctor is not available, please feel free to call the Surgical Center at [phone].  The Center is open Monday through Friday from 7AM to 7PM.  You can also call the HEALTH  HOTLINE open 24 hours/day, 7 days/week and speak to a nurse at [phone], or toll free at [phone].    A registered nurse may call you a few days after your surgery to see how you are doing after your procedure.    MEDICATIONS: Resume taking daily medication.  Take prescribed pain medication with food.  If no medication is prescribed, you may take non-aspirin pain medication if needed.  PAIN MEDICATION CAN BE VERY CONSTIPATING.  Take a stool softener or laxative such as senokot, pericolace, or milk of magnesia if needed.  Needle Biopsy of Lung, Care After  Refer to this sheet in the next few weeks. These instructions provide you with information on caring for yourself after your procedure. Your health care provider may also give you more specific instructions. Your treatment has been planned according to current medical practices, but problems sometimes occur. Call your health care provider if you have any problems or questions after your procedure.  WHAT TO EXPECT AFTER THE PROCEDURE  · A bandage will be applied over the area where the needle was inserted. You may be asked to apply pressure to the bandage for several minutes to ensure there is minimal bleeding.  · In most cases, you can leave when your needle biopsy procedure is completed. Do not drive yourself home. Someone else should take you home.  · If you received an IV sedative or general anesthetic, you will be taken to a comfortable place to relax while the medicine wears off.  · If you have upcoming travel scheduled, talk to your health care provider about when it is safe to travel by air after the procedure.  HOME CARE INSTRUCTIONS  · Expect to take it easy for the rest of the day.  · Protect the area where you received the needle biopsy by keeping the bandage in place for as long as instructed.  · You may feel some mild pain or discomfort in the area, but this should stop in a day or two.  · Take medicines only as directed by your health care  provider.  SEEK MEDICAL CARE IF:   · You have pain at the biopsy site that worsens or is not helped by medicine.  · You have swelling or drainage at the needle biopsy site.  · You have a fever.  SEEK IMMEDIATE MEDICAL CARE IF:   · You have new or worsening shortness of breath.  · You have chest pain.  · You are coughing up blood.  · You have bleeding that does not stop with pressure or a bandage.  · You develop light-headedness or fainting.     This information is not intended to replace advice given to you by your health care provider. Make sure you discuss any questions you have with your health care provider.     Document Released: 10/14/2008 Document Revised: 01/08/2016 Document Reviewed: 05/12/2014  Monitor Interactive Patient Education ©2016 Elsevier Inc.      If your physician has prescribed pain medication that includes Acetaminophen (Tylenol), do not take additional Acetaminophen (Tylenol) while taking the prescribed medication.    Depression / Suicide Risk    As you are discharged from this [facility] facility, it is important to learn how to keep safe from harming yourself.    Recognize the warning signs:  · Abrupt changes in personality, positive or negative- including increase in energy   · Giving away possessions  · Change in eating patterns- significant weight changes-  positive or negative  · Change in sleeping patterns- unable to sleep or sleeping all the time   · Unwillingness or inability to communicate  · Depression  · Unusual sadness, discouragement and loneliness  · Talk of wanting to die  · Neglect of personal appearance   · Rebelliousness- reckless behavior  · Withdrawal from people/activities they love  · Confusion- inability to concentrate     If you or a loved one observes any of these behaviors or has concerns about self-harm, here's what you can do:  · Talk about it- your feelings and reasons for harming yourself  · Remove any means that you might use to hurt yourself (examples:  pills, rope, extension cords, firearm)  · Get professional help from the community (Mental Health, Substance Abuse, psychological counseling)  · Do not be alone:Call your Safe Contact- someone whom you trust who will be there for you.  · Call your local CRISIS HOTLINE 542-3403 or 731-346-8347  · Call your local Children's Mobile Crisis Response Team [place] (772) 743-7087 or www.Tellyo  · Call the toll free National Suicide Prevention Hotlines   · National Suicide Prevention Lifeline 147-019-BIZM (1511)  · National Hope Line Network 800-SUICIDE (123-7766)

## 2017-11-28 ENCOUNTER — HOSPITAL ENCOUNTER (OUTPATIENT)
Dept: RADIOLOGY | Facility: MEDICAL CENTER | Age: 82
End: 2017-11-28
Attending: RADIOLOGY
Payer: MEDICARE

## 2017-11-28 DIAGNOSIS — J95.811 PNEUMOTHORAX, POST BIOPSY, RIGHT: ICD-10-CM

## 2017-11-28 DIAGNOSIS — J95.811 POSTPROCEDURAL PNEUMOTHORAX: ICD-10-CM

## 2017-11-28 PROCEDURE — 71010 DX-CHEST-LIMITED (1 VIEW): CPT

## 2017-11-28 PROCEDURE — 71010 DX-CHEST-PORTABLE (1 VIEW): CPT

## 2017-11-28 NOTE — ED NOTES
Pt ambulates to room, gait steady.  C/C increased blood in drainage tube (R chest).  Pt had lung biopsy today (Dr Ellis).  Told to expect a small amount of bloody drainage, but woke up tonight with drain overfilling.  Pt was getting biopsy to investigate spot seen on xray.  Pt denies increased pain or SOB.  Plan of care provided.

## 2017-11-28 NOTE — ED NOTES
PT decided to leave ED - has an appointment with Dr Ellis tomorrow am.  Encouraged pt to stay and be seen in ED - explained risks, etc.  Pt leaving [facility]BS.

## 2017-11-28 NOTE — OR SURGEON
Immediate Post- Operative Note        PostOp Diagnosis: RIGHT PTX POST CT GUIDED LUNG BX 11-27-17. PTX ESSENTIALLY RESOLVED POST OVERNIGHT PIGTAIL CHEST TUBE/HEIMLICH      Procedure(s): REMOVAL OF R 8F PIGTAIL CHEST TUBE    CXR'S BEFORE AND AFTER CLAMPING TUBE FOR 1.5 HOURS REVIEWED. NO RECURRENT PTX.     CHEST TUBE CUT AND REMOVED WITHOUT DIFFICULTY. STERILE DRESSING APPLIED.       Estimated Blood Loss: Less than 5 ml        Complications: None            11/28/2017     11:39 AM     Eddie Barr

## 2017-11-29 ENCOUNTER — TELEPHONE (OUTPATIENT)
Dept: PULMONOLOGY | Facility: HOSPICE | Age: 82
End: 2017-11-29

## 2017-11-29 DIAGNOSIS — C34.31 MALIGNANT NEOPLASM OF LOWER LOBE OF RIGHT LUNG (HCC): ICD-10-CM

## 2017-11-29 NOTE — TELEPHONE ENCOUNTER
----- Message from Kostas Conley M.D. sent at 11/29/2017 12:48 PM PST -----  Please cancel his appt tomorrow.  He is going to see Radiation Oncology  ----- Message -----  From: Intf, Lab  Sent: 11/28/2017   7:39 PM  To: Kostas Conley M.D.

## 2017-11-29 NOTE — PROGRESS NOTES
Called and gave results of lung bx.  He as adenocarcinoma.  He is refusing surgery (and may not be a candidate).  He is requesting radiation therapy and I have referred him to radiation oncology.

## 2017-12-04 ENCOUNTER — HOSPITAL ENCOUNTER (OUTPATIENT)
Facility: MEDICAL CENTER | Age: 82
End: 2017-12-04
Attending: PHYSICIAN ASSISTANT
Payer: MEDICARE

## 2017-12-04 LAB — CYTOLOGY REG CYTOL: NORMAL

## 2017-12-04 PROCEDURE — 88112 CYTOPATH CELL ENHANCE TECH: CPT

## 2017-12-04 PROCEDURE — 87086 URINE CULTURE/COLONY COUNT: CPT

## 2017-12-04 PROCEDURE — 82565 ASSAY OF CREATININE: CPT

## 2017-12-04 PROCEDURE — 84520 ASSAY OF UREA NITROGEN: CPT

## 2017-12-05 LAB
BUN SERPL-MCNC: 65 MG/DL (ref 8–22)
CREAT SERPL-MCNC: 2.75 MG/DL (ref 0.5–1.4)
GFR SERPL CREATININE-BSD FRML MDRD: 22 ML/MIN/1.73 M 2

## 2017-12-07 ENCOUNTER — TELEPHONE (OUTPATIENT)
Dept: PULMONOLOGY | Facility: HOSPICE | Age: 82
End: 2017-12-07

## 2017-12-07 LAB
BACTERIA UR CULT: NORMAL
SIGNIFICANT IND 70042: NORMAL
SOURCE SOURCE: NORMAL

## 2017-12-07 NOTE — TELEPHONE ENCOUNTER
"Who's calling me regarding \"radation therapy\"? Patient calling. I called Carmen at radiation therapy and she asked that I fax a referral to her and she will call and get patient scheduled. Request completed and confirmation received.   "

## 2017-12-11 ENCOUNTER — APPOINTMENT (OUTPATIENT)
Dept: RADIOLOGY | Facility: MEDICAL CENTER | Age: 82
DRG: 065 | End: 2017-12-11
Attending: EMERGENCY MEDICINE
Payer: MEDICARE

## 2017-12-11 ENCOUNTER — HOSPITAL ENCOUNTER (INPATIENT)
Facility: MEDICAL CENTER | Age: 82
LOS: 2 days | DRG: 065 | End: 2017-12-13
Attending: EMERGENCY MEDICINE | Admitting: INTERNAL MEDICINE
Payer: MEDICARE

## 2017-12-11 ENCOUNTER — RESOLUTE PROFESSIONAL BILLING HOSPITAL PROF FEE (OUTPATIENT)
Dept: HOSPITALIST | Facility: MEDICAL CENTER | Age: 82
End: 2017-12-11
Payer: MEDICARE

## 2017-12-11 DIAGNOSIS — I77.1 SUBCLAVIAN ARTERY STENOSIS, LEFT (HCC): ICD-10-CM

## 2017-12-11 DIAGNOSIS — E78.49 OTHER HYPERLIPIDEMIA: ICD-10-CM

## 2017-12-11 DIAGNOSIS — I10 HYPERTENSION, UNSPECIFIED TYPE: ICD-10-CM

## 2017-12-11 DIAGNOSIS — I63.013 CEREBROVASCULAR ACCIDENT (CVA) DUE TO BILATERAL THROMBOSIS OF VERTEBRAL ARTERIES (HCC): ICD-10-CM

## 2017-12-11 DIAGNOSIS — I73.9 PERIPHERAL VASCULAR DISEASE (HCC): ICD-10-CM

## 2017-12-11 DIAGNOSIS — J42 CHRONIC BRONCHITIS, UNSPECIFIED CHRONIC BRONCHITIS TYPE (HCC): ICD-10-CM

## 2017-12-11 DIAGNOSIS — I63.89 CEREBROVASCULAR ACCIDENT (CVA) DUE TO OTHER MECHANISM (HCC): ICD-10-CM

## 2017-12-11 DIAGNOSIS — I63.9 CEREBROVASCULAR ACCIDENT (CVA), UNSPECIFIED MECHANISM (HCC): ICD-10-CM

## 2017-12-11 LAB
ALBUMIN SERPL BCP-MCNC: 3.6 G/DL (ref 3.2–4.9)
ALBUMIN/GLOB SERPL: 1.1 G/DL
ALP SERPL-CCNC: 56 U/L (ref 30–99)
ALT SERPL-CCNC: 36 U/L (ref 2–50)
ANION GAP SERPL CALC-SCNC: 8 MMOL/L (ref 0–11.9)
APTT PPP: 32.1 SEC (ref 24.7–36)
AST SERPL-CCNC: 31 U/L (ref 12–45)
BASOPHILS # BLD AUTO: 0.4 % (ref 0–1.8)
BASOPHILS # BLD: 0.03 K/UL (ref 0–0.12)
BILIRUB SERPL-MCNC: 0.6 MG/DL (ref 0.1–1.5)
BUN SERPL-MCNC: 50 MG/DL (ref 8–22)
CALCIUM SERPL-MCNC: 9.5 MG/DL (ref 8.5–10.5)
CHLORIDE SERPL-SCNC: 113 MMOL/L (ref 96–112)
CO2 SERPL-SCNC: 17 MMOL/L (ref 20–33)
CREAT SERPL-MCNC: 2.4 MG/DL (ref 0.5–1.4)
EOSINOPHIL # BLD AUTO: 0.14 K/UL (ref 0–0.51)
EOSINOPHIL NFR BLD: 2 % (ref 0–6.9)
ERYTHROCYTE [DISTWIDTH] IN BLOOD BY AUTOMATED COUNT: 46.9 FL (ref 35.9–50)
GFR SERPL CREATININE-BSD FRML MDRD: 26 ML/MIN/1.73 M 2
GLOBULIN SER CALC-MCNC: 3.2 G/DL (ref 1.9–3.5)
GLUCOSE SERPL-MCNC: 97 MG/DL (ref 65–99)
HCT VFR BLD AUTO: 36.1 % (ref 42–52)
HGB BLD-MCNC: 11.9 G/DL (ref 14–18)
IMM GRANULOCYTES # BLD AUTO: 0.03 K/UL (ref 0–0.11)
IMM GRANULOCYTES NFR BLD AUTO: 0.4 % (ref 0–0.9)
INR PPP: 1.14 (ref 0.87–1.13)
LYMPHOCYTES # BLD AUTO: 0.86 K/UL (ref 1–4.8)
LYMPHOCYTES NFR BLD: 12.3 % (ref 22–41)
MCH RBC QN AUTO: 30 PG (ref 27–33)
MCHC RBC AUTO-ENTMCNC: 33 G/DL (ref 33.7–35.3)
MCV RBC AUTO: 90.9 FL (ref 81.4–97.8)
MONOCYTES # BLD AUTO: 0.42 K/UL (ref 0–0.85)
MONOCYTES NFR BLD AUTO: 6 % (ref 0–13.4)
NEUTROPHILS # BLD AUTO: 5.53 K/UL (ref 1.82–7.42)
NEUTROPHILS NFR BLD: 78.9 % (ref 44–72)
NRBC # BLD AUTO: 0 K/UL
NRBC BLD AUTO-RTO: 0 /100 WBC
PLATELET # BLD AUTO: 118 K/UL (ref 164–446)
PMV BLD AUTO: 11.6 FL (ref 9–12.9)
POTASSIUM SERPL-SCNC: 5.3 MMOL/L (ref 3.6–5.5)
PROT SERPL-MCNC: 6.8 G/DL (ref 6–8.2)
PROTHROMBIN TIME: 14.3 SEC (ref 12–14.6)
RBC # BLD AUTO: 3.97 M/UL (ref 4.7–6.1)
SODIUM SERPL-SCNC: 138 MMOL/L (ref 135–145)
TROPONIN I SERPL-MCNC: <0.01 NG/ML (ref 0–0.04)
WBC # BLD AUTO: 7 K/UL (ref 4.8–10.8)

## 2017-12-11 PROCEDURE — 93005 ELECTROCARDIOGRAM TRACING: CPT | Performed by: EMERGENCY MEDICINE

## 2017-12-11 PROCEDURE — 85610 PROTHROMBIN TIME: CPT

## 2017-12-11 PROCEDURE — A9270 NON-COVERED ITEM OR SERVICE: HCPCS | Performed by: INTERNAL MEDICINE

## 2017-12-11 PROCEDURE — 700117 HCHG RX CONTRAST REV CODE 255: Performed by: EMERGENCY MEDICINE

## 2017-12-11 PROCEDURE — 70498 CT ANGIOGRAPHY NECK: CPT

## 2017-12-11 PROCEDURE — 80053 COMPREHEN METABOLIC PANEL: CPT

## 2017-12-11 PROCEDURE — 85730 THROMBOPLASTIN TIME PARTIAL: CPT

## 2017-12-11 PROCEDURE — 70450 CT HEAD/BRAIN W/O DYE: CPT

## 2017-12-11 PROCEDURE — 99223 1ST HOSP IP/OBS HIGH 75: CPT | Performed by: INTERNAL MEDICINE

## 2017-12-11 PROCEDURE — 0042T CT-CEREBRAL PERFUSION ANALYSIS: CPT

## 2017-12-11 PROCEDURE — 770020 HCHG ROOM/CARE - TELE (206)

## 2017-12-11 PROCEDURE — 99285 EMERGENCY DEPT VISIT HI MDM: CPT

## 2017-12-11 PROCEDURE — 83036 HEMOGLOBIN GLYCOSYLATED A1C: CPT

## 2017-12-11 PROCEDURE — 70496 CT ANGIOGRAPHY HEAD: CPT

## 2017-12-11 PROCEDURE — A9270 NON-COVERED ITEM OR SERVICE: HCPCS | Performed by: SPECIALIST

## 2017-12-11 PROCEDURE — 85025 COMPLETE CBC W/AUTO DIFF WBC: CPT

## 2017-12-11 PROCEDURE — 84484 ASSAY OF TROPONIN QUANT: CPT

## 2017-12-11 PROCEDURE — 700102 HCHG RX REV CODE 250 W/ 637 OVERRIDE(OP): Performed by: INTERNAL MEDICINE

## 2017-12-11 PROCEDURE — 700102 HCHG RX REV CODE 250 W/ 637 OVERRIDE(OP): Performed by: SPECIALIST

## 2017-12-11 PROCEDURE — 71010 DX-CHEST-PORTABLE (1 VIEW): CPT

## 2017-12-11 RX ORDER — ATORVASTATIN CALCIUM 10 MG/1
10 TABLET, FILM COATED ORAL EVERY EVENING
COMMUNITY
End: 2018-01-04 | Stop reason: ALTCHOICE

## 2017-12-11 RX ORDER — TIOTROPIUM BROMIDE 18 UG/1
1 CAPSULE ORAL; RESPIRATORY (INHALATION)
Status: DISCONTINUED | OUTPATIENT
Start: 2017-12-12 | End: 2017-12-13 | Stop reason: HOSPADM

## 2017-12-11 RX ORDER — ASPIRIN 81 MG/1
324 TABLET, CHEWABLE ORAL DAILY
Status: DISCONTINUED | OUTPATIENT
Start: 2017-12-11 | End: 2017-12-13 | Stop reason: HOSPADM

## 2017-12-11 RX ORDER — ASPIRIN 325 MG
325 TABLET ORAL DAILY
Status: DISCONTINUED | OUTPATIENT
Start: 2017-12-11 | End: 2017-12-13 | Stop reason: HOSPADM

## 2017-12-11 RX ORDER — POLYETHYLENE GLYCOL 3350 17 G/17G
1 POWDER, FOR SOLUTION ORAL
Status: DISCONTINUED | OUTPATIENT
Start: 2017-12-11 | End: 2017-12-13 | Stop reason: HOSPADM

## 2017-12-11 RX ORDER — HEPARIN SODIUM 5000 [USP'U]/ML
5000 INJECTION, SOLUTION INTRAVENOUS; SUBCUTANEOUS EVERY 12 HOURS
Status: DISCONTINUED | OUTPATIENT
Start: 2017-12-12 | End: 2017-12-12

## 2017-12-11 RX ORDER — AMOXICILLIN 250 MG
2 CAPSULE ORAL 2 TIMES DAILY
Status: DISCONTINUED | OUTPATIENT
Start: 2017-12-11 | End: 2017-12-13 | Stop reason: HOSPADM

## 2017-12-11 RX ORDER — TERAZOSIN 10 MG/1
10 CAPSULE ORAL NIGHTLY
COMMUNITY
End: 2018-06-12 | Stop reason: SDUPTHER

## 2017-12-11 RX ORDER — FINASTERIDE 5 MG/1
5 TABLET, FILM COATED ORAL DAILY
Status: DISCONTINUED | OUTPATIENT
Start: 2017-12-12 | End: 2017-12-13 | Stop reason: HOSPADM

## 2017-12-11 RX ORDER — ALLOPURINOL 100 MG/1
100 TABLET ORAL DAILY
Status: DISCONTINUED | OUTPATIENT
Start: 2017-12-12 | End: 2017-12-13 | Stop reason: HOSPADM

## 2017-12-11 RX ORDER — ASPIRIN 300 MG/1
300 SUPPOSITORY RECTAL DAILY
Status: DISCONTINUED | OUTPATIENT
Start: 2017-12-11 | End: 2017-12-13 | Stop reason: HOSPADM

## 2017-12-11 RX ORDER — CLOPIDOGREL BISULFATE 75 MG/1
75 TABLET ORAL EVERY MORNING
Status: DISCONTINUED | OUTPATIENT
Start: 2017-12-12 | End: 2017-12-13 | Stop reason: HOSPADM

## 2017-12-11 RX ORDER — ATORVASTATIN CALCIUM 10 MG/1
10 TABLET, FILM COATED ORAL EVERY EVENING
Status: DISCONTINUED | OUTPATIENT
Start: 2017-12-11 | End: 2017-12-13 | Stop reason: HOSPADM

## 2017-12-11 RX ORDER — BISACODYL 10 MG
10 SUPPOSITORY, RECTAL RECTAL
Status: DISCONTINUED | OUTPATIENT
Start: 2017-12-11 | End: 2017-12-13 | Stop reason: HOSPADM

## 2017-12-11 RX ORDER — FINASTERIDE 5 MG/1
5 TABLET, FILM COATED ORAL DAILY
COMMUNITY
End: 2020-01-01

## 2017-12-11 RX ORDER — FERROUS SULFATE 325(65) MG
325 TABLET ORAL EVERY MORNING
Status: DISCONTINUED | OUTPATIENT
Start: 2017-12-12 | End: 2017-12-13 | Stop reason: HOSPADM

## 2017-12-11 RX ADMIN — ATORVASTATIN CALCIUM 10 MG: 10 TABLET, FILM COATED ORAL at 21:59

## 2017-12-11 RX ADMIN — IOHEXOL: 350 INJECTION, SOLUTION INTRAVENOUS at 18:03

## 2017-12-11 RX ADMIN — ASPIRIN 325 MG: 325 TABLET, COATED ORAL at 21:58

## 2017-12-11 RX ADMIN — IOHEXOL: 350 INJECTION, SOLUTION INTRAVENOUS at 18:05

## 2017-12-11 ASSESSMENT — COPD QUESTIONNAIRES
DO YOU EVER COUGH UP ANY MUCUS OR PHLEGM?: NO/ONLY WITH OCCASIONAL COLDS OR INFECTIONS
COPD SCREENING SCORE: 2
DURING THE PAST 4 WEEKS HOW MUCH DID YOU FEEL SHORT OF BREATH: NONE/LITTLE OF THE TIME
HAVE YOU SMOKED AT LEAST 100 CIGARETTES IN YOUR ENTIRE LIFE: NO/DON'T KNOW

## 2017-12-11 ASSESSMENT — PATIENT HEALTH QUESTIONNAIRE - PHQ9
SUM OF ALL RESPONSES TO PHQ9 QUESTIONS 1 AND 2: 0
2. FEELING DOWN, DEPRESSED, IRRITABLE, OR HOPELESS: NOT AT ALL
SUM OF ALL RESPONSES TO PHQ QUESTIONS 1-9: 0
1. LITTLE INTEREST OR PLEASURE IN DOING THINGS: NOT AT ALL

## 2017-12-11 ASSESSMENT — PAIN SCALES - GENERAL
PAINLEVEL_OUTOF10: 0

## 2017-12-11 ASSESSMENT — COGNITIVE AND FUNCTIONAL STATUS - GENERAL
CLIMB 3 TO 5 STEPS WITH RAILING: TOTAL
STANDING UP FROM CHAIR USING ARMS: TOTAL
SUGGESTED CMS G CODE MODIFIER MOBILITY: CL
HELP NEEDED FOR BATHING: A LOT
SUGGESTED CMS G CODE MODIFIER DAILY ACTIVITY: CK
WALKING IN HOSPITAL ROOM: TOTAL
DRESSING REGULAR LOWER BODY CLOTHING: A LITTLE
MOBILITY SCORE: 11
DRESSING REGULAR UPPER BODY CLOTHING: A LITTLE
EATING MEALS: A LITTLE
DAILY ACTIVITIY SCORE: 16
MOVING FROM LYING ON BACK TO SITTING ON SIDE OF FLAT BED: A LOT
PERSONAL GROOMING: A LOT
MOVING TO AND FROM BED TO CHAIR: A LITTLE
TOILETING: A LITTLE
TURNING FROM BACK TO SIDE WHILE IN FLAT BAD: A LITTLE

## 2017-12-11 ASSESSMENT — LIFESTYLE VARIABLES
ALCOHOL_USE: NO
EVER_SMOKED: NEVER
EVER_SMOKED: YES

## 2017-12-11 NOTE — ED NOTES
Pt presents to ED with CC of stroke like symptoms. Per pt he woke up this morning with right sided weakness, slurring and right facial drooping. EMS was called, at that time pt was feeling better.  This afternoon pt was taking a nap and woke up with same symptoms as this morning.    Pt is AOx4, slurred speech, weakness on right side and right facial drooping.  Family at bedside.

## 2017-12-12 ENCOUNTER — APPOINTMENT (OUTPATIENT)
Dept: RADIOLOGY | Facility: MEDICAL CENTER | Age: 82
DRG: 065 | End: 2017-12-12
Attending: SPECIALIST
Payer: MEDICARE

## 2017-12-12 ENCOUNTER — APPOINTMENT (OUTPATIENT)
Dept: RADIATION ONCOLOGY | Facility: MEDICAL CENTER | Age: 82
End: 2017-12-12
Attending: RADIOLOGY
Payer: MEDICARE

## 2017-12-12 PROBLEM — C34.90 LUNG CANCER (HCC): Status: ACTIVE | Noted: 2017-12-12

## 2017-12-12 PROBLEM — I42.9 CARDIOMYOPATHY (HCC): Status: ACTIVE | Noted: 2017-12-12

## 2017-12-12 PROBLEM — D69.6 THROMBOCYTOPENIA (HCC): Status: ACTIVE | Noted: 2017-12-12

## 2017-12-12 PROBLEM — I50.42 CHRONIC COMBINED SYSTOLIC AND DIASTOLIC CHF (CONGESTIVE HEART FAILURE) (HCC): Status: ACTIVE | Noted: 2017-12-12

## 2017-12-12 PROBLEM — E78.5 HYPERLIPIDEMIA: Status: ACTIVE | Noted: 2017-12-12

## 2017-12-12 PROBLEM — N18.4 CKD (CHRONIC KIDNEY DISEASE) STAGE 4, GFR 15-29 ML/MIN (HCC): Status: ACTIVE | Noted: 2017-12-12

## 2017-12-12 PROBLEM — I69.391 DYSPHAGIA DUE TO RECENT CEREBROVASCULAR ACCIDENT (CVA): Status: ACTIVE | Noted: 2017-12-12

## 2017-12-12 LAB
ANION GAP SERPL CALC-SCNC: 8 MMOL/L (ref 0–11.9)
BUN SERPL-MCNC: 48 MG/DL (ref 8–22)
CALCIUM SERPL-MCNC: 8.6 MG/DL (ref 8.5–10.5)
CHLORIDE SERPL-SCNC: 113 MMOL/L (ref 96–112)
CHOLEST SERPL-MCNC: 91 MG/DL (ref 100–199)
CO2 SERPL-SCNC: 16 MMOL/L (ref 20–33)
CREAT SERPL-MCNC: 2.4 MG/DL (ref 0.5–1.4)
ERYTHROCYTE [DISTWIDTH] IN BLOOD BY AUTOMATED COUNT: 46.2 FL (ref 35.9–50)
EST. AVERAGE GLUCOSE BLD GHB EST-MCNC: 131 MG/DL
GFR SERPL CREATININE-BSD FRML MDRD: 26 ML/MIN/1.73 M 2
GLUCOSE SERPL-MCNC: 163 MG/DL (ref 65–99)
HBA1C MFR BLD: 6.2 % (ref 0–5.6)
HCT VFR BLD AUTO: 33.2 % (ref 42–52)
HDLC SERPL-MCNC: 30 MG/DL
HGB BLD-MCNC: 11.3 G/DL (ref 14–18)
LDLC SERPL CALC-MCNC: 37 MG/DL
LV EJECT FRACT  99904: 45
LV EJECT FRACT MOD 2C 99903: 40.42
LV EJECT FRACT MOD 4C 99902: 52.87
LV EJECT FRACT MOD BP 99901: 47.15
MCH RBC QN AUTO: 30.7 PG (ref 27–33)
MCHC RBC AUTO-ENTMCNC: 34 G/DL (ref 33.7–35.3)
MCV RBC AUTO: 90.2 FL (ref 81.4–97.8)
PLATELET # BLD AUTO: 98 K/UL (ref 164–446)
PMV BLD AUTO: 10.9 FL (ref 9–12.9)
POTASSIUM SERPL-SCNC: 4.4 MMOL/L (ref 3.6–5.5)
RBC # BLD AUTO: 3.68 M/UL (ref 4.7–6.1)
SODIUM SERPL-SCNC: 137 MMOL/L (ref 135–145)
TRIGL SERPL-MCNC: 121 MG/DL (ref 0–149)
WBC # BLD AUTO: 5.6 K/UL (ref 4.8–10.8)

## 2017-12-12 PROCEDURE — 700102 HCHG RX REV CODE 250 W/ 637 OVERRIDE(OP): Performed by: SPECIALIST

## 2017-12-12 PROCEDURE — G8997 SWALLOW GOAL STATUS: HCPCS | Mod: CH

## 2017-12-12 PROCEDURE — 92610 EVALUATE SWALLOWING FUNCTION: CPT

## 2017-12-12 PROCEDURE — A9270 NON-COVERED ITEM OR SERVICE: HCPCS | Performed by: SPECIALIST

## 2017-12-12 PROCEDURE — G8978 MOBILITY CURRENT STATUS: HCPCS | Mod: CK

## 2017-12-12 PROCEDURE — 93306 TTE W/DOPPLER COMPLETE: CPT

## 2017-12-12 PROCEDURE — G8996 SWALLOW CURRENT STATUS: HCPCS | Mod: CJ

## 2017-12-12 PROCEDURE — G8988 SELF CARE GOAL STATUS: HCPCS | Mod: CI

## 2017-12-12 PROCEDURE — 99223 1ST HOSP IP/OBS HIGH 75: CPT | Performed by: RADIOLOGY

## 2017-12-12 PROCEDURE — 97162 PT EVAL MOD COMPLEX 30 MIN: CPT

## 2017-12-12 PROCEDURE — G8979 MOBILITY GOAL STATUS: HCPCS | Mod: CI

## 2017-12-12 PROCEDURE — 80048 BASIC METABOLIC PNL TOTAL CA: CPT

## 2017-12-12 PROCEDURE — 36415 COLL VENOUS BLD VENIPUNCTURE: CPT

## 2017-12-12 PROCEDURE — G8987 SELF CARE CURRENT STATUS: HCPCS | Mod: CK

## 2017-12-12 PROCEDURE — 700102 HCHG RX REV CODE 250 W/ 637 OVERRIDE(OP): Performed by: INTERNAL MEDICINE

## 2017-12-12 PROCEDURE — 302128 INFUSION PUMP W/POLE: Performed by: FAMILY MEDICINE

## 2017-12-12 PROCEDURE — 70551 MRI BRAIN STEM W/O DYE: CPT

## 2017-12-12 PROCEDURE — A9270 NON-COVERED ITEM OR SERVICE: HCPCS | Performed by: INTERNAL MEDICINE

## 2017-12-12 PROCEDURE — 93306 TTE W/DOPPLER COMPLETE: CPT | Mod: 26 | Performed by: INTERNAL MEDICINE

## 2017-12-12 PROCEDURE — 97166 OT EVAL MOD COMPLEX 45 MIN: CPT

## 2017-12-12 PROCEDURE — 770020 HCHG ROOM/CARE - TELE (206)

## 2017-12-12 PROCEDURE — 99233 SBSQ HOSP IP/OBS HIGH 50: CPT | Performed by: FAMILY MEDICINE

## 2017-12-12 PROCEDURE — 85027 COMPLETE CBC AUTOMATED: CPT

## 2017-12-12 PROCEDURE — 80061 LIPID PANEL: CPT

## 2017-12-12 RX ADMIN — ALLOPURINOL 100 MG: 100 TABLET ORAL at 09:35

## 2017-12-12 RX ADMIN — CLOPIDOGREL 75 MG: 75 TABLET, FILM COATED ORAL at 09:35

## 2017-12-12 RX ADMIN — ATORVASTATIN CALCIUM 10 MG: 10 TABLET, FILM COATED ORAL at 21:21

## 2017-12-12 RX ADMIN — VITAMIN D, TAB 1000IU (100/BT) 4000 UNITS: 25 TAB at 09:35

## 2017-12-12 RX ADMIN — ASPIRIN 325 MG: 325 TABLET, COATED ORAL at 09:33

## 2017-12-12 RX ADMIN — FINASTERIDE 5 MG: 5 TABLET, FILM COATED ORAL at 09:35

## 2017-12-12 RX ADMIN — Medication 325 MG: at 09:35

## 2017-12-12 ASSESSMENT — ENCOUNTER SYMPTOMS
BLURRED VISION: 0
WEAKNESS: 1
FOCAL WEAKNESS: 1
NAUSEA: 0
CHILLS: 0
FEVER: 0
ABDOMINAL PAIN: 0
SORE THROAT: 0
SHORTNESS OF BREATH: 0
COUGH: 0
DIARRHEA: 0
WHEEZING: 0
SPEECH CHANGE: 1
VOMITING: 0
HEARTBURN: 0
DIZZINESS: 0

## 2017-12-12 ASSESSMENT — PAIN SCALES - GENERAL
PAINLEVEL_OUTOF10: 0
PAINLEVEL_OUTOF10: 0

## 2017-12-12 ASSESSMENT — COGNITIVE AND FUNCTIONAL STATUS - GENERAL
STANDING UP FROM CHAIR USING ARMS: A LITTLE
SUGGESTED CMS G CODE MODIFIER MOBILITY: CK
DAILY ACTIVITIY SCORE: 16
DRESSING REGULAR UPPER BODY CLOTHING: A LOT
SUGGESTED CMS G CODE MODIFIER DAILY ACTIVITY: CK
MOVING FROM LYING ON BACK TO SITTING ON SIDE OF FLAT BED: UNABLE
TOILETING: A LITTLE
EATING MEALS: A LITTLE
HELP NEEDED FOR BATHING: A LITTLE
WALKING IN HOSPITAL ROOM: A LITTLE
PERSONAL GROOMING: A LITTLE
MOBILITY SCORE: 17
CLIMB 3 TO 5 STEPS WITH RAILING: A LOT
DRESSING REGULAR LOWER BODY CLOTHING: A LOT

## 2017-12-12 ASSESSMENT — GAIT ASSESSMENTS
DISTANCE (FEET): 150
ASSISTIVE DEVICE: HAND HELD ASSIST
DEVIATION: ATAXIC;DECREASED BASE OF SUPPORT
GAIT LEVEL OF ASSIST: MINIMAL ASSIST

## 2017-12-12 ASSESSMENT — ACTIVITIES OF DAILY LIVING (ADL): TOILETING: INDEPENDENT

## 2017-12-12 NOTE — DISCHARGE PLANNING
New stroke with ongoing medical management as well anticipate therapy need. Please consider PT/OT evaluations as medically appropriate. Please consider a PMR referral to assist with discharge planning.

## 2017-12-12 NOTE — ED PROVIDER NOTES
"ED Provider Note    CHIEF COMPLAINT  Chief Complaint   Patient presents with   • Possible Stroke       GIANNA Harden is a 84 y.o. male who presentsTo the emergency department complaining of right-sided weakness and difficulty speaking. The patient has had several episodes today the 1st was at 8 AM but he went back to bed and he feels that the symptoms resolved. Symptoms returned around noon today and then once again resolved spontaneously and finally returned again at around 2:30 in the afternoon with weakness of the right arm and the right leg and right sided facial droop and slurring of the speech. The patient also recently had lung biopsy 10 date is ago on the right side and was diagnosed with adenocarcinoma and the patient tells me that the cancer is \"metastatic\" but I don't know the extent of the metastases. The patient also has a history of GI bleeding secondary to AVM of the colon but his wife says it's been a couple of years since he has had any bleeding. The patient takes Plavix.    REVIEW OF SYSTEMS no chest pain no difficulty breathing no left side involvement. All other systems negative    PAST MEDICAL HISTORY  Past Medical History:   Diagnosis Date   • AVM (arteriovenous malformation) of colon    • Back pain    • Chickenpox    • Chronic airway obstruction, not elsewhere classified    • Congestive heart failure (CMS-[facility])    • Diabetes    • Diphtheria    • Emphysema of lung (CMS-[facility])    • Hypercholesteremia    • Hypertension    • Infectious disease    • Other and unspecified angina pectoris    • PVD (peripheral vascular disease) (CMS-[facility])    • Snoring    • Stroke (CMS-[facility]) 2015   • Unspecified cataract     right eye    • Urinary bladder disorder    • Urinary incontinence        FAMILY HISTORY  Family History   Problem Relation Age of Onset   • Non-contributory Neg Hx      \"unknown\"       SOCIAL HISTORY  Social History     Social History   • Marital status: Single     Spouse name: N/A   • Number of " children: N/A   • Years of education: N/A     Social History Main Topics   • Smoking status: Former Smoker     Packs/day: 2.00     Years: 40.00     Quit date: 1/1/1982   • Smokeless tobacco: Never Used      Comment: Hx smoking x 40 yrs. Quit 1983   • Alcohol use No   • Drug use: No   • Sexual activity: Not on file     Other Topics Concern   • Not on file     Social History Narrative   • No narrative on file       SURGICAL HISTORY  Past Surgical History:   Procedure Laterality Date   • COLONOSCOPY  6/17/2015    Procedure: COLONOSCOPY;  Surgeon: Eddie Levin M.D.;  Location: SURGERY [facility];  Service:    • GASTROSCOPY  6/17/2015    Procedure: GASTROSCOPY W/APC;  Surgeon: Eddie Levin M.D.;  Location: SURGERY [facility];  Service:    • GASTROSCOPY WITH APC  11/1/2014    Performed by Eddie Levin M.D. at ENDOSCOPY [facility]   • COLONOSCOPY WITH APC  11/1/2014    Performed by Eddie Levin M.D. at ENDOSCOPY [facility]   • GASTROSCOPY-ENDO  5/1/2011    Performed by HOLLY VASQUEZ at ENDOSCOPY [facility]   • COLONOSCOPY - ENDO  5/1/2011    Performed by HOLLY VASQUEZ at ENDOSCOPY [facility]   • COLONOSCOPY WITH APC  4/21/2011    Performed by EDMUND CHENG at [facility]   • GASTROSCOPY-ENDO  4/21/2011    Performed by EDMUND CHENG at SURGERY [facility]   • COLONOSCOPY WITH APC  9/28/2010    Performed by EDMUND CHENG at [facility]   • GASTROSCOPY WITH APC  9/28/2010    Performed by EDMUND CHENG at SURGERY [facility]   • AORTOFEMORAL BYPASS  1991    Both legs       CURRENT MEDICATIONS  Home Medications     Reviewed by Yg Deras (Pharmacy Tech) on 12/11/17 at 1828  Med List Status: Complete   Medication Last Dose Status   allopurinol (ZYLOPRIM) 100 MG TABS 12/11/2017 Active   atorvastatin (LIPITOR) 10 MG Tab 12/10/2017 Active   carvedilol (COREG) 12.5 MG Tab 12/11/2017  "Active   Cholecalciferol (VITAMIN D) 2000 UNIT TABS 12/11/2017 Active   clopidogrel (PLAVIX) 75 MG Tab 12/11/2017 Active   enalapril (VASOTEC) 10 MG Tab 12/11/2017 Active   Ferrous Sulfate (IRON) 325 (65 FE) MG TABS 12/11/2017 Active   finasteride (PROSCAR) 5 MG Tab 12/11/2017 Active   furosemide (LASIX) 20 MG Tab 11/27/2017 Active   tamsulosin (FLOMAX) 0.4 MG capsule 12/11/2017 Active   terazosin (HYTRIN) 10 MG capsule 12/10/2017 Active   Tiotropium Bromide Monohydrate (SPIRIVA HANDIHALER INH) 12/11/2017 Active                ALLERGIES  Allergies   Allergen Reactions   • Nkda [No Known Drug Allergy]        PHYSICAL EXAM  VITAL SIGNS: BP (!) 210/80 Comment: manual; rn notified  Pulse (!) 59   Temp 36.1 °C (96.9 °F)   Resp 16   Ht 1.753 m (5' 9\")   Wt 80.4 kg (177 lb 4 oz)   SpO2 97%   BMI 26.18 kg/m²    Oxygen saturation is interpreted asAdequate  Constitutional: Awake. He does not appear distressed or toxic  HENT: Mucous membranes are moist and throat clear  Eyes: Pupils round extraocular motion present  Neck: Trachea midline no JVD  Cardiovascular: Regular rate and rhythm  Lungs: Clear and equal bilaterally  Abdomen/Back: Soft nontender nondistended no peritoneal findings  Skin: Warm and dry  Musculoskeletal: No acute bony deformity  Neurologic: The patient is awake and verbal he is having some mild dysarthria. He has a slight right-sided facial droop at the corner of the mouth. The patient has generalized weakness of the right arm he is able to lift it up in the air but cannot keep it in the air and similar findings of the right leg.    Laboratory  A CBC shows white blood count of 7.0 hemoglobin is adequate 11.9, troponin is normal INR is 1.14, complete metabolic panel shows a slightly low bicarb of 17 with an elevated BUN of 50 and elevated creatinine of 2.40    EKG interpretation  A 12-lead EKG shows sinus rhythm 75 bpm there is wide QRS complex consistent with left bundle branch block pattern findings " are similar to a cardiogram from April 27, 2017    Radiology  CT-CTA HEAD WITH & W/O-POST PROCESS   Final Result      No evidence of large vessel occlusion.      Coarse calcified plaque in bilateral intracranial internal carotid arteries      DX-CHEST-PORTABLE (1 VIEW)   Final Result         No acute cardiac or pulmonary abnormality is identified.      CT-CTA NECK WITH & W/O-POST PROCESSING   Final Result      1.  Significant calcified atherosclerotic plaque is noted in the aorta and its branch vessels.      2.  50% to 70% diameter reduction stenosis noted in the internal carotid arteries bilaterally.      3.  90% diameter reduction stenosis in the left subclavian artery.      CT-CEREBRAL PERFUSION ANALYSIS   Final Result      1.  CT perfusion examination over the limited section of brain reveals 0.0 mL of brain parenchyma has less than 30% of cerebral blood flow (CBF).      2.  Please note that the cerebral perfusion was performed on the limited brain tissue around the basal ganglia region. Infarct/ischemia outside the CT perfusion sections can be missed in this study.      CT-HEAD W/O   Final Result         NO ACUTE ABNORMALITIES ARE NOTED ON CT SCAN OF THE HEAD.      Findings are consistent with atrophy.  Decreased attenuation in the periventricular white matter likely indicates microvascular ischemic disease.      Echocardiogram Comp W/O cont    (Results Pending)   MR-BRAIN-W/O    (Results Pending)     MEDICAL DECISION MAKING and DISPOSITION  The patient was made a stroke alert patient. The patient was seen immediately by Dr. Kong. The patient does have symptoms of stroke although the timing of the symptoms is variable he is given a slightly different timeframe to nursing staff and to me. In addition the patient has multiple comorbidities and is therefore not a candidate for thrombolytic therapy. The patient will be admitted to the hospital for further care of reviewed the case with the  hospitalist.    IMPRESSION  1. CVA      Electronically signed by: Cornell Gonzalez, 12/11/2017 10:14 PM

## 2017-12-12 NOTE — CONSULTS
DATE OF SERVICE:  12/11/2017    CHIEF COMPLAINT:  Code stroke.    HISTORY OF PRESENT ILLNESS:  I am asked by Dr. Cornell Gonzalez in [facility] Emergency   Room to see the patient, an 84-year-old gentleman who was brought in by   paramedics earlier today.  The patient awakened and felt fine.  At about 8:30,   he developed difficulty manipulating with his right hand when he was using   computer.  He went to sleep and woke up at 10:30 and felt normal.  Following   that, he again developed right-sided weakness.  He again went to sleep and   awakened about 2:30, feeling as if his right arm and leg were weak.  His   speech was slurred according to his family and he presented to the emergency   room.  CT brain scan by my unofficial reading does not show an acute   hemorrhage or an acute stroke.    The patient states that he recently had a lung biopsy on November 27 and was   diagnosed with adenocarcinoma of the lung based on a 2.8 cm peripheral nodule   in the right lower lobe.    PAST MEDICAL HISTORY:  1.  Recent diagnosis of adenocarcinoma of the lung.  2.  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  3.  Chronic obstructive pulmonary disease.  4.  Hypertension.  5.  History of congestive heart failure.  6.  Peripheral vascular disease, status post endoscopic vascular procedures.  7.  Type 2 diabetes in the past.    MEDICATIONS:  Flomax, Coreg, Proscar, Lasix, Plavix, Vasotec, Zyloprim,   Lipitor, iron, Hytrin.    ALLERGIES:  NKDA.    SOCIAL HISTORY:  He is an ex-smoker.    FAMILY HISTORY:  Noncontributory.    REVIEW OF SYSTEMS:  As above.    PHYSICAL EXAMINATION:  VITAL SIGNS:  Blood pressure 163/66.  GENERAL:  Patient is a cooperative gentleman who is alert.  His speech is   fluent, but dysarthric.  His mental status is grossly intact.  HEENT:  Pupils to be equal, round, reactive.  EOMs are full.  Visual fields   are full on confrontation testing.  NECK:  Supple.  NEUROLOGIC:  He has a right hemiparesis.  He has weakness of  the arm and the   leg.  Left side is intact.  He has symmetric sensation.  Reflexes are slightly   increased on the left compared to the right with a right upgoing and left   downgoing toe.    IMPRESSION:  The patient is an 84-year-old gentleman whom I am asked to see by   Dr. Cornell Gonzalez in [facility] Emergency Room.  The patient developed a right   hemiparesis, which was fluctuating earlier today.  The patient at the   emergency room has an NIH stroke scale of approximately 5.  This may be a   small vessel event.  He is not a tissue plasminogen activator, i.e. alteplase   candidate due to the fact that he has a recent diagnosis of adenocarcinoma of   the lung and he has had a clonic hemorrhage from an AVM of the colon in the   past.  He may be a candidate for thrombectomy if he has a penumbra and a clot.    His CT angiogram is pending.  He will be admitted to the stroke unit and   monitored.  He will be started on antiplatelet drugs, I started him on aspirin   on this occasional, although he was on Plavix before.    Thank you for this consultation.       ____________________________________     G MD TIMO YARBROUGH / DILLON    DD:  12/11/2017 17:54:53  DT:  12/11/2017 20:55:57    D#:  [id number]  Job#:  [id number]

## 2017-12-12 NOTE — ED NOTES
Patient to red 12. Agree with triage note. Slurred speech and right side weakenss noted. JEAN PAUL. ERP to see.

## 2017-12-12 NOTE — DISCHARGE PLANNING
Aware of PMR referral from Dr. Spicer. Pt currently undergoing medical workup for CVA. MRI pending today. TCC will follow for MRI results and Radiation Oncology plan. No Physiatry consult per protocol at this time. Will monitor for imaging results.

## 2017-12-12 NOTE — RESPIRATORY CARE
COPD EDUCATION by COPD CLINICAL EDUCATOR  12/12/2017 at 7:38 AM by Desiree Glasgow     Patient reviewed by COPD education team. Patient does not qualify for COPD program at this time

## 2017-12-12 NOTE — DISCHARGE PLANNING
Transitional Care Navigator:    Pt is appropriate candidate for acute rehabilitation therapies.  Order obtained for rehab consult.  SW is aware.

## 2017-12-12 NOTE — PROGRESS NOTES
2 RN skin check completed with ESTRADA Francois.    Generalized fragile skin.  Bilat arm bruising along forearms and hands.  Red and blanching ears (pt wears glasses and is on room air).  Bilat old scars on inner thighs.  Bilat calloused, dry, and flaky feet.

## 2017-12-12 NOTE — CARE PLAN
Problem: Communication  Goal: The ability to communicate needs accurately and effectively will improve  Pt able to communicate needs effectively, no aphasia noted. Intermittently slurs speech, but still comprehensible.    Problem: Mobility  Goal: Risk for activity intolerance will decrease    Intervention: Encourage patient to increase activity level in collaboration with Interdisciplinary Team  PT/OT consult already ordered to assess for mobility and strength needs.

## 2017-12-12 NOTE — CONSULTS
RADIATION ONCOLOGY CONSULT    DATE OF SERVICE: 12/12/2017    IDENTIFICATION:   Stage IA ([id number]) non-small cell lung carcinoma, adenocarcinoma, of the right lower lobe of lung     HISTORY OF PRESENT ILLNESS: I had the pleasure of seeing Mr. Harden today in consultation at the request of Dr. Conley for his newly diagnosed lung cancer. I was originally scheduled to see Mr. Harden today in consultation as an outpatient in the radiation oncology department. Unfortunately he suffered an acute event yesterday requiring hospitalization. He had asked whether I would be able to see him as an inpatient to help facilitate his care for his known lung cancer. Patient began to experience acute right sided weakness and dysarthria. As a part of his workup thus far this event appears most consistent with a small vessel event. Notably he does have significant carotid stenosis as well. He is scheduled for an MRI of the brain later today to rule out small volume malignancy. Relating to his lung cancer however his CT scan revealed an 1.8 cm right upper lobe lung lesion as well as a 2.4 cm right lower lobe lesion. His PET scan confirmed only uptake in the right lower lobe lesion without any hilar mediastinal or right lower lobe uptake. Biopsy of the right lower lobe lesion confirmed a non-small cell lung carcinoma with adenocarcinoma histology. Even before this event it was recognized patient has a notable medical history significant for aortic stenosis, heart failure, diabetes mellitus, COPD, and tobacco abuse. Patient smoked roughly 2 packs of cigarettes per day for nearly 40 years but fortunately quit in 1984. I've been asked to see him for consideration of radiosurgery for medically inoperable early stage lung cancer.    PAST MEDICAL HISTORY:   Past Medical History:   Diagnosis Date   • AVM (arteriovenous malformation) of colon    • Back pain    • Chickenpox    • Chronic airway obstruction, not elsewhere classified    • Congestive  heart failure (CMS-HCC)    • Diabetes    • Diphtheria    • Emphysema of lung (CMS-HCC)    • Hypercholesteremia    • Hypertension    • Infectious disease    • Other and unspecified angina pectoris    • PVD (peripheral vascular disease) (CMS-HCC)    • Snoring    • Stroke (CMS-HCC) 2015   • Unspecified cataract     right eye    • Urinary bladder disorder    • Urinary incontinence        PAST SURGICAL HISTORY:  Past Surgical History:   Procedure Laterality Date   • COLONOSCOPY  6/17/2015    Procedure: COLONOSCOPY;  Surgeon: Eddie Levin M.D.;  Location: SURGERY [facility];  Service:    • GASTROSCOPY  6/17/2015    Procedure: GASTROSCOPY W/APC;  Surgeon: Eddie Levin M.D.;  Location: SURGERY [facility];  Service:    • GASTROSCOPY WITH APC  11/1/2014    Performed by Eddie Levin M.D. at ENDOSCOPY [facility]   • COLONOSCOPY WITH APC  11/1/2014    Performed by Eddie Levin M.D. at ENDOSCOPY [facility]   • GASTROSCOPY-ENDO  5/1/2011    Performed by HOLLY VASQUEZ at ENDOSCOPY [facility]   • COLONOSCOPY - ENDO  5/1/2011    Performed by HOLLY VASQUEZ at ENDOSCOPY [facility]   • COLONOSCOPY WITH APC  4/21/2011    Performed by EDMUND CHENG at [facility]   • GASTROSCOPY-ENDO  4/21/2011    Performed by EDMUND CHENG at [facility]   • COLONOSCOPY WITH APC  9/28/2010    Performed by EDMUND CHENG at SURGERY [facility]   • GASTROSCOPY WITH APC  9/28/2010    Performed by EDMUND CHENG at [facility]   • AORTOFEMORAL BYPASS  1991    Both legs       CURRENT MEDICATIONS:  Current Facility-Administered Medications   Medication Dose Route Frequency Provider Last Rate Last Dose   • aspirin (ASA) tablet 325 mg  325 mg Oral DAILY TOMMY Kong M.D.   325 mg at 12/12/17 0933    Or   • aspirin (ASA) chewable tab 324 mg  324 mg Oral DAILY TOMMY Kong M.D.        Or   • aspirin (ASA) suppository  "300 mg  300 mg Rectal DAILY TOMMY Kong M.D.       • allopurinol (ZYLOPRIM) tablet 100 mg  100 mg Oral DAILY Zachariah Prince M.D.   100 mg at 12/12/17 0935   • atorvastatin (LIPITOR) tablet 10 mg  10 mg Oral Q EVENING Zachariah Prince M.D.   10 mg at 12/11/17 2159   • vitamin D (cholecalciferol) tablet 4,000 Units  4,000 Units Oral DAILY Zachariah Prince M.D.   4,000 Units at 12/12/17 0935   • clopidogrel (PLAVIX) tablet 75 mg  75 mg Oral QAM Zachariah Prince M.D.   75 mg at 12/12/17 0935   • ferrous sulfate tablet 325 mg  325 mg Oral QAM Zachariah Prince M.D.   325 mg at 12/12/17 0935   • finasteride (PROSCAR) tablet 5 mg  5 mg Oral DAILY Zachariah Prince M.D.   5 mg at 12/12/17 0935   • senna-docusate (PERICOLACE or SENOKOT S) 8.6-50 MG per tablet 2 Tab  2 Tab Oral BID Zachariah Prince M.D.   Stopped at 12/11/17 2123    And   • polyethylene glycol/lytes (MIRALAX) PACKET 1 Packet  1 Packet Oral QDAY PRN Zachariah Prince M.D.        And   • magnesium hydroxide (MILK OF MAGNESIA) suspension 30 mL  30 mL Oral QDAY PRN Zachariah Prince M.D.        And   • bisacodyl (DULCOLAX) suppository 10 mg  10 mg Rectal QDAY PRN Zachariah Prince M.D.       • tiotropium (SPIRIVA) 18 MCG inhalation capsule 1 Cap  1 Cap Inhalation QDAILY (RT) Zachariah Prince M.D.       • Respiratory Care per Protocol   Nebulization Continuous RT Zachariah Prince M.D.           ALLERGIES:    Nkda [no known drug allergy]    FAMILY HISTORY:    Family History   Problem Relation Age of Onset   • Non-contributory Neg Hx      \"unknown\"       SOCIAL HISTORY:    Social History   Substance Use Topics   • Smoking status: Former Smoker     Packs/day: 2.00     Years: 40.00     Quit date: 1/1/1982   • Smokeless tobacco: Never Used      Comment: Hx smoking x 40 yrs. Quit 1983   • Alcohol use No     REVIEW OF SYSTEMS:  A complete review of systems was completed in patient's chart on 12/12/2017.  All are negative with relationship to this diagnosis " with the exception of:  Persistent dysarthria and right-sided weakness    PHYSICAL EXAM:    2= Ambulatory and capable of all self care, but unable to carry out any work activities.  Up and about more than 50% of waking hours.    GENERAL: No apparent distress.  HEENT:  Pupils are equal, round, and reactive to light.  Extraocular muscles   are intact. Sclerae nonicteric.  Conjunctivae pink.  Oral cavity, tongue   protrudes midline.   NECK:  Supple without evidence of thyromegaly.  NODES:  No peripheral adenopathy of the neck, supraclavicular fossa or axillae   bilaterally.  LUNGS:  Clear to ascultation bilaterally   HEART:  Regular rate and rhythm.  No murmur appreciated  ABDOMEN:  Soft. No evidence of hepatosplenomegaly.  Positive bowel sounds.  EXTREMITIES:  Without Edema.  NEUROLOGIC: Right sided weakness of the upper and lower extremities, dysarthria    IMPRESSION:   Stage IA ([id number]) non-small cell lung carcinoma, adenocarcinoma, of the right lower lobe of lung     RECOMMENDATIONS:   I discussed the diagnosis, prognosis, and treatment options over a 1 hr 10 min time period, 95% of that time dedicated to ongoing treatment management. Patient is having his brain MRI scan later today. We will 1st ensure that there is no brain metastasis contributing into his current neurologic changes. Assuming no metastasis we went on to discuss the management of early stage medically inoperable lung cancer. Patient's acute event compounded with his past medical history make him medically inoperable. Therefore we discussed stereotactic body radiosurgery for stage I lung cancer. I anticipate 5 fraction radiosurgery to a total dose of 6000 cGy targeted at the right lower lobe lesion. Once his MRI scan is deemed clear I will bring him up a card for simulation to proceed with radiosurgery.    We discussed the risks, benefits and side effects of treatment and the patient is amenable to treatment.  If patient has any questions or  concerns, he should feel free to contact me.    Thank you for the opportunity to participate in his care.  If any questions or comments, please do not hesitate in calling.

## 2017-12-12 NOTE — RESPIRATORY CARE
COPD EDUCATION by COPD CLINICAL EDUCATOR  12/12/2017 at 1:13 PM by Desiree Glasgow     Patient reviewed by COPD education team. Patient does not qualify for COPD program at this time

## 2017-12-12 NOTE — PROGRESS NOTES
Ashley Mercedes Fall Risk Assessment:     Last Known Fall: No falls  Mobility: Immobilized/requires assist of one person  Medications: Cardiovascular or central nervous system meds  Mental Status/LOC/Awareness: Awake, alert, and oriented to date, place, and person  Toileting Needs: Use of assistive device (Bedside commode, bedpan, urinal)  Volume/Electrolyte Status: No problems  Communication/Sensory: Visual (Glasses)/hearing deficit  Behavior: Appropriate behavior  Ashley Mercedes Fall Risk Total: 9  Fall Risk Level: LOW RISK    Universal Fall Precautions:  call light/belongings in reach, bed in low position and locked, siderails up x 2, adequate lighting, use non-slip footwear, clutter free and spill free environment, educate on level of risk, educate to call for assistance    Fall Risk Level Interventions:   TRIAL (TELE 8, NEURO, MED SANA 5) Low Fall Risk Interventions  Place yellow fall risk ID band on patient: completed  Provide patient/family education based on risk assessment: completed  Educate patient/family to call staff for assistance when getting out of bed: completed  Place fall precaution signage outside patient door: completed      Patient Specific Interventions:     Medication: review medications with patient and family  Mental Status/LOC/Awareness: reinforce falls education, check on patient hourly, utilize bed/chair fall alarm and reinforce the use of call light  Toileting: instruct patient/family on the need to call for assistance when toileting and do not leave patient unattended in bathroom/refer to toileting scripting  Volume/Electrolyte Status: ensure patient remains hydrated  Communication/Sensory: update plan of care on whiteboard, ensure proper positioning when transferrng/ambulating, ensure patient has glasses/contacts and hearing aids/dentures and have patients with hearing deficit repeat information back to you to ensure proper understanding  Behavioral: administer medication as ordered and  instruct/reinforce fall program rationale  Mobility: utilize bed/chair fall alarm, ensure bed is locked and in lowest position, provide appropriate assistive device and instruct patient to exit bed on their strongest side

## 2017-12-12 NOTE — PROGRESS NOTES
[facility] Hospitalist Progress Note    Date of Service: 2017    Chief Complaint  84 y.o. male admitted 2017 with Stroke.    Interval Problem Update  Stroke - residual right-sided weakness  HTN - not controlled  Diabetes - controlled  Lung CA - recent diagnosis    Consultants/Specialty  Neuro - Luann    Disposition  Rehab vs SNF        Review of Systems   Constitutional: Positive for malaise/fatigue. Negative for chills and fever.   HENT: Negative for hearing loss and sore throat.    Eyes: Negative for blurred vision.   Respiratory: Negative for cough, shortness of breath and wheezing.    Gastrointestinal: Negative for abdominal pain, diarrhea, heartburn, nausea and vomiting.   Genitourinary: Negative for dysuria.   Neurological: Positive for speech change, focal weakness and weakness. Negative for dizziness.      Physical Exam  Laboratory/Imaging   Hemodynamics  Temp (24hrs), Av.3 °C (97.4 °F), Min:36.1 °C (96.9 °F), Max:36.8 °C (98.2 °F)   Temperature: 36.7 °C (98.1 °F)  Pulse  Av.5  Min: 55  Max: 77 Heart Rate (Monitored): (!) 59  Blood Pressure : (!) 187/82, NIBP: (!) 184/77      Respiratory      Respiration: 16, Pulse Oximetry: 98 %, O2 Daily Delivery Respiratory : Room Air with O2 Available        RUL Breath Sounds: Diminished, RML Breath Sounds: Diminished, RLL Breath Sounds: Diminished, MILES Breath Sounds: Diminished, LLL Breath Sounds: Diminished    Fluids    Intake/Output Summary (Last 24 hours) at 17 1446  Last data filed at 17 1400   Gross per 24 hour   Intake                0 ml   Output              600 ml   Net             -600 ml       Nutrition  Orders Placed This Encounter   Procedures   • DIET ORDER     Standing Status:   Standing     Number of Occurrences:   1     Order Specific Question:   Diet:     Answer:   Cardiac [6]     Order Specific Question:   Texture/Fiber modifications:     Answer:   Dysphagia 2(Pureed/Chopped)specify fluid consistency(question 6) [2]      Order Specific Question:   Consistency/Fluid modifications:     Answer:   Nectar Thick [2]     Physical Exam   Constitutional: He is oriented to person, place, and time. He appears well-developed and well-nourished.   HENT:   Head: Normocephalic and atraumatic.   Eyes: Conjunctivae and EOM are normal. Pupils are equal, round, and reactive to light.   Neck: No tracheal deviation present. No thyromegaly present.   Cardiovascular: Normal rate and regular rhythm.    Pulmonary/Chest: Effort normal and breath sounds normal.   Abdominal: Soft. Bowel sounds are normal. He exhibits no distension. There is no tenderness.   Lymphadenopathy:     He has no cervical adenopathy.   Neurological: He is alert and oriented to person, place, and time.   Mild right facial droop  Mild dysarthria  MMT LUE and LLE 5/5, RUE 2-3/5, RLE 2/5      Skin: Skin is warm and dry.   Nursing note and vitals reviewed.      Recent Labs      12/11/17 1647 12/12/17 0225   WBC  7.0  5.6   RBC  3.97*  3.68*   HEMOGLOBIN  11.9*  11.3*   HEMATOCRIT  36.1*  33.2*   MCV  90.9  90.2   MCH  30.0  30.7   MCHC  33.0*  34.0   RDW  46.9  46.2   PLATELETCT  118*  98*   MPV  11.6  10.9     Recent Labs      12/11/17   1647  12/12/17 0225   SODIUM  138  137   POTASSIUM  5.3  4.4   CHLORIDE  113*  113*   CO2  17*  16*   GLUCOSE  97  163*   BUN  50*  48*   CREATININE  2.40*  2.40*   CALCIUM  9.5  8.6     Recent Labs      12/11/17 1647   APTT  32.1   INR  1.14*         Recent Labs      12/12/17 0225   TRIGLYCERIDE  121   HDL  30*   LDL  37          Assessment/Plan     * CVA (cerebral vascular accident) (CMS-[facility])   Assessment & Plan    ASA, Lipitor  PT, OT, Speech  For MRI        Dysphagia due to recent cerebrovascular accident (CVA)- (present on admission)   Assessment & Plan    Dysphagia 2, NTL   Speech to follow        Thrombocytopenia (CMS-HCC)   Assessment & Plan    Follow cbc, hold heparin        Lung cancer (CMS-HCC)- (present on admission)   Assessment &  Plan    Radiation Oncology        Cardiomyopathy (CMS-HCC)- (present on admission)   Assessment & Plan    Resume Coreg soon        Chronic combined systolic and diastolic CHF (congestive heart failure) (CMS-HCC)- (present on admission)   Assessment & Plan    Resume Coreg soon        CKD (chronic kidney disease) stage 4, GFR 15-29 ml/min (CMS-HCC)- (present on admission)   Assessment & Plan    Follow bmp          Subclavian artery stenosis, left (CMS-HCC)- (present on admission)   Assessment & Plan    Plavix  ffup with Vascular surgery as outpt        Carotid artery stenosis- (present on admission)   Assessment & Plan    Plavix        Peripheral vascular disease (CMS-HCC)- (present on admission)   Assessment & Plan    Plavix        COPD (chronic obstructive pulmonary disease) (CMS-HCC)- (present on admission)   Assessment & Plan    Spiriva, RT protocol        DM (diabetes mellitus) (CMS-HCC)- (present on admission)   Assessment & Plan    Diet controlled?  check [id number]        HTN (hypertension)- (present on admission)   Assessment & Plan    Permissive HTN, resume Coreg soon        Dyslipidemia- (present on admission)   Assessment & Plan    Lipitor            Reviewed items::  Radiology images reviewed, Medications reviewed, Labs reviewed and EKG reviewed  Owens catheter::  No Owens  DVT prophylaxis - mechanical:  SCDs  Ulcer Prophylaxis::  No

## 2017-12-12 NOTE — PROGRESS NOTES
Neurology Progress Note               Author: TOMMY Martinley Date & Time created: 2017  8:26 AM     Interval History:  No new events, has had persistent R weakness.  No sensory loss.  Has moderate bilateral carotid stenosis, no intracranial clot.    Review of Systems:  ROS    Physical Exam:  Physical Exam   Neurological:   Alert, speech is dysarthric.  PERRL, full EOM, full VF.  R arm is 2/5, weaker distally, R leg 3/5.  L side is intact.  Sensation is =, DTR incr R>L.       Labs:        Invalid input(s): [id number]  Recent Labs      17   TROPONINI  <0.01     Recent Labs      17   SODIUM  138  137   POTASSIUM  5.3  4.4   CHLORIDE  113*  113*   CO2  17*  16*   BUN  50*  48*   CREATININE  2.40*  2.40*   CALCIUM  9.5  8.6     Recent Labs      17   ALTSGPT  36   --    ASTSGOT  31   --    ALKPHOSPHAT  56   --    TBILIRUBIN  0.6   --    GLUCOSE  97  163*     Recent Labs      177  17   RBC  3.97*  3.68*   HEMOGLOBIN  11.9*  11.3*   HEMATOCRIT  36.1*  33.2*   PLATELETCT  118*  98*   PROTHROMBTM  14.3   --    APTT  32.1   --    INR  1.14*   --      Recent Labs      17   WBC  7.0  5.6   NEUTSPOLYS  78.90*   --    LYMPHOCYTES  12.30*   --    MONOCYTES  6.00   --    EOSINOPHILS  2.00   --    BASOPHILS  0.40   --    ASTSGOT  31   --    ALTSGPT  36   --    ALKPHOSPHAT  56   --    TBILIRUBIN  0.6   --      Recent Labs      17   SODIUM  138  137   POTASSIUM  5.3  4.4   CHLORIDE  113*  113*   CO2  17*  16*   GLUCOSE  97  163*   BUN  50*  48*   CREATININE  2.40*  2.40*   CALCIUM  9.5  8.6     Hemodynamics:  Temp (24hrs), Av.3 °C (97.3 °F), Min:36.1 °C (96.9 °F), Max:36.8 °C (98.2 °F)  Temperature: 36.8 °C (98.2 °F)  Pulse  Av  Min: 55  Max: 77Heart Rate (Monitored): (!) 59  Blood Pressure : (!) 193/82 (rn notified), NIBP: (!) 184/77     Respiratory:     Respiration: 18, Pulse Oximetry: 97 %, O2 Daily Delivery Respiratory : Room Air with O2 Available           Fluids:  No intake or output data in the 24 hours ending 12/12/17 0826  Weight: 80.4 kg (177 lb 4 oz)  GI/Nutrition:  Orders Placed This Encounter   Procedures   • DIET ORDER     Standing Status:   Standing     Number of Occurrences:   1     Order Specific Question:   Diet:     Answer:   Cardiac [6]     Medical Decision Making, by Problem:  Active Hospital Problems    Diagnosis   • *CVA (cerebral vascular accident) (CMS-HCC) [I63.9]   • COPD (chronic obstructive pulmonary disease) (CMS-HCC) [J44.9]   • Hyperlipidemia [E78.5]   • Subclavian artery stenosis, left (CMS-HCC) [I77.1]       Plan:  Appears most consistent with a small vessel event.  Await MRI.  Start therapy, likely a good rehab candidate.  Continue aspirin.    Quality-Core Measures

## 2017-12-12 NOTE — PROGRESS NOTES
NIHSS    1a. Level of Consciousness  0 Alert. Keenly Responsive  1 Not Alert but arousable by minor stimulation  2 Not Alert. Requires repeated stimulation  3 Responds only w reflex motor/autonomic effects or totally unresponsive  Score:0  1b. Level of Consciousness: ask month and age  0 Answers both questions correctly  1 Answers one question correctly  2 Answers neither question correctly  Score:0  1c. Level of Consciousness: ask to open and close eyes/make a fist with non-paretic hand  0 Performs both tasks correctly  1 Performs one task correctly  2 Performs neither task correctly  Score:0  2. Best gaze: Horizontal eye movement   0 Normal   1 Partial gaze palsy. Forced deviation or total palsy not present   2 Forced deviation. Not overcome by oculo-cephalic maneuver   Score:0  3. Visual Fields   0 No loss   1 Partial hemianopia   2 Complete hemianopia   3 Bilateral hemianopia. Blindness   Score:0  4. Facial Palsy   0 Normal symmetrical movements   1 Minor paralysis   2 Partial paralysis   3 Complete paralysis   Score:0  5. Motor Arm Right and Left   0 No drift   1 Drift but does not hit bed   2 Some effort against gravity. Hits bed   3 No effort against gravity   4 No movement   UN Amputation/joint fusion/explain   Score R:2   Score L:0  6. Motor Leg: Right and Left   0 No drift   1 Drift but does not hit bed   2 Some effort against gravity. Hits bed   3 No effort against gravity   4 No movement   UN Amputation/joint fusion/explain   Score R :2   Score L :0  7. Limb Ataxia. Finger to nose/ shin-heel   0 Absent   1  Present in one limb   2 Present in two limbs   UN Amputation/joint fusion explain   Score:0  8. Sensory. Pinprick   0 Normal. No sensory loss   1 Mild to moderate sensory loss   2 Severe or total sensory loss   Score:0  9. Best language. Show the kitchen/cookie picture   0 No aphasia   1 Mild to moderate aphasia   2 Severe aphasia   3 Mute. Global aphasia   Score:0  10. Dysarthria.  Show the reading  list   0 Normal   1 Mild to moderate dysarthria   2 Severe dysarthria   3 Intubated or other physical barrier   Score:1  11. Extinction and Inattention   0 No abnormality   1 Extinction to simultaneous stimulation   2 Profound [place]-inattention or extinction   Score:0    TOTAL: 5    Presumed Mechanism by TOAST:    ___Large Artery    __x_Small Vessel (Lacunar)    ___Cardioembolic    ___ Other (Sickle Cell Disease, Vasculitis, Hypercoagulable State, etc)    ___Unknown

## 2017-12-12 NOTE — H&P
Hospital Medicine History and Physical    Date of Service  12/12/2017    Chief Complaint  Chief Complaint   Patient presents with   • Possible Stroke       History of Presenting Illness  84 y.o. male who presented 12/11/2017 with R sided weakness. He is not a good histroian and is somewhat dysarthric. Basically at early moning he noticed mild symptoms of R sided weakness but seem to be intermittent and resolving until 230PM, he had R arm weakness and R leg weakness. He did not fall but felt his R leg is stiff. He also had slight slurring of speech and slight facial droop on the right. He did not complain of vision loss.   At the ED, he is afebrile, hemodynamically stable. Head CT and CTA done see results below. Dr. Kong consulted, not a tPA candidate  When I saw him at the ED, he is in no acute distress. Mild R hemiparesis and R  weakness. Slight facial droop and tongue deviation to the right. Slight dysarthria. Seems to have a R wrist drop. Trace lower ext edema.  Primary Care Physician  Kirill Fabian D.O.    Consultants  Neurology    Code Status  full    Review of Systems  Review of Systems   Unable to perform ROS: Mental acuity        Past Medical History  Past Medical History:   Diagnosis Date   • Stroke (CMS-HCC) 2015   • AVM (arteriovenous malformation) of colon    • Back pain    • Chickenpox    • Chronic airway obstruction, not elsewhere classified    • Congestive heart failure (CMS-HCC)    • Diabetes    • Diphtheria    • Emphysema of lung (CMS-HCC)    • Hypercholesteremia    • Hypertension    • Infectious disease    • Other and unspecified angina pectoris    • PVD (peripheral vascular disease) (CMS-HCC)    • Snoring    • Unspecified cataract     right eye    • Urinary bladder disorder    • Urinary incontinence        Surgical History  Past Surgical History:   Procedure Laterality Date   • COLONOSCOPY  6/17/2015    Procedure: COLONOSCOPY;  Surgeon: Eddie Levin M.D.;  Location: SURGERY [facility]  [facility];  Service:    • GASTROSCOPY  6/17/2015    Procedure: GASTROSCOPY W/APC;  Surgeon: Eddie Levin M.D.;  Location: SURGERY [facility];  Service:    • GASTROSCOPY WITH APC  11/1/2014    Performed by Eddie Levin M.D. at ENDOSCOPY [facility]   • COLONOSCOPY WITH APC  11/1/2014    Performed by Eddie Levin M.D. at ENDOSCOPY [facility]   • GASTROSCOPY-ENDO  5/1/2011    Performed by HOLLY VASQUEZ at ENDOSCOPY [facility]   • COLONOSCOPY - ENDO  5/1/2011    Performed by HOLLY VASQUEZ at ENDOSCOPY [facility]   • COLONOSCOPY WITH APC  4/21/2011    Performed by EDMUND CHENG at [facility]   • GASTROSCOPY-ENDO  4/21/2011    Performed by EDMUND CHENG at [facility]   • COLONOSCOPY WITH APC  9/28/2010    Performed by EDMUND CHENG at [facility]   • GASTROSCOPY WITH APC  9/28/2010    Performed by EDMUND CHENG at [facility]   • AORTOFEMORAL BYPASS  1991    Both legs       Medications  No current facility-administered medications on file prior to encounter.      Current Outpatient Prescriptions on File Prior to Encounter   Medication Sig Dispense Refill   • Tiotropium Bromide Monohydrate (SPIRIVA HANDIHALER INH) Inhale  by mouth.     • carvedilol (COREG) 12.5 MG Tab Take 1 Tab by mouth 2 times a day, with meals. 180 Tab 3   • enalapril (VASOTEC) 10 MG Tab Take 2 Tabs by mouth 2 times a day. 180 Tab 3   • clopidogrel (PLAVIX) 75 MG Tab Take 1 Tab by mouth every morning. 90 Tab 3   • tamsulosin (FLOMAX) 0.4 MG capsule Take 0.4 mg by mouth ONE-HALF HOUR AFTER BREAKFAST.     • furosemide (LASIX) 20 MG Tab Take 0.5 Tabs by mouth 1 time daily as needed. 30 Tab 1   • allopurinol (ZYLOPRIM) 100 MG TABS Take 100 mg by mouth every day.     • Cholecalciferol (VITAMIN D) 2000 UNIT TABS Take 4,000 Units by mouth every day.     • Ferrous Sulfate (IRON) 325 (65 FE) MG TABS Take 1 Tab by mouth  "every morning.         Family History  Family History   Problem Relation Age of Onset   • Non-contributory Neg Hx      \"unknown\"       Social History  Social History   Substance Use Topics   • Smoking status: Former Smoker     Packs/day: 2.00     Years: 40.00     Quit date: 1982   • Smokeless tobacco: Never Used      Comment: Hx smoking x 40 yrs. Quit    • Alcohol use No       Allergies  Allergies   Allergen Reactions   • Nkda [No Known Drug Allergy]         Physical Exam  Laboratory   Hemodynamics  Temp (24hrs), Av.2 °C (97.1 °F), Min:36.1 °C (96.9 °F), Max:36.3 °C (97.4 °F)   Temperature: 36.1 °C (97 °F)  Pulse  Av.9  Min: 55  Max: 77 Heart Rate (Monitored): (!) 59  Blood Pressure : (!) 224/82 (manual; rn notified), NIBP: (!) 184/77      Respiratory      Respiration: 15, Pulse Oximetry: 98 %, O2 Daily Delivery Respiratory : Room Air with O2 Available             Physical Exam   Constitutional: He appears well-developed and well-nourished.   HENT:   Head: Normocephalic and atraumatic.   Eyes: Conjunctivae and EOM are normal. No scleral icterus.   Neck: Normal range of motion. Neck supple.   Cardiovascular: Normal rate and regular rhythm.  Exam reveals no gallop and no friction rub.    No murmur heard.  Pulmonary/Chest: Effort normal and breath sounds normal. No respiratory distress. He has no wheezes. He has no rales.   Abdominal: Soft. Bowel sounds are normal. He exhibits no distension. There is no tenderness. There is no rebound and no guarding.   Musculoskeletal: He exhibits no edema (trace) or tenderness.   Neurological: He is alert.   Mild R hemiparesis and R  weakness. Slight facial droop and tongue deviation to the right. Slight dysarthria. Seems to have a R wrist drop. Trace lower ext edema.  Mild cognitive deficits   Skin: Skin is warm.   Psychiatric: He has a normal mood and affect. His behavior is normal.       Recent Labs      17   1647   WBC  7.0   RBC  3.97*   HEMOGLOBIN  " 11.9*   HEMATOCRIT  36.1*   MCV  90.9   MCH  30.0   MCHC  33.0*   RDW  46.9   PLATELETCT  118*   MPV  11.6     Recent Labs      12/11/17   1647   SODIUM  138   POTASSIUM  5.3   CHLORIDE  113*   CO2  17*   GLUCOSE  97   BUN  50*   CREATININE  2.40*   CALCIUM  9.5     Recent Labs      12/11/17   1647   ALTSGPT  36   ASTSGOT  31   ALKPHOSPHAT  56   TBILIRUBIN  0.6   GLUCOSE  97     Recent Labs      12/11/17   1647   APTT  32.1   INR  1.14*             Lab Results   Component Value Date    TROPONINI <0.01 12/11/2017     Urinalysis:    Lab Results  Component Value Date/Time   SPECGRAVITY 1.014 02/08/2017 0748   GLUCOSEUR Negative 02/08/2017 0748   KETONES Negative 02/08/2017 0748   NITRITE Negative 02/08/2017 0748   WBCURINE 0-2 (A) 07/11/2016 1053   RBCURINE 0-2 (A) 07/11/2016 1053   BACTERIA Few (A) 11/02/2015 1344   EPITHELCELL Few 02/08/2017 0748        Imaging  Ct-cta Head With & W/o-post Process    Result Date: 12/11/2017 12/11/2017 5:16 PM HISTORY/REASON FOR EXAM:  Neurological Deficit Right-sided weakness, slurring, right facial droop TECHNIQUE/EXAM DESCRIPTION: CT angiogram of the [place] without and with contrast.  Initial precontrast images were obtained of the head from the skull base through the vertex.  Postcontrast images were obtained of the [place] following the power injection of nonionic contrast at 5.0 mL/sec. Thin-section helical images were obtained with overlapping reconstruction interval. Coronal and sagittal multiplanar volume reformats were generated.  3D angiographic images were reviewed on PACS.  Maximum intensity projection (MIP) images were generated and reviewed. 60 mL of Omnipaque 350 nonionic contrast was injected intravenously. Low dose optimization technique was utilized for this CT exam including automated exposure control and adjustment of the mA and/or kV according to patient size. COMPARISON:  None. FINDINGS: The posterior circulation shows the distal vertebral  arteries to be patent. The vertebrobasilar confluence is intact. The basilar artery is patent. No aneurysm or occlusive lesion is evident. The anterior circulation shows no stenotic or occlusive lesion. No aneurysm is evident about the [place]. There is coarse calcified plaque in the intracranial portions of the internal carotid arteries bilaterally     No evidence of large vessel occlusion. Coarse calcified plaque in bilateral intracranial internal carotid arteries    Ct-cta Neck With & W/o-post Processing    Result Date: 12/11/2017 12/11/2017 5:16 PM HISTORY/REASON FOR EXAM:  Right-sided weakness and facial droop with slurring of words TECHNIQUE/EXAM DESCRIPTION: CT angiogram of the neck with contrast. Postcontrast images were obtained of the neck from the great vessels through the skull base following the power injection of nonionic contrast at 5.0 mL/sec. Thin-section helical images were obtained with overlapping reconstruction interval. Coronal and oblique multiplanar volume reformats were generated. Cervical internal carotid artery percent stenosis is calculated using the standard method according to the NASCET criteria wherein a segment of uniform caliber mid or distal cervical internal carotid is used as the reference denominator. 3D angiographic images were reviewed on PACS.  Maximum intensity projection (MIP) images were generated and reviewed 60 mL of Omnipaque 350 nonionic contrast was injected intravenously. Low dose optimization technique was utilized for this CT exam including automated exposure control and adjustment of the mA and/or kV according to patient size. COMPARISON:  None. FINDINGS: Calcified atherosclerotic plaque is noted in the aorta and its branch vessels. No arterial occlusion is appreciated. There is 90% diameter reduction stenosis of the left subclavian artery. There is significant atherosclerotic plaque which is mainly calcified in the carotid bulb and internal carotid  artery on the right side. There is narrowing of the proximal right ICA with diameter reduction between 50 and 70%. Calcified atherosclerotic plaque is noted in the left carotid bulb and proximal ICA. There is narrowing of the proximal ICA with diameter reduction of approximately 50-70% range. The cervical vertebral arteries are normal bilaterally. The neck soft tissues and lung apices in the field of view are unremarkable.     1.  Significant calcified atherosclerotic plaque is noted in the aorta and its branch vessels. 2.  50% to 70% diameter reduction stenosis noted in the internal carotid arteries bilaterally. 3.  90% diameter reduction stenosis in the left subclavian artery.    Ct-head W/o    Result Date: 12/11/2017 12/11/2017 5:15 PM HISTORY/REASON FOR EXAM:  Right-sided weakness slurred speech and right facial drooping. TECHNIQUE/EXAM DESCRIPTION AND NUMBER OF VIEWS: CT of the head without contrast. Contiguous 5 mm axial sections were obtained from the skull base through the vertex. Up to date radiation dose reduction adjustments have been utilized to meet ALARA standards for radiation dose reduction. COMPARISON:  None available FINDINGS: There is no evidence of extra-axial hemorrhage. No intra-axial hemorrhage is noted. There is no brain swelling or edema. No mass effect or midline shift is noted. Ventricles and sulci appear prominent.  There is decreased attenuation in the periventricular white matter.     NO ACUTE ABNORMALITIES ARE NOTED ON CT SCAN OF THE HEAD. Findings are consistent with atrophy.  Decreased attenuation in the periventricular white matter likely indicates microvascular ischemic disease.    Ct-needle Bx-lung-mediastinum Right    Result Date: 11/27/2017 11/27/2017 11:01 AM HISTORY/REASON FOR EXAM:  2.8 cm right upper lobe peripheral nodule. TECHNIQUE/EXAM DESCRIPTION: Right upper lobe lung nodule biopsy with CT guidance and CT-guided placement of right chest tube. The biopsy/drainage was  done utilizing low dose optimization CT techniques including auto modulation for  imaging and low mA CT/fluoroscopy mode for the procedure. PROCEDURE: Informed consent was obtained. Localizing CT images were obtained with the patient in prone position. The skin was prepped with Betadine and draped in a sterile fashion. Following local anesthesia with 1% Lidocaine, a 17 G guiding needle was placed and needle position confirmed with CT. Using coaxial technique, an 18 G core biopsy needle was placed into the target lesion in the right upper lobe and needle position confirmed with CT. A total of 3 samples were obtained in this fashion and submitted in formalin. The guiding needle was removed and limited CT images obtained through the biopsy site show a moderate size right-sided pneumothorax. Subsequently an Amplatz guidewire was advanced through the introducer cannula in the right upper hemithorax. Next an 8 Wolof locking loop all purpose drain was advanced into the pleural space in the right upper chest and attached to a Heimlich valve. CT images demonstrated the locking loop catheter to be in good position. The patient tolerated the procedure well. COMPARISON: Recent PET/CT scan FINDINGS: The localizing CT images show satisfactory needle position within the target lesion. Further termination CT images demonstrate the locking loop catheter to be well positioned in the pleural space of the right upper posterior chest.     1.  CT guided right upper lobe lung biopsy. 2.  A follow up chest radiograph is forthcoming in one hours. 3. CT-guided chest tube placement for treatment of postbiopsy pneumothorax.     Dx-chest-limited (1 View)    Result Date: 11/28/2017 11/28/2017 10:37 AM HISTORY/REASON FOR EXAM:  Right-sided chest tube. Pneumothorax. Right chest tube clamped TECHNIQUE/EXAM DESCRIPTION AND NUMBER OF VIEWS: Single portable view of the chest. COMPARISON: Exam from 9:07 AM FINDINGS: Right-sided pigtail catheter  remains in place. There is again a trace, barely perceptible right apical pneumothorax. There appears stable. Bilateral calcified pleural plaques are once again noted.     Stable to slightly decreased trace right apical pneumothorax with chest tube in place.    Dx-chest-portable (1 View)    Result Date: 12/11/2017 12/11/2017 5:30 PM HISTORY/REASON FOR EXAM:  Right-sided weakness and slurring of speech TECHNIQUE/EXAM DESCRIPTION AND NUMBER OF VIEWS: Single portable view of the chest. COMPARISON: 11/28/2017 FINDINGS: Right-sided chest tube is no longer identified. Heart size is mildly enlarged. No focal infiltrates or consolidations are identified in the lungs. No pleural fluid collections are identified. No pneumothorax is appreciated.     No acute cardiac or pulmonary abnormality is identified.    Dx-chest-portable (1 View)    Result Date: 11/28/2017 11/28/2017 8:49 AM HISTORY/REASON FOR EXAM:  Right-sided chest tube for pneumothorax after lung biopsy. TECHNIQUE/EXAM DESCRIPTION AND NUMBER OF VIEWS: Single portable view of the chest. COMPARISON: November 27, 2017 FINDINGS: Right pigtail catheter remains in place. A tiny right apical pneumothorax identified. Bilateral calcified pleural plaques noted. There is right basal consolidation with elevation right hemidiaphragm.     Trace right apical pneumothorax with chest tube in place.    Dx-chest-portable (1 View)    Result Date: 11/27/2017 11/27/2017 2:16 PM HISTORY/REASON FOR EXAM: Post right lung biopsy, follow-up pneumothorax TECHNIQUE/EXAM DESCRIPTION AND NUMBER OF VIEWS: Single portable view of the chest. COMPARISON: 1 view chest 11/27/2017 1317 hours FINDINGS: Bilateral airspace process are present. Cardiac silhouette: enlarged. Right chest tube is present. There are multiple bilateral pleural plaque. Pleura: There are no pleural effusion or pneumothoraces. Osseous structures: No significant bony abnormality is present.     1.  No pneumothorax identified 2.   Multiple bilateral pleural plaque 3.  Right chest tube present    Dx-chest-portable (1 View)    Result Date: 11/27/2017 11/27/2017 12:57 PM HISTORY/REASON FOR EXAM: Post right-sided lung biopsy. TECHNIQUE/EXAM DESCRIPTION AND NUMBER OF VIEWS: Single portable view of the chest. COMPARISON: Chest CT scan from the same date. FINDINGS: There is a small bore right chest tube present. No evidence of pneumothorax. There are patchy bilateral infiltrates. There are calcified pleural plaques present bilaterally. There is no pleural effusion. The heart is enlarged.     1.  Small bore right chest tube present. No evidence of pneumothorax. 2.  Cardiomegaly. 3.  Patchy bilateral infiltrates and calcified pleural plaques.    Ct-cerebral Perfusion Analysis    Result Date: 12/11/2017 12/11/2017 5:24 PM HISTORY/REASON FOR EXAM:  Right-sided weakness and facial droop with slurring of words. TECHNIQUE/EXAM DESCRIPTION AND NUMBER OF VIEWS: CT Cerebral Perfusion Analysis. The study was performed on a 128 slice [employer] Multidetector CT scanner. Perfusion data and corresponding time-activity curves are processed and displayed as color-coded maps in the axial plane for Cerebral Blood Flow (CBF), Cerebral Blood Volume  (CBV), T Max and Mean Transit Time (MTT) and are post processed on the Ischemia view-RAPID virtual . 40 mL of Omnipaque 350 nonionic contrast was injected intravenously. Low dose optimization technique was utilized for this CT exam including automated exposure control and adjustment of the mA and/or kV according to patient size. COMPARISON:  None. FINDINGS: CT perfusion examination over the limited sections of brain reveal that 0.0 mL of brain parenchyma has less than 30% of cerebral blood flow (CBF < 30%).     1.  CT perfusion examination over the limited section of brain reveals 0.0 mL of brain parenchyma has less than 30% of cerebral blood flow (CBF). 2.  Please note that the cerebral perfusion was performed  on the limited brain tissue around the basal ganglia region. Infarct/ischemia outside the CT perfusion sections can be missed in this study.     Assessment/Plan     I anticipate this patient will require at least two midnights for appropriate medical management, necessitating inpatient admission.    * CVA (cerebral vascular accident) (CMS-HCC)   Assessment & Plan    Presented with R sided weakness, dysarthria.  Seen by Dr. Kong, not tPA candidate  Head CT no bleed, CTA unremarkable [place] of [place] but has carotid disease  Telermetry, neuro-checks, MRI brain, echo, ASA, statin and ordered lipid panel, PT/OT/ST          COPD (chronic obstructive pulmonary disease) (CMS-HCC)- (present on admission)   Assessment & Plan    Not currently exacerbating  Resp and O2 per protocol        Hyperlipidemia   Assessment & Plan    Continue statin        Subclavian artery stenosis, left (CMS-HCC)- (present on admission)   Assessment & Plan    Seen by Dr. Rizvi, was given Plavix            VTE prophylaxis: Pharmacologic as per Neurology.    I spent 72 minutes, reviewing the chart, notes, vitals, labs, imaging, ordering labs, evaluating Lex Harden for assessment, enacting the plan above. 50% of the time was spent in counseling Lex Harden and answering questions. Discussed with ED physician. Medical decision making is therefore complex. Time was devoted to counseling and coordinating care including review of records, pertinent lab data and studies, as well as discussing diagnostic evaluation and work up, planned therapeutic interventions and future disposition of care. Where indicated, the assessment and plan reflect discussion of patient with consultants, other healthcare providers, family members, and additional research needed to obtain further information in formulating the plan of care for Lex Harden.

## 2017-12-12 NOTE — ED NOTES
Med rec updated and complete.  Allergies reviewed.  Met with with pt /family at bedside and  Dicussed current medications and last doses taken.  Last antibiotic use was in October.

## 2017-12-12 NOTE — THERAPY
"Occupational Therapy Evaluation completed.   Functional Status:  Min/mod A with ADLs and txfs  Plan of Care: Will benefit from Occupational Therapy 4 times per week  Discharge Recommendations:  Equipment: Will Continue to Assess for Equipment Needs. Post-acute therapy Discharge to a transitional care facility for continued skilled therapy services.    See \"Rehab Therapy-Acute\" Patient Summary Report for complete documentation.    "

## 2017-12-12 NOTE — THERAPY
"Physical Therapy Evaluation completed.   Bed Mobility:  Supine to Sit: Contact Guard Assist  Transfers: Sit to Stand: Minimal Assist  Gait: Level Of Assist: Minimal Assist with HHA      Plan of Care: Will benefit from Physical Therapy 4 times per week  Discharge Recommendations: Equipment: Will Continue to Assess for Equipment Needs. Post-acute therapy Discharge to a transitional care facility for continued skilled therapy services.    Pt presents with decreased functional mobility most likely due to impaired R LE motor planning, impaired R LE sensation and possibly proprioception, and occasional R foot drag during swing. Formally, pt does not test below a 4-/5 to R pretibials, but functionally strength appears impaired. During gait, pt required HHA to remain upright and steady. He tended to scissor R LE with poor L LE support. Pt appears to have impaired safety awareness.  He will benefit from further acute skilled PT services to improve functional mobility and could benefit from placement for intensive multidisciplinary skilled therapy services upon DC.     See \"Rehab Therapy-Acute\" Patient Summary Report for complete documentation.     "

## 2017-12-12 NOTE — DISCHARGE PLANNING
Care Transition Team Assessment    IHD met with pt at bedside. Pt currently lives alone at home, but states that he has local family that provide support. He does not have DME, home O2, or HHC at this time. He states that he will have his daughter provide transportation home upon d/c.     Information Source  Orientation : Oriented x 4  Information Given By: Patient  Informant's Name: Lex   Who is responsible for making decisions for patient? : Patient         Elopement Risk  Legal Hold: No  Ambulatory or Self Mobile in Wheelchair: Yes  Disoriented: No  Psychiatric Symptoms: None  History of Wandering: No  Elopement this Admit: No  Vocalizing Wanting to Leave: No  Displays Behaviors, Body Language Wanting to Leave: No-Not at Risk for Elopement  Elopement Risk: Not at Risk for Elopement    Interdisciplinary Discharge Planning  Does Admitting Nurse Feel This Could be a Complex Discharge?: No  Primary Care Physician: Kirill Fabian, DO  Lives with - Patient's Self Care Capacity: Alone and Able to Care For Self  Support Systems: Children, Family Member(s) (daughter, ex-wife, grandkids)  Housing / Facility: 2 Story Apartment / Condo  Do You Take your Prescribed Medications Regularly: Yes  Able to Return to Previous ADL's: Future Time w/Therapy  Mobility Issues: Yes  Prior Services: None  Patient Expects to be Discharged to:: Back to his condo  Assistance Needed: Yes  Durable Medical Equipment: Not Applicable    Discharge Preparedness  What is your plan after discharge?: Home with help  What are your discharge supports?: Child, Other (comment) (ex-wife)  Prior Functional Level: Ambulatory, Drives Self, Independent with Activities of Daily Living, Independent with Medication Management  Difficulity with ADLs: None  Difficulity with IADLs: None    Functional Assesment  Prior Functional Level: Ambulatory, Drives Self, Independent with Activities of Daily Living, Independent with Medication Management    Finances  Financial  Barriers to Discharge: No  Prescription Coverage: Yes ([employer] in [place])    Vision / Hearing Impairment  Vision Impairment : Yes  Right Eye Vision: Impaired, Wears Glasses  Left Eye Vision: Impaired, Wears Glasses  Hearing Impairment : Yes  Hearing Impairment: Both Ears, Hearing Device(s) Available  Does Pt Need Special Equipment for the Hearing Impaired?: No    Values / Beliefs / Concerns  Values / Beliefs Concerns : No  Special Hospitalization Concerns: No         Domestic Abuse  Have you ever been the victim of abuse or violence?: No  Physical Abuse or Sexual Abuse: No  Verbal Abuse or Emotional Abuse: No    Psychological Assessment  History of Substance Abuse: None  History of Psychiatric Problems: No  Non-compliant with Treatment: No    Discharge Risks or Barriers  Discharge risks or barriers?: No    Anticipated Discharge Information  Anticipated discharge disposition: Home  Discharge Address: [facility]  Discharge Contact Phone Number: [phone]

## 2017-12-12 NOTE — THERAPY
"Speech Language Therapy Clinical Swallow Evaluation completed.  Functional Status: Patient seen for swallow evaluation this date.  Patient awake, alert and oriented in all spheres. He is presenting with right side facial droop, right tongue deviation on protrusion, right side palatal weakness and fluent, but mildly dysarthric speech.  Patient is left handed.  Presentation of PO consisted of ice, nectars, thin liquids, purees, soft solids and solids.  On thin liquids, patient with mild right side anterior spillage and delayed throat clearing following swallow.  He had 3 coughing episodes (2 from straw and 1 from cup sip) all of which are consistent with possible penetration/aspiration.  On mixed consistencies and solids, there was intermittent delayed throat clearing noted. Ice, nectars, purees and soft solids were consumed without any overt S/Sx of aspiration noted. At this time, would recommend modification of diet to dysphagia II with nectar thick liquids and close monitoring for any S/Sx of aspiration.  SLP will follow for diet upgrade and dysphagia therapy.  Patient would also benefit from a speech/language/cognitive evaluation.      Recommendations - Diet:  Dysphagia II diet with nectar thick liquids (cardiac as previously ordered)                          Strategies: Monitor during meals, No Straws and Head of Bed at 90 Degrees                          Medication Administration:  Float whole with puree or whole with nectar thick liquid wash  Plan of Care: Will benefit from Speech Therapy 5 times per week  Post-Acute Therapy: Discharge to a transitional care facility for continued skilled therapy services.    See \"Rehab Therapy-Acute\" Patient Summary Report for complete documentation.       "

## 2017-12-12 NOTE — PROGRESS NOTES
Notified hospitalist (Dr. Balbuena) of pt's BP of 224/82. Pt is asymptomatic. No new orders received. Per Dr. Balbuena, if pt's systolic BP is consistently at 220, notify MD again.

## 2017-12-12 NOTE — PROGRESS NOTES
Ashley Mercedes Fall Risk Assessment:     Last Known Fall: No falls  Mobility: Immobilized/requires assist of one person  Medications: Cardiovascular or central nervous system meds  Mental Status/LOC/Awareness: Awake, alert, and oriented to date, place, and person  Toileting Needs: Use of assistive device (Bedside commode, bedpan, urinal)  Volume/Electrolyte Status: No problems  Communication/Sensory: Visual (Glasses)/hearing deficit  Behavior: Behavioral noncompliance with instruction  Ashley Mercedes Fall Risk Total: 11  Fall Risk Level: MODERATE RISK    Universal Fall Precautions:  call light/belongings in reach, bed in low position and locked, siderails up x 2, use non-slip footwear, adequate lighting, clutter free and spill free environment, educate on level of risk, educate to call for assistance, wheelchairs and assistive devices out of sight    Fall Risk Level Interventions:   TRIAL (Con[facility] 8, NEURO, MED SANA 5) Low Fall Risk Interventions  Place yellow fall risk ID band on patient: completed  Provide patient/family education based on risk assessment: completed  Educate patient/family to call staff for assistance when getting out of bed: completed  Place fall precaution signage outside patient door: completedTRIAL (Conergy 8, NEURO, MED SANA 5) Moderate Fall Risk Interventions  Place yellow fall risk ID band on patient: completed  Provide patient/family education based on risk assessment : completed  Educate patient/family to call staff for assistance when getting out of bed: completed  Place fall precaution signage outside patient door: completed  Utilize bed/chair fall alarm: completed     Patient Specific Interventions:     Medication: review medications with patient and family  Mental Status/LOC/Awareness: not applicable  Toileting: monitor intake and output/use of appropriate interventions  Volume/Electrolyte Status: monitor abnormal lab values  Communication/Sensory: update plan of care on  whiteboard  Behavioral: encourage patient to voice feelings and engage patient in daily activities  Mobility: utilize bed/chair fall alarm and ensure bed is locked and in lowest position

## 2017-12-12 NOTE — PROGRESS NOTES
Received report from ESTRADA Mckenna (ER). Pt arrived to unit via [facility] with pt's belongings with Zoll monitor on. A&O x 4. No c/o pain. Pt transferred from [facility] to hospital bed via slideboard with 2 assists, tolerated well. Tele box on pt, Mika (CCT) confirmed from monitor room. Per Mika, pt's rhythm is sinus bradycardia with HR of 58. Pt oriented to call light and unit, verbalized understanding. Call light and bedside table within reach. Assessment completed. Will continue to monitor.

## 2017-12-13 ENCOUNTER — HOSPITAL ENCOUNTER (INPATIENT)
Facility: REHABILITATION | Age: 82
LOS: 14 days | DRG: 057 | End: 2017-12-27
Attending: PHYSICAL MEDICINE & REHABILITATION | Admitting: PHYSICAL MEDICINE & REHABILITATION
Payer: MEDICARE

## 2017-12-13 ENCOUNTER — RESOLUTE PROFESSIONAL BILLING HOSPITAL PROF FEE (OUTPATIENT)
Dept: PHYSICAL MEDICINE AND REHAB | Facility: REHABILITATION | Age: 82
End: 2017-12-13
Payer: MEDICARE

## 2017-12-13 VITALS
HEIGHT: 69 IN | SYSTOLIC BLOOD PRESSURE: 193 MMHG | TEMPERATURE: 97.3 F | RESPIRATION RATE: 18 BRPM | HEART RATE: 66 BPM | DIASTOLIC BLOOD PRESSURE: 91 MMHG | OXYGEN SATURATION: 96 % | BODY MASS INDEX: 27.27 KG/M2 | WEIGHT: 184.08 LBS

## 2017-12-13 PROBLEM — I63.9 LEFT SIDED CEREBRAL HEMISPHERE CEREBROVASCULAR ACCIDENT (CVA) (HCC): Status: ACTIVE | Noted: 2017-12-13

## 2017-12-13 PROBLEM — E53.8 VITAMIN B12 DEFICIENCY: Status: ACTIVE | Noted: 2017-12-13

## 2017-12-13 LAB
ANION GAP SERPL CALC-SCNC: 9 MMOL/L (ref 0–11.9)
BASOPHILS # BLD AUTO: 0.6 % (ref 0–1.8)
BASOPHILS # BLD: 0.04 K/UL (ref 0–0.12)
BUN SERPL-MCNC: 50 MG/DL (ref 8–22)
CALCIUM SERPL-MCNC: 8.7 MG/DL (ref 8.5–10.5)
CHLORIDE SERPL-SCNC: 113 MMOL/L (ref 96–112)
CO2 SERPL-SCNC: 16 MMOL/L (ref 20–33)
CREAT SERPL-MCNC: 2.32 MG/DL (ref 0.5–1.4)
EOSINOPHIL # BLD AUTO: 0.22 K/UL (ref 0–0.51)
EOSINOPHIL NFR BLD: 3.2 % (ref 0–6.9)
ERYTHROCYTE [DISTWIDTH] IN BLOOD BY AUTOMATED COUNT: 44.9 FL (ref 35.9–50)
GFR SERPL CREATININE-BSD FRML MDRD: 27 ML/MIN/1.73 M 2
GLUCOSE SERPL-MCNC: 109 MG/DL (ref 65–99)
HCT VFR BLD AUTO: 34.6 % (ref 42–52)
HGB BLD-MCNC: 11.6 G/DL (ref 14–18)
IMM GRANULOCYTES # BLD AUTO: 0.04 K/UL (ref 0–0.11)
IMM GRANULOCYTES NFR BLD AUTO: 0.6 % (ref 0–0.9)
LYMPHOCYTES # BLD AUTO: 0.98 K/UL (ref 1–4.8)
LYMPHOCYTES NFR BLD: 14.5 % (ref 22–41)
MCH RBC QN AUTO: 30 PG (ref 27–33)
MCHC RBC AUTO-ENTMCNC: 33.5 G/DL (ref 33.7–35.3)
MCV RBC AUTO: 89.4 FL (ref 81.4–97.8)
MONOCYTES # BLD AUTO: 0.56 K/UL (ref 0–0.85)
MONOCYTES NFR BLD AUTO: 8.3 % (ref 0–13.4)
NEUTROPHILS # BLD AUTO: 4.93 K/UL (ref 1.82–7.42)
NEUTROPHILS NFR BLD: 72.8 % (ref 44–72)
NRBC # BLD AUTO: 0 K/UL
NRBC BLD AUTO-RTO: 0 /100 WBC
PLATELET # BLD AUTO: 112 K/UL (ref 164–446)
PMV BLD AUTO: 11.5 FL (ref 9–12.9)
POTASSIUM SERPL-SCNC: 4.6 MMOL/L (ref 3.6–5.5)
RBC # BLD AUTO: 3.87 M/UL (ref 4.7–6.1)
SODIUM SERPL-SCNC: 138 MMOL/L (ref 135–145)
TSH SERPL DL<=0.005 MIU/L-ACNC: 3.98 UIU/ML (ref 0.3–3.7)
VIT B12 SERPL-MCNC: 188 PG/ML (ref 211–911)
WBC # BLD AUTO: 6.8 K/UL (ref 4.8–10.8)

## 2017-12-13 PROCEDURE — 80048 BASIC METABOLIC PNL TOTAL CA: CPT

## 2017-12-13 PROCEDURE — 85025 COMPLETE CBC W/AUTO DIFF WBC: CPT

## 2017-12-13 PROCEDURE — 700102 HCHG RX REV CODE 250 W/ 637 OVERRIDE(OP): Performed by: FAMILY MEDICINE

## 2017-12-13 PROCEDURE — 82607 VITAMIN B-12: CPT

## 2017-12-13 PROCEDURE — 700102 HCHG RX REV CODE 250 W/ 637 OVERRIDE(OP): Performed by: PHYSICAL MEDICINE & REHABILITATION

## 2017-12-13 PROCEDURE — 99239 HOSP IP/OBS DSCHRG MGMT >30: CPT | Performed by: FAMILY MEDICINE

## 2017-12-13 PROCEDURE — 700102 HCHG RX REV CODE 250 W/ 637 OVERRIDE(OP): Performed by: SPECIALIST

## 2017-12-13 PROCEDURE — 770010 HCHG ROOM/CARE - REHAB SEMI PRIVAT*

## 2017-12-13 PROCEDURE — A9270 NON-COVERED ITEM OR SERVICE: HCPCS | Performed by: FAMILY MEDICINE

## 2017-12-13 PROCEDURE — 84443 ASSAY THYROID STIM HORMONE: CPT

## 2017-12-13 PROCEDURE — 700111 HCHG RX REV CODE 636 W/ 250 OVERRIDE (IP): Performed by: FAMILY MEDICINE

## 2017-12-13 PROCEDURE — 94760 N-INVAS EAR/PLS OXIMETRY 1: CPT

## 2017-12-13 PROCEDURE — 92526 ORAL FUNCTION THERAPY: CPT

## 2017-12-13 PROCEDURE — A9270 NON-COVERED ITEM OR SERVICE: HCPCS | Performed by: INTERNAL MEDICINE

## 2017-12-13 PROCEDURE — 36415 COLL VENOUS BLD VENIPUNCTURE: CPT

## 2017-12-13 PROCEDURE — A9270 NON-COVERED ITEM OR SERVICE: HCPCS | Performed by: SPECIALIST

## 2017-12-13 PROCEDURE — 700102 HCHG RX REV CODE 250 W/ 637 OVERRIDE(OP): Performed by: INTERNAL MEDICINE

## 2017-12-13 PROCEDURE — A9270 NON-COVERED ITEM OR SERVICE: HCPCS | Performed by: PHYSICAL MEDICINE & REHABILITATION

## 2017-12-13 RX ORDER — HYDRALAZINE HYDROCHLORIDE 25 MG/1
25 TABLET, FILM COATED ORAL EVERY 8 HOURS PRN
Status: DISCONTINUED | OUTPATIENT
Start: 2017-12-13 | End: 2017-12-27 | Stop reason: HOSPADM

## 2017-12-13 RX ORDER — AMOXICILLIN 250 MG
2 CAPSULE ORAL 2 TIMES DAILY
Status: DISCONTINUED | OUTPATIENT
Start: 2017-12-13 | End: 2017-12-18

## 2017-12-13 RX ORDER — ASPIRIN 300 MG/1
300 SUPPOSITORY RECTAL DAILY
Status: CANCELLED | OUTPATIENT
Start: 2017-12-14

## 2017-12-13 RX ORDER — ENALAPRIL MALEATE 5 MG/1
10 TABLET ORAL TWICE DAILY
Status: DISCONTINUED | OUTPATIENT
Start: 2017-12-13 | End: 2017-12-27

## 2017-12-13 RX ORDER — POLYETHYLENE GLYCOL 3350 17 G/17G
1 POWDER, FOR SOLUTION ORAL
Status: DISCONTINUED | OUTPATIENT
Start: 2017-12-13 | End: 2017-12-18

## 2017-12-13 RX ORDER — FERROUS SULFATE 325(65) MG
325 TABLET ORAL EVERY MORNING
Status: DISCONTINUED | OUTPATIENT
Start: 2017-12-14 | End: 2017-12-27 | Stop reason: HOSPADM

## 2017-12-13 RX ORDER — FINASTERIDE 5 MG/1
5 TABLET, FILM COATED ORAL DAILY
Status: DISCONTINUED | OUTPATIENT
Start: 2017-12-14 | End: 2017-12-18

## 2017-12-13 RX ORDER — ONDANSETRON 2 MG/ML
4 INJECTION INTRAMUSCULAR; INTRAVENOUS 4 TIMES DAILY PRN
Status: DISCONTINUED | OUTPATIENT
Start: 2017-12-13 | End: 2017-12-27 | Stop reason: HOSPADM

## 2017-12-13 RX ORDER — TIOTROPIUM BROMIDE 18 UG/1
1 CAPSULE ORAL; RESPIRATORY (INHALATION)
Status: CANCELLED | OUTPATIENT
Start: 2017-12-14

## 2017-12-13 RX ORDER — CHOLECALCIFEROL (VITAMIN D3) 125 MCG
1000 CAPSULE ORAL DAILY
Status: CANCELLED | OUTPATIENT
Start: 2017-12-14

## 2017-12-13 RX ORDER — ASPIRIN 81 MG/1
324 TABLET, CHEWABLE ORAL DAILY
Status: DISCONTINUED | OUTPATIENT
Start: 2017-12-14 | End: 2017-12-13

## 2017-12-13 RX ORDER — ASPIRIN 300 MG/1
300 SUPPOSITORY RECTAL DAILY
Status: DISCONTINUED | OUTPATIENT
Start: 2017-12-14 | End: 2017-12-13

## 2017-12-13 RX ORDER — ASPIRIN 325 MG
325 TABLET ORAL DAILY
Status: DISCONTINUED | OUTPATIENT
Start: 2017-12-14 | End: 2017-12-27 | Stop reason: HOSPADM

## 2017-12-13 RX ORDER — CLOPIDOGREL BISULFATE 75 MG/1
75 TABLET ORAL EVERY MORNING
Status: CANCELLED | OUTPATIENT
Start: 2017-12-14

## 2017-12-13 RX ORDER — ALLOPURINOL 100 MG/1
100 TABLET ORAL DAILY
Status: DISCONTINUED | OUTPATIENT
Start: 2017-12-14 | End: 2017-12-27 | Stop reason: HOSPADM

## 2017-12-13 RX ORDER — OXYCODONE HYDROCHLORIDE 5 MG/1
5 TABLET ORAL
Status: DISCONTINUED | OUTPATIENT
Start: 2017-12-13 | End: 2017-12-18

## 2017-12-13 RX ORDER — ASPIRIN 325 MG
325 TABLET ORAL DAILY
Qty: 100 TAB | Status: ON HOLD
Start: 2017-12-14 | End: 2018-01-03

## 2017-12-13 RX ORDER — TRAZODONE HYDROCHLORIDE 50 MG/1
50 TABLET ORAL
Status: DISCONTINUED | OUTPATIENT
Start: 2017-12-13 | End: 2017-12-27 | Stop reason: HOSPADM

## 2017-12-13 RX ORDER — TIOTROPIUM BROMIDE 18 UG/1
1 CAPSULE ORAL; RESPIRATORY (INHALATION)
Status: DISCONTINUED | OUTPATIENT
Start: 2017-12-14 | End: 2017-12-15 | Stop reason: SDDI

## 2017-12-13 RX ORDER — POLYVINYL ALCOHOL 14 MG/ML
1 SOLUTION/ DROPS OPHTHALMIC PRN
Status: DISCONTINUED | OUTPATIENT
Start: 2017-12-13 | End: 2017-12-27 | Stop reason: HOSPADM

## 2017-12-13 RX ORDER — CYANOCOBALAMIN 1000 UG/ML
1000 INJECTION, SOLUTION INTRAMUSCULAR; SUBCUTANEOUS ONCE
Status: COMPLETED | OUTPATIENT
Start: 2017-12-13 | End: 2017-12-13

## 2017-12-13 RX ORDER — FINASTERIDE 5 MG/1
5 TABLET, FILM COATED ORAL DAILY
Status: CANCELLED | OUTPATIENT
Start: 2017-12-14

## 2017-12-13 RX ORDER — ECHINACEA PURPUREA EXTRACT 125 MG
2 TABLET ORAL PRN
Status: DISCONTINUED | OUTPATIENT
Start: 2017-12-13 | End: 2017-12-27 | Stop reason: HOSPADM

## 2017-12-13 RX ORDER — FERROUS SULFATE 325(65) MG
325 TABLET ORAL EVERY MORNING
Status: CANCELLED | OUTPATIENT
Start: 2017-12-14

## 2017-12-13 RX ORDER — ALLOPURINOL 100 MG/1
100 TABLET ORAL DAILY
Status: CANCELLED | OUTPATIENT
Start: 2017-12-14

## 2017-12-13 RX ORDER — BISACODYL 10 MG
10 SUPPOSITORY, RECTAL RECTAL
Status: DISCONTINUED | OUTPATIENT
Start: 2017-12-13 | End: 2017-12-18

## 2017-12-13 RX ORDER — ACETAMINOPHEN 325 MG/1
650 TABLET ORAL EVERY 4 HOURS PRN
Status: DISCONTINUED | OUTPATIENT
Start: 2017-12-13 | End: 2017-12-27 | Stop reason: HOSPADM

## 2017-12-13 RX ORDER — ATORVASTATIN CALCIUM 10 MG/1
10 TABLET, FILM COATED ORAL EVERY EVENING
Status: DISCONTINUED | OUTPATIENT
Start: 2017-12-13 | End: 2017-12-27 | Stop reason: HOSPADM

## 2017-12-13 RX ORDER — CLOPIDOGREL BISULFATE 75 MG/1
75 TABLET ORAL EVERY MORNING
Status: DISCONTINUED | OUTPATIENT
Start: 2017-12-14 | End: 2017-12-27 | Stop reason: HOSPADM

## 2017-12-13 RX ORDER — ONDANSETRON 4 MG/1
4 TABLET, ORALLY DISINTEGRATING ORAL 4 TIMES DAILY PRN
Status: DISCONTINUED | OUTPATIENT
Start: 2017-12-13 | End: 2017-12-27 | Stop reason: HOSPADM

## 2017-12-13 RX ORDER — OXYCODONE HYDROCHLORIDE 10 MG/1
10 TABLET ORAL
Status: DISCONTINUED | OUTPATIENT
Start: 2017-12-13 | End: 2017-12-18

## 2017-12-13 RX ORDER — CHOLECALCIFEROL (VITAMIN D3) 125 MCG
1000 CAPSULE ORAL DAILY
Status: DISCONTINUED | OUTPATIENT
Start: 2017-12-13 | End: 2017-12-13 | Stop reason: HOSPADM

## 2017-12-13 RX ORDER — ASPIRIN 81 MG/1
324 TABLET, CHEWABLE ORAL DAILY
Status: CANCELLED | OUTPATIENT
Start: 2017-12-14

## 2017-12-13 RX ORDER — ALUMINA, MAGNESIA, AND SIMETHICONE 2400; 2400; 240 MG/30ML; MG/30ML; MG/30ML
20 SUSPENSION ORAL
Status: DISCONTINUED | OUTPATIENT
Start: 2017-12-13 | End: 2017-12-27 | Stop reason: HOSPADM

## 2017-12-13 RX ORDER — ASPIRIN 325 MG
325 TABLET ORAL DAILY
Status: CANCELLED | OUTPATIENT
Start: 2017-12-14

## 2017-12-13 RX ORDER — ATORVASTATIN CALCIUM 10 MG/1
10 TABLET, FILM COATED ORAL EVERY EVENING
Status: CANCELLED | OUTPATIENT
Start: 2017-12-13

## 2017-12-13 RX ADMIN — ALLOPURINOL 100 MG: 100 TABLET ORAL at 09:48

## 2017-12-13 RX ADMIN — ASPIRIN 325 MG: 325 TABLET, COATED ORAL at 09:48

## 2017-12-13 RX ADMIN — CYANOCOBALAMIN 1000 MCG: 1000 INJECTION, SOLUTION INTRAMUSCULAR; SUBCUTANEOUS at 09:47

## 2017-12-13 RX ADMIN — Medication 325 MG: at 09:48

## 2017-12-13 RX ADMIN — CLOPIDOGREL 75 MG: 75 TABLET, FILM COATED ORAL at 09:48

## 2017-12-13 RX ADMIN — VITAMIN D, TAB 1000IU (100/BT) 4000 UNITS: 25 TAB at 09:48

## 2017-12-13 RX ADMIN — CYANOCOBALAMIN TAB 500 MCG 1000 MCG: 500 TAB at 09:47

## 2017-12-13 RX ADMIN — FINASTERIDE 5 MG: 5 TABLET, FILM COATED ORAL at 09:48

## 2017-12-13 RX ADMIN — HYDRALAZINE HYDROCHLORIDE 25 MG: 25 TABLET, FILM COATED ORAL at 20:36

## 2017-12-13 RX ADMIN — ATORVASTATIN CALCIUM 10 MG: 10 TABLET, FILM COATED ORAL at 19:51

## 2017-12-13 RX ADMIN — ENALAPRIL MALEATE 10 MG: 5 TABLET ORAL at 17:56

## 2017-12-13 ASSESSMENT — ENCOUNTER SYMPTOMS
WHEEZING: 0
CHILLS: 0
FEVER: 0
HEARTBURN: 0
BLURRED VISION: 0
SORE THROAT: 0
COUGH: 0
VOMITING: 0
NAUSEA: 0
ABDOMINAL PAIN: 0
WEAKNESS: 1
SHORTNESS OF BREATH: 0
DIARRHEA: 0
FOCAL WEAKNESS: 1
DIZZINESS: 0
SPEECH CHANGE: 1

## 2017-12-13 ASSESSMENT — PAIN SCALES - GENERAL
PAINLEVEL_OUTOF10: 0

## 2017-12-13 ASSESSMENT — LIFESTYLE VARIABLES
EVER_SMOKED: YES
EVER_SMOKED: YES
ALCOHOL_USE: NO

## 2017-12-13 ASSESSMENT — PATIENT HEALTH QUESTIONNAIRE - PHQ9
2. FEELING DOWN, DEPRESSED, IRRITABLE, OR HOPELESS: NOT AT ALL
1. LITTLE INTEREST OR PLEASURE IN DOING THINGS: NOT AT ALL
SUM OF ALL RESPONSES TO PHQ9 QUESTIONS 1 AND 2: 0
SUM OF ALL RESPONSES TO PHQ QUESTIONS 1-9: 0

## 2017-12-13 NOTE — PROGRESS NOTES
Assumed care at 0700, bedside report from NOC shift RN. Patient is A&O x 4 with no signs of distress. inital assessment completed, orders reviewed, call light is with in reach, and hourly rounding initiated. POC addressed with patient, no additional questions at this time.

## 2017-12-13 NOTE — DISCHARGE SUMMARY
Hospital Medicine Discharge Note     Admit Date:  12/11/2017       Discharge Date:   12/13/2017    Attending Physician:  Bal Spicer     Diagnoses (includes active and resolved):   Principal Problem:    CVA (cerebral vascular accident) (CMS-HCC) POA: Unknown  Active Problems:    HTN (hypertension) POA: Yes    DM (diabetes mellitus) (CMS-HCC) POA: Yes    COPD (chronic obstructive pulmonary disease) (CMS-HCC) POA: Yes    Peripheral vascular disease (CMS-HCC) POA: Yes    Carotid artery stenosis POA: Yes    Subclavian artery stenosis, left (CMS-HCC) POA: Yes    CKD (chronic kidney disease) stage 4, GFR 15-29 ml/min (CMS-HCC) POA: Yes    Chronic combined systolic and diastolic CHF (congestive heart failure) (CMS-HCC) POA: Yes    Cardiomyopathy (CMS-HCC) POA: Yes    Lung cancer (CMS-HCC) POA: Yes    Thrombocytopenia (CMS-HCC) POA: No    Dysphagia due to recent cerebrovascular accident (CVA) POA: Yes    Vitamin B12 deficiency POA: Yes    Dyslipidemia POA: Yes  Resolved Problems:    * No resolved hospital problems. *      Hospital Summary (Brief Narrative):       84-year-old white male who came in with right-sided weakness, he was noted to have right-sided facial weakness and right upper extremity weakness with mild dysarthria and dysphagia. Workup showed an acute infarct on MRI also showed old infarcts. He was working Plavix, neurology recommended addition of aspirin. He does have carotid artery stenosis and subclavian artery stenosis and he can follow-up with vascular surgery for this. Patient also with recent diagnosis of lung cancer, radiation oncology has seen the patient for possible scheduling of radiation. His medical his other medical problems were not acute issues during his stay. He will need continued therapy with physical occupational and speech therapy will be discharged to a rehab facility.     Consultants:      Neurology - Luann  Radiation Oncology - Shey    Procedures:        None    Discharge  Medications:        Medication Reconciliation Completed     Medication List      START taking these medications      Instructions   aspirin 325 MG Tabs  Start taking on:  12/14/2017  Commonly known as:  ASA   Take 1 Tab by mouth every day.  Dose:  325 mg     cyanocobalamin 1000 MCG Tabs  Start taking on:  12/14/2017  Commonly known as:  VITAMIN B12   Take 1 Tab by mouth every day.  Dose:  1000 mcg        CONTINUE taking these medications      Instructions   allopurinol 100 MG Tabs  Commonly known as:  ZYLOPRIM   Take 100 mg by mouth every day.  Dose:  100 mg     atorvastatin 10 MG Tabs  Commonly known as:  LIPITOR   Take 10 mg by mouth every evening.  Dose:  10 mg     carvedilol 12.5 MG Tabs  Commonly known as:  COREG   Doctor's comments:  Increasing carvedilol to 12.5 Mg twice a day  Take 1 Tab by mouth 2 times a day, with meals.  Dose:  12.5 mg     clopidogrel 75 MG Tabs  Commonly known as:  PLAVIX   Take 1 Tab by mouth every morning.  Dose:  75 mg     enalapril 10 MG Tabs  Commonly known as:  VASOTEC   Take 2 Tabs by mouth 2 times a day.  Dose:  20 mg     finasteride 5 MG Tabs  Commonly known as:  PROSCAR   Take 5 mg by mouth every day.  Dose:  5 mg     Iron 325 (65 Fe) MG Tabs   Take 1 Tab by mouth every morning.  Dose:  1 Tab     tamsulosin 0.4 MG capsule  Commonly known as:  FLOMAX   Take 0.4 mg by mouth ONE-HALF HOUR AFTER BREAKFAST.  Dose:  0.4 mg     terazosin 10 MG capsule  Commonly known as:  HYTRIN   Take 10 mg by mouth every evening.  Dose:  10 mg     vitamin D 2000 UNIT Tabs   Take 4,000 Units by mouth every day.  Dose:  4000 Units        STOP taking these medications    furosemide 20 MG Tabs  Commonly known as:  LASIX              Disposition:  Rehab    Diet:   Dysphagia 2 nectar thick liquid    Activity:   As tolerated    Code status:  Full    Primary Care Provider:    Kirill Fabian D.O.    Follow up appointment details :        [facility]    In 4 weeks      Dale Kat M.D.  [id number] [place] [address]  [phone]          Future Appointments  Date Time Provider Department Center   1/9/2018 11:00 AM ROGERIO Alves None       Pending Studies:        None    Time spent on discharge day patient visit: 35 minutes    #################################################      Most Recent Labs:    Lab Results   Component Value Date/Time    WBC 6.8 12/13/2017 04:08 AM    RBC 3.87 (L) 12/13/2017 04:08 AM    HEMOGLOBIN 11.6 (L) 12/13/2017 04:08 AM    HEMATOCRIT 34.6 (L) 12/13/2017 04:08 AM    MCV 89.4 12/13/2017 04:08 AM    MCH 30.0 12/13/2017 04:08 AM    MCHC 33.5 (L) 12/13/2017 04:08 AM    MPV 11.5 12/13/2017 04:08 AM    NEUTSPOLYS 72.80 (H) 12/13/2017 04:08 AM    LYMPHOCYTES 14.50 (L) 12/13/2017 04:08 AM    MONOCYTES 8.30 12/13/2017 04:08 AM    EOSINOPHILS 3.20 12/13/2017 04:08 AM    BASOPHILS 0.60 12/13/2017 04:08 AM    HYPOCHROMIA 1+ 04/30/2011 12:45 PM    ANISOCYTOSIS 1+ 06/09/2017 02:07 PM      Lab Results   Component Value Date/Time    SODIUM 138 12/13/2017 04:08 AM    POTASSIUM 4.6 12/13/2017 04:08 AM    CHLORIDE 113 (H) 12/13/2017 04:08 AM    CO2 16 (L) 12/13/2017 04:08 AM    GLUCOSE 109 (H) 12/13/2017 04:08 AM    BUN 50 (H) 12/13/2017 04:08 AM    CREATININE 2.32 (H) 12/13/2017 04:08 AM    CREATININE 1.6 (H) 12/31/2007 09:30 AM      Lab Results   Component Value Date/Time    ALTSGPT 36 12/11/2017 04:47 PM    ASTSGOT 31 12/11/2017 04:47 PM    ALKPHOSPHAT 56 12/11/2017 04:47 PM    TBILIRUBIN 0.6 12/11/2017 04:47 PM    LIPASE 11 04/27/2017 01:51 PM    ALBUMIN 3.6 12/11/2017 04:47 PM    ALBUMIN 3.63 (L) 12/31/2007 01:00 PM    GLOBULIN 3.2 12/11/2017 04:47 PM    INR 1.14 (H) 12/11/2017 04:47 PM    MACROCYTOSIS 1+ 01/02/2008 05:35 AM     Lab Results   Component Value Date/Time    PROTHROMBTM 14.3 12/11/2017 04:47 PM    INR 1.14 (H) 12/11/2017 04:47 PM        Imaging/ Testing:      MR-BRAIN-W/O   Final Result         1. Age-related cerebral atrophy.      2. Mild periventricular  and pontine white matter changes consistent with chronic microvascular ischemic gliosis.      3. Acute area of infarction in the left posterior frontal periventricular white matter which extends to the adjacent sylvian fissure.      4. Small old areas of lacunar type infarction involving the left globus pallidus and right thalamus.      Echocardiogram Comp W/O cont   Final Result      CT-CTA HEAD WITH & W/O-POST PROCESS   Final Result      No evidence of large vessel occlusion.      Coarse calcified plaque in bilateral intracranial internal carotid arteries      DX-CHEST-PORTABLE (1 VIEW)   Final Result         No acute cardiac or pulmonary abnormality is identified.      CT-CTA NECK WITH & W/O-POST PROCESSING   Final Result      1.  Significant calcified atherosclerotic plaque is noted in the aorta and its branch vessels.      2.  50% to 70% diameter reduction stenosis noted in the internal carotid arteries bilaterally.      3.  90% diameter reduction stenosis in the left subclavian artery.      CT-CEREBRAL PERFUSION ANALYSIS   Final Result      1.  CT perfusion examination over the limited section of brain reveals 0.0 mL of brain parenchyma has less than 30% of cerebral blood flow (CBF).      2.  Please note that the cerebral perfusion was performed on the limited brain tissue around the basal ganglia region. Infarct/ischemia outside the CT perfusion sections can be missed in this study.      CT-HEAD W/O   Final Result         NO ACUTE ABNORMALITIES ARE NOTED ON CT SCAN OF THE HEAD.      Findings are consistent with atrophy.  Decreased attenuation in the periventricular white matter likely indicates microvascular ischemic disease.          Instructions:      The patient was instructed to return to the ER in the event of worsening symptoms. I have counseled the patient on the importance of compliance and the patient has agreed to proceed with all medical recommendations and follow up plan indicated above.   The  patient understands that all medications come with benefits and risks. Risks may include permanent injury or death and these risks can be minimized with close reassessment and monitoring.

## 2017-12-13 NOTE — PROGRESS NOTES
[facility] Hospitalist Progress Note    Date of Service: 2017    Chief Complaint  84 y.o. male admitted 2017 with Stroke.    Interval Problem Update  Stroke - residual right-sided weakness, MRI reviewed with Neurology  HTN - not controlled  Diabetes - controlled  Lung CA - recent diagnosis  Low B12    Consultants/Specialty  Neuro - [facility]  Rehab consulted    Disposition  Rehab vs SNF        Review of Systems   Constitutional: Positive for malaise/fatigue. Negative for chills and fever.   HENT: Negative for hearing loss and sore throat.    Eyes: Negative for blurred vision.   Respiratory: Negative for cough, shortness of breath and wheezing.    Gastrointestinal: Negative for abdominal pain, diarrhea, heartburn, nausea and vomiting.   Genitourinary: Negative for dysuria.   Neurological: Positive for speech change, focal weakness and weakness. Negative for dizziness.      Physical Exam  Laboratory/Imaging   Hemodynamics  Temp (24hrs), Av.5 °C (97.7 °F), Min:36.3 °C (97.3 °F), Max:37 °C (98.6 °F)   Temperature: 36.3 °C (97.3 °F)  Pulse  Av.9  Min: 55  Max: 77   Blood Pressure : 141/81      Respiratory      Respiration: 18, Pulse Oximetry: 97 %        RUL Breath Sounds: Diminished, RML Breath Sounds: Diminished, RLL Breath Sounds: Diminished, MILES Breath Sounds: Diminished, LLL Breath Sounds: Diminished    Fluids    Intake/Output Summary (Last 24 hours) at 17 1241  Last data filed at 17 1100   Gross per 24 hour   Intake                0 ml   Output             1150 ml   Net            -1150 ml       Nutrition  Orders Placed This Encounter   Procedures   • DIET ORDER     Standing Status:   Standing     Number of Occurrences:   1     Order Specific Question:   Diet:     Answer:   Cardiac [6]     Order Specific Question:   Texture/Fiber modifications:     Answer:   Dysphagia 2(Pureed/Chopped)specify fluid consistency(question 6) [2]     Order Specific Question:   Consistency/Fluid modifications:      Answer:   Nectar Thick [2]     Physical Exam   Constitutional: He is oriented to person, place, and time. He appears well-developed and well-nourished.   HENT:   Head: Normocephalic and atraumatic.   Eyes: Conjunctivae and EOM are normal. Pupils are equal, round, and reactive to light.   Neck: No tracheal deviation present. No thyromegaly present.   Cardiovascular: Normal rate and regular rhythm.    Pulmonary/Chest: Effort normal and breath sounds normal.   Abdominal: Soft. Bowel sounds are normal. He exhibits no distension. There is no tenderness.   Lymphadenopathy:     He has no cervical adenopathy.   Neurological: He is alert and oriented to person, place, and time.   Mild right facial droop  Mild dysarthria  MMT LUE and LLE 5/5, RUE 2-3/5, RLE 2/5      Skin: Skin is warm and dry.   Nursing note and vitals reviewed.      Recent Labs      12/11/17 1647 12/12/17 0225  12/13/17   0408   WBC  7.0  5.6  6.8   RBC  3.97*  3.68*  3.87*   HEMOGLOBIN  11.9*  11.3*  11.6*   HEMATOCRIT  36.1*  33.2*  34.6*   MCV  90.9  90.2  89.4   MCH  30.0  30.7  30.0   MCHC  33.0*  34.0  33.5*   RDW  46.9  46.2  44.9   PLATELETCT  118*  98*  112*   MPV  11.6  10.9  11.5     Recent Labs      12/11/17 1647 12/12/17 0225  12/13/17   0408   SODIUM  138  137  138   POTASSIUM  5.3  4.4  4.6   CHLORIDE  113*  113*  113*   CO2  17*  16*  16*   GLUCOSE  97  163*  109*   BUN  50*  48*  50*   CREATININE  2.40*  2.40*  2.32*   CALCIUM  9.5  8.6  8.7     Recent Labs      12/11/17 1647   APTT  32.1   INR  1.14*         Recent Labs      12/12/17 0225   TRIGLYCERIDE  121   HDL  30*   LDL  37          Assessment/Plan     * CVA (cerebral vascular accident) (CMS-[facility])   Assessment & Plan    ASA, Plavix, Lipitor  PT, OT, Speech          Vitamin B12 deficiency- (present on admission)   Assessment & Plan    IM B12 today, start oral b12        Dysphagia due to recent cerebrovascular accident (CVA)- (present on admission)   Assessment & Plan     Dysphagia 2, NTL   Speech to follow        Thrombocytopenia (CMS-HCC)   Assessment & Plan    Follow cbc, hold heparin        Lung cancer (CMS-HCC)- (present on admission)   Assessment & Plan    Radiation Oncology        Cardiomyopathy (CMS-HCC)- (present on admission)   Assessment & Plan    Resume Coreg soon        Chronic combined systolic and diastolic CHF (congestive heart failure) (CMS-HCC)- (present on admission)   Assessment & Plan    Resume Coreg soon        CKD (chronic kidney disease) stage 4, GFR 15-29 ml/min (CMS-HCC)- (present on admission)   Assessment & Plan    Follow bmp          Subclavian artery stenosis, left (CMS-HCC)- (present on admission)   Assessment & Plan    ASA, Plavix  ffup with Vascular surgery as outpt        Carotid artery stenosis- (present on admission)   Assessment & Plan     ASA, Plavix        Peripheral vascular disease (CMS-HCC)- (present on admission)   Assessment & Plan    ASA, Plavix        COPD (chronic obstructive pulmonary disease) (CMS-HCC)- (present on admission)   Assessment & Plan    Spiriva, RT protocol        DM (diabetes mellitus) (CMS-HCC)- (present on admission)   Assessment & Plan    Diet controlled?  check [id number]        HTN (hypertension)- (present on admission)   Assessment & Plan    Permissive HTN, resume Coreg soon        Dyslipidemia- (present on admission)   Assessment & Plan    Lipitor            Reviewed items::  Radiology images reviewed, Medications reviewed, Labs reviewed and EKG reviewed  Owens catheter::  No Owens  DVT prophylaxis - mechanical:  SCDs  Ulcer Prophylaxis::  No

## 2017-12-13 NOTE — PROGRESS NOTES
Neurology Progress Note               Author: TOMMY Kong Date & Time created: 2017  7:53 AM     Interval History:  No new events, deficit is unchanged.  MRI shows a subcortical L hemisphere small stroke.    Review of Systems:  ROS    Physical Exam:  Physical Exam   Neurological:   Alert, dysarthric.  Again has moderate right arm>leg weakness.  Sensation is intact.  DTR are increased R>L.       Labs:        Invalid input(s): [id number]  Recent Labs      17   TROPONINI  <0.01     Recent Labs      17   040   SODIUM  138  137  138   POTASSIUM  5.3  4.4  4.6   CHLORIDE  113*  113*  113*   CO2  17*  16*  16*   BUN  50*  48*  50*   CREATININE  2.40*  2.40*  2.32*   CALCIUM  9.5  8.6  8.7     Recent Labs      178   ALTSGPT  36   --    --    ASTSGOT  31   --    --    ALKPHOSPHAT  56   --    --    TBILIRUBIN  0.6   --    --    GLUCOSE  97  163*  109*     Recent Labs      17   RBC  3.97*  3.68*  3.87*   HEMOGLOBIN  11.9*  11.3*  11.6*   HEMATOCRIT  36.1*  33.2*  34.6*   PLATELETCT  118*  98*  112*   PROTHROMBTM  14.3   --    --    APTT  32.1   --    --    INR  1.14*   --    --      Recent Labs      178   WBC  7.0  5.6  6.8   NEUTSPOLYS  78.90*   --   72.80*   LYMPHOCYTES  12.30*   --   14.50*   MONOCYTES  6.00   --   8.30   EOSINOPHILS  2.00   --   3.20   BASOPHILS  0.40   --   0.60   ASTSGOT  31   --    --    ALTSGPT  36   --    --    ALKPHOSPHAT  56   --    --    TBILIRUBIN  0.6   --    --      Recent Labs      178   SODIUM  138  137  138   POTASSIUM  5.3  4.4  4.6   CHLORIDE  113*  113*  113*   CO2  17*  16*  16*   GLUCOSE  97  163*  109*   BUN  50*  48*  50*   CREATININE  2.40*  2.40*  2.32*   CALCIUM  9.5  8.6  8.7     Hemodynamics:  Temp (24hrs), Av.4 °C (97.6 °F),  Min:36.3 °C (97.3 °F), Max:36.7 °C (98.1 °F)  Temperature: 36.6 °C (97.8 °F)  Pulse  Av  Min: 55  Max: 77  Blood Pressure : 149/61     Respiratory:    Respiration: 18, Pulse Oximetry: 96 %        RUL Breath Sounds: Diminished, RML Breath Sounds: Diminished, RLL Breath Sounds: Diminished, MILES Breath Sounds: Diminished, LLL Breath Sounds: Diminished  Fluids:    Intake/Output Summary (Last 24 hours) at 17 0753  Last data filed at 17 0637   Gross per 24 hour   Intake                0 ml   Output             1100 ml   Net            -1100 ml     Weight: 83.5 kg (184 lb 1.4 oz)  GI/Nutrition:  Orders Placed This Encounter   Procedures   • DIET ORDER     Standing Status:   Standing     Number of Occurrences:   1     Order Specific Question:   Diet:     Answer:   Cardiac [6]     Order Specific Question:   Texture/Fiber modifications:     Answer:   Dysphagia 2(Pureed/Chopped)specify fluid consistency(question 6) [2]     Order Specific Question:   Consistency/Fluid modifications:     Answer:   Nectar Thick [2]     Medical Decision Making, by Problem:  Active Hospital Problems    Diagnosis   • *CVA (cerebral vascular accident) (CMS-HCC) [I63.9]   • CKD (chronic kidney disease) stage 4, GFR 15-29 ml/min (CMS-HCC) [N18.4]   • Chronic combined systolic and diastolic CHF (congestive heart failure) (CMS-HCC) [I50.42]   • Cardiomyopathy (CMS-HCC) [I42.9]   • Lung cancer (CMS-HCC) [C34.90]   • Thrombocytopenia (CMS-HCC) [D69.6]   • Dysphagia due to recent cerebrovascular accident (CVA) [I69.391]   • Subclavian artery stenosis, left (CMS-HCC) [I77.1]   • Carotid artery stenosis [I65.29]   • COPD (chronic obstructive pulmonary disease) (CMS-HCC) [J44.9]   • Peripheral vascular disease (CMS-HCC) [I73.9]   • HTN (hypertension) [I10]   • DM (diabetes mellitus) (CMS-HCC) [E11.9]   • Dyslipidemia [E78.5]       Plan:  Stroke likely small vessel.  Likely not related to carotid stenosis.  On asa and plavix, continue both for  one month then stop the asa.  Ok for rehab.    Quality-Core Measures

## 2017-12-13 NOTE — DISCHARGE PLANNING
Rehab [facility] following. Physiatry consult ordered per protocol s/p completion of MRI. Will follow for consult recommendation. Additionally, will await clarification of Radiation Oncology treatment plan.

## 2017-12-13 NOTE — CONSULTS
Medical chart review completed. Patient is a 84 y.o. year-old left male  with  a past medical history significant for  Previous stroke, urinary incontinence, AODM, emphysema , PVD, cooronary artery disease, hypercholesterolemia, CHF, back and AVM of colon admitted to [facility]  On 12/11/2017  4:03 PM  with confusion and   mild symptoms of R sided weakness but seem to be intermittent and resolving until 230PM, he had R arm weakness and R leg weakness. He did not fall but felt his R leg is stiff. He also had slight slurring of speech and slight facial droop on the right    CTA of head and neck revealed    No evidence of large vessel occlusion.    Coarse calcified plaque in bilateral intracranial internal carotid arteries    1.  Significant calcified atherosclerotic plaque is noted in the aorta and its branch vessels.    2.  50% to 70% diameter reduction stenosis noted in the internal carotid arteries bilaterally.    3.  90% diameter reduction stenosis in the left subclavian artery.      MrI of head revealed      1. Age-related cerebral atrophy.    2. Mild periventricular and pontine white matter changes consistent with chronic microvascular ischemic gliosis.    3. Acute area of infarction in the left posterior frontal periventricular white matter which extends to the adjacent sylvian fissure.    4. Small old areas of lacunar type infarction involving the left globus pallidus and right thalamus.      PM&R has been consulted to assess for rehabilitative inteventions  PMH:  Past Medical History:   Diagnosis Date   • AVM (arteriovenous malformation) of colon    • Back pain    • Chickenpox    • Chronic airway obstruction, not elsewhere classified    • Congestive heart failure (CMS-HCC)    • Diabetes    • Diphtheria    • Emphysema of lung (CMS-HCC)    • Hypercholesteremia    • Hypertension    • Infectious disease    • Other and unspecified angina pectoris    • PVD (peripheral vascular disease) (CMS-HCC)    •  Snoring    • Stroke (CMS-[facility]) 2015   • Unspecified cataract     right eye    • Urinary bladder disorder    • Urinary incontinence        PSH:  Past Surgical History:   Procedure Laterality Date   • COLONOSCOPY  6/17/2015    Procedure: COLONOSCOPY;  Surgeon: Eddie Levin M.D.;  Location: SURGERY [facility];  Service:    • GASTROSCOPY  6/17/2015    Procedure: GASTROSCOPY W/APC;  Surgeon: Eddie Levin M.D.;  Location: SURGERY [facility];  Service:    • GASTROSCOPY WITH APC  11/1/2014    Performed by Eddie Levin M.D. at ENDOSCOPY [facility]   • COLONOSCOPY WITH APC  11/1/2014    Performed by Eddie Levin M.D. at ENDOSCOPY [facility]   • GASTROSCOPY-ENDO  5/1/2011    Performed by HOLLY VASQUEZ at ENDOSCOPY [facility]   • COLONOSCOPY - ENDO  5/1/2011    Performed by HOLLY VASQUEZ at ENDOSCOPY [facility]   • COLONOSCOPY WITH APC  4/21/2011    Performed by EDMUND CHENG at SURGERY [facility]   • GASTROSCOPY-ENDO  4/21/2011    Performed by EDMUND CHENG at SURGERY [facility]   • COLONOSCOPY WITH APC  9/28/2010    Performed by EDMUND CHENG at [facility]   • GASTROSCOPY WITH APC  9/28/2010    Performed by EDMUND CHENG at [facility]   • AORTOFEMORAL BYPASS  1991    Both legs       FAMILY HISTORY:  Non-contributory    MEDICATIONS:  Current Facility-Administered Medications   Medication Dose   • cyanocobalamin (VITAMIN B-12) tablet 1,000 mcg  1,000 mcg   • aspirin (ASA) tablet 325 mg  325 mg    Or   • aspirin (ASA) chewable tab 324 mg  324 mg    Or   • aspirin (ASA) suppository 300 mg  300 mg   • allopurinol (ZYLOPRIM) tablet 100 mg  100 mg   • atorvastatin (LIPITOR) tablet 10 mg  10 mg   • vitamin D (cholecalciferol) tablet 4,000 Units  4,000 Units   • clopidogrel (PLAVIX) tablet 75 mg  75 mg   • ferrous sulfate tablet 325 mg  325 mg   • finasteride (PROSCAR) tablet 5 mg  5 mg   •  senna-docusate (PERICOLACE or SENOKOT S) 8.6-50 MG per tablet 2 Tab  2 Tab    And   • polyethylene glycol/lytes (MIRALAX) PACKET 1 Packet  1 Packet    And   • magnesium hydroxide (MILK OF MAGNESIA) suspension 30 mL  30 mL    And   • bisacodyl (DULCOLAX) suppository 10 mg  10 mg   • tiotropium (SPIRIVA) 18 MCG inhalation capsule 1 Cap  1 Cap   • Respiratory Care per Protocol         ALLERGIES:  Nkda [no known drug allergy]    PSYCHOSOCIAL HISTORY:  Living Site:  Home  Living With:  self  Caregiver's availability:  BY report children and ex-wife are available as support  Number of stairs: Full flight  Substance use history:  80 pack year smoking history  Quit 30 years ago denies drugs and alcohol      The patient presents  with cognitive deficits and functional deficits in mobility/self-cares/swallowing/speech, and Minimal  de-conditioning. Pre-morbidly, this patient lived in a two level home with Full flight steps to enter,alone and able to care for self  The patient was evaluated by acute care Physical Therapy, Occupational Therapy and Speech Language Pathology; currently requiring minimal assistance for mobility and moderate assistance for ADLs, also with ongoing cognitive, speech and swallowing deficits. The patient's current diet is Dysphagia II with NTL liquids. The patient is   An Excellent candidate for an acute inpatient rehabilitation program with a coordinated program of care at an intensity and frequency not available at a lower level of care. This recommendation is substantiated by the patient's current medical condition with intervention and assessment of medical issues requiring an acute level of care for patient's safety and maximum outcome. A coordinated program of care will be provided by an interdisciplinary team including physical therapy, occupational therapy, speech language pathology, physiatry, rehab nursing and rehab psychology. Rehab goals include improved cognition, speech and swallowing,  mobility, self-care management, strength and conditioning/endurance, pain management, bowel and bladder management, mood and affect, and safety with independent home management including caregiver training. Estimated length of stay is approximately 14  days. Rehab potential: Very Good. Disposition: to pre-morbid independent living setting with supportive care of patient's family and community resources. We will continue to follow with you in anticipation of discharge to acute inpatient rehabilitation when medically stable to do so at the discretion of him attending physician. Thank you for allowing us to participate in him care. Please call with any questions regarding this recommendation.    Allan Brady M.D.

## 2017-12-13 NOTE — DISCHARGE PLANNING
AGUS Received call from Rose Mary with [facility]. She reports they have accepted pt and can take him today if he's cleared. MD stated during rounds that pt is medically cleared for transfer to Rehab. AGUS notified MD of acceptance and requested DC Summary.

## 2017-12-13 NOTE — PROGRESS NOTES
Ashley Mercedes Fall Risk Assessment:     Last Known Fall: No falls  Mobility: Immobilized/requires assist of one person  Medications: Cardiovascular or central nervous system meds  Mental Status/LOC/Awareness: Awake, alert, and oriented to date, place, and person  Toileting Needs: Use of assistive device (Bedside commode, bedpan, urinal)  Volume/Electrolyte Status: No problems  Communication/Sensory: Visual (Glasses)/hearing deficit  Behavior: Appropriate behavior  Ashley Mercedes Fall Risk Total: 9  Fall Risk Level: LOW RISK    Universal Fall Precautions:  call light/belongings in reach, bed in low position and locked, wheelchairs and assistive devices out of sight, siderails up x 2, use non-slip footwear, adequate lighting, clutter free and spill free environment, educate on level of risk, educate to call for assistance    Fall Risk Level Interventions:   TRIAL ([employer], NEURO, MED SANA 5) Low Fall Risk Interventions  Place yellow fall risk ID band on patient: verified  Provide patient/family education based on risk assessment: completed  Educate patient/family to call staff for assistance when getting out of bed: completed  Place fall precaution signage outside patient door: verifiedTRIAL ([employer], NEURO, MED SANA 5) Moderate Fall Risk Interventions  Place yellow fall risk ID band on patient: completed  Provide patient/family education based on risk assessment : completed  Educate patient/family to call staff for assistance when getting out of bed: completed  Place fall precaution signage outside patient door: completed  Utilize bed/chair fall alarm: completed     Patient Specific Interventions:     Medication: review medications with patient and family and limit combination of prn medications  Mental Status/LOC/Awareness: reinforce falls education, check on patient hourly, utilize bed/chair fall alarm and reinforce the use of call light  Toileting: provide frquent toileting and monitor intake and output/use of  appropriate interventions  Volume/Electrolyte Status: monitor abnormal lab values  Communication/Sensory: update plan of care on whiteboard  Behavioral: encourage patient to voice feelings  Mobility: ensure bed is locked and in lowest position, provide appropriate assistive device and instruct patient to exit bed on their strongest side

## 2017-12-13 NOTE — DISCHARGE INSTRUCTIONS
Discharge Instructions    Discharged to home by medical transportation with escort. Discharged via wheelchair, hospital escort: Yes.  Special equipment needed: Not Applicable    Be sure to schedule a follow-up appointment with your primary care doctor or any specialists as instructed.     Discharge Plan:   Diet Plan: Discussed  Activity Level: Discussed  Confirmed Follow up Appointment: Patient to Call and Schedule Appointment  Confirmed Symptoms Management: Discussed  Medication Reconciliation Updated: Yes  Influenza Vaccine Indication: Patient Refuses    I understand that a diet low in cholesterol, fat, and sodium is recommended for good health. Unless I have been given specific instructions below for another diet, I accept this instruction as my diet prescription.   Other diet: dysphagia 2 NTL    Special Instructions: None    · Is patient discharged on Warfarin / Coumadin?   No     · Is patient Post Blood Transfusion?  No    Depression / Suicide Risk    As you are discharged from this [facility] facility, it is important to learn how to keep safe from harming yourself.    Recognize the warning signs:  · Abrupt changes in personality, positive or negative- including increase in energy   · Giving away possessions  · Change in eating patterns- significant weight changes-  positive or negative  · Change in sleeping patterns- unable to sleep or sleeping all the time   · Unwillingness or inability to communicate  · Depression  · Unusual sadness, discouragement and loneliness  · Talk of wanting to die  · Neglect of personal appearance   · Rebelliousness- reckless behavior  · Withdrawal from people/activities they love  · Confusion- inability to concentrate     If you or a loved one observes any of these behaviors or has concerns about self-harm, here's what you can do:  · Talk about it- your feelings and reasons for harming yourself  · Remove any means that you might use to hurt yourself (examples: pills, rope,  extension cords, firearm)  · Get professional help from the community (Mental Health, Substance Abuse, psychological counseling)  · Do not be alone:Call your Safe Contact- someone whom you trust who will be there for you.  · Call your local CRISIS HOTLINE 292-1238 or 092-470-2800  · Call your local Children's Mobile Crisis Response Team [place] (454) 321-3221 or www.Launchups  · Call the toll free National Suicide Prevention Hotlines   · National Suicide Prevention Lifeline 250-890-LJRV (1541)  · [employer] Hope Line Network 800-SUICIDE (500-7547)    Rehabilitation After a Stroke, Adult  A stroke can cause many types of problems. The treatment of stroke involves three stages:  · Prevention.  · Treatment immediately following a stroke.  · Rehabilitation after a stroke.  HOW IS MY REHABILITATION PLAN DEVELOPED?  A detailed exam by your health care provider helps outline what problems were caused by the stroke. Your health care provider may consult specialists. The specialists may include doctors, occupational and physical therapists, and speech therapists. It is then possible to make a plan that best fits your needs.   Your evaluation might include the following:  · Evaluation of your ability to do daily activities that require using muscles, coordination, vision, reasoning, memory, and problem solving. Interviews with you and your health care provider will help determine what you could do and could not do before the stroke.  · Evaluation of your ability to do personal self-care tasks, such as dressing, grooming, and eating.  · Tests to see if there are sensory and motor changes due to the stroke, especially in the hands and legs.  WHAT ARE THE TYPES OF REHABILITATION?  Your health care provider may have you start rehabilitation right away depending on the type and severity of your stroke. Rehabilitation after a stroke is focused on getting function back and preventing another stroke. Rehabilitation might  include:   · Physical therapy. This can include help with walking, sitting, lying down, and balance. It may also be designed to help prevent shortening of the muscles (contractures) and swelling (edema).  · Occupational therapy. This therapy helps you to relearn skills needed for leading a normal life. These could include eating, using the restroom, dressing, and taking care of yourself. It helps to make you more independent.  · Vision therapy. This can help you to retrain, strengthen, and improve your vision after a stroke.  · Speech therapy. This can help to improve your speech and communication skills. It also teaches you and your family members to cope with problems of being unable to communicate.  · Cognitive therapy. This therapy can help with problems caused by lack of memory, attention, or concentration.  · Psychological or psychiatric therapy. This can help you cope with problems of frustration and emotional problems that may develop after a stroke.       This information is not intended to replace advice given to you by your health care provider. Make sure you discuss any questions you have with your health care provider.     Document Released: 01/06/2009 Document Revised: 05/03/2016 Document Reviewed: 05/22/2014  [employer] Interactive Patient Education ©2016 [email or web address]    Dysphagia Diet Level 2, Mechanically Altered  The dysphagia level 2 diet includes foods that are blended, chopped, ground, or mashed so they are easier to chew and swallow. The foods are soft, moist, and can be chopped into ¼-inch chunks (such as pancakes, pasta, and bananas).  In order to be on this diet, you must be able to chew. This diet helps you transition between the pureed textures of the dysphagia level 1 diet to more solid textures. This diet is helpful for people with mild to moderate swallowing difficulties. It reduces the risk of food getting caught in the windpipe, trachea, or lungs.   You may need help or supervision  during meals while following this diet so that you eat safely. You will be on this diet until your health care provider advances the texture of your diet.   WHAT DO I NEED TO KNOW ABOUT THIS DIET?  Foods  · You may eat foods that are soft and moist.  ¨ You may need to use a , whisk, or masher to soften some of your foods.  ¨ You can moisten foods with gravies, sauces, vegetable or fruit juice, milk, half and half, or water when blending, mashing, or grinding your foods to the right consistency.  · If you were on the dysphagia level 1 diet, you may still eat any of the foods included in that diet.  · Avoid foods that are dry, hard, sticky, chewy, coarse, and crunchy. Also avoid large cuts of food.  · Take small bites. Each bite should contain ¼ inch or less of food.  Liquids  · Avoid liquids with seeds and chunks.  · Thicken liquids, if instructed by your health care provider. Your health care provider will tell you the consistency to which you should thicken your liquids for safe swallowing. To thicken a liquid, use a commercial thickener or a thickening food (such as rice cereal or potato flakes). Ask your health care provider for specific recommendations on thickeners.  See your dietitian or health care provider regularly for help with your dietary changes.  WHAT FOODS CAN I EAT?  Grains  Store-bought soft breads that do not have nuts or seeds. Pancakes, sweet rolls, Cymro pastries, and English toast that have been moistened with syrup or sauce to form a slurry when blended. Well-cooked pasta, noodles, and bread dressing. Well-cooked noodles and pasta in sauce. Moist macaroni and cheese. Soft dumplings or spaetzle with gravy or butter. Cooked cereals (including oatmeal). Low-texture dry cereals, such as rice puff, corn, or wheat-flake cereals, with milk (if thin liquids are not allowed, make sure all of the milk is absorbed by the cereal before eating it).  Vegetables  Very soft, well-cooked vegetables in  pieces less than ½ inch in size. Cooked potatoes that are moist, not crispy, and with sauce.  Fruits  Canned or cooked fruits that are soft or moist and do not have skin or seeds. Fresh, soft bananas. Fruit juices with a small amount of pulp (if thin liquids are allowed). Gelatin or plain gelatin with canned fruit, except pineapple.  Meat and Other Protein Sources  Tender, moist meats, poultry, or fish cooked with gravy or sauce and cubed to ¼-inch bites or smaller. Ground meat. Moist meatball or meatloaf. Fish without bones. Moist casseroles without rice. Tuna, egg, or meat salad without chunks or hard-to-chew vegetables, such as celery and onions. Smooth quiche without large chunks. Scrambled, poached, or soft-cooked eggs with butter, margarine, sauce, or gravy. Tofu. Well-cooked, moistened and mashed beans, peas, baked beans, and other legumes. Casseroles without rice (such as tuna noodle casserole or soft moist meat lasagna).  Dairy  Cream cheese. Yogurt. Cottage cheese. Ask your health care provider if milk is allowed.  Sweets/Desserts  Pudding. Custard. Soft fruit pies with crust on the bottom only. Crisps and cobblers without seeds or nuts and with soft crusts. Soft, moist cakes. Icing. Pre-gelled cookies. Soft, moist cookies dunked in milk, coffee, or another liquid. Jelly. Soft, smooth chocolate bars that are easily chewed. Jams and preserves without seeds. Ask your health care provider whether you can have frozen desserts.  Fats and Oils  Butter. Margarine. Cream for cereal, depending on liquid consistency allowed. Gravy. Cream sauces. Mayonnaise. Salad dressings. Cream cheese. Cheese spreads, plain or with soft fruits or vegetables added. Sour cream. Sour cream dips with soft fruits or vegetables added. Whipped toppings.  Other  Sauces and salsas that have soft chunks that are about ½ inch or smaller.  The items listed above may not be a complete list of recommended foods or beverages. Contact your  dietitian for more options.  WHAT FOODS ARE NOT RECOMMENDED?  Grains  All breads not listed in the recommended list. Breads that are hard or have nuts or seeds. Coarse cereals. Cereals that have nuts, seeds, dried fruits, or coconut. Rice. Corn.  Vegetables  Whole, raw, frozen, or dried vegetables. Tough, fibrous, chewy, or stringy cooked vegetables, such as celery, peas, broccoli, cabbage, [place] sprouts, and asparagus. Potato skins. Potato and other vegetable chips. Fried or Vietnamese-fried potatoes. Cooked corn and peas.  Fruits  Whole raw, frozen, or dried fruits, including coconut. Pineapple. Fruits with seeds.  Meat and Other Protein Sources  Dry, tough meats, such as jamison, sausage, and hot dogs. Cheese slices and cubes. Peanut butter. Hard boiled or fried eggs. Nuts. Seeds. Pizza. Sandwiches. Dry casseroles or casseroles with rice or large chunks.  Dairy  Yogurt with nuts, seeds, or large chunks.  Sweets/Desserts  Coarse, hard, chewy, or sticky desserts. Any dessert with nuts, seeds, coconut, pineapple, or dried fruit. Ask your health care provider whether you can have frozen desserts.  Fats and Oils  Avoid fats with chunky, large textures, such as those with nuts or fruits.  Other  Soups and casseroles with large chunks.  The items listed above may not be a complete list of foods and beverages to avoid. Contact your dietitian for more information.     This information is not intended to replace advice given to you by your health care provider. Make sure you discuss any questions you have with your health care provider.     Document Released: 12/18/2006 Document Revised: 01/08/2016 Document Reviewed: 12/01/2014  [employer] Interactive Patient Education ©2016 [employer]    Clopidogrel tablets  What is this medicine?  CLOPIDOGREL (kloh PID oh grel) helps to prevent blood clots. This medicine is used to prevent heart attack, stroke, or other vascular events in people who are at high risk.  This medicine may be  used for other purposes; ask your health care provider or pharmacist if you have questions.  COMMON BRAND NAME(S): Plavix  What should I tell my health care provider before I take this medicine?  They need to know if you have any of the following conditions:  -bleeding disorder  -bleeding in the brain  -planned surgery  -stomach or intestinal ulcers  -stroke or transient ischemic attack  -an unusual or allergic reaction to clopidogrel, other medicines, foods, dyes, or preservatives  -pregnant or trying to get pregnant  -breast-feeding  How should I use this medicine?  Take this medicine by mouth with a drink of water. Follow the directions on the prescription label. You may take this medicine with or without food. Take your medicine at regular intervals. Do not take your medicine more often than directed.  Talk to your pediatrician regarding the use of this medicine in children. Special care may be needed.  Overdosage: If you think you have taken too much of this medicine contact a poison control center or emergency room at once.  NOTE: This medicine is only for you. Do not share this medicine with others.  What if I miss a dose?  If you miss a dose, take it as soon as you can. If it is almost time for your next dose, take only that dose. Do not take double or extra doses.  What may interact with this medicine?  -aspirin  -blood thinners like cilostazol, enoxaparin, ticlopidine, and warfarin  -certain medicines for depression like citalopram, fluoxetine, and fluvoxamine  -certain medicines for fungal infections like ketoconazole, fluconazole, and voriconazole  -certain medicines for HIV infection like delavirdine, efavirenz, and etravirine  -certain medicines for seizures like felbamate, oxcarbazepine, and phenytoin  -chloramphenicol  -fluvastatin  -isoniazid, INH  -medicines for inflammation like ibuprofen and naproxen  -modafinil  -nicardipine  -over-the counter supplements like echinacea, feverfew, fish oil,  garlic, howard, ginkgo, green tea, horse chestnut  -quinine  -stomach acid blockers like cimetidine, omeprazole, and esomeprazole  -tamoxifen  -tolbutamide  -topiramate  -torsemide  This list may not describe all possible interactions. Give your health care provider a list of all the medicines, herbs, non-prescription drugs, or dietary supplements you use. Also tell them if you smoke, drink alcohol, or use illegal drugs. Some items may interact with your medicine.  What should I watch for while using this medicine?  Visit your doctor or health care professional for regular check ups. Do not stop taking your medicine unless your doctor tells you to.  If you are going to have surgery or dental work, tell your doctor or health care professional that you are taking this medicine.  Certain genetic factors may reduce the effect of this medicine. Your doctor may use genetic tests to determine treatment.  What side effects may I notice from receiving this medicine?  Side effects that you should report to your doctor or health care professional as soon as possible:  -allergic reactions like skin rash, itching or hives, swelling of the face, lips, or tongue  -black, tarry stools  -blood in urine or vomit  -breathing problems  -changes in vision  -fever  -sudden weakness  -unusual bleeding or bruising  Side effects that usually do not require medical attention (report to your doctor or health care professional if they continue or are bothersome):  -constipation or diarrhea  -headache  -pain in back or joints  -stomach upset  This list may not describe all possible side effects. Call your doctor for medical advice about side effects. You may report side effects to FDA at 8-338-FDA-8289.  Where should I keep my medicine?  Keep out of the reach of children.  Store at room temperature of 59 to 86 degrees F (15 to 30 degrees C). Throw away any unused medicine after the expiration date.  NOTE: This sheet is a summary. It may not  cover all possible information. If you have questions about this medicine, talk to your doctor, pharmacist, or health care provider.  © 2014, [employer]. (6/8/2010 9:41:49 AM)

## 2017-12-13 NOTE — DISCHARGE PLANNING
Dr. Marcio Quick has accepted Lex to inpatient rehab. Transport is scheduled at 4:30p today. Nursing to call report to [phone]. Voice message to daughter Ekta Nunez. AGUS seth. Spoke with Alexa Harden by phone, provided update regarding transfer. Alexa has [facility] contact information and is planning to bring pt some clothes this evening. [facility] will follow to assist as needed with transition to [facility].

## 2017-12-13 NOTE — CARE PLAN
Problem: Knowledge Deficit  Goal: Knowledge of disease process/condition, treatment plan, diagnostic tests, and medications will improve  Outcome: PROGRESSING AS EXPECTED  Pt educated regarding plan of care for the night, activity, diet, and medications. Verbalized understanding.

## 2017-12-13 NOTE — DISCHARGE PLANNING
Thank you for the opportunity to assist with this patient as they transition to post acute services.  We are aware of the Rehab referral from Dr. Spicer.  Dr. Brady to consult this referral. Our Transitional Case Coordinator will follow. At this time patient is showing to have SCP as their coverage.   Please do not hesitate to call us if you require additional assistance my phone number is [phone] José Miguel.

## 2017-12-13 NOTE — DISCHARGE PLANNING
Received voice message from [facility] liaison Micaela providing verbal auth for inpatient rehab. Acute rehab pre-admit screen forwarded to Dr. Marcio Quick for review. AGUS Estrada aware, has requested d/c summary. Following for admission to [facility] this afternoon.

## 2017-12-13 NOTE — PREADMISSION SCREENING NOTE
Pre-Admission Screening Form    Patient Information:   Name: Lex Harden     MRN: [id number]       : 1933      Age: 84 y.o.   Gender: male      Race: White [7]       Marital Status: Single [1]  Family Contact: Alexa Harden,Lakesha Jackson        Relationship: Spouse [17]  Daughter [2]  Grandchild [6]  Home Phone: [phone]           Cell Phone:   [phone]    Advanced Directives: Yes  Code Status:  FULL  Current Attending Provider: Bal Spicer M.D.  Referring Physician: Dr. Bal Spicer      Physiatrist Consult: Dr. Allan Brady       Referral Date: 17  Primary Payor Source:  [employer]  Secondary Payor Source:      Medical Information:   Date of Admission to Acute Care [place]2017  Room Number: T811/02  Rehabilitation Diagnosis: 01.2 (R) Body Involvement (L) Brain     There is no immunization history on file for this patient.  Allergies   Allergen Reactions   • Nkda [No Known Drug Allergy]      Past Medical History:   Diagnosis Date   • AVM (arteriovenous malformation) of colon    • Back pain    • Chickenpox    • Chronic airway obstruction, not elsewhere classified    • Congestive heart failure (CMS-HCC)    • Diabetes    • Diphtheria    • Emphysema of lung (CMS-HCC)    • Hypercholesteremia    • Hypertension    • Infectious disease    • Other and unspecified angina pectoris    • PVD (peripheral vascular disease) (CMS-HCC)    • Snoring    • Stroke (CMS-HCC)    • Unspecified cataract     right eye    • Urinary bladder disorder    • Urinary incontinence      Past Surgical History:   Procedure Laterality Date   • COLONOSCOPY  2015    Procedure: COLONOSCOPY;  Surgeon: Eddie Levin M.D.;  Location: [facility];  Service:    • GASTROSCOPY  2015    Procedure: GASTROSCOPY W/APC;  Surgeon: Eddie Levin M.D.;  Location: [facility];  Service:    • GASTROSCOPY WITH APC  2014     Performed by Eddie Levin M.D. at ENDOSCOPY [facility]   • COLONOSCOPY WITH APC  11/1/2014    Performed by Eddie Levin M.D. at ENDOSCOPY [facility]   • GASTROSCOPY-ENDO  5/1/2011    Performed by HOLLY VASQUEZ at ENDOSCOPY [facility]   • COLONOSCOPY - ENDO  5/1/2011    Performed by HOLLY VASQUEZ at ENDOSCOPY [facility]   • COLONOSCOPY WITH APC  4/21/2011    Performed by EDMUND CHENG at [facility]   • GASTROSCOPY-ENDO  4/21/2011    Performed by EDMUND CHENG at [facility]   • COLONOSCOPY WITH APC  9/28/2010    Performed by EDMUND CHENG at [facility]   • GASTROSCOPY WITH APC  9/28/2010    Performed by EDMUND CHENG at [facility]   • AORTOFEMORAL BYPASS  1991    Both legs       History Leading to Admission, Conditions that Caused the Need for Rehab (CMS):     TOMMY Kong M.D. Physician Signed Neurology  Consults Date of Service: 12/11/2017  5:54 PM         []Hide copied text  []Hover for attribution information  DATE OF SERVICE:  12/11/2017     CHIEF COMPLAINT:  Code stroke.     HISTORY OF PRESENT ILLNESS:  I am asked by Dr. Cornell Gonzalez in [facility] Emergency   Room to see the patient, an 84-year-old gentleman who was brought in by   paramedics earlier today.  The patient awakened and felt fine.  At about 8:30,   he developed difficulty manipulating with his right hand when he was using   computer.  He went to sleep and woke up at 10:30 and felt normal.  Following   that, he again developed right-sided weakness.  He again went to sleep and   awakened about 2:30, feeling as if his right arm and leg were weak.  His   speech was slurred according to his family and he presented to the emergency   room.  CT brain scan by my unofficial reading does not show an acute   hemorrhage or an acute stroke.     The patient states that he recently had a lung biopsy on November 27 and was   diagnosed  with adenocarcinoma of the lung based on a 2.8 cm peripheral nodule   in the right lower lobe.     PAST MEDICAL HISTORY:  1.  Recent diagnosis of adenocarcinoma of the lung.  2.  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  3.  Chronic obstructive pulmonary disease.  4.  Hypertension.  5.  History of congestive heart failure.  6.  Peripheral vascular disease, status post endoscopic vascular procedures.  7.  Type 2 diabetes in the past.         IMPRESSION:  The patient is an 84-year-old gentleman whom I am asked to see by   Dr. Cornell Gonzalez in [facility].  The patient developed a right   hemiparesis, which was fluctuating earlier today.  The patient at the   emergency room has an NIH stroke scale of approximately 5.  This may be a   small vessel event.  He is not a tissue plasminogen activator, i.e. alteplase   candidate due to the fact that he has a recent diagnosis of adenocarcinoma of   the lung and he has had a clonic hemorrhage from an AVM of the colon in the   past.  He may be a candidate for thrombectomy if he has a penumbra and a clot.    His CT angiogram is pending.  He will be admitted to the stroke unit and   monitored.  He will be started on antiplatelet drugs, I started him on aspirin   on this occasional, although he was on Plavix before.     Thank you for this consultation.        ____________________________________     G MD Zachariah YARBROUGH M.D. Physician Signed [facility] Medicine  H&P Date of Service: 12/12/2017 12:30 AM      Expand All Collapse All    []Hide copied text  []Apolinarver for attribution information  Hospital Medicine History and Physical     Date of Service  12/12/2017     Chief Complaint      Chief Complaint   Patient presents with   • Possible Stroke         History of Presenting Illness  84 y.o. male who presented 12/11/2017 with R sided weakness. He is not a good histroian and is somewhat dysarthric. Basically at early moning he noticed mild symptoms of R  sided weakness but seem to be intermittent and resolving until 230PM, he had R arm weakness and R leg weakness. He did not fall but felt his R leg is stiff. He also had slight slurring of speech and slight facial droop on the right. He did not complain of vision loss.   At the ED, he is afebrile, hemodynamically stable. Head CT and CTA done see results below. Dr. Kong consulted, not a tPA candidate  When I saw him at the ED, he is in no acute distress. Mild R hemiparesis and R  weakness. Slight facial droop and tongue deviation to the right. Slight dysarthria. Seems to have a R wrist drop. Trace lower ext edema.           Dale Kat M.D. Physician Signed Radiation Oncology  Consults Date of Service: 12/12/2017  9:52 AM      Expand All Collapse All    []Hide copied text  []Hover for attribution information  RADIATION ONCOLOGY CONSULT     DATE OF SERVICE: 12/12/2017     IDENTIFICATION:   Stage IA ([id number]) non-small cell lung carcinoma, adenocarcinoma, of the right lower lobe of lung      HISTORY OF PRESENT ILLNESS: I had the pleasure of seeing Mr. Harden today in consultation at the request of Dr. Conley for his newly diagnosed lung cancer. I was originally scheduled to see Mr. Harden today in consultation as an outpatient in the radiation oncology department. Unfortunately he suffered an acute event yesterday requiring hospitalization. He had asked whether I would be able to see him as an inpatient to help facilitate his care for his known lung cancer. Patient began to experience acute right sided weakness and dysarthria. As a part of his workup thus far this event appears most consistent with a small vessel event. Notably he does have significant carotid stenosis as well. He is scheduled for an MRI of the brain later today to rule out small volume malignancy. Relating to his lung cancer however his CT scan revealed an 1.8 cm right upper lobe lung lesion as well as a 2.4 cm right lower lobe lesion. His  PET scan confirmed only uptake in the right lower lobe lesion without any hilar mediastinal or right lower lobe uptake. Biopsy of the right lower lobe lesion confirmed a non-small cell lung carcinoma with adenocarcinoma histology. Even before this event it was recognized patient has a notable medical history significant for aortic stenosis, heart failure, diabetes mellitus, COPD, and tobacco abuse. Patient smoked roughly 2 packs of cigarettes per day for nearly 40 years but fortunately quit in 1984. I've been asked to see him for consideration of radiosurgery for medically inoperable early stage lung cancer.     PAST MEDICAL HISTORY:       IMPRESSION:   Stage IA ([id number]) non-small cell lung carcinoma, adenocarcinoma, of the right lower lobe of lung      RECOMMENDATIONS:   I discussed the diagnosis, prognosis, and treatment options over a 1 hr 10 min time period, 95% of that time dedicated to ongoing treatment management. Patient is having his brain MRI scan later today. We will 1st ensure that there is no brain metastasis contributing into his current neurologic changes. Assuming no metastasis we went on to discuss the management of early stage medically inoperable lung cancer. Patient's acute event compounded with his past medical history make him medically inoperable. Therefore we discussed stereotactic body radiosurgery for stage I lung cancer. I anticipate 5 fraction radiosurgery to a total dose of 6000 cGy targeted at the right lower lobe lesion. Once his MRI scan is deemed clear I will bring him up a card for simulation to proceed with radiosurgery.     We discussed the risks, benefits and side effects of treatment and the patient is amenable to treatment.  If patient has any questions or concerns, he should feel free to contact me.     Thank you for the opportunity to participate in his care.  If any questions or comments, please do not hesitate in calling.       Allan Brady M.D. Physician Signed  Physical Medicine & Rehab  Consults Date of Service: 12/13/2017 10:37 AM   Consult Orders:   IP CONSULT FOR PHYSIATRY [[id number]] ordered by Bal Spicer M.D. at 12/13/17 1006      Expand All Collapse All    []Hide copied text  Medical chart review completed. Patient is a 84 y.o. year-old left male  with  a past medical history significant for  Previous stroke, urinary incontinence, AODM, emphysema , PVD, cooronary artery disease, hypercholesterolemia, CHF, back and AVM of colon admitted to [facility]  On 12/11/2017  4:03 PM  with confusion and   mild symptoms of R sided weakness but seem to be intermittent and resolving until 230PM, he had R arm weakness and R leg weakness. He did not fall but felt his R leg is stiff. He also had slight slurring of speech and slight facial droop on the right     CTA of head and neck revealed     No evidence of large vessel occlusion.   Coarse calcified plaque in bilateral intracranial internal carotid arteries     1.  Significant calcified atherosclerotic plaque is noted in the aorta and its branch vessels.    2.  50% to 70% diameter reduction stenosis noted in the internal carotid arteries bilaterally.    3.  90% diameter reduction stenosis in the left subclavian artery.        MrI of head revealed       1. Age-related cerebral atrophy.    2. Mild periventricular and pontine white matter changes consistent with chronic microvascular ischemic gliosis.    3. Acute area of infarction in the left posterior frontal periventricular white matter which extends to the adjacent sylvian fissure.    4. Small old areas of lacunar type infarction involving the left globus pallidus and right thalamus.        PM&R has been consulted to assess for rehabilitative inteventions  PMH:  Past Medical History        Past Medical History:   Diagnosis Date   • AVM (arteriovenous malformation) of colon     • Back pain     • Chickenpox     • Chronic airway obstruction, not elsewhere  classified     • Congestive heart failure (CMS-HCC)     • Diabetes     • Diphtheria     • Emphysema of lung (CMS-HCC)     • Hypercholesteremia     • Hypertension     • Infectious disease     • Other and unspecified angina pectoris     • PVD (peripheral vascular disease) (CMS-HCC)     • Snoring     • Stroke (CMS-HCC) 2015   • Unspecified cataract       right eye    • Urinary bladder disorder     • Urinary incontinence              PSH:  Past Surgical History         Past Surgical History:   Procedure Laterality Date   • COLONOSCOPY   6/17/2015     Procedure: COLONOSCOPY;  Surgeon: Eddie Levin M.D.;  Location: SURGERY [facility];  Service:    • GASTROSCOPY   6/17/2015     Procedure: GASTROSCOPY W/APC;  Surgeon: Eddie Levin M.D.;  Location: SURGERY [facility];  Service:    • GASTROSCOPY WITH APC   11/1/2014     Performed by Eddie Levin M.D. at ENDOSCOPY [facility]   • COLONOSCOPY WITH APC   11/1/2014     Performed by Eddie Levin M.D. at ENDOSCOPY [facility]   • GASTROSCOPY-ENDO   5/1/2011     Performed by HOLLY VASQUEZ at ENDOSCOPY [facility]   • COLONOSCOPY - ENDO   5/1/2011     Performed by HOLLY VASQUEZ at ENDOSCOPY [facility]   • COLONOSCOPY WITH APC   4/21/2011     Performed by EDMUND CHENG at [facility]   • GASTROSCOPY-ENDO   4/21/2011     Performed by EDMUND CHENG at [facility]   • COLONOSCOPY WITH APC   9/28/2010     Performed by EDMUND CHENG at [facility]   • GASTROSCOPY WITH APC   9/28/2010     Performed by EDMUND CHENG at [facility]   • AORTOFEMORAL BYPASS   1991     Both legs            FAMILY HISTORY:  Non-contributory     MEDICATIONS:       Current Facility-Administered Medications   Medication Dose   • cyanocobalamin (VITAMIN B-12) tablet 1,000 mcg  1,000 mcg   • aspirin (ASA) tablet 325 mg  325 mg     Or   • aspirin (ASA) chewable tab  324 mg  324 mg     Or   • aspirin (ASA) suppository 300 mg  300 mg   • allopurinol (ZYLOPRIM) tablet 100 mg  100 mg   • atorvastatin (LIPITOR) tablet 10 mg  10 mg   • vitamin D (cholecalciferol) tablet 4,000 Units  4,000 Units   • clopidogrel (PLAVIX) tablet 75 mg  75 mg   • ferrous sulfate tablet 325 mg  325 mg   • finasteride (PROSCAR) tablet 5 mg  5 mg   • senna-docusate (PERICOLACE or SENOKOT S) 8.6-50 MG per tablet 2 Tab  2 Tab     And   • polyethylene glycol/lytes (MIRALAX) PACKET 1 Packet  1 Packet     And   • magnesium hydroxide (MILK OF MAGNESIA) suspension 30 mL  30 mL     And   • bisacodyl (DULCOLAX) suppository 10 mg  10 mg   • tiotropium (SPIRIVA) 18 MCG inhalation capsule 1 Cap  1 Cap   • Respiratory Care per Protocol           ALLERGIES:  Nkda [no known drug allergy]     PSYCHOSOCIAL HISTORY:  Living Site:  Home  Living With:  self  Caregiver's availability:  BY report children and ex-wife are available as support  Number of stairs: Full flight  Substance use history:  80 pack year smoking history  Quit 30 years ago denies drugs and alcohol        The patient presents  with cognitive deficits and functional deficits in mobility/self-cares/swallowing/speech, and Minimal  de-conditioning. Pre-morbidly, this patient lived in a two level home with Full flight steps to enter,alone and able to care for self  The patient was evaluated by acute care Physical Therapy, Occupational Therapy and Speech Language Pathology; currently requiring minimal assistance for mobility and moderate assistance for ADLs, also with ongoing cognitive, speech and swallowing deficits. The patient's current diet is Dysphagia II with NTL liquids. The patient is   An Excellent candidate for an acute inpatient rehabilitation program with a coordinated program of care at an intensity and frequency not available at a lower level of care. This recommendation is substantiated by the patient's current medical condition with intervention and  assessment of medical issues requiring an acute level of care for patient's safety and maximum outcome. A coordinated program of care will be provided by an interdisciplinary team including physical therapy, occupational therapy, speech language pathology, physiatry, rehab nursing and rehab psychology. Rehab goals include improved cognition, speech and swallowing, mobility, self-care management, strength and conditioning/endurance, pain management, bowel and bladder management, mood and affect, and safety with independent home management including caregiver training. Estimated length of stay is approximately 14  days. Rehab potential: Very Good. Disposition: to pre-morbid independent living setting with supportive care of patient's family and community resources. We will continue to follow with you in anticipation of discharge to acute inpatient rehabilitation when medically stable to do so at the discretion of him attending physician. Thank you for allowing us to participate in him care. Please call with any questions regarding this recommendation.     Allan Brady M.D.        Co-morbidities: See above  Potential Risk - Complications: Cognitive Impairment, Contractures, Deep Vein Thrombosis, Dysphagia, Incontinence, Malnutrition, Pain, Paralysis, Perceptual Impairment, Pneumonia, Pressure Ulcer and Seizures  Level of Risk: High    Ongoing Medical Management Needed (Medical/Nursing Needs):   Patient Active Problem List    Diagnosis Date Noted   • CVA (cerebral vascular accident) (CMS-HCC) 12/11/2017     Priority: High   • Angina pectoris (CMS-HCC) 11/05/2014     Priority: High   • 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 10/31/2014     Priority: High   • Acute on Chronic blood loss anemia 05/01/2011     Priority: High   • Vitamin B12 deficiency 12/13/2017     Priority: Medium   • CKD (chronic kidney disease) stage 4, GFR 15-29 ml/min (CMS-HCC) 12/12/2017     Priority: Medium   • Chronic combined  systolic and diastolic CHF (congestive heart failure) (CMS-HCC) 12/12/2017     Priority: Medium   • Cardiomyopathy (CMS-HCC) 12/12/2017     Priority: Medium   • Lung cancer (CMS-HCC) 12/12/2017     Priority: Medium   • Thrombocytopenia (CMS-HCC) 12/12/2017     Priority: Medium   • Dysphagia due to recent cerebrovascular accident (CVA) 12/12/2017     Priority: Medium   • Carotid artery stenosis 11/05/2014     Priority: Medium   • Subclavian artery stenosis, left (CMS-HCC) 11/05/2014     Priority: Medium   • CKD (chronic kidney disease) stage 3, GFR 30-59 ml/min 10/31/2014     Priority: Medium   • COPD (chronic obstructive pulmonary disease) (CMS-HCC) 10/31/2014     Priority: Medium   • Peripheral vascular disease (CMS-HCC) 10/31/2014     Priority: Medium   • HTN (hypertension) 05/01/2011     Priority: Medium   • DM (diabetes mellitus) (CMS-HCC) 05/01/2011     Priority: Medium   • Dyslipidemia 05/01/2011     Priority: Low   • ACC/AHA stage C systolic heart failure (CMS-HCC) 08/23/2017   • Severe aortic stenosis 08/23/2017   • Type 2 diabetes mellitus without complication ([facility]) 04/28/2017   • BPH (benign prostatic hyperplasia) 04/28/2017   • Acute blood loss anemia 06/17/2015   • GIB (gastrointestinal bleeding) 06/16/2015   • Hypotension 06/16/2015   • Aortic valve stenosis 11/05/2014   • HEMORRHOIDS 05/01/2011   • Diverticulosis 05/01/2011     Geovanna Oro R.N. Registered Nurse Signed   Progress Notes Date of Service: 12/13/2017 10:55 AM         []Hide copied text  []Apolinarver for attribution information  Assumed care at 0700, bedside report from NOC shift RN. Patient is A&O x 4 with no signs of distress. inital assessment completed, orders reviewed, call light is with in reach, and hourly rounding initiated. POC addressed with patient, no additional questions at this time.        Current Vital Signs:   Temperature: 36.3 °C (97.3 °F) Pulse: 68 Respiration: 18 Blood Pressure : 141/81  Weight: 83.5 kg (184 lb 1.4 oz)  "Height: 175.3 cm (5' 9\")  Pulse Oximetry: 97 % O2 (LPM): 0      Completed Laboratory Reports:  Recent Labs      12/11/17   1647  12/12/17   0225  12/13/17   0408   WBC  7.0  5.6  6.8   HEMOGLOBIN  11.9*  11.3*  11.6*   HEMATOCRIT  36.1*  33.2*  34.6*   PLATELETCT  118*  98*  112*   SODIUM  138  137  138   POTASSIUM  5.3  4.4  4.6   BUN  50*  48*  50*   CREATININE  2.40*  2.40*  2.32*   ALBUMIN  3.6   --    --    GLUCOSE  97  163*  109*   INR  1.14*   --    --      Additional Labs: Not Applicable    Prior Living Situation:   Housing / Facility: 2 Story Apartment / Condo  Steps Into Home: 12  Lives with - Patient's Self Care Capacity: Alone and Able to Care For Self  Equipment Owned: None    Prior Level of Function / Living Situation:   Physical Therapy: Prior Services: None  Housing / Facility: 2 Story Apartment / Condo  Steps Into Home: 12  Equipment Owned: None  Lives with - Patient's Self Care Capacity: Alone and Able to Care For Self  Bed Mobility: Independent  Transfer Status: Independent  Ambulation: Independent  Distance Ambulation (Feet):  (unclear; pt inconsistent historian)  Assistive Devices Used: None  Stairs: Independent  Current Level of Function:   Level Of Assist: Minimal Assist  Assistive Device: Hand Held Assist  Distance (Feet): 150  Deviation: Ataxic, Decreased Base Of Support  Weight Bearing Status: FWB  Supine to Sit: Contact Guard Assist  Sit to Supine: Stand by Assist  Scooting: Stand by Assist  Sit to Stand: Minimal Assist  Bed, Chair, Wheelchair Transfer: Minimal Assist  Transfer Method:  (stand step with HHA)  Sitting in Chair: NT  Sitting Edge of Bed: found EOB  Standing: 10mins  Occupational Therapy:   Self Feeding: Independent  Grooming / Hygiene: Independent  Bathing: Independent  Dressing: Independent  Toileting: Independent  Medication Management: Independent  Laundry: Independent  Kitchen Mobility: Independent  Finances: Independent  Home Management: Independent  Shopping: " Independent  Prior Level Of Mobility: Independent Without Device in Community  Prior Services: None  Housing / Facility: 2 Story Apartment / Condo  Occupation (Pre-Hospital Vocational): Retired Due To Age (retired )  Current Level of Function:   Upper Body Dressing: Minimal Assist  Lower Body Dressing: Moderate Assist  Speech Language Pathology:   Assessment :  Patient seen for swallow evaluation this date.  Patient awake, alert and oriented in all spheres. He is presenting with right side facial droop, right tongue deviation on protrusion, right side palatal weakness and fluent, but mildly dysarthric speech.  Patient is left handed.  Presentation of PO consisted of ice, nectars, thin liquids, purees, soft solids and solids.  On thin liquids, patient with mild right side anterior spillage and delayed throat clearing following swallow.  He had 3 coughing episodes (2 from straw and 1 from cup sip) all of which are consistent with possible penetration/aspiration.  On mixed consistencies and solids, there was intermittent delayed throat clearing noted. Ice, nectars, purees and soft solids were consumed without any overt S/Sx of aspiration noted. At this time, would recommend modification of diet to dysphagia II with nectar thick liquids and close monitoring for any S/Sx of aspiration.  SLP will follow for diet upgrade and dysphagia therapy.  Patient would also benefit from a speech/language/cognitive evaluation  Problem List: Dysphagia, Dysarthia  Diet / Liquid Recommendation: Dysphagia II, Hazen Thick Liquid  Rehabilitation Prognosis/Potential: Good  Estimated Length of Stay: 14 days    Nursing:   Orientation : Oriented x 4  Continent    Scope/Intensity of Services Recommended:  Physical Therapy: 1 hr / day  5 days / week. Therapeutic Interventions Required: Maximize Endurance, Mobility, Strength and Safety  Occupational Therapy: 1 hr / day 5 days / week. Therapeutic Interventions Required: Maximize Self  Care, ADLs, IADLs and Energy Conservation  Speech & Language Pathology: 1 hr / day 5 days / week. Therapeutic Interventions Required: Maximize Cognition, Swallowing and Safety  Rehabilitation Nursin/7. Therapeutic Interventions Required: Monitor Pain, Skin, Vital Signs, Intake and Output, Labs, Safety, Aspiration Risk, Family Training and DVT Prophylaxis; ADL's Bowel & bladder regimen.  Rehabilitation Physician: 3 - 5 days / week. Therapeutic Interventions Required: Medical Management  Dietician: Consult. Therapeutic Interventions Required: Nutritional evaluation with recommendations to promote optimal health/healing.     Rehabilitation Goals and Plan (Expected frequency & duration of treatment in the IRF):   Return to the Community, Modified Independent Level of Care and Outpatient Support  Anticipated Date of Rehabilitation Admission: 17  Patient/Family oriented IRF level of care/facility/plan: Yes  Patient/Family willing to participate in IRF care/facility/plan: Yes  Patient able to tolerate IRF level of care proposed: Yes  Patient has potential to benefit IRF level of care proposed: Yes  Comments: Not Applicable    Special Needs or Precautions - Medical Necessity:  Safety Concerns/Precautions:  Fall Risk / High Risk for Falls, Balance and Cognition  Pain Management  Diet:   DIET ORDERS (Through next 24h)    Start     Ordered    17 1246  DIET ORDER  ALL MEALS     Question Answer Comment   Diet: Diabetic    Diet: Cardiac    Texture/Fiber modifications: Dysphagia 2(Pureed/Chopped)specify fluid consistency(question 6)    Consistency/Fluid modifications: Nectar Thick        17 1245          Anticipated Discharge Destination / Patient/Family Goal:  Destination: Home with Assistance Support System: Family  and Friends  Anticipated home health services: OT, PT, SLP, Nursing and Social Work  Previously used HH service/ provider: Not Applicable  Anticipated DME Needs: To be determined  Outpatient  Services: To be determined  Alternative resources to address additional identified needs:     Outpatient Radiation Oncology Mapping Appt - Per pt sometime on or about January 9, 2018  Pre-Screen Completed: 12/13/2017 1:59 PM Rose Mary Dawson R.N.

## 2017-12-13 NOTE — PROGRESS NOTES
Assumed care at 1915. Bedside report received from Day RN Geovanna. Patient's chart and MAR reviewed. 12 hour chart check complete. Patient was updated on plan of care for the night. Questions answered and concerns addressed.  Pt denies any additional needs at this time. White board updated. Call light, phone and personal belongings within reach. Bed alarm on and working appropriately. Vital signs stable

## 2017-12-13 NOTE — DISCHARGE PLANNING
F/U with client at bedside to discuss IRF referral. Explained specifics of inpatient rehab, answered question/conserns. Pt considering IRF level of care, stating he knows it would be a good plan, but hesitant to commit at time of TCC visit. Pt provided verbal auth for TCC to contact his daughter Ekta Nunez to discuss d/c option. Spent in excess of 25 minutes reviewing inpatient rehab with pt. Step son and wife present outside pt's room as TCC departing. Jose and Richard both voiced agreement with tentative plan for IRF, telling me pt was completely independent prior to admit and struggling with constraints of new deficits. They intended to reinforce positive aspects of admision to inpatient rehab. Attempted to reach daughter Ekta by phone, left message. Will follow for updates.     Pt tells me Dr. Kat has postponed Radiation Oncology mapping until Jan 9, 2018.

## 2017-12-14 ENCOUNTER — PATIENT OUTREACH (OUTPATIENT)
Dept: OTHER | Facility: MEDICAL CENTER | Age: 82
End: 2017-12-14

## 2017-12-14 LAB
25(OH)D3 SERPL-MCNC: 35 NG/ML (ref 30–100)
ALBUMIN SERPL BCP-MCNC: 3.6 G/DL (ref 3.2–4.9)
ALBUMIN/GLOB SERPL: 1.1 G/DL
ALP SERPL-CCNC: 60 U/L (ref 30–99)
ALT SERPL-CCNC: 34 U/L (ref 2–50)
ANION GAP SERPL CALC-SCNC: 10 MMOL/L (ref 0–11.9)
AST SERPL-CCNC: 26 U/L (ref 12–45)
BASOPHILS # BLD AUTO: 0.3 % (ref 0–1.8)
BASOPHILS # BLD: 0.02 K/UL (ref 0–0.12)
BILIRUB SERPL-MCNC: 0.8 MG/DL (ref 0.1–1.5)
BUN SERPL-MCNC: 55 MG/DL (ref 8–22)
CALCIUM SERPL-MCNC: 9.1 MG/DL (ref 8.5–10.5)
CHLORIDE SERPL-SCNC: 110 MMOL/L (ref 96–112)
CO2 SERPL-SCNC: 17 MMOL/L (ref 20–33)
CREAT SERPL-MCNC: 2.91 MG/DL (ref 0.5–1.4)
EOSINOPHIL # BLD AUTO: 0.2 K/UL (ref 0–0.51)
EOSINOPHIL NFR BLD: 2.9 % (ref 0–6.9)
ERYTHROCYTE [DISTWIDTH] IN BLOOD BY AUTOMATED COUNT: 45.3 FL (ref 35.9–50)
GFR SERPL CREATININE-BSD FRML MDRD: 21 ML/MIN/1.73 M 2
GLOBULIN SER CALC-MCNC: 3.2 G/DL (ref 1.9–3.5)
GLUCOSE SERPL-MCNC: 111 MG/DL (ref 65–99)
HCT VFR BLD AUTO: 36.2 % (ref 42–52)
HGB BLD-MCNC: 12.2 G/DL (ref 14–18)
IMM GRANULOCYTES # BLD AUTO: 0.03 K/UL (ref 0–0.11)
IMM GRANULOCYTES NFR BLD AUTO: 0.4 % (ref 0–0.9)
LYMPHOCYTES # BLD AUTO: 1 K/UL (ref 1–4.8)
LYMPHOCYTES NFR BLD: 14.5 % (ref 22–41)
MCH RBC QN AUTO: 30.4 PG (ref 27–33)
MCHC RBC AUTO-ENTMCNC: 33.7 G/DL (ref 33.7–35.3)
MCV RBC AUTO: 90.3 FL (ref 81.4–97.8)
MONOCYTES # BLD AUTO: 0.56 K/UL (ref 0–0.85)
MONOCYTES NFR BLD AUTO: 8.1 % (ref 0–13.4)
NEUTROPHILS # BLD AUTO: 5.09 K/UL (ref 1.82–7.42)
NEUTROPHILS NFR BLD: 73.8 % (ref 44–72)
NRBC # BLD AUTO: 0 K/UL
NRBC BLD AUTO-RTO: 0 /100 WBC
PLATELET # BLD AUTO: 101 K/UL (ref 164–446)
PMV BLD AUTO: 11.5 FL (ref 9–12.9)
POTASSIUM SERPL-SCNC: 4.7 MMOL/L (ref 3.6–5.5)
PROT SERPL-MCNC: 6.8 G/DL (ref 6–8.2)
RBC # BLD AUTO: 4.01 M/UL (ref 4.7–6.1)
SODIUM SERPL-SCNC: 137 MMOL/L (ref 135–145)
TSH SERPL DL<=0.005 MIU/L-ACNC: 3.59 UIU/ML (ref 0.3–3.7)
WBC # BLD AUTO: 6.9 K/UL (ref 4.8–10.8)

## 2017-12-14 PROCEDURE — A9270 NON-COVERED ITEM OR SERVICE: HCPCS | Performed by: PHYSICAL MEDICINE & REHABILITATION

## 2017-12-14 PROCEDURE — 36415 COLL VENOUS BLD VENIPUNCTURE: CPT

## 2017-12-14 PROCEDURE — 700102 HCHG RX REV CODE 250 W/ 637 OVERRIDE(OP): Performed by: PHYSICAL MEDICINE & REHABILITATION

## 2017-12-14 PROCEDURE — 82306 VITAMIN D 25 HYDROXY: CPT

## 2017-12-14 PROCEDURE — 99223 1ST HOSP IP/OBS HIGH 75: CPT | Performed by: PHYSICAL MEDICINE & REHABILITATION

## 2017-12-14 PROCEDURE — 97530 THERAPEUTIC ACTIVITIES: CPT

## 2017-12-14 PROCEDURE — 92610 EVALUATE SWALLOWING FUNCTION: CPT

## 2017-12-14 PROCEDURE — 92523 SPEECH SOUND LANG COMPREHEN: CPT

## 2017-12-14 PROCEDURE — 97535 SELF CARE MNGMENT TRAINING: CPT

## 2017-12-14 PROCEDURE — 97162 PT EVAL MOD COMPLEX 30 MIN: CPT

## 2017-12-14 PROCEDURE — 97167 OT EVAL HIGH COMPLEX 60 MIN: CPT

## 2017-12-14 PROCEDURE — 770010 HCHG ROOM/CARE - REHAB SEMI PRIVAT*

## 2017-12-14 PROCEDURE — 80053 COMPREHEN METABOLIC PANEL: CPT

## 2017-12-14 PROCEDURE — 85025 COMPLETE CBC W/AUTO DIFF WBC: CPT

## 2017-12-14 PROCEDURE — 84443 ASSAY THYROID STIM HORMONE: CPT

## 2017-12-14 RX ORDER — CARVEDILOL 12.5 MG/1
12.5 TABLET ORAL 2 TIMES DAILY WITH MEALS
Status: DISCONTINUED | OUTPATIENT
Start: 2017-12-14 | End: 2017-12-20

## 2017-12-14 RX ADMIN — ALLOPURINOL 100 MG: 100 TABLET ORAL at 08:37

## 2017-12-14 RX ADMIN — HYDRALAZINE HYDROCHLORIDE 25 MG: 25 TABLET, FILM COATED ORAL at 22:25

## 2017-12-14 RX ADMIN — ACETAMINOPHEN 650 MG: 325 TABLET ORAL at 20:33

## 2017-12-14 RX ADMIN — CHOLECALCIFEROL TAB 25 MCG (1000 UNIT) 4000 UNITS: 25 TAB at 08:38

## 2017-12-14 RX ADMIN — HYDRALAZINE HYDROCHLORIDE 25 MG: 25 TABLET, FILM COATED ORAL at 07:26

## 2017-12-14 RX ADMIN — CLOPIDOGREL BISULFATE 75 MG: 75 TABLET ORAL at 08:37

## 2017-12-14 RX ADMIN — FINASTERIDE 5 MG: 5 TABLET, FILM COATED ORAL at 08:37

## 2017-12-14 RX ADMIN — FERROUS SULFATE TAB 325 MG (65 MG ELEMENTAL FE) 325 MG: 325 (65 FE) TAB at 08:37

## 2017-12-14 RX ADMIN — ASPIRIN 325 MG: 325 TABLET, COATED ORAL at 08:37

## 2017-12-14 RX ADMIN — ENALAPRIL MALEATE 10 MG: 5 TABLET ORAL at 17:45

## 2017-12-14 RX ADMIN — CARVEDILOL 12.5 MG: 12.5 TABLET, FILM COATED ORAL at 17:45

## 2017-12-14 RX ADMIN — HYDRALAZINE HYDROCHLORIDE 25 MG: 25 TABLET, FILM COATED ORAL at 15:55

## 2017-12-14 RX ADMIN — ENALAPRIL MALEATE 10 MG: 5 TABLET ORAL at 05:03

## 2017-12-14 RX ADMIN — ATORVASTATIN CALCIUM 10 MG: 10 TABLET, FILM COATED ORAL at 20:30

## 2017-12-14 RX ADMIN — CARVEDILOL 12.5 MG: 12.5 TABLET, FILM COATED ORAL at 11:31

## 2017-12-14 ASSESSMENT — GAIT ASSESSMENTS
DISTANCE (FEET): 180
GAIT LEVEL OF ASSIST: CONTACT GUARD ASSIST

## 2017-12-14 ASSESSMENT — BRIEF INTERVIEW FOR MENTAL STATUS (BIMS)
WHAT MONTH IS IT: ACCURATE WITHIN 5 DAYS
INITIAL REPETITION OF BED BLUE SOCK - FIRST ATTEMPT: 3
ASKED TO RECALL BLUE: YES, NO CUE REQUIRED
WHAT YEAR IS IT: CORRECT
ASKED TO RECALL BED: YES, NO CUE REQUIRED
ASKED TO RECALL SOCK: YES, NO CUE REQUIRED
BIMS SUMMARY SCORE: 15
WHAT DAY OF THE WEEK IS IT: CORRECT

## 2017-12-14 ASSESSMENT — PAIN SCALES - GENERAL
PAINLEVEL_OUTOF10: 0
PAINLEVEL_OUTOF10: 2

## 2017-12-14 ASSESSMENT — ACTIVITIES OF DAILY LIVING (ADL): TOILETING: INDEPENDENT

## 2017-12-14 NOTE — CARE PLAN
Problem: Safety  Goal: Will remain free from falls    Intervention: Implement fall precautions  Use of call light encouraged to make needs known.Poor safety awareness.Bed in low position, non skid socks/shoes on when out of bed, side rails up x 2.Alarms in place for safety.Call light and things within reach.Will continue to monitor and assess needs and safety.      Problem: Bowel/Gastric:  Goal: Normal bowel function is maintained or improved    Intervention: Educate patient and significant other/support system about signs and symptoms of constipation and interventions to implement  Pt refused scheduled bowel medication.Continent of bowel per report.LB 12/13.Will continue to monitor and assess for s/sx of constipation.      Problem: Pain Management  Goal: Pain level will decrease to patient's comfort goal  Pt denies any pain, comfortable and no sign of acute distress noted.Repositioned with pillows for comfort.Will continue to monitor and assess pain level and medicate as needed.    Problem: Urinary Elimination:  Goal: Ability to reestablish a normal urinary elimination pattern will improve    Intervention: Assist patient to sit on commode or toilet for voiding  Assisted to the bathroom, continent of bladder.Denies any discomfort, afebrile.Will continue to monitor and assess.

## 2017-12-14 NOTE — CARE PLAN
Problem: Inadequate nutrient intake  Goal: Patient to consume greater than or equal to 50% of meals  Outcome: PROGRESSING AS EXPECTED

## 2017-12-14 NOTE — DIETARY
Nutrition Services: Consult for Supplements  84 year old male with admit dx of R body involvement L brain, left sided cerebral hemisphere cerebrovascular accident  Past Pertinent Med Hx: urinary incontinence, urinary bladder disorder, stroke, peripheral vascular disease, infectious disease, HTN, hypercholesteremia, emphysema of lung, diphtheria, DM, congestive heart failure, chronic airway obstruction, chickenpox, back pain, arteriovenous malformation of colon    Attempted to visit pt multiple times, pt was busy with other disciplines. Per chart pt PO 60% 1 meal documented.     Ht: 175.3 cm  Wt 12/13: 76.7 kg via stand up scale  BMI: 24.97  Diet: Diabetic, Cardiac, Dysphagia 2  Pertinent Labs: Glucose 111, BUN 55, Creatinine 2.91  Pertinent Meds: zyloprim, lipitor, plavix, vitamin B12, vasotec, ferrous sulfate, proscar, pericolace, vitamin D  Fluids: No IV fluids noted in MAR  Skin: No skin breakdown noted in chart  GI: Last BM 12/13    PLAN/RECOMMEND -   1) Nutrition rep to see patient for meal and snack preferences.  2) Encourage PO  3) Weekly weights to monitor fluid and nutrition status  4) Please document PO intake for each meal as percentage of meals consumed    RD following

## 2017-12-14 NOTE — PROGRESS NOTES
Patient discharged with all belongings to Rehab. RN reviewed discharge summary with pt and discussed medications and hospital stay, all questions answered. Patient left with medical transportation, via wheelchair.

## 2017-12-14 NOTE — PROGRESS NOTES
Patient admitted to facility at 1700 via w/c; accompanied by hospital transport. Patient assisted to room and positioned in bed for comfort and safety; call light within reach. Patient assisted with stowing belongings and oriented to room and facility.  Admission paperwork completed; signed copies placed in chart. Will continue to monitor.

## 2017-12-14 NOTE — IDT DISCHARGE PLANNING
CASE MANAGEMENT INITIAL ASSESSMENT    Admit Date:  12/13/2017     Patient is a  84 y.o. male transferred from [facility].  Patient has been unavailable in his room.  I have reviewed records.  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 10/31/2014     Priority: High   • Acute on Chronic blood loss anemia 05/01/2011     Priority: High   • Vitamin B12 deficiency 12/13/2017     Priority: Medium   • CKD (chronic kidney disease) stage 4, GFR 15-29 ml/min (CMS-HCC) 12/12/2017     Priority: Medium   • Chronic combined systolic and diastolic CHF (congestive heart failure) (CMS-HCC) 12/12/2017     Priority: Medium   • Cardiomyopathy (CMS-HCC) 12/12/2017     Priority: Medium   • Lung cancer (CMS-HCC) 12/12/2017     Priority: Medium   • Thrombocytopenia (CMS-HCC) 12/12/2017     Priority: Medium   • Dysphagia due to recent cerebrovascular accident (CVA) 12/12/2017     Priority: Medium   • Carotid artery stenosis 11/05/2014     Priority: Medium   • Subclavian artery stenosis, left (CMS-HCC) 11/05/2014     Priority: Medium   • CKD (chronic kidney disease) stage 3, GFR 30-59 ml/min 10/31/2014     Priority: Medium   • COPD (chronic obstructive pulmonary disease) (CMS-HCC) 10/31/2014     Priority: Medium   • Peripheral vascular disease (CMS-HCC) 10/31/2014     Priority: Medium   • HTN (hypertension) 05/01/2011     Priority: Medium   • DM (diabetes mellitus) (CMS-HCC) 05/01/2011     Priority: Medium   • Dyslipidemia 05/01/2011     Priority: Low   • Left sided cerebral hemisphere cerebrovascular accident (CVA) (CMS-HCC) 12/13/2017   • ACC/AHA stage C  systolic heart failure (CMS-HCC) 08/23/2017   • Severe aortic stenosis 08/23/2017   • Type 2 diabetes mellitus without complication ([facility]) 04/28/2017   • BPH (benign prostatic hyperplasia) 04/28/2017   • Acute blood loss anemia 06/17/2015   • GIB (gastrointestinal bleeding) 06/16/2015   • Hypotension 06/16/2015   • Aortic valve stenosis 11/05/2014   • HEMORRHOIDS 05/01/2011   • Diverticulosis 05/01/2011     Prior Living Situation:  Housing / Facility: (P) 2 Story Apartment / Condo (16 stairs to reach entrance to condo)  Lives with - Patient's Self Care Capacity: (P) Alone and Able to Care For Self    Prior Level of Function:  Medication Management: Independent  Finances: Independent  Home Management: Independent  Shopping: Independent  Prior Level Of Mobility: Independent Without Device in Community, Independent Without Device in Home  Driving / Transportation: Driving Independent    Support Systems:  Primary : Daughter is Ekta Nunez at [phone]  Other support systems: ex wife is Alexa Harden at [phone]     Previous Services Utilized:   Equipment Owned: None  Prior Services: (P) None, Home-Independent    Other Information:  Occupation (Pre-Hospital Vocational): (P) Retired Due To Age ()  Primary Payor Source: [employer]  Primary Care Practitioner : Dr. Fabian  Other MDs: Dr. Kat for Radiology    Additional Case Management Questions:  Have you ever received case management services for yourself or a family member? Unable to determine    Do you feel you have an an understanding of of what services  provide? Unable to determine    Do you have any additional questions regarding case management?    Unable to determine    Patient / Family Goal:  Patient / Family Goal: Patient lives alone but has supportive children and ex wife.  He will probably need home health.  He has follow up planned with Dr. Kat.  Case Management will follow to assist.    Plan:  1.  Continue to follow patient through hospitalization and provide discharge planning in collaboration with patient, family, physicians and ancillary services.     2. Utilize community resources to ensure a safe discharge.

## 2017-12-14 NOTE — THERAPY
Speech Therapy Initial Plan of Care Note    1) Assessment:  Patient is 84 y.o. male with a diagnosis of CVA.  Additional factors influencing patient status / progress (ie: cognitive factors, co-morbidities, social support, etc): independent PLOF, hx of stroke, dysarthria, dysphagia, impaired safety awareness and insight.  Long term and short term goals have been discussed with patient and they are in agreement.  2) Plan:  Recommend Speech Therapy  minutes per day 5-7 days per week for 2 weeks for the following treatments:  SLP Speech Language Treatment, SLP Swallowing Dysfunction Treatment, SLP Oral Pharyngeal Evaluation, SLP Video Swallow Evaluation and SLP Cognitive Skill Development.  3) Goals:  Please refer to care plan for goals.

## 2017-12-14 NOTE — PROGRESS NOTES
Patient is AOx4 can be impulsive, does well with frequent reminders to use the call light when assistance is needed. Patient denies complaints of pain and has been up participating in therapies.

## 2017-12-14 NOTE — CARE PLAN
Problem: Safety  Goal: Will remain free from falls    Intervention: Assess risk factors for falls  Patient is AOx4 does well with frequent reminders to use the call light when assistance is needed. Patient has call light close by, alarms are set on bed and w/c, shoes on when out of bed, bed in low position.      Problem: Bowel/Gastric:  Goal: Will not experience complications related to bowel motility    Intervention: Assess baseline bowel pattern  Patient encouraged throughout the day to drink plenty of fluids to promote regular bowel movements. Patient educated that movement and exercise also promote gastric motility.

## 2017-12-14 NOTE — THERAPY
"Speech Language Therapy dysphagia treatment completed.   Functional Status:  The patient was seen for dysphagia therapy this date. The patient was awake, alert and eager for therapy. The patient requested thin liquids and PO trials of thin liquids were completed. The patient exhibited consistent throat clear following thin liquid trials. NTL, purees and soft solids were consumed with no overt s/s of aspiration however, patient required mod cues to modulate bite/sip size. At this time, recommend patient continue on D2/NTL with supervision and ongoing SLP therapy.     Recommendations: 1) D2/Nectar thick liquids with supervision. 2) Cognitive/speech-language evaluation recommended.   Plan of Care: Will benefit from Speech Therapy 5 times per week  Post-Acute Therapy: Discharge to a transitional care facility for continued skilled therapy services.    See \"Rehab Therapy-Acute\" Patient Summary Report for complete documentation.     "

## 2017-12-14 NOTE — THERAPY
Occupational Therapy Initial Plan of Care Note    1) Assessment:  The patient is a 84 y.o. male with a past medical history of Previous stroke, urinary incontinence, AODM, emphysema , PVD, cooronary artery disease, hypercholesterolemia, CHF, back pain and AVM of colon admitted to [facility] On 12/11/2017 4:03 PM with confusion and mild symptoms of R sided weakness but seem to be intermittent and resolving until 230PM, he had R arm weakness and R leg weakness. He did not fall but felt his R leg is stiff. He also had slight slurring of speech and slight facial droop on the right. Patient was also evaluated by Radiation Oncology as he was supposed to follow-up as an outpatient but was evaluated inpatient for treatment course for lower lobe adenoma. Patient is scheduled for radiation in January. Pt was alert, oriented and cooperative w/ OT eval. Pt declined shower activity secondary completing shower w/ CNA at 5:00 his morning. Pt agreeable to complete shower activity tomorrow. Pt at Sup/SBA for bed mobility and Min assist for functional transfers. Right hemiplegia, RUE mildly involved w/ limitations right wrist extension and prehension/FMS. Noted generalized weakness/endurance, impulsivity and poor safety awareness. Patient Would Benefit from Inpatient Rehab Occupational Therapy to Maximize [place] with ADLs, IADLs and Functional Mobility.  Long term and short term goals have been discussed with patient and they are in agreement.  2) Plan:  Recommend Occupational Therapy  minutes per day 5-7 days per week for 2 weeks for the following treatments:  OT Self Care/ADL, OT Neuro Re-Ed/Balance, OT Therapeutic Activity, OT Evaluation and OT Therapeutic Exercise.  3) Goals:  Please refer to care plan for goals.

## 2017-12-14 NOTE — H&P
REHABILITATION HISTORY AND PHYSICAL/POST ADMISSION PHYSICAL EVALUATION    Date of Admission: 12/14/2017  10:41 AM  Lxe Harden  [id number]    [facility] Code / Diagnosis to Support: 01.2 (R) Body Involvement (L) Brain   Reason for admission: Left sided cerebral hemisphere cerebrovascular accident (CVA) (CMS-HCC)    HPI:  The patient is a 84 y.o. male with a past medical history of  Previous stroke, urinary incontinence, AODM, emphysema , PVD, cooronary artery disease, hypercholesterolemia, CHF, back pain and AVM of colon admitted to [facility]  On 12/11/2017  4:03 PM  with confusion and  mild symptoms of R sided weakness but seem to be intermittent and resolving until 230PM, he had R arm weakness and R leg weakness. He did not fall but felt his R leg is stiff. He also had slight slurring of speech and slight facial droop on the right.  CT did not show acute infarct but MRI showed acute left sided infarction in the left posterior frontal periventricular white matter which extended to the adjacent sylvian fissure.  Patient was not a tPA candidate due to timing and recent diagnosis of adenocarcinoma of the lungs and colonic hemorrhage from an AVM.  Patient was not a thrombectomy candidate either. Patient was also evaluated by Radiation Oncology as he was supposed to follow-up as an outpatient but was evaluated inpatient for treatment course for lower lobe adenoma.  Patient is scheduled for radiation in January.      REVIEW OF SYSTEMS:     Comprehensive 14 point ROS was reviewed and all were negative except as noted elsewhere in this document.       PMH:  Past Medical History:   Diagnosis Date   • AVM (arteriovenous malformation) of colon    • Back pain    • Chickenpox    • Chronic airway obstruction, not elsewhere classified    • Congestive heart failure (CMS-HCC)    • Diabetes    • Diphtheria    • Emphysema of lung (CMS-HCC)    • Hypercholesteremia    • Hypertension    • Infectious disease    • Other and unspecified  "angina pectoris    • PVD (peripheral vascular disease) (CMS-HCC)    • Snoring    • Stroke (CMS-HCC) 2015   • Unspecified cataract     right eye    • Urinary bladder disorder    • Urinary incontinence        PSH:  Past Surgical History:   Procedure Laterality Date   • COLONOSCOPY  6/17/2015    Procedure: COLONOSCOPY;  Surgeon: Eddie Levin M.D.;  Location: SURGERY [facility];  Service:    • GASTROSCOPY  6/17/2015    Procedure: GASTROSCOPY W/APC;  Surgeon: Eddie Levin M.D.;  Location: SURGERY [facility];  Service:    • GASTROSCOPY WITH APC  11/1/2014    Performed by Eddie Levin M.D. at ENDOSCOPY [facility]   • COLONOSCOPY WITH APC  11/1/2014    Performed by Eddie Levin M.D. at ENDOSCOPY [facility]   • GASTROSCOPY-ENDO  5/1/2011    Performed by HOLLY VASQUEZ at ENDOSCOPY [facility]   • COLONOSCOPY - ENDO  5/1/2011    Performed by HOLLY VASQUEZ at ENDOSCOPY [facility]   • COLONOSCOPY WITH APC  4/21/2011    Performed by EDMUND CHENG at [facility]   • GASTROSCOPY-ENDO  4/21/2011    Performed by EDMUND CHENG at [facility]   • COLONOSCOPY WITH APC  9/28/2010    Performed by EDMUND CHENG at [facility]   • GASTROSCOPY WITH APC  9/28/2010    Performed by EDMUND CHENG at [facility]   • AORTOFEMORAL BYPASS  1991    Both legs       FAMILY HISTORY:  Family History   Problem Relation Age of Onset   • Non-contributory Neg Hx      \"unknown\"   Reports no other family members with cancer     MEDICATIONS:  Current Facility-Administered Medications   Medication Dose   • aspirin (ASA) tablet 325 mg  325 mg   • allopurinol (ZYLOPRIM) tablet 100 mg  100 mg   • atorvastatin (LIPITOR) tablet 10 mg  10 mg   • clopidogrel (PLAVIX) tablet 75 mg  75 mg   • cyanocobalamin (VITAMIN B12) tablet 1,000 mcg  1,000 mcg   • ferrous sulfate tablet 325 mg  325 mg   • finasteride (PROSCAR) " tablet 5 mg  5 mg   • tiotropium (SPIRIVA) 18 MCG inhalation capsule 1 Cap  1 Cap   • vitamin D (cholecalciferol) tablet 4,000 Units  4,000 Units   • Respiratory Care per Protocol     • Pharmacy Consult Request ...Pain Management Review 1 Each  1 Each   • oxycodone immediate-release (ROXICODONE) tablet 5 mg  5 mg   • oxycodone immediate release (ROXICODONE) tablet 10 mg  10 mg   • acetaminophen (TYLENOL) tablet 650 mg  650 mg   • senna-docusate (PERICOLACE or SENOKOT S) 8.6-50 MG per tablet 2 Tab  2 Tab    And   • polyethylene glycol/lytes (MIRALAX) PACKET 1 Packet  1 Packet    And   • magnesium hydroxide (MILK OF MAGNESIA) suspension 30 mL  30 mL    And   • bisacodyl (DULCOLAX) suppository 10 mg  10 mg   • artificial tears 1.4 % ophthalmic solution 1 Drop  1 Drop   • benzocaine-menthol (CEPACOL) lozenge 1 Lozenge  1 Lozenge   • hydrALAZINE (APRESOLINE) tablet 25 mg  25 mg   • mag hydrox-al hydrox-simeth (MAALOX PLUS ES or MYLANTA DS) suspension 20 mL  20 mL   • ondansetron (ZOFRAN ODT) dispertab 4 mg  4 mg    Or   • ondansetron (ZOFRAN) syringe/vial injection 4 mg  4 mg   • trazodone (DESYREL) tablet 50 mg  50 mg   • sodium chloride (OCEAN) 0.65 % nasal spray 2 Spray  2 Spray   • enalapril (VASOTEC) tablet 10 mg  10 mg       ALLERGIES:  Nkda [no known drug allergy]    PSYCHOSOCIAL HISTORY:  Living Site:  Home  Living With:  self  Caregiver's availability:  Reports family members available  Number of stairs:  two full flights  Substance use history:  Tobacco until mid 1980s  Housing / Facility: 2 Story Apartment / Condo  Steps Into Home: 12  Lives with - Patient's Self Care Capacity: Alone and Able to Care For Self  Equipment Owned: None     Prior Level of Function / Living Situation:   Physical Therapy: Prior Services: None  Housing / Facility: 2 Story Apartment / Condo  Steps Into Home: 12  Equipment Owned: None  Lives with - Patient's Self Care Capacity: Alone and Able to Care For Self  Bed Mobility:  Independent  Transfer Status: Independent  Ambulation: Independent  Distance Ambulation (Feet):  (unclear; pt inconsistent historian)  Assistive Devices Used: None  Stairs: Independent  Current Level of Function:   Level Of Assist: Minimal Assist  Assistive Device: Hand Held Assist  Distance (Feet): 150  Deviation: Ataxic, Decreased Base Of Support  Weight Bearing Status: FWB  Supine to Sit: Contact Guard Assist  Sit to Supine: Stand by Assist  Scooting: Stand by Assist  Sit to Stand: Minimal Assist  Bed, Chair, Wheelchair Transfer: Minimal Assist  Transfer Method:  (stand step with HHA)  Sitting in Chair: NT  Sitting Edge of Bed: found EOB  Standing: 10mins  Occupational Therapy:   Self Feeding: Independent  Grooming / Hygiene: Independent  Bathing: Independent  Dressing: Independent  Toileting: Independent  Medication Management: Independent  Laundry: Independent  Kitchen Mobility: Independent  Finances: Independent  Home Management: Independent  Shopping: Independent  Prior Level Of Mobility: Independent Without Device in Community  Prior Services: None  Housing / Facility: 2 Story Apartment / Condo  Occupation (Pre-Hospital Vocational): Retired Due To Age (retired )  Current Level of Function:   Upper Body Dressing: Minimal Assist  Lower Body Dressing: Moderate Assist  Speech Language Pathology:   Assessment :  Patient seen for swallow evaluation this date.  Patient awake, alert and oriented in all spheres. He is presenting with right side facial droop, right tongue deviation on protrusion, right side palatal weakness and fluent, but mildly dysarthric speech.  Patient is left handed.  Presentation of PO consisted of ice, nectars, thin liquids, purees, soft solids and solids.  On thin liquids, patient with mild right side anterior spillage and delayed throat clearing following swallow.  He had 3 coughing episodes (2 from straw and 1 from cup sip) all of which are consistent with possible  "penetration/aspiration.  On mixed consistencies and solids, there was intermittent delayed throat clearing noted. Ice, nectars, purees and soft solids were consumed without any overt S/Sx of aspiration noted. At this time, would recommend modification of diet to dysphagia II with nectar thick liquids and close monitoring for any S/Sx of aspiration.  SLP will follow for diet upgrade and dysphagia therapy.  Patient would also benefit from a speech/language/cognitive evaluation  Problem List: Dysphagia, Dysarthia  Diet / Liquid Recommendation: Dysphagia II, [place] Thick Liquid  Rehabilitation Prognosis/Potential: Good  Estimated Length of Stay: 14 days     Nursing:   Orientation : Oriented x 4  Continent    CURRENT LEVEL OF FUNCTION:   Same as level of function prior to admission to [facility]    PHYSICAL EXAM:     VITAL SIGNS:   height is 1.753 m (5' 9\") and weight is 76.7 kg (169 lb 1.5 oz). His temperature is 36.7 °C (98 °F). His blood pressure is 128/68 and his pulse is 68. His respiration is 16 and oxygen saturation is 95%.     GENERAL: No apparent distress  HEENT: Normocephalic/atraumatic, EOMI and PERRL  CARDIAC: Regular rate and rhythm, normal S1, S2   LUNGS: Clear to auscultation   ABDOMINAL: bowel sounds present, soft, nontender and nondistended    EXTREMITIES: no contractures, spasticity, or edema.  No calf tenderness bilaterally, 2+ bilateral DP/PT pulses.  Bilateral hand intrinsic muscle wasting    NEURO:    Mental status:  A&Ox4 (person, place, date, situation) answers questions appropriately follows commands; very impulsive  Speech: Dysarthric with word finding difficulty    CRANIAL NERVES:  2,3: visual acuity grossly intact, PERRL  3,4,6: EOMI bilaterally, no nystagmus or diplopia  7: mild right facial droop  8:  Mild hearing loss bilaterally  9,10: symmetric palate elevation  11: SCM/Trapezius strength 5/5 L 4/5 R  12: tongue protrudes right    Motor:      Shoulder flexors:  Right - "  4/5, Left -  5/5  Elbow flexors:  Right -  3/5, Left -  5/5  Wrist extensors:  Right -  1/5, Left -  4/5  Elbow extensors:  Right -  3/5, Left -  5/5  Finger flexors:  Right -  3/5, Left -  5/5  Finger abductors:  Right -  1/5, Left -  5/5  Hip flexors:  Right -  4/5, Left -  5/5  Knee ext:  Right -  3/5, Left -  5/5  Dorsiflexors:  Right -  4/5, Left -  5/5  EHL:  Right -  4/5, Left -  5/5  Plantar flexors:  Right -  5/5, Left -  4/5  Sensory: intact to light touch through out    DTRs: 2+ in L biceps and patellar tendons; 3+ on R  Spencer's positive on right    RADIOLOGY:  MRI Brain  Impression         1. Age-related cerebral atrophy.    2. Mild periventricular and pontine white matter changes consistent with chronic microvascular ischemic gliosis.    3. Acute area of infarction in the left posterior frontal periventricular white matter which extends to the adjacent sylvian fissure.    4. Small old areas of lacunar type infarction involving the left globus pallidus and right thalamus.     CT Brain  Impression         NO ACUTE ABNORMALITIES ARE NOTED ON CT SCAN OF THE HEAD.     PET Scan 11/3/17  Impression       1.  2.8 cm mass in right lower pulmonary lobe is again identified. Density of the mass is slightly decreased although overall size is unchanged. There is some elevated activity with maximum of 2.16 SUV. Differential diagnosis includes lung carcinoma as   well as infectious or inflammatory process. Lesion is potentially amenable to percutaneous biopsy if indicated.    2.  1.6 cm groundglass density in right upper pulmonary lobe is again identified. There is no associated elevated activity. Differential diagnosis includes neoplasm such as adenocarcinoma in situ. Inflammatory process is also possible. Follow-up CT could   be obtained in 3 months.    3.  No other focal areas of elevated activity are noted in the neck chest abdomen or pelvis.    4.  Bilateral calcified pleural plaque is identified which could  indicate prior asbestos exposure.    5.  Renal calcifications are noted bilaterally.    6.  Calcific atherosclerosis.         LABS:  Recent Labs      12/12/17 0225 12/13/17   0408  12/14/17   0520   SODIUM  137  138  137   POTASSIUM  4.4  4.6  4.7   CHLORIDE  113*  113*  110   CO2  16*  16*  17*   GLUCOSE  163*  109*  111*   BUN  48*  50*  55*   CREATININE  2.40*  2.32*  2.91*   CALCIUM  8.6  8.7  9.1     Recent Labs      12/12/17 0225 12/13/17   0408  12/14/17   0520   WBC  5.6  6.8  6.9   RBC  3.68*  3.87*  4.01*   HEMOGLOBIN  11.3*  11.6*  12.2*   HEMATOCRIT  33.2*  34.6*  36.2*   MCV  90.2  89.4  90.3   MCH  30.7  30.0  30.4   MCHC  34.0  33.5*  33.7   RDW  46.2  44.9  45.3   PLATELETCT  98*  112*  101*   MPV  10.9  11.5  11.5     Recent Labs      12/11/17   1647   APTT  32.1   INR  1.14*         PRIMARY REHAB DIAGNOSIS:    This patient is a 84 y.o. male admitted for acute inpatient rehabilitation with Left sided cerebral hemisphere cerebrovascular accident (CVA) (CMS-[facility]).    IMPAIRMENTS:   Cognitive  ADLs/IADLs  Mobility  Speech  Swallow    SECONDARY DIAGNOSIS/MEDICAL CO-MORBIDITIES AFFECTING FUNCTION:  Aphasia  CHF (congestive heart failure)  Cognitive impairment  COPD (chronic obstructive pulmonary disease)  Coronary artery disease  Diabetes mellitus  Hypertension  Ostearthritis  AVM of Colon  Lung Adenocarcinoma    RELEVANT CHANGES SINCE PREADMISSION EVALUATION:    Status unchanged    The patient's rehabilitation potential is Good  The patient's medical prognosis is fair    PLAN:   Discussion and Recommendations:   1. The patient requires an acute inpatient rehabilitation program with a coordinated program of care at an intensity and frequency not available at a lower level of care. This recommendation is substantiated by the patient's medical physicians who recommend that the patient's intervention and assessment of medical issues needs to be done at an acute level of care for patient's safety and  maximum outcome.   2. A coordinated program of care will be supplied by an interdisciplinary team of physical therapy, occupational therapy, rehab physician, rehab nursing, and, if needed, speech therapy and rehab psychology. Rehab team presents a patient-specific rehabilitation and education program concentrating on prevention of future problems related to accessibility, mobility, skin, bowel, bladder, sexuality, and psychosocial and medical/surgical problems.   3. Need for Rehabilitation Physician: The rehab physician will be evaluating the patient on a multi-weekly basis to help coordinate the program of care. The rehab physician communicates between medical physicians, therapists, and nurses to maximize the patient's potential outcome. Specific areas in which the rehab physician will be providing daily assessment include the following:   A. Assessing the patient's heart rate and blood pressure response (vitals monitoring) to activity and making adjustments in medications or conservative measures as needed.   B. The rehab physician will be assessing the frequency at which the program can be increased to allow the patient to reach optimal functional outcome.   C. The rehab physician will also provide assessments in daily skin care, especially in light of patient's impairments in mobility.   D. The rehab physician will provide special expertise in understanding how to work with functional impairment and recommend appropriate interventions, compensatory techniques, and education that will facilitate the patient's outcome.   4. Rehab R.N.   The rehab RN will be working with patient to carry over in room mobility and activities of daily living when the patient is not in 3 hours of skilled therapy. Rehab nursing will be working in conjunction with rehab physician to address all the medical issues above and continue to assess laboratory work and discuss abnormalities with the treating physicians, assess vitals, and  response to activity, and discuss and report abnormalities with the rehab physician. Rehab RN will also continue daily skin care, supervise bladder/bowel program, instruct in medication administration, and ensure patient safety.   5. Rehab Therapy: Therapies to treat at intensity and frequency of (may change after completion of evaluation by all therapeutic disciplines):       PT:  Physical therapy to address mobility, transfer, gait training and evaluation for adaptive equipment needs 1 hour/day at least 5 days/week for the duration of the ELOS (see below)       OT:  Occupational therapy to address ADLs, self-care, home management training, functional mobility/transfers and assistive device evaluation, and community re-integration 1  hour/day at least 5 days/week for the duration of the ELOS (see below).        ST/Dysphagia:  Speech therapy to address speech, language, and cognitive deficits as well as swallowing difficulties with retraining/dysphagia management and community re-integration with comprehension, expression, cognitive training 1  hour/day at least 5 days/week for the duration of the ELOS (see below).     Medical management / Rehabilitation Issues/ Adverse Potential as part of rehabilitation plan     REHABILITATION ISSUES/ADVERSE POTENTIAL:  1.  CVA (Cerebrovascular Accident): Cont ASA/Plavix for secondary prophylaxis as well as lipid and blood pressure management. Patient demonstrates functional deficits in strength, balance, coordination, and ADL's. Patient is admitted to [facility] for comprehensive rehabilitation therapy as described below.   Rehabilitation nursing monitors bowel and bladder control, educates on medication administration, co-morbidities and monitors patient safety.    2.  Neurostimulants: None at this time but continue to assess daily for need to initiate should status change.    3.  DVT prophylaxis:  Patient is on TEDs for anticoagulation upon transfer,  ambulating sufficiently. Encourage OOB. Monitor daily for signs and symptoms of DVT including but not limited to swelling and pain to prevent the development of DVT that may interfere with therapies.    4.  GI prophylaxis:  On prilosec to prevent gastritis/dyspepsia which may interfere with therapies.    5.  Pain: No issues with pain currently / Controlled with Tylenol/Oxycodone    6.  Nutrition/Dysphagia: Dietician monitors nutrient intake, recommend supplements prn and provide nutrition education to pt/family to promote optimal nutrition for wound healing/recovery.     7.  Bladder/bowel:  Start bowel and bladder program as described below, to prevent constipation, urinary retention (which may lead to UTI), and urinary incontinence (which will impact upon pt's functional independence).   - TV Q3h while awake with post void bladder scans, I&O cath for PVRs >400  - up to commode after meal     8.  Skin/dermal ulcer prophylaxis: Monitor for new skin conditions with q.2 h. turns as required to prevent the development of skin breakdown.     9.  Cognition/Behavior:  Psychologist Dr. Sandoval provides adjustment counseling to illness and psychosocial barriers that may be potential barriers to rehabilitation.     10. Respiratory therapy: RT performs O2 management prn, breathing retraining, pulmonary hygiene and bronchospasm management prn to optimize participation in therapies.     MEDICAL CO-MORBIDITIES/ADVERSE POTENTIAL AFFECTING FUNCTION:  CVA - Patient with new left sided CVA with R sided weakness, dysphagia and dysarthria.  Patient previously with bilateral strokes but improved to baseline.    -Continue  mg, Plavix 75 mg  -Continue Good BP and DLD control (see below)    RLL Lung Adenocarcinoma - Evaluated by Radiation oncology while at [facility].  Recommending radiation therapy in January.  -Follow-up with Radiation oncology post-discharge    HTN - Patient with longstanding HTN as well as CHF.  Patient previously on  Coreg, Vasotec and Lasix.  All medications were stopped acutely for permissive hypertension. Since admission continued elevated BP  -Restart Enalapril 10 mg BID, Coreg 12.5 mg BID    DLD - Patient on statin at baseline.  Cholesterol WNL on acute check.  -Continue Atorvastatin 10 mg QHS    BPH - Patient on Proscar at baseline  -Continue Proscar 5 mg daily    COPD - No exacerbation since stroke.  Continue on home medications  -Continue Spiriva qDaily    B12 Deficiency - Found during acute stay.  -Continue 1000 mcg daily B12 supplement    Chronic Anemia - Patient with chronic anemia with known AVM in Colon on Iron supplementation  -Continue Iron supplement daily.    Gout - Pt reports being on allopurinol for years.   -Continue Alllopurinol 100 mg daily    Vitamin D Deficiency - Reported deficiency but normal on admission labs. Will continue supplementation  -Cholecalciferol 4000 U daily    DVT Prophlyaxis - Patient ambulating sufficiently.  Continue TEDs in AM    Pt was seen today for 75 min, and entire time spent in face-to-face contact was >50% in counseling and coordination of care as detailed in A/P above.        GOALS/EXPECTED LEVEL OF FUNCTION BASED ON CURRENT MEDICAL AND FUNCTIONAL STATUS (may change based on patient's medical status and rate of impairment recovery):  Transfers:   Modified Independent  Mobility/Gait:   Modified Independent  ADL's:   Modified Independent  Cognition:  [place]-Supervised  Swallowing:  Dys 1    DISPOSITION: Discharge to pre-morbid independent living setting with the supportive care of patient's family.      ELOS: 14 days

## 2017-12-15 ENCOUNTER — APPOINTMENT (OUTPATIENT)
Dept: PAIN MANAGEMENT | Facility: REHABILITATION | Age: 82
DRG: 057 | End: 2017-12-15
Attending: PHYSICAL MEDICINE & REHABILITATION
Payer: MEDICARE

## 2017-12-15 ENCOUNTER — APPOINTMENT (OUTPATIENT)
Dept: RADIOLOGY | Facility: REHABILITATION | Age: 82
DRG: 057 | End: 2017-12-15
Attending: PHYSICAL MEDICINE & REHABILITATION
Payer: MEDICARE

## 2017-12-15 PROCEDURE — 770010 HCHG ROOM/CARE - REHAB SEMI PRIVAT*

## 2017-12-15 PROCEDURE — A9270 NON-COVERED ITEM OR SERVICE: HCPCS | Performed by: PHYSICAL MEDICINE & REHABILITATION

## 2017-12-15 PROCEDURE — 99232 SBSQ HOSP IP/OBS MODERATE 35: CPT | Performed by: PHYSICAL MEDICINE & REHABILITATION

## 2017-12-15 PROCEDURE — 92611 MOTION FLUOROSCOPY/SWALLOW: CPT

## 2017-12-15 PROCEDURE — 97116 GAIT TRAINING THERAPY: CPT

## 2017-12-15 PROCEDURE — 97535 SELF CARE MNGMENT TRAINING: CPT

## 2017-12-15 PROCEDURE — 97112 NEUROMUSCULAR REEDUCATION: CPT

## 2017-12-15 PROCEDURE — 97532 HCHG COGNITIVE SKILL DEV. 15 MIN: CPT

## 2017-12-15 PROCEDURE — 92526 ORAL FUNCTION THERAPY: CPT

## 2017-12-15 PROCEDURE — 74230 X-RAY XM SWLNG FUNCJ C+: CPT

## 2017-12-15 PROCEDURE — 97110 THERAPEUTIC EXERCISES: CPT

## 2017-12-15 PROCEDURE — 700102 HCHG RX REV CODE 250 W/ 637 OVERRIDE(OP): Performed by: PHYSICAL MEDICINE & REHABILITATION

## 2017-12-15 RX ORDER — AMLODIPINE BESYLATE 5 MG/1
5 TABLET ORAL DAILY
Status: DISCONTINUED | OUTPATIENT
Start: 2017-12-15 | End: 2017-12-18

## 2017-12-15 RX ORDER — AMLODIPINE BESYLATE 5 MG/1
5 TABLET ORAL
Status: DISCONTINUED | OUTPATIENT
Start: 2017-12-15 | End: 2017-12-15

## 2017-12-15 RX ADMIN — DOCUSATE SODIUM AND SENNOSIDES 2 TABLET: 8.6; 5 TABLET, FILM COATED ORAL at 08:24

## 2017-12-15 RX ADMIN — ENALAPRIL MALEATE 10 MG: 5 TABLET ORAL at 17:43

## 2017-12-15 RX ADMIN — FERROUS SULFATE TAB 325 MG (65 MG ELEMENTAL FE) 325 MG: 325 (65 FE) TAB at 08:24

## 2017-12-15 RX ADMIN — CLOPIDOGREL BISULFATE 75 MG: 75 TABLET ORAL at 08:24

## 2017-12-15 RX ADMIN — CARVEDILOL 12.5 MG: 12.5 TABLET, FILM COATED ORAL at 17:41

## 2017-12-15 RX ADMIN — HYDRALAZINE HYDROCHLORIDE 25 MG: 25 TABLET, FILM COATED ORAL at 15:07

## 2017-12-15 RX ADMIN — CARVEDILOL 12.5 MG: 12.5 TABLET, FILM COATED ORAL at 08:24

## 2017-12-15 RX ADMIN — FINASTERIDE 5 MG: 5 TABLET, FILM COATED ORAL at 08:24

## 2017-12-15 RX ADMIN — ENALAPRIL MALEATE 10 MG: 5 TABLET ORAL at 05:44

## 2017-12-15 RX ADMIN — ATORVASTATIN CALCIUM 10 MG: 10 TABLET, FILM COATED ORAL at 20:20

## 2017-12-15 RX ADMIN — ALLOPURINOL 100 MG: 100 TABLET ORAL at 08:24

## 2017-12-15 RX ADMIN — ASPIRIN 325 MG: 325 TABLET, COATED ORAL at 08:24

## 2017-12-15 RX ADMIN — CHOLECALCIFEROL TAB 25 MCG (1000 UNIT) 4000 UNITS: 25 TAB at 08:24

## 2017-12-15 ASSESSMENT — PAIN SCALES - GENERAL
PAINLEVEL_OUTOF10: 0
PAINLEVEL_OUTOF10: 0

## 2017-12-15 ASSESSMENT — GAIT ASSESSMENTS
ASSISTIVE DEVICE: SINGLE POINT CANE
DISTANCE (FEET): 200
GAIT LEVEL OF ASSIST: CONTACT GUARD ASSIST
DEVIATION: ATAXIC;INCREASED BASE OF SUPPORT;BRADYKINETIC;SHUFFLED GAIT

## 2017-12-15 NOTE — REHAB-CM IDT TEAM NOTE
Case Management    DC Planning  DC destination/dispostion:  Return to [place] second story [facility] with 16 entry stairs to condo where he lives alone. Patient has local support from daughter, Ekta, granddaughter, Lakesha and X-wife, Alexa.    Referrals: Anticipate home health services    DC Needs: DME - TBD, Patient currently has none. Follow up appointments with PCP - Kirill Fabian MD and Radiology - Dr. Kat    Barriers to discharge:  Lives alone, flight of stairs leading to condo    Strengths: Supportive family    Section completed by:  Shira Flood CM MSW

## 2017-12-15 NOTE — PROGRESS NOTES
Patient's bed alarm sounded; when the CNA entered the room the patient was on his knees at bedside. Patient denies falling; stated he lowered himself from the edge of the bed to the floor so he could use the toilet. /77 pulse 55; prn hydralazine given. Patient refuses to have family notified; on call physician notified and received order to give hydralazine prior to Q8 hours. Patient denies pain , no sign of injury.

## 2017-12-15 NOTE — PROGRESS NOTES
Patient is AOx4 has poor safety awareness and is close to the nurses station. Patient has alarms set on bed and w/c. Patient denies complaints of pain and has been up participating in therapies.

## 2017-12-15 NOTE — CARE PLAN
Problem: Safety  Goal: Will remain free from falls    Intervention: Assess risk factors for falls  Patient is AOx4 has poor safety awareness and is impulsive. Patient is close to the nurses station, has call light close by, bed in low position, shoes on when out of bed. Patient has alarms set on bed and w/c.      Problem: Bowel/Gastric:  Goal: Will not experience complications related to bowel motility    Intervention: Assess baseline bowel pattern  Patient encouraged throughout the day to increase fluid intake to promote regular bowel movements. Patient educated regarding nectar thick liquids and safety precautions.

## 2017-12-15 NOTE — PROGRESS NOTES
Received shift report and assumed care of patient.  Patient awake, calm and stable, currently positioned in bed for comfort and safety; call light within reach. P{t sleeping comfortably in bed at this time.  Will continue to monitor.

## 2017-12-15 NOTE — PROGRESS NOTES
"Rehab Progress Note     Encounter Date: 12/15/2017    CC: L CVA with right sided weakness    Interval Events (Subjective)  Patient sitting up in bed prior to SLP. Patient reports that although nursing says he had a fall last night he did not fall. He reports the nurses were taking too long to answer his bells earlier so he decided he did not have time to wait to use the restroom. He therefore went onto his stomach and wiggled off the bed onto his knees.  He says that nursing finally came in as he was on his hands and knees.  Denies trauma or injury.  He was then helped up and went to the restroom.  Discussed at length with patient about using the call light and that therapy will work to get him safer so that he can be more independent in his room.  Denies pain at this time. Only complaint is his right limp wrist.  Denies SOB.  Denies N/V/D.    Objective:  VITAL SIGNS: /64   Pulse (!) 56   Temp 36.6 °C (97.8 °F)   Resp 18   Ht 1.753 m (5' 9\")   Wt 76.7 kg (169 lb 1.5 oz)   SpO2 94%   BMI 24.97 kg/m²   Gen: NAD, dysarthric speech  Psych: Mood and affect normal  Resp: CTAB  CV: RRR, minimal edema R LE  Abd: NTND, BS+  Neuro: AOx3,  R Wrist flexor 2/5, R wrist extensor 1/5,  Finger flexor 3/5    Recent Results (from the past 72 hour(s))   Echocardiogram Comp W/O cont    Collection Time: 12/12/17 11:30 AM   Result Value Ref Range    Eject.Frac. MOD BP 47.15     Eject.Frac. MOD 4C 52.87     Eject.Frac. MOD 2C 40.42     Left Ventrical Ejection Fraction 45    TSH    Collection Time: 12/13/17  4:08 AM   Result Value Ref Range    TSH 3.980 (H) 0.300 - 3.700 uIU/mL   VITAMIN B12    Collection Time: 12/13/17  4:08 AM   Result Value Ref Range    Vitamin B12 -True Cobalamin 188 (L) 211 - 911 pg/mL   CBC WITH DIFFERENTIAL    Collection Time: 12/13/17  4:08 AM   Result Value Ref Range    WBC 6.8 4.8 - 10.8 K/uL    RBC 3.87 (L) 4.70 - 6.10 M/uL    Hemoglobin 11.6 (L) 14.0 - 18.0 g/dL    Hematocrit 34.6 (L) 42.0 - 52.0 % "    MCV 89.4 81.4 - 97.8 fL    MCH 30.0 27.0 - 33.0 pg    MCHC 33.5 (L) 33.7 - 35.3 g/dL    RDW 44.9 35.9 - 50.0 fL    Platelet Count 112 (L) 164 - 446 K/uL    MPV 11.5 9.0 - 12.9 fL    Neutrophils-Polys 72.80 (H) 44.00 - 72.00 %    Lymphocytes 14.50 (L) 22.00 - 41.00 %    Monocytes 8.30 0.00 - 13.40 %    Eosinophils 3.20 0.00 - 6.90 %    Basophils 0.60 0.00 - 1.80 %    Immature Granulocytes 0.60 0.00 - 0.90 %    Nucleated RBC 0.00 /100 WBC    Neutrophils (Absolute) 4.93 1.82 - 7.42 K/uL    Lymphs (Absolute) 0.98 (L) 1.00 - 4.80 K/uL    Monos (Absolute) 0.56 0.00 - 0.85 K/uL    Eos (Absolute) 0.22 0.00 - 0.51 K/uL    Baso (Absolute) 0.04 0.00 - 0.12 K/uL    Immature Granulocytes (abs) 0.04 0.00 - 0.11 K/uL    NRBC (Absolute) 0.00 K/uL   BASIC METABOLIC PANEL    Collection Time: 12/13/17  4:08 AM   Result Value Ref Range    Sodium 138 135 - 145 mmol/L    Potassium 4.6 3.6 - 5.5 mmol/L    Chloride 113 (H) 96 - 112 mmol/L    Co2 16 (L) 20 - 33 mmol/L    Glucose 109 (H) 65 - 99 mg/dL    Bun 50 (H) 8 - 22 mg/dL    Creatinine 2.32 (H) 0.50 - 1.40 mg/dL    Calcium 8.7 8.5 - 10.5 mg/dL    Anion Gap 9.0 0.0 - 11.9   ESTIMATED GFR    Collection Time: 12/13/17  4:08 AM   Result Value Ref Range    GFR If  33 (A) >60 mL/min/1.73 m 2    GFR If Non  27 (A) >60 mL/min/1.73 m 2   CBC with Differential    Collection Time: 12/14/17  5:20 AM   Result Value Ref Range    WBC 6.9 4.8 - 10.8 K/uL    RBC 4.01 (L) 4.70 - 6.10 M/uL    Hemoglobin 12.2 (L) 14.0 - 18.0 g/dL    Hematocrit 36.2 (L) 42.0 - 52.0 %    MCV 90.3 81.4 - 97.8 fL    MCH 30.4 27.0 - 33.0 pg    MCHC 33.7 33.7 - 35.3 g/dL    RDW 45.3 35.9 - 50.0 fL    Platelet Count 101 (L) 164 - 446 K/uL    MPV 11.5 9.0 - 12.9 fL    Neutrophils-Polys 73.80 (H) 44.00 - 72.00 %    Lymphocytes 14.50 (L) 22.00 - 41.00 %    Monocytes 8.10 0.00 - 13.40 %    Eosinophils 2.90 0.00 - 6.90 %    Basophils 0.30 0.00 - 1.80 %    Immature Granulocytes 0.40 0.00 - 0.90 %     Nucleated RBC 0.00 /100 WBC    Neutrophils (Absolute) 5.09 1.82 - 7.42 K/uL    Lymphs (Absolute) 1.00 1.00 - 4.80 K/uL    Monos (Absolute) 0.56 0.00 - 0.85 K/uL    Eos (Absolute) 0.20 0.00 - 0.51 K/uL    Baso (Absolute) 0.02 0.00 - 0.12 K/uL    Immature Granulocytes (abs) 0.03 0.00 - 0.11 K/uL    NRBC (Absolute) 0.00 K/uL   Comp Metabolic Panel (CMP)    Collection Time: 12/14/17  5:20 AM   Result Value Ref Range    Sodium 137 135 - 145 mmol/L    Potassium 4.7 3.6 - 5.5 mmol/L    Chloride 110 96 - 112 mmol/L    Co2 17 (L) 20 - 33 mmol/L    Anion Gap 10.0 0.0 - 11.9    Glucose 111 (H) 65 - 99 mg/dL    Bun 55 (H) 8 - 22 mg/dL    Creatinine 2.91 (H) 0.50 - 1.40 mg/dL    Calcium 9.1 8.5 - 10.5 mg/dL    AST(SGOT) 26 12 - 45 U/L    ALT(SGPT) 34 2 - 50 U/L    Alkaline Phosphatase 60 30 - 99 U/L    Total Bilirubin 0.8 0.1 - 1.5 mg/dL    Albumin 3.6 3.2 - 4.9 g/dL    Total Protein 6.8 6.0 - 8.2 g/dL    Globulin 3.2 1.9 - 3.5 g/dL    A-G Ratio 1.1 g/dL   TSH with Reflex to FT4    Collection Time: 12/14/17  5:20 AM   Result Value Ref Range    TSH 3.590 0.300 - 3.700 uIU/mL   Vitamin D, 25-hydroxy (blood)    Collection Time: 12/14/17  5:20 AM   Result Value Ref Range    25-Hydroxy   Vitamin D 25 35 30 - 100 ng/mL   ESTIMATED GFR    Collection Time: 12/14/17  5:20 AM   Result Value Ref Range    GFR If African American 25 (A) >60 mL/min/1.73 m 2    GFR If Non African American 21 (A) >60 mL/min/1.73 m 2       Current Facility-Administered Medications   Medication Frequency   • carvedilol (COREG) tablet 12.5 mg BID WITH MEALS   • aspirin (ASA) tablet 325 mg DAILY   • allopurinol (ZYLOPRIM) tablet 100 mg DAILY   • atorvastatin (LIPITOR) tablet 10 mg Q EVENING   • clopidogrel (PLAVIX) tablet 75 mg QAM   • cyanocobalamin (VITAMIN B12) tablet 1,000 mcg DAILY   • ferrous sulfate tablet 325 mg QAM   • finasteride (PROSCAR) tablet 5 mg DAILY   • vitamin D (cholecalciferol) tablet 4,000 Units DAILY   • Respiratory Care per Protocol  Continuous RT   • Pharmacy Consult Request ...Pain Management Review 1 Each PRN   • oxycodone immediate-release (ROXICODONE) tablet 5 mg Q3HRS PRN   • oxycodone immediate release (ROXICODONE) tablet 10 mg Q3HRS PRN   • acetaminophen (TYLENOL) tablet 650 mg Q4HRS PRN   • senna-docusate (PERICOLACE or SENOKOT S) 8.6-50 MG per tablet 2 Tab BID    And   • polyethylene glycol/lytes (MIRALAX) PACKET 1 Packet QDAY PRN    And   • magnesium hydroxide (MILK OF MAGNESIA) suspension 30 mL QDAY PRN    And   • bisacodyl (DULCOLAX) suppository 10 mg QDAY PRN   • artificial tears 1.4 % ophthalmic solution 1 Drop PRN   • benzocaine-menthol (CEPACOL) lozenge 1 Lozenge Q2HRS PRN   • hydrALAZINE (APRESOLINE) tablet 25 mg Q8HRS PRN   • mag hydrox-al hydrox-simeth (MAALOX PLUS ES or MYLANTA DS) suspension 20 mL Q2HRS PRN   • ondansetron (ZOFRAN ODT) dispertab 4 mg 4X/DAY PRN    Or   • ondansetron (ZOFRAN) syringe/vial injection 4 mg 4X/DAY PRN   • trazodone (DESYREL) tablet 50 mg QHS PRN   • sodium chloride (OCEAN) 0.65 % nasal spray 2 Spray PRN   • enalapril (VASOTEC) tablet 10 mg TWICE DAILY       Orders Placed This Encounter   Procedures   • Diet Order     Standing Status:   Standing     Number of Occurrences:   1     Order Specific Question:   Diet:     Answer:   Diabetic [3]     Order Specific Question:   Diet:     Answer:   Cardiac [6]     Order Specific Question:   Texture/Fiber modifications:     Answer:   Dysphagia 2(Pureed/Chopped)specify fluid consistency(question 6) [2]       Assessment:  Active Hospital Problems    Diagnosis   • *Left sided cerebral hemisphere cerebrovascular accident (CVA) (CMS-HCC)   • CVA (cerebral vascular accident) (CMS-HCC)   • Acute on Chronic blood loss anemia   • Vitamin B12 deficiency   • Lung cancer (CMS-HCC)   • Cardiomyopathy (CMS-HCC)   • Thrombocytopenia (CMS-HCC)   • Dysphagia due to recent cerebrovascular accident (CVA)   • Chronic combined systolic and diastolic CHF (congestive heart  failure) (CMS-HCC)   • CKD (chronic kidney disease) stage 3, GFR 30-59 ml/min   • COPD (chronic obstructive pulmonary disease) (CMS-HCC)   • HTN (hypertension)   • DM (diabetes mellitus) (CMS-HCC)   • Dyslipidemia   • BPH (benign prostatic hyperplasia)       Medical Decision Making and Plan:  CVA - Patient with new left sided CVA with R sided weakness, dysphagia and dysarthria.  Patient previously with bilateral strokes but improved to baseline.    -Continue  mg, Plavix 75 mg  -Continue Good BP and DLD control (see below)     RLL Lung Adenocarcinoma - Evaluated by Radiation oncology while at [facility].  Recommending radiation therapy in January.  -Follow-up with Radiation oncology post-discharge     HTN - Patient with longstanding HTN as well as CHF.  Patient previously on Coreg, Vasotec and Lasix.  All medications were stopped acutely for permissive hypertension. Since admission continued elevated BP  -Restart Enalapril 10 mg BID, Coreg 12.5 mg BID. Continue to monitor as was labile prior to restarting medications.     DLD - Patient on statin at baseline.  Cholesterol WNL on acute check.  -Continue Atorvastatin 10 mg QHS     BPH - Patient on Proscar at baseline  -Continue Proscar 5 mg daily     COPD - No exacerbation since stroke.  Continue on home medications  -Patient declining Spiriva     B12 Deficiency - Found during acute stay.  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     Gout - Pt reports being on allopurinol for years.   -Continue Alllopurinol 100 mg daily     Vitamin D Deficiency - Reported deficiency but normal on admission labs. Will continue supplementation  -Cholecalciferol 4000 U daily     DVT Prophlyaxis - Patient ambulating sufficiently.  Continue TEDs in AM    Total time:  > 25 minutes.  I spent greater than 50% of the time for patient care and coordination on this  date, including unit/floor time, and face-to-face time with the patient as per assessment and plan above.  Discussed fall risk and alerting nursing first. Discussed BP management, restarted both medications yesterday should continue to monitor.    Huy Quick M.D.

## 2017-12-15 NOTE — CARE PLAN
Problem: Infection  Goal: Will remain free from infection   Outcome: PROGRESSING AS EXPECTED  Patient remains free from s/s infection; afebrile.  Will continue to monitor.      Problem: Pain Management  Goal: Pain level will decrease to patient's comfort goal  Outcome: PROGRESSING AS EXPECTED  Pt reported 2/10 pain/ discomfort in groin area; pt states its his prostate under the skin that's causing his discomfort

## 2017-12-16 PROCEDURE — A9270 NON-COVERED ITEM OR SERVICE: HCPCS | Performed by: PHYSICAL MEDICINE & REHABILITATION

## 2017-12-16 PROCEDURE — 97535 SELF CARE MNGMENT TRAINING: CPT

## 2017-12-16 PROCEDURE — 97110 THERAPEUTIC EXERCISES: CPT

## 2017-12-16 PROCEDURE — 770010 HCHG ROOM/CARE - REHAB SEMI PRIVAT*

## 2017-12-16 PROCEDURE — 97112 NEUROMUSCULAR REEDUCATION: CPT

## 2017-12-16 PROCEDURE — 97530 THERAPEUTIC ACTIVITIES: CPT

## 2017-12-16 PROCEDURE — 700102 HCHG RX REV CODE 250 W/ 637 OVERRIDE(OP): Performed by: PHYSICAL MEDICINE & REHABILITATION

## 2017-12-16 PROCEDURE — 92526 ORAL FUNCTION THERAPY: CPT

## 2017-12-16 PROCEDURE — 97116 GAIT TRAINING THERAPY: CPT

## 2017-12-16 RX ADMIN — ASPIRIN 325 MG: 325 TABLET, COATED ORAL at 09:09

## 2017-12-16 RX ADMIN — ALLOPURINOL 100 MG: 100 TABLET ORAL at 09:08

## 2017-12-16 RX ADMIN — FINASTERIDE 5 MG: 5 TABLET, FILM COATED ORAL at 09:09

## 2017-12-16 RX ADMIN — CARVEDILOL 12.5 MG: 12.5 TABLET, FILM COATED ORAL at 09:09

## 2017-12-16 RX ADMIN — DOCUSATE SODIUM AND SENNOSIDES 2 TABLET: 8.6; 5 TABLET, FILM COATED ORAL at 20:00

## 2017-12-16 RX ADMIN — CARVEDILOL 12.5 MG: 12.5 TABLET, FILM COATED ORAL at 17:52

## 2017-12-16 RX ADMIN — AMLODIPINE BESYLATE 5 MG: 5 TABLET ORAL at 09:09

## 2017-12-16 RX ADMIN — CHOLECALCIFEROL TAB 25 MCG (1000 UNIT) 4000 UNITS: 25 TAB at 09:08

## 2017-12-16 RX ADMIN — CLOPIDOGREL BISULFATE 75 MG: 75 TABLET ORAL at 09:09

## 2017-12-16 RX ADMIN — FERROUS SULFATE TAB 325 MG (65 MG ELEMENTAL FE) 325 MG: 325 (65 FE) TAB at 09:09

## 2017-12-16 RX ADMIN — ENALAPRIL MALEATE 10 MG: 5 TABLET ORAL at 05:09

## 2017-12-16 RX ADMIN — ENALAPRIL MALEATE 10 MG: 5 TABLET ORAL at 17:52

## 2017-12-16 RX ADMIN — ATORVASTATIN CALCIUM 10 MG: 10 TABLET, FILM COATED ORAL at 20:00

## 2017-12-16 RX ADMIN — DOCUSATE SODIUM AND SENNOSIDES 2 TABLET: 8.6; 5 TABLET, FILM COATED ORAL at 09:08

## 2017-12-16 ASSESSMENT — PAIN SCALES - GENERAL
PAINLEVEL_OUTOF10: 0
PAINLEVEL_OUTOF10: 0

## 2017-12-16 NOTE — CARE PLAN
Problem: Safety  Goal: Will remain free from falls  Reinforced safety education with patient, using call light and waiting for assistance before attempting to self transfer. Patient stating he does not feel like the bed alarm is appropriate, RN educated patient on need for bed alarm after last night he had a fall. Will continue to frequently round on patient throughout the night with CNA.     Problem: Bowel/Gastric:  Goal: Normal bowel function is maintained or improved  Patient having loose bowel movements, last BM 12/15, requesting bowel medications be held tonight.

## 2017-12-16 NOTE — PROGRESS NOTES
Received bedside shift report and assumed care of patient. Patient awake,calm, and stable. Currently positioned in bed for comfort and safety; call light within reach. Denies pain or discomfort at this time. Will continue to monitor.

## 2017-12-16 NOTE — REHAB-PHARMACY IDT TEAM NOTE
Pharmacy   Pharmacy  Antibiotics/Antifungals/Antivirals:  Medication:      Active Orders     None        Route:        NA  Stop Date:  NA  Reason:      NA  Antihypertensives/Cardiac:  Medication:    Orders (72h ago through future)    Start     Ordered    12/15/17 1530  amlodipine (NORVASC) tablet 5 mg  Q DAY,   Status:  Discontinued      12/15/17 1510    12/15/17 1530  amlodipine (NORVASC) tablet 5 mg  DAILY      12/15/17 1510    12/14/17 1115  carvedilol (COREG) tablet 12.5 mg  2 TIMES DAILY WITH MEALS      12/14/17 1047    12/13/17 2100  atorvastatin (LIPITOR) tablet 10 mg  EVERY EVENING      12/13/17 1656    12/13/17 1745  enalapril (VASOTEC) tablet 10 mg  TWICE DAILY      12/13/17 1720    12/13/17 1717  hydrALAZINE (APRESOLINE) tablet 25 mg  EVERY 8 HOURS PRN      12/13/17 1718        Patient Vitals for the past 24 hrs:   BP Pulse   12/15/17 1741 144/60 (!) 58   12/15/17 1400 (!) 164/60 (!) 53   12/15/17 0630 132/64 (!) 56   12/15/17 0500 (!) 163/74 64   12/15/17 0000 127/60 (!) 56   12/14/17 2330 120/59 (!) 58   12/14/17 2300 159/72 (!) 54   12/14/17 2245 140/67 (!) 54   12/14/17 2230 (!) 181/77 (!) 55   12/14/17 2225 (!) 173/77 (!) 55   12/14/17 2215 (!) 173/77 -   12/14/17 1901 148/64 (!) 58       Anticoagulation:  Medication: Aspirin,  Plavix    Other key medications: A review of the medication list reveals no issues at this time. Patient is currently on antihypertensive(s). Recommend home blood pressure monitoring/recording if antihypertensive(s) regimen(s) continue.    Section completed by: Nixno [facility]

## 2017-12-17 PROCEDURE — 97535 SELF CARE MNGMENT TRAINING: CPT

## 2017-12-17 PROCEDURE — 97530 THERAPEUTIC ACTIVITIES: CPT

## 2017-12-17 PROCEDURE — 97116 GAIT TRAINING THERAPY: CPT

## 2017-12-17 PROCEDURE — 700102 HCHG RX REV CODE 250 W/ 637 OVERRIDE(OP): Performed by: PHYSICAL MEDICINE & REHABILITATION

## 2017-12-17 PROCEDURE — 770010 HCHG ROOM/CARE - REHAB SEMI PRIVAT*

## 2017-12-17 PROCEDURE — 97110 THERAPEUTIC EXERCISES: CPT

## 2017-12-17 PROCEDURE — A9270 NON-COVERED ITEM OR SERVICE: HCPCS | Performed by: PHYSICAL MEDICINE & REHABILITATION

## 2017-12-17 PROCEDURE — 92526 ORAL FUNCTION THERAPY: CPT

## 2017-12-17 RX ADMIN — ASPIRIN 325 MG: 325 TABLET, COATED ORAL at 08:42

## 2017-12-17 RX ADMIN — ENALAPRIL MALEATE 10 MG: 5 TABLET ORAL at 17:18

## 2017-12-17 RX ADMIN — ENALAPRIL MALEATE 10 MG: 5 TABLET ORAL at 05:17

## 2017-12-17 RX ADMIN — CHOLECALCIFEROL TAB 25 MCG (1000 UNIT) 4000 UNITS: 25 TAB at 08:42

## 2017-12-17 RX ADMIN — FERROUS SULFATE TAB 325 MG (65 MG ELEMENTAL FE) 325 MG: 325 (65 FE) TAB at 08:43

## 2017-12-17 RX ADMIN — FINASTERIDE 5 MG: 5 TABLET, FILM COATED ORAL at 08:43

## 2017-12-17 RX ADMIN — CLOPIDOGREL BISULFATE 75 MG: 75 TABLET ORAL at 08:43

## 2017-12-17 RX ADMIN — ATORVASTATIN CALCIUM 10 MG: 10 TABLET, FILM COATED ORAL at 20:02

## 2017-12-17 RX ADMIN — AMLODIPINE BESYLATE 5 MG: 5 TABLET ORAL at 08:42

## 2017-12-17 RX ADMIN — ALLOPURINOL 100 MG: 100 TABLET ORAL at 08:42

## 2017-12-17 RX ADMIN — OXYCODONE HYDROCHLORIDE 5 MG: 5 TABLET ORAL at 20:02

## 2017-12-17 RX ADMIN — CARVEDILOL 12.5 MG: 12.5 TABLET, FILM COATED ORAL at 08:42

## 2017-12-17 ASSESSMENT — GAIT ASSESSMENTS
ASSISTIVE DEVICE: SINGLE POINT CANE
DEVIATION: ATAXIC;INCREASED BASE OF SUPPORT;SHUFFLED GAIT
GAIT LEVEL OF ASSIST: CONTACT GUARD ASSIST
DISTANCE (FEET): 125

## 2017-12-17 ASSESSMENT — PAIN SCALES - GENERAL
PAINLEVEL_OUTOF10: 5
PAINLEVEL_OUTOF10: 0
PAINLEVEL_OUTOF10: 0

## 2017-12-17 NOTE — PROGRESS NOTES
0715: Bedside report received, assumed care for this patient.  Patient is A&O x 4.  VSS.  Communication board updated, call light and belongings are within reach.  Alarms on bed and w/c.  Bed is in low position. Patient appears in no distress, and has no complaints of SOB and 0/10 of pain.  Will be medicated per MAR.  Plan of care discussed and agreed upon with patient.

## 2017-12-17 NOTE — CARE PLAN
Problem: Safety  Goal: Will remain free from falls  Call light within reach, needs met.  Bed in low and locked position.  Patient educated on fall risk and verbalized agreement, patient does call appropriately for toileting, nutrition, and other needs.  Patient A&O x 3 and impulsive per report but not this shift.    Alarms on the bed and wheelchair.    Problem: Bowel/Gastric:  Goal: Normal bowel function is maintained or improved  Patient is not taking scheduled bowel medications as prescribed, held senna this AM as stool was loose.  Bowel sounds normoactive all 4 quads, no distension noted and patient not c/o discomfort.  Patient having regular bowel movements and passing flatus.

## 2017-12-17 NOTE — CARE PLAN
Problem: Safety  Goal: Will remain free from falls    Intervention: Implement fall precautions  Call light with in reach. Redirection to use call light for assistance. Non skid socks. Upper siderails up x2 while in bed. Patient refusing bed alarm on, redirection with some good effect. Patient said that he is a grown up man and does not need bed alarm. S/P fall with no apparent injury. No complains of pain. No respiratory distress. HOB up 30 degrees. Will continue to monitor.

## 2017-12-17 NOTE — CARE PLAN
Problem: Safety  Goal: Will remain free from injury  Pt uses call light consistently and appropriately. Waits for assistance does not attempt self transfer this shift. Able to verbalize needs.    Problem: Infection  Goal: Will remain free from infection  Patient able to verbalize pain level and verbalize an acceptable level of pain.    Problem: Pain Management  Goal: Pain level will decrease to patient's comfort goal  Patient able to verbalize pain level and verbalize an acceptable level of pain.

## 2017-12-18 ENCOUNTER — APPOINTMENT (OUTPATIENT)
Dept: RADIOLOGY | Facility: REHABILITATION | Age: 82
DRG: 057 | End: 2017-12-18
Attending: PHYSICAL MEDICINE & REHABILITATION
Payer: MEDICARE

## 2017-12-18 ENCOUNTER — APPOINTMENT (OUTPATIENT)
Dept: PAIN MANAGEMENT | Facility: REHABILITATION | Age: 82
DRG: 057 | End: 2017-12-18
Attending: PHYSICAL MEDICINE & REHABILITATION
Payer: MEDICARE

## 2017-12-18 PROCEDURE — 97110 THERAPEUTIC EXERCISES: CPT

## 2017-12-18 PROCEDURE — 92611 MOTION FLUOROSCOPY/SWALLOW: CPT

## 2017-12-18 PROCEDURE — 770010 HCHG ROOM/CARE - REHAB SEMI PRIVAT*

## 2017-12-18 PROCEDURE — 92526 ORAL FUNCTION THERAPY: CPT

## 2017-12-18 PROCEDURE — 97112 NEUROMUSCULAR REEDUCATION: CPT

## 2017-12-18 PROCEDURE — A9270 NON-COVERED ITEM OR SERVICE: HCPCS | Performed by: PHYSICAL MEDICINE & REHABILITATION

## 2017-12-18 PROCEDURE — 97116 GAIT TRAINING THERAPY: CPT

## 2017-12-18 PROCEDURE — 97535 SELF CARE MNGMENT TRAINING: CPT

## 2017-12-18 PROCEDURE — 74230 X-RAY XM SWLNG FUNCJ C+: CPT

## 2017-12-18 PROCEDURE — 99232 SBSQ HOSP IP/OBS MODERATE 35: CPT | Performed by: PHYSICAL MEDICINE & REHABILITATION

## 2017-12-18 PROCEDURE — 700102 HCHG RX REV CODE 250 W/ 637 OVERRIDE(OP): Performed by: PHYSICAL MEDICINE & REHABILITATION

## 2017-12-18 RX ORDER — AMLODIPINE BESYLATE 5 MG/1
10 TABLET ORAL DAILY
Status: DISCONTINUED | OUTPATIENT
Start: 2017-12-19 | End: 2017-12-27 | Stop reason: HOSPADM

## 2017-12-18 RX ORDER — FINASTERIDE 5 MG/1
10 TABLET, FILM COATED ORAL DAILY
Status: DISCONTINUED | OUTPATIENT
Start: 2017-12-19 | End: 2017-12-27 | Stop reason: HOSPADM

## 2017-12-18 RX ORDER — BISACODYL 10 MG
10 SUPPOSITORY, RECTAL RECTAL
Status: DISCONTINUED | OUTPATIENT
Start: 2017-12-18 | End: 2017-12-27 | Stop reason: HOSPADM

## 2017-12-18 RX ORDER — AMOXICILLIN 250 MG
2 CAPSULE ORAL 2 TIMES DAILY PRN
Status: DISCONTINUED | OUTPATIENT
Start: 2017-12-18 | End: 2017-12-27 | Stop reason: HOSPADM

## 2017-12-18 RX ORDER — HYDROCODONE BITARTRATE AND ACETAMINOPHEN 5; 325 MG/1; MG/1
1-2 TABLET ORAL EVERY 4 HOURS PRN
Status: DISCONTINUED | OUTPATIENT
Start: 2017-12-18 | End: 2017-12-27 | Stop reason: HOSPADM

## 2017-12-18 RX ORDER — POLYETHYLENE GLYCOL 3350 17 G/17G
1 POWDER, FOR SOLUTION ORAL
Status: DISCONTINUED | OUTPATIENT
Start: 2017-12-18 | End: 2017-12-27 | Stop reason: HOSPADM

## 2017-12-18 RX ADMIN — CLOPIDOGREL BISULFATE 75 MG: 75 TABLET ORAL at 10:20

## 2017-12-18 RX ADMIN — ASPIRIN 325 MG: 325 TABLET, COATED ORAL at 10:20

## 2017-12-18 RX ADMIN — AMLODIPINE BESYLATE 5 MG: 5 TABLET ORAL at 10:18

## 2017-12-18 RX ADMIN — ALLOPURINOL 100 MG: 100 TABLET ORAL at 10:21

## 2017-12-18 RX ADMIN — CHOLECALCIFEROL TAB 25 MCG (1000 UNIT) 4000 UNITS: 25 TAB at 10:19

## 2017-12-18 RX ADMIN — ATORVASTATIN CALCIUM 10 MG: 10 TABLET, FILM COATED ORAL at 19:45

## 2017-12-18 RX ADMIN — HYDROCODONE BITARTRATE AND ACETAMINOPHEN 1 TABLET: 5; 325 TABLET ORAL at 19:45

## 2017-12-18 RX ADMIN — CARVEDILOL 12.5 MG: 12.5 TABLET, FILM COATED ORAL at 10:26

## 2017-12-18 RX ADMIN — FERROUS SULFATE TAB 325 MG (65 MG ELEMENTAL FE) 325 MG: 325 (65 FE) TAB at 10:21

## 2017-12-18 RX ADMIN — ACETAMINOPHEN 650 MG: 325 TABLET ORAL at 13:00

## 2017-12-18 RX ADMIN — ENALAPRIL MALEATE 10 MG: 5 TABLET ORAL at 17:36

## 2017-12-18 RX ADMIN — FINASTERIDE 5 MG: 5 TABLET, FILM COATED ORAL at 10:21

## 2017-12-18 RX ADMIN — ENALAPRIL MALEATE 10 MG: 5 TABLET ORAL at 05:37

## 2017-12-18 RX ADMIN — OXYCODONE HYDROCHLORIDE 10 MG: 10 TABLET ORAL at 15:13

## 2017-12-18 ASSESSMENT — GAIT ASSESSMENTS
ASSISTIVE DEVICE: QUAD CANE
DEVIATION: ATAXIC;DECREASED HEEL STRIKE
DISTANCE (FEET): 50
GAIT LEVEL OF ASSIST: CONTACT GUARD ASSIST

## 2017-12-18 ASSESSMENT — PAIN SCALES - GENERAL
PAINLEVEL_OUTOF10: 0
PAINLEVEL_OUTOF10: 7
PAINLEVEL_OUTOF10: 0
PAINLEVEL_OUTOF10: 7
PAINLEVEL_OUTOF10: 4

## 2017-12-18 NOTE — REHAB-OT IDT TEAM NOTE
Occupational Therapy   Activities of Daily Living  Eating Initial:  5 - Standby Prompting/Supervision or Set-up  Eating Current:  5 - Standby Prompting/Supervision or Set-up   Eating Description:  Modified diet, Supervision for safety, Verbal cueing, Set-up of equipment or meal/tube feeding  Grooming Initial:  5 - Standby Prompting/Supervision or Set-up  Grooming Current:  5 - Standby Prompting/Supervision or Set-up   Grooming Description:  Increased time, Supervision for safety, Set-up of equipment (Seated at sinkside for groom/hygiene routine - completed ADL w/ LUE only)  Bathing Initial:  0 - Not tested, patient refused  Bathing Current:  3 - Moderate Assistance   Bathing Description:  Grab bar, Tub bench, Long handled bath tool, Hand held shower, Increased time, Supervision for safety, Verbal cueing, Set-up of equipment, Initial preparation for task (Required assistance for dominant unaffected LUE, BLE's and CG for standing)  Upper Body Dressing Initial:  3 - Moderate Assistance  Upper Body Dressing Current:  5 - Standby Prompting/Supervision or Set-up   Upper Body Dressing Description:  Increased time, Supervision for safety, Verbal cueing, Set-up of equipment (Education for nunu dressing techniques. )  Lower Body Dressing Initial:  3 - Moderate Assistance  Lower Body Dressing Current:  5 - Standby Prompting/Supervsion or Set-up   Lower Body Dressing Description:  5 - Standby Prompting/Supervsion or Set-up  Toileting Initial:  3 - Moderate Assistance  Toileting Current:  4 - Minimal Assistance   Toileting Description:  Grab bar, Increased time, Supervision for safety, Verbal cueing  Toilet Transfer Initial:  4 - Minimal Assistance  Toilet Transfer Current:  5 - Standby Prompting/Supervision or Set-up   Toilet Transfer Description:  5 - Standby Prompting/Supervision or Set-up  Tub / Shower Transfer Initial:  0 - Not tested, patient refused  Tub / Shower Transfer Current:  4 - Minimal Assistance   Tub / Shower  Transfer Description:  Grab bar, Increased time, Supervision for safety, Verbal cueing, Set-up of equipment, Adaptive equipment (SPT to/from manual wc and shower chair via grab bar)  IADL:  TBD   Family Training/Education:  TBD   DME/DC Recommendations:  TBD     Strengths:  Alert and oriented, Independent PLOF, Making steady progress towards goals, Motivated for self care and independence, Supportive family and Willingly participates in therapeutic activities  Barriers:  Decreased endurance, Generalized weakness, Hemiplegia, Impulsive and Limited mobility     # of short term goals set= 5    # of short term goals met= 4          Occupational Therapy Goals           Problem: Dressing     Dates: Start: 12/14/17       Goal: STG-Within one week, patient will dress UB     Dates: Start: 12/18/17       Description: 1) Individualized Goal: Mod Indep to retrieve and don/doff upper body clothing  2) Interventions:  OT Self Care/ADL, OT Neuro Re-Ed/Balance, OT Therapeutic Activity, OT Evaluation and OT Therapeutic Exercise             Goal: STG-Within one week, patient will dress LB     Dates: Start: 12/18/17       Description: 1) Individualized Goal:  Mod Indep  2) Interventions:  OT Self Care/ADL, OT Neuro Re-Ed/Balance, OT Therapeutic Activity, OT Evaluation and OT Therapeutic Exercise               Problem: IADL's     Dates: Start: 12/14/17       Goal: STG-Within one week, patient will access kitchen area     Dates: Start: 12/14/17       Description: 1) Individualized Goal:  Min assist w/ AE PRN  2) Interventions:  OT Self Care/ADL, OT Neuro Re-Ed/Balance, OT Therapeutic Activity, OT Evaluation and OT Therapeutic Exercise       Note:     Goal Note filed on 12/18/17 1216 by Casey Saldivar MS,OTR/L    Goal: STG-Within one week, patient will access kitchen area  Outcome: NOT MET  Activity not completed at this time                  Problem: OT Long Term Goals     Dates: Start: 12/14/17       Goal: LTG-By discharge, patient will  complete basic self care tasks     Dates: Start: 12/14/17       Description: 1) Individualized Goal:  Mod Indep w/ AE PRN  2) Interventions:  OT Self Care/ADL, OT Neuro Re-Ed/Balance, OT Therapeutic Activity, OT Evaluation and OT Therapeutic Exercise             Goal: LTG-By discharge, patient will perform bathroom transfers     Dates: Start: 12/14/17       Description: 1) Individualized Goal:  Mod Indep w/ AE PRN  2) Interventions:  OT Self Care/ADL, OT Neuro Re-Ed/Balance, OT Therapeutic Activity, OT Evaluation and OT Therapeutic Exercise                   Section completed by:  Caesy Saldivar MS,OTR/L

## 2017-12-18 NOTE — CARE PLAN
Problem: Swallowing STGs  Goal: STG-Within one week, patient will  1) Individualized goal:  Participate in instrumental swallow assessment (MBSS) to r/o silent aspiration and readiness for diet advancement.   2) Interventions:  SLP Video Swallow Evaluation     Outcome: NOT MET  Pt to participate in a MBSS on 12/18  Goal: STG-Within one week, patient will  1) Individualized goal:  Safely swallow Dys 2/NTL with MIN cues for use of compensatory strategies.   2) Interventions:  SLP Swallowing Dysfunction Treatment     Outcome: MET Date Met: 12/18/17  Pt is currently tolerating dysphagia 2 textures and NTL's    Problem: Problem Solving STGs  Goal: STG-Within one week, patient will  1) Individualized goal:  Participate in further assessment of cognitive-linguistic skills (with goals as clinically appropriate to patient's intended discharge location).  2) Interventions:  SLP Cognitive Skill Development     Outcome: MET Date Met: 12/18/17  Cognitive evaluation completed, no further ST for cognition warranted at this time

## 2017-12-18 NOTE — CARE PLAN
Problem: Balance  Goal: STG-Within one week, patient will maintain dynamic standing  1) Individualized goal: Maintain dynamic standing balance for transfers and gait sba, one week  2) Interventions:  PT E Stim Attended, PT Gait Training, PT Therapeutic Exercises, PT Neuro Re-Ed/Balance and PT Therapeutic Activity     Outcome: NOT MET  Contact-guard assist single-point cane    Problem: Mobility  Goal: STG-Within one week, patient will ascend and descend four to six stairs  1) Individualized goal: Up/down 4-6 stairs railings, sba one week  2) Interventions:  PT E Stim Attended, PT Gait Training, PT Therapeutic Exercises, PT Neuro Re-Ed/Balance and PT Therapeutic Activity     Outcome: NOT MET  Contact-guard assist, impaired sequencing and LE impairment  Goal: STG-Within one week, patient will  1) Individualized goal:  Gait spc or no device sba 250 ft one week  2) Interventions:  PT E Stim Attended, PT Gait Training, PT Therapeutic Exercises, PT Neuro Re-Ed/Balance and PT Therapeutic Activity     Outcome: NOT MET  125 ft    Problem: Mobility Transfers  Goal: STG-Within one week, patient will transfer bed to chair  1) Individualized goal: Transfer bed to/from chair supervision one week  2) Interventions:  PT E Stim Attended, PT Gait Training, PT Therapeutic Exercises, PT Neuro Re-Ed/Balance and PT Therapeutic Activity     Outcome: NOT MET  Standby assist, verbal cues for safety and sequencing

## 2017-12-18 NOTE — PROGRESS NOTES
"Rehab Progress Note     Encounter Date: 12/18/2017    CC: L CVA with right sided weakness    Interval Events (Subjective)  Patient sitting up in bed after therapy. Patient reports that his only complaint is prostate pain. He reports this pain has been sporadic since about June. He has gone to multiple Urologist for it and tried multiple medications. He reports it does not come with defecation or urination but is random. His current pain medications including Tylenol do not control it.  Discussed alternative options.  Denies SOB.  Denies N/V/D.    IDT Team Meeting 12/18/2017    Huy CORTEZ M.D., was present and led the interdisciplinary team conference on 12/18/2017.       RN:  Diet Diabetic, Cardiac Dys 2   % Meal   70%   Pain  Prostate pain    Sleep    Bowel De   Bladder TotA, 1 accident   In's & Out's    Swallow study at 2, refuses NTL with meds    PT:  Bed Mobility Close SBA   Transfers  SBA   Mobility  SPC with gait belt   Stairs  continues cues for safety and sequencing   Impulsive and unsafe  Safety and insight are barrier    OT: Impulsive  Eating    Grooming supervis   Bathing supervi   UB Dressing superv   LB Dressing superv   Toileting superv   Shower & Transfer maxA   Kinesiotaping    SLP:  Cognitive - functional activity limited   28/30 MOCA  Swallow study today at 1400  Upgrade to thin liquids hopefully      CM:  Continues to work on disposition and DME needs.      DC/Disposition:  11/29/17 has entire set of stairs into condo    Objective:  VITAL SIGNS: /68   Pulse 64   Temp 36.4 °C (97.6 °F)   Resp 19   Ht 1.753 m (5' 9\")   Wt 76.7 kg (169 lb 1.5 oz)   SpO2 96%   BMI 24.97 kg/m²   Gen: NAD, dysarthric speech  Psych: Mood and affect normal  Resp: CTAB  CV: RRR, minimal edema R LE  Abd: NTND, BS+  Neuro: AOx3,  R Wrist flexor 2/5, R wrist extensor 1/5,  Finger flexor 3/5    No results found for this or any previous visit (from the past 72 hour(s)).    Current " Facility-Administered Medications   Medication Frequency   • amlodipine (NORVASC) tablet 5 mg DAILY   • carvedilol (COREG) tablet 12.5 mg BID WITH MEALS   • aspirin (ASA) tablet 325 mg DAILY   • allopurinol (ZYLOPRIM) tablet 100 mg DAILY   • atorvastatin (LIPITOR) tablet 10 mg Q EVENING   • clopidogrel (PLAVIX) tablet 75 mg QAM   • cyanocobalamin (VITAMIN B12) tablet 1,000 mcg DAILY   • ferrous sulfate tablet 325 mg QAM   • finasteride (PROSCAR) tablet 5 mg DAILY   • vitamin D (cholecalciferol) tablet 4,000 Units DAILY   • Respiratory Care per Protocol Continuous RT   • Pharmacy Consult Request ...Pain Management Review 1 Each PRN   • oxycodone immediate-release (ROXICODONE) tablet 5 mg Q3HRS PRN   • oxycodone immediate release (ROXICODONE) tablet 10 mg Q3HRS PRN   • acetaminophen (TYLENOL) tablet 650 mg Q4HRS PRN   • senna-docusate (PERICOLACE or SENOKOT S) 8.6-50 MG per tablet 2 Tab BID    And   • polyethylene glycol/lytes (MIRALAX) PACKET 1 Packet QDAY PRN    And   • magnesium hydroxide (MILK OF MAGNESIA) suspension 30 mL QDAY PRN    And   • bisacodyl (DULCOLAX) suppository 10 mg QDAY PRN   • artificial tears 1.4 % ophthalmic solution 1 Drop PRN   • benzocaine-menthol (CEPACOL) lozenge 1 Lozenge Q2HRS PRN   • hydrALAZINE (APRESOLINE) tablet 25 mg Q8HRS PRN   • mag hydrox-al hydrox-simeth (MAALOX PLUS ES or MYLANTA DS) suspension 20 mL Q2HRS PRN   • ondansetron (ZOFRAN ODT) dispertab 4 mg 4X/DAY PRN    Or   • ondansetron (ZOFRAN) syringe/vial injection 4 mg 4X/DAY PRN   • trazodone (DESYREL) tablet 50 mg QHS PRN   • sodium chloride (OCEAN) 0.65 % nasal spray 2 Spray PRN   • enalapril (VASOTEC) tablet 10 mg TWICE DAILY       Orders Placed This Encounter   Procedures   • Diet Order     Standing Status:   Standing     Number of Occurrences:   1     Order Specific Question:   Diet:     Answer:   Diabetic [3]     Order Specific Question:   Diet:     Answer:   Cardiac [6]     Order Specific Question:   Texture/Fiber  modifications:     Answer:   Dysphagia 2(Pureed/Chopped)specify fluid consistency(question 6) [2]     Order Specific Question:   Consistency/Fluid modifications:     Answer:   [place] Thick [2]       Assessment:  Active Hospital Problems    Diagnosis   • *Left sided cerebral hemisphere cerebrovascular accident (CVA) (CMS-[facility])   • CVA (cerebral vascular accident) (CMS-[facility])   • Acute on Chronic blood loss anemia   • Vitamin B12 deficiency   • Lung cancer (CMS-[facility])   • Cardiomyopathy (CMS-[facility])   • Thrombocytopenia (CMS-[facility])   • Dysphagia due to recent cerebrovascular accident (CVA)   • Chronic combined systolic and diastolic CHF (congestive heart failure) (CMS-[facility])   • CKD (chronic kidney disease) stage 3, GFR 30-59 ml/min   • COPD (chronic obstructive pulmonary disease) (CMS-[facility])   • HTN (hypertension)   • DM (diabetes mellitus) (CMS-[facility])   • Dyslipidemia   • BPH (benign prostatic hyperplasia)       Medical Decision Making and Plan:  CVA - Patient with new left sided CVA with R sided weakness, dysphagia and dysarthria.  Patient previously with bilateral strokes but improved to baseline.    -Continue  mg, Plavix 75 mg  -Continue Good BP and DLD control (see below)  -Repeat MBS for dysphagia on 12/18/17.     RLL Lung Adenocarcinoma - Evaluated by Radiation oncology while at [facility].  Recommending radiation therapy in January.  -Follow-up with Radiation oncology post-discharge     HTN - Patient with longstanding HTN as well as CHF.  Patient previously on Coreg, Vasotec and Lasix.  All medications were stopped acutely for permissive hypertension. Since admission continued elevated BP  -Restart Enalapril 10 mg BID, Coreg 12.5 mg BID. Continue to monitor as was labile prior to restarting medications.  -Added parameters for Coreg for HR.  Added Amlodipine and increased to 10 mg on 12/18/17     DLD - Patient on statin at baseline.  Cholesterol WNL on acute check.  -Continue Atorvastatin 10 mg QHS     BPH - Patient on  "Proscar at baseline  -Continue Proscar 10 mg daily  -\"Prostate pain\" - not responding to oxycodone will try norco. Increased proscar     Neurogenic Bowel  - Patient would like all medications made PRN.    COPD - No exacerbation since stroke.  Continue on home medications  -Patient declining Spiriva. Held     B12 Deficiency - Found during acute stay.  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     Gout - Pt reports being on allopurinol for years.   -Continue Alllopurinol 100 mg daily     Vitamin D Deficiency - Reported deficiency but normal on admission labs. Will continue supplementation  -Cholecalciferol 4000 U daily     DVT Prophlyaxis - Patient ambulating sufficiently.  Continue TEDs in AM    Total time:  > 35 minutes.  I spent greater than 50% of the time for patient care and coordination on this date, including unit/floor time, and face-to-face time with the patient as per assessment and plan above.  Continued ongoing BP and HR control.  Discussed prostate pain. Patient was discussed separately in IDT today; please see details above    Huy Quick M.D.      "

## 2017-12-18 NOTE — REHAB-NURSING IDT TEAM NOTE
Nursing   Nursing  Diet/Nutrition:  Diabetic, Cardiac, Dysphagia II and Nectar Thick Liquids  Medication Administration:  Float Whole with Puree  % consumed at meals in last 24 hours:  Consumed 60-70% of meals per documentation.  Breakfast:  70%   Lunch:  60%  Dinner:  70%   Snack schedule:  HS  Appetite:  Fair  Fluid Intake/Output in past 24 hours: In: 980 [P.O.:980]  Out: 700   Admit Weight:  Weight: 76.7 kg (169 lb 1.5 oz)  Weight Last 7 Days   [76.7 kg (169 lb 1.5 oz)] 76.7 kg (169 lb 1.5 oz) (12/13 1658)    Pain Issues:    Location:  Other (Comments) (12/17 2002) Comment: Pt states pain in his prostate  --         Severity:  Mild   Scheduled pain medications:  None     PRN pain medications used in last 24 hours:  oxycodone immediate release (ROXICODONE)    Non Pharmacologic Interventions:  emotional support, repositioned and rest  Effectiveness of pain management plan:  good=patient states acceptable comfort after interventions    Bowel:    Bowel Assist Initial Score:  4 - Minimal Assistance  Bowel Assist Current Score:  4 - Minimal Assistance  Bowl Accidents in last 7 days:     Last bowel movement: 12/17/17  Stool: Large, Loose     Usual bowel pattern:  every other day  Scheduled bowel medications:  senna-docusate (PERICOLACE or SENOKOT S)   PRN bowel medications used in last 24 hours:  None  Nursing Interventions:  Increased time, Scheduled medication, Supervision, Verbal cueing, Positioning on commode/toilet  Effectiveness of bowel program:   good=regular, predictable, controlled emptying of bowel  Bladder:    Bladder Assist Initial Score:  4 - Minimal Assistance  Bladder Assist Current Score:  1 - Total Assistance  Bladder Accidents in last 7 days:  1  PVR range for past 24-48 hours:  [90]  ()  Medications affecting bladder:  Proscar    Time void schedule/voiding pattern:  Time void every 3 hours during the day and every 4 hours at night  Interventions:  Increased time, Supervision, Verbal cueing, Urinal,  Medication  Effectiveness of bladder training:  Fair=occasional unpredictable, incontinent emptying of bladder (< 1 time/day)    Sleep/wake cycle:   Average hours slept:  Sleeps 4-6 hours without waking  Sleep medication usage:  None    Patient/Family Training/Education:  Aspiration Precautions, Diet/Nutrition, Fall Prevention and Safety    Strengths: Willingly participates in therapeutic activities, Able to follow instructions, Pleasant and cooperative and Manages pain appropriately   Barriers:   Aspiration risk, Generalized weakness and Impulsive            Nursing Problems           Problem: Bowel/Gastric:     Goal: Normal bowel function is maintained or improved     Flowsheet:     Taken at 12/17/17 2002    Last BM 12/17/17 by Joaquina Syed R.N.    Taken at 12/15/17 2020    Last BM 12/15/17 by Joaquina Syed R.N.                Goal: Will not experience complications related to bowel motility             Problem: Communication     Goal: The ability to communicate needs accurately and effectively will improve             Problem: Discharge Barriers/Planning     Goal: Patient's continuum of care needs will be met             Problem: Infection     Goal: Will remain free from infection             Problem: Knowledge Deficit     Goal: Knowledge of disease process/condition, treatment plan, diagnostic tests, and medications will improve           Goal: Knowledge of the prescribed therapeutic regimen will improve             Problem: Mobility     Goal: Risk for activity intolerance will decrease             Problem: Pain Management     Goal: Pain level will decrease to patient's comfort goal     Flowsheet:     Taken at 12/14/17 2036    Nurse Pain Scale 0 - 10  2 by Naila Temple R.N.                  Problem: Respiratory:     Goal: Respiratory status will improve             Problem: Safety     Goal: Will remain free from injury           Goal: Will remain free from falls             Problem: Urinary Elimination:      Goal: Ability to reestablish a normal urinary elimination pattern will improve             Problem: Venous Thromboembolism (VTW)/Deep Vein Thrombosis (DVT) Prevention:     Goal: Patient will participate in Venous Thrombosis (VTE)/Deep Vein Thrombosis (DVT)Prevention Measures                  Long Term Goals:   At discharge patient will be able to function safely at home and in the community with support.    Section completed by:  Joaquina Syed R.N.

## 2017-12-18 NOTE — CARE PLAN
Problem: Dressing  Goal: STG-Within one week, patient will dress UB  1) Individualized Goal:  Min assist to don/doff pull over shirt  2) Interventions: OT Self Care/ADL, OT Neuro Re-Ed/Balance, OT Therapeutic Activity, OT Evaluation and OT Therapeutic Exercise     Outcome: MET Date Met: 12/18/17  Pt at Supervision/SBA  Goal: STG-Within one week, patient will dress LB  1) Individualized Goal:  Min assist w/ AE PRN  2) Interventions:  OT Self Care/ADL, OT Neuro Re-Ed/Balance, OT Therapeutic Activity, OT Evaluation and OT Therapeutic Exercise     Outcome: MET Date Met: 12/18/17  Pt at Supervision/SBA    Problem: Toileting  Goal: STG-Within one week, patient will complete toileting tasks  1) Individualized Goal: Min assist  2) Interventions: OT Self Care/ADL, OT Neuro Re-Ed/Balance, OT Therapeutic Activity, OT Evaluation and OT Therapeutic Exercise     Outcome: MET Date Met: 12/18/17  Pt at Min assist    Problem: Functional Transfers  Goal: STG-Within one week, patient will transfer to toilet  1) Individualized Goal:  Sup/SBA w/ AE PRN  2) Interventions:  OT Self Care/ADL, OT Neuro Re-Ed/Balance, OT Therapeutic Activity, OT Evaluation and OT Therapeutic Exercise     Outcome: MET Date Met: 12/18/17  Pt at Supervision level    Problem: IADL's  Goal: STG-Within one week, patient will access kitchen area  1) Individualized Goal:  Min assist w/ AE PRN  2) Interventions:  OT Self Care/ADL, OT Neuro Re-Ed/Balance, OT Therapeutic Activity, OT Evaluation and OT Therapeutic Exercise     Outcome: NOT MET  Activity not completed at this time

## 2017-12-18 NOTE — REHAB-SLP IDT TEAM NOTE
Speech Therapy   Cognitive Linquistic Functions  Comprehension Initial:  5 - Stand-by Prompting/Supervision or Set-up  Comprehension Current:  6 - Modified Independent   Comprehension Description:  Verbal cues  Expression Initial:  5 - Stand-by Prompting/Supervision or Set-up  Expression Current:  4 - Minimal Assistance   Expression Description:  Verbal cueing  Social Interaction Initial:  6 - Modified Independent  Social Interaction Current:  6 - Modified Independent   Social Interaction Description:  Increased time, Verbal cues  Problem Solving Initial:  5 - Standby Prompting/Supervision or Set-up  Problem Solving Current:  5 - Standby Prompting/Supervision or Set-up   Problem Solving Description:  Verbal cueing, Therapy schedule  Memory Initial:  5 - Standby Prompting/Supervision or Set-up  Memory Current:  6 - Modified Independent   Memory Description:  Verbal cueing, Supervision, Therapy schedule  Executive Functioning / Organization Initial:  Supervision (5)  Executive Functioning / Organization Current:  Supervision (5)   Executive Functioning Desciption:  Verbal cues   Swallowing  Swallowing Status:  Pt is currently tolerating a dysphagia 2 diet with NTL's, Unfortunately due to technical difficulties pt's MBSS that was completed on 12/15 was not recorded.  Repeat study to be completed on 12/18.    Orders Placed This Encounter   Procedures   • Diet Order     Standing Status:   Standing     Number of Occurrences:   1     Order Specific Question:   Diet:     Answer:   Diabetic [3]     Order Specific Question:   Diet:     Answer:   Cardiac [6]     Order Specific Question:   Texture/Fiber modifications:     Answer:   Dysphagia 2(Pureed/Chopped)specify fluid consistency(question 6) [2]     Order Specific Question:   Consistency/Fluid modifications:     Answer:   Nectar Thick [2]     Behavior Modification Plan  Redirect to task/topic and Analyze tasks (break down into smaller steps)  Assistive Technology  Other:  n/a  Family Training/Education:  To be completed   DC Recommendations:   TBD, possibly outpatient ST   Strengths:  Able to follow instructions, Motivated for self care and independence, Pleasant and cooperative, Supportive family and Willingly participates in therapeutic activities  Barriers:  Aspiration risk and Other: dysarthria, poor safety awareness   # of short term goals set=3  # of short term goals met=2       Speech Therapy Problems           Problem: Expression STGs     Dates: Start: 12/18/17       Goal: STG-Within one week, patient will     Dates: Start: 12/18/17       Description: 1) Individualized goal:  Implement speech intelligibility strategies given min cues in sentences to achieve 90% intelligibility.    2) Interventions:  SLP Speech Language Treatment and SLP Self Care / ADL Training                Problem: Speech/Swallowing LTGs     Dates: Start: 12/14/17       Goal: LTG-By discharge, patient will safely swallow     Dates: Start: 12/14/17       Description: 1) Individualized goal:  The least restrictive diet independent for use of safe swallow strategies.   2) Interventions:  SLP Swallowing Dysfunction Treatment             Goal: LTG-By discharge, patient will solve complex problem     Dates: Start: 12/14/17       Description: 1) Individualized goal:  By utilizing compensatory intervention for memory and problem-solving to allow for safe completion of daily activities MOD I.  2) Interventions:  SLP Cognitive Skill Development               Problem: Swallowing STGs     Dates: Start: 12/14/17       Goal: STG-Within one week, patient will     Dates: Start: 12/14/17       Description: 1) Individualized goal:  Participate in instrumental swallow assessment (MBSS) to r/o silent aspiration and readiness for diet advancement.   2) Interventions:  SLP Video Swallow Evaluation       Note:     Goal Note filed on 12/18/17 0756 by Bahman Delgadillo MS,CCC-SLP    Goal: STG-Within one week, patient will  Outcome:  NOT MET  Pt to participate in a MBSS on 12/18                Goal: STG-Within one week, patient will safely swallow     Dates: Start: 12/18/17       Description: 1) Individualized goal:  Safely swallow Dys 3/thin liquds with MIN cues for use of compensatory strategies.   2) Interventions:  SLP Swallowing Dysfunction Treatment, SLP Oral Pharyngeal Evaluation, SLP Video Swallow Evaluation and SLP Self Care / ADL Training                     Section completed by:  Bahman Delgadillo MS,CCC-SLP

## 2017-12-18 NOTE — REHAB-PT IDT TEAM NOTE
Physical Therapy   Mobility  Bed mobility: Standby assist supine to/from sit, verbal cues for sequencing and safety  Bed /Chair/Wheelchair Transfer Initial:  4 - Minimal Assistance  Bed /Chair/Wheelchair Transfer Current:  5 - Standby/Supervision or Set-up, single-point cane   Bed/Chair/Wheelchair Transfer Description:  Increased time, Supervision for safety, Verbal cueing, Set-up of equipment  Walk Initial:  4 - Minimal Assistance  Walk Current:  4 contact-guard, verbal cues for safety, single-point cane 125 ft   Walk Description:  Assist device/equipment, Supervision for safety  Wheelchair Initial:  5 - Standby Prompting/Supervision or Set-up  Wheelchair Current:  5 - Standby Prompting/Supervision or Set-up   Wheelchair Description:   (SBA x150 ft x2 reps w/BLE propulsion, slow pace, cues to manage RLE)  Stairs Initial:  2 - Max Assistance  Stairs Current: 2 - Max Assistance, verbal cues for safety and sequencing, contact-guard gait belt for safety   Stairs Description: Extra time, Safety concerns, Verbal cueing, Supervision for safety, Ascends/descends 4 to 11 steps  Patient/Family Training/Education: In progress  DME/DC Recommendations:  TBD  Strengths:  Independent PLOF, Making steady progress towards goals, Motivated for self care and independence and Willingly participates in therapeutic activities  Barriers:   Other: hemiparesis, impaired coordination, balance, fall risk, safety awareness and insight, impulsive, impaired gait  # of short term goals set= 4  # of short term goals met=0       Physical Therapy Problems           Problem: Balance     Dates: Start: 12/14/17       Goal: STG-Within one week, patient will maintain dynamic standing     Dates: Start: 12/14/17       Description: 1) Individualized goal: Maintain dynamic standing balance for transfers and gait sba, one week  2) Interventions:  PT E Stim Attended, PT Gait Training, PT Therapeutic Exercises, PT Neuro Re-Ed/Balance and PT Therapeutic  Activity       Note:     Goal Note filed on 12/18/17 1159 by AVELINA Marie    Goal: STG-Within one week, patient will maintain dynamic standing  Outcome: NOT MET  Contact-guard assist single-point cane                  Problem: Mobility     Dates: Start: 12/14/17       Goal: STG-Within one week, patient will ascend and descend four to six stairs     Dates: Start: 12/14/17       Description: 1) Individualized goal: Up/down 4-6 stairs railings, sba one week  2) Interventions:  PT E Stim Attended, PT Gait Training, PT Therapeutic Exercises, PT Neuro Re-Ed/Balance and PT Therapeutic Activity       Note:     Goal Note filed on 12/18/17 1159 by AVELINA Marie    Goal: STG-Within one week, patient will ascend and descend four to six   stairs  Outcome: NOT MET  Contact-guard assist, impaired sequencing and LE impairment                Goal: STG-Within one week, patient will     Dates: Start: 12/14/17       Description: 1) Individualized goal:  Gait spc or no device sba 250 ft one week  2) Interventions:  PT E Stim Attended, PT Gait Training, PT Therapeutic Exercises, PT Neuro Re-Ed/Balance and PT Therapeutic Activity       Note:     Goal Note filed on 12/18/17 1159 by AVELINA Marie    Goal: STG-Within one week, patient will  Outcome: NOT MET  125 ft                  Problem: Mobility Transfers     Dates: Start: 12/14/17       Goal: STG-Within one week, patient will transfer bed to chair     Dates: Start: 12/14/17       Description: 1) Individualized goal: Transfer bed to/from chair supervision one week  2) Interventions:  PT E Stim Attended, PT Gait Training, PT Therapeutic Exercises, PT Neuro Re-Ed/Balance and PT Therapeutic Activity       Note:     Goal Note filed on 12/18/17 1159 by AVELINA Marie    Goal: STG-Within one week, patient will transfer bed to chair  Outcome: NOT MET  Standby assist, verbal cues for safety and sequencing                  Problem: PT-Long Term Goals     Dates:  Start: 12/14/17       Goal: LTG-By discharge, patient will ambulate     Dates: Start: 12/14/17       Description: 1) Individualized goal: Gait spc, household 150 ft, community 400 ft, modified independent at discharge   2) Interventions:  PT E Stim Attended, PT Gait Training, PT Therapeutic Exercises, PT Neuro Re-Ed/Balance and PT Therapeutic Activity             Goal: LTG-By discharge, patient will transfer one surface to another     Dates: Start: 12/14/17       Description: 1) Individualized goal: Transfer one surface to another spc, modified independent at discharge  2) Interventions:  PT E Stim Attended, PT Gait Training, PT Therapeutic Exercises, PT Neuro Re-Ed/Balance and PT Therapeutic Activity             Goal: LTG-By discharge, patient will ambulate up/down flight of stairs     Dates: Start: 12/14/17       Description: 1) Individualized goal: Up/down flight of stairs spc and railing modified independent at discharge  2) Interventions:  PT E Stim Attended, PT Gait Training, PT Therapeutic Exercises, PT Neuro Re-Ed/Balance and PT Therapeutic Activity             Goal: LTG-By discharge, patient will transfer in/out of a car     Dates: Start: 12/14/17       Description: 1) Individualized goal: Car transfer spc, modified independent at discharge  2) Interventions:  PT E Stim Attended, PT Gait Training, PT Therapeutic Exercises, PT Neuro Re-Ed/Balance and PT Therapeutic Activity                     Section completed by:  AVELINA Marie

## 2017-12-18 NOTE — CARE PLAN
Problem: Safety  Goal: Will remain free from injury  Outcome: PROGRESSING SLOWER THAN EXPECTED  Patient continues to not use call light for assistance when standing up to use urinal, patient ripped cord out of bed alarm. RN discussed with patient concern for safety that we keep the bed alarm on because he continues to not call for assistance and reinforced he is a greater fall risk after being given pain medication. Patient stated understanding and demonstrated how to use call light. Will continue to frequently round on patient.     Problem: Bowel/Gastric:  Goal: Normal bowel function is maintained or improved  Outcome: PROGRESSING AS EXPECTED  Patient having regular bowel movements; last BM 12/17.  Denies s/s constipation; bowel meds available if needed.  Will continue to monitor.

## 2017-12-18 NOTE — REHAB-COLLABORATIVE ONGOING IDT TEAM NOTE
Weekly Interdisciplinary Team Conference Note    Weekly Interdisciplinary Team Conference # 1  Date:  12/18/2017    Clinicians present and reporting at team conference include the following:   MD: Huy Quick MD   RN:  Milla Perez RN   PT:   Meet Perkins, PT, MPT, MEd  OT:  Casey Saldivar MS OTR/L   ST:  Bahman Delgadillo MS, CCC-SLP  CM:  NEO Ortega  REC:  None  RT:  None  RPh:  None  Other:   None  All reporting clinicians have a working knowledge of this patient's plan of care.    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 ml/min (CMS-HCC) 12/12/2017     Priority: Medium   • Chronic combined systolic and diastolic CHF (congestive heart failure) (CMS-HCC) 12/12/2017     Priority: Medium   • Cardiomyopathy (CMS-HCC) 12/12/2017     Priority: Medium   • Lung cancer (CMS-HCC) 12/12/2017     Priority: Medium   • Thrombocytopenia (CMS-HCC) 12/12/2017     Priority: Medium   • Dysphagia due to recent cerebrovascular accident (CVA) 12/12/2017     Priority: Medium   • Carotid artery stenosis 11/05/2014     Priority: Medium   • Subclavian artery stenosis, left (CMS-HCC) 11/05/2014     Priority: Medium   • CKD (chronic kidney disease) stage 3, GFR 30-59 ml/min 10/31/2014     Priority: Medium   • COPD (chronic obstructive pulmonary disease) (CMS-HCC) 10/31/2014     Priority: Medium   • Peripheral vascular disease (CMS-HCC) 10/31/2014     Priority: Medium   • HTN (hypertension) 05/01/2011     Priority: Medium   • DM (diabetes mellitus) (CMS-HCC)  05/01/2011     Priority: Medium   • Dyslipidemia 05/01/2011     Priority: Low   • Left sided cerebral hemisphere cerebrovascular accident (CVA) (CMS-HCC) 12/13/2017   • ACC/AHA stage C systolic heart failure (CMS-HCC) 08/23/2017   • Severe aortic stenosis 08/23/2017   • Type 2 diabetes mellitus without complication ([facility]) 04/28/2017   • BPH (benign prostatic hyperplasia) 04/28/2017   • Acute blood loss anemia 06/17/2015   • GIB (gastrointestinal bleeding) 06/16/2015   • Hypotension 06/16/2015   • Aortic valve stenosis 11/05/2014   • HEMORRHOIDS 05/01/2011   • Diverticulosis 05/01/2011     Results     ** No results found for the last 24 hours. **           Nursing  Diet/Nutrition:  Diabetic, Cardiac, Dysphagia II and Nectar Thick Liquids  Medication Administration:  Float Whole with Puree  % consumed at meals in last 24 hours:  Consumed 60-70% of meals per documentation.  Breakfast:  70%   Lunch:  60%  Dinner:  70%   Snack schedule:  HS  Appetite:  Fair  Fluid Intake/Output in past 24 hours: In: 980 [P.O.:980]  Out: 700   Admit Weight:  Weight: 76.7 kg (169 lb 1.5 oz)  Weight Last 7 Days   [76.7 kg (169 lb 1.5 oz)] 76.7 kg (169 lb 1.5 oz) (12/13 1658)    Pain Issues:    Location:  Other (Comments) (12/17 2002) Comment: Pt states pain in his prostate  --         Severity:  Mild   Scheduled pain medications:  None     PRN pain medications used in last 24 hours:  oxycodone immediate release (ROXICODONE)    Non Pharmacologic Interventions:  emotional support, repositioned and rest  Effectiveness of pain management plan:  good=patient states acceptable comfort after interventions    Bowel:    Bowel Assist Initial Score:  4 - Minimal Assistance  Bowel Assist Current Score:  4 - Minimal Assistance  Bowl Accidents in last 7 days:     Last bowel movement: 12/17/17  Stool: Large, Loose     Usual bowel pattern:  every other day  Scheduled bowel medications:  senna-docusate (PERICOLACE or SENOKOT S)   PRN bowel medications used  in last 24 hours:  None  Nursing Interventions:  Increased time, Scheduled medication, Supervision, Verbal cueing, Positioning on commode/toilet  Effectiveness of bowel program:   good=regular, predictable, controlled emptying of bowel  Bladder:    Bladder Assist Initial Score:  4 - Minimal Assistance  Bladder Assist Current Score:  1 - Total Assistance  Bladder Accidents in last 7 days:  1  PVR range for past 24-48 hours:  [90]  ()  Medications affecting bladder:  Proscar    Time void schedule/voiding pattern:  Time void every 3 hours during the day and every 4 hours at night  Interventions:  Increased time, Supervision, Verbal cueing, Urinal, Medication  Effectiveness of bladder training:  Fair=occasional unpredictable, incontinent emptying of bladder (< 1 time/day)    Sleep/wake cycle:   Average hours slept:  Sleeps 4-6 hours without waking  Sleep medication usage:  None    Patient/Family Training/Education:  Aspiration Precautions, Diet/Nutrition, Fall Prevention and Safety    Strengths: Willingly participates in therapeutic activities, Able to follow instructions, Pleasant and cooperative and Manages pain appropriately   Barriers:   Aspiration risk, Generalized weakness and Impulsive            Nursing Problems           Problem: Bowel/Gastric:     Goal: Normal bowel function is maintained or improved     Flowsheet:     Taken at 12/17/17 2002    Last BM 12/17/17 by Joaquina Syed R.N.    Taken at 12/15/17 2020    Last BM 12/15/17 by Joaquina Syed R.N.                Goal: Will not experience complications related to bowel motility             Problem: Communication     Goal: The ability to communicate needs accurately and effectively will improve             Problem: Discharge Barriers/Planning     Goal: Patient's continuum of care needs will be met             Problem: Infection     Goal: Will remain free from infection             Problem: Knowledge Deficit     Goal: Knowledge of disease process/condition,  treatment plan, diagnostic tests, and medications will improve           Goal: Knowledge of the prescribed therapeutic regimen will improve             Problem: Mobility     Goal: Risk for activity intolerance will decrease             Problem: Pain Management     Goal: Pain level will decrease to patient's comfort goal     Flowsheet:     Taken at 12/14/17 2036    Nurse Pain Scale 0 - 10  2 by Naila Temple R.N.                  Problem: Respiratory:     Goal: Respiratory status will improve             Problem: Safety     Goal: Will remain free from injury           Goal: Will remain free from falls             Problem: Urinary Elimination:     Goal: Ability to reestablish a normal urinary elimination pattern will improve             Problem: Venous Thromboembolism (VTW)/Deep Vein Thrombosis (DVT) Prevention:     Goal: Patient will participate in Venous Thrombosis (VTE)/Deep Vein Thrombosis (DVT)Prevention Measures                  Long Term Goals:   At discharge patient will be able to function safely at home and in the community with support.    Section completed by:  Joaquina Syed R.N.              Mobility  Bed mobility: Standby assist supine to/from sit, verbal cues for sequencing and safety  Bed /Chair/Wheelchair Transfer Initial:  4 - Minimal Assistance  Bed /Chair/Wheelchair Transfer Current:  5 - Standby/Supervision or Set-up, single-point cane   Bed/Chair/Wheelchair Transfer Description:  Increased time, Supervision for safety, Verbal cueing, Set-up of equipment  Walk Initial:  4 - Minimal Assistance  Walk Current:  4 contact-guard, verbal cues for safety, single-point cane 125 ft   Walk Description:  Assist device/equipment, Supervision for safety  Wheelchair Initial:  5 - Standby Prompting/Supervision or Set-up  Wheelchair Current:  5 - Standby Prompting/Supervision or Set-up   Wheelchair Description:   (SBA x150 ft x2 reps w/BLE propulsion, slow pace, cues to manage RLE)  Stairs Initial:  2 - Max  Assistance  Stairs Current: 2 - Max Assistance, verbal cues for safety and sequencing, contact-guard gait belt for safety   Stairs Description: Extra time, Safety concerns, Verbal cueing, Supervision for safety, Ascends/descends 4 to 11 steps  Patient/Family Training/Education: In progress  DME/DC Recommendations:  TBD  Strengths:  Independent PLOF, Making steady progress towards goals, Motivated for self care and independence and Willingly participates in therapeutic activities  Barriers:   Other: hemiparesis, impaired coordination, balance, fall risk, safety awareness and insight, impulsive, impaired gait  # of short term goals set= 4  # of short term goals met=0       Physical Therapy Problems           Problem: Balance     Dates: Start: 12/14/17       Goal: STG-Within one week, patient will maintain dynamic standing     Dates: Start: 12/14/17       Description: 1) Individualized goal: Maintain dynamic standing balance for transfers and gait sba, one week  2) Interventions:  PT E Stim Attended, PT Gait Training, PT Therapeutic Exercises, PT Neuro Re-Ed/Balance and PT Therapeutic Activity       Note:     Goal Note filed on 12/18/17 1159 by AVELINA Marie    Goal: STG-Within one week, patient will maintain dynamic standing  Outcome: NOT MET  Contact-guard assist single-point cane                  Problem: Mobility     Dates: Start: 12/14/17       Goal: STG-Within one week, patient will ascend and descend four to six stairs     Dates: Start: 12/14/17       Description: 1) Individualized goal: Up/down 4-6 stairs railings, sba one week  2) Interventions:  PT E Stim Attended, PT Gait Training, PT Therapeutic Exercises, PT Neuro Re-Ed/Balance and PT Therapeutic Activity       Note:     Goal Note filed on 12/18/17 1159 by AVELINA Marie    Goal: STG-Within one week, patient will ascend and descend four to six   stairs  Outcome: NOT MET  Contact-guard assist, impaired sequencing and LE impairment                 Goal: STG-Within one week, patient will     Dates: Start: 12/14/17       Description: 1) Individualized goal:  Gait spc or no device sba 250 ft one week  2) Interventions:  PT E Stim Attended, PT Gait Training, PT Therapeutic Exercises, PT Neuro Re-Ed/Balance and PT Therapeutic Activity       Note:     Goal Note filed on 12/18/17 1159 by AVELINA Marie    Goal: STG-Within one week, patient will  Outcome: NOT MET  125 ft                  Problem: Mobility Transfers     Dates: Start: 12/14/17       Goal: STG-Within one week, patient will transfer bed to chair     Dates: Start: 12/14/17       Description: 1) Individualized goal: Transfer bed to/from chair supervision one week  2) Interventions:  PT E Stim Attended, PT Gait Training, PT Therapeutic Exercises, PT Neuro Re-Ed/Balance and PT Therapeutic Activity       Note:     Goal Note filed on 12/18/17 1159 by AVELINA Marie    Goal: STG-Within one week, patient will transfer bed to chair  Outcome: NOT MET  Standby assist, verbal cues for safety and sequencing                  Problem: PT-Long Term Goals     Dates: Start: 12/14/17       Goal: LTG-By discharge, patient will ambulate     Dates: Start: 12/14/17       Description: 1) Individualized goal: Gait spc, household 150 ft, community 400 ft, modified independent at discharge   2) Interventions:  PT E Stim Attended, PT Gait Training, PT Therapeutic Exercises, PT Neuro Re-Ed/Balance and PT Therapeutic Activity             Goal: LTG-By discharge, patient will transfer one surface to another     Dates: Start: 12/14/17       Description: 1) Individualized goal: Transfer one surface to another spc, modified independent at discharge  2) Interventions:  PT E Stim Attended, PT Gait Training, PT Therapeutic Exercises, PT Neuro Re-Ed/Balance and PT Therapeutic Activity             Goal: LTG-By discharge, patient will ambulate up/down flight of stairs     Dates: Start: 12/14/17       Description: 1)  Individualized goal: Up/down flight of stairs spc and railing modified independent at discharge  2) Interventions:  PT E Stim Attended, PT Gait Training, PT Therapeutic Exercises, PT Neuro Re-Ed/Balance and PT Therapeutic Activity             Goal: LTG-By discharge, patient will transfer in/out of a car     Dates: Start: 12/14/17       Description: 1) Individualized goal: Car transfer spc, modified independent at discharge  2) Interventions:  PT E Stim Attended, PT Gait Training, PT Therapeutic Exercises, PT Neuro Re-Ed/Balance and PT Therapeutic Activity                     Section completed by:  AVELINA Marie       Activities of Daily Living  Eating Initial:  5 - Standby Prompting/Supervision or Set-up  Eating Current:  5 - Standby Prompting/Supervision or Set-up   Eating Description:  Modified diet, Supervision for safety, Verbal cueing, Set-up of equipment or meal/tube feeding  Grooming Initial:  5 - Standby Prompting/Supervision or Set-up  Grooming Current:  5 - Standby Prompting/Supervision or Set-up   Grooming Description:  Increased time, Supervision for safety, Set-up of equipment (Seated at sinkside for groom/hygiene routine - completed ADL w/ LUE only)  Bathing Initial:  0 - Not tested, patient refused  Bathing Current:  3 - Moderate Assistance   Bathing Description:  Grab bar, Tub bench, Long handled bath tool, Hand held shower, Increased time, Supervision for safety, Verbal cueing, Set-up of equipment, Initial preparation for task (Required assistance for dominant unaffected LUE, BLE's and CG for standing)  Upper Body Dressing Initial:  3 - Moderate Assistance  Upper Body Dressing Current:  5 - Standby Prompting/Supervision or Set-up   Upper Body Dressing Description:  Increased time, Supervision for safety, Verbal cueing, Set-up of equipment (Education for nunu dressing techniques. )  Lower Body Dressing Initial:  3 - Moderate Assistance  Lower Body Dressing Current:  5 - Standby  Prompting/Supervsion or Set-up   Lower Body Dressing Description:  5 - Standby Prompting/Supervsion or Set-up  Toileting Initial:  3 - Moderate Assistance  Toileting Current:  4 - Minimal Assistance   Toileting Description:  Grab bar, Increased time, Supervision for safety, Verbal cueing  Toilet Transfer Initial:  4 - Minimal Assistance  Toilet Transfer Current:  5 - Standby Prompting/Supervision or Set-up   Toilet Transfer Description:  5 - Standby Prompting/Supervision or Set-up  Tub / Shower Transfer Initial:  0 - Not tested, patient refused  Tub / Shower Transfer Current:  4 - Minimal Assistance   Tub / Shower Transfer Description:  Grab bar, Increased time, Supervision for safety, Verbal cueing, Set-up of equipment, Adaptive equipment (SPT to/from manual wc and shower chair via grab bar)  IADL:  TBD   Family Training/Education:  TBD   DME/DC Recommendations:  TBD     Strengths:  Alert and oriented, Independent PLOF, Making steady progress towards goals, Motivated for self care and independence, Supportive family and Willingly participates in therapeutic activities  Barriers:  Decreased endurance, Generalized weakness, Hemiplegia, Impulsive and Limited mobility     # of short term goals set= 5    # of short term goals met= 4          Occupational Therapy Goals           Problem: Dressing     Dates: Start: 12/14/17       Goal: STG-Within one week, patient will dress UB     Dates: Start: 12/18/17       Description: 1) Individualized Goal: Mod Indep to retrieve and don/doff upper body clothing  2) Interventions:  OT Self Care/ADL, OT Neuro Re-Ed/Balance, OT Therapeutic Activity, OT Evaluation and OT Therapeutic Exercise             Goal: STG-Within one week, patient will dress LB     Dates: Start: 12/18/17       Description: 1) Individualized Goal:  Mod Indep  2) Interventions:  OT Self Care/ADL, OT Neuro Re-Ed/Balance, OT Therapeutic Activity, OT Evaluation and OT Therapeutic Exercise               Problem: IADL's      Dates: Start: 12/14/17       Goal: STG-Within one week, patient will access kitchen area     Dates: Start: 12/14/17       Description: 1) Individualized Goal:  Min assist w/ AE PRN  2) Interventions:  OT Self Care/ADL, OT Neuro Re-Ed/Balance, OT Therapeutic Activity, OT Evaluation and OT Therapeutic Exercise       Note:     Goal Note filed on 12/18/17 1216 by Casey Saldivar MS,OTR/L    Goal: STG-Within one week, patient will access kitchen area  Outcome: NOT MET  Activity not completed at this time                  Problem: OT Long Term Goals     Dates: Start: 12/14/17       Goal: LTG-By discharge, patient will complete basic self care tasks     Dates: Start: 12/14/17       Description: 1) Individualized Goal:  Mod Indep w/ AE PRN  2) Interventions:  OT Self Care/ADL, OT Neuro Re-Ed/Balance, OT Therapeutic Activity, OT Evaluation and OT Therapeutic Exercise             Goal: LTG-By discharge, patient will perform bathroom transfers     Dates: Start: 12/14/17       Description: 1) Individualized Goal:  Mod Indep w/ AE PRN  2) Interventions:  OT Self Care/ADL, OT Neuro Re-Ed/Balance, OT Therapeutic Activity, OT Evaluation and OT Therapeutic Exercise                   Section completed by:  Casey Saldivar MS,OTR/L       Cognitive Linquistic Functions  Comprehension Initial:  5 - Stand-by Prompting/Supervision or Set-up  Comprehension Current:  6 - Modified Independent   Comprehension Description:  Verbal cues  Expression Initial:  5 - Stand-by Prompting/Supervision or Set-up  Expression Current:  4 - Minimal Assistance   Expression Description:  Verbal cueing  Social Interaction Initial:  6 - Modified Independent  Social Interaction Current:  6 - Modified Independent   Social Interaction Description:  Increased time, Verbal cues  Problem Solving Initial:  5 - Standby Prompting/Supervision or Set-up  Problem Solving Current:  5 - Standby Prompting/Supervision or Set-up   Problem Solving Description:  Verbal cueing,  Therapy schedule  Memory Initial:  5 - Standby Prompting/Supervision or Set-up  Memory Current:  6 - Modified Independent   Memory Description:  Verbal cueing, Supervision, Therapy schedule  Executive Functioning / Organization Initial:  Supervision (5)  Executive Functioning / Organization Current:  Supervision (5)   Executive Functioning Desciption:  Verbal cues   Swallowing  Swallowing Status:  Pt is currently tolerating a dysphagia 2 diet with NTL's, Unfortunately due to technical difficulties pt's MBSS that was completed on 12/15 was not recorded.  Repeat study to be completed on 12/18.    Orders Placed This Encounter   Procedures   • Diet Order     Standing Status:   Standing     Number of Occurrences:   1     Order Specific Question:   Diet:     Answer:   Diabetic [3]     Order Specific Question:   Diet:     Answer:   Cardiac [6]     Order Specific Question:   Texture/Fiber modifications:     Answer:   Dysphagia 2(Pureed/Chopped)specify fluid consistency(question 6) [2]     Order Specific Question:   Consistency/Fluid modifications:     Answer:   Nectar Thick [2]     Behavior Modification Plan  Redirect to task/topic and Analyze tasks (break down into smaller steps)  Assistive Technology  Other: n/a  Family Training/Education:  To be completed   DC Recommendations:   TBD, possibly outpatient ST   Strengths:  Able to follow instructions, Motivated for self care and independence, Pleasant and cooperative, Supportive family and Willingly participates in therapeutic activities  Barriers:  Aspiration risk and Other: dysarthria, poor safety awareness   # of short term goals set=3  # of short term goals met=2       Speech Therapy Problems           Problem: Expression STGs     Dates: Start: 12/18/17       Goal: STG-Within one week, patient will     Dates: Start: 12/18/17       Description: 1) Individualized goal:  Implement speech intelligibility strategies given min cues in sentences to achieve 90% intelligibility.     2) Interventions:  SLP Speech Language Treatment and SLP Self Care / ADL Training                Problem: Speech/Swallowing LTGs     Dates: Start: 12/14/17       Goal: LTG-By discharge, patient will safely swallow     Dates: Start: 12/14/17       Description: 1) Individualized goal:  The least restrictive diet independent for use of safe swallow strategies.   2) Interventions:  SLP Swallowing Dysfunction Treatment             Goal: LTG-By discharge, patient will solve complex problem     Dates: Start: 12/14/17       Description: 1) Individualized goal:  By utilizing compensatory intervention for memory and problem-solving to allow for safe completion of daily activities MOD I.  2) Interventions:  SLP Cognitive Skill Development               Problem: Swallowing STGs     Dates: Start: 12/14/17       Goal: STG-Within one week, patient will     Dates: Start: 12/14/17       Description: 1) Individualized goal:  Participate in instrumental swallow assessment (MBSS) to r/o silent aspiration and readiness for diet advancement.   2) Interventions:  SLP Video Swallow Evaluation       Note:     Goal Note filed on 12/18/17 0756 by Bahman Delgadillo MS,CCC-SLP    Goal: STG-Within one week, patient will  Outcome: NOT MET  Pt to participate in a MBSS on 12/18                Goal: STG-Within one week, patient will safely swallow     Dates: Start: 12/18/17       Description: 1) Individualized goal:  Safely swallow Dys 3/thin liquds with MIN cues for use of compensatory strategies.   2) Interventions:  SLP Swallowing Dysfunction Treatment, SLP Oral Pharyngeal Evaluation, SLP Video Swallow Evaluation and SLP Self Care / ADL Training                     Section completed by:  Bahman Delgadillo MS,CCC-SLP          REHAB-Pharmacy IDT Team Note by Nixon Harden RPH at 12/15/2017  5:59 PM  Version 1 of 1    Author:  Nixon Harden RPH Service:  (none) Author Type:  Pharmacist    Filed:  12/15/2017  6:00 PM Date of Service:  12/15/2017   5:59 PM Status:  Signed    :  Nixon Harden RPH (Pharmacist)         Pharmacy   Pharmacy  Antibiotics/Antifungals/Antivirals:  Medication:      Active Orders     None        Route:        NA  Stop Date:  NA  Reason:      NA  Antihypertensives/Cardiac:  Medication:    Orders (72h ago through future)    Start     Ordered    12/15/17 1530  amlodipine (NORVASC) tablet 5 mg  Q DAY,   Status:  Discontinued      12/15/17 1510    12/15/17 1530  amlodipine (NORVASC) tablet 5 mg  DAILY      12/15/17 1510    12/14/17 1115  carvedilol (COREG) tablet 12.5 mg  2 TIMES DAILY WITH MEALS      12/14/17 1047    12/13/17 2100  atorvastatin (LIPITOR) tablet 10 mg  EVERY EVENING      12/13/17 1656    12/13/17 1745  enalapril (VASOTEC) tablet 10 mg  TWICE DAILY      12/13/17 1720    12/13/17 1717  hydrALAZINE (APRESOLINE) tablet 25 mg  EVERY 8 HOURS PRN      12/13/17 1718        Patient Vitals for the past 24 hrs:   BP Pulse   12/15/17 1741 144/60 (!) 58   12/15/17 1400 (!) 164/60 (!) 53   12/15/17 0630 132/64 (!) 56   12/15/17 0500 (!) 163/74 64   12/15/17 0000 127/60 (!) 56   12/14/17 2330 120/59 (!) 58   12/14/17 2300 159/72 (!) 54   12/14/17 2245 140/67 (!) 54   12/14/17 2230 (!) 181/77 (!) 55   12/14/17 2225 (!) 173/77 (!) 55   12/14/17 2215 (!) 173/77 -   12/14/17 1901 148/64 (!) 58       Anticoagulation:  Medication: Aspirin,  Plavix    Other key medications: A review of the medication list reveals no issues at this time. Patient is currently on antihypertensive(s). Recommend home blood pressure monitoring/recording if antihypertensive(s) regimen(s) continue.    Section completed by: Nixon Harden RP[[email or web address]]     Attribution Key     AW.1 - Nixon Harden RPH on 12/15/2017  5:59 PM                    DC Planning  DC destination/dispostion:  Return to [place] second story [facility] with [address] entry stairs to [facility] where he lives alone. Patient has local support from daughter, Ekta, granddaughter, Lakesha and X-wife,  Alexa.    Referrals: Anticipate home health services    DC Needs: DME - TBD, Patient currently has none. Follow up appointments with PCP - Kirill Fabian MD and Radiology - Dr. Kta    Barriers to discharge:  Lives alone, flight of stairs leading to condo    Strengths: Supportive family    Section completed by:  Shira Flood CM MSW      Physician Summary  Huy Quick MD participated and led team conference discussion.

## 2017-12-19 PROCEDURE — 770010 HCHG ROOM/CARE - REHAB SEMI PRIVAT*

## 2017-12-19 PROCEDURE — A9270 NON-COVERED ITEM OR SERVICE: HCPCS | Performed by: PHYSICAL MEDICINE & REHABILITATION

## 2017-12-19 PROCEDURE — 97112 NEUROMUSCULAR REEDUCATION: CPT

## 2017-12-19 PROCEDURE — 97116 GAIT TRAINING THERAPY: CPT

## 2017-12-19 PROCEDURE — 92526 ORAL FUNCTION THERAPY: CPT

## 2017-12-19 PROCEDURE — 99232 SBSQ HOSP IP/OBS MODERATE 35: CPT | Performed by: PHYSICAL MEDICINE & REHABILITATION

## 2017-12-19 PROCEDURE — 700102 HCHG RX REV CODE 250 W/ 637 OVERRIDE(OP): Performed by: PHYSICAL MEDICINE & REHABILITATION

## 2017-12-19 PROCEDURE — 97530 THERAPEUTIC ACTIVITIES: CPT

## 2017-12-19 PROCEDURE — 97535 SELF CARE MNGMENT TRAINING: CPT

## 2017-12-19 RX ADMIN — CLOPIDOGREL BISULFATE 75 MG: 75 TABLET ORAL at 08:44

## 2017-12-19 RX ADMIN — HYDROCODONE BITARTRATE AND ACETAMINOPHEN 1 TABLET: 5; 325 TABLET ORAL at 19:36

## 2017-12-19 RX ADMIN — ASPIRIN 325 MG: 325 TABLET, COATED ORAL at 08:44

## 2017-12-19 RX ADMIN — CARVEDILOL 12.5 MG: 12.5 TABLET, FILM COATED ORAL at 08:44

## 2017-12-19 RX ADMIN — AMLODIPINE BESYLATE 10 MG: 5 TABLET ORAL at 08:44

## 2017-12-19 RX ADMIN — FERROUS SULFATE TAB 325 MG (65 MG ELEMENTAL FE) 325 MG: 325 (65 FE) TAB at 08:44

## 2017-12-19 RX ADMIN — CHOLECALCIFEROL TAB 25 MCG (1000 UNIT) 4000 UNITS: 25 TAB at 08:43

## 2017-12-19 RX ADMIN — ALLOPURINOL 100 MG: 100 TABLET ORAL at 08:43

## 2017-12-19 RX ADMIN — ENALAPRIL MALEATE 10 MG: 5 TABLET ORAL at 17:34

## 2017-12-19 RX ADMIN — ATORVASTATIN CALCIUM 10 MG: 10 TABLET, FILM COATED ORAL at 19:36

## 2017-12-19 RX ADMIN — CARVEDILOL 12.5 MG: 12.5 TABLET, FILM COATED ORAL at 17:34

## 2017-12-19 RX ADMIN — ENALAPRIL MALEATE 10 MG: 5 TABLET ORAL at 04:46

## 2017-12-19 RX ADMIN — FINASTERIDE 10 MG: 5 TABLET, FILM COATED ORAL at 08:44

## 2017-12-19 ASSESSMENT — GAIT ASSESSMENTS
ASSISTIVE DEVICE: SINGLE POINT CANE
GAIT LEVEL OF ASSIST: CONTACT GUARD ASSIST
DEVIATION: ATAXIC;DECREASED BASE OF SUPPORT;BRADYKINETIC;DECREASED HEEL STRIKE;DECREASED TOE OFF
DISTANCE (FEET): 125

## 2017-12-19 ASSESSMENT — PAIN SCALES - GENERAL
PAINLEVEL_OUTOF10: 0
PAINLEVEL_OUTOF10: 8
PAINLEVEL_OUTOF10: 4

## 2017-12-19 NOTE — CARE PLAN
Problem: Communication  Goal: The ability to communicate needs accurately and effectively will improve  Patient alert and oriented x 4, but patient is impulsive and does not consistently use his call light.

## 2017-12-19 NOTE — PROGRESS NOTES
"Rehab Progress Note     Encounter Date: 12/19/2017    CC: L CVA with right sided weakness    Interval Events (Subjective)  Patient sitting up in bed after therapy. Patient reports that he had another attack of his \"prostate pain\" last night but that the change to Norco was enough to keep the pain under control. He reports this morning he only required tylenol. Patient with increased walking this AM with PT and trialed stairs.  Per PT patient requires a lot of cues and sequencing for both up and down stairs.  Patient reports increased motion at R wrist with tape helping.  Denies SOB.  Denies N/V/D.      DC/Disposition:  11/29/17 has entire set of stairs into condo    Objective:  VITAL SIGNS: /55   Pulse (!) 55   Temp 36.4 °C (97.6 °F)   Resp 18   Ht 1.753 m (5' 9\")   Wt 76.7 kg (169 lb 1.5 oz)   SpO2 96%   BMI 24.97 kg/m²   Gen: NAD, dysarthric speech  Psych: Mood and affect normal  Resp: CTAB  CV: RRR, minimal edema R LE  Abd: NTND, BS+  Neuro: AOx3,  R Wrist flexor 3/5, R wrist extensor 3/5,  Finger flexor 3/5    No results found for this or any previous visit (from the past 72 hour(s)).    Current Facility-Administered Medications   Medication Frequency   • amlodipine (NORVASC) tablet 10 mg DAILY   • senna-docusate (PERICOLACE or SENOKOT S) 8.6-50 MG per tablet 2 Tab BID PRN    And   • polyethylene glycol/lytes (MIRALAX) PACKET 1 Packet QDAY PRN    And   • magnesium hydroxide (MILK OF MAGNESIA) suspension 30 mL QDAY PRN    And   • bisacodyl (DULCOLAX) suppository 10 mg QDAY PRN   • hydrocodone-acetaminophen (NORCO) 5-325 MG per tablet 1-2 Tab Q4HRS PRN   • finasteride (PROSCAR) tablet 10 mg DAILY   • carvedilol (COREG) tablet 12.5 mg BID WITH MEALS   • aspirin (ASA) tablet 325 mg DAILY   • allopurinol (ZYLOPRIM) tablet 100 mg DAILY   • atorvastatin (LIPITOR) tablet 10 mg Q EVENING   • clopidogrel (PLAVIX) tablet 75 mg QAM   • cyanocobalamin (VITAMIN B12) tablet 1,000 mcg DAILY   • ferrous sulfate " tablet 325 mg QAM   • vitamin D (cholecalciferol) tablet 4,000 Units DAILY   • Respiratory Care per Protocol Continuous RT   • Pharmacy Consult Request ...Pain Management Review 1 Each PRN   • acetaminophen (TYLENOL) tablet 650 mg Q4HRS PRN   • artificial tears 1.4 % ophthalmic solution 1 Drop PRN   • benzocaine-menthol (CEPACOL) lozenge 1 Lozenge Q2HRS PRN   • hydrALAZINE (APRESOLINE) tablet 25 mg Q8HRS PRN   • mag hydrox-al hydrox-simeth (MAALOX PLUS ES or MYLANTA DS) suspension 20 mL Q2HRS PRN   • ondansetron (ZOFRAN ODT) dispertab 4 mg 4X/DAY PRN    Or   • ondansetron (ZOFRAN) syringe/vial injection 4 mg 4X/DAY PRN   • trazodone (DESYREL) tablet 50 mg QHS PRN   • sodium chloride (OCEAN) 0.65 % nasal spray 2 Spray PRN   • enalapril (VASOTEC) tablet 10 mg TWICE DAILY       Orders Placed This Encounter   Procedures   • Diet Order     Standing Status:   Standing     Number of Occurrences:   1     Order Specific Question:   Diet:     Answer:   Diabetic [3]     Order Specific Question:   Diet:     Answer:   Cardiac [6]     Order Specific Question:   Texture/Fiber modifications:     Answer:   Dysphagia 2(Pureed/Chopped)specify fluid consistency(question 6) [2]     Order Specific Question:   Consistency/Fluid modifications:     Answer:   [place] Thick [2]       Assessment:  Active Hospital Problems    Diagnosis   • *Left sided cerebral hemisphere cerebrovascular accident (CVA) (CMS-[facility])   • CVA (cerebral vascular accident) (CMS-HCC)   • Acute on Chronic blood loss anemia   • Vitamin B12 deficiency   • Lung cancer (CMS-[facility])   • Cardiomyopathy (CMS-[facility])   • Thrombocytopenia (CMS-HCC)   • Dysphagia due to recent cerebrovascular accident (CVA)   • Chronic combined systolic and diastolic CHF (congestive heart failure) (CMS-HCC)   • CKD (chronic kidney disease) stage 3, GFR 30-59 ml/min   • COPD (chronic obstructive pulmonary disease) (CMS-HCC)   • HTN (hypertension)   • DM (diabetes mellitus) (CMS-HCC)   • Dyslipidemia   •  "BPH (benign prostatic hyperplasia)       Medical Decision Making and Plan:  CVA - Patient with new left sided CVA with R sided weakness, dysphagia and dysarthria.  Patient previously with bilateral strokes but improved to baseline.    -Continue  mg, Plavix 75 mg  -Continue Good BP and DLD control (see below)  -Repeat MBS for dysphagia on 12/18/17.  Will trial Dysphagia 3 with SLP     RLL Lung Adenocarcinoma - Evaluated by Radiation oncology while at [facility].  Recommending radiation therapy in January.  -Follow-up with Radiation oncology post-discharge     HTN - Patient with longstanding HTN as well as CHF.  Patient previously on Coreg, Vasotec and Lasix.  All medications were stopped acutely for permissive hypertension. Since admission continued elevated BP  -Restart Enalapril 10 mg BID, Coreg 12.5 mg BID. Continue to monitor as was labile prior to restarting medications.  -Added parameters for Coreg for HR.  Added Amlodipine and increased to 10 mg on 12/18/17. Much better control on 11/18-11/19     DLD - Patient on statin at baseline.  Cholesterol WNL on acute check.  -Continue Atorvastatin 10 mg QHS     BPH - Patient on Proscar at baseline  -Continue Proscar 10 mg daily  -\"Prostate pain\" - not responding to oxycodone will try norco. Increased proscar.  Improved     Neurogenic Bowel  - Patient would like all medications made PRN.    COPD - No exacerbation since stroke.  Continue on home medications  -Patient declining Spiriva. Held     B12 Deficiency - Found during acute stay.  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     Gout - Pt reports being on allopurinol for years.   -Continue Alllopurinol 100 mg daily     Vitamin D Deficiency - Reported deficiency but normal on admission labs. Will continue supplementation  -Cholecalciferol 4000 U daily     DVT Prophlyaxis - Patient ambulating " sufficiently.  Continue TEDs in AM    Total time:  > 25 minutes.  I spent greater than 50% of the time for patient care and coordination on this date, including unit/floor time, and face-to-face time with the patient as per assessment and plan above.  Discussed pain management, BP control and increased function in right wrist after kinesiotaping.    Huy Quick M.D.

## 2017-12-19 NOTE — CARE PLAN
Problem: Safety  Goal: Will remain free from injury  Pt can be impulsive per report. Bed alarms on. Side rails x2 secured for safety. Pt's room in close proximity to nurse's station. Emphasized the use of call light to prevent injury or falls. Hourly rounding in place.     Problem: Urinary Elimination:  Goal: Ability to reestablish a normal urinary elimination pattern will improve  Pt continent of bladder and voiding adequate amount of clear yellow urine. No incontinence or accident this shift. Latest PVR is 94 ml. Denies flank pain or dysuria when voiding. Pt taking Proscar as bladder device. Will continue to monitor.

## 2017-12-19 NOTE — DISCHARGE PLANNING
CM/AGUS met with patient after Team Conference today to relate progress and targeted discharge date of 12/29/17. Patient requesting a discharge one day sooner on 12/28/17 since he had an eye doctor appointment on 12/29. Conferred with Dr. Quick and he agreed to discharge on 12/28/17.

## 2017-12-19 NOTE — PROGRESS NOTES
1915 Received report from day shift RN and assume plan of care. Patient calm and stable. Denies any pain or discomfort at this time. Positioned comfortably in bed. Call light within reach. Safety precautions in place. For continuity of care.

## 2017-12-19 NOTE — CARE PLAN
Problem: Pain Management  Goal: Pain level will decrease to patient's comfort goal   12/18/17 1300   OTHER   Nurse Pain Scale 0 - 10  7   Comfort Goal Comfort at Rest   1300 Patient complained of his rectum and prostate area hurting.  Requested Tylenol.  Tylenol 2 tabs given per prn order.   1513 Patient states he still has pain.  Roxicodone 10 mg given per prn order.   1650 No further complaint of pain.

## 2017-12-19 NOTE — CARE PLAN
Problem: Safety  Goal: Will remain free from falls  Call light within reach, needs met.  Bed in low and locked position.  Patient educated on fall risk and verbalized agreement, patient does call appropriately for toileting, nutrition, and other needs.  Patient A&O x 4 and impulsive per report but not this shift.    Alarms on the bed and wheelchair.    Problem: Urinary Elimination:  Goal: Ability to reestablish a normal urinary elimination pattern will improve  Patient is continent.  Voiding regularly.  Patient is on bladder meds, is getting bladder scanned.  Not retaining.  No straight cath this shift or perez.

## 2017-12-20 PROCEDURE — 97535 SELF CARE MNGMENT TRAINING: CPT

## 2017-12-20 PROCEDURE — 700102 HCHG RX REV CODE 250 W/ 637 OVERRIDE(OP): Performed by: PHYSICAL MEDICINE & REHABILITATION

## 2017-12-20 PROCEDURE — 97116 GAIT TRAINING THERAPY: CPT

## 2017-12-20 PROCEDURE — 97530 THERAPEUTIC ACTIVITIES: CPT

## 2017-12-20 PROCEDURE — 92526 ORAL FUNCTION THERAPY: CPT

## 2017-12-20 PROCEDURE — 97110 THERAPEUTIC EXERCISES: CPT

## 2017-12-20 PROCEDURE — 97112 NEUROMUSCULAR REEDUCATION: CPT

## 2017-12-20 PROCEDURE — 99232 SBSQ HOSP IP/OBS MODERATE 35: CPT | Performed by: PHYSICAL MEDICINE & REHABILITATION

## 2017-12-20 PROCEDURE — A9270 NON-COVERED ITEM OR SERVICE: HCPCS | Performed by: PHYSICAL MEDICINE & REHABILITATION

## 2017-12-20 PROCEDURE — 770010 HCHG ROOM/CARE - REHAB SEMI PRIVAT*

## 2017-12-20 PROCEDURE — 92507 TX SP LANG VOICE COMM INDIV: CPT

## 2017-12-20 RX ORDER — CARVEDILOL 3.12 MG/1
6.25 TABLET ORAL 2 TIMES DAILY WITH MEALS
Status: DISCONTINUED | OUTPATIENT
Start: 2017-12-20 | End: 2017-12-27 | Stop reason: HOSPADM

## 2017-12-20 RX ADMIN — TRAZODONE HYDROCHLORIDE 50 MG: 50 TABLET ORAL at 19:09

## 2017-12-20 RX ADMIN — ENALAPRIL MALEATE 10 MG: 5 TABLET ORAL at 04:46

## 2017-12-20 RX ADMIN — ALLOPURINOL 100 MG: 100 TABLET ORAL at 08:58

## 2017-12-20 RX ADMIN — HYDROCODONE BITARTRATE AND ACETAMINOPHEN 1 TABLET: 5; 325 TABLET ORAL at 18:23

## 2017-12-20 RX ADMIN — ASPIRIN 325 MG: 325 TABLET, COATED ORAL at 08:58

## 2017-12-20 RX ADMIN — AMLODIPINE BESYLATE 10 MG: 5 TABLET ORAL at 08:58

## 2017-12-20 RX ADMIN — CHOLECALCIFEROL TAB 25 MCG (1000 UNIT) 4000 UNITS: 25 TAB at 08:58

## 2017-12-20 RX ADMIN — ATORVASTATIN CALCIUM 10 MG: 10 TABLET, FILM COATED ORAL at 19:06

## 2017-12-20 RX ADMIN — FINASTERIDE 10 MG: 5 TABLET, FILM COATED ORAL at 08:58

## 2017-12-20 RX ADMIN — ENALAPRIL MALEATE 10 MG: 5 TABLET ORAL at 17:05

## 2017-12-20 RX ADMIN — FERROUS SULFATE TAB 325 MG (65 MG ELEMENTAL FE) 325 MG: 325 (65 FE) TAB at 08:59

## 2017-12-20 RX ADMIN — CARVEDILOL 12.5 MG: 12.5 TABLET, FILM COATED ORAL at 08:58

## 2017-12-20 RX ADMIN — CLOPIDOGREL BISULFATE 75 MG: 75 TABLET ORAL at 08:58

## 2017-12-20 ASSESSMENT — GAIT ASSESSMENTS
DEVIATION: ATAXIC;DECREASED BASE OF SUPPORT;BRADYKINETIC;SHUFFLED GAIT
GAIT LEVEL OF ASSIST: CONTACT GUARD ASSIST
DISTANCE (FEET): 150
ASSISTIVE DEVICE: SINGLE POINT CANE

## 2017-12-20 ASSESSMENT — PAIN SCALES - GENERAL: PAINLEVEL_OUTOF10: 7

## 2017-12-20 NOTE — CARE PLAN
"Problem: Inadequate nutrient intake  Goal: Patient to consume greater than or equal to 50% of meals  Outcome: PROGRESSING SLOWER THAN EXPECTED  Assessment:    Additional Information: Patient was seen today at bedside.     Patient stated that he is not happy with his diet . He does not like the meal presentation and states \"it looks like a bunch of stuff fell out of a truck onto his plate.\" Patient states that his ground meat is very repetitive and he hates the texture of the pureed foods and ground meat. Patient also stated he does not like nectar thick liquids, so therefore does not drink any fluids. Patient was advanced from a dysphagia 2 to a dysphagia 3 diet today. Hopefully that will improve his intake. Patient stated he likes Mongolian-style green beans and spinach.     Patient stated that he is having regular bowel movements and voiding. He stated his pee is yellow in color. No other GI issues noted.     Patient states he gets help with feeding in t-dine. He does not think he has difficulty swallowing, however patient remains on nectar thick diet. Does not fully understand the concept of working with the SLP.     Appetite: poor   Diet: diabetic, cardiac, dysphagia 3, nectar thick   Average PO intake x 3 days: 73%     Labs: glucose 111, BUN 55, creatinine 2.91  Medications: zyloprim, norvasc, lipitor, coreg, plavix, vitamin B12, vasotec, ferrous sulfate, proscar, vitamin D3  PRN Medications: zofran PRN, bowel regimen PRN  IVF: none noted at this time     Height: 175.3 cm  Weight: 76.7 kg   IBW: 65.9 kg  BMI: 24.97 (normal)     Skin: intact  GI: BM 12/19  : noted   Vitals: bradycardia noted  I/Os: +485     Nutrient Needs:  Kcal: 1449 - 1597 kcal/day (19-21 kcal/kg) BEE = 1449 x 1.1   Protein: 77 - 92 g/day (1 - 1.2 g/kg actual body weight)  Fluid: 2000 - 2200mL/day (26-29 mL/kg)      Diagnosis: Inadequate energy/fluid intake R/T poor appetite as evidence by patient report of <50% nutrient/fluid intake    "   Intervention/ Recommendations/POC:  1. Magic cup supplements   2. Encourage adequate PO/fluid intake.  3. Continue current diet. Diet upgrades per SLP  4. Nutrition rep to see regarding food prefs/ honor within dietary restrictions (if indicated)     Monitor/Evaluation: Monitor PO intake, weight, labs, medication adjustments, skin integrity, GI function, vitals, I/Os, and overall hydration status.  Adjust nutritional POC pending clinical outcomes.    RD following weekly.   Goal: Maintain >50% oral nutrient/fluid intake to promote nutrition optimization/healing.

## 2017-12-20 NOTE — CARE PLAN
Problem: Inadequate nutrient intake  Goal: Patient to consume greater than or equal to 50% of meals  Outcome: MET Date Met: 12/20/17  Patient with adequate PO intake. He is consuming >50% of most meals since admit.

## 2017-12-20 NOTE — REHAB-DIETARY IDT TEAM NOTE
"Dietary   Nutrition       Dietary Problems           Problem: Inadequate nutrient intake     Goal: Patient to consume greater than or equal to 50% of meals               Assessment:     Additional Information: Patient was seen today at bedside.      Patient stated that he is not happy with his diet . He does not like the meal presentation and states \"it looks like a bunch of stuff fell out of a truck onto his plate.\" Patient states that his ground meat is very repetitive and he hates the texture of the pureed foods and ground meat. Patient also stated he does not like nectar thick liquids, so therefore does not drink any fluids. Patient was advanced from a dysphagia 2 to a dysphagia 3 diet today. Hopefully that will improve his intake. Patient stated he likes Latvian-style green beans and spinach.      Patient stated that he is having regular bowel movements and voiding. He stated his pee is yellow in color. No other GI issues noted.      Patient states he gets help with feeding in t-dine. He does not think he has difficulty swallowing, however patient remains on nectar thick diet. Does not fully understand the concept of working with the SLP.      Appetite: poor   Diet: diabetic, cardiac, dysphagia 3, nectar thick   Average PO intake x 3 days: 73%      Labs: glucose 111, BUN 55, creatinine 2.91  Medications: zyloprim, norvasc, lipitor, coreg, plavix, vitamin B12, vasotec, ferrous sulfate, proscar, vitamin D3  PRN Medications: zofran PRN, bowel regimen PRN  IVF: none noted at this time      Height: 175.3 cm  Weight: 76.7 kg   IBW: 65.9 kg  BMI: 24.97 (normal)      Skin: intact  GI: BM 12/19  : noted   Vitals: bradycardia noted  I/Os: +485      Nutrient Needs:  Kcal: 1449 - 1597 kcal/day (19-21 kcal/kg)    BEE = 1449 x 1.1   Protein: 77 - 92 g/day (1 - 1.2 g/kg actual body weight)  Fluid: 2000 - 2200mL/day (26-29 mL/kg)        Diagnosis: Inadequate energy/fluid intake R/T poor appetite as evidence by patient report " of <50% nutrient/fluid intake      Intervention/ Recommendations/POC:  1. Magic cup supplements   2. Encourage adequate PO/fluid intake.  3. Continue current diet. Diet upgrades per SLP  4. Nutrition rep to see regarding food prefs/ honor within dietary restrictions (if indicated)     Monitor/Evaluation: Monitor PO intake, weight, labs, medication adjustments, skin integrity, GI function, vitals, I/Os, and overall hydration status.  Adjust nutritional POC pending clinical outcomes.    RD following weekly.   Goal: Maintain >50% oral nutrient/fluid intake to promote nutrition optimization/healing.     Section completed by:  Bryanna Nino, Student

## 2017-12-20 NOTE — CARE PLAN
Problem: Bowel/Gastric:  Goal: Normal bowel function is maintained or improved   12/19/17 0855   OTHER   Last BM 12/17/17     Will offer laxatives in AM.   0500 - pt states he had a BM yesterday - 12/19/17. No laxatives needed.     Problem: Respiratory:  Goal: Respiratory status will improve  Pt remains on room air with oxygen saturations of 93% - 95%. No s/sx of respiratory distress noted. Pt sleeping comfortably with calm unlabored breathing.

## 2017-12-20 NOTE — CARE PLAN
Problem: Pain Management  Goal: Pain level will decrease to patient's comfort goal  Patient able to verbalize needs.  Denies pain or discomfort this shift and no s/s same noted.  Will continue to monitor.    Problem: Urinary Elimination:  Goal: Ability to reestablish a normal urinary elimination pattern will improve  Patient voiding adequate amounts of clear yellow urine.  Denies flank pain or dysuria; afebrile.  Will continue to monitor.

## 2017-12-20 NOTE — PROGRESS NOTES
"Rehab Progress Note     Encounter Date: 12/20/2017    CC: L CVA with right sided weakness    Interval Events (Subjective)  Patient sitting up in bed after therapy. Patient reports he understands why he needs PT and OT but does not understand SLP. I discussed the reason behind the SLP and patient reports he is OK with his speech being slurred. Discussed about cognitive exercises and patient more understanding/accepting of SLP.  Patient otherwise reports his prostate pain is controlled.  Denies SOB.  Denies N/V/D.      DC/Disposition:  11/29/17 has entire set of stairs into condo    Objective:  VITAL SIGNS: /70   Pulse (!) 54   Temp 36.5 °C (97.7 °F)   Resp 18   Ht 1.753 m (5' 9\")   Wt 76.7 kg (169 lb 1.5 oz)   SpO2 98%   BMI 24.97 kg/m²   Gen: NAD, dysarthric speech  Psych: Mood and affect normal  Resp: CTAB  CV: RRR, minimal edema R LE  Abd: NTND, BS+  Neuro: AOx3,  R Wrist flexor 4/5, R wrist extensor 3+/5,  Finger flexor 4/5    No results found for this or any previous visit (from the past 72 hour(s)).    Current Facility-Administered Medications   Medication Frequency   • amlodipine (NORVASC) tablet 10 mg DAILY   • senna-docusate (PERICOLACE or SENOKOT S) 8.6-50 MG per tablet 2 Tab BID PRN    And   • polyethylene glycol/lytes (MIRALAX) PACKET 1 Packet QDAY PRN    And   • magnesium hydroxide (MILK OF MAGNESIA) suspension 30 mL QDAY PRN    And   • bisacodyl (DULCOLAX) suppository 10 mg QDAY PRN   • hydrocodone-acetaminophen (NORCO) 5-325 MG per tablet 1-2 Tab Q4HRS PRN   • finasteride (PROSCAR) tablet 10 mg DAILY   • carvedilol (COREG) tablet 12.5 mg BID WITH MEALS   • aspirin (ASA) tablet 325 mg DAILY   • allopurinol (ZYLOPRIM) tablet 100 mg DAILY   • atorvastatin (LIPITOR) tablet 10 mg Q EVENING   • clopidogrel (PLAVIX) tablet 75 mg QAM   • cyanocobalamin (VITAMIN B12) tablet 1,000 mcg DAILY   • ferrous sulfate tablet 325 mg QAM   • vitamin D (cholecalciferol) tablet 4,000 Units DAILY   • Respiratory " Care per Protocol Continuous RT   • Pharmacy Consult Request ...Pain Management Review 1 Each PRN   • acetaminophen (TYLENOL) tablet 650 mg Q4HRS PRN   • artificial tears 1.4 % ophthalmic solution 1 Drop PRN   • benzocaine-menthol (CEPACOL) lozenge 1 Lozenge Q2HRS PRN   • hydrALAZINE (APRESOLINE) tablet 25 mg Q8HRS PRN   • mag hydrox-al hydrox-simeth (MAALOX PLUS ES or MYLANTA DS) suspension 20 mL Q2HRS PRN   • ondansetron (ZOFRAN ODT) dispertab 4 mg 4X/DAY PRN    Or   • ondansetron (ZOFRAN) syringe/vial injection 4 mg 4X/DAY PRN   • trazodone (DESYREL) tablet 50 mg QHS PRN   • sodium chloride (OCEAN) 0.65 % nasal spray 2 Spray PRN   • enalapril (VASOTEC) tablet 10 mg TWICE DAILY       Orders Placed This Encounter   Procedures   • Diet Order     Standing Status:   Standing     Number of Occurrences:   1     Order Specific Question:   Diet:     Answer:   Diabetic [3]     Order Specific Question:   Diet:     Answer:   Cardiac [6]     Order Specific Question:   Texture/Fiber modifications:     Answer:   Dysphagia 3(Mechanical Soft)specify fluid consistency(question 6) [3]     Order Specific Question:   Consistency/Fluid modifications:     Answer:   [place] Thick [2]       Assessment:  Active Hospital Problems    Diagnosis   • *Left sided cerebral hemisphere cerebrovascular accident (CVA) (CMS-HCC)   • CVA (cerebral vascular accident) (CMS-[facility])   • Acute on Chronic blood loss anemia   • Vitamin B12 deficiency   • Lung cancer (CMS-[facility])   • Cardiomyopathy (CMS-[facility])   • Thrombocytopenia (CMS-[facility])   • Dysphagia due to recent cerebrovascular accident (CVA)   • Chronic combined systolic and diastolic CHF (congestive heart failure) (CMS-[facility])   • CKD (chronic kidney disease) stage 3, GFR 30-59 ml/min   • COPD (chronic obstructive pulmonary disease) (CMS-[facility])   • HTN (hypertension)   • DM (diabetes mellitus) (CMS-[facility])   • Dyslipidemia   • BPH (benign prostatic hyperplasia)       Medical Decision Making and Plan:  CVA - Patient  "with new left sided CVA with R sided weakness, dysphagia and dysarthria.  Patient previously with bilateral strokes but improved to baseline.    -Continue  mg, Plavix 75 mg  -Continue Good BP and DLD control (see below)  -Repeat MBS for dysphagia on 12/18/17.  Will trial Dysphagia 3 with SLP     RLL Lung Adenocarcinoma - Evaluated by Radiation oncology while at [facility].  Recommending radiation therapy in January.  -Follow-up with Radiation oncology post-discharge     HTN - Patient with longstanding HTN as well as CHF.  Patient previously on Coreg, Vasotec and Lasix.  All medications were stopped acutely for permissive hypertension. Since admission continued elevated BP  -Restart Enalapril 10 mg BID, Coreg 12.5 mg BID. Continue to monitor as was labile prior to restarting medications.  -Added parameters for Coreg for HR.  Added Amlodipine and increased to 10 mg on 12/18/17. Much better control on 11/18-11/19.  With bracycardia decrease Coreg to 6.25 mg BID     DLD - Patient on statin at baseline.  Cholesterol WNL on acute check.  -Continue Atorvastatin 10 mg QHS     BPH - Patient on Proscar at baseline  -Continue Proscar 10 mg daily  -\"Prostate pain\" - not responding to oxycodone will try norco. Increased proscar.  Improved     Neurogenic Bowel  - Patient would like all medications made PRN.    COPD - No exacerbation since stroke.  Continue on home medications  -Patient declining Spiriva. Held     B12 Deficiency - Found during acute stay.  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     Gout - Pt reports being on allopurinol for years.   -Continue Alllopurinol 100 mg daily     Vitamin D Deficiency - Reported deficiency but normal on admission labs. Will continue supplementation  -Cholecalciferol 4000 U daily     DVT Prophlyaxis - Patient ambulating sufficiently.  Continue TEDs in " AM    Total time:  > 25 minutes.  I spent greater than 50% of the time for patient care and coordination on this date, including unit/floor time, and face-to-face time with the patient as per assessment and plan above.  Discussed BP/HR management.    Huy Quick M.D.

## 2017-12-21 PROCEDURE — 97112 NEUROMUSCULAR REEDUCATION: CPT

## 2017-12-21 PROCEDURE — 97116 GAIT TRAINING THERAPY: CPT

## 2017-12-21 PROCEDURE — A9270 NON-COVERED ITEM OR SERVICE: HCPCS | Performed by: PHYSICAL MEDICINE & REHABILITATION

## 2017-12-21 PROCEDURE — 99232 SBSQ HOSP IP/OBS MODERATE 35: CPT | Performed by: PHYSICAL MEDICINE & REHABILITATION

## 2017-12-21 PROCEDURE — 97535 SELF CARE MNGMENT TRAINING: CPT

## 2017-12-21 PROCEDURE — 97530 THERAPEUTIC ACTIVITIES: CPT

## 2017-12-21 PROCEDURE — 92507 TX SP LANG VOICE COMM INDIV: CPT

## 2017-12-21 PROCEDURE — 770010 HCHG ROOM/CARE - REHAB SEMI PRIVAT*

## 2017-12-21 PROCEDURE — 700102 HCHG RX REV CODE 250 W/ 637 OVERRIDE(OP): Performed by: PHYSICAL MEDICINE & REHABILITATION

## 2017-12-21 PROCEDURE — 97110 THERAPEUTIC EXERCISES: CPT

## 2017-12-21 PROCEDURE — 92526 ORAL FUNCTION THERAPY: CPT

## 2017-12-21 RX ADMIN — AMLODIPINE BESYLATE 10 MG: 5 TABLET ORAL at 08:40

## 2017-12-21 RX ADMIN — CHOLECALCIFEROL TAB 25 MCG (1000 UNIT) 4000 UNITS: 25 TAB at 08:42

## 2017-12-21 RX ADMIN — CLOPIDOGREL BISULFATE 75 MG: 75 TABLET ORAL at 08:41

## 2017-12-21 RX ADMIN — ALLOPURINOL 100 MG: 100 TABLET ORAL at 08:42

## 2017-12-21 RX ADMIN — ATORVASTATIN CALCIUM 10 MG: 10 TABLET, FILM COATED ORAL at 20:13

## 2017-12-21 RX ADMIN — CARVEDILOL 6.25 MG: 3.12 TABLET, FILM COATED ORAL at 08:42

## 2017-12-21 RX ADMIN — HYDROCODONE BITARTRATE AND ACETAMINOPHEN 1 TABLET: 5; 325 TABLET ORAL at 10:39

## 2017-12-21 RX ADMIN — FERROUS SULFATE TAB 325 MG (65 MG ELEMENTAL FE) 325 MG: 325 (65 FE) TAB at 08:42

## 2017-12-21 RX ADMIN — ENALAPRIL MALEATE 10 MG: 5 TABLET ORAL at 04:57

## 2017-12-21 RX ADMIN — FINASTERIDE 10 MG: 5 TABLET, FILM COATED ORAL at 08:39

## 2017-12-21 RX ADMIN — HYDROCODONE BITARTRATE AND ACETAMINOPHEN 2 TABLET: 5; 325 TABLET ORAL at 16:40

## 2017-12-21 RX ADMIN — HYDROCODONE BITARTRATE AND ACETAMINOPHEN 1 TABLET: 5; 325 TABLET ORAL at 16:39

## 2017-12-21 RX ADMIN — ENALAPRIL MALEATE 10 MG: 5 TABLET ORAL at 18:25

## 2017-12-21 RX ADMIN — ASPIRIN 325 MG: 325 TABLET, COATED ORAL at 08:41

## 2017-12-21 ASSESSMENT — GAIT ASSESSMENTS
GAIT LEVEL OF ASSIST: CONTACT GUARD ASSIST
DISTANCE (FEET): 125
ASSISTIVE DEVICE: SINGLE POINT CANE
DEVIATION: ATAXIC;DECREASED BASE OF SUPPORT;BRADYKINETIC;SHUFFLED GAIT

## 2017-12-21 ASSESSMENT — PAIN SCALES - GENERAL
PAINLEVEL_OUTOF10: 0
PAINLEVEL_OUTOF10: 7

## 2017-12-21 NOTE — CARE PLAN
Problem: Safety  Goal: Will remain free from falls    Intervention: Assess risk factors for falls  Pt reminded to use call light if assistance is needed, non skid socks/shoes when OOB, pt occasionally impulsive. 2 SR up,bed on lowest position, hourly rounding maintained.      Problem: Pain Management  Goal: Pain level will decrease to patient's comfort goal  Pt denies pain at time of assessment, will continue to monitor.

## 2017-12-21 NOTE — CARE PLAN
Problem: Bowel/Gastric:  Goal: Normal bowel function is maintained or improved  Outcome: PROGRESSING AS EXPECTED  Patient uses bathroom during bowel elimination. Will continue to monitor    Problem: Mobility  Goal: Risk for activity intolerance will decrease  Pt tolerating 3 hours of therapy every day. Endurance has not decreased from admit.

## 2017-12-21 NOTE — PROGRESS NOTES
"Rehab Progress Note     Encounter Date: 12/21/2017    CC: L CVA with right sided weakness    Interval Events (Subjective)  Patient sitting up in bed after therapy. Patient reports doing well overnight, had throbbing pain last night in his colon and asked for norco and pain subsided. He reports he is having pain this morning after therapy and would like medication before PT. Notified RN.  Patient otherwise reports he is feeling stronger on his right side. Denies SOB.  Denies N/V/D.      DC/Disposition:  11/29/17 has entire set of stairs into condo    Objective:  VITAL SIGNS: /52   Pulse (!) 52   Temp 36.4 °C (97.6 °F)   Resp 18   Ht 1.753 m (5' 9\")   Wt 76.7 kg (169 lb 1.5 oz)   SpO2 95%   BMI 24.97 kg/m²   Gen: NAD, dysarthric speech  Psych: Mood and affect normal  Resp: CTAB  CV: RRR, minimal edema R LE  Abd: NTND, BS+  Neuro: AOx3,  R Wrist flexor 4/5, R wrist extensor 3+/5,  Finger flexor 4/5    No results found for this or any previous visit (from the past 72 hour(s)).    Current Facility-Administered Medications   Medication Frequency   • carvedilol (COREG) tablet 6.25 mg BID WITH MEALS   • amlodipine (NORVASC) tablet 10 mg DAILY   • senna-docusate (PERICOLACE or SENOKOT S) 8.6-50 MG per tablet 2 Tab BID PRN    And   • polyethylene glycol/lytes (MIRALAX) PACKET 1 Packet QDAY PRN    And   • magnesium hydroxide (MILK OF MAGNESIA) suspension 30 mL QDAY PRN    And   • bisacodyl (DULCOLAX) suppository 10 mg QDAY PRN   • hydrocodone-acetaminophen (NORCO) 5-325 MG per tablet 1-2 Tab Q4HRS PRN   • finasteride (PROSCAR) tablet 10 mg DAILY   • aspirin (ASA) tablet 325 mg DAILY   • allopurinol (ZYLOPRIM) tablet 100 mg DAILY   • atorvastatin (LIPITOR) tablet 10 mg Q EVENING   • clopidogrel (PLAVIX) tablet 75 mg QAM   • cyanocobalamin (VITAMIN B12) tablet 1,000 mcg DAILY   • ferrous sulfate tablet 325 mg QAM   • vitamin D (cholecalciferol) tablet 4,000 Units DAILY   • Respiratory Care per Protocol Continuous RT "   • Pharmacy Consult Request ...Pain Management Review 1 Each PRN   • acetaminophen (TYLENOL) tablet 650 mg Q4HRS PRN   • artificial tears 1.4 % ophthalmic solution 1 Drop PRN   • benzocaine-menthol (CEPACOL) lozenge 1 Lozenge Q2HRS PRN   • hydrALAZINE (APRESOLINE) tablet 25 mg Q8HRS PRN   • mag hydrox-al hydrox-simeth (MAALOX PLUS ES or MYLANTA DS) suspension 20 mL Q2HRS PRN   • ondansetron (ZOFRAN ODT) dispertab 4 mg 4X/DAY PRN    Or   • ondansetron (ZOFRAN) syringe/vial injection 4 mg 4X/DAY PRN   • trazodone (DESYREL) tablet 50 mg QHS PRN   • sodium chloride (OCEAN) 0.65 % nasal spray 2 Spray PRN   • enalapril (VASOTEC) tablet 10 mg TWICE DAILY       Orders Placed This Encounter   Procedures   • Diet Order     Standing Status:   Standing     Number of Occurrences:   1     Order Specific Question:   Diet:     Answer:   Diabetic [3]     Order Specific Question:   Diet:     Answer:   Cardiac [6]     Order Specific Question:   Texture/Fiber modifications:     Answer:   Dysphagia 3(Mechanical Soft)specify fluid consistency(question 6) [3]     Order Specific Question:   Consistency/Fluid modifications:     Answer:   [place] Thick [2]       Assessment:  Active Hospital Problems    Diagnosis   • *Left sided cerebral hemisphere cerebrovascular accident (CVA) (CMS-HCC)   • CVA (cerebral vascular accident) (CMS-[facility])   • Acute on Chronic blood loss anemia   • Vitamin B12 deficiency   • Lung cancer (CMS-[facility])   • Cardiomyopathy (CMS-[facility])   • Thrombocytopenia (CMS-[facility])   • Dysphagia due to recent cerebrovascular accident (CVA)   • Chronic combined systolic and diastolic CHF (congestive heart failure) (CMS-[facility])   • CKD (chronic kidney disease) stage 3, GFR 30-59 ml/min   • COPD (chronic obstructive pulmonary disease) (CMS-[facility])   • HTN (hypertension)   • DM (diabetes mellitus) (CMS-[facility])   • Dyslipidemia   • BPH (benign prostatic hyperplasia)       Medical Decision Making and Plan:  CVA - Patient with new left sided CVA with R  sided weakness, dysphagia and dysarthria.  Patient previously with bilateral strokes but improved to baseline.    -Continue  mg, Plavix 75 mg  -Continue Good BP and DLD control (see below)  -Repeat MBS for dysphagia on 12/18/17.  Will trial Regular diet on 12/21/17 with SLP     RLL Lung Adenocarcinoma - Evaluated by Radiation oncology while at [facility].  Recommending radiation therapy in January.  -Follow-up with Radiation oncology post-discharge     HTN - Patient with longstanding HTN as well as CHF.  Patient previously on Coreg, Vasotec and Lasix.  All medications were stopped acutely for permissive hypertension. Since admission continued elevated BP  -Restart Enalapril 10 mg BID, Coreg 12.5 mg BID. Continue to monitor as was labile prior to restarting medications.  -Added parameters for Coreg for HR.  Added Amlodipine and increased to 10 mg on 12/18/17. Much better control on 11/18-11/19.  With bracycardia decrease Coreg to 6.25 mg BID. HR still in 50's     DLD - Patient on statin at baseline.  Cholesterol WNL on acute check.  -Continue Atorvastatin 10 mg QHS     BPH - Patient on Proscar at baseline  -Continue Proscar 10 mg daily.     Neurogenic Bowel  - Patient would like all medications made PRN.    COPD - No exacerbation since stroke.  Continue on home medications  -Patient declining Spiriva. Held     B12 Deficiency - Found during acute stay.  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     Gout - Pt reports being on allopurinol for years.   -Continue Alllopurinol 100 mg daily     Vitamin D Deficiency - Reported deficiency but normal on admission labs. Will continue supplementation  -Cholecalciferol 4000 U daily     DVT Prophlyaxis - Patient ambulating sufficiently.  Continue TEDs in AM    Total time:  > 25 minutes.  I spent greater than 50% of the time for patient care and  coordination on this date, including unit/floor time, and face-to-face time with the patient as per assessment and plan above.  Discussed HR management. Discussed pain management post-discharge.     Huy Quick M.D.

## 2017-12-21 NOTE — REHAB-DIETARY IDT TEAM NOTE
"Dietary   Nutrition       Dietary Problems           Problem: Inadequate nutrient intake     Goal: Patient to consume greater than or equal to 50% of meals (Resolved)               Additional Information: Patient was seen today at bedside.      Patient stated that he is not happy with his diet . He does not like the meal presentation and states \"it looks like a bunch of stuff fell out of a truck onto his plate.\" Patient states that his ground meat is very repetitive and he hates the texture of the pureed foods and ground meat. Patient also stated he does not like nectar thick liquids, so therefore does not drink any fluids. Patient was advanced from a dysphagia 2 to a dysphagia 3 diet today. Hopefully that will improve his intake. Patient stated he likes [place]-style green beans and spinach.      Patient stated that he is having regular bowel movements and voiding. He stated his pee is yellow in color. No other GI issues noted.      Patient states he gets help with feeding in t-dine. He does not think he has difficulty swallowing, however patient remains on nectar thick diet. Does not fully understand the concept of working with the SLP.   Diet just upgraded to D3 today.  Will add Magic Cups with lunch and supper to aid in adequate nutrient/ fluid intake d/t pt disliking meat and disliking NTL fluids.     PO is adequate at this time.  RD following PRN as appropriate interventions are in place. Nutrition rep to aid in helping patient pick foods he likes.        Appetite: poor   Diet: diabetic, cardiac, dysphagia 3, nectar thick   Average PO intake x 3 days: 73%      Labs: glucose 111, BUN 55, creatinine 2.91  Medications: zyloprim, norvasc, lipitor, coreg, plavix, vitamin B12, vasotec, ferrous sulfate, proscar, vitamin D3  PRN Medications: zofran PRN, bowel regimen PRN  IVF: none noted at this time      Height: 175.3 cm  Weight: 76.7 kg   IBW: 65.9 kg  BMI: 24.97 (normal)      Skin: intact  GI: BM 12/19  : noted "   Vitals: bradycardia noted  I/Os: +485      Nutrient Needs:  Kcal: 1449 - 1597 kcal/day (19-21 kcal/kg)    BEE = 1449 x 1.1   Protein: 77 - 92 g/day (1 - 1.2 g/kg actual body weight)  Fluid: 2000 - 2200mL/day (26-29 mL/kg)        Diagnosis: Inadequate energy/fluid intake R/T poor appetite as evidence by patient report of <50% nutrient/fluid intake      Intervention/ Recommendations/POC:  1. Magic cup supplements   2. Encourage adequate PO/fluid intake.  3. Continue current diet. Diet upgrades per SLP  4. Nutrition rep to see regarding food prefs/ honor within dietary restrictions (if indicated)     Monitor/Evaluation: Monitor PO intake, weight, labs, medication adjustments, skin integrity, GI function, vitals, I/Os, and overall hydration status.  Adjust nutritional POC pending clinical outcomes.    RD following weekly.   Goal: Maintain >50% oral nutrient/fluid intake to promote nutrition optimization/healing.               Section completed by:  Vandana Chaney R.D.

## 2017-12-21 NOTE — CARE PLAN
Problem: Inadequate nutrient intake  Goal: Patient to consume greater than or equal to 50% of meals  Outcome: MET Date Met: 12/20/17

## 2017-12-22 LAB
APPEARANCE UR: ABNORMAL
BACTERIA #/AREA URNS HPF: ABNORMAL /HPF
BILIRUB UR QL STRIP.AUTO: NEGATIVE
COLOR UR: YELLOW
EPI CELLS #/AREA URNS HPF: NEGATIVE /HPF
GLUCOSE UR STRIP.AUTO-MCNC: NEGATIVE MG/DL
HYALINE CASTS #/AREA URNS LPF: ABNORMAL /LPF
KETONES UR STRIP.AUTO-MCNC: NEGATIVE MG/DL
LEUKOCYTE ESTERASE UR QL STRIP.AUTO: ABNORMAL
MICRO URNS: ABNORMAL
NITRITE UR QL STRIP.AUTO: NEGATIVE
PH UR STRIP.AUTO: 5 [PH]
PROT UR QL STRIP: 100 MG/DL
RBC # URNS HPF: ABNORMAL /HPF
RBC UR QL AUTO: ABNORMAL
RENAL EPI CELLS #/AREA URNS HPF: ABNORMAL /HPF
SP GR UR STRIP.AUTO: 1.02
UROBILINOGEN UR STRIP.AUTO-MCNC: 0.2 MG/DL
WBC #/AREA URNS HPF: ABNORMAL /HPF

## 2017-12-22 PROCEDURE — 770010 HCHG ROOM/CARE - REHAB SEMI PRIVAT*

## 2017-12-22 PROCEDURE — 97112 NEUROMUSCULAR REEDUCATION: CPT

## 2017-12-22 PROCEDURE — 700102 HCHG RX REV CODE 250 W/ 637 OVERRIDE(OP): Performed by: PHYSICAL MEDICINE & REHABILITATION

## 2017-12-22 PROCEDURE — 87086 URINE CULTURE/COLONY COUNT: CPT

## 2017-12-22 PROCEDURE — 97530 THERAPEUTIC ACTIVITIES: CPT

## 2017-12-22 PROCEDURE — 97535 SELF CARE MNGMENT TRAINING: CPT

## 2017-12-22 PROCEDURE — 92526 ORAL FUNCTION THERAPY: CPT

## 2017-12-22 PROCEDURE — 81001 URINALYSIS AUTO W/SCOPE: CPT

## 2017-12-22 PROCEDURE — A9270 NON-COVERED ITEM OR SERVICE: HCPCS | Performed by: PHYSICAL MEDICINE & REHABILITATION

## 2017-12-22 PROCEDURE — 97116 GAIT TRAINING THERAPY: CPT

## 2017-12-22 PROCEDURE — 99233 SBSQ HOSP IP/OBS HIGH 50: CPT | Performed by: PHYSICAL MEDICINE & REHABILITATION

## 2017-12-22 RX ORDER — CIPROFLOXACIN 500 MG/1
250 TABLET, FILM COATED ORAL EVERY 12 HOURS
Status: DISCONTINUED | OUTPATIENT
Start: 2017-12-22 | End: 2017-12-27

## 2017-12-22 RX ADMIN — CARVEDILOL 6.25 MG: 3.12 TABLET, FILM COATED ORAL at 09:50

## 2017-12-22 RX ADMIN — ATORVASTATIN CALCIUM 10 MG: 10 TABLET, FILM COATED ORAL at 20:01

## 2017-12-22 RX ADMIN — ALLOPURINOL 100 MG: 100 TABLET ORAL at 09:49

## 2017-12-22 RX ADMIN — ENALAPRIL MALEATE 10 MG: 5 TABLET ORAL at 17:45

## 2017-12-22 RX ADMIN — CHOLECALCIFEROL TAB 25 MCG (1000 UNIT) 4000 UNITS: 25 TAB at 09:47

## 2017-12-22 RX ADMIN — CIPROFLOXACIN HYDROCHLORIDE 250 MG: 500 TABLET, FILM COATED ORAL at 20:01

## 2017-12-22 RX ADMIN — ALUMINUM HYDROXIDE, MAGNESIUM HYDROXIDE, AND DIMETHICONE 20 ML: 400; 400; 40 SUSPENSION ORAL at 00:21

## 2017-12-22 RX ADMIN — HYDROCODONE BITARTRATE AND ACETAMINOPHEN 2 TABLET: 5; 325 TABLET ORAL at 21:48

## 2017-12-22 RX ADMIN — CLOPIDOGREL BISULFATE 75 MG: 75 TABLET ORAL at 09:48

## 2017-12-22 RX ADMIN — CARVEDILOL 6.25 MG: 3.12 TABLET, FILM COATED ORAL at 17:43

## 2017-12-22 RX ADMIN — FINASTERIDE 10 MG: 5 TABLET, FILM COATED ORAL at 09:48

## 2017-12-22 RX ADMIN — CIPROFLOXACIN HYDROCHLORIDE 250 MG: 500 TABLET, FILM COATED ORAL at 13:45

## 2017-12-22 RX ADMIN — ASPIRIN 325 MG: 325 TABLET, COATED ORAL at 09:48

## 2017-12-22 RX ADMIN — AMLODIPINE BESYLATE 10 MG: 5 TABLET ORAL at 09:48

## 2017-12-22 RX ADMIN — ENALAPRIL MALEATE 10 MG: 5 TABLET ORAL at 04:59

## 2017-12-22 RX ADMIN — FERROUS SULFATE TAB 325 MG (65 MG ELEMENTAL FE) 325 MG: 325 (65 FE) TAB at 09:48

## 2017-12-22 ASSESSMENT — PAIN SCALES - GENERAL: PAINLEVEL_OUTOF10: 7

## 2017-12-22 NOTE — REHAB-NURSING IDT TEAM NOTE
Nursing   Nursing  Diet/Nutrition:  Diabetic, Cardiac, Dysphagia III-Mechanical Soft and Nectar Thick Liquids  Medication Administration:  Float Whole with Puree  % consumed at meals in last 24 hours:  Consumed 40-90% of meals per documentation.  Breakfast:  40%   Lunch:  90%  Dinner:  70%   Snack schedule:  HS  Appetite:  Fair  Fluid Intake/Output in past 24 hours: In: 440 [P.O.:440]  Out: 575   Admit Weight:  Weight: 76.7 kg (169 lb 1.5 oz)  Weight Last 7 Days       Pain Issues:    Location:  Leg (12/21 1129)  Right;Left;Upper;Proximal (12/21 1129)         Severity:  Denies   Scheduled pain medications:  None     PRN pain medications used in last 24 hours:  None   Non Pharmacologic Interventions:  emotional support, relaxation and rest  Effectiveness of pain management plan:  good=patient states acceptable comfort after interventions    Bowel:    Bowel Assist Initial Score:  4 - Minimal Assistance  Bowel Assist Current Score:  4 - Minimal Assistance  Bowl Accidents in last 7 days:   0  Last bowel movement: 12/19/17 (per patient )  Stool: Large, Loose     Usual bowel pattern:  every other day  Scheduled bowel medications:  None  PRN bowel medications used in last 24 hours:  None  Nursing Interventions:  Increased time, PRN medication, Verbal cueing  Effectiveness of bowel program:   good=regular, predictable, controlled emptying of bowel  Bladder:    Bladder Assist Initial Score:  4 - Minimal Assistance  Bladder Assist Current Score:  5 - Standby Prompting/Supervision or Set-up  Bladder Accidents in last 7 days:  1  PVR range for past 24-48 hours:  [15-66]  ()  Medications affecting bladder:  Proscar    Time void schedule/voiding pattern:  Time void every 3 hours during the day and every 4 hours at night  Interventions:  Increased time, Medication, Supervision, Urinal, Verbal cueing  Effectiveness of bladder training:  Fair=occasional unpredictable, incontinent emptying of bladder (< 1 time/day)  Sleep/wake cycle:    Average hours slept:  Sleeps 4-6 hours without waking  Sleep medication usage:  None    Patient/Family Training/Education:  Diet/Nutrition, Fall Prevention, Medication Management, Safe Transfers and Safety  Strengths: Willingly participates in therapeutic activities, Able to follow instructions and Pleasant and cooperative   Barriers:   Aspiration risk, Generalized weakness and Impulsive            Nursing Problems           Problem: Bowel/Gastric:     Goal: Normal bowel function is maintained or improved     Flowsheet:     Taken at 12/19/17 0855    Last BM 12/17/17 by Katie Lorenzana R.N.    Taken at 12/17/17 2002    Last BM 12/17/17 by Joaquina Syed R.N.                Goal: Will not experience complications related to bowel motility             Problem: Communication     Goal: The ability to communicate needs accurately and effectively will improve             Problem: Discharge Barriers/Planning     Goal: Patient's continuum of care needs will be met             Problem: Infection     Goal: Will remain free from infection             Problem: Knowledge Deficit     Goal: Knowledge of disease process/condition, treatment plan, diagnostic tests, and medications will improve           Goal: Knowledge of the prescribed therapeutic regimen will improve             Problem: Mobility     Goal: Risk for activity intolerance will decrease             Problem: Pain Management     Goal: Pain level will decrease to patient's comfort goal     Flowsheet:     Taken at 12/21/17 2013    Nurse Pain Scale 0 - 10  0 by Joaquina Syed R.N.    Non Verbal Scale  Calm;Unlabored Breathing by Joaquina Syed R.N.    Taken at 12/18/17 1300    Nurse Pain Scale 0 - 10  7 by Esther Olivares, L.P.N.    Comfort Goal Comfort at Rest by Esther Olivares, L.P.NGino                  Problem: Respiratory:     Goal: Respiratory status will improve             Problem: Safety     Goal: Will remain free from injury           Goal: Will remain  free from falls             Problem: Urinary Elimination:     Goal: Ability to reestablish a normal urinary elimination pattern will improve             Problem: Venous Thromboembolism (VTW)/Deep Vein Thrombosis (DVT) Prevention:     Goal: Patient will participate in Venous Thrombosis (VTE)/Deep Vein Thrombosis (DVT)Prevention Measures                  Long Term Goals:   At discharge patient will be able to function safely at home and in the community with support.    Section completed by:  Joaquina Syed R.N.

## 2017-12-22 NOTE — REHAB-PT IDT TEAM NOTE
Physical Therapy   Mobility  Bed mobility:   independent supine<>sit  Bed /Chair/Wheelchair Transfer Initial:  4 - Minimal Assistance  Bed /Chair/Wheelchair Transfer Current:  5 - Standby/Supervision or Set-up   Bed/Chair/Wheelchair Transfer Description:  Increased time, Supervision for safety, Set-up of equipment  Walk Initial:  4 - Minimal Assistance  Walk Current:  4 cga single point cane, 125 ft Limited by moderate to severe bilateral LE pain   Walk Description:  Assist device/equipment, Extra time, Safety concerns, Supervision for safety, Verbal cueing  Wheelchair Initial:  5 - Standby Prompting/Supervision or Set-up  Wheelchair Current:  5 - Standby Prompting/Supervision or Set-up   Wheelchair Description:  Extra time, Safety concerns, Supervision for safety, Verbal cueing  Stairs Initial:  2 - Max Assistance  Stairs Current: CGA bilateral railings   Stairs Description: Hand rails, Supervision for safety  Patient/Family Training/Education:  In progress with pt  DME/DC Recommendations:  Single point cane  Strengths:  Independent PLOF, Pleasant and cooperative and Willingly participates in therapeutic activities  Barriers:   Other: modrate to severe pain, impaired endurance, balance/coordination, gait, fall risk, bilateral LE pain  # of short term goals set= 4  # of short term goals met=1       Physical Therapy Problems           Problem: Balance     Dates: Start: 12/14/17       Goal: STG-Within one week, patient will maintain dynamic standing     Dates: Start: 12/14/17       Description: 1) Individualized goal: Maintain dynamic standing balance for transfers and gait sba, one week  2) Interventions:  PT E Stim Attended, PT Gait Training, PT Therapeutic Exercises, PT Neuro Re-Ed/Balance and PT Therapeutic Activity       Note:     Goal Note filed on 12/21/17 8358 by AVELINA Marie    Goal: STG-Within one week, patient will maintain dynamic standing  Outcome: NOT MET  Intermittent cga                   Problem: Mobility     Dates: Start: 12/14/17       Goal: STG-Within one week, patient will ascend and descend four to six stairs     Dates: Start: 12/14/17       Description: 1) Individualized goal: Up/down 4-6 stairs railings, sba one week  2) Interventions:  PT E Stim Attended, PT Gait Training, PT Therapeutic Exercises, PT Neuro Re-Ed/Balance and PT Therapeutic Activity       Note:     Goal Note filed on 12/21/17 1838 by AVELINA Marie    Goal: STG-Within one week, patient will ascend and descend four to six   stairs  Outcome: NOT MET  cga                Goal: STG-Within one week, patient will     Dates: Start: 12/14/17       Description: 1) Individualized goal:  Gait spc or no device sba 250 ft one week  2) Interventions:  PT E Stim Attended, PT Gait Training, PT Therapeutic Exercises, PT Neuro Re-Ed/Balance and PT Therapeutic Activity       Note:     Goal Note filed on 12/21/17 1838 by AVELINA Marie    Goal: STG-Within one week, patient will  Outcome: NOT MET  125 to 150 ft cga                  Problem: Mobility Transfers     Dates: Start: 12/14/17       Goal: STG-Within one week, patient will transfer bed to chair     Dates: Start: 12/21/17       Description: 1) Individualized goal:  Transfer bed/chair spc modified independent, one week  2) Interventions:  PT Gait Training, PT Therapeutic Exercises, PT Neuro Re-Ed/Balance and PT Therapeutic Activity               Problem: PT-Long Term Goals     Dates: Start: 12/14/17       Goal: LTG-By discharge, patient will ambulate     Dates: Start: 12/14/17       Description: 1) Individualized goal: Gait spc, household 150 ft, community 400 ft, modified independent at discharge   2) Interventions:  PT E Stim Attended, PT Gait Training, PT Therapeutic Exercises, PT Neuro Re-Ed/Balance and PT Therapeutic Activity             Goal: LTG-By discharge, patient will transfer one surface to another     Dates: Start: 12/14/17       Description: 1) Individualized  goal: Transfer one surface to another spc, modified independent at discharge  2) Interventions:  PT E Stim Attended, PT Gait Training, PT Therapeutic Exercises, PT Neuro Re-Ed/Balance and PT Therapeutic Activity             Goal: LTG-By discharge, patient will ambulate up/down flight of stairs     Dates: Start: 12/14/17       Description: 1) Individualized goal: Up/down flight of stairs spc and railing modified independent at discharge  2) Interventions:  PT E Stim Attended, PT Gait Training, PT Therapeutic Exercises, PT Neuro Re-Ed/Balance and PT Therapeutic Activity             Goal: LTG-By discharge, patient will transfer in/out of a car     Dates: Start: 12/14/17       Description: 1) Individualized goal: Car transfer spc, modified independent at discharge  2) Interventions:  PT E Stim Attended, PT Gait Training, PT Therapeutic Exercises, PT Neuro Re-Ed/Balance and PT Therapeutic Activity                     Section completed by:  JENNIFER MarieT

## 2017-12-22 NOTE — PROGRESS NOTES
DATE OF SERVICE:  12/21/2017    Patient was seen for followup visit.  As reported in his last progress note,   the patient is doing well.  His attitude is upbeat for the most part.  He is   meeting all his goals.  Patient reports improvement in his strength and   endurance.    The session was focused on the patient's home program and his   expectations/place for recovery and what he needs to implement on a day-to-day   basis as far as physical conditioning.       ____________________________________     SAY CHOI, PHD    JOSSELINE / DILLON    DD:  12/22/2017 09:28:36  DT:  12/22/2017 10:41:48    D#:  [id number]  Job#:  [id number]

## 2017-12-22 NOTE — REHAB-COLLABORATIVE ONGOING IDT TEAM NOTE
Weekly Interdisciplinary Team Conference Note    Weekly Interdisciplinary Team Conference # 2  Date:  12/22/2017    Clinicians present and reporting at team conference include the following:   MD: Huy Quick MD   RN:  Chary Enamorado RN   PT:   Marsha Cordoba, PT, DPT  OT:  Casey Saldivar MS OTR/L   ST:  Bahman Delgadillo MS, CCC-SLP  CM:  NEO Ortega  REC:  Not Applicable  RT:  Not Applicable  RPh:  Nixon Harden rae  Other:   None  All reporting clinicians have a working knowledge of this patient's plan of care.    Targeted DC Date:  12/28/2017     Medical    Patient Active Problem List    Diagnosis Date Noted   • CVA (cerebral vascular accident) (CMS-HCC) 12/11/2017     Priority: High   • Angina pectoris (CMS-HCC) 11/05/2014     Priority: High   • 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 10/31/2014     Priority: High   • Acute on Chronic blood loss anemia 05/01/2011     Priority: High   • Vitamin B12 deficiency 12/13/2017     Priority: Medium   • CKD (chronic kidney disease) stage 4, GFR 15-29 ml/min (CMS-HCC) 12/12/2017     Priority: Medium   • Chronic combined systolic and diastolic CHF (congestive heart failure) (CMS-HCC) 12/12/2017     Priority: Medium   • Cardiomyopathy (CMS-HCC) 12/12/2017     Priority: Medium   • Lung cancer (CMS-HCC) 12/12/2017     Priority: Medium   • Thrombocytopenia (CMS-HCC) 12/12/2017     Priority: Medium   • Dysphagia due to recent cerebrovascular accident (CVA) 12/12/2017     Priority: Medium   • Carotid artery stenosis 11/05/2014     Priority: Medium   • Subclavian artery stenosis, left (CMS-HCC) 11/05/2014     Priority: Medium   • CKD (chronic kidney disease) stage 3, GFR 30-59 ml/min 10/31/2014     Priority: Medium   • COPD (chronic obstructive pulmonary disease) (CMS-HCC) 10/31/2014     Priority: Medium   • Peripheral vascular disease (CMS-HCC) 10/31/2014     Priority: Medium   • HTN (hypertension) 05/01/2011     Priority: Medium   • DM (diabetes  mellitus) (CMS-HCC) 05/01/2011     Priority: Medium   • Dyslipidemia 05/01/2011     Priority: Low   • Left sided cerebral hemisphere cerebrovascular accident (CVA) (CMS-HCC) 12/13/2017   • ACC/AHA stage C systolic heart failure (CMS-HCC) 08/23/2017   • Severe aortic stenosis 08/23/2017   • Type 2 diabetes mellitus without complication ([facility]) 04/28/2017   • BPH (benign prostatic hyperplasia) 04/28/2017   • Acute blood loss anemia 06/17/2015   • GIB (gastrointestinal bleeding) 06/16/2015   • Hypotension 06/16/2015   • Aortic valve stenosis 11/05/2014   • HEMORRHOIDS 05/01/2011   • Diverticulosis 05/01/2011     Results     Procedure Component Value Ref Range Date/Time    URINE MICROSCOPIC (W/UA) [[id number]]  (Abnormal) Collected:  12/22/17 0500    Order Status:  Completed Lab Status:  Final result Updated:  12/22/17 0827     WBC Packed (A) /hpf      Comment: Female  <12 Yr 0-2  >12 Yr 0-5  Male   None          RBC 10-20 (A) /hpf      Comment: Female  >12 Yr 0-2  Male   None          Bacteria Few (A) None /hpf      Epithelial Cells Negative /hpf      Epithelial Cells Renal Few /hpf      Hyaline Cast 0-2 /lpf     Narrative:       Collected By:[id number] CHIDI NUNEZ    URINALYSIS [[id number]]  (Abnormal) Collected:  12/22/17 0500    Order Status:  Completed Lab Status:  Final result Updated:  12/22/17 0723    Specimen:  Urine from Urine, Clean Catch      Color Yellow     Character Turbid (A)     Specific Gravity 1.017 <1.035      Ph 5.0 5.0 - 8.0      Glucose Negative Negative mg/dL      Ketones Negative Negative mg/dL      Protein 100 (A) Negative mg/dL      Bilirubin Negative Negative      Urobilinogen, Urine 0.2 Negative      Nitrite Negative Negative      Leukocyte Esterase Large (A) Negative      Occult Blood Moderate (A) Negative      Micro Urine Req Microscopic    Narrative:       Collected By:[id number] CHIDI NUNEZ           Nursing  Diet/Nutrition:  Diabetic, Cardiac, Dysphagia III-Mechanical Soft and Nectar  Thick Liquids  Medication Administration:  Float Whole with Puree  % consumed at meals in last 24 hours:  Consumed 40-90% of meals per documentation.  Breakfast:  40%   Lunch:  90%  Dinner:  70%   Snack schedule:  HS  Appetite:  Fair  Fluid Intake/Output in past 24 hours: In: 440 [P.O.:440]  Out: 575   Admit Weight:  Weight: 76.7 kg (169 lb 1.5 oz)  Weight Last 7 Days       Pain Issues:    Location:  Leg (12/21 1129)  Right;Left;Upper;Proximal (12/21 1129)         Severity:  Denies   Scheduled pain medications:  None     PRN pain medications used in last 24 hours:  None   Non Pharmacologic Interventions:  emotional support, relaxation and rest  Effectiveness of pain management plan:  good=patient states acceptable comfort after interventions    Bowel:    Bowel Assist Initial Score:  4 - Minimal Assistance  Bowel Assist Current Score:  4 - Minimal Assistance  Bowl Accidents in last 7 days:   0  Last bowel movement: 12/19/17 (per patient )  Stool: Large, Loose     Usual bowel pattern:  every other day  Scheduled bowel medications:  None  PRN bowel medications used in last 24 hours:  None  Nursing Interventions:  Increased time, PRN medication, Verbal cueing  Effectiveness of bowel program:   good=regular, predictable, controlled emptying of bowel  Bladder:    Bladder Assist Initial Score:  4 - Minimal Assistance  Bladder Assist Current Score:  5 - Standby Prompting/Supervision or Set-up  Bladder Accidents in last 7 days:  1  PVR range for past 24-48 hours:  [15-66]  ()  Medications affecting bladder:  Proscar    Time void schedule/voiding pattern:  Time void every 3 hours during the day and every 4 hours at night  Interventions:  Increased time, Medication, Supervision, Urinal, Verbal cueing  Effectiveness of bladder training:  Fair=occasional unpredictable, incontinent emptying of bladder (< 1 time/day)  Sleep/wake cycle:   Average hours slept:  Sleeps 4-6 hours without waking  Sleep medication usage:   None    Patient/Family Training/Education:  Diet/Nutrition, Fall Prevention, Medication Management, Safe Transfers and Safety  Strengths: Willingly participates in therapeutic activities, Able to follow instructions and Pleasant and cooperative   Barriers:   Aspiration risk, Generalized weakness and Impulsive            Nursing Problems           Problem: Bowel/Gastric:     Goal: Normal bowel function is maintained or improved     Flowsheet:     Taken at 12/19/17 0855    Last BM 12/17/17 by Katie Lorenzana R.N.    Taken at 12/17/17 2002    Last BM 12/17/17 by Joaquina Syed R.N.                Goal: Will not experience complications related to bowel motility             Problem: Communication     Goal: The ability to communicate needs accurately and effectively will improve             Problem: Discharge Barriers/Planning     Goal: Patient's continuum of care needs will be met             Problem: Infection     Goal: Will remain free from infection             Problem: Knowledge Deficit     Goal: Knowledge of disease process/condition, treatment plan, diagnostic tests, and medications will improve           Goal: Knowledge of the prescribed therapeutic regimen will improve             Problem: Mobility     Goal: Risk for activity intolerance will decrease             Problem: Pain Management     Goal: Pain level will decrease to patient's comfort goal     Flowsheet:     Taken at 12/21/17 2013    Nurse Pain Scale 0 - 10  0 by Joaquina Syed R.N.    Non Verbal Scale  Calm;Unlabored Breathing by Joaquina Syed R.N.    Taken at 12/18/17 1300    Nurse Pain Scale 0 - 10  7 by Esther Olivares, L.P.N.    Comfort Goal Comfort at Rest by Esther Olivares, L.P.NGino                  Problem: Respiratory:     Goal: Respiratory status will improve             Problem: Safety     Goal: Will remain free from injury           Goal: Will remain free from falls             Problem: Urinary Elimination:     Goal: Ability to  reestablish a normal urinary elimination pattern will improve             Problem: Venous Thromboembolism (VTW)/Deep Vein Thrombosis (DVT) Prevention:     Goal: Patient will participate in Venous Thrombosis (VTE)/Deep Vein Thrombosis (DVT)Prevention Measures                  Long Term Goals:   At discharge patient will be able to function safely at home and in the community with support.    Section completed by:  Joaquina Syed R.N.              Mobility  Bed mobility:   independent supine<>sit  Bed /Chair/Wheelchair Transfer Initial:  4 - Minimal Assistance  Bed /Chair/Wheelchair Transfer Current:  5 - Standby/Supervision or Set-up   Bed/Chair/Wheelchair Transfer Description:  Increased time, Supervision for safety, Set-up of equipment  Walk Initial:  4 - Minimal Assistance  Walk Current:  4 cga single point cane, 125 ft Limited by moderate to severe bilateral LE pain   Walk Description:  Assist device/equipment, Extra time, Safety concerns, Supervision for safety, Verbal cueing  Wheelchair Initial:  5 - Standby Prompting/Supervision or Set-up  Wheelchair Current:  5 - Standby Prompting/Supervision or Set-up   Wheelchair Description:  Extra time, Safety concerns, Supervision for safety, Verbal cueing  Stairs Initial:  2 - Max Assistance  Stairs Current: CGA bilateral railings   Stairs Description: Hand rails, Supervision for safety  Patient/Family Training/Education:  In progress with pt  DME/DC Recommendations:  Single point cane  Strengths:  Independent PLOF, Pleasant and cooperative and Willingly participates in therapeutic activities  Barriers:   Other: modrate to severe pain, impaired endurance, balance/coordination, gait, fall risk, bilateral LE pain  # of short term goals set= 4  # of short term goals met=1       Physical Therapy Problems           Problem: Balance     Dates: Start: 12/14/17       Goal: STG-Within one week, patient will maintain dynamic standing     Dates: Start: 12/14/17       Description:  1) Individualized goal: Maintain dynamic standing balance for transfers and gait sba, one week  2) Interventions:  PT E Stim Attended, PT Gait Training, PT Therapeutic Exercises, PT Neuro Re-Ed/Balance and PT Therapeutic Activity       Note:     Goal Note filed on 12/21/17 1838 by AVELINA Marie    Goal: STG-Within one week, patient will maintain dynamic standing  Outcome: NOT MET  Intermittent cga                  Problem: Mobility     Dates: Start: 12/14/17       Goal: STG-Within one week, patient will ascend and descend four to six stairs     Dates: Start: 12/14/17       Description: 1) Individualized goal: Up/down 4-6 stairs railings, sba one week  2) Interventions:  PT E Stim Attended, PT Gait Training, PT Therapeutic Exercises, PT Neuro Re-Ed/Balance and PT Therapeutic Activity       Note:     Goal Note filed on 12/21/17 1838 by AVELINA Marie    Goal: STG-Within one week, patient will ascend and descend four to six   stairs  Outcome: NOT MET  cga                Goal: STG-Within one week, patient will     Dates: Start: 12/14/17       Description: 1) Individualized goal:  Gait spc or no device sba 250 ft one week  2) Interventions:  PT E Stim Attended, PT Gait Training, PT Therapeutic Exercises, PT Neuro Re-Ed/Balance and PT Therapeutic Activity       Note:     Goal Note filed on 12/21/17 1838 by AVELINA Marie    Goal: STG-Within one week, patient will  Outcome: NOT MET  125 to 150 ft cga                  Problem: Mobility Transfers     Dates: Start: 12/14/17       Goal: STG-Within one week, patient will transfer bed to chair     Dates: Start: 12/21/17       Description: 1) Individualized goal:  Transfer bed/chair spc modified independent, one week  2) Interventions:  PT Gait Training, PT Therapeutic Exercises, PT Neuro Re-Ed/Balance and PT Therapeutic Activity               Problem: PT-Long Term Goals     Dates: Start: 12/14/17       Goal: LTG-By discharge, patient will ambulate      Dates: Start: 12/14/17       Description: 1) Individualized goal: Gait spc, household 150 ft, community 400 ft, modified independent at discharge   2) Interventions:  PT E Stim Attended, PT Gait Training, PT Therapeutic Exercises, PT Neuro Re-Ed/Balance and PT Therapeutic Activity             Goal: LTG-By discharge, patient will transfer one surface to another     Dates: Start: 12/14/17       Description: 1) Individualized goal: Transfer one surface to another spc, modified independent at discharge  2) Interventions:  PT E Stim Attended, PT Gait Training, PT Therapeutic Exercises, PT Neuro Re-Ed/Balance and PT Therapeutic Activity             Goal: LTG-By discharge, patient will ambulate up/down flight of stairs     Dates: Start: 12/14/17       Description: 1) Individualized goal: Up/down flight of stairs spc and railing modified independent at discharge  2) Interventions:  PT E Stim Attended, PT Gait Training, PT Therapeutic Exercises, PT Neuro Re-Ed/Balance and PT Therapeutic Activity             Goal: LTG-By discharge, patient will transfer in/out of a car     Dates: Start: 12/14/17       Description: 1) Individualized goal: Car transfer spc, modified independent at discharge  2) Interventions:  PT E Stim Attended, PT Gait Training, PT Therapeutic Exercises, PT Neuro Re-Ed/Balance and PT Therapeutic Activity                     Section completed by:  AVELINA Marie       Activities of Daily Living  Eating Initial:  5 - Standby Prompting/Supervision or Set-up  Eating Current:  5 - Standby Prompting/Supervision or Set-up   Eating Description:  Increased time, Modified diet, Supervision for safety, Verbal cueing  Grooming Initial:  5 - Standby Prompting/Supervision or Set-up  Grooming Current:  5 - Standby Prompting/Supervision or Set-up   Grooming Description:  Increased time, Supervision for safety, Set-up of equipment (Seated at sinkside)  Bathing Initial:  0 - Not tested, patient refused  Bathing Current:   5 - Standby Prompting/Supervision or Set-up   Bathing Description:  Grab bar, Tub bench, Long handled bath tool, Hand held shower, Increased time, Supervision for safety, Set-up of equipment, Initial preparation for task  Upper Body Dressing Initial:  3 - Moderate Assistance  Upper Body Dressing Current:  6 - Modified Independent   Upper Body Dressing Description:  Increased time (Don/doff pull over shirt)  Lower Body Dressing Initial:  3 - Moderate Assistance  Lower Body Dressing Current:  5 - Standby Prompting/Supervsion or Set-up   Lower Body Dressing Description:  5 - Standby Prompting/Supervsion or Set-up  Toileting Initial:  3 - Moderate Assistance  Toileting Current:  5 - Standby Prompting/Supervision or Set-up   Toileting Description:  Increased time, Supervision for safety  Toilet Transfer Initial:  4 - Minimal Assistance  Toilet Transfer Current:  4 - Minimal Assistance   Toilet Transfer Description:  4 - Minimal Assistance  Tub / Shower Transfer Initial:  0 - Not tested, patient refused  Tub / Shower Transfer Current:  5 - Standby Prompting/Supervision or Set-up   Tub / Shower Transfer Description:  Increased time, Supervision for safety, Grab bar (SPT to/from manual wc and shower chair via grab bar)  IADL:  TBD    Family Training/Education:  TBD   DME/DC Recommendations:  TBD     Strengths:  Independent PLOF, Motivated for self care and independence, Pleasant and cooperative and Willingly participates in therapeutic activities  Barriers:  Generalized weakness, Hemiplegia, Impulsive, Limited mobility and Poor balance     # of short term goals set= 3    # of short term goals met= 1          Occupational Therapy Goals           Problem: Dressing     Dates: Start: 12/14/17       Goal: STG-Within one week, patient will dress LB     Dates: Start: 12/18/17       Description: 1) Individualized Goal:  Mod Indep  2) Interventions:  OT Self Care/ADL, OT Neuro Re-Ed/Balance, OT Therapeutic Activity, OT Evaluation  and OT Therapeutic Exercise       Note:     Goal Note filed on 12/22/17 1125 by Casey Saldivar MS,OTR/L    Goal: STG-Within one week, patient will dress LB  Outcome: NOT MET  Pt at Supervision secondary right hemiparesis, impaired standing balance,   impulsivity                  Problem: IADL's     Dates: Start: 12/14/17       Goal: STG-Within one week, patient will access kitchen area     Dates: Start: 12/14/17       Description: 1) Individualized Goal:  Min assist w/ AE PRN  2) Interventions:  OT Self Care/ADL, OT Neuro Re-Ed/Balance, OT Therapeutic Activity, OT Evaluation and OT Therapeutic Exercise       Note:     Goal Note filed on 12/22/17 1125 by Casey Saldivar MS,OTR/L    Goal: STG-Within one week, patient will access kitchen area  Outcome: NOT MET  Activity to be attempted                  Problem: OT Long Term Goals     Dates: Start: 12/14/17       Goal: LTG-By discharge, patient will complete basic self care tasks     Dates: Start: 12/14/17       Description: 1) Individualized Goal:  Mod Indep w/ AE PRN  2) Interventions:  OT Self Care/ADL, OT Neuro Re-Ed/Balance, OT Therapeutic Activity, OT Evaluation and OT Therapeutic Exercise             Goal: LTG-By discharge, patient will perform bathroom transfers     Dates: Start: 12/14/17       Description: 1) Individualized Goal:  Mod Indep w/ AE PRN  2) Interventions:  OT Self Care/ADL, OT Neuro Re-Ed/Balance, OT Therapeutic Activity, OT Evaluation and OT Therapeutic Exercise                   Section completed by:  Casey Saldivar MS,OTR/L       Cognitive Linquistic Functions  Comprehension Initial:  5 - Stand-by Prompting/Supervision or Set-up  Comprehension Current:  5 - Stand-by Prompting/Supervision or Set-up   Comprehension Description:  Glasses, Verbal cues  Expression Initial:  5 - Stand-by Prompting/Supervision or Set-up  Expression Current:  4 - Minimal Assistance   Expression Description:  Verbal cueing  Social Interaction Initial:  6 - Modified  Independent  Social Interaction Current:  5 - Stand-by Prompting/Supervision or Set-up   Social Interaction Description:  Increased time, Verbal cues  Problem Solving Initial:  5 - Standby Prompting/Supervision or Set-up  Problem Solving Current:  4 - Minimal Assistance   Problem Solving Description:  Verbal cueing, Bed/chair alarm, Increased time  Memory Initial:  5 - Standby Prompting/Supervision or Set-up  Memory Current:  4 - Minimal Assistance   Memory Description:  Verbal cueing  Executive Functioning / Organization Initial:  Supervision (5)  Executive Functioning / Organization Current:  Supervision (5)   Executive Functioning Desciption:  Verbal cues   Swallowing  Swallowing Status:  MBSS completed on 12/18 showed silent aspiration of thin liquids, penetration with NTL, thin liquids and mechanical soft and mild pharyngeal residue.  Pt is currently tolerating dysphagia 3 textures with NTL's.  Regular texture trials in progress.    Orders Placed This Encounter   Procedures   • Diet Order     Standing Status:   Standing     Number of Occurrences:   1     Order Specific Question:   Diet:     Answer:   Diabetic [3]     Order Specific Question:   Diet:     Answer:   Cardiac [6]     Order Specific Question:   Texture/Fiber modifications:     Answer:   Dysphagia 3(Mechanical Soft)specify fluid consistency(question 6) [3]     Order Specific Question:   Consistency/Fluid modifications:     Answer:   Nectar Thick [2]     Behavior Modification Plan  Redirect to task/topic and Analyze tasks (break down into smaller steps)  Assistive Technology  Memory aides: and Low tech: Calendar, planner, schedule, alarms/timers, pill organizer, post-it notes, lists  Family Training/Education:  To be completed   DC Recommendations:   Home health ST   Strengths:  Independent PLOF, Making steady progress towards goals and Motivated for self care and independence  Barriers:  Aspiration risk and Other: poor insight into deficits, poor  compliance of swallowing/speech strategies, difficulty recalling new information   # of short term goals set=3  # of short term goals met=1       Speech Therapy Problems           Problem: Expression STGs     Dates: Start: 12/18/17       Goal: STG-Within one week, patient will     Dates: Start: 12/18/17       Description: 1) Individualized goal:  Implement speech intelligibility strategies given min cues in sentences to achieve 90% intelligibility.    2) Interventions:  SLP Speech Language Treatment and SLP Self Care / ADL Training        Note:     Goal Note filed on 12/22/17 0823 by Bahman Delgadillo MS,CCC-SLP    Goal: STG-Within one week, patient will  Outcome: NOT MET  Mod cues required for pt to implement intelligibility strategies                   Problem: Speech/Swallowing LTGs     Dates: Start: 12/14/17       Goal: LTG-By discharge, patient will safely swallow     Dates: Start: 12/14/17       Description: 1) Individualized goal:  The least restrictive diet independent for use of safe swallow strategies.   2) Interventions:  SLP Swallowing Dysfunction Treatment             Goal: LTG-By discharge, patient will solve complex problem     Dates: Start: 12/14/17       Description: 1) Individualized goal:  By utilizing compensatory intervention for memory and problem-solving to allow for safe completion of daily activities MOD I.  2) Interventions:  SLP Cognitive Skill Development               Problem: Swallowing STGs     Dates: Start: 12/14/17       Goal: STG-Within one week, patient will safely swallow     Dates: Start: 12/18/17       Description: 1) Individualized goal:  Safely swallow Dys 3/thin liquds with MIN cues for use of compensatory strategies.   2) Interventions:  SLP Swallowing Dysfunction Treatment, SLP Oral Pharyngeal Evaluation, SLP Video Swallow Evaluation and SLP Self Care / ADL Training        Note:     Goal Note filed on 12/22/17 0823 by Bahman Delgadillo MS,CCC-SLP    Goal: STG-Within one week,  "patient will safely swallow  Outcome: NOT MET  Pt is able to tolerate dysphagia 3 textures, but did silently aspirate   thin liquids during his MBSS                       Section completed by:  Bahman Delgadillo MS,CCC-SLP          Nutrition       Dietary Problems           Problem: Inadequate nutrient intake     Goal: Patient to consume greater than or equal to 50% of meals (Resolved)               Additional Information: Patient was seen today at bedside.      Patient stated that he is not happy with his diet . He does not like the meal presentation and states \"it looks like a bunch of stuff fell out of a truck onto his plate.\" Patient states that his ground meat is very repetitive and he hates the texture of the pureed foods and ground meat. Patient also stated he does not like nectar thick liquids, so therefore does not drink any fluids. Patient was advanced from a dysphagia 2 to a dysphagia 3 diet today. Hopefully that will improve his intake. Patient stated he likes [place]-style green beans and spinach.      Patient stated that he is having regular bowel movements and voiding. He stated his pee is yellow in color. No other GI issues noted.      Patient states he gets help with feeding in t-dine. He does not think he has difficulty swallowing, however patient remains on nectar thick diet. Does not fully understand the concept of working with the SLP.   Diet just upgraded to D3 today.  Will add Magic Cups with lunch and supper to aid in adequate nutrient/ fluid intake d/t pt disliking meat and disliking NTL fluids.     PO is adequate at this time.  RD following PRN as appropriate interventions are in place. Nutrition rep to aid in helping patient pick foods he likes.        Appetite: poor   Diet: diabetic, cardiac, dysphagia 3, nectar thick   Average PO intake x 3 days: 73%      Labs: glucose 111, BUN 55, creatinine 2.91  Medications: zyloprim, norvasc, lipitor, coreg, plavix, vitamin B12, vasotec, ferrous sulfate, " proscar, vitamin D3  PRN Medications: zofran PRN, bowel regimen PRN  IVF: none noted at this time      Height: 175.3 cm  Weight: 76.7 kg   IBW: 65.9 kg  BMI: 24.97 (normal)      Skin: intact  GI: BM 12/19  : noted   Vitals: bradycardia noted  I/Os: +485      Nutrient Needs:  Kcal: 1449 - 1597 kcal/day (19-21 kcal/kg)    BEE = 1449 x 1.1   Protein: 77 - 92 g/day (1 - 1.2 g/kg actual body weight)  Fluid: 2000 - 2200mL/day (26-29 mL/kg)        Diagnosis: Inadequate energy/fluid intake R/T poor appetite as evidence by patient report of <50% nutrient/fluid intake      Intervention/ Recommendations/POC:  1. Magic cup supplements   2. Encourage adequate PO/fluid intake.  3. Continue current diet. Diet upgrades per SLP  4. Nutrition rep to see regarding food prefs/ honor within dietary restrictions (if indicated)     Monitor/Evaluation: Monitor PO intake, weight, labs, medication adjustments, skin integrity, GI function, vitals, I/Os, and overall hydration status.  Adjust nutritional POC pending clinical outcomes.    RD following weekly.   Goal: Maintain >50% oral nutrient/fluid intake to promote nutrition optimization/healing.               Section completed by:  Vandana Chaney R.D.    REHAB-Pharmacy IDT Team Note by Nixon Harden RPH at 12/22/2017  9:00 AM  Version 1 of 1    Author:  Nixon Harden RPH Service:  (none) Author Type:  Pharmacist    Filed:  12/22/2017  9:01 AM Date of Service:  12/22/2017  9:00 AM Status:  Signed    :  Nixon Harden RPH (Pharmacist)         Pharmacy   Pharmacy  Antibiotics/Antifungals/Antivirals:  Medication:      Active Orders     None        Route:        NA  Stop Date:  NA  Reason:      NA   Antihypertensives/Cardiac:  Medication:    Orders (72h ago through future)    Start     Ordered    12/20/17 1730  carvedilol (COREG) tablet 6.25 mg  2 TIMES DAILY WITH MEALS      12/20/17 1100    12/19/17 0900  amlodipine (NORVASC) tablet 10 mg  DAILY      12/18/17 1151    12/14/17  1115  carvedilol (COREG) tablet 12.5 mg  2 TIMES DAILY WITH MEALS,   Status:  Discontinued      12/14/17 1047    12/13/17 2100  atorvastatin (LIPITOR) tablet 10 mg  EVERY EVENING      12/13/17 1656    12/13/17 1745  enalapril (VASOTEC) tablet 10 mg  TWICE DAILY      12/13/17 1720    12/13/17 1717  hydrALAZINE (APRESOLINE) tablet 25 mg  EVERY 8 HOURS PRN      12/13/17 1718        Patient Vitals for the past 24 hrs:   BP Pulse   12/22/17 0700 116/54 62   12/22/17 0459 120/53 (!) 56   12/21/17 2144 150/64 (!) 54   12/21/17 1849 (!) 81/44 (!) 50   12/21/17 1500 106/80 (!) 48       Anticoagulation:  Medication: Aspirin, Lovenox, Plavix    Other key medications: A review of the medication list reveals no issues at this time. Patient is currently on antihypertensive(s). Recommend home blood pressure monitoring/recording if antihypertensive(s) regimen(s) continue.    Section completed by: Nixon Harden [facility][[email or web address]]     Attribution Key     AW.1 - Nixon Harden [facility] on 12/22/2017  9:00 AM                    DC Planning  DC destination/dispostion:  Return to [place] second story [facility] with 16 entry stairs to [facility] where he lives alone. Patient has local support from daughter, Ekta, granddaughter, Lakesha and X-wife, Alexa.     Referrals: Anticipate home health services     DC Needs: DME  Follow up appointments with PCP - Kirill Fabian MD and Radiology - Dr. Kat     Barriers to discharge:  Lives alone, flight of stairs leading to condo     Strengths: Supportive family     Section completed by:  OLI AnnW, [facility]       Physician Summary  Huy Quick MD participated and led team conference discussion.

## 2017-12-22 NOTE — PROGRESS NOTES
"Rehab Progress Note     Encounter Date: 12/22/2017    CC: L CVA with right sided weakness    Interval Events (Subjective)  Patient sitting up in bed after therapy.  Patient's ex-wife and step-daughter at bedside. Answered all questions particularly about discharge and discharge medications that family had. Patient had +UA yesterday, will start on antibiotics.  Patient denies pain. Denies dysuria but +frequency. Denies N/V/D.      IDT Team Meeting 12/22/2017    Huy CORTEZ M.D., was present and led the interdisciplinary team conference on 12/22/2017.       RN:   Diet  Regular, whole in applesauce, ate salad   % Meal     Pain    Sleep    Bowel CGA - BM this am   Bladder continent   In's & Out's    160/80    PT:  Bed Mobility ind   Transfers SBA   Mobility 125 feet w CGA w SPC, limited due to LE pain; SBA in WC > 150 feet   Stairs CGA both rails   Limited by Pain and Balance    OT:  Eating Supervs   Grooming  Supervs   Bathing Supervs   UB Dressing  Supervs   LB Dressing    Toileting    Shower & Transfer De   Poor function with RUE    SLP:  Maryanne 3 NTL, trial of regulars -> upgrade tomorrow possibly. Concern for lack of compliance when discharge  Memory and cognitive mild-mod deficits  Speech intelligibility   T dine    CM:  Continues to work on disposition and DME needs.       DC/Disposition:  11/28/17 has entire set of stairs into condo    Objective:  VITAL SIGNS: /80   Pulse 60   Temp 36.4 °C (97.6 °F)   Resp 18   Ht 1.753 m (5' 9\")   Wt 76.7 kg (169 lb 1.5 oz)   SpO2 98%   BMI 24.97 kg/m²   Gen: NAD, dysarthric speech  Psych: Mood and affect normal  Resp: CTAB  CV: RRR, minimal edema R LE  Abd: NTND, BS+  Neuro: AOx3,  R Wrist flexor 4/5, R wrist extensor 3+/5,  Finger flexor 4/5    Recent Results (from the past 72 hour(s))   URINALYSIS    Collection Time: 12/22/17  5:00 AM   Result Value Ref Range    Color Yellow     Character Turbid (A)     Specific Gravity 1.017 <1.035    Ph " 5.0 5.0 - 8.0    Glucose Negative Negative mg/dL    Ketones Negative Negative mg/dL    Protein 100 (A) Negative mg/dL    Bilirubin Negative Negative    Urobilinogen, Urine 0.2 Negative    Nitrite Negative Negative    Leukocyte Esterase Large (A) Negative    Occult Blood Moderate (A) Negative    Micro Urine Req Microscopic    URINE MICROSCOPIC (W/UA)    Collection Time: 12/22/17  5:00 AM   Result Value Ref Range    WBC Packed (A) /hpf    RBC 10-20 (A) /hpf    Bacteria Few (A) None /hpf    Epithelial Cells Negative /hpf    Epithelial Cells Renal Few /hpf    Hyaline Cast 0-2 /lpf       Current Facility-Administered Medications   Medication Frequency   • carvedilol (COREG) tablet 6.25 mg BID WITH MEALS   • amlodipine (NORVASC) tablet 10 mg DAILY   • senna-docusate (PERICOLACE or SENOKOT S) 8.6-50 MG per tablet 2 Tab BID PRN    And   • polyethylene glycol/lytes (MIRALAX) PACKET 1 Packet QDAY PRN    And   • magnesium hydroxide (MILK OF MAGNESIA) suspension 30 mL QDAY PRN    And   • bisacodyl (DULCOLAX) suppository 10 mg QDAY PRN   • hydrocodone-acetaminophen (NORCO) 5-325 MG per tablet 1-2 Tab Q4HRS PRN   • finasteride (PROSCAR) tablet 10 mg DAILY   • aspirin (ASA) tablet 325 mg DAILY   • allopurinol (ZYLOPRIM) tablet 100 mg DAILY   • atorvastatin (LIPITOR) tablet 10 mg Q EVENING   • clopidogrel (PLAVIX) tablet 75 mg QAM   • cyanocobalamin (VITAMIN B12) tablet 1,000 mcg DAILY   • ferrous sulfate tablet 325 mg QAM   • vitamin D (cholecalciferol) tablet 4,000 Units DAILY   • Respiratory Care per Protocol Continuous RT   • Pharmacy Consult Request ...Pain Management Review 1 Each PRN   • acetaminophen (TYLENOL) tablet 650 mg Q4HRS PRN   • artificial tears 1.4 % ophthalmic solution 1 Drop PRN   • benzocaine-menthol (CEPACOL) lozenge 1 Lozenge Q2HRS PRN   • hydrALAZINE (APRESOLINE) tablet 25 mg Q8HRS PRN   • mag hydrox-al hydrox-simeth (MAALOX PLUS ES or MYLANTA DS) suspension 20 mL Q2HRS PRN   • ondansetron (ZOFRAN ODT)  dispertab 4 mg 4X/DAY PRN    Or   • ondansetron (ZOFRAN) syringe/vial injection 4 mg 4X/DAY PRN   • trazodone (DESYREL) tablet 50 mg QHS PRN   • sodium chloride (OCEAN) 0.65 % nasal spray 2 Spray PRN   • enalapril (VASOTEC) tablet 10 mg TWICE DAILY       Orders Placed This Encounter   Procedures   • Diet Order     Standing Status:   Standing     Number of Occurrences:   1     Order Specific Question:   Diet:     Answer:   Diabetic [3]     Order Specific Question:   Diet:     Answer:   Cardiac [6]     Order Specific Question:   Texture/Fiber modifications:     Answer:   Dysphagia 3(Mechanical Soft)specify fluid consistency(question 6) [3]     Order Specific Question:   Consistency/Fluid modifications:     Answer:   [place] Thick [2]       Assessment:  Active Hospital Problems    Diagnosis   • *Left sided cerebral hemisphere cerebrovascular accident (CVA) (CMS-HCC)   • CVA (cerebral vascular accident) (CMS-HCC)   • Acute on Chronic blood loss anemia   • Vitamin B12 deficiency   • Lung cancer (CMS-[facility])   • Cardiomyopathy (CMS-[facility])   • Thrombocytopenia (CMS-[facility])   • Dysphagia due to recent cerebrovascular accident (CVA)   • Chronic combined systolic and diastolic CHF (congestive heart failure) (CMS-HCC)   • CKD (chronic kidney disease) stage 3, GFR 30-59 ml/min   • COPD (chronic obstructive pulmonary disease) (CMS-[facility])   • HTN (hypertension)   • DM (diabetes mellitus) (CMS-[facility])   • Dyslipidemia   • BPH (benign prostatic hyperplasia)       Medical Decision Making and Plan:  CVA - Patient with new left sided CVA with R sided weakness, dysphagia and dysarthria.  Patient previously with bilateral strokes but improved to baseline.    -Continue  mg, Plavix 75 mg  -Continue Good BP and DLD control (see below)  -Repeat MBS for dysphagia on 12/18/17.  Will trial Regular diet on 12/21/17 with SLP     RLL Lung Adenocarcinoma - Evaluated by Radiation oncology while at [facility].  Recommending radiation therapy in  January.  -Follow-up with Radiation oncology post-discharge     HTN - Patient with longstanding HTN as well as CHF.  Patient previously on Coreg, Vasotec and Lasix.  All medications were stopped acutely for permissive hypertension. Since admission continued elevated BP  -Restart Enalapril 10 mg BID, Coreg 12.5 mg BID. Continue to monitor as was labile prior to restarting medications.  -Added parameters for Coreg for HR.  Added Amlodipine and increased to 10 mg on 12/18/17. Much better control on 11/18-11/19.  With bracycardia decrease Coreg to 6.25 mg BID. HR still in 50's     DLD - Patient on statin at baseline.  Cholesterol WNL on acute check.  -Continue Atorvastatin 10 mg QHS     BPH - Patient on Proscar at baseline  -Continue Proscar 10 mg daily.     Neurogenic Bowel  - Patient would like all medications made PRN.    UTI - Patient with frequency and discomfort. +UA with UCx pending  -Start Ciprofloxacin BID for 7 days    COPD - No exacerbation since stroke.  Continue on home medications  -Patient declining Spiriva. Held     B12 Deficiency - Found during acute stay.  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     Gout - Pt reports being on allopurinol for years.   -Continue Alllopurinol 100 mg daily     Vitamin D Deficiency - Reported deficiency but normal on admission labs. Will continue supplementation  -Cholecalciferol 4000 U daily     DVT Prophlyaxis - Patient ambulating sufficiently.  Continue TEDs in AM    Total time:  > 35 minutes.  I spent greater than 50% of the time for patient care and coordination on this date, including unit/floor time, and face-to-face time with the patient as per assessment and plan above. Discussed discharge plan with family members. Patient was discussed separately in IDT today; please see details above      Huy Quick M.D.

## 2017-12-22 NOTE — REHAB-SLP IDT TEAM NOTE
Speech Therapy   Cognitive Linquistic Functions  Comprehension Initial:  5 - Stand-by Prompting/Supervision or Set-up  Comprehension Current:  5 - Stand-by Prompting/Supervision or Set-up   Comprehension Description:  Glasses, Verbal cues  Expression Initial:  5 - Stand-by Prompting/Supervision or Set-up  Expression Current:  4 - Minimal Assistance   Expression Description:  Verbal cueing  Social Interaction Initial:  6 - Modified Independent  Social Interaction Current:  5 - Stand-by Prompting/Supervision or Set-up   Social Interaction Description:  Increased time, Verbal cues  Problem Solving Initial:  5 - Standby Prompting/Supervision or Set-up  Problem Solving Current:  4 - Minimal Assistance   Problem Solving Description:  Verbal cueing, Bed/chair alarm, Increased time  Memory Initial:  5 - Standby Prompting/Supervision or Set-up  Memory Current:  4 - Minimal Assistance   Memory Description:  Verbal cueing  Executive Functioning / Organization Initial:  Supervision (5)  Executive Functioning / Organization Current:  Supervision (5)   Executive Functioning Desciption:  Verbal cues   Swallowing  Swallowing Status:  MBSS completed on 12/18 showed silent aspiration of thin liquids, penetration with NTL, thin liquids and mechanical soft and mild pharyngeal residue.  Pt is currently tolerating dysphagia 3 textures with NTL's.  Regular texture trials in progress.    Orders Placed This Encounter   Procedures   • Diet Order     Standing Status:   Standing     Number of Occurrences:   1     Order Specific Question:   Diet:     Answer:   Diabetic [3]     Order Specific Question:   Diet:     Answer:   Cardiac [6]     Order Specific Question:   Texture/Fiber modifications:     Answer:   Dysphagia 3(Mechanical Soft)specify fluid consistency(question 6) [3]     Order Specific Question:   Consistency/Fluid modifications:     Answer:   [place] Thick [2]     Behavior Modification Plan  Redirect to task/topic and Analyze tasks  (break down into smaller steps)  Assistive Technology  Memory aides: and Low tech: Calendar, planner, schedule, alarms/timers, pill organizer, post-it notes, lists  Family Training/Education:  To be completed   DC Recommendations:   Home health ST   Strengths:  Independent PLOF, Making steady progress towards goals and Motivated for self care and independence  Barriers:  Aspiration risk and Other: poor insight into deficits, poor compliance of swallowing/speech strategies, difficulty recalling new information   # of short term goals set=3  # of short term goals met=1       Speech Therapy Problems           Problem: Expression STGs     Dates: Start: 12/18/17       Goal: STG-Within one week, patient will     Dates: Start: 12/18/17       Description: 1) Individualized goal:  Implement speech intelligibility strategies given min cues in sentences to achieve 90% intelligibility.    2) Interventions:  SLP Speech Language Treatment and SLP Self Care / ADL Training        Note:     Goal Note filed on 12/22/17 0823 by Bahman Delgadillo MS,CCC-SLP    Goal: STG-Within one week, patient will  Outcome: NOT MET  Mod cues required for pt to implement intelligibility strategies                   Problem: Speech/Swallowing LTGs     Dates: Start: 12/14/17       Goal: LTG-By discharge, patient will safely swallow     Dates: Start: 12/14/17       Description: 1) Individualized goal:  The least restrictive diet independent for use of safe swallow strategies.   2) Interventions:  SLP Swallowing Dysfunction Treatment             Goal: LTG-By discharge, patient will solve complex problem     Dates: Start: 12/14/17       Description: 1) Individualized goal:  By utilizing compensatory intervention for memory and problem-solving to allow for safe completion of daily activities MOD I.  2) Interventions:  SLP Cognitive Skill Development               Problem: Swallowing STGs     Dates: Start: 12/14/17       Goal: STG-Within one week, patient will  safely swallow     Dates: Start: 12/18/17       Description: 1) Individualized goal:  Safely swallow Dys 3/thin liquds with MIN cues for use of compensatory strategies.   2) Interventions:  SLP Swallowing Dysfunction Treatment, SLP Oral Pharyngeal Evaluation, SLP Video Swallow Evaluation and SLP Self Care / ADL Training        Note:     Goal Note filed on 12/22/17 0823 by Bahman Delgadillo MS,CCC-SLP    Goal: STG-Within one week, patient will safely swallow  Outcome: NOT MET  Pt is able to tolerate dysphagia 3 textures, but did silently aspirate   thin liquids during his MBSS                       Section completed by:  Bahman Delgadillo MS,CCC-SLP

## 2017-12-22 NOTE — REHAB-CM IDT TEAM NOTE
Case Management    DC Planning  DC destination/dispostion:  Return to [place] second story condo with 16 entry stairs to condo where he lives alone. Patient has local support from daughter, Ekta, granddaughter, Lakesha and X-wife, Alexa.     Referrals: Anticipate home health services     DC Needs: DME  Follow up appointments with PCP - Kirill Fabian MD and Radiology - Dr. Kat     Barriers to discharge:  Lives alone, flight of stairs leading to condo     Strengths: Supportive family     Section completed by:  FAREED Ann, CCM

## 2017-12-22 NOTE — CARE PLAN
Problem: Balance  Goal: STG-Within one week, patient will maintain dynamic standing  1) Individualized goal: Maintain dynamic standing balance for transfers and gait sba, one week  2) Interventions:  PT E Stim Attended, PT Gait Training, PT Therapeutic Exercises, PT Neuro Re-Ed/Balance and PT Therapeutic Activity     Outcome: NOT MET  Intermittent cga    Problem: Mobility  Goal: STG-Within one week, patient will ascend and descend four to six stairs  1) Individualized goal: Up/down 4-6 stairs railings, sba one week  2) Interventions:  PT E Stim Attended, PT Gait Training, PT Therapeutic Exercises, PT Neuro Re-Ed/Balance and PT Therapeutic Activity     Outcome: NOT MET  cga  Goal: STG-Within one week, patient will  1) Individualized goal:  Gait spc or no device sba 250 ft one week  2) Interventions:  PT E Stim Attended, PT Gait Training, PT Therapeutic Exercises, PT Neuro Re-Ed/Balance and PT Therapeutic Activity     Outcome: NOT MET  125 to 150 ft cga    Problem: Mobility Transfers  Goal: STG-Within one week, patient will transfer bed to chair  1) Individualized goal: Transfer bed to/from chair supervision one week  2) Interventions:  PT E Stim Attended, PT Gait Training, PT Therapeutic Exercises, PT Neuro Re-Ed/Balance and PT Therapeutic Activity     Outcome: MET Date Met: 12/21/17

## 2017-12-22 NOTE — CARE PLAN
Problem: Safety  Goal: Will remain free from injury  Continue to reinforce the importance of safety, calling for assistance when needing to use bathroom or urinal, belongings within reach , bed in low, locked position with alarms on. Will continue to round frequently on patient.    Problem: Pain Management  Goal: Pain level will decrease to patient's comfort goal  Patient able to verbalize needs.  Denies pain or discomfort this shift and no s/s same noted.  Will continue to monitor.

## 2017-12-22 NOTE — CARE PLAN
Problem: Dressing  Goal: STG-Within one week, patient will dress UB  1) Individualized Goal: Mod Indep to retrieve and don/doff upper body clothing  2) Interventions:  OT Self Care/ADL, OT Neuro Re-Ed/Balance, OT Therapeutic Activity, OT Evaluation and OT Therapeutic Exercise     Outcome: MET Date Met: 12/22/17  Pt at Mod Indep  Goal: STG-Within one week, patient will dress LB  1) Individualized Goal:  Mod Indep  2) Interventions:  OT Self Care/ADL, OT Neuro Re-Ed/Balance, OT Therapeutic Activity, OT Evaluation and OT Therapeutic Exercise     Outcome: NOT MET  Pt at Supervision secondary right hemiparesis, impaired standing balance, impulsivity    Problem: IADL's  Goal: STG-Within one week, patient will access kitchen area  1) Individualized Goal:  Min assist w/ AE PRN  2) Interventions:  OT Self Care/ADL, OT Neuro Re-Ed/Balance, OT Therapeutic Activity, OT Evaluation and OT Therapeutic Exercise     Outcome: NOT MET  Activity to be attempted

## 2017-12-22 NOTE — PROGRESS NOTES
DATE OF SERVICE:  12/20/2017    Patient was seen for followup visit.  Patient is doing well.  He is meeting   all of his goals.  He has a fairly good attitude.  He seems realistic in his   assessment of his deficits.  Patient reported he has improved in his strength,   balance and coordination.    This session focused on the patient's return home and what realistically he   can expect as far as his limitations.  This included a discussion of his   driving and the importance of making sure that he is realistic and what he can   and cannot do safely.       ____________________________________     SAY CHOI, PHD    JOSSELINE / DILLON    DD:  12/22/2017 09:18:59  DT:  12/22/2017 09:53:34    D#:  [id number]  Job#:  [id number]

## 2017-12-22 NOTE — CARE PLAN
Problem: Swallowing STGs  Goal: STG-Within one week, patient will  1) Individualized goal:  Participate in instrumental swallow assessment (MBSS) to r/o silent aspiration and readiness for diet advancement.   2) Interventions:  SLP Video Swallow Evaluation     Outcome: MET Date Met: 12/22/17  MBSS completed   Goal: STG-Within one week, patient will safely swallow  1) Individualized goal:  Safely swallow Dys 3/thin liquds with MIN cues for use of compensatory strategies.   2) Interventions:  SLP Swallowing Dysfunction Treatment, SLP Oral Pharyngeal Evaluation, SLP Video Swallow Evaluation and SLP Self Care / ADL Training      Outcome: NOT MET  Pt is able to tolerate dysphagia 3 textures, but did silently aspirate thin liquids during his MBSS    Problem: Expression STGs  Goal: STG-Within one week, patient will  1) Individualized goal:  Implement speech intelligibility strategies given min cues in sentences to achieve 90% intelligibility.    2) Interventions:  SLP Speech Language Treatment and SLP Self Care / ADL Training      Outcome: NOT MET  Mod cues required for pt to implement intelligibility strategies

## 2017-12-22 NOTE — REHAB-PHARMACY IDT TEAM NOTE
Pharmacy   Pharmacy  Antibiotics/Antifungals/Antivirals:  Medication:      Active Orders     None        Route:        NA  Stop Date:  NA  Reason:      NA   Antihypertensives/Cardiac:  Medication:    Orders (72h ago through future)    Start     Ordered    12/20/17 1730  carvedilol (COREG) tablet 6.25 mg  2 TIMES DAILY WITH MEALS      12/20/17 1100    12/19/17 0900  amlodipine (NORVASC) tablet 10 mg  DAILY      12/18/17 1151    12/14/17 1115  carvedilol (COREG) tablet 12.5 mg  2 TIMES DAILY WITH MEALS,   Status:  Discontinued      12/14/17 1047    12/13/17 2100  atorvastatin (LIPITOR) tablet 10 mg  EVERY EVENING      12/13/17 1656    12/13/17 1745  enalapril (VASOTEC) tablet 10 mg  TWICE DAILY      12/13/17 1720    12/13/17 1717  hydrALAZINE (APRESOLINE) tablet 25 mg  EVERY 8 HOURS PRN      12/13/17 1718        Patient Vitals for the past 24 hrs:   BP Pulse   12/22/17 0700 116/54 62   12/22/17 0459 120/53 (!) 56   12/21/17 2144 150/64 (!) 54   12/21/17 1849 (!) 81/44 (!) 50   12/21/17 1500 106/80 (!) 48       Anticoagulation:  Medication: Aspirin, Lovenox, Plavix    Other key medications: A review of the medication list reveals no issues at this time. Patient is currently on antihypertensive(s). Recommend home blood pressure monitoring/recording if antihypertensive(s) regimen(s) continue.    Section completed by: Nixon Harden [facility]

## 2017-12-23 PROCEDURE — 700102 HCHG RX REV CODE 250 W/ 637 OVERRIDE(OP): Performed by: PHYSICAL MEDICINE & REHABILITATION

## 2017-12-23 PROCEDURE — A9270 NON-COVERED ITEM OR SERVICE: HCPCS | Performed by: PHYSICAL MEDICINE & REHABILITATION

## 2017-12-23 PROCEDURE — 99232 SBSQ HOSP IP/OBS MODERATE 35: CPT | Performed by: PHYSICAL MEDICINE & REHABILITATION

## 2017-12-23 PROCEDURE — 770010 HCHG ROOM/CARE - REHAB SEMI PRIVAT*

## 2017-12-23 RX ORDER — PHENAZOPYRIDINE HYDROCHLORIDE 200 MG/1
200 TABLET, FILM COATED ORAL
Status: DISPENSED | OUTPATIENT
Start: 2017-12-23 | End: 2017-12-25

## 2017-12-23 RX ADMIN — CIPROFLOXACIN HYDROCHLORIDE 250 MG: 500 TABLET, FILM COATED ORAL at 11:16

## 2017-12-23 RX ADMIN — FERROUS SULFATE TAB 325 MG (65 MG ELEMENTAL FE) 325 MG: 325 (65 FE) TAB at 11:16

## 2017-12-23 RX ADMIN — ALLOPURINOL 100 MG: 100 TABLET ORAL at 11:16

## 2017-12-23 RX ADMIN — FINASTERIDE 10 MG: 5 TABLET, FILM COATED ORAL at 11:15

## 2017-12-23 RX ADMIN — ATORVASTATIN CALCIUM 10 MG: 10 TABLET, FILM COATED ORAL at 20:06

## 2017-12-23 RX ADMIN — ENALAPRIL MALEATE 10 MG: 5 TABLET ORAL at 18:00

## 2017-12-23 RX ADMIN — AMLODIPINE BESYLATE 10 MG: 5 TABLET ORAL at 11:17

## 2017-12-23 RX ADMIN — ENALAPRIL MALEATE 10 MG: 5 TABLET ORAL at 05:05

## 2017-12-23 RX ADMIN — CARVEDILOL 6.25 MG: 3.12 TABLET, FILM COATED ORAL at 17:56

## 2017-12-23 RX ADMIN — CIPROFLOXACIN HYDROCHLORIDE 250 MG: 500 TABLET, FILM COATED ORAL at 20:06

## 2017-12-23 RX ADMIN — PHENAZOPYRIDINE HYDROCHLORIDE 200 MG: 200 TABLET ORAL at 17:55

## 2017-12-23 RX ADMIN — ASPIRIN 325 MG: 325 TABLET, COATED ORAL at 11:15

## 2017-12-23 RX ADMIN — CHOLECALCIFEROL TAB 25 MCG (1000 UNIT) 4000 UNITS: 25 TAB at 11:14

## 2017-12-23 RX ADMIN — CLOPIDOGREL BISULFATE 75 MG: 75 TABLET ORAL at 11:15

## 2017-12-23 ASSESSMENT — PAIN SCALES - GENERAL: PAINLEVEL_OUTOF10: 0

## 2017-12-23 NOTE — PROGRESS NOTES
Bed alarm sounding and observed pt walking to bathroom. Assisted into bathroom as refusing to use wc. Lost balance when trying to lower pants but I was able to steady him. Angry about my intervention and refused to try and urinate. Noted positive UA and order received for urine culture.

## 2017-12-23 NOTE — PROGRESS NOTES
"Rehab Progress Note     Encounter Date: 12/23/2017    CC: L CVA with right sided weakness, confusion this morning.    Interval Events (Subjective)  Mr. Harden is doing well today. By the time of my examination, he is oriented to his current situation. He denies any recollection of feeling confused. He does report feeling slight dysuria today. He reports that his urine does have a strange smell and appears darker than usual. He denies any subjective fevers or chills today. He feels he is making some progress in therapy.        Objective:  VITAL SIGNS: /67   Pulse (!) 53   Temp 36.7 °C (98 °F)   Resp 20   Ht 1.753 m (5' 9\")   Wt 76.7 kg (169 lb 1.5 oz)   SpO2 100%   BMI 24.97 kg/m²   Gen: Exaggerated startle response, dysarthric speech, tangential   Psych: Mood and affect normal  Resp: Clear to auscultation bilaterally  CV: Somewhat bradycardic with a pulse rate in the high 50s on my examination  Abd: Soft, nontender, nondistended, bowel sounds are present and normoactive  Neuro: Awake, alert, and oriented to person place, and general situation.      Recent Results (from the past 72 hour(s))   URINALYSIS    Collection Time: 12/22/17  5:00 AM   Result Value Ref Range    Color Yellow     Character Turbid (A)     Specific Gravity 1.017 <1.035    Ph 5.0 5.0 - 8.0    Glucose Negative Negative mg/dL    Ketones Negative Negative mg/dL    Protein 100 (A) Negative mg/dL    Bilirubin Negative Negative    Urobilinogen, Urine 0.2 Negative    Nitrite Negative Negative    Leukocyte Esterase Large (A) Negative    Occult Blood Moderate (A) Negative    Micro Urine Req Microscopic    URINE MICROSCOPIC (W/UA)    Collection Time: 12/22/17  5:00 AM   Result Value Ref Range    WBC Packed (A) /hpf    RBC 10-20 (A) /hpf    Bacteria Few (A) None /hpf    Epithelial Cells Negative /hpf    Epithelial Cells Renal Few /hpf    Hyaline Cast 0-2 /lpf       Current Facility-Administered Medications   Medication Frequency   • ciprofloxacin " (CIPRO) tablet 250 mg Q12HRS   • carvedilol (COREG) tablet 6.25 mg BID WITH MEALS   • amlodipine (NORVASC) tablet 10 mg DAILY   • senna-docusate (PERICOLACE or SENOKOT S) 8.6-50 MG per tablet 2 Tab BID PRN    And   • polyethylene glycol/lytes (MIRALAX) PACKET 1 Packet QDAY PRN    And   • magnesium hydroxide (MILK OF MAGNESIA) suspension 30 mL QDAY PRN    And   • bisacodyl (DULCOLAX) suppository 10 mg QDAY PRN   • hydrocodone-acetaminophen (NORCO) 5-325 MG per tablet 1-2 Tab Q4HRS PRN   • finasteride (PROSCAR) tablet 10 mg DAILY   • aspirin (ASA) tablet 325 mg DAILY   • allopurinol (ZYLOPRIM) tablet 100 mg DAILY   • atorvastatin (LIPITOR) tablet 10 mg Q EVENING   • clopidogrel (PLAVIX) tablet 75 mg QAM   • cyanocobalamin (VITAMIN B12) tablet 1,000 mcg DAILY   • ferrous sulfate tablet 325 mg QAM   • vitamin D (cholecalciferol) tablet 4,000 Units DAILY   • Respiratory Care per Protocol Continuous RT   • Pharmacy Consult Request ...Pain Management Review 1 Each PRN   • acetaminophen (TYLENOL) tablet 650 mg Q4HRS PRN   • artificial tears 1.4 % ophthalmic solution 1 Drop PRN   • benzocaine-menthol (CEPACOL) lozenge 1 Lozenge Q2HRS PRN   • hydrALAZINE (APRESOLINE) tablet 25 mg Q8HRS PRN   • mag hydrox-al hydrox-simeth (MAALOX PLUS ES or MYLANTA DS) suspension 20 mL Q2HRS PRN   • ondansetron (ZOFRAN ODT) dispertab 4 mg 4X/DAY PRN    Or   • ondansetron (ZOFRAN) syringe/vial injection 4 mg 4X/DAY PRN   • trazodone (DESYREL) tablet 50 mg QHS PRN   • sodium chloride (OCEAN) 0.65 % nasal spray 2 Spray PRN   • enalapril (VASOTEC) tablet 10 mg TWICE DAILY       Orders Placed This Encounter   Procedures   • Diet Order     Standing Status:   Standing     Number of Occurrences:   1     Order Specific Question:   Diet:     Answer:   Diabetic [3]     Comments:   regular textures     Order Specific Question:   Diet:     Answer:   Cardiac [6]     Order Specific Question:   Consistency/Fluid modifications:     Answer:   [place] Thick [2]        Assessment:  Active Hospital Problems    Diagnosis   • *Left sided cerebral hemisphere cerebrovascular accident (CVA) (CMS-[facility])   • CVA (cerebral vascular accident) (CMS-[facility])   • Acute on Chronic blood loss anemia   • Vitamin B12 deficiency   • Lung cancer (CMS-[facility])   • Cardiomyopathy (CMS-[facility])   • Thrombocytopenia (CMS-[facility])   • Dysphagia due to recent cerebrovascular accident (CVA)   • Chronic combined systolic and diastolic CHF (congestive heart failure) (CMS-[facility])   • CKD (chronic kidney disease) stage 3, GFR 30-59 ml/min   • COPD (chronic obstructive pulmonary disease) (CMS-[facility])   • HTN (hypertension)   • DM (diabetes mellitus) (CMS-[facility])   • Dyslipidemia   • BPH (benign prostatic hyperplasia)       Medical Decision Making and Plan:  CVA - Patient with new left sided CVA with R sided weakness, dysphagia and dysarthria.  Patient previously with bilateral strokes but improved to baseline.    -Continue  mg, Plavix 75 mg  -Continue Good BP and DLD control (see below)  -Repeat MBS for dysphagia on 12/18/17.Trialing diet with speech     RLL Lung Adenocarcinoma - Evaluated by Radiation oncology while at [facility].  Recommending radiation therapy in January.  -Follow-up with Radiation oncology post-discharge     HTN - Patient with longstanding HTN as well as CHF.  Patient previously on Coreg, Vasotec and Lasix.  All medications were stopped acutely for permissive hypertension. Since admission continued elevated BP  -Enalapril 10 mg BID  Norvasc 10 mg daily  Coreg 6.25 mg daily due to bradycardia  Hydralazine as needed  Blood pressure relatively stable today at 135/67     DLD - Patient on statin at baseline.  Cholesterol WNL on acute check.  -Continue Atorvastatin 10 mg QHS     BPH - Patient on Proscar at baseline  -Continue Proscar 10 mg daily.     Neurogenic Bowel  - Patient would like all medications made PRN.    UTI - Patient with frequency and discomfort.   Positive UA on 12/22  -Currently being treated with  Ciprofloxacin BID for 7 days (last dose 12/28)  Culture ordered    COPD - No exacerbation since stroke.  Continue on home medications  -Patient declining Spiriva. Held     B12 Deficiency - Found during acute stay.  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     Gout - Pt reports being on allopurinol for years.   -Continue Alllopurinol 100 mg daily     Vitamin D Deficiency - Reported deficiency but normal on admission labs. Will continue supplementation  -Cholecalciferol 4000 U daily     DVT Prophlyaxis - Patient ambulating sufficiently.  Continue TEDs in AM    Total time:  > 25 minutes.  I spent greater than 50% of the time for patient care and coordination on this date, including unit/floor time, and face-to-face time with the patient as per assessment and plan above. Discussed urinary tract infection and antibiotics    Emir Villalobos M.D.

## 2017-12-23 NOTE — DISCHARGE PLANNING
Updated patient and family regarding Team Conference today. Targeted discharge remains 12/28/17. [facility] chosen for recommended home health services, RN/PT/OT/ST and referral in process. DME TBD next week.

## 2017-12-23 NOTE — PROGRESS NOTES
Pt awake found in bathroom alone. Very unsteady. Assisted with pants and pt tried to void in toilet. Upset with us being in there with him, sat back into wheel chair refusing to pull pants back up or sit on toilet to void, with promise of privacy. Remains in room as above no void and pants still down refusing assistance from anyone so far this morning.

## 2017-12-23 NOTE — CARE PLAN
Problem: Bowel/Gastric:  Goal: Normal bowel function is maintained or improved  Patient having regular bowel movements; last BM 12/22.  Denies s/s constipation; bowel meds available if needed.  Will continue to monitor.    Problem: Urinary Elimination:  Goal: Ability to reestablish a normal urinary elimination pattern will improve  Pt taking cipro after positive UA having less frequency and urgency tonight than previous night, denies flank pain, VSS. Will continue to monitor.

## 2017-12-24 PROCEDURE — A9270 NON-COVERED ITEM OR SERVICE: HCPCS | Performed by: PHYSICAL MEDICINE & REHABILITATION

## 2017-12-24 PROCEDURE — 770010 HCHG ROOM/CARE - REHAB SEMI PRIVAT*

## 2017-12-24 PROCEDURE — 700102 HCHG RX REV CODE 250 W/ 637 OVERRIDE(OP): Performed by: PHYSICAL MEDICINE & REHABILITATION

## 2017-12-24 PROCEDURE — 97116 GAIT TRAINING THERAPY: CPT

## 2017-12-24 PROCEDURE — 92526 ORAL FUNCTION THERAPY: CPT

## 2017-12-24 PROCEDURE — 97112 NEUROMUSCULAR REEDUCATION: CPT

## 2017-12-24 PROCEDURE — 97110 THERAPEUTIC EXERCISES: CPT

## 2017-12-24 PROCEDURE — 92507 TX SP LANG VOICE COMM INDIV: CPT

## 2017-12-24 PROCEDURE — 97535 SELF CARE MNGMENT TRAINING: CPT

## 2017-12-24 RX ADMIN — FERROUS SULFATE TAB 325 MG (65 MG ELEMENTAL FE) 325 MG: 325 (65 FE) TAB at 09:09

## 2017-12-24 RX ADMIN — AMLODIPINE BESYLATE 10 MG: 5 TABLET ORAL at 09:12

## 2017-12-24 RX ADMIN — ENALAPRIL MALEATE 10 MG: 5 TABLET ORAL at 05:45

## 2017-12-24 RX ADMIN — CLOPIDOGREL BISULFATE 75 MG: 75 TABLET ORAL at 09:09

## 2017-12-24 RX ADMIN — FINASTERIDE 10 MG: 5 TABLET, FILM COATED ORAL at 09:10

## 2017-12-24 RX ADMIN — CHOLECALCIFEROL TAB 25 MCG (1000 UNIT) 4000 UNITS: 25 TAB at 09:09

## 2017-12-24 RX ADMIN — CIPROFLOXACIN HYDROCHLORIDE 250 MG: 500 TABLET, FILM COATED ORAL at 22:32

## 2017-12-24 RX ADMIN — CIPROFLOXACIN HYDROCHLORIDE 250 MG: 500 TABLET, FILM COATED ORAL at 09:10

## 2017-12-24 RX ADMIN — ENALAPRIL MALEATE 10 MG: 5 TABLET ORAL at 17:06

## 2017-12-24 RX ADMIN — TRAZODONE HYDROCHLORIDE 50 MG: 50 TABLET ORAL at 22:32

## 2017-12-24 RX ADMIN — PHENAZOPYRIDINE HYDROCHLORIDE 200 MG: 200 TABLET ORAL at 08:00

## 2017-12-24 RX ADMIN — ALLOPURINOL 100 MG: 100 TABLET ORAL at 09:11

## 2017-12-24 RX ADMIN — PHENAZOPYRIDINE HYDROCHLORIDE 200 MG: 200 TABLET ORAL at 17:30

## 2017-12-24 RX ADMIN — ASPIRIN 325 MG: 325 TABLET, COATED ORAL at 09:09

## 2017-12-24 RX ADMIN — ATORVASTATIN CALCIUM 10 MG: 10 TABLET, FILM COATED ORAL at 22:32

## 2017-12-24 RX ADMIN — CARVEDILOL 6.25 MG: 3.12 TABLET, FILM COATED ORAL at 17:06

## 2017-12-24 ASSESSMENT — GAIT ASSESSMENTS
ASSISTIVE DEVICE: SINGLE POINT CANE
DISTANCE (FEET): 125
GAIT LEVEL OF ASSIST: CONTACT GUARD ASSIST

## 2017-12-24 ASSESSMENT — PAIN SCALES - GENERAL: PAINLEVEL_OUTOF10: 0

## 2017-12-24 NOTE — CARE PLAN
Problem: Safety  Goal: Will remain free from injury  Outcome: PROGRESSING AS EXPECTED  Nearly fell first thing this morning to bathroom alone charge nurse was able to 'catch' and stabilize pt. Reinforced pt needs to call for assistance,  Not to get up with out assistance. Bed alarm and chair alarms on and working. Pt will need consistent reminders. Room is close to nurses station

## 2017-12-24 NOTE — CARE PLAN
Problem: Safety  Goal: Will remain free from injury  RN reinforced education about safety and fall prevention, discussed when and how to use the call light, explained to patient he is not to get up without assistance. Call light and belongings within reach, bed in low locked position with alarms on. Will continue to frequently round on patient.     Problem: Urinary Elimination:  Goal: Ability to reestablish a normal urinary elimination pattern will improve  Patient on pyridium and cipro to manage UTI, discussed with patient that side effect of pyridium, and that orange urine is expected. RN also discussed patient standing while voiding to empty his bladder fully. Will continue to monitor.

## 2017-12-25 LAB
BACTERIA UR CULT: NORMAL
SIGNIFICANT IND 70042: NORMAL
SITE SITE: NORMAL
SOURCE SOURCE: NORMAL

## 2017-12-25 PROCEDURE — 700111 HCHG RX REV CODE 636 W/ 250 OVERRIDE (IP): Performed by: PHYSICAL MEDICINE & REHABILITATION

## 2017-12-25 PROCEDURE — 92507 TX SP LANG VOICE COMM INDIV: CPT

## 2017-12-25 PROCEDURE — 97112 NEUROMUSCULAR REEDUCATION: CPT

## 2017-12-25 PROCEDURE — 97530 THERAPEUTIC ACTIVITIES: CPT

## 2017-12-25 PROCEDURE — 97110 THERAPEUTIC EXERCISES: CPT

## 2017-12-25 PROCEDURE — 770010 HCHG ROOM/CARE - REHAB SEMI PRIVAT*

## 2017-12-25 PROCEDURE — A9270 NON-COVERED ITEM OR SERVICE: HCPCS | Performed by: PHYSICAL MEDICINE & REHABILITATION

## 2017-12-25 PROCEDURE — 700102 HCHG RX REV CODE 250 W/ 637 OVERRIDE(OP): Performed by: PHYSICAL MEDICINE & REHABILITATION

## 2017-12-25 RX ADMIN — FINASTERIDE 10 MG: 5 TABLET, FILM COATED ORAL at 09:30

## 2017-12-25 RX ADMIN — ATORVASTATIN CALCIUM 10 MG: 10 TABLET, FILM COATED ORAL at 20:06

## 2017-12-25 RX ADMIN — AMLODIPINE BESYLATE 10 MG: 5 TABLET ORAL at 09:33

## 2017-12-25 RX ADMIN — DOCUSATE SODIUM AND SENNOSIDES 2 TABLET: 8.6; 5 TABLET, FILM COATED ORAL at 05:37

## 2017-12-25 RX ADMIN — ALLOPURINOL 100 MG: 100 TABLET ORAL at 09:31

## 2017-12-25 RX ADMIN — CHOLECALCIFEROL TAB 25 MCG (1000 UNIT) 4000 UNITS: 25 TAB at 09:30

## 2017-12-25 RX ADMIN — CLOPIDOGREL BISULFATE 75 MG: 75 TABLET ORAL at 09:31

## 2017-12-25 RX ADMIN — PHENAZOPYRIDINE HYDROCHLORIDE 200 MG: 200 TABLET ORAL at 09:37

## 2017-12-25 RX ADMIN — ONDANSETRON 4 MG: 4 TABLET, ORALLY DISINTEGRATING ORAL at 13:10

## 2017-12-25 RX ADMIN — ENALAPRIL MALEATE 10 MG: 5 TABLET ORAL at 05:37

## 2017-12-25 RX ADMIN — ASPIRIN 325 MG: 325 TABLET, COATED ORAL at 09:31

## 2017-12-25 RX ADMIN — HYDROCODONE BITARTRATE AND ACETAMINOPHEN 2 TABLET: 5; 325 TABLET ORAL at 00:15

## 2017-12-25 RX ADMIN — CARVEDILOL 6.25 MG: 3.12 TABLET, FILM COATED ORAL at 09:31

## 2017-12-25 RX ADMIN — PHENAZOPYRIDINE HYDROCHLORIDE 200 MG: 200 TABLET ORAL at 11:52

## 2017-12-25 RX ADMIN — CARVEDILOL 6.25 MG: 3.12 TABLET, FILM COATED ORAL at 17:46

## 2017-12-25 RX ADMIN — CIPROFLOXACIN HYDROCHLORIDE 250 MG: 500 TABLET, FILM COATED ORAL at 20:06

## 2017-12-25 RX ADMIN — ENALAPRIL MALEATE 10 MG: 5 TABLET ORAL at 17:47

## 2017-12-25 RX ADMIN — FERROUS SULFATE TAB 325 MG (65 MG ELEMENTAL FE) 325 MG: 325 (65 FE) TAB at 09:30

## 2017-12-25 RX ADMIN — HYDROCODONE BITARTRATE AND ACETAMINOPHEN 1 TABLET: 5; 325 TABLET ORAL at 20:06

## 2017-12-25 RX ADMIN — CIPROFLOXACIN HYDROCHLORIDE 250 MG: 500 TABLET, FILM COATED ORAL at 09:31

## 2017-12-25 ASSESSMENT — PAIN SCALES - GENERAL
PAINLEVEL_OUTOF10: 7
PAINLEVEL_OUTOF10: 7
PAINLEVEL_OUTOF10: 3
PAINLEVEL_OUTOF10: 0
PAINLEVEL_OUTOF10: 0

## 2017-12-25 NOTE — PROGRESS NOTES
Received bedside shift report from Chary GARCIA RN regarding patient and assumed care. Patient awake, calm and stable, currently positioned in bed for comfort and safety; call light within reach. Denies pain or discomfort at this time. Will continue to monitor.

## 2017-12-25 NOTE — CARE PLAN
Problem: Bowel/Gastric:  Goal: Will not experience complications related to bowel motility  Outcome: PROGRESSING SLOWER THAN EXPECTED  pt with nausea and dry heaves. Zofran 4 mg administered. Monitoring in progress.    Problem: Mobility  Goal: Risk for activity intolerance will decrease  Outcome: PROGRESSING AS EXPECTED  OOB to T-dine for meals, pt up in w/c and participating in therapies.

## 2017-12-25 NOTE — CARE PLAN
Problem: Communication  Goal: The ability to communicate needs accurately and effectively will improve  Encouraged to make needs known to staff.  Encouraged to use call light for staff assist.    Problem: Safety  Goal: Will remain free from falls  Call light kept within reach and encouraged to use for assistance and with toileting needs. Encouraged to call staff and to wait for staff before transfers.  Room close to nurses station.  Bed alarms are in place.

## 2017-12-25 NOTE — CARE PLAN
Problem: Communication  Goal: The ability to communicate needs accurately and effectively will improve  Outcome: PROGRESSING AS EXPECTED  Easier to understand today then yesterday carried on conversation with pt about family and coming from [facility] and the [employer]. Less frustrated today in regards to speaking but still very upset about nectar thick liquids. Encourage to pt engage in conversation, give pt time to respond, reorient if/when needed. Discuss plan for the day at start of day and periodically thru out the day.    Problem: Urinary Elimination:  Goal: Ability to reestablish a normal urinary elimination pattern will improve  Outcome: PROGRESSING AS EXPECTED  Voiding easier today but still small amounts unless standing over toilet. Feels better less anxious about having to void all the time. On Cipro and on pyridium. Taking fluid okay especially with meds but needs encouragement to drink NTL.

## 2017-12-25 NOTE — PROGRESS NOTES
Last documented bowel movement was 12/22/17.  Senokot given this morning.  Bed alarm remains in place.

## 2017-12-26 ENCOUNTER — HOME HEALTH ADMISSION (OUTPATIENT)
Dept: HOME HEALTH SERVICES | Facility: HOME HEALTHCARE | Age: 82
End: 2017-12-26
Payer: MEDICARE

## 2017-12-26 PROCEDURE — 99232 SBSQ HOSP IP/OBS MODERATE 35: CPT | Performed by: PHYSICAL MEDICINE & REHABILITATION

## 2017-12-26 PROCEDURE — 97116 GAIT TRAINING THERAPY: CPT

## 2017-12-26 PROCEDURE — 97535 SELF CARE MNGMENT TRAINING: CPT

## 2017-12-26 PROCEDURE — 97110 THERAPEUTIC EXERCISES: CPT

## 2017-12-26 PROCEDURE — 97530 THERAPEUTIC ACTIVITIES: CPT

## 2017-12-26 PROCEDURE — A9270 NON-COVERED ITEM OR SERVICE: HCPCS | Performed by: PHYSICAL MEDICINE & REHABILITATION

## 2017-12-26 PROCEDURE — 770010 HCHG ROOM/CARE - REHAB SEMI PRIVAT*

## 2017-12-26 PROCEDURE — 92526 ORAL FUNCTION THERAPY: CPT

## 2017-12-26 PROCEDURE — 700102 HCHG RX REV CODE 250 W/ 637 OVERRIDE(OP): Performed by: PHYSICAL MEDICINE & REHABILITATION

## 2017-12-26 RX ADMIN — AMLODIPINE BESYLATE 10 MG: 5 TABLET ORAL at 08:57

## 2017-12-26 RX ADMIN — CIPROFLOXACIN HYDROCHLORIDE 250 MG: 500 TABLET, FILM COATED ORAL at 08:58

## 2017-12-26 RX ADMIN — CIPROFLOXACIN HYDROCHLORIDE 250 MG: 500 TABLET, FILM COATED ORAL at 20:02

## 2017-12-26 RX ADMIN — HYDROCODONE BITARTRATE AND ACETAMINOPHEN 1 TABLET: 5; 325 TABLET ORAL at 20:02

## 2017-12-26 RX ADMIN — ATORVASTATIN CALCIUM 10 MG: 10 TABLET, FILM COATED ORAL at 20:03

## 2017-12-26 RX ADMIN — FINASTERIDE 10 MG: 5 TABLET, FILM COATED ORAL at 08:58

## 2017-12-26 RX ADMIN — ENALAPRIL MALEATE 10 MG: 5 TABLET ORAL at 17:25

## 2017-12-26 RX ADMIN — CHOLECALCIFEROL TAB 25 MCG (1000 UNIT) 4000 UNITS: 25 TAB at 08:56

## 2017-12-26 RX ADMIN — FERROUS SULFATE TAB 325 MG (65 MG ELEMENTAL FE) 325 MG: 325 (65 FE) TAB at 08:58

## 2017-12-26 RX ADMIN — CLOPIDOGREL BISULFATE 75 MG: 75 TABLET ORAL at 08:58

## 2017-12-26 RX ADMIN — ASPIRIN 325 MG: 325 TABLET, COATED ORAL at 08:58

## 2017-12-26 RX ADMIN — CARVEDILOL 6.25 MG: 3.12 TABLET, FILM COATED ORAL at 17:25

## 2017-12-26 RX ADMIN — CARVEDILOL 6.25 MG: 3.12 TABLET, FILM COATED ORAL at 08:58

## 2017-12-26 RX ADMIN — ALLOPURINOL 100 MG: 100 TABLET ORAL at 08:58

## 2017-12-26 RX ADMIN — HYDROCODONE BITARTRATE AND ACETAMINOPHEN 1 TABLET: 5; 325 TABLET ORAL at 09:04

## 2017-12-26 ASSESSMENT — PAIN SCALES - GENERAL
PAINLEVEL_OUTOF10: 8
PAINLEVEL_OUTOF10: 6
PAINLEVEL_OUTOF10: 0

## 2017-12-26 ASSESSMENT — GAIT ASSESSMENTS
ASSISTIVE DEVICE: FRONT WHEEL WALKER;SINGLE POINT CANE
GAIT LEVEL OF ASSIST: CONTACT GUARD ASSIST
DEVIATION: ANTALGIC;INCREASED BASE OF SUPPORT;BRADYKINETIC;SHUFFLED GAIT
DISTANCE (FEET): 125

## 2017-12-26 NOTE — CARE PLAN
Problem: Safety  Goal: Will remain free from injury  Pt can be impulsive at times. Bed rails x2 secured for safety. Pt's room in close proximity to nurse's station. Bed alarms on. Non skid socks provided. Will continue to assess and monitor for safety.     Problem: Bowel/Gastric:  Goal: Normal bowel function is maintained or improved   12/23/17 2000   OTHER   Last BM 12/22/17     Will give laxatives in AM.

## 2017-12-26 NOTE — PROGRESS NOTES
"Rehab Progress Note     Encounter Date: 12/26/2017    CC: L CVA with right sided weakness    Interval Events (Subjective)  Patient sitting up in bed after therapy.  He reports he worked with [facility] this morning and he feels good. He reports that yesterday he had some nausea and emesis while sitting up in therapy but that has resolved. Discussed about mental status and could be due to UTI which is being treated. Denies N/V/D.      IDT Team Meeting 12/22/2017    DC/Disposition:  11/28/17 has entire set of stairs into condo    Objective:  VITAL SIGNS: /75   Pulse 68   Temp 36.4 °C (97.5 °F)   Resp 18   Ht 1.753 m (5' 9\")   Wt 72.5 kg (159 lb 13.3 oz)   SpO2 95%   BMI 23.60 kg/m²   Gen: NAD, dysarthric speech  Psych: Mood and affect normal  Resp: CTAB  CV: RRR, minimal edema R LE  Abd: NTND, BS+  Neuro: AOx3,  R Wrist flexor 4/5, R wrist extensor 3+/5,  Finger flexor 4/5    No results found for this or any previous visit (from the past 72 hour(s)).    Current Facility-Administered Medications   Medication Frequency   • ciprofloxacin (CIPRO) tablet 250 mg Q12HRS   • carvedilol (COREG) tablet 6.25 mg BID WITH MEALS   • amlodipine (NORVASC) tablet 10 mg DAILY   • senna-docusate (PERICOLACE or SENOKOT S) 8.6-50 MG per tablet 2 Tab BID PRN    And   • polyethylene glycol/lytes (MIRALAX) PACKET 1 Packet QDAY PRN    And   • magnesium hydroxide (MILK OF MAGNESIA) suspension 30 mL QDAY PRN    And   • bisacodyl (DULCOLAX) suppository 10 mg QDAY PRN   • hydrocodone-acetaminophen (NORCO) 5-325 MG per tablet 1-2 Tab Q4HRS PRN   • finasteride (PROSCAR) tablet 10 mg DAILY   • aspirin (ASA) tablet 325 mg DAILY   • allopurinol (ZYLOPRIM) tablet 100 mg DAILY   • atorvastatin (LIPITOR) tablet 10 mg Q EVENING   • clopidogrel (PLAVIX) tablet 75 mg QAM   • cyanocobalamin (VITAMIN B12) tablet 1,000 mcg DAILY   • ferrous sulfate tablet 325 mg QAM   • vitamin D (cholecalciferol) tablet 4,000 Units DAILY   • Respiratory Care per " Protocol Continuous RT   • Pharmacy Consult Request ...Pain Management Review 1 Each PRN   • acetaminophen (TYLENOL) tablet 650 mg Q4HRS PRN   • artificial tears 1.4 % ophthalmic solution 1 Drop PRN   • benzocaine-menthol (CEPACOL) lozenge 1 Lozenge Q2HRS PRN   • hydrALAZINE (APRESOLINE) tablet 25 mg Q8HRS PRN   • mag hydrox-al hydrox-simeth (MAALOX PLUS ES or MYLANTA DS) suspension 20 mL Q2HRS PRN   • ondansetron (ZOFRAN ODT) dispertab 4 mg 4X/DAY PRN    Or   • ondansetron (ZOFRAN) syringe/vial injection 4 mg 4X/DAY PRN   • trazodone (DESYREL) tablet 50 mg QHS PRN   • sodium chloride (OCEAN) 0.65 % nasal spray 2 Spray PRN   • enalapril (VASOTEC) tablet 10 mg TWICE DAILY       Orders Placed This Encounter   Procedures   • Diet Order     Standing Status:   Standing     Number of Occurrences:   1     Order Specific Question:   Diet:     Answer:   Diabetic [3]     Comments:   regular textures     Order Specific Question:   Diet:     Answer:   Cardiac [6]     Order Specific Question:   Consistency/Fluid modifications:     Answer:   [place] Thick [2]       Assessment:  Active Hospital Problems    Diagnosis   • *Left sided cerebral hemisphere cerebrovascular accident (CVA) (CMS-[facility])   • CVA (cerebral vascular accident) (CMS-[facility])   • Acute on Chronic blood loss anemia   • Vitamin B12 deficiency   • Lung cancer (CMS-[facility])   • Cardiomyopathy (CMS-[facility])   • Thrombocytopenia (CMS-[facility])   • Dysphagia due to recent cerebrovascular accident (CVA)   • Chronic combined systolic and diastolic CHF (congestive heart failure) (CMS-[facility])   • CKD (chronic kidney disease) stage 3, GFR 30-59 ml/min   • COPD (chronic obstructive pulmonary disease) (CMS-[facility])   • HTN (hypertension)   • DM (diabetes mellitus) (CMS-[facility])   • Dyslipidemia   • BPH (benign prostatic hyperplasia)       Medical Decision Making and Plan:  CVA - Patient with new left sided CVA with R sided weakness, dysphagia and dysarthria.  Patient previously with bilateral strokes but  improved to baseline.    -Continue  mg, Plavix 75 mg  -Continue Good BP and DLD control (see below)  -Repeat MBS for dysphagia on 12/18/17.  Will trial Regular diet on 12/21/17 with SLP     RLL Lung Adenocarcinoma - Evaluated by Radiation oncology while at [facility].  Recommending radiation therapy in January.  -Follow-up with Radiation oncology post-discharge     HTN - Patient with longstanding HTN as well as CHF.  Patient previously on Coreg, Vasotec and Lasix.  All medications were stopped acutely for permissive hypertension. Since admission continued elevated BP  -Restart Enalapril 10 mg BID, Coreg 12.5 mg BID. Continue to monitor as was labile prior to restarting medications.  -Added parameters for Coreg for HR.  Added Amlodipine and increased to 10 mg on 12/18/17. Much better control on 11/18-11/19.  With bracycardia decrease Coreg to 6.25 mg BID. HR still in 50's     DLD - Patient on statin at baseline.  Cholesterol WNL on acute check.  -Continue Atorvastatin 10 mg QHS     BPH - Patient on Proscar at baseline  -Continue Proscar 10 mg daily.     Neurogenic Bowel  - Patient would like all medications made PRN.    UTI - Patient with frequency and discomfort. +UA with UCx pending  -Start Ciprofloxacin BID for 7 days. Culture so far negative.    COPD - No exacerbation since stroke.  Continue on home medications  -Patient declining Spiriva. Held     B12 Deficiency - Found during acute stay.  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     Gout - Pt reports being on allopurinol for years.   -Continue Alllopurinol 100 mg daily     Vitamin D Deficiency - Reported deficiency but normal on admission labs. Will continue supplementation  -Cholecalciferol 4000 U daily     DVT Prophlyaxis - Patient ambulating sufficiently.  Continue TEDs in AM    Total time:  > 25 minutes.  I spent greater than  50% of the time for patient care and coordination on this date, including unit/floor time, and face-to-face time with the patient as per assessment and plan above. Discussed will complete Ciprofloxacin on 12/28/17 day of discharge. Discussed discharge planning.       Huy Quick M.D.

## 2017-12-26 NOTE — DISCHARGE PLANNING
Case Management  Met with patient , daughter Mayra and Alexa patients ex-wife.  They are involved in care and Mayra will be involved in transportation to MD appointments and assisting with ADL's.  Will follow-up with DME for home.

## 2017-12-27 ENCOUNTER — APPOINTMENT (OUTPATIENT)
Dept: RADIOLOGY | Facility: MEDICAL CENTER | Age: 82
DRG: 682 | End: 2017-12-27
Attending: HOSPITALIST
Payer: MEDICARE

## 2017-12-27 ENCOUNTER — RESOLUTE PROFESSIONAL BILLING HOSPITAL PROF FEE (OUTPATIENT)
Dept: HOSPITALIST | Facility: MEDICAL CENTER | Age: 82
End: 2017-12-27
Payer: MEDICARE

## 2017-12-27 ENCOUNTER — HOSPITAL ENCOUNTER (INPATIENT)
Facility: MEDICAL CENTER | Age: 82
LOS: 8 days | DRG: 682 | End: 2018-01-04
Attending: INTERNAL MEDICINE | Admitting: INTERNAL MEDICINE
Payer: MEDICARE

## 2017-12-27 VITALS
HEIGHT: 69 IN | DIASTOLIC BLOOD PRESSURE: 49 MMHG | HEART RATE: 64 BPM | TEMPERATURE: 97.5 F | RESPIRATION RATE: 18 BRPM | BODY MASS INDEX: 23.67 KG/M2 | OXYGEN SATURATION: 93 % | WEIGHT: 159.83 LBS | SYSTOLIC BLOOD PRESSURE: 104 MMHG

## 2017-12-27 DIAGNOSIS — I63.89 CEREBROVASCULAR ACCIDENT (CVA) DUE TO OTHER MECHANISM (HCC): ICD-10-CM

## 2017-12-27 PROBLEM — N39.0 URINARY TRACT INFECTION WITHOUT HEMATURIA: Status: ACTIVE | Noted: 2017-12-27

## 2017-12-27 PROBLEM — N18.5 ACUTE RENAL FAILURE SUPERIMPOSED ON STAGE 5 CHRONIC KIDNEY DISEASE, NOT ON CHRONIC DIALYSIS (HCC): Status: ACTIVE | Noted: 2017-12-27

## 2017-12-27 PROBLEM — N17.9 ACUTE RENAL FAILURE SUPERIMPOSED ON STAGE 5 CHRONIC KIDNEY DISEASE, NOT ON CHRONIC DIALYSIS (HCC): Status: ACTIVE | Noted: 2017-12-27

## 2017-12-27 LAB
ANION GAP SERPL CALC-SCNC: 14 MMOL/L (ref 0–11.9)
ANION GAP SERPL CALC-SCNC: 9 MMOL/L (ref 0–11.9)
BASOPHILS # BLD AUTO: 0.3 % (ref 0–1.8)
BASOPHILS # BLD: 0.02 K/UL (ref 0–0.12)
BUN SERPL-MCNC: 141 MG/DL (ref 8–22)
BUN SERPL-MCNC: 144 MG/DL (ref 8–22)
CALCIUM SERPL-MCNC: 9.2 MG/DL (ref 8.5–10.5)
CALCIUM SERPL-MCNC: 9.3 MG/DL (ref 8.5–10.5)
CHLORIDE SERPL-SCNC: 107 MMOL/L (ref 96–112)
CHLORIDE SERPL-SCNC: 110 MMOL/L (ref 96–112)
CO2 SERPL-SCNC: 14 MMOL/L (ref 20–33)
CO2 SERPL-SCNC: 18 MMOL/L (ref 20–33)
CREAT SERPL-MCNC: 5.5 MG/DL (ref 0.5–1.4)
CREAT SERPL-MCNC: 6.47 MG/DL (ref 0.5–1.4)
CREAT UR-MCNC: 141.3 MG/DL
EKG IMPRESSION: NORMAL
EOSINOPHIL # BLD AUTO: 0.19 K/UL (ref 0–0.51)
EOSINOPHIL NFR BLD: 2.9 % (ref 0–6.9)
ERYTHROCYTE [DISTWIDTH] IN BLOOD BY AUTOMATED COUNT: 48.1 FL (ref 35.9–50)
ERYTHROCYTE [DISTWIDTH] IN BLOOD BY AUTOMATED COUNT: 48.8 FL (ref 35.9–50)
GFR SERPL CREATININE-BSD FRML MDRD: 10 ML/MIN/1.73 M 2
GFR SERPL CREATININE-BSD FRML MDRD: 8 ML/MIN/1.73 M 2
GLUCOSE SERPL-MCNC: 101 MG/DL (ref 65–99)
GLUCOSE SERPL-MCNC: 150 MG/DL (ref 65–99)
HCT VFR BLD AUTO: 27.9 % (ref 42–52)
HCT VFR BLD AUTO: 29.8 % (ref 42–52)
HGB BLD-MCNC: 9.2 G/DL (ref 14–18)
HGB BLD-MCNC: 9.9 G/DL (ref 14–18)
IMM GRANULOCYTES # BLD AUTO: 0.03 K/UL (ref 0–0.11)
IMM GRANULOCYTES NFR BLD AUTO: 0.5 % (ref 0–0.9)
LYMPHOCYTES # BLD AUTO: 0.91 K/UL (ref 1–4.8)
LYMPHOCYTES NFR BLD: 14 % (ref 22–41)
MCH RBC QN AUTO: 30.8 PG (ref 27–33)
MCH RBC QN AUTO: 31 PG (ref 27–33)
MCHC RBC AUTO-ENTMCNC: 33 G/DL (ref 33.7–35.3)
MCHC RBC AUTO-ENTMCNC: 33.2 G/DL (ref 33.7–35.3)
MCV RBC AUTO: 93.3 FL (ref 81.4–97.8)
MCV RBC AUTO: 93.4 FL (ref 81.4–97.8)
MONOCYTES # BLD AUTO: 0.55 K/UL (ref 0–0.85)
MONOCYTES NFR BLD AUTO: 8.4 % (ref 0–13.4)
NEUTROPHILS # BLD AUTO: 4.81 K/UL (ref 1.82–7.42)
NEUTROPHILS NFR BLD: 73.9 % (ref 44–72)
NRBC # BLD AUTO: 0 K/UL
NRBC BLD-RTO: 0 /100 WBC
PLATELET # BLD AUTO: 106 K/UL (ref 164–446)
PLATELET # BLD AUTO: 92 K/UL (ref 164–446)
PMV BLD AUTO: 11.5 FL (ref 9–12.9)
PMV BLD AUTO: 12.5 FL (ref 9–12.9)
POTASSIUM SERPL-SCNC: 6 MMOL/L (ref 3.6–5.5)
POTASSIUM SERPL-SCNC: 6.3 MMOL/L (ref 3.6–5.5)
RBC # BLD AUTO: 2.99 M/UL (ref 4.7–6.1)
RBC # BLD AUTO: 3.19 M/UL (ref 4.7–6.1)
SODIUM SERPL-SCNC: 135 MMOL/L (ref 135–145)
SODIUM SERPL-SCNC: 137 MMOL/L (ref 135–145)
SODIUM UR-SCNC: 53 MMOL/L
WBC # BLD AUTO: 6.5 K/UL (ref 4.8–10.8)
WBC # BLD AUTO: 8.7 K/UL (ref 4.8–10.8)

## 2017-12-27 PROCEDURE — 93005 ELECTROCARDIOGRAM TRACING: CPT | Performed by: INTERNAL MEDICINE

## 2017-12-27 PROCEDURE — 700105 HCHG RX REV CODE 258: Performed by: HOSPITALIST

## 2017-12-27 PROCEDURE — 80048 BASIC METABOLIC PNL TOTAL CA: CPT

## 2017-12-27 PROCEDURE — 93010 ELECTROCARDIOGRAM REPORT: CPT | Performed by: INTERNAL MEDICINE

## 2017-12-27 PROCEDURE — 700111 HCHG RX REV CODE 636 W/ 250 OVERRIDE (IP): Performed by: INTERNAL MEDICINE

## 2017-12-27 PROCEDURE — 85025 COMPLETE CBC W/AUTO DIFF WBC: CPT

## 2017-12-27 PROCEDURE — 700102 HCHG RX REV CODE 250 W/ 637 OVERRIDE(OP): Performed by: HOSPITALIST

## 2017-12-27 PROCEDURE — 36415 COLL VENOUS BLD VENIPUNCTURE: CPT

## 2017-12-27 PROCEDURE — 97110 THERAPEUTIC EXERCISES: CPT

## 2017-12-27 PROCEDURE — 85027 COMPLETE CBC AUTOMATED: CPT | Mod: 91

## 2017-12-27 PROCEDURE — 99233 SBSQ HOSP IP/OBS HIGH 50: CPT | Performed by: PHYSICAL MEDICINE & REHABILITATION

## 2017-12-27 PROCEDURE — 97116 GAIT TRAINING THERAPY: CPT

## 2017-12-27 PROCEDURE — 700102 HCHG RX REV CODE 250 W/ 637 OVERRIDE(OP): Performed by: PHYSICAL MEDICINE & REHABILITATION

## 2017-12-27 PROCEDURE — 700111 HCHG RX REV CODE 636 W/ 250 OVERRIDE (IP): Performed by: HOSPITALIST

## 2017-12-27 PROCEDURE — 97530 THERAPEUTIC ACTIVITIES: CPT

## 2017-12-27 PROCEDURE — A9270 NON-COVERED ITEM OR SERVICE: HCPCS | Performed by: HOSPITALIST

## 2017-12-27 PROCEDURE — 770020 HCHG ROOM/CARE - TELE (206)

## 2017-12-27 PROCEDURE — 97535 SELF CARE MNGMENT TRAINING: CPT

## 2017-12-27 PROCEDURE — 99223 1ST HOSP IP/OBS HIGH 75: CPT | Performed by: HOSPITALIST

## 2017-12-27 PROCEDURE — 82570 ASSAY OF URINE CREATININE: CPT

## 2017-12-27 PROCEDURE — 700105 HCHG RX REV CODE 258: Performed by: INTERNAL MEDICINE

## 2017-12-27 PROCEDURE — A9270 NON-COVERED ITEM OR SERVICE: HCPCS | Performed by: PHYSICAL MEDICINE & REHABILITATION

## 2017-12-27 PROCEDURE — 99223 1ST HOSP IP/OBS HIGH 75: CPT | Mod: AI | Performed by: HOSPITALIST

## 2017-12-27 PROCEDURE — 92526 ORAL FUNCTION THERAPY: CPT

## 2017-12-27 PROCEDURE — 76775 US EXAM ABDO BACK WALL LIM: CPT

## 2017-12-27 PROCEDURE — 84300 ASSAY OF URINE SODIUM: CPT

## 2017-12-27 RX ORDER — CLOPIDOGREL BISULFATE 75 MG/1
75 TABLET ORAL EVERY MORNING
Status: DISCONTINUED | OUTPATIENT
Start: 2017-12-28 | End: 2018-01-04 | Stop reason: HOSPADM

## 2017-12-27 RX ORDER — ONDANSETRON 4 MG/1
4 TABLET, ORALLY DISINTEGRATING ORAL EVERY 4 HOURS PRN
Status: DISCONTINUED | OUTPATIENT
Start: 2017-12-27 | End: 2018-01-04 | Stop reason: HOSPADM

## 2017-12-27 RX ORDER — TERAZOSIN 5 MG/1
10 CAPSULE ORAL NIGHTLY
Status: DISCONTINUED | OUTPATIENT
Start: 2017-12-27 | End: 2018-01-04 | Stop reason: HOSPADM

## 2017-12-27 RX ORDER — FERROUS SULFATE 325(65) MG
325 TABLET ORAL EVERY MORNING
Status: DISCONTINUED | OUTPATIENT
Start: 2017-12-28 | End: 2017-12-28

## 2017-12-27 RX ORDER — TAMSULOSIN HYDROCHLORIDE 0.4 MG/1
0.4 CAPSULE ORAL
Status: DISCONTINUED | OUTPATIENT
Start: 2017-12-28 | End: 2017-12-28

## 2017-12-27 RX ORDER — LABETALOL HYDROCHLORIDE 5 MG/ML
10 INJECTION, SOLUTION INTRAVENOUS EVERY 4 HOURS PRN
Status: DISCONTINUED | OUTPATIENT
Start: 2017-12-27 | End: 2018-01-04 | Stop reason: HOSPADM

## 2017-12-27 RX ORDER — ASPIRIN 325 MG
325 TABLET ORAL DAILY
Status: DISCONTINUED | OUTPATIENT
Start: 2017-12-28 | End: 2017-12-28

## 2017-12-27 RX ORDER — CARVEDILOL 6.25 MG/1
12.5 TABLET ORAL 2 TIMES DAILY WITH MEALS
Status: DISCONTINUED | OUTPATIENT
Start: 2017-12-27 | End: 2018-01-04 | Stop reason: HOSPADM

## 2017-12-27 RX ORDER — FINASTERIDE 5 MG/1
5 TABLET, FILM COATED ORAL DAILY
Status: DISCONTINUED | OUTPATIENT
Start: 2017-12-28 | End: 2018-01-04 | Stop reason: HOSPADM

## 2017-12-27 RX ORDER — HALOPERIDOL 5 MG/ML
5 INJECTION INTRAMUSCULAR EVERY 4 HOURS PRN
Status: DISCONTINUED | OUTPATIENT
Start: 2017-12-27 | End: 2018-01-04 | Stop reason: HOSPADM

## 2017-12-27 RX ORDER — SODIUM CHLORIDE 9 MG/ML
500 INJECTION, SOLUTION INTRAVENOUS CONTINUOUS
Status: DISCONTINUED | OUTPATIENT
Start: 2017-12-27 | End: 2017-12-27 | Stop reason: HOSPADM

## 2017-12-27 RX ORDER — ONDANSETRON 2 MG/ML
4 INJECTION INTRAMUSCULAR; INTRAVENOUS EVERY 4 HOURS PRN
Status: DISCONTINUED | OUTPATIENT
Start: 2017-12-27 | End: 2018-01-04 | Stop reason: HOSPADM

## 2017-12-27 RX ORDER — SODIUM POLYSTYRENE SULFONATE 15 G/60ML
30 SUSPENSION ORAL; RECTAL ONCE
Status: COMPLETED | OUTPATIENT
Start: 2017-12-27 | End: 2017-12-27

## 2017-12-27 RX ORDER — ALLOPURINOL 100 MG/1
100 TABLET ORAL DAILY
Status: DISCONTINUED | OUTPATIENT
Start: 2017-12-28 | End: 2017-12-28

## 2017-12-27 RX ORDER — CALCIUM CHLORIDE 100 MG/ML
1 INJECTION INTRAVENOUS; INTRAVENTRICULAR ONCE
Status: DISCONTINUED | OUTPATIENT
Start: 2017-12-27 | End: 2017-12-27

## 2017-12-27 RX ORDER — ATORVASTATIN CALCIUM 10 MG/1
10 TABLET, FILM COATED ORAL EVERY EVENING
Status: DISCONTINUED | OUTPATIENT
Start: 2017-12-27 | End: 2018-01-04 | Stop reason: HOSPADM

## 2017-12-27 RX ORDER — CHOLECALCIFEROL (VITAMIN D3) 125 MCG
1000 CAPSULE ORAL DAILY
Status: DISCONTINUED | OUTPATIENT
Start: 2017-12-28 | End: 2018-01-04 | Stop reason: HOSPADM

## 2017-12-27 RX ORDER — ACETAMINOPHEN 325 MG/1
650 TABLET ORAL EVERY 6 HOURS PRN
Status: DISCONTINUED | OUTPATIENT
Start: 2017-12-27 | End: 2018-01-04 | Stop reason: HOSPADM

## 2017-12-27 RX ADMIN — ONDANSETRON 4 MG: 2 INJECTION INTRAMUSCULAR; INTRAVENOUS at 21:57

## 2017-12-27 RX ADMIN — CARVEDILOL 12.5 MG: 12.5 TABLET, FILM COATED ORAL at 21:46

## 2017-12-27 RX ADMIN — CIPROFLOXACIN HYDROCHLORIDE 250 MG: 500 TABLET, FILM COATED ORAL at 09:20

## 2017-12-27 RX ADMIN — SODIUM BICARBONATE: 84 INJECTION, SOLUTION INTRAVENOUS at 22:02

## 2017-12-27 RX ADMIN — CALCIUM CHLORIDE 1 G: 100 INJECTION, SOLUTION INTRAVENOUS at 19:13

## 2017-12-27 RX ADMIN — ATORVASTATIN CALCIUM 10 MG: 10 TABLET, FILM COATED ORAL at 21:46

## 2017-12-27 RX ADMIN — TERAZOSIN HYDROCHLORIDE ANHYDROUS 10 MG: 5 CAPSULE ORAL at 21:46

## 2017-12-27 RX ADMIN — ASPIRIN 325 MG: 325 TABLET, COATED ORAL at 09:20

## 2017-12-27 RX ADMIN — CEFTRIAXONE 2 G: 2 INJECTION, POWDER, FOR SOLUTION INTRAMUSCULAR; INTRAVENOUS at 21:37

## 2017-12-27 RX ADMIN — AMLODIPINE BESYLATE 10 MG: 5 TABLET ORAL at 09:19

## 2017-12-27 RX ADMIN — CARVEDILOL 6.25 MG: 3.12 TABLET, FILM COATED ORAL at 09:20

## 2017-12-27 RX ADMIN — SODIUM POLYSTYRENE SULFONATE 30 G: 15 SUSPENSION ORAL; RECTAL at 15:56

## 2017-12-27 RX ADMIN — ALLOPURINOL 100 MG: 100 TABLET ORAL at 09:19

## 2017-12-27 RX ADMIN — FERROUS SULFATE TAB 325 MG (65 MG ELEMENTAL FE) 325 MG: 325 (65 FE) TAB at 09:20

## 2017-12-27 RX ADMIN — FINASTERIDE 10 MG: 5 TABLET, FILM COATED ORAL at 09:20

## 2017-12-27 RX ADMIN — ENALAPRIL MALEATE 10 MG: 5 TABLET ORAL at 05:27

## 2017-12-27 RX ADMIN — CHOLECALCIFEROL TAB 25 MCG (1000 UNIT) 4000 UNITS: 25 TAB at 09:20

## 2017-12-27 RX ADMIN — CLOPIDOGREL BISULFATE 75 MG: 75 TABLET ORAL at 09:20

## 2017-12-27 ASSESSMENT — LIFESTYLE VARIABLES
ALCOHOL_USE: NO
EVER_SMOKED: YES
EVER_SMOKED: YES
DO YOU DRINK ALCOHOL: NO

## 2017-12-27 ASSESSMENT — ENCOUNTER SYMPTOMS
HEARTBURN: 0
DIZZINESS: 0
BLOOD IN STOOL: 0
BRUISES/BLEEDS EASILY: 0
CHILLS: 0
WEIGHT LOSS: 0
RESPIRATORY NEGATIVE: 1
CONSTITUTIONAL NEGATIVE: 1
LOSS OF CONSCIOUSNESS: 0
DIARRHEA: 0
FLANK PAIN: 0
CARDIOVASCULAR NEGATIVE: 1
FEVER: 0
WEAKNESS: 0
ABDOMINAL PAIN: 0
VOMITING: 0
COUGH: 0
FOCAL WEAKNESS: 1
NAUSEA: 1
SHORTNESS OF BREATH: 0
PSYCHIATRIC NEGATIVE: 1
CONSTIPATION: 0
DEPRESSION: 0
BACK PAIN: 0
MYALGIAS: 0
MUSCULOSKELETAL NEGATIVE: 1
NERVOUS/ANXIOUS: 0

## 2017-12-27 ASSESSMENT — GAIT ASSESSMENTS
DISTANCE (FEET): 125
GAIT LEVEL OF ASSIST: CONTACT GUARD ASSIST
ASSISTIVE DEVICE: FRONT WHEEL WALKER

## 2017-12-27 ASSESSMENT — PAIN SCALES - GENERAL: PAINLEVEL_OUTOF10: 4

## 2017-12-27 ASSESSMENT — PATIENT HEALTH QUESTIONNAIRE - PHQ9
SUM OF ALL RESPONSES TO PHQ9 QUESTIONS 1 AND 2: 0
SUM OF ALL RESPONSES TO PHQ QUESTIONS 1-9: 0
2. FEELING DOWN, DEPRESSED, IRRITABLE, OR HOPELESS: NOT AT ALL
1. LITTLE INTEREST OR PLEASURE IN DOING THINGS: NOT AT ALL

## 2017-12-27 NOTE — PROGRESS NOTES
"Rehab Progress Note     Encounter Date: 12/27/2017    CC: L CVA with right sided weakness    Interval Events (Subjective)  Patient sitting up in wheelchair after therapy. Patient reports he feels fine but PT reports he is nauseated during therapy and not performing as well as previous days. Discussed with patient that his kidney function has worsened and will need to start fluids. He reports that he thinks he is dehydrated because he was on a modified diet for so long. Discussed UTI as well as other medications can factor.  Patient understands, reports his nausea has resolved while sitting in the chair. Otherwise denies pain at this time. Discussed with CM that his discharge may be postponed until next week.       IDT Team Meeting 12/22/2017    DC/Disposition:  11/28/17 has entire set of stairs into condo    Objective:  VITAL SIGNS: /62   Pulse 84   Temp 36.4 °C (97.5 °F)   Resp 18   Ht 1.753 m (5' 9\")   Wt 72.5 kg (159 lb 13.3 oz)   SpO2 93%   BMI 23.60 kg/m²   Gen: NAD, dysarthric speech  Psych: Mood and affect normal  Resp: CTAB  CV: RRR, minimal edema R LE  Abd: NTND, BS+  Neuro: AOx3,  R Wrist flexor 4/5, R wrist extensor 3+/5,  Finger flexor 4/5    Recent Results (from the past 72 hour(s))   CBC WITH DIFFERENTIAL    Collection Time: 12/27/17  5:48 AM   Result Value Ref Range    WBC 6.5 4.8 - 10.8 K/uL    RBC 2.99 (L) 4.70 - 6.10 M/uL    Hemoglobin 9.2 (L) 14.0 - 18.0 g/dL    Hematocrit 27.9 (L) 42.0 - 52.0 %    MCV 93.3 81.4 - 97.8 fL    MCH 30.8 27.0 - 33.0 pg    MCHC 33.0 (L) 33.7 - 35.3 g/dL    RDW 48.8 35.9 - 50.0 fL    Platelet Count 92 (L) 164 - 446 K/uL    MPV 11.5 9.0 - 12.9 fL    Neutrophils-Polys 73.90 (H) 44.00 - 72.00 %    Lymphocytes 14.00 (L) 22.00 - 41.00 %    Monocytes 8.40 0.00 - 13.40 %    Eosinophils 2.90 0.00 - 6.90 %    Basophils 0.30 0.00 - 1.80 %    Immature Granulocytes 0.50 0.00 - 0.90 %    Nucleated RBC 0.00 /100 WBC    Neutrophils (Absolute) 4.81 1.82 - 7.42 K/uL    " Lymphs (Absolute) 0.91 (L) 1.00 - 4.80 K/uL    Monos (Absolute) 0.55 0.00 - 0.85 K/uL    Eos (Absolute) 0.19 0.00 - 0.51 K/uL    Baso (Absolute) 0.02 0.00 - 0.12 K/uL    Immature Granulocytes (abs) 0.03 0.00 - 0.11 K/uL    NRBC (Absolute) 0.00 K/uL   BASIC METABOLIC PANEL    Collection Time: 12/27/17  5:48 AM   Result Value Ref Range    Sodium 137 135 - 145 mmol/L    Potassium 6.0 (H) 3.6 - 5.5 mmol/L    Chloride 110 96 - 112 mmol/L    Co2 18 (L) 20 - 33 mmol/L    Glucose 101 (H) 65 - 99 mg/dL    Bun 141 (HH) 8 - 22 mg/dL    Creatinine 6.47 (HH) 0.50 - 1.40 mg/dL    Calcium 9.2 8.5 - 10.5 mg/dL    Anion Gap 9.0 0.0 - 11.9   ESTIMATED GFR    Collection Time: 12/27/17  5:48 AM   Result Value Ref Range    GFR If African American 10 (A) >60 mL/min/1.73 m 2    GFR If Non  8 (A) >60 mL/min/1.73 m 2       Current Facility-Administered Medications   Medication Frequency   • NS infusion 500 mL Continuous   • ciprofloxacin (CIPRO) tablet 250 mg Q12HRS   • carvedilol (COREG) tablet 6.25 mg BID WITH MEALS   • amlodipine (NORVASC) tablet 10 mg DAILY   • senna-docusate (PERICOLACE or SENOKOT S) 8.6-50 MG per tablet 2 Tab BID PRN    And   • polyethylene glycol/lytes (MIRALAX) PACKET 1 Packet QDAY PRN    And   • magnesium hydroxide (MILK OF MAGNESIA) suspension 30 mL QDAY PRN    And   • bisacodyl (DULCOLAX) suppository 10 mg QDAY PRN   • hydrocodone-acetaminophen (NORCO) 5-325 MG per tablet 1-2 Tab Q4HRS PRN   • finasteride (PROSCAR) tablet 10 mg DAILY   • aspirin (ASA) tablet 325 mg DAILY   • allopurinol (ZYLOPRIM) tablet 100 mg DAILY   • atorvastatin (LIPITOR) tablet 10 mg Q EVENING   • clopidogrel (PLAVIX) tablet 75 mg QAM   • cyanocobalamin (VITAMIN B12) tablet 1,000 mcg DAILY   • ferrous sulfate tablet 325 mg QAM   • vitamin D (cholecalciferol) tablet 4,000 Units DAILY   • Respiratory Care per Protocol Continuous RT   • Pharmacy Consult Request ...Pain Management Review 1 Each PRN   • acetaminophen (TYLENOL)  tablet 650 mg Q4HRS PRN   • artificial tears 1.4 % ophthalmic solution 1 Drop PRN   • benzocaine-menthol (CEPACOL) lozenge 1 Lozenge Q2HRS PRN   • hydrALAZINE (APRESOLINE) tablet 25 mg Q8HRS PRN   • mag hydrox-al hydrox-simeth (MAALOX PLUS ES or MYLANTA DS) suspension 20 mL Q2HRS PRN   • ondansetron (ZOFRAN ODT) dispertab 4 mg 4X/DAY PRN    Or   • ondansetron (ZOFRAN) syringe/vial injection 4 mg 4X/DAY PRN   • trazodone (DESYREL) tablet 50 mg QHS PRN   • sodium chloride (OCEAN) 0.65 % nasal spray 2 Spray PRN   • enalapril (VASOTEC) tablet 10 mg TWICE DAILY       Orders Placed This Encounter   Procedures   • Diet Order     Standing Status:   Standing     Number of Occurrences:   1     Order Specific Question:   Diet:     Answer:   Diabetic [3]     Comments:   regular textures, medications given one at a time floated in puree     Order Specific Question:   Diet:     Answer:   Cardiac [6]     Order Specific Question:   Consistency/Fluid modifications:     Answer:   Thin Liquids [3]       Assessment:  Active Hospital Problems    Diagnosis   • *Left sided cerebral hemisphere cerebrovascular accident (CVA) (CMS-[facility])   • CVA (cerebral vascular accident) (CMS-[facility])   • Acute on Chronic blood loss anemia   • Vitamin B12 deficiency   • Lung cancer (CMS-[facility])   • Cardiomyopathy (CMS-HCC)   • Thrombocytopenia (CMS-HCC)   • Dysphagia due to recent cerebrovascular accident (CVA)   • Chronic combined systolic and diastolic CHF (congestive heart failure) (CMS-HCC)   • CKD (chronic kidney disease) stage 3, GFR 30-59 ml/min   • COPD (chronic obstructive pulmonary disease) (CMS-[facility])   • HTN (hypertension)   • DM (diabetes mellitus) (CMS-HCC)   • Dyslipidemia   • BPH (benign prostatic hyperplasia)       Medical Decision Making and Plan:  CVA - Patient with new left sided CVA with R sided weakness, dysphagia and dysarthria.  Patient previously with bilateral strokes but improved to baseline.    -Continue  mg, Plavix 75  mg  -Continue Good BP and DLD control (see below)  -Repeat MBS for dysphagia on 12/18/17.  Will trial Regular diet on 12/21/17 with SLP     RLL Lung Adenocarcinoma - Evaluated by Radiation oncology while at [facility].  Recommending radiation therapy in January.  -Follow-up with Radiation oncology post-discharge     HTN - Patient with longstanding HTN as well as CHF.  Patient previously on Coreg, Vasotec and Lasix.  All medications were stopped acutely for permissive hypertension. Since admission continued elevated BP  -Restart Enalapril 10 mg BID, Coreg 12.5 mg BID. Continue to monitor as was labile prior to restarting medications.  -Added parameters for Coreg for HR.  Added Amlodipine and increased to 10 mg on 12/18/17. Much better control on 11/18-11/19.  With bracycardia decrease Coreg to 6.25 mg BID. HR still in 50's     DLD - Patient on statin at baseline.  Cholesterol WNL on acute check.  -Continue Atorvastatin 10 mg QHS     BPH - Patient on Proscar at baseline  -Continue Proscar 10 mg daily.     Neurogenic Bowel  - Patient would like all medications made PRN.    UTI/ARNALDO - Patient with frequency and discomfort. +UA with UCx pending  -Start Ciprofloxacin BID for 7 days.   -Worsening Cr from ~2.5 up to 6.0.  Patient still urinating. Will start on fluids and consult hospitalist.     COPD - No exacerbation since stroke.  Continue on home medications  -Patient declining Spiriva. Held     B12 Deficiency - Found during acute stay.  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     Gout - Pt reports being on allopurinol for years.   -Continue Alllopurinol 100 mg daily. Consider hold     Vitamin D Deficiency - Reported deficiency but normal on admission labs. Will continue supplementation  -Cholecalciferol 4000 U daily     DVT Prophlyaxis - Patient ambulating sufficiently.  Continue TEDs in  AM    Total time:  > 35 minutes.  I spent greater than 50% of the time for patient care and coordination on this date, including unit/floor time, and face-to-face time with the patient as per assessment and plan above. Discussed ARNALDO and need for possible postponement of discharge. Starting IV fluids and repeat labs.  Most likely will push back discharge date to 1/4/17 Consult hospitalist.       Huy Quick M.D.    Addendum: Hospitalist spoke with Nephrologist and concern for large jump in creatinine function which is not completely explained by dehydration.  Patient is not tolerating therapy. Therefore decision to transfer to ER for further monitoring and nephrology will assess.

## 2017-12-27 NOTE — CARE PLAN
"Problem: Safety  Goal: Will remain free from falls  Pt remains free of accidental injury at this time. Bed rails x2 secured for safety. Call light and personal belongings within reach. Hourly rounds done by staff to ensure safety and check if needs are met.  Bed alarms secured. Will continue to assess and monitor for safety.     Problem: Bowel/Gastric:  Goal: Normal bowel function is maintained or improved   12/23/17 2000   OTHER   Last BM 12/22/17     Will give laxatives in AM.   0527 - pt refuses to take Miralax this morning. Informed pt that he has no BM for 5 days and said, \"no, I dont think so..\" educated about the importance of routine and regular bowel movement but still refuses and starting to get irritated. Advised to increase oral fluid intake and drink prune juice with meals. Charge RN informed.       "

## 2017-12-27 NOTE — CARE PLAN
Problem: Safety  Goal: Will remain free from falls  Outcome: PROGRESSING AS EXPECTED  CLIP.  Patient oriented to call light system and is calling appropriately.  Patient oriented to bathroom location, TV, bed controls, clock.  Patient asked to use call light when ever they need to transfer out of bed/chair or to ambulate and patient verbalized understanding.  POC discussed with patient and patient oriented to white board.  Schedule for therapy delivered to patient.  Patient verbalized understanding.  Patients fall risk assessed.  Patient moved into a comfortable position.  Patient medicated for pain as needed and interventions in place.  Patient offered/helped to the restroom.  Patient's personal belongings within reach.  Bed in low position.  Patient wearing non-skid footwear.  Bed rails up times three.  Bed and chair alarms in place.  Wheels locked on bed and wheelchairs.  Hourly rounding in place.      Problem: Bowel/Gastric:  Goal: Normal bowel function is maintained or improved  Outcome: PROGRESSING AS EXPECTED  Patient has not had BM within six shifts.  Will encourage fluids.  Patient participating in therapy.  Will consider bowel medications and offer prune juice.

## 2017-12-27 NOTE — PROGRESS NOTES
Per patient request, call to Ekta, his daughter and notified of plan to transfer direct admit to [facility].

## 2017-12-27 NOTE — CARE PLAN
Problem: Safety  Goal: Will remain free from injury  Pt uses call light consistently and appropriately. Waits for assistance does not attempt self transfer this shift. Able to verbalize needs.    Problem: Infection  Goal: Will remain free from infection  Patient remains free of infection as evidenced by normal vital signs and breath sounds. Will continue monitoring.    Problem: Pain Management  Goal: Pain level will decrease to patient's comfort goal  Patient reports satisfactory pain control and decrease intensity after pharmacological pain management.

## 2017-12-27 NOTE — CONSULTS
DATE OF SERVICE:  12/27/2017    REQUESTING PHYSICIAN:  Marcio Quick MD    CHIEF COMPLAINT / REASON FOR CONSULTATION:   Renal Failure  Hyperkalemia    HISTORY OF PRESENT ILLNESS:  This is an 84 y.o. male with a past medical history of previous stroke, urinary incontinence, AODM, emphysema , PVD, cooronary artery disease, hypercholesterolemia, CHF, back pain and AVM of colon admitted to [facility] on 12/11/2017 with confusion and  mild symptoms of R sided weakness but seem to be intermittent and resolving until 230PM, he had R arm weakness and R leg weakness. He did not fall but felt his R leg is stiff. He also had slight slurring of speech and slight facial droop on the right.  CT did not show acute infarct but MRI showed acute left sided infarction in the left posterior frontal periventricular white matter which extended to the adjacent sylvian fissure.  Patient was not a tPA candidate due to timing and recent diagnosis of adenocarcinoma of the lungs and colonic hemorrhage from an AVM.  Patient was not a thrombectomy candidate either. Patient was also evaluated by Radiation Oncology as he was supposed to follow-up as an outpatient but was evaluated inpatient for treatment course for lower lobe adenoma.  Patient is scheduled for radiation in January.    Because of the patient's weakness and debility, Rehab was consulted, evaluated the patient, and he was deemed a good Rehab candidate.  The patient was transferred over to the Rehab facility on 12/13//2017.      The patient denies fever, chills, nausea, vomiting, headaches, blurry vision, or chest pain.    Because of these issues, Internal Medicine was consulted to help evaluate and manage the patient.    REVIEW OF SYSTEMS: All review of systems are negative pre AMA and CMS criteria   except for that stated in the HPI.    PAST MEDICAL HISTORY:  Past Medical History:   Diagnosis Date   • AVM (arteriovenous malformation) of colon    • Back pain    •  Chickenpox    • Chronic airway obstruction, not elsewhere classified    • Congestive heart failure (CMS-HCC)    • Diabetes    • Diphtheria    • Emphysema of lung (CMS-HCC)    • Hypercholesteremia    • Hypertension    • Infectious disease    • Other and unspecified angina pectoris    • PVD (peripheral vascular disease) (CMS-HCC)    • Snoring    • Stroke (CMS-HCC) 2015   • Unspecified cataract     right eye    • Urinary bladder disorder    • Urinary incontinence        PAST SURGICAL HISTORY:  Past Surgical History:   Procedure Laterality Date   • COLONOSCOPY  6/17/2015    Procedure: COLONOSCOPY;  Surgeon: Eddie Levin M.D.;  Location: SURGERY [facility];  Service:    • GASTROSCOPY  6/17/2015    Procedure: GASTROSCOPY W/APC;  Surgeon: Eddie Levin M.D.;  Location: SURGERY [facility];  Service:    • GASTROSCOPY WITH APC  11/1/2014    Performed by Eddie Levin M.D. at ENDOSCOPY [facility]   • COLONOSCOPY WITH APC  11/1/2014    Performed by Eddie Levin M.D. at ENDOSCOPY [facility]   • GASTROSCOPY-ENDO  5/1/2011    Performed by HOLLY VASQUEZ at ENDOSCOPY [facility]   • COLONOSCOPY - ENDO  5/1/2011    Performed by HOLLY VASQUEZ at ENDOSCOPY [facility]   • COLONOSCOPY WITH APC  4/21/2011    Performed by EDMUND CHENG at [facility]   • GASTROSCOPY-ENDO  4/21/2011    Performed by EDMUND CHENG at [facility]   • COLONOSCOPY WITH APC  9/28/2010    Performed by EDMUND CHENG at [facility]   • GASTROSCOPY WITH APC  9/28/2010    Performed by EDMUND CHENG at [facility]   • AORTOFEMORAL BYPASS  1991    Both legs       Allergies   Allergen Reactions   • Nkda [No Known Drug Allergy]        CURRENT MEDICATIONS:    Current Facility-Administered Medications:   •  NS  •  sodium polystyrene  •  ciprofloxacin  •  carvedilol  •  amlodipine  •  senna-docusate **AND** polyethylene  glycol/lytes **AND** magnesium hydroxide **AND** bisacodyl  •  hydrocodone-acetaminophen  •  finasteride  •  aspirin **OR** [DISCONTINUED] aspirin **OR** [DISCONTINUED] aspirin  •  allopurinol  •  atorvastatin  •  clopidogrel  •  cyanocobalamin  •  ferrous sulfate  •  vitamin D  •  Respiratory Care per Protocol  •  Pharmacy Consult Request  •  acetaminophen  •  artificial tears  •  benzocaine-menthol  •  hydrALAZINE  •  mag hydrox-al hydrox-simeth  •  ondansetron **OR** ondansetron  •  trazodone  •  sodium chloride  •  enalapril    Social History     Social History   • Marital status: Single     Spouse name: N/A   • Number of children: N/A   • Years of education: N/A     Social History Main Topics   • Smoking status: Former Smoker     Packs/day: 2.00     Years: 40.00     Quit date: 1/1/1982   • Smokeless tobacco: Never Used      Comment: Hx smoking x 40 yrs. Quit 1983   • Alcohol use No   • Drug use: No   • Sexual activity: Not on file     Other Topics Concern   • Not on file     Social History Narrative   • No narrative on file       FAMILY HISTORY:  was reviewed and is not pertinent to this consultation.    PHYSICAL EXAMINATION:  VITAL SIGNS:  Temp is 97.3, blood pressure is 109/58, heart rate is 48-84, respiratory rate is 18.  GENERAL:  Patient was lying in bed in no distress.  HEENT:  Pupils were equal, round and reactive to light and accomodation.  Oral mucosa was pink and moist.  NECK:  Soft.  Supple.  No JVD.  HEART:  Regular rate and rhythm.  Normal S1 and S2.  Murmurs was appreciated.  LUNGS:  Are clear to auscultation bilaterally.  ABDOMEN:  Soft, non tender, non distendded.  Bowels sound were positive in all four quadrants.  EXTREMITIES:  No clubbing, cyanosis.  There was no lower extremity edema.  NEUROLOGIC:  Cranial nerves two through twelve were grossly intact.    LABS:  Lab Results   Component Value Date/Time    SODIUM 137 12/27/2017 05:48 AM    POTASSIUM 6.0 (H) 12/27/2017 05:48 AM    CHLORIDE 110  12/27/2017 05:48 AM    CO2 18 (L) 12/27/2017 05:48 AM    GLUCOSE 101 (H) 12/27/2017 05:48 AM     (HH) 12/27/2017 05:48 AM    CREATININE 6.47 (HH) 12/27/2017 05:48 AM    CREATININE 1.6 (H) 12/31/2007 09:30 AM      Lab Results   Component Value Date/Time    WBC 6.5 12/27/2017 05:48 AM    RBC 2.99 (L) 12/27/2017 05:48 AM    HEMOGLOBIN 9.2 (L) 12/27/2017 05:48 AM    HEMATOCRIT 27.9 (L) 12/27/2017 05:48 AM    MCV 93.3 12/27/2017 05:48 AM    MCH 30.8 12/27/2017 05:48 AM    MCHC 33.0 (L) 12/27/2017 05:48 AM    MPV 11.5 12/27/2017 05:48 AM    NEUTSPOLYS 73.90 (H) 12/27/2017 05:48 AM    LYMPHOCYTES 14.00 (L) 12/27/2017 05:48 AM    MONOCYTES 8.40 12/27/2017 05:48 AM    EOSINOPHILS 2.90 12/27/2017 05:48 AM    BASOPHILS 0.30 12/27/2017 05:48 AM    HYPOCHROMIA 1+ 04/30/2011 12:45 PM    ANISOCYTOSIS 1+ 06/09/2017 02:07 PM      Lab Results   Component Value Date/Time    PROTHROMBTM 14.3 12/11/2017 04:47 PM    INR 1.14 (H) 12/11/2017 04:47 PM        ASSESSMENT:  1.  Renal Failure  2.  ? Altered Mentation  3.  Hyperkalemia  4.  For the other assessment, please see the past medical history above.    PLAN:  For the patient's renal failure, he does have a history of chronic kidney disease with a baseline around 2.5 to 3.5.  The patient bun/cr have been ok up to 12/14/17.  The next labs were drawn today with a significantly elevated bun/cr at 141/6.47 and his K+ facundo up to 6.0.  The patient is a poor historian and is difficult to ascertain if he drinks enough fluids but I can't imagine that even if he is somewhat dehydrated, it would cause such a rise.  I discussed the case with his Nephrologist, Dr. Garcia, and because of these findings, we will [place] the [place] [facility] [place].  Will stop his Enalapril.    For the patient's hyperkalemia, this is secondary to his recent renal failure.  Will be transferring him [place] [facility] [place] but will give Kayexalate 30 g x 1 today.    It was noted that the patient is on  Cipro for a possible UTI but cultures have come back negative.  Will d/c Cipro.      Critical Care Time:  45 minutes.  No overlap.      This case has been discussed with the attending Physiatrist.    Thank you for the consultation.

## 2017-12-27 NOTE — CONSULTS
DATE OF SERVICE:  12/19/2017    BRIEF HISTORY OF PRESENTING COMPLAINTS:  The patient is an 84-year-old white   single male who is referred for behavioral medicine evaluation by Dr. Quick.  The patient was transferred to rehab from acute where he was   treated for a left-sided cerebral hemisphere, cerebrovascular accident.  The   patient was stabilized acutely and then sent to rehab to address his general   debility.    PAST MEDICAL HISTORY:  Significant for AVM of the colon, back pain,   chickenpox, chronic airway obstruction, congestive heart failure, diabetes,   diphtheria, emphysema of lung, hypercholesterolemia, hypertension, infectious   disease, snoring, stroke, unspecified cataracts, urinary bladder disorder,   urinary incontinence.    PSYCHOLOGICAL STATUS:  MENTAL STATUS EXAMINATION:  The patient is a well-nourished, slightly   overweight male of medium stature who appeared his stated age of 84.  At   presentation, patient was alert.  He was resting supine in bed when   approached.  The patient oriented well to my presence.  The patient was kempt   in appearance.  He was dressed casually in street attire that was appropriate   to his age and setting.  The patient's manner of presentation was cooperative   and friendly.    Patient was well oriented to time, place, and person.  His language was   logical and goal oriented.  His speech was dysarthric and somewhat hesitant.    The patient's concentration and memory functioning appeared slightly   diminished.    The patient's affect was somewhat constricted, stable and mildly intense.  He   related well.  His mood appeared dysphoric, but appropriate to the context.    There was no evidence of delusional or perceptual disturbance.  Also, the   patient showed no unusual pain or motor behavior during the interview.    SPECIFIC BEHAVIORAL COMPLAINTS:  Patient admitted to mild symptoms of   generalized mood disturbance.  He reported in the past several days he  has   felt somewhat worried and sad.  He reported no clinically significant symptoms   of a negative mood.    Other problems mentioned by the patient included diminished energy levels and   poor appetite.  He also reported sleep disturbance.    The patient denied any interpersonal discord or discomfort, family disharmony   or problems managing his day-to-day stressors prior to his hospitalization.    The patient also denied any ETOH or other drug abuse.  He has a history,   however, of heavy drinking.  He said he quit approximately 3 years ago.    Patient has remote history of 2 DUIs.  Patient also quit smoking nearly 40   years ago.    PSYCHIATRIC HISTORY:  The patient denied any history significant for   psychiatric disturbance or treatment including in or outpatient cares.    PSYCHOMETRIC TESTING:  The patient was administered 2 psychometric tests and 2   screening instruments.  The PS/PC-R revealed very mild symptoms of dysphoria.    The patient's CDR survey showed no problems with level of consciousness,   attention, thinking or orientation.  The patient's speech was dysarthric.    Patient also admitted to some memory deficits of mild nature.  Moreover, he   revealed problems with behavioral activation and basic self-care.  He also   reported diminished energy levels _____ mild symptoms of depression and   diminished appetite.    The patient was screened for any elder abuse or risk of suicide.  There is no   strong evidence for either problem.    SOCIAL HISTORY:  The patient is a retired .  He is .  He   was living alone in [place], [place].    IMPRESSION:  Minor adjustment disorder with dysphoric mood.    RECOMMENDATIONS:  Patient will be followed for status and supportive care.       ____________________________________     SAY CHOI, PHD    JOSSELINE / DILLON    DD:  12/26/2017 18:27:40  DT:  12/26/2017 19:44:10    D#:  [id number]  Job#:  [id number]

## 2017-12-28 PROBLEM — I34.0 MITRAL REGURGITATION: Status: ACTIVE | Noted: 2017-12-28

## 2017-12-28 PROBLEM — M10.9 GOUT: Status: ACTIVE | Noted: 2017-12-28

## 2017-12-28 PROBLEM — N18.4 CHRONIC KIDNEY DISEASE (CKD), STAGE IV (SEVERE) (HCC): Status: ACTIVE | Noted: 2017-12-28

## 2017-12-28 PROBLEM — K29.70 GASTRITIS: Status: ACTIVE | Noted: 2017-12-28

## 2017-12-28 PROBLEM — E87.5 HYPERKALEMIA: Status: ACTIVE | Noted: 2017-12-28

## 2017-12-28 PROBLEM — G93.49 UREMIC ENCEPHALOPATHY: Status: ACTIVE | Noted: 2017-12-28

## 2017-12-28 PROBLEM — N19 UREMIC ENCEPHALOPATHY: Status: ACTIVE | Noted: 2017-12-28

## 2017-12-28 PROBLEM — E53.8 B12 DEFICIENCY: Status: ACTIVE | Noted: 2017-12-28

## 2017-12-28 PROBLEM — I42.9 CARDIOMYOPATHY (HCC): Status: RESOLVED | Noted: 2017-12-12 | Resolved: 2017-12-28

## 2017-12-28 LAB
ALBUMIN SERPL BCP-MCNC: 3.2 G/DL (ref 3.2–4.9)
ALBUMIN/GLOB SERPL: 1.1 G/DL
ALP SERPL-CCNC: 44 U/L (ref 30–99)
ALT SERPL-CCNC: 5 U/L (ref 2–50)
ANION GAP SERPL CALC-SCNC: 9 MMOL/L (ref 0–11.9)
AST SERPL-CCNC: 9 U/L (ref 12–45)
BASOPHILS # BLD AUTO: 0.3 % (ref 0–1.8)
BASOPHILS # BLD: 0.02 K/UL (ref 0–0.12)
BILIRUB SERPL-MCNC: 0.4 MG/DL (ref 0.1–1.5)
BUN SERPL-MCNC: 123 MG/DL (ref 8–22)
CALCIUM SERPL-MCNC: 9.1 MG/DL (ref 8.5–10.5)
CHLORIDE SERPL-SCNC: 109 MMOL/L (ref 96–112)
CO2 SERPL-SCNC: 20 MMOL/L (ref 20–33)
CREAT SERPL-MCNC: 4.7 MG/DL (ref 0.5–1.4)
EKG IMPRESSION: NORMAL
EOSINOPHIL # BLD AUTO: 0.07 K/UL (ref 0–0.51)
EOSINOPHIL NFR BLD: 1 % (ref 0–6.9)
ERYTHROCYTE [DISTWIDTH] IN BLOOD BY AUTOMATED COUNT: 45.4 FL (ref 35.9–50)
ERYTHROCYTE [DISTWIDTH] IN BLOOD BY AUTOMATED COUNT: 45.4 FL (ref 35.9–50)
GFR SERPL CREATININE-BSD FRML MDRD: 12 ML/MIN/1.73 M 2
GLOBULIN SER CALC-MCNC: 2.9 G/DL (ref 1.9–3.5)
GLUCOSE SERPL-MCNC: 107 MG/DL (ref 65–99)
HCT VFR BLD AUTO: 24.8 % (ref 42–52)
HCT VFR BLD AUTO: 24.8 % (ref 42–52)
HGB BLD-MCNC: 8.4 G/DL (ref 14–18)
HGB BLD-MCNC: 8.4 G/DL (ref 14–18)
IMM GRANULOCYTES # BLD AUTO: 0.03 K/UL (ref 0–0.11)
IMM GRANULOCYTES NFR BLD AUTO: 0.4 % (ref 0–0.9)
INR PPP: 1.24 (ref 0.87–1.13)
LYMPHOCYTES # BLD AUTO: 0.75 K/UL (ref 1–4.8)
LYMPHOCYTES NFR BLD: 10.4 % (ref 22–41)
MAGNESIUM SERPL-MCNC: 2.7 MG/DL (ref 1.5–2.5)
MCH RBC QN AUTO: 30.7 PG (ref 27–33)
MCH RBC QN AUTO: 30.7 PG (ref 27–33)
MCHC RBC AUTO-ENTMCNC: 33.9 G/DL (ref 33.7–35.3)
MCHC RBC AUTO-ENTMCNC: 33.9 G/DL (ref 33.7–35.3)
MCV RBC AUTO: 90.5 FL (ref 81.4–97.8)
MCV RBC AUTO: 90.5 FL (ref 81.4–97.8)
MONOCYTES # BLD AUTO: 0.51 K/UL (ref 0–0.85)
MONOCYTES NFR BLD AUTO: 7 % (ref 0–13.4)
NEUTROPHILS # BLD AUTO: 5.86 K/UL (ref 1.82–7.42)
NEUTROPHILS NFR BLD: 80.9 % (ref 44–72)
NRBC # BLD AUTO: 0 K/UL
NRBC BLD-RTO: 0 /100 WBC
PHOSPHATE SERPL-MCNC: 6.1 MG/DL (ref 2.5–4.5)
PLATELET # BLD AUTO: 83 K/UL (ref 164–446)
PLATELET # BLD AUTO: 83 K/UL (ref 164–446)
PMV BLD AUTO: 11.7 FL (ref 9–12.9)
PMV BLD AUTO: 11.7 FL (ref 9–12.9)
POTASSIUM SERPL-SCNC: 4.9 MMOL/L (ref 3.6–5.5)
PROT SERPL-MCNC: 6.1 G/DL (ref 6–8.2)
PROTHROMBIN TIME: 15.3 SEC (ref 12–14.6)
RBC # BLD AUTO: 2.74 M/UL (ref 4.7–6.1)
RBC # BLD AUTO: 2.74 M/UL (ref 4.7–6.1)
SODIUM SERPL-SCNC: 138 MMOL/L (ref 135–145)
WBC # BLD AUTO: 7.3 K/UL (ref 4.8–10.8)
WBC # BLD AUTO: 7.3 K/UL (ref 4.8–10.8)

## 2017-12-28 PROCEDURE — 700102 HCHG RX REV CODE 250 W/ 637 OVERRIDE(OP): Performed by: HOSPITALIST

## 2017-12-28 PROCEDURE — 700111 HCHG RX REV CODE 636 W/ 250 OVERRIDE (IP): Performed by: INTERNAL MEDICINE

## 2017-12-28 PROCEDURE — 770020 HCHG ROOM/CARE - TELE (206)

## 2017-12-28 PROCEDURE — 700105 HCHG RX REV CODE 258: Performed by: INTERNAL MEDICINE

## 2017-12-28 PROCEDURE — 85025 COMPLETE CBC W/AUTO DIFF WBC: CPT

## 2017-12-28 PROCEDURE — 700111 HCHG RX REV CODE 636 W/ 250 OVERRIDE (IP): Performed by: STUDENT IN AN ORGANIZED HEALTH CARE EDUCATION/TRAINING PROGRAM

## 2017-12-28 PROCEDURE — 700102 HCHG RX REV CODE 250 W/ 637 OVERRIDE(OP): Performed by: INTERNAL MEDICINE

## 2017-12-28 PROCEDURE — 700111 HCHG RX REV CODE 636 W/ 250 OVERRIDE (IP): Performed by: HOSPITALIST

## 2017-12-28 PROCEDURE — A9270 NON-COVERED ITEM OR SERVICE: HCPCS | Performed by: HOSPITALIST

## 2017-12-28 PROCEDURE — 36415 COLL VENOUS BLD VENIPUNCTURE: CPT

## 2017-12-28 PROCEDURE — 82270 OCCULT BLOOD FECES: CPT

## 2017-12-28 PROCEDURE — 85610 PROTHROMBIN TIME: CPT

## 2017-12-28 PROCEDURE — 80053 COMPREHEN METABOLIC PANEL: CPT

## 2017-12-28 PROCEDURE — A9270 NON-COVERED ITEM OR SERVICE: HCPCS | Performed by: INTERNAL MEDICINE

## 2017-12-28 PROCEDURE — 99233 SBSQ HOSP IP/OBS HIGH 50: CPT | Performed by: INTERNAL MEDICINE

## 2017-12-28 PROCEDURE — 83735 ASSAY OF MAGNESIUM: CPT

## 2017-12-28 PROCEDURE — 84100 ASSAY OF PHOSPHORUS: CPT

## 2017-12-28 RX ORDER — OMEPRAZOLE 20 MG/1
40 CAPSULE, DELAYED RELEASE ORAL 2 TIMES DAILY
Status: DISCONTINUED | OUTPATIENT
Start: 2017-12-28 | End: 2018-01-04 | Stop reason: HOSPADM

## 2017-12-28 RX ORDER — TAMSULOSIN HYDROCHLORIDE 0.4 MG/1
0.8 CAPSULE ORAL
Status: DISCONTINUED | OUTPATIENT
Start: 2017-12-29 | End: 2017-12-28

## 2017-12-28 RX ORDER — SEVELAMER HYDROCHLORIDE 800 MG/1
800 TABLET, FILM COATED ORAL
Status: DISCONTINUED | OUTPATIENT
Start: 2017-12-28 | End: 2018-01-04 | Stop reason: HOSPADM

## 2017-12-28 RX ORDER — ISOSORBIDE DINITRATE 10 MG/1
10 TABLET ORAL 3 TIMES DAILY
Status: DISCONTINUED | OUTPATIENT
Start: 2017-12-28 | End: 2018-01-04 | Stop reason: HOSPADM

## 2017-12-28 RX ORDER — HYDRALAZINE HYDROCHLORIDE 25 MG/1
25 TABLET, FILM COATED ORAL EVERY 8 HOURS
Status: DISCONTINUED | OUTPATIENT
Start: 2017-12-28 | End: 2018-01-04 | Stop reason: HOSPADM

## 2017-12-28 RX ORDER — TAMSULOSIN HYDROCHLORIDE 0.4 MG/1
0.4 CAPSULE ORAL
Status: DISCONTINUED | OUTPATIENT
Start: 2017-12-29 | End: 2018-01-04 | Stop reason: HOSPADM

## 2017-12-28 RX ORDER — FAMOTIDINE 20 MG/1
20 TABLET, FILM COATED ORAL DAILY
Status: DISCONTINUED | OUTPATIENT
Start: 2017-12-28 | End: 2017-12-28

## 2017-12-28 RX ORDER — MORPHINE SULFATE 4 MG/ML
1-3 INJECTION, SOLUTION INTRAMUSCULAR; INTRAVENOUS EVERY 4 HOURS PRN
Status: DISCONTINUED | OUTPATIENT
Start: 2017-12-28 | End: 2018-01-04 | Stop reason: HOSPADM

## 2017-12-28 RX ADMIN — SODIUM BICARBONATE: 84 INJECTION, SOLUTION INTRAVENOUS at 16:56

## 2017-12-28 RX ADMIN — MORPHINE SULFATE 2 MG: 4 INJECTION INTRAVENOUS at 04:07

## 2017-12-28 RX ADMIN — CEFTRIAXONE 2 G: 2 INJECTION, POWDER, FOR SOLUTION INTRAMUSCULAR; INTRAVENOUS at 07:54

## 2017-12-28 RX ADMIN — TAMSULOSIN HYDROCHLORIDE 0.4 MG: 0.4 CAPSULE ORAL at 07:50

## 2017-12-28 RX ADMIN — ATORVASTATIN CALCIUM 10 MG: 10 TABLET, FILM COATED ORAL at 21:37

## 2017-12-28 RX ADMIN — SODIUM BICARBONATE: 84 INJECTION, SOLUTION INTRAVENOUS at 07:49

## 2017-12-28 RX ADMIN — HYDRALAZINE HYDROCHLORIDE 25 MG: 25 TABLET, FILM COATED ORAL at 21:37

## 2017-12-28 RX ADMIN — TERAZOSIN HYDROCHLORIDE ANHYDROUS 10 MG: 5 CAPSULE ORAL at 21:37

## 2017-12-28 RX ADMIN — CHOLECALCIFEROL TAB 25 MCG (1000 UNIT) 4000 UNITS: 25 TAB at 07:49

## 2017-12-28 RX ADMIN — Medication 325 MG: at 07:50

## 2017-12-28 RX ADMIN — FINASTERIDE 5 MG: 5 TABLET, FILM COATED ORAL at 07:50

## 2017-12-28 RX ADMIN — OMEPRAZOLE 40 MG: 20 CAPSULE, DELAYED RELEASE ORAL at 21:38

## 2017-12-28 RX ADMIN — HALOPERIDOL LACTATE 5 MG: 5 INJECTION, SOLUTION INTRAMUSCULAR at 03:37

## 2017-12-28 RX ADMIN — ASPIRIN 325 MG: 325 TABLET, COATED ORAL at 07:50

## 2017-12-28 RX ADMIN — ALLOPURINOL 100 MG: 100 TABLET ORAL at 07:50

## 2017-12-28 RX ADMIN — CLOPIDOGREL 75 MG: 75 TABLET, FILM COATED ORAL at 07:50

## 2017-12-28 RX ADMIN — RENAGEL 800 MG: 800 TABLET ORAL at 21:37

## 2017-12-28 RX ADMIN — CARVEDILOL 12.5 MG: 12.5 TABLET, FILM COATED ORAL at 16:57

## 2017-12-28 RX ADMIN — CYANOCOBALAMIN TAB 500 MCG 1000 MCG: 500 TAB at 07:50

## 2017-12-28 RX ADMIN — CARVEDILOL 12.5 MG: 12.5 TABLET, FILM COATED ORAL at 07:50

## 2017-12-28 ASSESSMENT — PAIN SCALES - GENERAL
PAINLEVEL_OUTOF10: 0
PAINLEVEL_OUTOF10: 4
PAINLEVEL_OUTOF10: 10

## 2017-12-28 ASSESSMENT — ENCOUNTER SYMPTOMS
TINGLING: 0
BACK PAIN: 0
CARDIOVASCULAR NEGATIVE: 1
SEIZURES: 0
SPEECH CHANGE: 1
DIARRHEA: 0
DOUBLE VISION: 0
FOCAL WEAKNESS: 1
HEMOPTYSIS: 0
NECK PAIN: 0
COUGH: 0
HEARTBURN: 1
WEAKNESS: 1
BLOOD IN STOOL: 0
MYALGIAS: 0
SENSORY CHANGE: 1
PND: 0
PALPITATIONS: 0
TREMORS: 0
SHORTNESS OF BREATH: 0
ABDOMINAL PAIN: 0
VOMITING: 0
CONSTIPATION: 0
NAUSEA: 1
BLURRED VISION: 0
HEADACHES: 0
DEPRESSION: 0
RESPIRATORY NEGATIVE: 1
LOSS OF CONSCIOUSNESS: 0
CHILLS: 0
FEVER: 0
DIZZINESS: 0
FALLS: 0

## 2017-12-28 ASSESSMENT — LIFESTYLE VARIABLES: DO YOU DRINK ALCOHOL: NO

## 2017-12-28 NOTE — ASSESSMENT & PLAN NOTE
Last EF 12/12 was better up to 45% from 25  Continue lipitor, coreg, hold ACE due to worsened renal failure

## 2017-12-28 NOTE — PROGRESS NOTES
Lab at bedside, pt refusing lab to be drawn.  Writer attempted to explain to patient that we need to make sure the interventions are working.  Pt continues to refuse.  Pt agrees to have lab done about 0900

## 2017-12-28 NOTE — ASSESSMENT & PLAN NOTE
Multiple etiologies including anemia of renal disease and acute blood loss from hematuria last night-   no evidence of obvious GI bleed  Continue Plavix  Patient is not interested in aggressive care or transfusion at this time

## 2017-12-28 NOTE — PROGRESS NOTES
Direct admit from Dr. Cruz at [facility] ([place]). Dr. Ramires accepting for Renal Failure. ADT signed and held 12/27/2017 at 1617 and will need to be released upon patient arrival to unit. Patient arriving via ground transport.

## 2017-12-28 NOTE — PROGRESS NOTES
Received bedside shift report from night RNGabi.  Pt is AOx2, confused to place and event.  Discussed POC and goal for the day with patient but he was not able to verbalize understanding.  Eduated pt on white board and call light.  Bed locked and in lowest position with bilateral bedside rails up.  Will resume care and continue to monitor.

## 2017-12-28 NOTE — CARE PLAN
Problem: Communication  Goal: The ability to communicate needs accurately and effectively will improve  Outcome: PROGRESSING AS EXPECTED    Intervention: Gilbert patient and significant other/support system to call light to alert staff of needs   12/28/17 0253   OTHER   Oriented to: Location of bathroom;Unit Routine;Call Light & Bedside Controls;Bedside Rail Policy;Patient Rights and Responsibilities      12/28/17 0253   OTHER   Oriented to: Location of bathroom;Unit Routine;Call Light & Bedside Controls;Bedside Rail Policy;Patient Rights and Responsibilities     Intervention: Educate patient and significant other/support system about the plan of care, procedures, treatments, medications and allow for questions   12/28/17 0253   OTHER   Pt & Family Have Been Educated on Methods Available to Report Concerns Related to Care, Treatment, Services, and Patient Safety Issues Yes     Intervention: Use communication aids and/or /Language Line as appropriate   12/27/17 1704 12/27/17 2130 12/28/17 0253   Special Assistance Needs   Patient's Primary Language --  --  English   Hearing Impairment --  Both Ears --    Does Pt Need Special Equipment for the Hearing Impaired? No --  --          Problem: Psychosocial Needs:  Goal: Level of anxiety will decrease  Outcome: PROGRESSING AS EXPECTED   12/27/17 2130   OTHER   Patient Behaviors Irritable     Intervention: Identify and develop with patient strategies to cope with anxiety triggers  Pt hard of hearing, make sure pt can hear and understands

## 2017-12-28 NOTE — CONSULTS
DATE OF SERVICE:  12/27/2017    REASON FOR CONSULTATION:  Acute kidney injury, hyperkalemia, acidemia.    HISTORY OF PRESENT ILLNESS:  The patient is an 84-year-old gentleman with CKD   stage IV, secondary to renal vascular disease and diabetes who presented to   [facility] with a left-sided infarction with some confusion and right-sided   weakness.  The patient was acutely treated and sent to rehab, they had   surveillance laboratories done today that showed that his potassium had   increased to 6.3 and he had significant worsening of his chronic kidney   disease and was transferred to [facility] for further evaluation and treatment.    Reviewing his chart, the patient did have some low blood pressures in the low   100s.  He did not receive any contrast studies.  He was on ciprofloxacin, but   denied any stigmata of skin changes or rash.  He reports decreased urine   output during that time.  He has received no NSAIDs.    PAST MEDICAL HISTORY:  Significant for diabetes, chronic kidney disease, COPD,   cerebrovascular disease, hypercholesterolemia, peripheral vascular disease.    SOCIAL HISTORY:  The patient is currently at the rehab center.  He is not   .  He has a child.  He has a history of alcohol use, but has not used   for sometime.    FAMILY HISTORY:  Reviewed, believes he had hypertension and heart disease.    MEDICATIONS:  At the outside facility were ciprofloxacin carvedilol,   amlodipine, hydrocodone, _____, aspirin, allopurinol, atorvastatin,   clopidogrel, hydralazine, enalapril and trazodone.    REVIEW OF SYSTEMS:  GENERAL:  The patient reports in general he has been feeling stronger.  He has   had no fevers.  HEENT:  Denies difficulty seeing, hearing or swallowing.  CARDIOVASCULAR:  He has had no chest pain.  PULMONARY:  Denies shortness of breath.  GASTROINTESTINAL:  He has had abdominal pain.  GENITOURINARY:  He thinks he has had mild decreased in urine output.  EXTREMITIES:  He denies any lower  extremity swelling.  NEUROLOGIC:  He just says he has had weakness, but his upper body strength is   improving.    PHYSICAL EXAMINATION:  VITAL SIGNS:  He is afebrile, his blood pressure was 109/58, heart rates in   the 40s-80s, and respirations are 18.  GENERAL:  He is an elderly man sitting up in bed in no acute distress.  HEENT:  His extraocular muscles are intact.  His sclerae are anicteric.  His   oropharynx is clear.  NECK:  Supple.  HEART:  Regular rate, S1, S2.  LUNGS:  Clear.  ABDOMEN:  Has mild suprapubic tenderness.  He has no lower extremity edema.  NEUROLOGIC:  Grossly nonfocal with preserved bilateral upper extremity   strength.    LABORATORY DATA:  As follows:  His sodium is 135, potassium 6.3, chloride 107,   CO2 is 14, glucose 150, BUN is 144, creatinine 5.5 and calcium is 9.3.  His   white blood cell count is 8.7.  His hemoglobin is 9.9.  His platelet count is   106.    CONCLUSION:  The patient is an 84-year-old gentleman with multiple medical   issues including hyperkalemia and acute kidney injury.  1.  Hyperkalemia.  The patient's elevated potassium is secondary to renal   failure, acidemia and the use of ACE inhibitor.  He has been treated medically   at this time.  We are going to put him on a bicarbonate rich solution and   check an EKG, he has received calcium, insulin at this time.  2.  Acidemia positive gap, likely related to his renal failure.  3.  Acute kidney injury, question obstruction with known obstruction versus   decreased perfusion, exacerbated by the ongoing use of ACE inhibitor.  At this   point, the patient does not require dialysis.  His ultrasound was done, but   the results are pending.  We will repeat his urinalysis, check urine   electrolytes and treat him medically at this point.  4.  Chronic kidney disease secondary to diabetes and vascular disease.  5.  Left cerebrovascular accident, currently receiving rehab.  6.  Anemia, likely multifactorial including chronic kidney  disease.  7.  Adenocarcinoma of the lung.  8.  Hypertension, now with borderline low blood pressure.    PLAN:  At this time is to get an EKG.  Followup ultrasound results.  Start   bicarbonate rich solution.  Place a Owens catheter with serial laboratories.       ____________________________________     LJ DOTSON MD    RFQ / DILLON    DD:  12/27/2017 18:16:50  DT:  12/27/2017 22:16:35    D#:  [id number]  Job#:  [id number]    cc: ARANZA JONES MD

## 2017-12-28 NOTE — PROGRESS NOTES
Continues to have some bilat foot pain, describing as sharp and shooting.  Bilat pedal pulses unchanged from beginning of shift assessment.  Pain and agitation appear to be lessened, but patient unable to rate pain

## 2017-12-28 NOTE — PROGRESS NOTES
Hospital Medicine Progress Note, Adult, Complex               Author: Kostas Garcia Date & Time created: 12/28/2017  9:37 AM     Interval History:  84-year-old gentleman with CKD stage IV, secondary to renal vascular disease and diabetes who presented to [facility] with a left-sided infarction with some confusion and right-sided weakness.      The patient was acutely treated and sent to rehab, they had surveillance laboratories done today that showed that his potassium had   increased to 6.3 and he had significant worsening of his chronic kidney disease and was transferred to [facility] for further evaluation and treatment.     Reviewing his chart, the patient did have some low blood pressures in the low 100s.  He did not receive any contrast studies.  He was on ciprofloxacin, but denied any stigmata of skin changes or rash.  He reports decreased urine output during that time.  He has received no NSAIDs.       Daily Nephrology Summary  12/27/17 - seen in consultation after transfer from Rehab.  ARNALDO and Hyperkalemia treated with IVF/bicarb/kayexalate  12/28/17 - Renal function some better with current tx.  K now ok.  Fractional excretion non diagnostic.  Continue current tx    Review of Systems   Constitutional: Positive for malaise/fatigue.   Respiratory: Negative.    Cardiovascular: Negative.    Skin: Negative.          Physical Exam   Constitutional: No distress.   Eyes: No scleral icterus.   Neck: No JVD present.   Cardiovascular: Normal rate.  Exam reveals no friction rub.    Pulmonary/Chest: Effort normal. No respiratory distress.   Abdominal: Soft. He exhibits no distension.       Labs:        Invalid input(s): [id number]      Recent Labs      12/27/17   0548  12/27/17   1623  12/28/17   0816   SODIUM  137  135  138   POTASSIUM  6.0*  6.3*  4.9   CHLORIDE  110  107  109   CO2  18*  14*  20   BUN  141*  144*  123*   CREATININE  6.47*  5.50*  4.70*   MAGNESIUM   --    --   2.7*   PHOSPHORUS   --    --   6.1*    CALCIUM  9.2  9.3  9.1     Recent Labs      17   0548  17   1623  17   0816   ALTSGPT   --    --   5   ASTSGOT   --    --   9*   ALKPHOSPHAT   --    --   44   TBILIRUBIN   --    --   0.4   GLUCOSE  101*  150*  107*     Recent Labs      17   0548  17   1000  17   0816   RBC  2.99*  3.19*  2.74*   HEMOGLOBIN  9.2*  9.9*  8.4*   HEMATOCRIT  27.9*  29.8*  24.8*   PLATELETCT  92*  106*  83*   PROTHROMBTM   --    --   15.3*   INR   --    --   1.24*     Recent Labs      17   0548  17   1000  17   0816   WBC  6.5  8.7  7.3   NEUTSPOLYS  73.90*   --    --    LYMPHOCYTES  14.00*   --    --    MONOCYTES  8.40   --    --    EOSINOPHILS  2.90   --    --    BASOPHILS  0.30   --    --    ASTSGOT   --    --   9*   ALTSGPT   --    --   5   ALKPHOSPHAT   --    --   44   TBILIRUBIN   --    --   0.4           Hemodynamics:  Temp (24hrs), Av.7 °C (98 °F), Min:36.4 °C (97.6 °F), Max:37.1 °C (98.7 °F)  Temperature: 36.4 °C (97.6 °F)  Pulse  Av  Min: 60  Max: 71   Blood Pressure : 136/56     Respiratory:    Respiration: 19, Pulse Oximetry: 94 %        RUL Breath Sounds: Diminished, RML Breath Sounds: Diminished, RLL Breath Sounds: Diminished, MILES Breath Sounds: Diminished, LLL Breath Sounds: Diminished  Fluids:    Intake/Output Summary (Last 24 hours) at 17 0972  Last data filed at 17 0431   Gross per 24 hour   Intake                0 ml   Output             1350 ml   Net            -1350 ml     Weight: 73.8 kg (162 lb 11.2 oz)  GI/Nutrition:  Orders Placed This Encounter   Procedures   • DIET ORDER     Standing Status:   Standing     Number of Occurrences:   1     Order Specific Question:   Diet:     Answer:   Renal [8]     Medical Decision Making, by Problem:  Active Hospital Problems    Diagnosis   • CVA (cerebral vascular accident) (CMS-HCC) [I63.9]   • Acute on Chronic blood loss anemia [D50.0]   • Cardiomyopathy (CMS-HCC) [I42.9]   • Lung cancer (CMS-HCC)  [C34.90]   • Acute renal failure superimposed on stage 5 chronic kidney disease, not on chronic dialysis (CMS-HCC) [N17.9, N18.5]   • Urinary tract infection without hematuria [N39.0]   • BPH (benign prostatic hyperplasia) [N40.0]     IMPRESSION/PLAN    # ARNALDO/CKD  -- baseline SCreat around 2.5 w eGFR ~25  -- non oliguric with improving numbers with current treatment  -- no indication for acute dialysis at this time  -- fractional excretion of sodium was non diagnostic  -- monitor chems/hgb routinely  -- continue current treatment    # Hyperkalemia  -- need to check stools for blood given prior hx of GI bleeding and was hyperkalemic with high BUN  -- K better today after IVF and bicarb  -- monitor daily                  Quality-Core Measures

## 2017-12-28 NOTE — PROGRESS NOTES
Dr Perry notified that pt refused labs, is currently agitated, and c/o sharp shooting pain in bilat feet.  Morphine 1-3 mg IV ordered every 4 hours prn

## 2017-12-28 NOTE — CONSULTS
85 y/o man with ARNALDO/CKD with hyperkalemia-treated medically, insert perez, full consult dictated

## 2017-12-28 NOTE — ASSESSMENT & PLAN NOTE
Patient on therapy with terazosin and flomax and finasteride  Continue perez  Will need outpatient urology follow up

## 2017-12-28 NOTE — ASSESSMENT & PLAN NOTE
Improved  CKD stage IV acute on chronic dysfunction   Owens removed per patient request, voiding well without it  No further hematuria

## 2017-12-28 NOTE — RESPIRATORY CARE
COPD EDUCATION by COPD CLINICAL EDUCATOR  12/28/2017 at 7:43 AM by Katie Telles     Patient reviewed by COPD education team. Patient does not qualify for COPD program.

## 2017-12-28 NOTE — ASSESSMENT & PLAN NOTE
Urinalysis on 12/22 showed urine packed with WBC, cultures negative to date  Continue ceftriaxone

## 2017-12-28 NOTE — PROGRESS NOTES
Bed control called with available bed in [facility] tower 708-1. Daughter Ekta notified.

## 2017-12-28 NOTE — ASSESSMENT & PLAN NOTE
Recent CVA, came over from acute rehab  R sided deficits stable  Continue plavix, asa stopped  Continue Atorvastatin

## 2017-12-28 NOTE — H&P
" Hospital Medicine History and Physical    Date of Service  12/27/2017    Chief Complaint  Abnormal labs, sent over from Acute Rehab.    History of Presenting Illness  84 y.o. male who presented 12/27/2017 with worsening acute renal failure, presenting with hyperkalemia and low urine output. He was admitted 12/11/17 to [facility], he was discharged to acute rehabilitation. He had been doing well with treatment/therapy until the last few days when he started complaining of nausea and fatigue. The rehab physician ordered lab work and found renal function that was quite far off of his baseline with markedly elevated potassium. Patient states his urine stream has been weaker, he complains of \"indigestion\" and nausea.    Primary Care Physician  Kirill Fabian D.O.    Consultants  Nephrology, Dr. Barbosa    Code Status  Full code.    Review of Systems  Review of Systems   Constitutional: Negative.  Negative for chills, fever, malaise/fatigue and weight loss.   HENT: Negative.    Respiratory: Negative.  Negative for cough and shortness of breath.    Cardiovascular: Negative.  Negative for chest pain and leg swelling.   Gastrointestinal: Positive for nausea. Negative for abdominal pain, blood in stool, constipation, diarrhea, heartburn, melena and vomiting.   Genitourinary: Negative.  Negative for dysuria and flank pain.   Musculoskeletal: Negative.  Negative for back pain and myalgias.   Neurological: Positive for focal weakness (persistent right sided weakness). Negative for dizziness, loss of consciousness and weakness.   Endo/Heme/Allergies: Negative.  Does not bruise/bleed easily.   Psychiatric/Behavioral: Negative.  Negative for depression. The patient is not nervous/anxious.    All other systems reviewed and are negative.       Past Medical History  Past Medical History:   Diagnosis Date   • Stroke (CMS-HCC) 2015   • AVM (arteriovenous malformation) of colon    • Back pain    • Chickenpox    • " Chronic airway obstruction, not elsewhere classified    • Congestive heart failure (CMS-HCC)    • Diabetes    • Diphtheria    • Emphysema of lung (CMS-HCC)    • Hypercholesteremia    • Hypertension    • Infectious disease    • Other and unspecified angina pectoris    • PVD (peripheral vascular disease) (CMS-HCC)    • Snoring    • Unspecified cataract     right eye    • Urinary bladder disorder    • Urinary incontinence        Surgical History  Past Surgical History:   Procedure Laterality Date   • COLONOSCOPY  6/17/2015    Procedure: COLONOSCOPY;  Surgeon: Eddie Levin M.D.;  Location: SURGERY [facility];  Service:    • GASTROSCOPY  6/17/2015    Procedure: GASTROSCOPY W/APC;  Surgeon: Eddie Levin M.D.;  Location: SURGERY [facility];  Service:    • GASTROSCOPY WITH APC  11/1/2014    Performed by Eddie Levin M.D. at ENDOSCOPY [facility]   • COLONOSCOPY WITH APC  11/1/2014    Performed by Eddie Levin M.D. at ENDOSCOPY [facility]   • GASTROSCOPY-ENDO  5/1/2011    Performed by HOLLY VASQUEZ at ENDOSCOPY [facility]   • COLONOSCOPY - ENDO  5/1/2011    Performed by HOLLY VASQUEZ at ENDOSCOPY [facility]   • COLONOSCOPY WITH APC  4/21/2011    Performed by EDMUND CHENG at [facility]   • GASTROSCOPY-ENDO  4/21/2011    Performed by EDMUND CHENG at [facility]   • COLONOSCOPY WITH APC  9/28/2010    Performed by EDMUND CHENG at [facility]   • GASTROSCOPY WITH APC  9/28/2010    Performed by EDMUND CHENG at [facility]   • AORTOFEMORAL BYPASS  1991    Both legs       Medications  No current facility-administered medications on file prior to encounter.      Current Outpatient Prescriptions on File Prior to Encounter   Medication Sig Dispense Refill   • aspirin (ASA) 325 MG Tab Take 1 Tab by mouth every day. 100 Tab    • cyanocobalamin (VITAMIN B12) 1000 MCG Tab Take 1  "Tab by mouth every day. 30 Tab 3   • terazosin (HYTRIN) 10 MG capsule Take 10 mg by mouth every evening.     • atorvastatin (LIPITOR) 10 MG Tab Take 10 mg by mouth every evening.     • finasteride (PROSCAR) 5 MG Tab Take 5 mg by mouth every day.     • carvedilol (COREG) 12.5 MG Tab Take 1 Tab by mouth 2 times a day, with meals. 180 Tab 3   • enalapril (VASOTEC) 10 MG Tab Take 2 Tabs by mouth 2 times a day. 180 Tab 3   • clopidogrel (PLAVIX) 75 MG Tab Take 1 Tab by mouth every morning. 90 Tab 3   • tamsulosin (FLOMAX) 0.4 MG capsule Take 0.4 mg by mouth ONE-HALF HOUR AFTER BREAKFAST.     • allopurinol (ZYLOPRIM) 100 MG TABS Take 100 mg by mouth every day.     • Cholecalciferol (VITAMIN D) 2000 UNIT TABS Take 4,000 Units by mouth every day.     • Ferrous Sulfate (IRON) 325 (65 FE) MG TABS Take 1 Tab by mouth every morning.         Family History  Family History   Problem Relation Age of Onset   • Non-contributory Neg Hx      \"unknown\"       Social History  Social History   Substance Use Topics   • Smoking status: Former Smoker     Packs/day: 2.00     Years: 40.00     Quit date: 1/1/1982   • Smokeless tobacco: Never Used      Comment: Hx smoking x 40 yrs. Quit 1983   • Alcohol use No       Allergies  Allergies   Allergen Reactions   • Nkda [No Known Drug Allergy]         Physical Exam  Laboratory   Hemodynamics  No data recorded.      No Data Recorded           Respiratory                    Physical Exam   Constitutional: He is oriented to person, place, and time. He appears well-developed and well-nourished. No distress.   HENT:   Nose: Nose normal.   Mouth/Throat: Oropharynx is clear and moist. No oropharyngeal exudate.   Eyes: Conjunctivae are normal. Right eye exhibits no discharge. Left eye exhibits no discharge. No scleral icterus.   Neck: No JVD present. No tracheal deviation present.   Cardiovascular: Normal rate, regular rhythm and normal heart sounds.    Pulmonary/Chest: Effort normal and breath sounds " normal. No stridor. No respiratory distress. He has no wheezes. He has no rales. He exhibits no tenderness.   Abdominal: Soft. Bowel sounds are normal. He exhibits distension. There is tenderness.   Bladder easily palpable and distended   Musculoskeletal: He exhibits no edema or tenderness.   Neurological: He is alert and oriented to person, place, and time. He displays normal reflexes. A cranial nerve deficit is present. He exhibits abnormal muscle tone. Coordination abnormal.   Right upper extremity 3/5 strength  Right lower extremity 4/5 strength  Right facial droop   Skin: Skin is warm and dry. He is not diaphoretic. No pallor.   Psychiatric: He has a normal mood and affect. His behavior is normal. Judgment and thought content normal.   Nursing note and vitals reviewed.      Recent Labs      12/27/17   0548  12/27/17   1000   WBC  6.5  8.7   RBC  2.99*  3.19*   HEMOGLOBIN  9.2*  9.9*   HEMATOCRIT  27.9*  29.8*   MCV  93.3  93.4   MCH  30.8  31.0   MCHC  33.0*  33.2*   RDW  48.8  48.1   PLATELETCT  92*  106*   MPV  11.5  12.5     Recent Labs      12/27/17   0548  12/27/17   1623   SODIUM  137  135   POTASSIUM  6.0*  6.3*   CHLORIDE  110  107   CO2  18*  14*   GLUCOSE  101*  150*   BUN  141*  144*   CREATININE  6.47*  5.50*   CALCIUM  9.2  9.3     Recent Labs      12/27/17   0548  12/27/17   1623   GLUCOSE  101*  150*                 Lab Results   Component Value Date    TROPONINI <0.01 12/11/2017     Urinalysis:    Lab Results  Component Value Date/Time   SPECGRAVITY 1.017 12/22/2017 0500   GLUCOSEUR Negative 12/22/2017 0500   KETONES Negative 12/22/2017 0500   NITRITE Negative 12/22/2017 0500   WBCURINE Packed (A) 12/22/2017 0500   RBCURINE 10-20 (A) 12/22/2017 0500   BACTERIA Few (A) 12/22/2017 0500   EPITHELCELL Negative 12/22/2017 0500    This urinalysis was from 12/22.    Imaging  Ultrasound of kidneys pending   Assessment/Plan     I anticipate this patient will require at least two midnights for appropriate  medical management, necessitating inpatient admission.    CVA (cerebral vascular accident) (CMS-HCC)- (present on admission)   Assessment & Plan    Recent CVA, came over from acute rehab  Persistent right sided deficits  Continue lipitor and carvedilol        Acute on Chronic blood loss anemia- (present on admission)   Assessment & Plan    Probably component of anemia due to renal disease  Worsened since admission to rehab  Follow hemoglobin  Continue iron supplement        Lung cancer (CMS-HCC)- (present on admission)   Assessment & Plan    Left lower lobe adenoma  Patient scheduled to start radiation in January        Cardiomyopathy (CMS-HCC)- (present on admission)   Assessment & Plan    Last EF 12/12 was better up to 45% from 25  Continue lipitor, coreg, hold ACE due to worsened renal failure        Urinary tract infection without hematuria- (present on admission)   Assessment & Plan    Urinalysis on 12/22 showed urine packed with WBC, cultures negative to date  Start Ceftriaxone 2 grams IV  Repeat urinalysis  Repeat urine culture        Acute renal failure superimposed on stage 5 chronic kidney disease, not on chronic dialysis (CMS-HCC)- (present on admission)   Assessment & Plan    Possible component of obstructive uropathy, also history of stage IV pre existing disease  Discussed with Dr. Familia Rigginsxalate given at rehab prior to transfer  Give one gram of calcium chloride IV  Bicarbonate drip  Urinalysis, urine electrolytes, renal ultrasound  Place perez catheter  Continue prostate medications          BPH (benign prostatic hyperplasia)- (present on admission)   Assessment & Plan    Continue finasteride, flomax.  Place perez catheter.            VTE prophylaxis: SCDs.

## 2017-12-29 PROBLEM — N19 RENAL FAILURE: Status: ACTIVE | Noted: 2017-12-29

## 2017-12-29 LAB
25(OH)D3 SERPL-MCNC: 41 NG/ML (ref 30–100)
ALBUMIN SERPL BCP-MCNC: 2.9 G/DL (ref 3.2–4.9)
ALBUMIN/GLOB SERPL: 1.1 G/DL
ALP SERPL-CCNC: 37 U/L (ref 30–99)
ALT SERPL-CCNC: 7 U/L (ref 2–50)
ANION GAP SERPL CALC-SCNC: 11 MMOL/L (ref 0–11.9)
AST SERPL-CCNC: 14 U/L (ref 12–45)
BASOPHILS # BLD AUTO: 0.4 % (ref 0–1.8)
BASOPHILS # BLD: 0.02 K/UL (ref 0–0.12)
BILIRUB SERPL-MCNC: 0.4 MG/DL (ref 0.1–1.5)
BUN SERPL-MCNC: 104 MG/DL (ref 8–22)
CALCIUM SERPL-MCNC: 8.5 MG/DL (ref 8.5–10.5)
CHLORIDE SERPL-SCNC: 109 MMOL/L (ref 96–112)
CO2 SERPL-SCNC: 22 MMOL/L (ref 20–33)
CREAT SERPL-MCNC: 4.31 MG/DL (ref 0.5–1.4)
EOSINOPHIL # BLD AUTO: 0.13 K/UL (ref 0–0.51)
EOSINOPHIL NFR BLD: 2.4 % (ref 0–6.9)
ERYTHROCYTE [DISTWIDTH] IN BLOOD BY AUTOMATED COUNT: 45.7 FL (ref 35.9–50)
FERRITIN SERPL-MCNC: 232.4 NG/ML (ref 22–322)
FOLATE SERPL-MCNC: 13.1 NG/ML
GFR SERPL CREATININE-BSD FRML MDRD: 13 ML/MIN/1.73 M 2
GLOBULIN SER CALC-MCNC: 2.7 G/DL (ref 1.9–3.5)
GLUCOSE SERPL-MCNC: 103 MG/DL (ref 65–99)
HCT VFR BLD AUTO: 23.7 % (ref 42–52)
HEMOCCULT SP1 STL QL: POSITIVE
HGB BLD-MCNC: 8 G/DL (ref 14–18)
HGB RETIC QN AUTO: 36.8 PG/CELL (ref 29–35)
IMM GRANULOCYTES # BLD AUTO: 0.05 K/UL (ref 0–0.11)
IMM GRANULOCYTES NFR BLD AUTO: 0.9 % (ref 0–0.9)
IMM RETICS NFR: 11.2 % (ref 9.3–17.4)
IRON SATN MFR SERPL: 16 % (ref 15–55)
IRON SERPL-MCNC: 35 UG/DL (ref 50–180)
LV EJECT FRACT  99904: 50
LV EJECT FRACT MOD 2C 99903: 63.21
LV EJECT FRACT MOD 4C 99902: 49.56
LV EJECT FRACT MOD BP 99901: 52.59
LYMPHOCYTES # BLD AUTO: 0.6 K/UL (ref 1–4.8)
LYMPHOCYTES NFR BLD: 10.9 % (ref 22–41)
MAGNESIUM SERPL-MCNC: 2.2 MG/DL (ref 1.5–2.5)
MCH RBC QN AUTO: 30.7 PG (ref 27–33)
MCHC RBC AUTO-ENTMCNC: 33.8 G/DL (ref 33.7–35.3)
MCV RBC AUTO: 90.8 FL (ref 81.4–97.8)
MONOCYTES # BLD AUTO: 0.49 K/UL (ref 0–0.85)
MONOCYTES NFR BLD AUTO: 8.9 % (ref 0–13.4)
NEUTROPHILS # BLD AUTO: 4.21 K/UL (ref 1.82–7.42)
NEUTROPHILS NFR BLD: 76.5 % (ref 44–72)
NRBC # BLD AUTO: 0 K/UL
NRBC BLD-RTO: 0 /100 WBC
PHOSPHATE SERPL-MCNC: 4.5 MG/DL (ref 2.5–4.5)
PLATELET # BLD AUTO: 79 K/UL (ref 164–446)
PMV BLD AUTO: 12.1 FL (ref 9–12.9)
POTASSIUM SERPL-SCNC: 4.5 MMOL/L (ref 3.6–5.5)
PROT SERPL-MCNC: 5.6 G/DL (ref 6–8.2)
PTH-INTACT SERPL-MCNC: 38.5 PG/ML (ref 14–72)
RBC # BLD AUTO: 2.61 M/UL (ref 4.7–6.1)
RETICS # AUTO: 0.03 M/UL (ref 0.04–0.06)
RETICS/RBC NFR: 1.2 % (ref 0.8–2.1)
SODIUM SERPL-SCNC: 142 MMOL/L (ref 135–145)
TIBC SERPL-MCNC: 214 UG/DL (ref 250–450)
WBC # BLD AUTO: 5.5 K/UL (ref 4.8–10.8)

## 2017-12-29 PROCEDURE — G8979 MOBILITY GOAL STATUS: HCPCS | Mod: CI

## 2017-12-29 PROCEDURE — 85025 COMPLETE CBC W/AUTO DIFF WBC: CPT

## 2017-12-29 PROCEDURE — 700111 HCHG RX REV CODE 636 W/ 250 OVERRIDE (IP): Performed by: INTERNAL MEDICINE

## 2017-12-29 PROCEDURE — 93306 TTE W/DOPPLER COMPLETE: CPT

## 2017-12-29 PROCEDURE — G8978 MOBILITY CURRENT STATUS: HCPCS | Mod: CK

## 2017-12-29 PROCEDURE — 770006 HCHG ROOM/CARE - MED/SURG/GYN SEMI*

## 2017-12-29 PROCEDURE — 93306 TTE W/DOPPLER COMPLETE: CPT | Mod: 26 | Performed by: INTERNAL MEDICINE

## 2017-12-29 PROCEDURE — 85046 RETICYTE/HGB CONCENTRATE: CPT

## 2017-12-29 PROCEDURE — G8997 SWALLOW GOAL STATUS: HCPCS | Mod: CH

## 2017-12-29 PROCEDURE — 99233 SBSQ HOSP IP/OBS HIGH 50: CPT | Mod: 25 | Performed by: INTERNAL MEDICINE

## 2017-12-29 PROCEDURE — 83970 ASSAY OF PARATHORMONE: CPT

## 2017-12-29 PROCEDURE — 80053 COMPREHEN METABOLIC PANEL: CPT

## 2017-12-29 PROCEDURE — 82728 ASSAY OF FERRITIN: CPT

## 2017-12-29 PROCEDURE — 83540 ASSAY OF IRON: CPT

## 2017-12-29 PROCEDURE — 83550 IRON BINDING TEST: CPT

## 2017-12-29 PROCEDURE — 700105 HCHG RX REV CODE 258: Performed by: INTERNAL MEDICINE

## 2017-12-29 PROCEDURE — 83735 ASSAY OF MAGNESIUM: CPT

## 2017-12-29 PROCEDURE — 36415 COLL VENOUS BLD VENIPUNCTURE: CPT

## 2017-12-29 PROCEDURE — 700102 HCHG RX REV CODE 250 W/ 637 OVERRIDE(OP): Performed by: HOSPITALIST

## 2017-12-29 PROCEDURE — 700111 HCHG RX REV CODE 636 W/ 250 OVERRIDE (IP): Performed by: STUDENT IN AN ORGANIZED HEALTH CARE EDUCATION/TRAINING PROGRAM

## 2017-12-29 PROCEDURE — G8996 SWALLOW CURRENT STATUS: HCPCS | Mod: CI

## 2017-12-29 PROCEDURE — A9270 NON-COVERED ITEM OR SERVICE: HCPCS | Performed by: HOSPITALIST

## 2017-12-29 PROCEDURE — 700101 HCHG RX REV CODE 250: Performed by: INTERNAL MEDICINE

## 2017-12-29 PROCEDURE — A9270 NON-COVERED ITEM OR SERVICE: HCPCS | Performed by: INTERNAL MEDICINE

## 2017-12-29 PROCEDURE — 92610 EVALUATE SWALLOWING FUNCTION: CPT

## 2017-12-29 PROCEDURE — 700102 HCHG RX REV CODE 250 W/ 637 OVERRIDE(OP): Performed by: INTERNAL MEDICINE

## 2017-12-29 PROCEDURE — 97161 PT EVAL LOW COMPLEX 20 MIN: CPT

## 2017-12-29 PROCEDURE — 82746 ASSAY OF FOLIC ACID SERUM: CPT

## 2017-12-29 PROCEDURE — 82306 VITAMIN D 25 HYDROXY: CPT

## 2017-12-29 PROCEDURE — 99497 ADVNCD CARE PLAN 30 MIN: CPT | Performed by: INTERNAL MEDICINE

## 2017-12-29 PROCEDURE — 84100 ASSAY OF PHOSPHORUS: CPT

## 2017-12-29 RX ADMIN — TERAZOSIN HYDROCHLORIDE ANHYDROUS 10 MG: 5 CAPSULE ORAL at 20:55

## 2017-12-29 RX ADMIN — ISOSORBIDE DINITRATE 10 MG: 10 TABLET ORAL at 16:26

## 2017-12-29 RX ADMIN — HYDRALAZINE HYDROCHLORIDE 25 MG: 25 TABLET, FILM COATED ORAL at 20:55

## 2017-12-29 RX ADMIN — RENAGEL 800 MG: 800 TABLET ORAL at 16:26

## 2017-12-29 RX ADMIN — RENAGEL 800 MG: 800 TABLET ORAL at 13:38

## 2017-12-29 RX ADMIN — CARVEDILOL 12.5 MG: 12.5 TABLET, FILM COATED ORAL at 16:26

## 2017-12-29 RX ADMIN — MORPHINE SULFATE 2 MG: 4 INJECTION INTRAVENOUS at 02:27

## 2017-12-29 RX ADMIN — HYDRALAZINE HYDROCHLORIDE 25 MG: 25 TABLET, FILM COATED ORAL at 06:01

## 2017-12-29 RX ADMIN — RENAGEL 800 MG: 800 TABLET ORAL at 09:28

## 2017-12-29 RX ADMIN — TAMSULOSIN HYDROCHLORIDE 0.4 MG: 0.4 CAPSULE ORAL at 09:28

## 2017-12-29 RX ADMIN — OMEPRAZOLE 40 MG: 20 CAPSULE, DELAYED RELEASE ORAL at 09:28

## 2017-12-29 RX ADMIN — CYANOCOBALAMIN TAB 500 MCG 1000 MCG: 500 TAB at 09:28

## 2017-12-29 RX ADMIN — CHOLECALCIFEROL TAB 25 MCG (1000 UNIT) 4000 UNITS: 25 TAB at 09:29

## 2017-12-29 RX ADMIN — CARVEDILOL 12.5 MG: 12.5 TABLET, FILM COATED ORAL at 09:28

## 2017-12-29 RX ADMIN — ATORVASTATIN CALCIUM 10 MG: 10 TABLET, FILM COATED ORAL at 20:55

## 2017-12-29 RX ADMIN — SODIUM BICARBONATE: 84 INJECTION, SOLUTION INTRAVENOUS at 02:22

## 2017-12-29 RX ADMIN — OMEPRAZOLE 40 MG: 20 CAPSULE, DELAYED RELEASE ORAL at 20:55

## 2017-12-29 RX ADMIN — WATER 1000 MG: 1 INJECTION INTRAMUSCULAR; INTRAVENOUS; SUBCUTANEOUS at 09:29

## 2017-12-29 RX ADMIN — FINASTERIDE 5 MG: 5 TABLET, FILM COATED ORAL at 09:28

## 2017-12-29 RX ADMIN — ISOSORBIDE DINITRATE 10 MG: 10 TABLET ORAL at 09:28

## 2017-12-29 RX ADMIN — SODIUM BICARBONATE: 84 INJECTION, SOLUTION INTRAVENOUS at 15:51

## 2017-12-29 RX ADMIN — ISOSORBIDE DINITRATE 10 MG: 10 TABLET ORAL at 20:57

## 2017-12-29 ASSESSMENT — ENCOUNTER SYMPTOMS
CHILLS: 0
DOUBLE VISION: 0
NECK PAIN: 0
WEAKNESS: 1
BLURRED VISION: 0
DIARRHEA: 0
SHORTNESS OF BREATH: 0
COUGH: 0
MYALGIAS: 0
HEMOPTYSIS: 0
SEIZURES: 0
CONSTIPATION: 0
DEPRESSION: 0
LOSS OF CONSCIOUSNESS: 0
HEARTBURN: 1
VOMITING: 0
PND: 0
FEVER: 0
TINGLING: 0
TREMORS: 0
DIZZINESS: 0
FALLS: 0
HEADACHES: 0
NAUSEA: 1
ABDOMINAL PAIN: 0
RESPIRATORY NEGATIVE: 1
PALPITATIONS: 0
SPEECH CHANGE: 1
BACK PAIN: 0
SENSORY CHANGE: 1
CARDIOVASCULAR NEGATIVE: 1
FOCAL WEAKNESS: 1
BLOOD IN STOOL: 0

## 2017-12-29 ASSESSMENT — COGNITIVE AND FUNCTIONAL STATUS - GENERAL
WALKING IN HOSPITAL ROOM: A LITTLE
MOBILITY SCORE: 18
STANDING UP FROM CHAIR USING ARMS: A LITTLE
MOVING FROM LYING ON BACK TO SITTING ON SIDE OF FLAT BED: A LITTLE
MOVING TO AND FROM BED TO CHAIR: A LITTLE
SUGGESTED CMS G CODE MODIFIER MOBILITY: CK
CLIMB 3 TO 5 STEPS WITH RAILING: A LOT

## 2017-12-29 ASSESSMENT — PAIN SCALES - GENERAL
PAINLEVEL_OUTOF10: 0

## 2017-12-29 ASSESSMENT — GAIT ASSESSMENTS
GAIT LEVEL OF ASSIST: CONTACT GUARD ASSIST
ASSISTIVE DEVICE: FRONT WHEEL WALKER
DISTANCE (FEET): 25
DEVIATION: DECREASED BASE OF SUPPORT;STEP TO

## 2017-12-29 NOTE — DIETARY
"Nutrition Services: Consult for High-Risk DX    Past Medical History:   Diagnosis Date   • AVM (arteriovenous malformation) of colon    • Back pain    • Chickenpox    • Chronic airway obstruction, not elsewhere classified    • Congestive heart failure (CMS-HCC)    • Diabetes    • Diphtheria    • Emphysema of lung (CMS-HCC)    • Hypercholesteremia    • Hypertension    • Infectious disease    • Other and unspecified angina pectoris    • PVD (peripheral vascular disease) (CMS-HCC)    • Snoring    • Stroke (CMS-HCC) 2015   • Unspecified cataract     right eye    • Urinary bladder disorder    • Urinary incontinence      Ht: 5' 9\"  Wt: 76.8kg (169#)  BMI: 25.0 (WNL)  Diet: Renal, Dysphagia 3, Nectar Thick Liquids    85 y/o male admitted with renal failure who presets with Lung Cancer. Attempted to see pt 2x, but pt was busy with other disciplines. Noted per chart review that pt is receiving Boost Plus TID. Updated so that pt recieves Boost VHC and Magic Cups per pt on Nectar Thick Liquid diet and updated supplements are more safe and appropriate fluid consistency. Per chart review, pt has 1 meal documented and PO was 25-50% of meals. Will follow up on pt to assess nutrition status and make recommendations as appropriate.     Pertinent Labs: Glucose: 103, BUN: 104, Cr: 4.31, Albumin: 2.9  Pertinent Meds: Coreg, Vitamin 12, Renagel, Vit D  Fluids: Na Bicarb @ 125 mL/hr  Skin: No skin breakdown noted per chart review  GI: + BM today    PLAN/RECOMMEND -   1) Nutrition rep to see patient daily for meal and snack preferences.  2) Encourage PO of meals and supplements  3) Weekly weights to monitor fluid and nutrition status      RD following    "

## 2017-12-29 NOTE — CONSULTS
"Reason for PC Consult: Advance Care Planning    Consulted by: Henrietta Russo MD    Assessment:  General: 83yo gentleman admitted from [facility] 12/27 for ARNALDO, hyperK+, uremic(?) gastritis, 12/29 SLP recommed dysphagia 3, NTL, pureed.  PMH: recent admot 12/11-13 for CVA left side infract with right deficits/weakness, CKD stage IV 2/2 DM, Stage 1A RLL lung cancer, CHF, 90% carotid stenosis, COPD, PVD, [place], stopped smoking 1984 after 40 years    Dyspnea: No-    Last BM: 12/29/17-    Pain: No-    Depression: Mood appropriate for situation-      Spiritual:  Is [facility] or spirituality important for coping with this illness? No-    Has a  or spiritual provider visit been requested? No    Palliative Performance Scale: 50    Advanced Directive: None-  Completed at this encounter with Statements of Desires #2, #3 and #5 selected as well as desire for [employer] - pt's card scanned to [employer], AD and Cert of Comp signed by Dr. Russo placed on [employer] for notary servcie - pt/dtr aware how to have two witness if notary not completed in hospital.    DPOA:  -    POLST:No-  Completed at this encounter    Code Status: Full-  Changed to DNR at this encounter    Outcome:  Introduced self and role of Palliative Care to pt, dtr Ektasa Nunez and ex-wife Alexa Harden.  Assessed pt's understanding of his current medical status, overall health picture, and options for future care.  Pt stated many of his acute and chronic issues and then said, \"So I guess this could be the end.\"    Explored pt's values, beliefs, and preferences in order to identify GOC.  Pt was living independently in his condo prior to his CVA and now accepts that he won't be able to do this.  Pt stated that he wanted things to be \"easiest\" for his family.  In regards to his multiple medical issues, pt mused, \"Why go through all this to then have to deal with all that?  It's like a flogging a dead horse.\"  Pt stated no tube feeding nor dialysis " "and in light of that, does not see the need to go through xrt for his cancer or \"really anything else\" except that he would want antibiotics. Pt restated DNR/DNI and all of this was documented on POLST signed by pt and MD.      Pt would like to rehab his stroke less intensely at SNF to see if his baseline can improve -- pt and family will review his kidney function at SNF d/c with likely d/c to hospice at either dtr's home or  depending on pt's prognostic timeframe.  List of hospices and SNFs provided.  Pt's dtr looking at SNF list for choice.    Active listening, reflection, reminiscing, and empathic support utilized throughout this encounter.  All questions answered.  PC contact information given.     Updated: Dr. Russo    Plan: SNF d/c    Recommendations:  I do not recommend an ethics or hospice consult at this time because pt wants to rehab CVA at SNF and then likely d/c home with hospice for his kidney failure.    Thank you for allowing Palliative Care to participate in this patient's care. Please feel free to call [phone] with any questions or concerns.  "

## 2017-12-29 NOTE — DISCHARGE PLANNING
Care Transition Team Assessment    IHD met with patient bedside. He stated he admitted from [facility], but prior to that he lived alone. He stated he is normally able to drive himself and doesn't use any DME, O2 or HHC. He expects to discharge to [facility].     Information Source  Orientation : Disoriented to Place, Disoriented to Event  Information Given By: Patient  Informant's Name: Lex    Readmission Evaluation  Is this a readmission?: Yes - unplanned readmission    Elopement Risk  Legal Hold: No  Ambulatory or Self Mobile in Wheelchair: Yes  Disoriented: No  Psychiatric Symptoms: None  History of Wandering: No  Elopement this Admit: No  Vocalizing Wanting to Leave: No  Displays Behaviors, Body Language Wanting to Leave: No-Not at Risk for Elopement  Elopement Risk: Not at Risk for Elopement    Interdisciplinary Discharge Planning  Does Admitting Nurse Feel This Could be a Complex Discharge?: No  Primary Care Physician: Dr. Fabian  Lives with - Patient's Self Care Capacity: Alone and Able to Care For Self  Patient or legal guardian wants to designate a caregiver (see row info): No  Support Systems: Family Member(s)  Housing / Facility: 1 Story Apartment / Condo (on the second floor)  Do You Take your Prescribed Medications Regularly: Yes  Able to Return to Previous ADL's: Future Time w/Therapy  Mobility Issues: Yes  [facility]  Patient Expects to be Discharged to:: INPATIENT REHAB   Assistance Needed: Yes    Discharge Preparedness  What is your plan after discharge?: Skilled nursing facility (Rehab)  What are your discharge supports?: Child, Other (comment) (ex-wife)  Prior Functional Level: Ambulatory, Drives Self, Independent with Activities of Daily Living, Independent with Medication Management  Difficulity with ADLs: None  Difficulity with IADLs: None    Functional Assesment  Prior Functional Level: Ambulatory, Drives Self, Independent with Activities of Daily Living,  Independent with Medication Management    Finances  Financial Barriers to Discharge: No  Prescription Coverage: Yes ([employer])    Vision / Hearing Impairment  Vision Impairment : Yes  Right Eye Vision: Impaired, Wears Glasses  Left Eye Vision: Impaired, Wears Glasses  Hearing Impairment : Yes  Hearing Impairment: Both Ears, Hearing Device(s) Available  Does Pt Need Special Equipment for the Hearing Impaired?: No    Values / Beliefs / Concerns  Values / Beliefs Concerns : No  Special Hospitalization Concerns: NONE    Advance Directive  Advance Directive?: None    Domestic Abuse  Have you ever been the victim of abuse or violence?: No  Physical Abuse or Sexual Abuse: No  Verbal Abuse or Emotional Abuse: No  Possible Abuse Reported to:: Not Applicable    Psychological Assessment  History of Substance Abuse: None  History of Psychiatric Problems: No  Non-compliant with Treatment: No  Newly Diagnosed Illness: No    Discharge Risks or Barriers  Discharge risks or barriers?: No    Anticipated Discharge Information  Anticipated discharge disposition: Discharge needs currently unknown  Discharge Contact Phone Number: [phone]

## 2017-12-29 NOTE — ASSESSMENT & PLAN NOTE
Continue plavix / atorvastatin  Vascular surgery evaluation as outpatient if patient wants- currently not interested in further procedures

## 2017-12-29 NOTE — CARE PLAN
Problem: Safety  Goal: Will remain free from injury  Outcome: PROGRESSING AS EXPECTED  Patient educated on use of call light before attempting independent ambulation. Bed locked. Bed in lowest position. Patient and family educated on falls.     Problem: Mobility  Goal: Risk for activity intolerance will decrease  Outcome: PROGRESSING SLOWER THAN EXPECTED  Educated patient on potential loss of mobility as a result of immobility. Client refused to ambulate to bathroom, requested Bedpan. Continued to educate and encourage client.

## 2017-12-29 NOTE — DISCHARGE SUMMARY
Rehab Discharge Summary    Admission Date: 12/13/2017    Discharge Date: 12/27/2017    Attending Provider: Huy Quick MD/PhD    Admission Diagnosis:   Active Hospital Problems    Diagnosis   • *Left sided cerebral hemisphere cerebrovascular accident (CVA) (CMS-HCC)   • Vitamin B12 deficiency   • CVA (cerebral vascular accident) (CMS-HCC)   • CKD (chronic kidney disease) stage 3, GFR 30-59 ml/min   • Anemia   • Lung cancer (CMS-HCC)   • Thrombocytopenia (CMS-HCC)   • Dysphagia due to recent cerebrovascular accident (CVA)   • Chronic combined systolic and diastolic CHF (congestive heart failure) (CMS-HCC)   • BPH (benign prostatic hyperplasia)   • COPD (chronic obstructive pulmonary disease) (CMS-HCC)   • HTN (hypertension)   • DM (diabetes mellitus) (CMS-HCC)   • Dyslipidemia       Discharge Diagnosis:  Active Hospital Problems    Diagnosis   • *Left sided cerebral hemisphere cerebrovascular accident (CVA) (CMS-HCC)   • Vitamin B12 deficiency   • CVA (cerebral vascular accident) (CMS-HCC)   • CKD (chronic kidney disease) stage 3, GFR 30-59 ml/min   • Anemia   • Lung cancer (CMS-HCC)   • Thrombocytopenia (CMS-HCC)   • Dysphagia due to recent cerebrovascular accident (CVA)   • Chronic combined systolic and diastolic CHF (congestive heart failure) (CMS-HCC)   • BPH (benign prostatic hyperplasia)   • COPD (chronic obstructive pulmonary disease) (CMS-HCC)   • HTN (hypertension)   • DM (diabetes mellitus) (CMS-HCC)   • Dyslipidemia       HPI per H&P:  The patient is a 84 y.o. male with a past medical history of  Previous stroke, urinary incontinence, AODM, emphysema , PVD, cooronary artery disease, hypercholesterolemia, CHF, back pain and AVM of colon admitted to [facility]  On 12/11/2017  4:03 PM  with confusion and  mild symptoms of R sided weakness but seem to be intermittent and resolving until 230PM, he had R arm weakness and R leg weakness. He did not fall but felt his R leg is stiff. He also  had slight slurring of speech and slight facial droop on the right.  CT did not show acute infarct but MRI showed acute left sided infarction in the left posterior frontal periventricular white matter which extended to the adjacent sylvian fissure.  Patient was not a tPA candidate due to timing and recent diagnosis of adenocarcinoma of the lungs and colonic hemorrhage from an AVM.  Patient was not a thrombectomy candidate either. Patient was also evaluated by Radiation Oncology as he was supposed to follow-up as an outpatient but was evaluated inpatient for treatment course for lower lobe adenoma.  Patient is scheduled for radiation in January.      Patient was admitted to [facility] on 12/13/2017.     Hospital Course by Problem List:  UTI/ARNALDO - Patient with frequency and discomfort. +UA with UCx pending. Start Ciprofloxacin BID for 7 days.   -Worsening Cr from ~2.5 up to 6.0.  Patient still urinating. Will start on fluids and consult hospitalist.   -Patient transferred emergently from [facility] to [facility] on 12/27/17 for worsening ARNALDO.    CVA - Patient with new left sided CVA with R sided weakness, dysphagia and dysarthria.  Patient previously with bilateral strokes but improved to baseline.  Repeat MBS for dysphagia on 12/18/17.  Will trial Regular diet on 12/21/17 with SLP  -Continue  mg, Plavix 75 mg  -Continue Good BP and DLD control (see below)     RLL Lung Adenocarcinoma - Evaluated by Radiation oncology while at [facility].  Recommending radiation therapy in January.  -Follow-up with Radiation oncology post-discharge     HTN - Patient with longstanding HTN as well as CHF.  Patient previously on Coreg, Vasotec and Lasix.  All medications were stopped acutely for permissive hypertension. Since admission continued elevated BP  -Restart Enalapril 10 mg BID, Coreg 12.5 mg BID. Continue to monitor as was labile prior to restarting medications.  -Added parameters for Coreg for HR.  Added Amlodipine and  increased to 10 mg on 17. Much better control on -.  With bracycardia decrease Coreg to 6.25 mg BID. HR still in 50's     DLD - Patient on statin at baseline.  Cholesterol WNL on acute check.  -Continue Atorvastatin 10 mg QHS     BPH - Patient on Proscar at baseline  -Continue Proscar 10 mg daily.     Neurogenic Bowel  - Patient would like all medications made PRN.     COPD - No exacerbation since stroke.  Continue on home medications  -Patient declining Spiriva. Held     B12 Deficiency - Found during acute stay.  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     Gout - Pt reports being on allopurinol for years.   -Continue Alllopurinol 100 mg daily.     Vitamin D Deficiency - Reported deficiency but normal on admission labs. Will continue supplementation  -Cholecalciferol 4000 U daily       Functional Status at Discharge  Eatin - Standby Prompting/Supervision or Set-up  Eating Description:  Increased time, Modified diet, Supervision for safety, Verbal cueing  Groomin - Standby Prompting/Supervision or Set-up  Grooming Description:   ( setup and cues to perform task    oral care )  Bathin - Standby Prompting/Supervision or Set-up  Bathing Description:  Grab bar, Tub bench, Hand held shower, Increased time, Set-up of equipment, Verbal cueing (pt able to complete 10/10 steps with vc's for use of grab bars while standing to wash anastasia area)  Upper Body Dressin - Modified Independent  Upper Body Dressing Description:  Increased time (use of nunu-technique to don/doff pull over shirt and vest)  Lower Body Dressin - Standby Prompting/Supervsion or Set-up  Lower Body Dressing Description:  5 - Standby Prompting/Supervsion or Set-up     Walk:  2 - Max Assistance  Distance Walked:  Walks  feet  Walk Description:  Requires incidental assist, Safety concerns,  Supervision for safety, Verbal cueing, Extra time, Assist device/equipment (125' x 1 and 90' x 3 SPC De for stability, vc for sequencing with cane and posture)  Wheelchair:  4 - Minimal Assistance  Distance Propelled:  Propels a minimum of 150 feet   Wheelchair Description:  Verbal cueing, Safety concerns, Extra time (min A for steering avoiding objects)  Stairs 5 - Standby Prompting/Supervision or Set-up  Stairs DescriptionAscends/descends 12 to 14 steps, Extra time, Verbal cueing, Safety concerns, Supervision for safety, Requires incidental assist     Comprehension Mode:  Auditory  Comprehension:  5 - Stand-by Prompting/Supervision or Set-up  Comprehension Description:  Verbal cues  Expression Mode:  Vocal  Expression:  4 - Minimal Assistance  Expression Description:  Verbal cueing  Social Interaction:  5 - Stand-by Prompting/Supervision or Set-up  Social Interaction Description:  Increased time, Verbal cues  Problem Solvin - Minimal Assistance  Problem Solving Description:  Verbal cueing  Memory:  4 - Minimal Assistance  Memory Description:  Verbal cueing, Supervision, Bed/chair alarm, Seat belt       Discharge Medication:     Medication List      ASK your doctor about these medications      Instructions   allopurinol 100 MG Tabs  Commonly known as:  ZYLOPRIM   Take 100 mg by mouth every day.  Dose:  100 mg     aspirin 325 MG Tabs  Commonly known as:  ASA   Take 1 Tab by mouth every day.  Dose:  325 mg     atorvastatin 10 MG Tabs  Commonly known as:  LIPITOR   Take 10 mg by mouth every evening.  Dose:  10 mg     carvedilol 12.5 MG Tabs  Commonly known as:  COREG   Doctor's comments:  Increasing carvedilol to 12.5 Mg twice a day  Take 1 Tab by mouth 2 times a day, with meals.  Dose:  12.5 mg     clopidogrel 75 MG Tabs  Commonly known as:  PLAVIX   Take 1 Tab by mouth every morning.  Dose:  75 mg     cyanocobalamin 1000 MCG Tabs  Commonly known as:  VITAMIN B12   Take 1 Tab by mouth every day.  Dose:  1000  mcg     enalapril 10 MG Tabs  Commonly known as:  VASOTEC   Take 2 Tabs by mouth 2 times a day.  Dose:  20 mg     finasteride 5 MG Tabs  Commonly known as:  PROSCAR   Take 5 mg by mouth every day.  Dose:  5 mg     Iron 325 (65 Fe) MG Tabs   Take 1 Tab by mouth every morning.  Dose:  1 Tab     tamsulosin 0.4 MG capsule  Commonly known as:  FLOMAX   Take 0.4 mg by mouth ONE-HALF HOUR AFTER BREAKFAST.  Dose:  0.4 mg     terazosin 10 MG capsule  Commonly known as:  HYTRIN   Take 10 mg by mouth every evening.  Dose:  10 mg     vitamin D 2000 UNIT Tabs   Take 4,000 Units by mouth every day.  Dose:  4000 Units            Discharge Diet:  Dysphagia 3 w thins    Discharge Activity:  Per [facility]    Disposition:  To [facility] for worsening ARNALDO management    Equipment:  TBD    Follow-up & Discharge Instructions:  TBD    Condition on Discharge:  Guarded    Huy Quick M.D.    Date of Service: 12/27/2017

## 2017-12-29 NOTE — THERAPY
"Speech Language Therapy Clinical Swallow Evaluation completed.  Functional Status: Patient awake but confused. He was agitated last night but calm at this moment. Per EMR notes the patient was dx with silent aspiration on thin liquids on 12/18/2017. He was upgraded to thins 1 to 2 days prior to [facility] re admit. Unfortunately, the patient is showing signs of penetration/aspiration today with thin liquids. RN reported patient had strong coughing when taking pills with water this morning. He had intermittent throat clearing with delayed coughing after sipping thin juice from his breakfast tray during this evaluation. He denies having any difficulty and adamantly refuses thickened liquids. He listened to education but again reports \"I have no problems.\" He participated in simple oral motor exercises but did not follow any swallow strategies.    Recommendations - Diet:  Recommend Dysphagia 3, (pureed soup) with nectar thick liquids.                          Strategies: Direct supervision during meals, Assistance needed for meal tray set-up, No Straws and Head of Bed at 90 Degrees                          Medication Administration:  Float whole in puree.  Plan of Care: Will benefit from Speech Therapy 3 times per week. Patient may also benefit from a speech/cognitive evaluation to further address patient's dysarthria and comprehension.  Post-Acute Therapy: Discharge to a transitional care facility for continued skilled therapy services. and Discharge to home with outpatient or home health for additional skilled therapy services.    See \"Rehab Therapy-Acute\" Patient Summary Report for complete documentation.   "

## 2017-12-29 NOTE — PROGRESS NOTES
[facility] Hospitalist Progress Note    Date of Service: 12/28/2017    Chief Complaint  84 y.o. male admitted 12/27/2017 with ARNALDO / Uremia from acute rehab where patient was undergoing rehab after recent CVA    Interval Problem Update  Patient seen and evaluated on rounds  Per nursing was confused and encephalopathic overnight  This am he was A&Ox4  Wants to go home  I believe he has poor insight regarding his condition  Renal function / hyperkalemia has improved  Down trending Hb, from hydration ? Or bleeding uncertain  Uremia has improved now  Nephrology team on board    Upon review of this patient's chart and underlying medical co morbidities, this patient remains at very high risk of poor prognosis, co morbidity and mortality. I recommend palliative consultation and strong consideration towards palliative care / hospice care at this time. Will further address with patient and family tomorrow.     Consultants/Specialty  Nephrology   Palliative care    Disposition  Continue telemetry monitoring at this time        Review of Systems   Constitutional: Positive for malaise/fatigue. Negative for chills and fever.   HENT: Positive for hearing loss. Negative for tinnitus.    Eyes: Negative for blurred vision and double vision.   Respiratory: Negative for cough, hemoptysis and shortness of breath.    Cardiovascular: Negative for chest pain, palpitations and PND.   Gastrointestinal: Positive for heartburn and nausea. Negative for abdominal pain, blood in stool, constipation, diarrhea, melena and vomiting.   Genitourinary:        Owens in place   Musculoskeletal: Negative for back pain, falls, joint pain, myalgias and neck pain.   Skin: Negative for rash.   Neurological: Positive for sensory change, speech change, focal weakness and weakness. Negative for dizziness, tingling, tremors, seizures, loss of consciousness and headaches.   Psychiatric/Behavioral: Negative for depression and suicidal ideas.      Physical Exam   Laboratory/Imaging   Hemodynamics  Temp (24hrs), Av.4 °C (97.6 °F), Min:36.2 °C (97.1 °F), Max:36.6 °C (97.9 °F)   Temperature: 36.6 °C (97.9 °F)  Pulse  Av.5  Min: 55  Max: 71    Blood Pressure : (!) 178/64 (Rn notified)      Respiratory      Respiration: 19, Pulse Oximetry: 93 %        RUL Breath Sounds: Diminished, RML Breath Sounds: Diminished, RLL Breath Sounds: Diminished, MILES Breath Sounds: Diminished, LLL Breath Sounds: Diminished    Fluids    Intake/Output Summary (Last 24 hours) at 17 1913  Last data filed at 17 0431   Gross per 24 hour   Intake                0 ml   Output             1350 ml   Net            -1350 ml       Nutrition  Orders Placed This Encounter   Procedures   • DIET ORDER     Standing Status:   Standing     Number of Occurrences:   1     Order Specific Question:   Diet:     Answer:   Renal [8]     Physical Exam   Constitutional: He is oriented to person, place, and time. He appears well-developed and well-nourished. No distress.   HENT:   Head: Normocephalic.   Mouth/Throat: Oropharynx is clear and moist. No oropharyngeal exudate.   Eyes: Pupils are equal, round, and reactive to light. No scleral icterus.   Pale conjunctivae   Neck: No JVD present.   Cardiovascular: Normal rate and regular rhythm.  Exam reveals no gallop and no friction rub.    Murmur heard.  Pulmonary/Chest: Effort normal. No stridor. No respiratory distress. He has no wheezes. He has rales.   Abdominal: Soft. Bowel sounds are normal. He exhibits no distension. There is no tenderness. There is no rebound and no guarding.   Musculoskeletal: He exhibits edema. He exhibits no tenderness or deformity.   Neurological: He is alert and oriented to person, place, and time.   No flap. R sided deficitis.    Skin: Skin is warm and dry. He is not diaphoretic.   Psychiatric: He has a normal mood and affect. His behavior is normal. Judgment and thought content normal.       Recent Labs      17   2039   12/27/17   1000  12/28/17   0816   WBC  6.5  8.7  7.3  7.3   RBC  2.99*  3.19*  2.74*  2.74*   HEMOGLOBIN  9.2*  9.9*  8.4*  8.4*   HEMATOCRIT  27.9*  29.8*  24.8*  24.8*   MCV  93.3  93.4  90.5  90.5   MCH  30.8  31.0  30.7  30.7   MCHC  33.0*  33.2*  33.9  33.9   RDW  48.8  48.1  45.4  45.4   PLATELETCT  92*  106*  83*  83*   MPV  11.5  12.5  11.7  11.7     Recent Labs      12/27/17   0548  12/27/17   1623  12/28/17   0816   SODIUM  137  135  138   POTASSIUM  6.0*  6.3*  4.9   CHLORIDE  110  107  109   CO2  18*  14*  20   GLUCOSE  101*  150*  107*   BUN  141*  144*  123*   CREATININE  6.47*  5.50*  4.70*   CALCIUM  9.2  9.3  9.1     Recent Labs      12/28/17   0816   INR  1.24*                  Assessment/Plan     ARNALDO (acute kidney injury) (CMS-HCC)- (present on admission)   Assessment & Plan    Persistent but improving  In the setting of underlying CKD stage IV  Perez catheter was placed given concern for obstructive uropathy  Continue perez catheter at this time  Profound uremia, on aspirin stop ASA. Dysfunctional platelets.   Stop allopurinol.   Continue HCO3 gtt.   Nephrology team on board.   Appreciate nephrology team input.   See plan in CKD  Monitor renal function / Avoid nephrotoxins and dose medications renally          Gastritis- (present on admission)   Assessment & Plan    This is uremic gastritis likely  Previous history of 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 AVM  Recommend patient be maintained on high dose PO PPI and sucralfate  Optimize uremia, consider IHD if no improvement  Consider GI consultation for EGD needs if declining Hb  Patient is on DAPT, stop ASA given likely dysfunctional platelets from uremia  Stop medications with potential to cause gastritis / gastric irritation (iron)  Monitor Hb / Restrictive transfusion strategy        Hyperkalemia- (present on admission)   Assessment & Plan    POA  Medically treated, improved  Nephrology team on board  Continue telemetry monitoring at this time  Daily  monitoring of renal function panel        Urinary tract infection- (present on admission)   Assessment & Plan    Urinalysis on 12/22 showed urine packed with WBC, cultures negative to date  Repeat UA / Ucx not collected, orders placed for this again  Continue ceftriaxone at this time  De escalate abx as able  No hematuria, flank pain. No hydronephrosis on US. No indication to pursue CT renal at this time.         CVA (cerebral vascular accident) (CMS-HCC)- (present on admission)   Assessment & Plan    Recent CVA, came over from acute rehab  R sided deficits  Stop ASA, continue plavix  Atorvastatin   Continue risk factor modifications  PT/OT/SLP evaluations  Disposition as clinically appropriate        Anemia- (present on admission)   Assessment & Plan    This is anemia of acute blood loss likely from GI losses  Combination of anemia of renal disease  Stop ASA  Continue Plavix  Monitor Hb / Restrictive transfusion strategy  Consider GI input if ongoing down trending hemoglobin  EPO needs per nephrology team        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)   Assessment & Plan    POA  Improved  Management per nephrology team        Gout- (present on admission)   Assessment & Plan    Allopurinol held with ARNALDO        B12 deficiency- (present on admission)   Assessment & Plan    Recently depressed levels  On PO supplementation  Recheck levels  If persistently depressed initiate IM supplementation        Mitral regurgitation- (present on admission)   Assessment & Plan    Recommend outpatient cardiology follow up        Chronic kidney disease (CKD), stage IV (severe) (CMS-HCC)- (present on admission)   Assessment & Plan    This has worsened   Appears to be related to diabetic nephropathy / Hypertensive nephropathy / vascular disease  Closely monitor urine output  Monitor renal function / Avoid nephrotoxins and dose medications renally  Check PTH / Vitamin D levels  Check Iron studies  Aim AIC < 7  Aim SBP < 130  Monitor  Ph levels, initiate Ph binders  Once off HCO3 gtt consider PO HCO3 supplementation  Aim Hb > 10, EPO with recent acute stroke ? Defer to nephrology team  Continue statin therapy  No PICC lines  Consider pIV in one arm preferably R sided        Dysphagia due to recent cerebrovascular accident (CVA)- (present on admission)   Assessment & Plan    Improved  SLP evaluation  Diet per SLP recommendations        Thrombocytopenia (CMS-HCC)- (present on admission)   Assessment & Plan    On DAPT also  Uremia will lead to dysfunctional platelets  Stopped ASA  Monitor for bleeding events  If bleeding consider platelet transfusion  Closely monitor        Lung cancer (CMS-HCC)- (present on admission)   Assessment & Plan    Left lower lobe adenocarcinoma  Patient scheduled to start radiation in January        Chronic combined systolic and diastolic CHF (congestive heart failure) (CMS-HCC)- (present on admission)   Assessment & Plan    ACE-I therapy held because of profound ARNALDO  Non decompensated at this time  Continue carvedilol  Start Isordil / hydralazine  Afterload reduction  Aim SBP < 110        BPH (benign prostatic hyperplasia)- (present on admission)   Assessment & Plan    Patient on dual therapy with terazosin and flomax ?   In addition on finasteride  Continue baseline regimen at this time  Maintain Owens catheter at this time  Elevated PSA is noted  Recommended close outpatient urology f/u and ongoing management per urology team in outpatient setting        Aortic valve stenosis- (present on admission)   Assessment & Plan    This was reported as severe on TTE 07/2017 12/2017 no stenosis noted  Obtain interval echocardiogram  Recommend close outpatient cardiology evaluation and follow up        Subclavian artery stenosis, left (CMS-HCC)- (present on admission)   Assessment & Plan    90 %  Continue plavix / atorvastatin and risk factor modifications  Vascular surgery evaluation once acute issues resolve        Carotid  artery stenosis- (present on admission)   Assessment & Plan    B/L   Continue plavix / atorvastatin  Risk factor modification  Vascular surgery evaluation once acute issues resolve        Peripheral vascular disease (CMS-HCC)- (present on admission)   Assessment & Plan    Continue plavix / atorvastatin  Vascular surgery evaluation once acute issues resolve        COPD (chronic obstructive pulmonary disease) (CMS-HCC)- (present on admission)   Assessment & Plan    Without acute exacerbation  Continue close clinical monitoring        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)   Assessment & Plan    Hx of bleeding from this        DM (diabetes mellitus) (CMS-HCC)- (present on admission)   Assessment & Plan    Type II  Currently controlled off therapy  Monitor daily am blood sugars        Dyslipidemia- (present on admission)   Assessment & Plan    Continue atorvastatin        HTN (hypertension)- (present on admission)   Assessment & Plan    Afterload reduction to maintain SBP < 110            Reviewed items::  Labs reviewed, Medications reviewed and Radiology images reviewed  Owens catheter::  No Owens  DVT prophylaxis pharmacological::  Contraindicated - High bleeding risk  DVT prophylaxis - mechanical:  SCDs  Ulcer Prophylaxis::  Yes

## 2017-12-29 NOTE — PROGRESS NOTES
Pt observed, no change from original assessment, vss, no c/o pain at this time  Pt AAOx2 disoriented to time and situation, no other signs or symptoms of distress, fall precautions in place, call light within reach, all questions answered, hourly rounding initiated.

## 2017-12-29 NOTE — PROGRESS NOTES
"Pt found sitting at edge of bed while bed alarm was going off.  When asked what he was doing, he responded \"i'm trying not to shit my pants\".  Attempted to direct patient to stand and pivot to sit on bedside commode.  Pt very agitated and yelled at writer.  Attempts to redirect and calm pt unsuccessful.   Pt then yelled \"i'll knock you on your ass\".  Small amount of dark brown/black stool on bedside commode seat, sample sent to lab.  Pt cleaned up but started stooling again in bed onto the sheets.    "

## 2017-12-29 NOTE — PROGRESS NOTES
Hospital Medicine Progress Note, Adult, Complex               Author: Kostas Garcia Date & Time created: 12/29/2017  11:25 AM     Interval History:  84-year-old gentleman with CKD stage IV, secondary to renal vascular disease and diabetes who presented to [facility] with a left-sided infarction with some confusion and right-sided weakness.      The patient was acutely treated and sent to rehab, they had surveillance laboratories done today that showed that his potassium had   increased to 6.3 and he had significant worsening of his chronic kidney disease and was transferred to [facility] for further evaluation and treatment.     Reviewing his chart, the patient did have some low blood pressures in the low 100s.  He did not receive any contrast studies.  He was on ciprofloxacin, but denied any stigmata of skin changes or rash.  He reports decreased urine output during that time.  He has received no NSAIDs.       Daily Nephrology Summary  12/27/17 - seen in consultation after transfer from Rehab.  ARNALDO and Hyperkalemia treated with IVF/bicarb/kayexalate  12/28/17 - Renal function some better with current tx.  K now ok.  Fractional excretion non diagnostic.  Continue current tx  12/29/17 - BUN/Cr trending down.  K better.  Has significant CKD so renal function wont improve beyond Creat 2.5ish.  OK back to rehab    Review of Systems   Constitutional: Positive for malaise/fatigue.   Respiratory: Negative.    Cardiovascular: Negative.    Skin: Negative.          Physical Exam   Constitutional: No distress.   Eyes: No scleral icterus.   Neck: No JVD present.   Cardiovascular: Normal rate.  Exam reveals no friction rub.    Pulmonary/Chest: Effort normal. No respiratory distress.   Abdominal: Soft. He exhibits no distension.       Labs:        Invalid input(s): [id number]      Recent Labs      12/27/17   1623  12/28/17   0816  12/29/17   0334   SODIUM  135  138  142   POTASSIUM  6.3*  4.9  4.5   CHLORIDE  107  109  109   CO2  14*   20  22   BUN  144*  123*  104*   CREATININE  5.50*  4.70*  4.31*   MAGNESIUM   --   2.7*  2.2   PHOSPHORUS   --   6.1*  4.5   CALCIUM  9.3  9.1  8.5     Recent Labs      17   1623  17   0816  17   0334   ALTSGPT   --   5  7   ASTSGOT   --   9*  14   ALKPHOSPHAT   --   44  37   TBILIRUBIN   --   0.4  0.4   GLUCOSE  150*  107*  103*     Recent Labs      17   1000  17   0816  17   0334   RBC  3.19*  2.74*  2.74*  2.61*   HEMOGLOBIN  9.9*  8.4*  8.4*  8.0*   HEMATOCRIT  29.8*  24.8*  24.8*  23.7*   PLATELETCT  106*  83*  83*  79*   PROTHROMBTM   --   15.3*   --    INR   --   1.24*   --    IRON   --    --   35*   FERRITIN   --    --   232.4   TOTIRONBC   --    --   214*     Recent Labs      17   0548  17   1000  17   0816  17   0334   WBC  6.5  8.7  7.3  7.3  5.5   NEUTSPOLYS  73.90*   --   80.90*  76.50*   LYMPHOCYTES  14.00*   --   10.40*  10.90*   MONOCYTES  8.40   --   7.00  8.90   EOSINOPHILS  2.90   --   1.00  2.40   BASOPHILS  0.30   --   0.30  0.40   ASTSGOT   --    --   9*  14   ALTSGPT   --    --   5  7   ALKPHOSPHAT   --    --   44  37   TBILIRUBIN   --    --   0.4  0.4           Hemodynamics:  Temp (24hrs), Av.7 °C (98.1 °F), Min:36.2 °C (97.1 °F), Max:37.3 °C (99.1 °F)  Temperature: 37.3 °C (99.1 °F)  Pulse  Av.9  Min: 55  Max: 71   Blood Pressure : 127/50     Respiratory:    Respiration: 12, Pulse Oximetry: 97 %        RUL Breath Sounds: Diminished, RML Breath Sounds: Diminished, RLL Breath Sounds: Diminished;Crackles, MILES Breath Sounds: Diminished, LLL Breath Sounds: Diminished;Crackles  Fluids:    Intake/Output Summary (Last 24 hours) at 17 1125  Last data filed at 17 1830   Gross per 24 hour   Intake                0 ml   Output              900 ml   Net             -900 ml     Weight: 76.8 kg (169 lb 5 oz)  GI/Nutrition:  Orders Placed This Encounter   Procedures   • DIET ORDER     Standing Status:   Standing      Number of Occurrences:   1     Order Specific Question:   Diet:     Answer:   Renal [8]     Order Specific Question:   Texture/Fiber modifications:     Answer:   Dysphagia 3(Mechanical Soft)specify fluid consistency(question 6) [3]     Comments:   please puree soup     Order Specific Question:   Consistency/Fluid modifications:     Answer:   Nectar Thick [2]     Comments:   NO STRAWS!     Order Specific Question:   Miscellaneous modifications:     Answer:   SLP - Deliver to Nursing Station [22]     Comments:   pt needs help with HOB positioning     Medical Decision Making, by Problem:  Active Hospital Problems    Diagnosis   • CVA (cerebral vascular accident) (CMS-HCC) [I63.9]   • Acute on Chronic blood loss anemia [D50.0]   • Cardiomyopathy (CMS-HCC) [I42.9]   • Lung cancer (CMS-HCC) [C34.90]   • Acute renal failure superimposed on stage 5 chronic kidney disease, not on chronic dialysis (CMS-HCC) [N17.9, N18.5]   • Urinary tract infection without hematuria [N39.0]   • BPH (benign prostatic hyperplasia) [N40.0]     IMPRESSION/PLAN    # ARNALDO/CKD  -- baseline SCreat around 2.5 w eGFR ~25  -- non oliguric with improving numbers with current treatment  -- no indication for acute dialysis at this time  -- fractional excretion of sodium was non diagnostic  -- monitor chems/hgb routinely  -- continue current treatment    # Hyperkalemia  -- need to check stools for blood given prior hx of GI bleeding and was hyperkalemic with high BUN  -- K better  -- monitor daily      This patient is appropriate for hospice/pallliative care.  He is a terrible candidate for dialysis (thankfully does not require it at this time) - he has so many serious comorbid conditions and terminal cancer that dialysis would not significantly prolong his life, EVEN if it were indicated otherwise, so I do not recommend it.          Quality-Core Measures

## 2017-12-29 NOTE — ASSESSMENT & PLAN NOTE
POA  Medically treated, improved  Nephrology team on board  Continue telemetry monitoring at this time  Daily monitoring of renal function panel

## 2017-12-29 NOTE — ASSESSMENT & PLAN NOTE
Likely due to uremic gastritis   Previous history of 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 AVM  Will continue PO PPI and sucralfate  Informal conversation with GI consultation for EGD needed, currently suspect blood loss is from hematuria  Patient not interested in further procedures at this time

## 2017-12-29 NOTE — ASSESSMENT & PLAN NOTE
B/L   Continue plavix / atorvastatin  Risk factor modification  Vascular surgery evaluation once acute issues resolve if patient decides

## 2017-12-29 NOTE — ASSESSMENT & PLAN NOTE
90 %  Continue plavix / atorvastatin and risk factor modifications  Vascular surgery evaluation once acute issues resolve

## 2017-12-29 NOTE — THERAPY
"83 y/o male adm from [facility] rehab for uremia, ARNALDO, renal failure. Pt at [facility] for recent CVA right sided hemipaesis and dysarthria. Acute PT to address bed mobility, transfers, balance and gait with FWW to achieve higher level of function.  Physical Therapy Evaluation completed.   Bed Mobility:  Supine to Sit: Contact Guard Assist  Transfers: Sit to Stand: Contact Guard Assist  Gait: Level Of Assist: Contact Guard Assist with Front-Wheel Walker    X 25 ft.  Plan of Care: Will benefit from Physical Therapy 3 times per week  Discharge Recommendations: Equipment: Will Continue to Assess for Equipment Needs. Post-acute therapy Discharge to a transitional care facility for continued skilled therapy services.    See \"Rehab Therapy-Acute\" Patient Summary Report for complete documentation.     "

## 2017-12-29 NOTE — PROGRESS NOTES
[]Hide copied text  []Hover for attribution information  U/S bedside performing Echo. Patients family bedside. Plan of care discussed with client and family. All questions and concerns addressed and answered.

## 2017-12-30 PROBLEM — E87.5 HYPERKALEMIA: Status: RESOLVED | Noted: 2017-12-28 | Resolved: 2017-12-30

## 2017-12-30 PROBLEM — N18.4 CHRONIC KIDNEY DISEASE (CKD), STAGE IV (SEVERE) (HCC): Status: RESOLVED | Noted: 2017-12-28 | Resolved: 2017-12-30

## 2017-12-30 LAB
ALBUMIN SERPL BCP-MCNC: 2.9 G/DL (ref 3.2–4.9)
BUN SERPL-MCNC: 88 MG/DL (ref 8–22)
CALCIUM SERPL-MCNC: 8.4 MG/DL (ref 8.5–10.5)
CHLORIDE SERPL-SCNC: 109 MMOL/L (ref 96–112)
CO2 SERPL-SCNC: 25 MMOL/L (ref 20–33)
CREAT SERPL-MCNC: 3.46 MG/DL (ref 0.5–1.4)
ERYTHROCYTE [DISTWIDTH] IN BLOOD BY AUTOMATED COUNT: 45.5 FL (ref 35.9–50)
GFR SERPL CREATININE-BSD FRML MDRD: 17 ML/MIN/1.73 M 2
GLUCOSE SERPL-MCNC: 103 MG/DL (ref 65–99)
HCT VFR BLD AUTO: 21.8 % (ref 42–52)
HGB BLD-MCNC: 7.4 G/DL (ref 14–18)
MCH RBC QN AUTO: 30.5 PG (ref 27–33)
MCHC RBC AUTO-ENTMCNC: 33.9 G/DL (ref 33.7–35.3)
MCV RBC AUTO: 89.7 FL (ref 81.4–97.8)
PHOSPHATE SERPL-MCNC: 3.6 MG/DL (ref 2.5–4.5)
PLATELET # BLD AUTO: 83 K/UL (ref 164–446)
PMV BLD AUTO: 11.9 FL (ref 9–12.9)
POTASSIUM SERPL-SCNC: 4.3 MMOL/L (ref 3.6–5.5)
RBC # BLD AUTO: 2.43 M/UL (ref 4.7–6.1)
SODIUM SERPL-SCNC: 141 MMOL/L (ref 135–145)
WBC # BLD AUTO: 5.2 K/UL (ref 4.8–10.8)

## 2017-12-30 PROCEDURE — 700105 HCHG RX REV CODE 258: Performed by: INTERNAL MEDICINE

## 2017-12-30 PROCEDURE — 770006 HCHG ROOM/CARE - MED/SURG/GYN SEMI*

## 2017-12-30 PROCEDURE — 80069 RENAL FUNCTION PANEL: CPT

## 2017-12-30 PROCEDURE — 85027 COMPLETE CBC AUTOMATED: CPT

## 2017-12-30 PROCEDURE — A9270 NON-COVERED ITEM OR SERVICE: HCPCS | Performed by: HOSPITALIST

## 2017-12-30 PROCEDURE — 700111 HCHG RX REV CODE 636 W/ 250 OVERRIDE (IP): Performed by: INTERNAL MEDICINE

## 2017-12-30 PROCEDURE — A9270 NON-COVERED ITEM OR SERVICE: HCPCS | Performed by: INTERNAL MEDICINE

## 2017-12-30 PROCEDURE — 700102 HCHG RX REV CODE 250 W/ 637 OVERRIDE(OP): Performed by: INTERNAL MEDICINE

## 2017-12-30 PROCEDURE — 36415 COLL VENOUS BLD VENIPUNCTURE: CPT

## 2017-12-30 PROCEDURE — 700101 HCHG RX REV CODE 250: Performed by: INTERNAL MEDICINE

## 2017-12-30 PROCEDURE — 700102 HCHG RX REV CODE 250 W/ 637 OVERRIDE(OP): Performed by: HOSPITALIST

## 2017-12-30 PROCEDURE — 99233 SBSQ HOSP IP/OBS HIGH 50: CPT | Performed by: HOSPITALIST

## 2017-12-30 RX ADMIN — WATER 1000 MG: 1 INJECTION INTRAMUSCULAR; INTRAVENOUS; SUBCUTANEOUS at 15:33

## 2017-12-30 RX ADMIN — CARVEDILOL 12.5 MG: 12.5 TABLET, FILM COATED ORAL at 17:40

## 2017-12-30 RX ADMIN — HYDRALAZINE HYDROCHLORIDE 25 MG: 25 TABLET, FILM COATED ORAL at 05:47

## 2017-12-30 RX ADMIN — FINASTERIDE 5 MG: 5 TABLET, FILM COATED ORAL at 08:19

## 2017-12-30 RX ADMIN — OMEPRAZOLE 40 MG: 20 CAPSULE, DELAYED RELEASE ORAL at 08:19

## 2017-12-30 RX ADMIN — CARVEDILOL 12.5 MG: 12.5 TABLET, FILM COATED ORAL at 08:19

## 2017-12-30 RX ADMIN — ISOSORBIDE DINITRATE 10 MG: 10 TABLET ORAL at 08:19

## 2017-12-30 RX ADMIN — TAMSULOSIN HYDROCHLORIDE 0.4 MG: 0.4 CAPSULE ORAL at 08:19

## 2017-12-30 RX ADMIN — RENAGEL 800 MG: 800 TABLET ORAL at 17:40

## 2017-12-30 RX ADMIN — CHOLECALCIFEROL TAB 25 MCG (1000 UNIT) 4000 UNITS: 25 TAB at 08:20

## 2017-12-30 RX ADMIN — CYANOCOBALAMIN TAB 500 MCG 1000 MCG: 500 TAB at 08:19

## 2017-12-30 RX ADMIN — TERAZOSIN HYDROCHLORIDE ANHYDROUS 10 MG: 5 CAPSULE ORAL at 19:53

## 2017-12-30 RX ADMIN — HYDRALAZINE HYDROCHLORIDE 25 MG: 25 TABLET, FILM COATED ORAL at 22:27

## 2017-12-30 RX ADMIN — OMEPRAZOLE 40 MG: 20 CAPSULE, DELAYED RELEASE ORAL at 19:50

## 2017-12-30 RX ADMIN — SODIUM BICARBONATE: 84 INJECTION, SOLUTION INTRAVENOUS at 06:19

## 2017-12-30 RX ADMIN — ISOSORBIDE DINITRATE 10 MG: 10 TABLET ORAL at 15:34

## 2017-12-30 RX ADMIN — ISOSORBIDE DINITRATE 10 MG: 10 TABLET ORAL at 19:50

## 2017-12-30 RX ADMIN — RENAGEL 800 MG: 800 TABLET ORAL at 11:40

## 2017-12-30 RX ADMIN — RENAGEL 800 MG: 800 TABLET ORAL at 08:19

## 2017-12-30 RX ADMIN — CLOPIDOGREL 75 MG: 75 TABLET, FILM COATED ORAL at 08:19

## 2017-12-30 RX ADMIN — ATORVASTATIN CALCIUM 10 MG: 10 TABLET, FILM COATED ORAL at 19:51

## 2017-12-30 RX ADMIN — HYDRALAZINE HYDROCHLORIDE 25 MG: 25 TABLET, FILM COATED ORAL at 15:34

## 2017-12-30 ASSESSMENT — ENCOUNTER SYMPTOMS
TREMORS: 0
FOCAL WEAKNESS: 1
SEIZURES: 0
MYALGIAS: 0
COUGH: 0
DOUBLE VISION: 0
CARDIOVASCULAR NEGATIVE: 1
FEVER: 0
NECK PAIN: 0
CHILLS: 0
SENSORY CHANGE: 1
SPEECH CHANGE: 1
HEADACHES: 0
PND: 0
PALPITATIONS: 0
CONSTIPATION: 0
ABDOMINAL PAIN: 0
VOMITING: 0
HEARTBURN: 1
DIZZINESS: 0
DEPRESSION: 0
WEAKNESS: 1
HEMOPTYSIS: 0
BACK PAIN: 0
NAUSEA: 1
BLOOD IN STOOL: 0
FALLS: 0
DIARRHEA: 0
BLURRED VISION: 0
TINGLING: 0
SHORTNESS OF BREATH: 0
RESPIRATORY NEGATIVE: 1
LOSS OF CONSCIOUSNESS: 0

## 2017-12-30 ASSESSMENT — PAIN SCALES - GENERAL
PAINLEVEL_OUTOF10: 0

## 2017-12-30 NOTE — CARE PLAN
Problem: Safety  Goal: Will remain free from falls    Intervention: Implement fall precautions   12/29/17 2000   OTHER   Environmental Precautions Treaded Slipper Socks on Patient;Personal Belongings, Wastebasket, Call Bell etc. in Easy Reach;Report Given to Other Health Care Providers Regarding Fall Risk;Bed in Low Position;Communication Sign for Patients & Families;Mobility Assessed & Appropriate Sign Placed   IV Pole on Same Side of Bed as Bathroom Yes   Bedrails Bedrails Closest to Bathroom Down   Chair/Bed Strip Alarm Yes - Alarm On

## 2017-12-30 NOTE — CARE PLAN
Problem: Infection  Goal: Will remain free from infection  Outcome: PROGRESSING SLOWER THAN EXPECTED  Owens still in place. Will recommend trial DC of Owens.

## 2017-12-30 NOTE — PROGRESS NOTES
Ashley Mercedes Fall Risk Assessment:     Last Known Fall: No falls  Mobility: Use of assistive device/requires assist of two people  Medications: Cardiovascular or central nervous system meds  Mental Status/LOC/Awareness: Oriented to person and place  Toileting Needs: Use of catheters or diversion devices  Volume/Electrolyte Status: Use of IV fluids/tube feeds  Communication/Sensory: Visual (Glasses)/hearing deficit  Behavior: Appropriate behavior  Ashley Mercedes Fall Risk Total: 12  Fall Risk Level: MODERATE RISK    Universal Fall Precautions:  call light/belongings in reach, bed in low position and locked, siderails up x 2, wheelchairs and assistive devices out of sight, use non-slip footwear, adequate lighting, clutter free and spill free environment, educate on level of risk, educate to call for assistance    Fall Risk Level Interventions:    TRIAL (TELE 8, NEURO, MED SANA 5) Moderate Fall Risk Interventions  Place yellow fall risk ID band on patient: verified  Provide patient/family education based on risk assessment : completed  Educate patient/family to call staff for assistance when getting out of bed: completed  Place fall precaution signage outside patient door: verified  Utilize bed/chair fall alarm: verified     Patient Specific Interventions:     Medication: review medications with patient and family  Mental Status/LOC/Awareness: reorient patient, reinforce falls education, encourage family to stay with patient, check on patient hourly, utilize bed/chair fall alarm and reinforce the use of call light  Toileting: provide frquent toileting, monitor intake and output/use of appropriate interventions, instruct male patients prone to dizziness to void while sitting, instruct patient/family on the need to call for assistance when toileting and do not leave patient unattended in bathroom/refer to toileting scripting  Volume/Electrolyte Status: ensure patient remains hydrated, advance diet as tolerated,  administer medications as ordered for nausea and vomiting and monitor abnormal lab values  Communication/Sensory: update plan of care on whiteboard, ensure proper positioning when transferrng/ambulating and ensure patient has glasses/contacts and hearing aids/dentures  Behavioral: encourage patient to voice feelings, engage patient in daily activities, administer medication as ordered, instruct/reinforce fall program rationale and use appropriate de-escalation techniques  Mobility: schedule physical activity throughout the day, provide comfort measures during transport, dangle prior to standing, utilize bed/chair fall alarm, ensure bed is locked and in lowest position and instruct patient to exit bed on their strongest side

## 2017-12-30 NOTE — CARE PLAN
Problem: Skin Integrity  Goal: Risk for impaired skin integrity will decrease    Intervention: Implement precautions to protect skin integrity in collaboration with the interdisciplinary team   12/29/17 2000   OTHER   Skin Preventative Measures Pillows in Use for Support / Positioning   Bed Types Pressure Redistribution Mattress (Atmosair)   Friction Interventions Draw Sheet / Pad Used for Repositioning   Moisturizers Moisturizer ;Barrier Wipes   PT / OT Involved in Care Physical Therapy Involved;Occupational Therapy Involved   Activity  Bed;Range of motion   Patient Turns / Repositioning Patient Turns Self from Side to Side   Patient is Receiving Nutrition Oral Intake Adequate   Nutrition Consult Ordered Yes, Consult has been Placed   Vitamin Therapy in Use No

## 2017-12-30 NOTE — PROGRESS NOTES
[facility] Hospitalist Progress Note    Date of Service: 2017    Chief Complaint  84 y.o. male admitted 2017 with ARNALDO / Uremia from acute rehab where patient was undergoing rehab after recent CVA    Interval Problem Update  Axox4, he denies pain, no sob, he is eager to go back to rehab, no melena or hematochezia    Consultants/Specialty  Nephrology   Palliative care    Disposition   SNF then home with hospice        Review of Systems   Constitutional: Positive for malaise/fatigue. Negative for chills and fever.   HENT: Positive for hearing loss. Negative for tinnitus.    Eyes: Negative for blurred vision and double vision.   Respiratory: Negative for cough, hemoptysis and shortness of breath.    Cardiovascular: Negative for chest pain, palpitations and PND.   Gastrointestinal: Positive for heartburn and nausea. Negative for abdominal pain, blood in stool, constipation, diarrhea, melena and vomiting.   Genitourinary:        Owens in place   Musculoskeletal: Negative for back pain, falls, joint pain, myalgias and neck pain.   Skin: Negative for rash.   Neurological: Positive for sensory change, speech change, focal weakness and weakness. Negative for dizziness, tingling, tremors, seizures, loss of consciousness and headaches.   Psychiatric/Behavioral: Negative for depression and suicidal ideas.      Physical Exam  Laboratory/Imaging   Hemodynamics  Temp (24hrs), Av.5 °C (97.7 °F), Min:36.2 °C (97.1 °F), Max:36.9 °C (98.4 °F)   Temperature: 36.3 °C (97.4 °F)  Pulse  Av.3  Min: 55  Max: 72    Blood Pressure : 126/53      Respiratory      Respiration: 17, Pulse Oximetry: 94 %        RUL Breath Sounds: Clear, RML Breath Sounds: Clear, RLL Breath Sounds: Diminished, MILES Breath Sounds: Clear, LLL Breath Sounds: Diminished    Fluids    Intake/Output Summary (Last 24 hours) at 17 1439  Last data filed at 17 0600   Gross per 24 hour   Intake              240 ml   Output             1725 ml   Net             -1485 ml       Nutrition  Orders Placed This Encounter   Procedures   • DIET ORDER     Standing Status:   Standing     Number of Occurrences:   1     Order Specific Question:   Diet:     Answer:   Renal [8]     Order Specific Question:   Texture/Fiber modifications:     Answer:   Dysphagia 3(Mechanical Soft)specify fluid consistency(question 6) [3]     Comments:   please puree soup     Order Specific Question:   Consistency/Fluid modifications:     Answer:   Nectar Thick [2]     Comments:   NO STRAWS!     Order Specific Question:   Miscellaneous modifications:     Answer:   SLP - Deliver to Nursing Station [22]     Comments:   pt needs help with HOB positioning     Physical Exam   Constitutional: He is oriented to person, place, and time. He appears well-developed and well-nourished. No distress.   HENT:   Head: Normocephalic.   Mouth/Throat: Oropharynx is clear and moist. No oropharyngeal exudate.   Eyes: Pupils are equal, round, and reactive to light. No scleral icterus.   Pale conjunctivae   Neck: No JVD present.   Cardiovascular: Normal rate and regular rhythm.  Exam reveals no gallop and no friction rub.    Murmur heard.  Pulmonary/Chest: Effort normal. No stridor. No respiratory distress. He has no wheezes. He has rales.   Abdominal: Soft. Bowel sounds are normal. He exhibits no distension. There is no tenderness. There is no rebound and no guarding.   Musculoskeletal: He exhibits edema. He exhibits no tenderness or deformity.   Neurological: He is alert and oriented to person, place, and time.    R sided weakness   Skin: Skin is warm and dry. He is not diaphoretic.   Psychiatric: He has a normal mood and affect. His behavior is normal. Judgment and thought content normal.       Recent Labs      12/28/17   0816  12/29/17   0334  12/30/17   0146   WBC  7.3  7.3  5.5  5.2   RBC  2.74*  2.74*  2.61*  2.43*   HEMOGLOBIN  8.4*  8.4*  8.0*  7.4*   HEMATOCRIT  24.8*  24.8*  23.7*  21.8*   MCV  90.5  90.5   90.8  89.7   MCH  30.7  30.7  30.7  30.5   MCHC  33.9  33.9  33.8  33.9   RDW  45.4  45.4  45.7  45.5   PLATELETCT  83*  83*  79*  83*   MPV  11.7  11.7  12.1  11.9     Recent Labs      12/28/17   0816  12/29/17   0334  12/30/17   0146   SODIUM  138  142  141   POTASSIUM  4.9  4.5  4.3   CHLORIDE  109  109  109   CO2  20  22  25   GLUCOSE  107*  103*  103*   BUN  123*  104*  88*   CREATININE  4.70*  4.31*  3.46*   CALCIUM  9.1  8.5  8.4*     Recent Labs      12/28/17   0816   INR  1.24*                  Assessment/Plan     ARNALDO (acute kidney injury) (CMS-[facility])- (present on admission)   Assessment & Plan    Improving  CKD stage IV acute on chronic dysfunction   Continue perez catheter as evidence of obstruction contributing  Nephrology following            Gastritis- (present on admission)   Assessment & Plan    Likely due to uremic gastritis   Previous history of 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 AVM  Will continue PO PPI and sucralfate  Consider GI consultation for EGD needs if hbg continues to decrease (already informally discussed with GI)          Urinary tract infection- (present on admission)   Assessment & Plan    Urinalysis on 12/22 showed urine packed with WBC, cultures negative to date  Continue ceftriaxone           CVA (cerebral vascular accident) (CMS-HCC)- (present on admission)   Assessment & Plan    Recent CVA, came over from acute rehab  R sided deficits stable  Continue plavix, asa stopped  Continue Atorvastatin           Anemia- (present on admission)   Assessment & Plan    Multiple etiologies including anemia of renal disease and acute blood loss likely from GI losses  Continue Plavix, asa was stopped  Monitor Hb / Restrictive transfusion strategy  Continue to follow h/h, slowly decreasing        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)   Assessment & Plan    improving        Gout- (present on admission)   Assessment & Plan    Allopurinol held with ARNALDO  No evidence of flair        B12 deficiency-  (present on admission)   Assessment & Plan    Recently depressed levels  On PO supplementation          Mitral regurgitation- (present on admission)   Assessment & Plan    Recommend outpatient cardiology follow up        Dysphagia due to recent cerebrovascular accident (CVA)- (present on admission)   Assessment & Plan    Improved  SLP evaluation  Diet per SLP recommendations        Thrombocytopenia (CMS-HCC)- (present on admission)   Assessment & Plan    On DAPT also  Uremia will lead to dysfunctional platelets  Stopped ASA  Monitor for bleeding events  If bleeding consider platelet transfusion  Closely monitor        Lung cancer (CMS-HCC)- (present on admission)   Assessment & Plan    Left lower lobe adenocarcinoma  Patient scheduled to start radiation in January        Chronic combined systolic and diastolic CHF (congestive heart failure) (CMS-HCC)- (present on admission)   Assessment & Plan    ACE-I therapy held because of profound ARNALDO  No evidence of acute exacerbation  Continue carvedilol          BPH (benign prostatic hyperplasia)- (present on admission)   Assessment & Plan    Patient on therapy with terazosin and flomax and finasteride  Continue perez  Will need outpatient urology follow up           Aortic valve stenosis- (present on admission)   Assessment & Plan    This was reported as severe on TTE 07/2017 12/2017 no stenosis noted  TTE now reveals Mild AS        Subclavian artery stenosis, left (CMS-HCC)- (present on admission)   Assessment & Plan    90 %  Continue plavix / atorvastatin and risk factor modifications  Vascular surgery evaluation once acute issues resolve        Carotid artery stenosis- (present on admission)   Assessment & Plan    B/L   Continue plavix / atorvastatin  Risk factor modification  Vascular surgery evaluation once acute issues resolve        Peripheral vascular disease (CMS-HCC)- (present on admission)   Assessment & Plan    Continue plavix / atorvastatin  Vascular surgery  evaluation once acute issues resolve as outpatient        COPD (chronic obstructive pulmonary disease) (CMS-HCC)- (present on admission)   Assessment & Plan    No evidence of acute exacerbation  Continue following closely         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)   Assessment & Plan    Hx of        DM (diabetes mellitus) (CMS-HCC)- (present on admission)   Assessment & Plan    Type II  Currently controlled off therapy  Monitor daily am blood sugars        Dyslipidemia- (present on admission)   Assessment & Plan    Continue atorvastatin        HTN (hypertension)- (present on admission)   Assessment & Plan    Following                  Reviewed items::  Labs reviewed, Medications reviewed and Radiology images reviewed  Owens catheter::  No Owens  DVT prophylaxis pharmacological::  Contraindicated - High bleeding risk  DVT prophylaxis - mechanical:  SCDs  Ulcer Prophylaxis::  Yes

## 2017-12-30 NOTE — PROGRESS NOTES
2 RN skin check performed. Multiple red and purple bruises noted to bilateral upper extremities. Bottom is red but blanchable.

## 2017-12-30 NOTE — PROGRESS NOTES
[facility] Nephrology Progress Note               Author: Kostas Garcia Date & Time created: 12/30/2017  9:00 AM     Interval History:  84-year-old gentleman with CKD stage IV, secondary to renal vascular disease and diabetes who presented to [facility] with a left-sided infarction with some confusion and right-sided weakness.      The patient was acutely treated and sent to rehab, they had surveillance laboratories done today that showed that his potassium had   increased to 6.3 and he had significant worsening of his chronic kidney disease and was transferred to [facility] for further evaluation and treatment.     Reviewing his chart, the patient did have some low blood pressures in the low 100s.  He did not receive any contrast studies.  He was on ciprofloxacin, but denied any stigmata of skin changes or rash.  He reports decreased urine output during that time.  He has received no NSAIDs.       Daily Nephrology Summary  12/27/17 - seen in consultation after transfer from Rehab.  ARNALDO and Hyperkalemia treated with IVF/bicarb/kayexalate  12/28/17 - Renal function some better with current tx.  K now ok.  Fractional excretion non diagnostic.  Continue current tx  12/29/17 - BUN/Cr trending down.  K better.  Has significant CKD so renal function wont improve beyond Creat 2.5ish.   12/30/17 - BUN/Cr improving slowly.  K ok.       Review of Systems   Constitutional: Positive for malaise/fatigue.   Respiratory: Negative.    Cardiovascular: Negative.    Skin: Negative.          Physical Exam   Constitutional: No distress.   Eyes: No scleral icterus.   Neck: No JVD present.   Cardiovascular: Normal rate.  Exam reveals no friction rub.    Pulmonary/Chest: Effort normal. No respiratory distress.   Abdominal: Soft. He exhibits no distension.       Labs:        Invalid input(s): [id number]      Recent Labs      12/28/17   0816  12/29/17   0334  12/30/17   0146   SODIUM  138  142  141   POTASSIUM  4.9  4.5  4.3   CHLORIDE  109   109  109   CO2  20  22  25   BUN  123*  104*  88*   CREATININE  4.70*  4.31*  3.46*   MAGNESIUM  2.7*  2.2   --    PHOSPHORUS  6.1*  4.5  3.6   CALCIUM  9.1  8.5  8.4*     Recent Labs      17   08176   ALTSGPT  5  7   --    ASTSGOT  9*  14   --    ALKPHOSPHAT  44  37   --    TBILIRUBIN  0.4  0.4   --    GLUCOSE  107*  103*  103*     Recent Labs      17   RBC  2.74*  2.74*  2.61*  2.43*   HEMOGLOBIN  8.4*  8.4*  8.0*  7.4*   HEMATOCRIT  24.8*  24.8*  23.7*  21.8*   PLATELETCT  83*  83*  79*  83*   PROTHROMBTM  15.3*   --    --    INR  1.24*   --    --    IRON   --   35*   --    FERRITIN   --   232.4   --    TOTIRONBC   --   214*   --      Recent Labs      17   WBC  7.3  7.3  5.5  5.2   NEUTSPOLYS  80.90*  76.50*   --    LYMPHOCYTES  10.40*  10.90*   --    MONOCYTES  7.00  8.90   --    EOSINOPHILS  1.00  2.40   --    BASOPHILS  0.30  0.40   --    ASTSGOT  9*  14   --    ALTSGPT  5  7   --    ALKPHOSPHAT  44  37   --    TBILIRUBIN  0.4  0.4   --            Hemodynamics:  Temp (24hrs), Av.5 °C (97.7 °F), Min:36.2 °C (97.1 °F), Max:36.9 °C (98.4 °F)  Temperature: 36.4 °C (97.5 °F)  Pulse  Av.9  Min: 55  Max: 72   Blood Pressure : 140/52     Respiratory:    Respiration: 18, Pulse Oximetry: 95 %        RUL Breath Sounds: Clear, RML Breath Sounds: Clear, RLL Breath Sounds: Diminished, MILES Breath Sounds: Clear, LLL Breath Sounds: Diminished  Fluids:    Intake/Output Summary (Last 24 hours) at 17 0900  Last data filed at 17 0600   Gross per 24 hour   Intake              240 ml   Output             1725 ml   Net            -1485 ml     Weight: 78.5 kg (173 lb 1 oz)  GI/Nutrition:  Orders Placed This Encounter   Procedures   • DIET ORDER     Standing Status:   Standing     Number of Occurrences:   1     Order Specific Question:   Diet:     Answer:   Renal [8]     Order  Specific Question:   Texture/Fiber modifications:     Answer:   Dysphagia 3(Mechanical Soft)specify fluid consistency(question 6) [3]     Comments:   please puree soup     Order Specific Question:   Consistency/Fluid modifications:     Answer:   Nectar Thick [2]     Comments:   NO STRAWS!     Order Specific Question:   Miscellaneous modifications:     Answer:   SLP - Deliver to Nursing Station [22]     Comments:   pt needs help with HOB positioning     Medical Decision Making, by Problem:  Active Hospital Problems    Diagnosis   • CVA (cerebral vascular accident) (CMS-HCC) [I63.9]   • Acute on Chronic blood loss anemia [D50.0]   • Cardiomyopathy (CMS-HCC) [I42.9]   • Lung cancer (CMS-HCC) [C34.90]   • Acute renal failure superimposed on stage 5 chronic kidney disease, not on chronic dialysis (CMS-HCC) [N17.9, N18.5]   • Urinary tract infection without hematuria [N39.0]   • BPH (benign prostatic hyperplasia) [N40.0]     IMPRESSION/PLAN    # ARNALDO/CKD  -- baseline SCreat around 2.5 w eGFR ~25  -- non oliguric with improving numbers with current treatment  -- no indication for acute dialysis at this time  -- fractional excretion of sodium was non diagnostic  -- monitor chems/hgb routinely  -- continue current treatment    # Hyperkalemia  -- need to check stools for blood given prior hx of GI bleeding and was hyperkalemic with high BUN  -- K better  -- monitor daily      This patient is appropriate for hospice/pallliative care.  He is a terrible candidate for dialysis (thankfully does not require it at this time) - he has so many serious comorbid conditions and terminal cancer that dialysis would not significantly prolong his life, EVEN if it were indicated otherwise, so I do not recommend it.          Quality-Core Measures

## 2017-12-30 NOTE — PROGRESS NOTES
Pt transferred to [facility]. Report called to receiving RN, all questions and concerns addressed. All personal belongings accounted for. Chart, medications, and belongings with pt. Pt transported via hospital bed with pt transport.

## 2017-12-30 NOTE — PROGRESS NOTES
PT arrived to the unit @ about 0430. Pt settled in and is already sleeping soundly in bed. No c/o pain. No SOB or resp distress. Pt breathing on room air. SCDs in place. Bed alarm on. All needs met, call light within reach, will continue to monitor.

## 2017-12-30 NOTE — PROGRESS NOTES
[facility] Hospitalist Progress Note    Date of Service: 2017    Chief Complaint  84 y.o. male admitted 2017 with ARNALDO / Uremia from acute rehab where patient was undergoing rehab after recent CVA    Interval Problem Update  Patient seen and evaluated on rounds  This am he was A&Ox4  Renal function / hyperkalemia has improved  Down trending Hb, from hydration ? Or bleeding uncertain  Uremia has improved now  Nephrology team on board  Appreciate nephrology team input    Patient is DNR / DNI. POLST completed today.     Consultants/Specialty  Nephrology   Palliative care    Disposition  No telemetry needs at this time. Patient can be transferred to neurology RNF  Plan disposition to SNF  From SNF plan disposition home with hospice care        Review of Systems   Constitutional: Positive for malaise/fatigue. Negative for chills and fever.   HENT: Positive for hearing loss. Negative for tinnitus.    Eyes: Negative for blurred vision and double vision.   Respiratory: Negative for cough, hemoptysis and shortness of breath.    Cardiovascular: Negative for chest pain, palpitations and PND.   Gastrointestinal: Positive for heartburn and nausea. Negative for abdominal pain, blood in stool, constipation, diarrhea, melena and vomiting.   Genitourinary:        Owens in place   Musculoskeletal: Negative for back pain, falls, joint pain, myalgias and neck pain.   Skin: Negative for rash.   Neurological: Positive for sensory change, speech change, focal weakness and weakness. Negative for dizziness, tingling, tremors, seizures, loss of consciousness and headaches.   Psychiatric/Behavioral: Negative for depression and suicidal ideas.      Physical Exam  Laboratory/Imaging   Hemodynamics  Temp (24hrs), Av.8 °C (98.3 °F), Min:36.4 °C (97.5 °F), Max:37.3 °C (99.1 °F)   Temperature: 36.9 °C (98.4 °F)  Pulse  Av.2  Min: 55  Max: 72    Blood Pressure : 136/56      Respiratory      Respiration: 18, Pulse Oximetry: 92 %         RUL Breath Sounds: Diminished, RML Breath Sounds: Diminished, RLL Breath Sounds: Diminished;Crackles, MILES Breath Sounds: Diminished, LLL Breath Sounds: Diminished;Crackles    Fluids    Intake/Output Summary (Last 24 hours) at 12/29/17 2021  Last data filed at 12/29/17 1800   Gross per 24 hour   Intake                0 ml   Output             1100 ml   Net            -1100 ml       Nutrition  Orders Placed This Encounter   Procedures   • DIET ORDER     Standing Status:   Standing     Number of Occurrences:   1     Order Specific Question:   Diet:     Answer:   Renal [8]     Order Specific Question:   Texture/Fiber modifications:     Answer:   Dysphagia 3(Mechanical Soft)specify fluid consistency(question 6) [3]     Comments:   please puree soup     Order Specific Question:   Consistency/Fluid modifications:     Answer:   Nectar Thick [2]     Comments:   NO STRAWS!     Order Specific Question:   Miscellaneous modifications:     Answer:   SLP - Deliver to Nursing Station [22]     Comments:   pt needs help with HOB positioning     Physical Exam   Constitutional: He is oriented to person, place, and time. He appears well-developed and well-nourished. No distress.   HENT:   Head: Normocephalic.   Mouth/Throat: Oropharynx is clear and moist. No oropharyngeal exudate.   Eyes: Pupils are equal, round, and reactive to light. No scleral icterus.   Pale conjunctivae   Neck: No JVD present.   Cardiovascular: Normal rate and regular rhythm.  Exam reveals no gallop and no friction rub.    Murmur heard.  Pulmonary/Chest: Effort normal. No stridor. No respiratory distress. He has no wheezes. He has rales.   Abdominal: Soft. Bowel sounds are normal. He exhibits no distension. There is no tenderness. There is no rebound and no guarding.   Musculoskeletal: He exhibits edema. He exhibits no tenderness or deformity.   Neurological: He is alert and oriented to person, place, and time.   No flap. R sided deficitis.    Skin: Skin is warm  and dry. He is not diaphoretic.   Psychiatric: He has a normal mood and affect. His behavior is normal. Judgment and thought content normal.       Recent Labs      12/27/17   1000  12/28/17   0816  12/29/17   0334   WBC  8.7  7.3  7.3  5.5   RBC  3.19*  2.74*  2.74*  2.61*   HEMOGLOBIN  9.9*  8.4*  8.4*  8.0*   HEMATOCRIT  29.8*  24.8*  24.8*  23.7*   MCV  93.4  90.5  90.5  90.8   MCH  31.0  30.7  30.7  30.7   MCHC  33.2*  33.9  33.9  33.8   RDW  48.1  45.4  45.4  45.7   PLATELETCT  106*  83*  83*  79*   MPV  12.5  11.7  11.7  12.1     Recent Labs      12/27/17   1623  12/28/17   0816  12/29/17   0334   SODIUM  135  138  142   POTASSIUM  6.3*  4.9  4.5   CHLORIDE  107  109  109   CO2  14*  20  22   GLUCOSE  150*  107*  103*   BUN  144*  123*  104*   CREATININE  5.50*  4.70*  4.31*   CALCIUM  9.3  9.1  8.5     Recent Labs      12/28/17   0816   INR  1.24*                  Assessment/Plan     ARNALDO (acute kidney injury) (CMS-HCC)- (present on admission)   Assessment & Plan    Persistent but improving  In the setting of underlying CKD stage IV  Perez catheter was placed given concern for obstructive uropathy  Continue perez catheter at this time  Profound uremia, on aspirin stop ASA. Dysfunctional platelets.   Stop allopurinol.   Continue HCO3 gtt. Decreased to 50 ml / hour  Nephrology team on board.   Appreciate nephrology team input.   See plan in CKD  Monitor renal function / Avoid nephrotoxins and dose medications renally          Gastritis- (present on admission)   Assessment & Plan    This is uremic gastritis likely  Previous history of 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 AVM  Recommend patient be maintained on high dose PO PPI and sucralfate  Optimize uremia, consider IHD if no improvement  Consider GI consultation for EGD needs if declining Hb  Patient is on DAPT, stop ASA given likely dysfunctional platelets from uremia  Stop medications with potential to cause gastritis / gastric irritation (iron)  Monitor Hb /  Restrictive transfusion strategy  I discussed with Dr Vasquez and she agrees with the plan of care        Hyperkalemia- (present on admission)   Assessment & Plan    POA  Medically treated, improved  Nephrology team on board  Continue telemetry monitoring at this time  Daily monitoring of renal function panel        Urinary tract infection- (present on admission)   Assessment & Plan    Urinalysis on 12/22 showed urine packed with WBC, cultures negative to date  Repeat UA / Ucx not collected, orders placed for this again  Continue ceftriaxone at this time  De escalate abx as able  No hematuria, flank pain. No hydronephrosis on US. No indication to pursue CT renal at this time.         CVA (cerebral vascular accident) (CMS-HCC)- (present on admission)   Assessment & Plan    Recent CVA, came over from acute rehab  R sided deficits  Stop ASA, continue plavix  Atorvastatin   Continue risk factor modifications  PT/OT/SLP evaluations  Disposition as clinically appropriate        Anemia- (present on admission)   Assessment & Plan    This is anemia of acute blood loss likely from GI losses  Combination of anemia of renal disease  Stop ASA  Continue Plavix  Monitor Hb / Restrictive transfusion strategy  Consider GI input if ongoing down trending hemoglobin  EPO needs per nephrology team        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)   Assessment & Plan    POA  Improved  Management per nephrology team        Gout- (present on admission)   Assessment & Plan    Allopurinol held with ARNALDO        B12 deficiency- (present on admission)   Assessment & Plan    Recently depressed levels  On PO supplementation  Improving with PO supplementation        Mitral regurgitation- (present on admission)   Assessment & Plan    Recommend outpatient cardiology follow up        Chronic kidney disease (CKD), stage IV (severe) (CMS-HCC)- (present on admission)   Assessment & Plan    This has worsened   Appears to be related to diabetic  nephropathy / Hypertensive nephropathy / vascular disease  Closely monitor urine output  Monitor renal function / Avoid nephrotoxins and dose medications renally  Aim AIC < 7  Aim SBP < 130  Monitor Ph levels, initiated Ph binders  Once off HCO3 gtt consider PO HCO3 supplementation  Aim Hb > 10, EPO with recent acute stroke ? Defer to nephrology team  Continue statin therapy  No PICC lines  Consider pIV in one arm preferably R sided  Further care per nephrology team        Dysphagia due to recent cerebrovascular accident (CVA)- (present on admission)   Assessment & Plan    Improved  SLP evaluation  Diet per SLP recommendations        Thrombocytopenia (CMS-HCC)- (present on admission)   Assessment & Plan    On DAPT also  Uremia will lead to dysfunctional platelets  Stopped ASA  Monitor for bleeding events  If bleeding consider platelet transfusion  Closely monitor        Lung cancer (CMS-HCC)- (present on admission)   Assessment & Plan    Left lower lobe adenocarcinoma  Patient scheduled to start radiation in January        Chronic combined systolic and diastolic CHF (congestive heart failure) (CMS-HCC)- (present on admission)   Assessment & Plan    ACE-I therapy held because of profound ARNALDO  Non decompensated at this time  Continue carvedilol  Start Isordil / hydralazine  Afterload reduction  Aim SBP < 110        BPH (benign prostatic hyperplasia)- (present on admission)   Assessment & Plan    Patient on dual therapy with terazosin and flomax ?   In addition on finasteride  Continue baseline regimen at this time  Maintain Owens catheter at this time  Elevated PSA is noted  Recommended close outpatient urology f/u and ongoing management per urology team in outpatient setting        Aortic valve stenosis- (present on admission)   Assessment & Plan    This was reported as severe on TTE 07/2017 12/2017 no stenosis noted  TTE now reveals Mild AS        Subclavian artery stenosis, left (CMS-HCC)- (present on admission)    Assessment & Plan    90 %  Continue plavix / atorvastatin and risk factor modifications  Vascular surgery evaluation once acute issues resolve        Carotid artery stenosis- (present on admission)   Assessment & Plan    B/L   Continue plavix / atorvastatin  Risk factor modification  Vascular surgery evaluation once acute issues resolve        Peripheral vascular disease (CMS-HCC)- (present on admission)   Assessment & Plan    Continue plavix / atorvastatin  Vascular surgery evaluation once acute issues resolve        COPD (chronic obstructive pulmonary disease) (CMS-HCC)- (present on admission)   Assessment & Plan    Without acute exacerbation  Continue close clinical monitoring        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 During this time discussed current plan of care, ongoing medical co morbidities, burden / complications associated with medical management and poor anticipated prognosis. We discussed process of cardiac resuscitation / life support and it R/B/A. We discussed palliative / hospice care. Patient / family decided to proceed with DNR / DNI code status at this time. Discharge to SNF opposed to rehab and from their discharge home with hospice care. We completed a POLST today.     Time spend on advance care planning is in addition to time spend evaluating and treating this medically complex patient. Billing [id number].      Reviewed items::  Labs reviewed, Medications reviewed and  Radiology images reviewed  Owens catheter::  No Owens  DVT prophylaxis pharmacological::  Contraindicated - High bleeding risk  DVT prophylaxis - mechanical:  SCDs  Ulcer Prophylaxis::  Yes

## 2017-12-31 ENCOUNTER — APPOINTMENT (OUTPATIENT)
Dept: RADIOLOGY | Facility: MEDICAL CENTER | Age: 82
DRG: 682 | End: 2017-12-31
Attending: INTERNAL MEDICINE
Payer: MEDICARE

## 2017-12-31 LAB
ANION GAP SERPL CALC-SCNC: 7 MMOL/L (ref 0–11.9)
BASOPHILS # BLD AUTO: 0.2 % (ref 0–1.8)
BASOPHILS # BLD: 0.01 K/UL (ref 0–0.12)
BUN SERPL-MCNC: 66 MG/DL (ref 8–22)
CALCIUM SERPL-MCNC: 7.9 MG/DL (ref 8.5–10.5)
CHLORIDE SERPL-SCNC: 106 MMOL/L (ref 96–112)
CO2 SERPL-SCNC: 26 MMOL/L (ref 20–33)
CREAT SERPL-MCNC: 2.94 MG/DL (ref 0.5–1.4)
EKG IMPRESSION: NORMAL
EOSINOPHIL # BLD AUTO: 0.16 K/UL (ref 0–0.51)
EOSINOPHIL NFR BLD: 3 % (ref 0–6.9)
ERYTHROCYTE [DISTWIDTH] IN BLOOD BY AUTOMATED COUNT: 46.5 FL (ref 35.9–50)
GFR SERPL CREATININE-BSD FRML MDRD: 20 ML/MIN/1.73 M 2
GLUCOSE SERPL-MCNC: 101 MG/DL (ref 65–99)
HCT VFR BLD AUTO: 22.3 % (ref 42–52)
HGB BLD-MCNC: 7.5 G/DL (ref 14–18)
IMM GRANULOCYTES # BLD AUTO: 0.05 K/UL (ref 0–0.11)
IMM GRANULOCYTES NFR BLD AUTO: 0.9 % (ref 0–0.9)
LYMPHOCYTES # BLD AUTO: 0.91 K/UL (ref 1–4.8)
LYMPHOCYTES NFR BLD: 16.9 % (ref 22–41)
MCH RBC QN AUTO: 31 PG (ref 27–33)
MCHC RBC AUTO-ENTMCNC: 33.6 G/DL (ref 33.7–35.3)
MCV RBC AUTO: 92.1 FL (ref 81.4–97.8)
MONOCYTES # BLD AUTO: 0.56 K/UL (ref 0–0.85)
MONOCYTES NFR BLD AUTO: 10.4 % (ref 0–13.4)
NEUTROPHILS # BLD AUTO: 3.7 K/UL (ref 1.82–7.42)
NEUTROPHILS NFR BLD: 68.6 % (ref 44–72)
NRBC # BLD AUTO: 0 K/UL
NRBC BLD-RTO: 0 /100 WBC
PLATELET # BLD AUTO: 82 K/UL (ref 164–446)
PMV BLD AUTO: 11.3 FL (ref 9–12.9)
POTASSIUM SERPL-SCNC: 4 MMOL/L (ref 3.6–5.5)
RBC # BLD AUTO: 2.42 M/UL (ref 4.7–6.1)
SODIUM SERPL-SCNC: 139 MMOL/L (ref 135–145)
TROPONIN I SERPL-MCNC: 0.03 NG/ML (ref 0–0.04)
TROPONIN I SERPL-MCNC: 0.07 NG/ML (ref 0–0.04)
TROPONIN I SERPL-MCNC: 0.13 NG/ML (ref 0–0.04)
WBC # BLD AUTO: 5.4 K/UL (ref 4.8–10.8)

## 2017-12-31 PROCEDURE — A9270 NON-COVERED ITEM OR SERVICE: HCPCS | Performed by: HOSPITALIST

## 2017-12-31 PROCEDURE — 71010 DX-CHEST-PORTABLE (1 VIEW): CPT

## 2017-12-31 PROCEDURE — 85025 COMPLETE CBC W/AUTO DIFF WBC: CPT

## 2017-12-31 PROCEDURE — 700111 HCHG RX REV CODE 636 W/ 250 OVERRIDE (IP): Performed by: INTERNAL MEDICINE

## 2017-12-31 PROCEDURE — 700102 HCHG RX REV CODE 250 W/ 637 OVERRIDE(OP): Performed by: HOSPITALIST

## 2017-12-31 PROCEDURE — 93005 ELECTROCARDIOGRAM TRACING: CPT | Performed by: INTERNAL MEDICINE

## 2017-12-31 PROCEDURE — 700101 HCHG RX REV CODE 250: Performed by: INTERNAL MEDICINE

## 2017-12-31 PROCEDURE — 36415 COLL VENOUS BLD VENIPUNCTURE: CPT

## 2017-12-31 PROCEDURE — 97166 OT EVAL MOD COMPLEX 45 MIN: CPT

## 2017-12-31 PROCEDURE — 80048 BASIC METABOLIC PNL TOTAL CA: CPT

## 2017-12-31 PROCEDURE — 700105 HCHG RX REV CODE 258: Performed by: INTERNAL MEDICINE

## 2017-12-31 PROCEDURE — 99233 SBSQ HOSP IP/OBS HIGH 50: CPT | Performed by: HOSPITALIST

## 2017-12-31 PROCEDURE — 770006 HCHG ROOM/CARE - MED/SURG/GYN SEMI*

## 2017-12-31 PROCEDURE — 84484 ASSAY OF TROPONIN QUANT: CPT

## 2017-12-31 PROCEDURE — A9270 NON-COVERED ITEM OR SERVICE: HCPCS | Performed by: INTERNAL MEDICINE

## 2017-12-31 PROCEDURE — 700111 HCHG RX REV CODE 636 W/ 250 OVERRIDE (IP): Performed by: STUDENT IN AN ORGANIZED HEALTH CARE EDUCATION/TRAINING PROGRAM

## 2017-12-31 PROCEDURE — G8987 SELF CARE CURRENT STATUS: HCPCS | Mod: CK

## 2017-12-31 PROCEDURE — 93010 ELECTROCARDIOGRAM REPORT: CPT | Performed by: INTERNAL MEDICINE

## 2017-12-31 PROCEDURE — 700102 HCHG RX REV CODE 250 W/ 637 OVERRIDE(OP): Performed by: INTERNAL MEDICINE

## 2017-12-31 PROCEDURE — G8988 SELF CARE GOAL STATUS: HCPCS | Mod: CI

## 2017-12-31 RX ORDER — HYDROCODONE BITARTRATE AND ACETAMINOPHEN 5; 325 MG/1; MG/1
1-2 TABLET ORAL EVERY 6 HOURS PRN
Status: DISCONTINUED | OUTPATIENT
Start: 2017-12-31 | End: 2018-01-02

## 2017-12-31 RX ADMIN — MORPHINE SULFATE 2 MG: 4 INJECTION INTRAVENOUS at 08:45

## 2017-12-31 RX ADMIN — FINASTERIDE 5 MG: 5 TABLET, FILM COATED ORAL at 08:48

## 2017-12-31 RX ADMIN — ISOSORBIDE DINITRATE 10 MG: 10 TABLET ORAL at 15:25

## 2017-12-31 RX ADMIN — RENAGEL 800 MG: 800 TABLET ORAL at 08:48

## 2017-12-31 RX ADMIN — HYDRALAZINE HYDROCHLORIDE 25 MG: 25 TABLET, FILM COATED ORAL at 05:28

## 2017-12-31 RX ADMIN — CARVEDILOL 12.5 MG: 12.5 TABLET, FILM COATED ORAL at 17:15

## 2017-12-31 RX ADMIN — CLOPIDOGREL 75 MG: 75 TABLET, FILM COATED ORAL at 08:48

## 2017-12-31 RX ADMIN — HYDRALAZINE HYDROCHLORIDE 25 MG: 25 TABLET, FILM COATED ORAL at 20:55

## 2017-12-31 RX ADMIN — RENAGEL 800 MG: 800 TABLET ORAL at 17:15

## 2017-12-31 RX ADMIN — TERAZOSIN HYDROCHLORIDE ANHYDROUS 10 MG: 5 CAPSULE ORAL at 20:55

## 2017-12-31 RX ADMIN — HYDRALAZINE HYDROCHLORIDE 25 MG: 25 TABLET, FILM COATED ORAL at 15:25

## 2017-12-31 RX ADMIN — LIDOCAINE HYDROCHLORIDE 15 ML: 20 SOLUTION OROPHARYNGEAL at 17:15

## 2017-12-31 RX ADMIN — CYANOCOBALAMIN TAB 500 MCG 1000 MCG: 500 TAB at 08:48

## 2017-12-31 RX ADMIN — ISOSORBIDE DINITRATE 10 MG: 10 TABLET ORAL at 08:47

## 2017-12-31 RX ADMIN — OMEPRAZOLE 40 MG: 20 CAPSULE, DELAYED RELEASE ORAL at 20:55

## 2017-12-31 RX ADMIN — ATORVASTATIN CALCIUM 10 MG: 10 TABLET, FILM COATED ORAL at 20:55

## 2017-12-31 RX ADMIN — ISOSORBIDE DINITRATE 10 MG: 10 TABLET ORAL at 20:55

## 2017-12-31 RX ADMIN — WATER 1000 MG: 1 INJECTION INTRAMUSCULAR; INTRAVENOUS; SUBCUTANEOUS at 15:25

## 2017-12-31 RX ADMIN — OMEPRAZOLE 40 MG: 20 CAPSULE, DELAYED RELEASE ORAL at 08:48

## 2017-12-31 RX ADMIN — RENAGEL 800 MG: 800 TABLET ORAL at 12:35

## 2017-12-31 RX ADMIN — TAMSULOSIN HYDROCHLORIDE 0.4 MG: 0.4 CAPSULE ORAL at 08:48

## 2017-12-31 RX ADMIN — SODIUM BICARBONATE: 84 INJECTION, SOLUTION INTRAVENOUS at 05:28

## 2017-12-31 RX ADMIN — CARVEDILOL 12.5 MG: 12.5 TABLET, FILM COATED ORAL at 08:48

## 2017-12-31 RX ADMIN — CHOLECALCIFEROL TAB 25 MCG (1000 UNIT) 4000 UNITS: 25 TAB at 08:48

## 2017-12-31 ASSESSMENT — ENCOUNTER SYMPTOMS
PALPITATIONS: 0
TREMORS: 0
SENSORY CHANGE: 1
ABDOMINAL PAIN: 0
CHILLS: 0
DIZZINESS: 0
HEARTBURN: 0
FALLS: 0
RESPIRATORY NEGATIVE: 1
NECK PAIN: 0
HEMOPTYSIS: 0
MYALGIAS: 0
TINGLING: 0
SHORTNESS OF BREATH: 0
HEADACHES: 0
DOUBLE VISION: 0
DIARRHEA: 0
CONSTIPATION: 0
VOMITING: 0
FOCAL WEAKNESS: 1
NAUSEA: 0
LOSS OF CONSCIOUSNESS: 0
DEPRESSION: 0
BLURRED VISION: 0
WEAKNESS: 0
CARDIOVASCULAR NEGATIVE: 1
PND: 0
BACK PAIN: 0
BLOOD IN STOOL: 0
COUGH: 0
SEIZURES: 0
FEVER: 0
SPEECH CHANGE: 1

## 2017-12-31 ASSESSMENT — PAIN SCALES - GENERAL
PAINLEVEL_OUTOF10: 7
PAINLEVEL_OUTOF10: 3
PAINLEVEL_OUTOF10: 4
PAINLEVEL_OUTOF10: 4
PAINLEVEL_OUTOF10: 0
PAINLEVEL_OUTOF10: 0

## 2017-12-31 ASSESSMENT — COGNITIVE AND FUNCTIONAL STATUS - GENERAL
DRESSING REGULAR LOWER BODY CLOTHING: A LOT
EATING MEALS: A LITTLE
DRESSING REGULAR UPPER BODY CLOTHING: A LITTLE
DAILY ACTIVITIY SCORE: 15
SUGGESTED CMS G CODE MODIFIER DAILY ACTIVITY: CK
TOILETING: A LOT
PERSONAL GROOMING: A LITTLE
HELP NEEDED FOR BATHING: A LOT

## 2017-12-31 ASSESSMENT — ACTIVITIES OF DAILY LIVING (ADL): TOILETING: INDEPENDENT

## 2017-12-31 NOTE — DISCHARGE PLANNING
Medical SW    Referral: Sw f/u w/ SNF order.    Intervention: Sw met w/ pt at bedside. He indicates he would rather not make a choice now as he just received pain medication. Pt accepted copy of choice form and indicates he will discuss w/ his dtr then report to Sw later his choice for SNF. Sw advised his insurance contracts w/ all local SNFs accept for [facility] and [facility] per [facility]. Choice form reflects this.    Plan: Sw to assist w/ d/c planning as needed.

## 2017-12-31 NOTE — PROGRESS NOTES
Ashley Mercedes Fall Risk Assessment:     Last Known Fall: No falls  Mobility: Use of assistive device/requires assist of two people  Medications: Cardiovascular or central nervous system meds  Mental Status/LOC/Awareness: Oriented to person and place  Toileting Needs: Use of catheters or diversion devices  Volume/Electrolyte Status: Use of IV fluids/tube feeds  Communication/Sensory: Visual (Glasses)/hearing deficit  Behavior: Appropriate behavior  Ashley Mercedes Fall Risk Total: 12  Fall Risk Level: MODERATE RISK    Universal Fall Precautions:  call light/belongings in reach, bed in low position and locked, siderails up x 2, use non-slip footwear    Fall Risk Level Interventions:    TRIAL (TELE 8, NEURO, MED SANA 5) Moderate Fall Risk Interventions  Place yellow fall risk ID band on patient: verified  Provide patient/family education based on risk assessment : completed  Educate patient/family to call staff for assistance when getting out of bed: completed  Place fall precaution signage outside patient door: verified  Utilize bed/chair fall alarm: verified     Patient Specific Interventions:     Medication: review medications with patient and family  Mental Status/LOC/Awareness: check on patient hourly, utilize bed/chair fall alarm and reinforce the use of call light  Toileting: provide frquent toileting  Volume/Electrolyte Status: ensure patient remains hydrated  Communication/Sensory: update plan of care on whiteboard  Behavioral: encourage patient to voice feelings and engage patient in daily activities  Mobility: utilize bed/chair fall alarm

## 2017-12-31 NOTE — PROGRESS NOTES
EKG, chest x-ray and troponin draw done. VSS. Pt reports pain is almost gone now. Pt currently sleeping comfortably. Paged Dr. Matos for updates. No new order received.

## 2017-12-31 NOTE — PROGRESS NOTES
BS report received. Pt resting in bed, alert and oriented x4. No c/o pain, nausea or SOB at this time.  Pt has right side weakness and facial droop. Medication given by floating in applesauce. Owens to gravity. SCDs in place. POC discussed, verbalized understanding. Bed low and locked, bed alarm on. Call light and belonging within reach and demonstrated use of call light. Fall precaution in effect. Hourly rounding in place.

## 2017-12-31 NOTE — THERAPY
"Occupational Therapy Evaluation completed.   Functional Status:  Min A supine to sit.  Max A LB dressing.  Min A simple oral hygiene seated EOB using swabs.  CGA sit to stand and to take 2-3 steps along EOB with FWW.  Pt declined further activity 2' to pain and lethargy.  Min A to return to supine.  Plan of Care: Will benefit from Occupational Therapy 4 times per week  Discharge Recommendations:  Equipment: Will Continue to Assess for Equipment Needs. Post-acute therapy Discharge to a transitional care facility for continued skilled therapy services.    See \"Rehab Therapy-Acute\" Patient Summary Report for complete documentation.    "

## 2017-12-31 NOTE — PALLIATIVE CARE
PALLIATIVE CARE FOLLOW-UP:    Discussed d/c planning with [facility].    Plan:  SNF    Thank you for allowing Palliative Care to support this pt and his family.  Contact [phone] for additional assistance, questions or concerns.

## 2017-12-31 NOTE — CODE DOCUMENTATION
Pt states pain is a constant band in upper chest that spans entire width. Pt states it is does not radiate and is more of an aching sensation, 3-4/10 pain. States it changes with breaths and has gotten a little better since it began. Primary RN states the pain did not begin until pt was boosted up in bed.

## 2017-12-31 NOTE — PROGRESS NOTES
[facility]ist Progress Note    Date of Service: 2017    Chief Complaint  84 y.o. male admitted 2017 with ARNALDO / Uremia from acute rehab where patient was undergoing rehab after recent CVA    Interval Problem Update  Remains with no evidence of melena or hematochezia, reports some right hip pain with movement, mild, atypical chest pain last night resolved, ekg and trop negative, no sob, ros otherwise negative, Granddaughter at bedside    Consultants/Specialty  Nephrology   Palliative care    Disposition   SNF then home with hospice        Review of Systems   Constitutional: Negative for chills, fever and malaise/fatigue.   HENT: Positive for hearing loss. Negative for tinnitus.    Eyes: Negative for blurred vision and double vision.   Respiratory: Negative for cough, hemoptysis and shortness of breath.    Cardiovascular: Negative for chest pain, palpitations and PND.   Gastrointestinal: Negative for abdominal pain, blood in stool, constipation, diarrhea, heartburn, melena, nausea and vomiting.   Genitourinary:        Owens in place   Musculoskeletal: Negative for back pain, falls, joint pain, myalgias and neck pain.   Skin: Negative for rash.   Neurological: Positive for sensory change, speech change and focal weakness. Negative for dizziness, tingling, tremors, seizures, loss of consciousness, weakness and headaches.   Psychiatric/Behavioral: Negative for depression and suicidal ideas.      Physical Exam  Laboratory/Imaging   Hemodynamics  Temp (24hrs), Av.4 °C (97.6 °F), Min:36.4 °C (97.5 °F), Max:36.5 °C (97.7 °F)   Temperature: 36.4 °C (97.6 °F)  Pulse  Av.1  Min: 55  Max: 91    Blood Pressure : 106/45      Respiratory      Respiration: 16, Pulse Oximetry: 94 %, O2 Daily Delivery Respiratory : Silicone Nasal Cannula     Work Of Breathing / Effort: Mild  RUL Breath Sounds: Clear, RML Breath Sounds: Clear, RLL Breath Sounds: Diminished, MILES Breath Sounds: Clear, LLL Breath Sounds:  Diminished    Fluids    Intake/Output Summary (Last 24 hours) at 12/31/17 1140  Last data filed at 12/31/17 0400   Gross per 24 hour   Intake              480 ml   Output             1275 ml   Net             -795 ml       Nutrition  Orders Placed This Encounter   Procedures   • DIET ORDER     Standing Status:   Standing     Number of Occurrences:   1     Order Specific Question:   Diet:     Answer:   Renal [8]     Order Specific Question:   Texture/Fiber modifications:     Answer:   Dysphagia 3(Mechanical Soft)specify fluid consistency(question 6) [3]     Comments:   please puree soup     Order Specific Question:   Consistency/Fluid modifications:     Answer:   Nectar Thick [2]     Comments:   NO STRAWS!     Order Specific Question:   Miscellaneous modifications:     Answer:   SLP - Deliver to Nursing Station [22]     Comments:   pt needs help with HOB positioning     Physical Exam   Constitutional: He is oriented to person, place, and time. He appears well-developed and well-nourished. No distress.   HENT:   Head: Normocephalic.   Mouth/Throat: Oropharynx is clear and moist. No oropharyngeal exudate.   Eyes: Pupils are equal, round, and reactive to light. No scleral icterus.   Pale conjunctivae   Neck: No JVD present.   Cardiovascular: Normal rate and regular rhythm.  Exam reveals no gallop and no friction rub.    Murmur heard.  Pulmonary/Chest: Effort normal. No stridor. No respiratory distress. He has no wheezes. He has rales.   Abdominal: Soft. Bowel sounds are normal. He exhibits no distension. There is no tenderness. There is no rebound and no guarding.   Musculoskeletal: He exhibits edema. He exhibits no tenderness or deformity.   Neurological: He is alert and oriented to person, place, and time.    R sided weakness   Skin: Skin is warm and dry. He is not diaphoretic.   Psychiatric: He has a normal mood and affect. His behavior is normal. Judgment and thought content normal.       Recent Labs      12/29/17    0334  12/30/17   0146  12/31/17   0203   WBC  5.5  5.2  5.4   RBC  2.61*  2.43*  2.42*   HEMOGLOBIN  8.0*  7.4*  7.5*   HEMATOCRIT  23.7*  21.8*  22.3*   MCV  90.8  89.7  92.1   MCH  30.7  30.5  31.0   MCHC  33.8  33.9  33.6*   RDW  45.7  45.5  46.5   PLATELETCT  79*  83*  82*   MPV  12.1  11.9  11.3     Recent Labs      12/29/17   0334  12/30/17   0146  12/31/17   0203   SODIUM  142  141  139   POTASSIUM  4.5  4.3  4.0   CHLORIDE  109  109  106   CO2  22  25  26   GLUCOSE  103*  103*  101*   BUN  104*  88*  66*   CREATININE  4.31*  3.46*  2.94*   CALCIUM  8.5  8.4*  7.9*                      Assessment/Plan     ARNALDO (acute kidney injury) (CMS-HCC)- (present on admission)   Assessment & Plan    Continues to improve  CKD stage IV acute on chronic dysfunction   Continue perez catheter as evidence of obstruction contributing  Nephrology following            Gastritis- (present on admission)   Assessment & Plan    Likely due to uremic gastritis   Previous history of 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 AVM  Will continue PO PPI and sucralfate  Consider GI consultation for EGD needs if hbg continues to decrease, slight improvement today (already informally discussed with GI)          Urinary tract infection- (present on admission)   Assessment & Plan    Urinalysis on 12/22 showed urine packed with WBC, cultures negative to date  Continue ceftriaxone           CVA (cerebral vascular accident) (CMS-HCC)- (present on admission)   Assessment & Plan    Recent CVA, came over from acute rehab  R sided deficits stable  Continue plavix, asa stopped  Continue Atorvastatin           Anemia- (present on admission)   Assessment & Plan    Multiple etiologies including anemia of renal disease and acute blood loss likely from GI losses  Continue Plavix, asa was stopped  Monitor Hb / Restrictive transfusion strategy  See below  Hbg slightly improved today        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)   Assessment & Plan    improving        Gout-  (present on admission)   Assessment & Plan    Allopurinol held with ARNALDO  No evidence of flair        B12 deficiency- (present on admission)   Assessment & Plan    Recently depressed levels  On PO supplementation          Mitral regurgitation- (present on admission)   Assessment & Plan    Recommend outpatient cardiology follow up        Dysphagia due to recent cerebrovascular accident (CVA)- (present on admission)   Assessment & Plan    Improved  SLP evaluation  Diet per SLP recommendations        Thrombocytopenia (CMS-HCC)- (present on admission)   Assessment & Plan    On DAPT also  Uremia will lead to dysfunctional platelets  Stopped ASA  Monitor for bleeding events  If bleeding consider platelet transfusion  Closely monitor        Lung cancer (CMS-HCC)- (present on admission)   Assessment & Plan    Left lower lobe adenocarcinoma  Patient scheduled to start radiation in January        Chronic combined systolic and diastolic CHF (congestive heart failure) (CMS-HCC)- (present on admission)   Assessment & Plan    ACE-I therapy held because of profound ARNALDO  No evidence of acute exacerbation  Continue carvedilol          BPH (benign prostatic hyperplasia)- (present on admission)   Assessment & Plan    Patient on therapy with terazosin and flomax and finasteride  Continue perez  Will need outpatient urology follow up           Aortic valve stenosis- (present on admission)   Assessment & Plan    This was reported as severe on TTE 07/2017 12/2017 no stenosis noted  TTE now reveals Mild AS        Subclavian artery stenosis, left (CMS-HCC)- (present on admission)   Assessment & Plan    90 %  Continue plavix / atorvastatin and risk factor modifications  Vascular surgery evaluation once acute issues resolve        Carotid artery stenosis- (present on admission)   Assessment & Plan    B/L   Continue plavix / atorvastatin  Risk factor modification  Vascular surgery evaluation once acute issues resolve        Peripheral vascular  disease (CMS-[facility])- (present on admission)   Assessment & Plan    Continue plavix / atorvastatin  Vascular surgery evaluation once acute issues resolve as outpatient        COPD (chronic obstructive pulmonary disease) (CMS-HCC)- (present on admission)   Assessment & Plan    No evidence of acute exacerbation  Continue following closely         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  I discussed issue with patient, he is planning on going on hospice and is not interested in any type of cardiac intervention if it were indicated so he would like to forgo an further cardiac work up and start trial of norco.         Reviewed items::  Labs reviewed, Medications reviewed and Radiology images reviewed  Owens catheter::  No Owens  DVT prophylaxis pharmacological::  Contraindicated - High bleeding risk  DVT prophylaxis - mechanical:  SCDs  Ulcer Prophylaxis::  Yes

## 2017-12-31 NOTE — PROGRESS NOTES
Pt complaining of chest pain, states a pressure in right shoulder and down mid chest.  VSS bp 120/56, HR 72, sp02 92% RA. Most recent troponin only slightly higher than previous troponin level, but also CKD.  Dr. Baird in to assess patient. Dr. Baird to order pain medication.

## 2017-12-31 NOTE — PROGRESS NOTES
Pt c/o right shoulder pain, burning and stinging. C/o new onset right chest pain with numbness and tingling shooting down his arm and across his chest. RRT called for chest pain.

## 2017-12-31 NOTE — PROGRESS NOTES
Pt stating he is experiencing pain in his right hip joint when he moves.  Pt states it started the past few days.  Morphine given per order with minimal relief.  Dr. Baird made aware.  Dr. Baird in to assess pt.

## 2017-12-31 NOTE — PROGRESS NOTES
Ashley Mercedes Fall Risk Assessment:     Last Known Fall: No falls  Mobility: Use of assistive device/requires assist of two people  Medications: Cardiovascular or central nervous system meds  Mental Status/LOC/Awareness: Oriented to person and place  Toileting Needs: Use of catheters or diversion devices  Volume/Electrolyte Status: Use of IV fluids/tube feeds  Communication/Sensory: Visual (Glasses)/hearing deficit  Behavior: Appropriate behavior  Ashley Mercedes Fall Risk Total: 12  Fall Risk Level: MODERATE RISK    Universal Fall Precautions:  call light/belongings in reach, bed in low position and locked, wheelchairs and assistive devices out of sight, siderails up x 2, use non-slip footwear, adequate lighting, educate on level of risk, clutter free and spill free environment, educate to call for assistance    Fall Risk Level Interventions:    TRIAL (TELE 8, NEURO, MED SANA 5) Moderate Fall Risk Interventions  Place yellow fall risk ID band on patient: verified  Provide patient/family education based on risk assessment : completed  Educate patient/family to call staff for assistance when getting out of bed: completed  Place fall precaution signage outside patient door: verified  Utilize bed/chair fall alarm: verified     Patient Specific Interventions:     Medication: review medications with patient and family  Mental Status/LOC/Awareness: reinforce falls education  Toileting: monitor intake and output/use of appropriate interventions  Volume/Electrolyte Status: administer medications as ordered for nausea and vomiting  Communication/Sensory: update plan of care on whiteboard  Behavioral: administer medication as ordered  Mobility: utilize bed/chair fall alarm

## 2017-12-31 NOTE — CARE PLAN
Problem: Safety  Goal: Will remain free from falls  Room/floor clear. Treaded socks on. Proper signs up. Bed alarm on. Bed low and locked. Call light and belonging within reach. Hourly rounding in place.    Problem: Respiratory:  Goal: Respiratory status will improve  Pt on RA, oxygen saturation remains above 90%. Respiration even and non labored.

## 2017-12-31 NOTE — PROGRESS NOTES
Assumed care of patient at 0700.  Received report from night RN.  Pt alert and oriented x 4, pt Kaw,  has no complaints of pain. No complaints of chest pain. Pt sitting in chair with chair alarm on.

## 2018-01-01 LAB
ANION GAP SERPL CALC-SCNC: 5 MMOL/L (ref 0–11.9)
BASOPHILS # BLD AUTO: 0.3 % (ref 0–1.8)
BASOPHILS # BLD: 0.02 K/UL (ref 0–0.12)
BUN SERPL-MCNC: 54 MG/DL (ref 8–22)
CALCIUM SERPL-MCNC: 8 MG/DL (ref 8.5–10.5)
CHLORIDE SERPL-SCNC: 109 MMOL/L (ref 96–112)
CO2 SERPL-SCNC: 28 MMOL/L (ref 20–33)
CREAT SERPL-MCNC: 2.61 MG/DL (ref 0.5–1.4)
EOSINOPHIL # BLD AUTO: 0.2 K/UL (ref 0–0.51)
EOSINOPHIL NFR BLD: 3.4 % (ref 0–6.9)
ERYTHROCYTE [DISTWIDTH] IN BLOOD BY AUTOMATED COUNT: 47.8 FL (ref 35.9–50)
GFR SERPL CREATININE-BSD FRML MDRD: 24 ML/MIN/1.73 M 2
GLUCOSE SERPL-MCNC: 107 MG/DL (ref 65–99)
HCT VFR BLD AUTO: 22.3 % (ref 42–52)
HGB BLD-MCNC: 7.1 G/DL (ref 14–18)
IMM GRANULOCYTES # BLD AUTO: 0.04 K/UL (ref 0–0.11)
IMM GRANULOCYTES NFR BLD AUTO: 0.7 % (ref 0–0.9)
LYMPHOCYTES # BLD AUTO: 0.6 K/UL (ref 1–4.8)
LYMPHOCYTES NFR BLD: 10.1 % (ref 22–41)
MCH RBC QN AUTO: 30 PG (ref 27–33)
MCHC RBC AUTO-ENTMCNC: 31.8 G/DL (ref 33.7–35.3)
MCV RBC AUTO: 94.1 FL (ref 81.4–97.8)
MONOCYTES # BLD AUTO: 0.55 K/UL (ref 0–0.85)
MONOCYTES NFR BLD AUTO: 9.2 % (ref 0–13.4)
NEUTROPHILS # BLD AUTO: 4.56 K/UL (ref 1.82–7.42)
NEUTROPHILS NFR BLD: 76.3 % (ref 44–72)
NRBC # BLD AUTO: 0.02 K/UL
NRBC BLD-RTO: 0.3 /100 WBC
PLATELET # BLD AUTO: 82 K/UL (ref 164–446)
PMV BLD AUTO: 11.2 FL (ref 9–12.9)
POTASSIUM SERPL-SCNC: 4.1 MMOL/L (ref 3.6–5.5)
RBC # BLD AUTO: 2.37 M/UL (ref 4.7–6.1)
SODIUM SERPL-SCNC: 142 MMOL/L (ref 135–145)
WBC # BLD AUTO: 6 K/UL (ref 4.8–10.8)

## 2018-01-01 PROCEDURE — A9270 NON-COVERED ITEM OR SERVICE: HCPCS | Performed by: HOSPITALIST

## 2018-01-01 PROCEDURE — 700101 HCHG RX REV CODE 250: Performed by: INTERNAL MEDICINE

## 2018-01-01 PROCEDURE — 99232 SBSQ HOSP IP/OBS MODERATE 35: CPT | Performed by: HOSPITALIST

## 2018-01-01 PROCEDURE — 700105 HCHG RX REV CODE 258: Performed by: INTERNAL MEDICINE

## 2018-01-01 PROCEDURE — 770006 HCHG ROOM/CARE - MED/SURG/GYN SEMI*

## 2018-01-01 PROCEDURE — 700102 HCHG RX REV CODE 250 W/ 637 OVERRIDE(OP): Performed by: HOSPITALIST

## 2018-01-01 PROCEDURE — 85025 COMPLETE CBC W/AUTO DIFF WBC: CPT

## 2018-01-01 PROCEDURE — 36415 COLL VENOUS BLD VENIPUNCTURE: CPT

## 2018-01-01 PROCEDURE — 700111 HCHG RX REV CODE 636 W/ 250 OVERRIDE (IP): Performed by: STUDENT IN AN ORGANIZED HEALTH CARE EDUCATION/TRAINING PROGRAM

## 2018-01-01 PROCEDURE — 700111 HCHG RX REV CODE 636 W/ 250 OVERRIDE (IP): Performed by: INTERNAL MEDICINE

## 2018-01-01 PROCEDURE — A9270 NON-COVERED ITEM OR SERVICE: HCPCS | Performed by: INTERNAL MEDICINE

## 2018-01-01 PROCEDURE — 80048 BASIC METABOLIC PNL TOTAL CA: CPT

## 2018-01-01 PROCEDURE — 700102 HCHG RX REV CODE 250 W/ 637 OVERRIDE(OP): Performed by: INTERNAL MEDICINE

## 2018-01-01 RX ADMIN — CHOLECALCIFEROL TAB 25 MCG (1000 UNIT) 4000 UNITS: 25 TAB at 08:12

## 2018-01-01 RX ADMIN — SODIUM BICARBONATE: 84 INJECTION, SOLUTION INTRAVENOUS at 03:23

## 2018-01-01 RX ADMIN — MORPHINE SULFATE 2 MG: 4 INJECTION INTRAVENOUS at 19:24

## 2018-01-01 RX ADMIN — MORPHINE SULFATE 2 MG: 4 INJECTION INTRAVENOUS at 03:18

## 2018-01-01 RX ADMIN — WATER 1000 MG: 1 INJECTION INTRAMUSCULAR; INTRAVENOUS; SUBCUTANEOUS at 08:13

## 2018-01-01 RX ADMIN — FINASTERIDE 5 MG: 5 TABLET, FILM COATED ORAL at 08:15

## 2018-01-01 RX ADMIN — CYANOCOBALAMIN TAB 500 MCG 1000 MCG: 500 TAB at 08:13

## 2018-01-01 RX ADMIN — CARVEDILOL 12.5 MG: 12.5 TABLET, FILM COATED ORAL at 17:43

## 2018-01-01 RX ADMIN — CARVEDILOL 12.5 MG: 12.5 TABLET, FILM COATED ORAL at 08:12

## 2018-01-01 RX ADMIN — TERAZOSIN HYDROCHLORIDE ANHYDROUS 10 MG: 5 CAPSULE ORAL at 21:02

## 2018-01-01 RX ADMIN — HYDRALAZINE HYDROCHLORIDE 25 MG: 25 TABLET, FILM COATED ORAL at 15:02

## 2018-01-01 RX ADMIN — RENAGEL 800 MG: 800 TABLET ORAL at 17:43

## 2018-01-01 RX ADMIN — ISOSORBIDE DINITRATE 10 MG: 10 TABLET ORAL at 15:02

## 2018-01-01 RX ADMIN — OMEPRAZOLE 40 MG: 20 CAPSULE, DELAYED RELEASE ORAL at 21:02

## 2018-01-01 RX ADMIN — ISOSORBIDE DINITRATE 10 MG: 10 TABLET ORAL at 21:02

## 2018-01-01 RX ADMIN — TAMSULOSIN HYDROCHLORIDE 0.4 MG: 0.4 CAPSULE ORAL at 08:13

## 2018-01-01 RX ADMIN — HYDRALAZINE HYDROCHLORIDE 25 MG: 25 TABLET, FILM COATED ORAL at 21:02

## 2018-01-01 RX ADMIN — RENAGEL 800 MG: 800 TABLET ORAL at 08:13

## 2018-01-01 RX ADMIN — MORPHINE SULFATE 2 MG: 4 INJECTION INTRAVENOUS at 08:22

## 2018-01-01 RX ADMIN — HYDRALAZINE HYDROCHLORIDE 25 MG: 25 TABLET, FILM COATED ORAL at 04:43

## 2018-01-01 RX ADMIN — ISOSORBIDE DINITRATE 10 MG: 10 TABLET ORAL at 08:13

## 2018-01-01 RX ADMIN — OMEPRAZOLE 40 MG: 20 CAPSULE, DELAYED RELEASE ORAL at 08:13

## 2018-01-01 RX ADMIN — ATORVASTATIN CALCIUM 10 MG: 10 TABLET, FILM COATED ORAL at 21:02

## 2018-01-01 RX ADMIN — RENAGEL 800 MG: 800 TABLET ORAL at 12:42

## 2018-01-01 RX ADMIN — CLOPIDOGREL 75 MG: 75 TABLET, FILM COATED ORAL at 08:12

## 2018-01-01 ASSESSMENT — ENCOUNTER SYMPTOMS
PALPITATIONS: 0
FALLS: 0
COUGH: 0
LOSS OF CONSCIOUSNESS: 0
DEPRESSION: 0
DIZZINESS: 0
CONSTIPATION: 0
HEMOPTYSIS: 0
MYALGIAS: 0
HEADACHES: 0
SHORTNESS OF BREATH: 0
DOUBLE VISION: 0
FOCAL WEAKNESS: 1
BLOOD IN STOOL: 0
SPEECH CHANGE: 1
VOMITING: 0
PND: 0
FEVER: 0
SEIZURES: 0
BLURRED VISION: 0
EYES NEGATIVE: 1
NECK PAIN: 0
WEAKNESS: 0
CARDIOVASCULAR NEGATIVE: 1
NAUSEA: 0
HEARTBURN: 0
CHILLS: 0
DIARRHEA: 0
ABDOMINAL PAIN: 0
RESPIRATORY NEGATIVE: 1
MUSCULOSKELETAL NEGATIVE: 1
TREMORS: 0
BACK PAIN: 0
SENSORY CHANGE: 1
TINGLING: 0

## 2018-01-01 ASSESSMENT — PAIN SCALES - GENERAL
PAINLEVEL_OUTOF10: 8
PAINLEVEL_OUTOF10: 3
PAINLEVEL_OUTOF10: 2

## 2018-01-01 NOTE — PROGRESS NOTES
Aox4. [place]. Speech slightly slurred and difficult to understand. Reports pain to right leg, Morphine given with relief. Able to take medications with applesauce. Owens to gravity. Able to make needs known. Provided with family phone numbers per request.

## 2018-01-01 NOTE — CARE PLAN
Problem: Communication  Goal: The ability to communicate needs accurately and effectively will improve  Outcome: PROGRESSING AS EXPECTED      Problem: Safety  Goal: Will remain free from injury  Outcome: PROGRESSING AS EXPECTED

## 2018-01-01 NOTE — DISCHARGE PLANNING
Medical Social Work    Per pts chart, pt needs a Hospice referral. SW met with family at bedside who requested that referrals be sent to [facility]. SW addressed all questions and encouraged follow up as needed. SW sent the choice form to [facility] and confirmed receipt of choice form. SW to monitor response status and follow up with care team.

## 2018-01-01 NOTE — PROGRESS NOTES
[facility] Hospitalist Progress Note    Date of Service: 2018    Chief Complaint  84 y.o. male admitted 2017 with ARNALDO / Uremia from acute rehab where patient was undergoing rehab after recent CVA    Interval Problem Update  Hematuria last night now resolved, atypical chest pain and shoulder pain controlled with norco, intermittent right hip pain 3/10, denies sob, no dizziness, no melena or hematochezia    Consultants/Specialty  Nephrology   Palliative care    Disposition   SNF then home with hospice        Review of Systems   Constitutional: Negative for chills, fever and malaise/fatigue.   HENT: Positive for hearing loss. Negative for tinnitus.    Eyes: Negative for blurred vision and double vision.   Respiratory: Negative for cough, hemoptysis and shortness of breath.    Cardiovascular: Negative for chest pain, palpitations and PND.   Gastrointestinal: Negative for abdominal pain, blood in stool, constipation, diarrhea, heartburn, melena, nausea and vomiting.   Genitourinary:        Owens in place   Musculoskeletal: Negative for back pain, falls, joint pain, myalgias and neck pain.   Skin: Negative for rash.   Neurological: Positive for sensory change, speech change and focal weakness. Negative for dizziness, tingling, tremors, seizures, loss of consciousness, weakness and headaches.   Psychiatric/Behavioral: Negative for depression and suicidal ideas.      Physical Exam  Laboratory/Imaging   Hemodynamics  Temp (24hrs), Av.4 °C (97.5 °F), Min:36.1 °C (97 °F), Max:36.6 °C (97.8 °F)   Temperature: 36.6 °C (97.8 °F)  Pulse  Av.4  Min: 55  Max: 91    Blood Pressure : 127/63      Respiratory      Respiration: 14, Pulse Oximetry: 93 %     Work Of Breathing / Effort: Mild  RUL Breath Sounds: Clear, RML Breath Sounds: Clear, RLL Breath Sounds: Diminished, MILES Breath Sounds: Clear, LLL Breath Sounds: Diminished    Fluids    Intake/Output Summary (Last 24 hours) at 18 1141  Last data filed at 18  0400   Gross per 24 hour   Intake                0 ml   Output             1000 ml   Net            -1000 ml       Nutrition  Orders Placed This Encounter   Procedures   • DIET ORDER     Standing Status:   Standing     Number of Occurrences:   1     Order Specific Question:   Diet:     Answer:   Renal [8]     Order Specific Question:   Texture/Fiber modifications:     Answer:   Dysphagia 3(Mechanical Soft)specify fluid consistency(question 6) [3]     Comments:   please puree soup     Order Specific Question:   Consistency/Fluid modifications:     Answer:   Nectar Thick [2]     Comments:   NO STRAWS!     Order Specific Question:   Miscellaneous modifications:     Answer:   SLP - Deliver to Nursing Station [22]     Comments:   pt needs help with HOB positioning     Physical Exam   Constitutional: He is oriented to person, place, and time. He appears well-developed and well-nourished. No distress.   HENT:   Head: Normocephalic.   Mouth/Throat: Oropharynx is clear and moist. No oropharyngeal exudate.   Eyes: Pupils are equal, round, and reactive to light. No scleral icterus.   Pale conjunctivae   Neck: No JVD present.   Cardiovascular: Normal rate and regular rhythm.  Exam reveals no gallop and no friction rub.    Murmur heard.  Pulmonary/Chest: Effort normal. No stridor. No respiratory distress. He has no wheezes. He has rales.   Abdominal: Soft. Bowel sounds are normal. He exhibits no distension. There is no tenderness. There is no rebound and no guarding.   Musculoskeletal: He exhibits edema. He exhibits no tenderness or deformity.   Neurological: He is alert and oriented to person, place, and time.    R sided weakness   Skin: Skin is warm and dry. He is not diaphoretic.   Psychiatric: He has a normal mood and affect. His behavior is normal. Judgment and thought content normal.       Recent Labs      12/30/17   0146  12/31/17   0203  01/01/18   0401   WBC  5.2  5.4  6.0   RBC  2.43*  2.42*  2.37*   HEMOGLOBIN  7.4*   7.5*  7.1*   HEMATOCRIT  21.8*  22.3*  22.3*   MCV  89.7  92.1  94.1   MCH  30.5  31.0  30.0   MCHC  33.9  33.6*  31.8*   RDW  45.5  46.5  47.8   PLATELETCT  83*  82*  82*   MPV  11.9  11.3  11.2     Recent Labs      12/30/17   0146  12/31/17   0203  01/01/18   0401   SODIUM  141  139  142   POTASSIUM  4.3  4.0  4.1   CHLORIDE  109  106  109   CO2  25  26  28   GLUCOSE  103*  101*  107*   BUN  88*  66*  54*   CREATININE  3.46*  2.94*  2.61*   CALCIUM  8.4*  7.9*  8.0*                      Assessment/Plan     ARNALDO (acute kidney injury) (CMS-HCC)- (present on admission)   Assessment & Plan    Continues to improve  CKD stage IV acute on chronic dysfunction   Continue perez catheter as evidence of obstruction contributing  Nephrology following  Had hematuria yesterday evening, no clots, now resolved            Angina pectoris (CMS-HCC)- (present on admission)   Assessment & Plan    Atypical chest pain suspect musculoskeletal component right shoulder  Minimally elevated trop likely due to decreased renal clearance  Long discussion with patient who declined further cardiac work up  Norco for shoulder pain effective        Gastritis- (present on admission)   Assessment & Plan    Likely due to uremic gastritis   Previous history of 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 AVM  Will continue PO PPI and sucralfate  Informal conversation with GI consultation for EGD needed, currently suspect blood loss is from hematuria  Patient not interested in further procedures at this time  Following          Urinary tract infection- (present on admission)   Assessment & Plan    Urinalysis on 12/22 showed urine packed with WBC, cultures negative to date  Continue ceftriaxone           CVA (cerebral vascular accident) (CMS-HCC)- (present on admission)   Assessment & Plan    Recent CVA, came over from acute rehab  R sided deficits stable  Continue plavix, asa stopped  Continue Atorvastatin           Anemia- (present on admission)   Assessment & Plan    Multiple  etiologies including anemia of renal disease and acute blood loss from hematuria last night-   no evidence of obvious GI bleed  Continue Plavix, asa was stopped  Monitor Hb / Restrictive transfusion strategy, hbg down to 7.1, discussed with patient  Patient is not interested in aggressive care          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)   Assessment & Plan    resolved        Gout- (present on admission)   Assessment & Plan    Allopurinol held with ARNALDO  No evidence of flair        B12 deficiency- (present on admission)   Assessment & Plan    Recently depressed levels  On PO supplementation          Mitral regurgitation- (present on admission)   Assessment & Plan    Recommend outpatient cardiology follow up        Dysphagia due to recent cerebrovascular accident (CVA)- (present on admission)   Assessment & Plan    Improved  SLP evaluation  Diet per SLP recommendations        Thrombocytopenia (CMS-HCC)- (present on admission)   Assessment & Plan    Asa stopped  plts stable  following          Lung cancer (CMS-HCC)- (present on admission)   Assessment & Plan    Left lower lobe adenocarcinoma  Patient scheduled to start radiation in January        Chronic combined systolic and diastolic CHF (congestive heart failure) (CMS-HCC)- (present on admission)   Assessment & Plan    ACE-I therapy held because of profound ARNALDO  No evidence of acute exacerbation  Continue carvedilol          BPH (benign prostatic hyperplasia)- (present on admission)   Assessment & Plan    Patient on therapy with terazosin and flomax and finasteride  Continue perez  Will need outpatient urology follow up           Aortic valve stenosis- (present on admission)   Assessment & Plan    This was reported as severe on TTE 07/2017 12/2017 no stenosis noted  TTE now reveals Mild AS        Subclavian artery stenosis, left (CMS-HCC)- (present on admission)   Assessment & Plan    90 %  Continue plavix / atorvastatin and risk factor  modifications  Vascular surgery evaluation once acute issues resolve        Carotid artery stenosis- (present on admission)   Assessment & Plan    B/L   Continue plavix / atorvastatin  Risk factor modification  Vascular surgery evaluation once acute issues resolve if patient decides        Peripheral vascular disease (CMS-HCC)- (present on admission)   Assessment & Plan    Continue plavix / atorvastatin  Vascular surgery evaluation as outpatient if patient wants- currently not interested in further procedures        COPD (chronic obstructive pulmonary disease) (CMS-HCC)- (present on admission)   Assessment & Plan    No evidence of acute exacerbation  Continue follow        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)   Assessment & Plan    Hx of        DM (diabetes mellitus) (CMS-HCC)- (present on admission)   Assessment & Plan    Type II  Currently controlled off therapy  Monitor daily am blood sugars        Dyslipidemia- (present on admission)   Assessment & Plan    Continue atorvastatin        HTN (hypertension)- (present on admission)   Assessment & Plan    Following                Reviewed items::  Labs reviewed, Medications reviewed and Radiology images reviewed  Owens catheter::  No Owens  DVT prophylaxis pharmacological::  Contraindicated - High bleeding risk  DVT prophylaxis - mechanical:  SCDs  Ulcer Prophylaxis::  Yes

## 2018-01-01 NOTE — ASSESSMENT & PLAN NOTE
Atypical chest pain suspect musculoskeletal component right shoulder  Minimally elevated trop likely due to decreased renal clearance  Long discussion with patient who declined further cardiac work up  Norco for shoulder pain effective

## 2018-01-01 NOTE — PROGRESS NOTES
[facility] Nephrology Progress Note               Author: Kelvin Quintero Date & Time created: 1/1/2018  1:51 PM     Interval History:  84-year-old gentleman with CKD stage IV, secondary to renal vascular disease and diabetes who presented to [facility] with a left-sided infarction with some confusion and right-sided weakness.      The patient was acutely treated and sent to rehab, they had surveillance laboratories done today that showed that his potassium had   increased to 6.3 and he had significant worsening of his chronic kidney disease and was transferred to [facility] for further evaluation and treatment.     Reviewing his chart, the patient did have some low blood pressures in the low 100s.  He did not receive any contrast studies.  He was on ciprofloxacin, but denied any stigmata of skin changes or rash.  He reports decreased urine output during that time.  He has received no NSAIDs.       Daily Nephrology Summary  12/27/17 - seen in consultation after transfer from Rehab.  ARNALDO and Hyperkalemia treated with IVF/bicarb/kayexalate  12/28/17 - Renal function some better with current tx.  K now ok.  Fractional excretion non diagnostic.  Continue current tx  12/29/17 - BUN/Cr trending down.  K better.  Has significant CKD so renal function wont improve beyond Creat 2.5ish.   12/30/17 - BUN/Cr improving slowly.  K ok.   01/01/18 - Scr continues to trend in the right direction. Non-oliguric.    Review of Systems   Constitutional: Positive for malaise/fatigue.   Eyes: Negative.    Respiratory: Negative.    Cardiovascular: Negative.    Musculoskeletal: Negative.    Skin: Negative.          Physical Exam   Constitutional: No distress.   Eyes: No scleral icterus.   Neck: No JVD present.   Cardiovascular: Normal rate and regular rhythm.  Exam reveals no friction rub.    Pulmonary/Chest: Effort normal. No respiratory distress.   Abdominal: Soft. He exhibits no distension.   Skin: Skin is warm.       Labs:        Invalid  input(s): [id number]  Recent Labs      17   0203  17   1222  17   1841   TROPONINI  0.03  0.07*  0.13*     Recent Labs      17   0146  17   0203  18   0401   SODIUM  141  139  142   POTASSIUM  4.3  4.0  4.1   CHLORIDE  109  106  109   CO2  25  26  28   BUN  88*  66*  54*   CREATININE  3.46*  2.94*  2.61*   PHOSPHORUS  3.6   --    --    CALCIUM  8.4*  7.9*  8.0*     Recent Labs      17   0146  17   0203  18   0401   GLUCOSE  103*  101*  107*     Recent Labs      17   0203  01/01/18   0401   RBC  2.43*  2.42*  2.37*   HEMOGLOBIN  7.4*  7.5*  7.1*   HEMATOCRIT  21.8*  22.3*  22.3*   PLATELETCT  83*  82*  82*     Recent Labs      17   0203  01/01/18   0401   WBC  5.2  5.4  6.0   NEUTSPOLYS   --   68.60  76.30*   LYMPHOCYTES   --   16.90*  10.10*   MONOCYTES   --   10.40  9.20   EOSINOPHILS   --   3.00  3.40   BASOPHILS   --   0.20  0.30           Hemodynamics:  Temp (24hrs), Av.4 °C (97.5 °F), Min:36.1 °C (97 °F), Max:36.6 °C (97.8 °F)  Temperature: 36.6 °C (97.8 °F)  Pulse  Av.4  Min: 55  Max: 91   Blood Pressure : 127/63     Respiratory:    Respiration: 14, Pulse Oximetry: 93 %     Work Of Breathing / Effort: Mild  RUL Breath Sounds: Clear, RML Breath Sounds: Clear, RLL Breath Sounds: Diminished, MILES Breath Sounds: Clear, LLL Breath Sounds: Diminished  Fluids:    Intake/Output Summary (Last 24 hours) at 18 1351  Last data filed at 18 0400   Gross per 24 hour   Intake                0 ml   Output             1000 ml   Net            -1000 ml        GI/Nutrition:  Orders Placed This Encounter   Procedures   • DIET ORDER     Standing Status:   Standing     Number of Occurrences:   1     Order Specific Question:   Diet:     Answer:   Renal [8]     Order Specific Question:   Texture/Fiber modifications:     Answer:   Dysphagia 3(Mechanical Soft)specify fluid consistency(question 6) [3]     Comments:    please puree soup     Order Specific Question:   Consistency/Fluid modifications:     Answer:   Nectar Thick [2]     Comments:   NO STRAWS!     Order Specific Question:   Miscellaneous modifications:     Answer:   SLP - Deliver to Nursing Station [22]     Comments:   pt needs help with HOB positioning     Medical Decision Making, by Problem:  Active Hospital Problems    Diagnosis   • CVA (cerebral vascular accident) (CMS-HCC) [I63.9]   • Acute on Chronic blood loss anemia [D50.0]   • Cardiomyopathy (CMS-HCC) [I42.9]   • Lung cancer (CMS-HCC) [C34.90]   • Acute renal failure superimposed on stage 5 chronic kidney disease, not on chronic dialysis (CMS-HCC) [N17.9, N18.5]   • Urinary tract infection without hematuria [N39.0]   • BPH (benign prostatic hyperplasia) [N40.0]     IMPRESSION/PLAN    # ARNALDO/CKD  -- SCreatinine trending down toward baseline now  -- baseline SCreat around 2.5 w eGFR ~25  -- non oliguric with improving numbers with current treatment  -- no indication for acute dialysis at this time  -- monitor chems/hgb routinely  -- continue current treatment    # Hyperkalemia  -- need to check stools for blood given prior hx of GI bleeding and was hyperkalemic with high BUN  -- K has improved  -- monitor daily    # CVA    # DM    This patient is appropriate for hospice/pallliative care.      Reviewed items::  Labs reviewed, Medications reviewed and Radiology images reviewed

## 2018-01-01 NOTE — CARE PLAN
Problem: Mobility  Goal: Risk for activity intolerance will decrease    Intervention: Assess and monitor signs of activity intolerance  Up with assistance. Able to make needs known.      Problem: Pain Management  Goal: Pain level will decrease to patient's comfort goal    Intervention: Follow pain managment plan developed in collaboration with patient and Interdisciplinary Team  Medicated per MAR.

## 2018-01-01 NOTE — PROGRESS NOTES
Pt with perez for urine retention.  Urine in perez bag is clear yellow, the tubing from the catheter to the bag has remedios blood, no blood clots.  Pt states he doesn't think he pulled his perez.  Dr. Baird made aware.  Dr. Baird ordered a cbc for the am, and if blood clots are noted to start a CBI.  Will monitor.

## 2018-01-01 NOTE — DISCHARGE PLANNING
Medical Social Work    SW provided education on pts Medicare rights and provided a copy of IMM to pt. SW addressed all questions and encouraged follow up as needed. SW placed the signed IMM in folder to get digitally scanned into pts medical record. SW to remain available.

## 2018-01-01 NOTE — CARE PLAN
Problem: Nutritional:  Goal: Achieve adequate nutritional intake  Patient will consume >50% of meals  Outcome: PROGRESSING SLOWER THAN EXPECTED  Patient consuming 50% or less of most meals or refusing meal trays. Attempted to speak with patient at bedside, however he was asleep and did not awake to verbal cues. Patient receiving supplements 3x daily with meals.

## 2018-01-02 ENCOUNTER — HOME CARE VISIT (OUTPATIENT)
Dept: HOSPICE | Facility: HOSPICE | Age: 83
End: 2018-01-02
Payer: MEDICARE

## 2018-01-02 ENCOUNTER — HOSPICE ADMISSION (OUTPATIENT)
Dept: HOSPICE | Facility: HOSPICE | Age: 83
End: 2018-01-02
Payer: MEDICARE

## 2018-01-02 ENCOUNTER — HOSPITAL ENCOUNTER (OUTPATIENT)
Dept: RADIATION ONCOLOGY | Facility: MEDICAL CENTER | Age: 83
End: 2018-01-31
Attending: RADIOLOGY
Payer: MEDICARE

## 2018-01-02 PROBLEM — N39.0 URINARY TRACT INFECTION: Status: RESOLVED | Noted: 2017-12-27 | Resolved: 2018-01-02

## 2018-01-02 LAB
ANION GAP SERPL CALC-SCNC: 4 MMOL/L (ref 0–11.9)
BASOPHILS # BLD AUTO: 0.3 % (ref 0–1.8)
BASOPHILS # BLD: 0.02 K/UL (ref 0–0.12)
BUN SERPL-MCNC: 45 MG/DL (ref 8–22)
CALCIUM SERPL-MCNC: 7.8 MG/DL (ref 8.5–10.5)
CHLORIDE SERPL-SCNC: 110 MMOL/L (ref 96–112)
CO2 SERPL-SCNC: 28 MMOL/L (ref 20–33)
CREAT SERPL-MCNC: 2.62 MG/DL (ref 0.5–1.4)
EOSINOPHIL # BLD AUTO: 0.24 K/UL (ref 0–0.51)
EOSINOPHIL NFR BLD: 3.6 % (ref 0–6.9)
ERYTHROCYTE [DISTWIDTH] IN BLOOD BY AUTOMATED COUNT: 49.5 FL (ref 35.9–50)
GFR SERPL CREATININE-BSD FRML MDRD: 23 ML/MIN/1.73 M 2
GLUCOSE SERPL-MCNC: 126 MG/DL (ref 65–99)
HCT VFR BLD AUTO: 21.7 % (ref 42–52)
HGB BLD-MCNC: 6.9 G/DL (ref 14–18)
IMM GRANULOCYTES # BLD AUTO: 0.06 K/UL (ref 0–0.11)
IMM GRANULOCYTES NFR BLD AUTO: 0.9 % (ref 0–0.9)
LYMPHOCYTES # BLD AUTO: 0.58 K/UL (ref 1–4.8)
LYMPHOCYTES NFR BLD: 8.7 % (ref 22–41)
MCH RBC QN AUTO: 30.5 PG (ref 27–33)
MCHC RBC AUTO-ENTMCNC: 31.8 G/DL (ref 33.7–35.3)
MCV RBC AUTO: 96 FL (ref 81.4–97.8)
MONOCYTES # BLD AUTO: 0.54 K/UL (ref 0–0.85)
MONOCYTES NFR BLD AUTO: 8.1 % (ref 0–13.4)
NEUTROPHILS # BLD AUTO: 5.24 K/UL (ref 1.82–7.42)
NEUTROPHILS NFR BLD: 78.4 % (ref 44–72)
NRBC # BLD AUTO: 0 K/UL
NRBC BLD-RTO: 0 /100 WBC
PLATELET # BLD AUTO: 89 K/UL (ref 164–446)
PMV BLD AUTO: 11.4 FL (ref 9–12.9)
POTASSIUM SERPL-SCNC: 4 MMOL/L (ref 3.6–5.5)
RBC # BLD AUTO: 2.26 M/UL (ref 4.7–6.1)
SODIUM SERPL-SCNC: 142 MMOL/L (ref 135–145)
WBC # BLD AUTO: 6.7 K/UL (ref 4.8–10.8)

## 2018-01-02 PROCEDURE — 700105 HCHG RX REV CODE 258: Performed by: INTERNAL MEDICINE

## 2018-01-02 PROCEDURE — G8996 SWALLOW CURRENT STATUS: HCPCS | Mod: CL

## 2018-01-02 PROCEDURE — 700111 HCHG RX REV CODE 636 W/ 250 OVERRIDE (IP): Performed by: INTERNAL MEDICINE

## 2018-01-02 PROCEDURE — 770006 HCHG ROOM/CARE - MED/SURG/GYN SEMI*

## 2018-01-02 PROCEDURE — 36415 COLL VENOUS BLD VENIPUNCTURE: CPT

## 2018-01-02 PROCEDURE — 99232 SBSQ HOSP IP/OBS MODERATE 35: CPT | Performed by: HOSPITALIST

## 2018-01-02 PROCEDURE — 700102 HCHG RX REV CODE 250 W/ 637 OVERRIDE(OP): Performed by: INTERNAL MEDICINE

## 2018-01-02 PROCEDURE — 85025 COMPLETE CBC W/AUTO DIFF WBC: CPT

## 2018-01-02 PROCEDURE — A9270 NON-COVERED ITEM OR SERVICE: HCPCS | Performed by: HOSPITALIST

## 2018-01-02 PROCEDURE — 92526 ORAL FUNCTION THERAPY: CPT

## 2018-01-02 PROCEDURE — 700111 HCHG RX REV CODE 636 W/ 250 OVERRIDE (IP): Performed by: STUDENT IN AN ORGANIZED HEALTH CARE EDUCATION/TRAINING PROGRAM

## 2018-01-02 PROCEDURE — A9270 NON-COVERED ITEM OR SERVICE: HCPCS | Performed by: INTERNAL MEDICINE

## 2018-01-02 PROCEDURE — G8997 SWALLOW GOAL STATUS: HCPCS | Mod: CH

## 2018-01-02 PROCEDURE — 700102 HCHG RX REV CODE 250 W/ 637 OVERRIDE(OP): Performed by: HOSPITALIST

## 2018-01-02 PROCEDURE — 700101 HCHG RX REV CODE 250: Performed by: INTERNAL MEDICINE

## 2018-01-02 PROCEDURE — 97535 SELF CARE MNGMENT TRAINING: CPT

## 2018-01-02 PROCEDURE — 80048 BASIC METABOLIC PNL TOTAL CA: CPT

## 2018-01-02 PROCEDURE — G8988 SELF CARE GOAL STATUS: HCPCS | Mod: CI

## 2018-01-02 PROCEDURE — G8989 SELF CARE D/C STATUS: HCPCS | Mod: CK

## 2018-01-02 PROCEDURE — 51798 US URINE CAPACITY MEASURE: CPT

## 2018-01-02 RX ORDER — HYDROCODONE BITARTRATE AND ACETAMINOPHEN 5; 325 MG/1; MG/1
1-2 TABLET ORAL EVERY 4 HOURS PRN
Status: DISCONTINUED | OUTPATIENT
Start: 2018-01-02 | End: 2018-01-04 | Stop reason: HOSPADM

## 2018-01-02 RX ADMIN — RENAGEL 800 MG: 800 TABLET ORAL at 07:43

## 2018-01-02 RX ADMIN — RENAGEL 800 MG: 800 TABLET ORAL at 11:44

## 2018-01-02 RX ADMIN — SODIUM BICARBONATE: 84 INJECTION, SOLUTION INTRAVENOUS at 20:29

## 2018-01-02 RX ADMIN — HYDRALAZINE HYDROCHLORIDE 25 MG: 25 TABLET, FILM COATED ORAL at 14:02

## 2018-01-02 RX ADMIN — TAMSULOSIN HYDROCHLORIDE 0.4 MG: 0.4 CAPSULE ORAL at 07:43

## 2018-01-02 RX ADMIN — HYDROCODONE BITARTRATE AND ACETAMINOPHEN 2 TABLET: 5; 325 TABLET ORAL at 18:08

## 2018-01-02 RX ADMIN — SODIUM BICARBONATE: 84 INJECTION, SOLUTION INTRAVENOUS at 02:03

## 2018-01-02 RX ADMIN — CLOPIDOGREL 75 MG: 75 TABLET, FILM COATED ORAL at 07:43

## 2018-01-02 RX ADMIN — CYANOCOBALAMIN TAB 500 MCG 1000 MCG: 500 TAB at 07:43

## 2018-01-02 RX ADMIN — RENAGEL 800 MG: 800 TABLET ORAL at 16:34

## 2018-01-02 RX ADMIN — HYDROCODONE BITARTRATE AND ACETAMINOPHEN 1 TABLET: 5; 325 TABLET ORAL at 03:59

## 2018-01-02 RX ADMIN — CARVEDILOL 12.5 MG: 12.5 TABLET, FILM COATED ORAL at 07:42

## 2018-01-02 RX ADMIN — WATER 1000 MG: 1 INJECTION INTRAMUSCULAR; INTRAVENOUS; SUBCUTANEOUS at 07:47

## 2018-01-02 RX ADMIN — CHOLECALCIFEROL TAB 25 MCG (1000 UNIT) 4000 UNITS: 25 TAB at 07:42

## 2018-01-02 RX ADMIN — TERAZOSIN HYDROCHLORIDE ANHYDROUS 10 MG: 5 CAPSULE ORAL at 22:27

## 2018-01-02 RX ADMIN — CARVEDILOL 12.5 MG: 12.5 TABLET, FILM COATED ORAL at 16:34

## 2018-01-02 RX ADMIN — ISOSORBIDE DINITRATE 10 MG: 10 TABLET ORAL at 07:43

## 2018-01-02 RX ADMIN — MORPHINE SULFATE 3 MG: 4 INJECTION INTRAVENOUS at 16:19

## 2018-01-02 RX ADMIN — FINASTERIDE 5 MG: 5 TABLET, FILM COATED ORAL at 07:43

## 2018-01-02 RX ADMIN — MORPHINE SULFATE 2 MG: 4 INJECTION INTRAVENOUS at 02:17

## 2018-01-02 RX ADMIN — OMEPRAZOLE 40 MG: 20 CAPSULE, DELAYED RELEASE ORAL at 07:42

## 2018-01-02 RX ADMIN — ISOSORBIDE DINITRATE 10 MG: 10 TABLET ORAL at 14:02

## 2018-01-02 RX ADMIN — HYDRALAZINE HYDROCHLORIDE 25 MG: 25 TABLET, FILM COATED ORAL at 05:21

## 2018-01-02 ASSESSMENT — PAIN SCALES - GENERAL
PAINLEVEL_OUTOF10: 0
PAINLEVEL_OUTOF10: 8
PAINLEVEL_OUTOF10: 0
PAINLEVEL_OUTOF10: 9
PAINLEVEL_OUTOF10: 4
PAINLEVEL_OUTOF10: 7
PAINLEVEL_OUTOF10: 7
PAINLEVEL_OUTOF10: 0
PAINLEVEL_OUTOF10: 0

## 2018-01-02 ASSESSMENT — ENCOUNTER SYMPTOMS
SPEECH CHANGE: 1
HEMOPTYSIS: 0
SHORTNESS OF BREATH: 0
COUGH: 0
DOUBLE VISION: 0
TREMORS: 0
DIARRHEA: 0
BACK PAIN: 0
CONSTIPATION: 0
BLOOD IN STOOL: 0
CHILLS: 0
DIZZINESS: 0
RESPIRATORY NEGATIVE: 1
HEARTBURN: 0
NECK PAIN: 0
NAUSEA: 0
WEAKNESS: 0
BLURRED VISION: 0
HEADACHES: 0
DEPRESSION: 0
VOMITING: 0
FALLS: 0
SENSORY CHANGE: 1
FEVER: 0
CARDIOVASCULAR NEGATIVE: 1
PALPITATIONS: 0
FOCAL WEAKNESS: 1
LOSS OF CONSCIOUSNESS: 0
MYALGIAS: 0
MUSCULOSKELETAL NEGATIVE: 1
SEIZURES: 0
EYES NEGATIVE: 1
ABDOMINAL PAIN: 0
TINGLING: 0
PND: 0

## 2018-01-02 ASSESSMENT — COGNITIVE AND FUNCTIONAL STATUS - GENERAL
PERSONAL GROOMING: A LITTLE
HELP NEEDED FOR BATHING: A LOT
TOILETING: A LOT
DAILY ACTIVITIY SCORE: 15
EATING MEALS: A LITTLE
SUGGESTED CMS G CODE MODIFIER DAILY ACTIVITY: CK
DRESSING REGULAR LOWER BODY CLOTHING: A LOT
DRESSING REGULAR UPPER BODY CLOTHING: A LITTLE

## 2018-01-02 NOTE — PROGRESS NOTES
"Hemoglobin this morning 6.9. On call hospitalist paged regarding current hemoglobin. Per Dr. Bales due to current plan of care,  review with patient regarding his wish to receive blood. Per patient, \" No, I don't want blood. I don't even think I need it.\" Will discuss with oncoming nurse and treatment team.   "

## 2018-01-02 NOTE — PROGRESS NOTES
Patient resting in bed at this time. Patient c/o pain in his right hip. Dayshift nurse will give PRN pain medication. Patient is a/o x3-4 with intermittent confusion.

## 2018-01-02 NOTE — DISCHARGE PLANNING
Per Althea with [facility], pt is requesting [facility] transport to his home to take care of his computer, and then to his daughter's home. Althea is checking with her supervisor to see if this can be accommodated. If not, family will have to choose another hospice agency.

## 2018-01-02 NOTE — THERAPY
"Speech Language Therapy dysphagia treatment completed.   Functional Status:  The patient was seen for dysphagia therapy this date. The patient was awake, alert and sitting upright with his D3/NTL meal tray. The patient reported having poor PO intake and decreased appetite. The patient with max cues agreed to minimal PO trials of NTL and purees with single sips of thins. The patient presented with consistent throat clear during Po trials despite cues for swallow strategies. At this time, patient remains high risk for aspiration. Extensive education was provided to patient and his family regarding SLP recommendations for NPO with repeat swallow evaluation. The patient and his family declined further swallow evaluation or diet modification. The patient requested to change to D3/thins for quality with clear understanding of aspiration risk.     Recommendations: 1) After discussion with pt, family and MD orders received for D3/thin liquids despite risk. Patient and family aware of SLP recommendation for NPO with repeat swallow study however, that is not their wish at this time.      Plan of Care: Will benefit from Speech Therapy 3 times per week.    Post-Acute Therapy: Discharge to a transitional care facility for continued skilled therapy services.    See \"Rehab Therapy-Acute\" Patient Summary Report for complete documentation.     "

## 2018-01-02 NOTE — CARE PLAN
Problem: Respiratory:  Goal: Respiratory status will improve    Intervention: Administer and titrate oxygen therapy  2 liters of supplemental oxygen applied this shift. Will attempt to titrate as appropriate.       Problem: Pain Management  Goal: Pain level will decrease to patient's comfort goal  Patient c/o right arm, leg, and hip pain often during this shift. PRN medications given with partial relief.

## 2018-01-02 NOTE — PROGRESS NOTES
[facility] Hospitalist Progress Note    Date of Service: 2018    Chief Complaint  84 y.o. male admitted 2017 with ARNALDO / Uremia from acute rehab where patient was undergoing rehab after recent CVA    Interval Problem Update  No further hematuria, no melena or hematochezia, intermittent r shoulder and r hip pain, controlled on norco.  Daughter at bedside. Plan to d/c with hospice. Patient requesting a trial without perez- discussed with nursing    Consultants/Specialty  Nephrology   Palliative care    Disposition   hospice        Review of Systems   Constitutional: Negative for chills, fever and malaise/fatigue.   HENT: Positive for hearing loss. Negative for tinnitus.    Eyes: Negative for blurred vision and double vision.   Respiratory: Negative for cough, hemoptysis and shortness of breath.    Cardiovascular: Negative for chest pain, palpitations and PND.   Gastrointestinal: Negative for abdominal pain, blood in stool, constipation, diarrhea, heartburn, melena, nausea and vomiting.   Genitourinary:        Perez in place   Musculoskeletal: Negative for back pain, falls, joint pain, myalgias and neck pain.   Skin: Negative for rash.   Neurological: Positive for sensory change, speech change and focal weakness. Negative for dizziness, tingling, tremors, seizures, loss of consciousness, weakness and headaches.   Psychiatric/Behavioral: Negative for depression and suicidal ideas.      Physical Exam  Laboratory/Imaging   Hemodynamics  Temp (24hrs), Av.8 °C (98.3 °F), Min:36.4 °C (97.6 °F), Max:37.3 °C (99.1 °F)   Temperature: 36.4 °C (97.6 °F)  Pulse  Av.7  Min: 55  Max: 91    Blood Pressure : 126/56      Respiratory      Respiration: 15, Pulse Oximetry: 92 %     Work Of Breathing / Effort: Mild  RUL Breath Sounds: Clear, RML Breath Sounds: Diminished, RLL Breath Sounds: Diminished, MILES Breath Sounds: Clear, LLL Breath Sounds: Diminished    Fluids    Intake/Output Summary (Last 24 hours) at 18  1230  Last data filed at 01/02/18 0656   Gross per 24 hour   Intake             1221 ml   Output              750 ml   Net              471 ml       Nutrition  Orders Placed This Encounter   Procedures   • DIET ORDER     Standing Status:   Standing     Number of Occurrences:   1     Order Specific Question:   Diet:     Answer:   Renal [8]     Order Specific Question:   Texture/Fiber modifications:     Answer:   Dysphagia 3(Mechanical Soft)specify fluid consistency(question 6) [3]     Comments:   please puree soup     Order Specific Question:   Consistency/Fluid modifications:     Answer:   Thin Liquids [3]     Comments:   NO STRAWS!     Order Specific Question:   Miscellaneous modifications:     Answer:   SLP - Deliver to Nursing Station [22]     Comments:   pt needs help with HOB positioning     Physical Exam   Constitutional: He is oriented to person, place, and time. He appears well-developed and well-nourished. No distress.   HENT:   Head: Normocephalic.   Mouth/Throat: Oropharynx is clear and moist. No oropharyngeal exudate.   Eyes: Pupils are equal, round, and reactive to light. No scleral icterus.   Pale conjunctivae   Neck: No JVD present.   Cardiovascular: Normal rate and regular rhythm.  Exam reveals no gallop and no friction rub.    Murmur heard.  Pulmonary/Chest: Effort normal. No stridor. No respiratory distress. He has no wheezes. He has no rales.   Abdominal: Soft. Bowel sounds are normal. He exhibits no distension. There is no tenderness. There is no rebound and no guarding.   Musculoskeletal: He exhibits edema. He exhibits no tenderness or deformity.   Neurological: He is alert and oriented to person, place, and time.    R sided weakness   Skin: Skin is warm and dry. He is not diaphoretic.   Psychiatric: He has a normal mood and affect. His behavior is normal. Judgment and thought content normal.       Recent Labs      12/31/17   0203  01/01/18   0401  01/02/18   0503   WBC  5.4  6.0  6.7   RBC   2.42*  2.37*  2.26*   HEMOGLOBIN  7.5*  7.1*  6.9*   HEMATOCRIT  22.3*  22.3*  21.7*   MCV  92.1  94.1  96.0   MCH  31.0  30.0  30.5   MCHC  33.6*  31.8*  31.8*   RDW  46.5  47.8  49.5   PLATELETCT  82*  82*  89*   MPV  11.3  11.2  11.4     Recent Labs      12/31/17   0203  01/01/18   0401  01/02/18   0503   SODIUM  139  142  142   POTASSIUM  4.0  4.1  4.0   CHLORIDE  106  109  110   CO2  26  28  28   GLUCOSE  101*  107*  126*   BUN  66*  54*  45*   CREATININE  2.94*  2.61*  2.62*   CALCIUM  7.9*  8.0*  7.8*                      Assessment/Plan     ARNALDO (acute kidney injury) (CMS-HCC)- (present on admission)   Assessment & Plan    Improved  CKD stage IV acute on chronic dysfunction   Continue perez catheter as evidence of obstruction contributing  Nephrology following  No further hematuria            Angina pectoris (CMS-HCC)- (present on admission)   Assessment & Plan    Atypical chest pain suspect musculoskeletal component right shoulder  Minimally elevated trop likely due to decreased renal clearance  Long discussion with patient who declined further cardiac work up  Norco for shoulder pain effective        Gastritis- (present on admission)   Assessment & Plan    Likely due to uremic gastritis   Previous history of 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 AVM  Will continue PO PPI and sucralfate  Informal conversation with GI consultation for EGD needed, currently suspect blood loss is from hematuria  Patient not interested in further procedures at this time          Urinary tract infection- (present on admission)   Assessment & Plan    Urinalysis on 12/22 showed urine packed with WBC, cultures negative to date  Continue ceftriaxone           CVA (cerebral vascular accident) (CMS-HCC)- (present on admission)   Assessment & Plan    Recent CVA, came over from acute rehab  R sided deficits stable  Continue plavix, asa stopped  Continue Atorvastatin           Anemia- (present on admission)   Assessment & Plan    Multiple etiologies including  anemia of renal disease and acute blood loss from hematuria last night-   no evidence of obvious GI bleed  Continue Plavix, asa was stopped  Monitor Hb / Restrictive transfusion strategy, hbg down to 6.9, discussed with patient  Patient is not interested in aggressive care or transfusion at this time          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)   Assessment & Plan    resolved        Gout- (present on admission)   Assessment & Plan    Allopurinol held with ARNALDO  No evidence of flair        B12 deficiency- (present on admission)   Assessment & Plan    Recently depressed levels  On PO supplementation          Mitral regurgitation- (present on admission)   Assessment & Plan    Recommend outpatient cardiology follow up        Dysphagia due to recent cerebrovascular accident (CVA)- (present on admission)   Assessment & Plan    Improved  SLP evaluation  Diet per SLP recommendations        Thrombocytopenia (CMS-HCC)- (present on admission)   Assessment & Plan    Asa stopped  plts stable  following          Lung cancer (CMS-HCC)- (present on admission)   Assessment & Plan    Left lower lobe adenocarcinoma  Patient scheduled to start radiation in January        Chronic combined systolic and diastolic CHF (congestive heart failure) (CMS-HCC)- (present on admission)   Assessment & Plan    ACE-I therapy held because of profound ARNALDO  No evidence of acute exacerbation  Continue carvedilol          BPH (benign prostatic hyperplasia)- (present on admission)   Assessment & Plan    Patient on therapy with terazosin and flomax and finasteride  Continue perez  Will need outpatient urology follow up           Aortic valve stenosis- (present on admission)   Assessment & Plan    This was reported as severe on TTE 07/2017 12/2017 no stenosis noted  TTE now reveals Mild AS        Subclavian artery stenosis, left (CMS-HCC)- (present on admission)   Assessment & Plan    90 %  Continue plavix / atorvastatin and risk factor  modifications  Vascular surgery evaluation once acute issues resolve        Carotid artery stenosis- (present on admission)   Assessment & Plan    B/L   Continue plavix / atorvastatin  Risk factor modification  Vascular surgery evaluation once acute issues resolve if patient decides        Peripheral vascular disease (CMS-HCC)- (present on admission)   Assessment & Plan    Continue plavix / atorvastatin  Vascular surgery evaluation as outpatient if patient wants- currently not interested in further procedures        COPD (chronic obstructive pulmonary disease) (CMS-HCC)- (present on admission)   Assessment & Plan    No evidence of acute exacerbation  Continue follow        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)   Assessment & Plan    Hx of        DM (diabetes mellitus) (CMS-HCC)- (present on admission)   Assessment & Plan    Type II  Currently controlled off therapy  Monitor daily am blood sugars        Dyslipidemia- (present on admission)   Assessment & Plan    Continue atorvastatin        HTN (hypertension)- (present on admission)   Assessment & Plan    Following                Reviewed items::  Labs reviewed, Medications reviewed and Radiology images reviewed  Owens catheter::  No Owens  DVT prophylaxis pharmacological::  Contraindicated - High bleeding risk  DVT prophylaxis - mechanical:  SCDs  Ulcer Prophylaxis::  Yes

## 2018-01-02 NOTE — PROGRESS NOTES
Ashley Mercedes Fall Risk Assessment:     Last Known Fall: No falls  Mobility: Use of assistive device/requires assist of two people  Medications: Cardiovascular or central nervous system meds  Mental Status/LOC/Awareness: Awake, alert, and oriented to date, place, and person  Toileting Needs: Use of catheters or diversion devices  Volume/Electrolyte Status: Use of IV fluids/tube feeds  Communication/Sensory: Visual (Glasses)/hearing deficit  Behavior: Appropriate behavior  Ashley Mercedes Fall Risk Total: 11  Fall Risk Level: MODERATE RISK    Universal Fall Precautions:  call light/belongings in reach, bed in low position and locked, siderails up x 2, wheelchairs and assistive devices out of sight, use non-slip footwear, adequate lighting, clutter free and spill free environment, educate on level of risk, educate to call for assistance    Fall Risk Level Interventions:    TRIAL (TELE 8, NEURO, MED SANA 5) Moderate Fall Risk Interventions  Place yellow fall risk ID band on patient: verified  Provide patient/family education based on risk assessment : completed  Educate patient/family to call staff for assistance when getting out of bed: completed  Place fall precaution signage outside patient door: verified  Utilize bed/chair fall alarm: verified     Patient Specific Interventions:     Medication: review medications with patient and family  Mental Status/LOC/Awareness: reinforce falls education, check on patient hourly, utilize bed/chair fall alarm, reinforce the use of call light and provide activity  Toileting: provide frquent toileting  Volume/Electrolyte Status: ensure patient remains hydrated  Communication/Sensory: update plan of care on whiteboard  Behavioral: encourage patient to voice feelings and engage patient in daily activities  Mobility: schedule physical activity throughout the day, ensure bed is locked and in lowest position, provide appropriate assistive device, instruct patient to exit bed on their  strongest side and collaborate with doctor for possible PT/OT consult

## 2018-01-02 NOTE — PROGRESS NOTES
Pt AOx4, but forgetful. Family at bedside wanting to discuss discharge planning with MD. Currently pt is not wanting much treatment and is considering hospice/comfort care. Fall precautions in place.

## 2018-01-02 NOTE — PROGRESS NOTES
[facility] Nephrology Progress Note               Author: Kelvin Quintero Date & Time created: 1/2/2018  9:38 AM     Interval History:  84-year-old gentleman with CKD stage IV, secondary to renal vascular disease and diabetes who presented to [facility] with a left-sided infarction with some confusion and right-sided weakness.      The patient was acutely treated and sent to rehab, they had surveillance laboratories done today that showed that his potassium had   increased to 6.3 and he had significant worsening of his chronic kidney disease and was transferred to [facility] for further evaluation and treatment.     Reviewing his chart, the patient did have some low blood pressures in the low 100s.  He did not receive any contrast studies.  He was on ciprofloxacin, but denied any stigmata of skin changes or rash.  He reports decreased urine output during that time.  He has received no NSAIDs.       Daily Nephrology Summary  12/27/17 - seen in consultation after transfer from Rehab.  ARNALDO and Hyperkalemia treated with IVF/bicarb/kayexalate  12/28/17 - Renal function some better with current tx.  K now ok.  Fractional excretion non diagnostic.  Continue current tx  12/29/17 - BUN/Cr trending down.  K better.  Has significant CKD so renal function wont improve beyond Creat 2.5ish.   12/30/17 - BUN/Cr improving slowly.  K ok.   01/01/18 - Scr continues to trend in the right direction. Non-oliguric.  01/02/18 - No new overnight renal events. Feels fine. Scr is same.    Review of Systems   Constitutional: Positive for malaise/fatigue.   Eyes: Negative.    Respiratory: Negative.    Cardiovascular: Negative.    Musculoskeletal: Negative.    Skin: Negative.        Physical Exam   Constitutional: No distress.   Eyes: No scleral icterus.   Neck: No JVD present.   Cardiovascular: Normal rate and regular rhythm.  Exam reveals no friction rub.    Pulmonary/Chest: Effort normal. No respiratory distress.   Abdominal: Soft. He exhibits no  distension.   Skin: Skin is warm.       Labs:        Invalid input(s): [id number]  Recent Labs      17   0203  17   1222  17   1841   TROPONINI  0.03  0.07*  0.13*     Recent Labs      17   0203  18   0401  18   0503   SODIUM  139  142  142   POTASSIUM  4.0  4.1  4.0   CHLORIDE  106  109  110   CO2  26  28  28   BUN  66*  54*  45*   CREATININE  2.94*  2.61*  2.62*   CALCIUM  7.9*  8.0*  7.8*     Recent Labs      17   0203  18   0401  18   0503   GLUCOSE  101*  107*  126*     Recent Labs      17   0203  18   0401  18   0503   RBC  2.42*  2.37*  2.26*   HEMOGLOBIN  7.5*  7.1*  6.9*   HEMATOCRIT  22.3*  22.3*  21.7*   PLATELETCT  82*  82*  89*     Recent Labs      17   0203  18   0401  18   0503   WBC  5.4  6.0  6.7   NEUTSPOLYS  68.60  76.30*  78.40*   LYMPHOCYTES  16.90*  10.10*  8.70*   MONOCYTES  10.40  9.20  8.10   EOSINOPHILS  3.00  3.40  3.60   BASOPHILS  0.20  0.30  0.30           Hemodynamics:  Temp (24hrs), Av.9 °C (98.5 °F), Min:36.6 °C (97.8 °F), Max:37.3 °C (99.1 °F)  Temperature: 37 °C (98.6 °F)  Pulse  Av.7  Min: 55  Max: 91   Blood Pressure : 126/56     Respiratory:    Respiration: 18, Pulse Oximetry: 97 %     Work Of Breathing / Effort: Mild  RUL Breath Sounds: Clear, RML Breath Sounds: Diminished, RLL Breath Sounds: Diminished, MILES Breath Sounds: Clear, LLL Breath Sounds: Diminished  Fluids:    Intake/Output Summary (Last 24 hours) at 18 0938  Last data filed at 18 0656   Gross per 24 hour   Intake             1221 ml   Output              750 ml   Net              471 ml        GI/Nutrition:  Orders Placed This Encounter   Procedures   • DIET ORDER     Standing Status:   Standing     Number of Occurrences:   1     Order Specific Question:   Diet:     Answer:   Renal [8]     Order Specific Question:   Texture/Fiber modifications:     Answer:   Dysphagia 3(Mechanical Soft)specify fluid  consistency(question 6) [3]     Comments:   please puree soup     Order Specific Question:   Consistency/Fluid modifications:     Answer:   Nectar Thick [2]     Comments:   NO STRAWS!     Order Specific Question:   Miscellaneous modifications:     Answer:   SLP - Deliver to Nursing Station [22]     Comments:   pt needs help with HOB positioning     Medical Decision Making, by Problem:  Active Hospital Problems    Diagnosis   • CVA (cerebral vascular accident) (CMS-HCC) [I63.9]   • Acute on Chronic blood loss anemia [D50.0]   • Cardiomyopathy (CMS-HCC) [I42.9]   • Lung cancer (CMS-HCC) [C34.90]   • Acute renal failure superimposed on stage 5 chronic kidney disease, not on chronic dialysis (CMS-HCC) [N17.9, N18.5]   • Urinary tract infection without hematuria [N39.0]   • BPH (benign prostatic hyperplasia) [N40.0]     IMPRESSION/PLAN    # ARNALDO/CKD  -- SCreatinine trending down toward baseline now. It has stabilized around baseline levels  -- baseline SCreat around 2.5 w eGFR ~25  -- non oliguric with improving numbers with current treatment  -- no indication for acute dialysis at this time  -- monitor chems/hgb routinely  -- continue current treatment    # Hyperkalemia  -- need to check stools for blood given prior hx of GI bleeding and was hyperkalemic with high BUN  -- K has improved  -- monitor daily    # CVA    # DM    This patient is appropriate for hospice/pallliative care.      Reviewed items::  Labs reviewed, Medications reviewed and Radiology images reviewed

## 2018-01-02 NOTE — PROGRESS NOTES
"Patient call this writer into the room c/o right hip pain  at \"9\" on a scale of 0-10 that radiated to his shoulder. Patient asked was it chest pain. Patient told this writer it was chest pain and the charge nurse that it was not chest pain. PRN medication given. Reviewed with charge nurse. Will monitor at this time.   "

## 2018-01-02 NOTE — DISCHARGE PLANNING
SW received call from pt's daughter, Rhiannon. Daughter notiifed SW that pt does not want to go to SNF for CVA. Pt wanting to d/c on Hospice.     Daughter requesting phone number for [facility]. Daughter did not have pen so SW offered to deliver number on post it to bedside. SW when to bedside. Daughter not at bedside. SW left Post it on side table. SW called pt's cell and received beeping sounds. SW called left message with pt's wife and also provided number for [facility].     SW provided direct line for any questions or concerns.

## 2018-01-02 NOTE — THERAPY
"Occupational Therapy Treatment completed with focus on ADLs and caregiver training.  Functional Status:  Pt seen for OT tx today.  Pt was pleasant throughout the session however despite stating he wanted to participate declined when it was time to follow through stating he was tired.  At that point in time the dtr stated that he is going home on hospice and doesn't think he needs further OT tx to which he was then in agreement.  Continues to be limited by fatigue, weakness, endurance, and self care.  Pt and dtr were educated on home safety, AE, energy conservation, and benefits of rehab to help with quality of life once home.  Pt was given AE resource guide to assist with deciding what equipment would help him to be as I as possible at home.    Plan of Care: Will benefit from Occupational Therapy 4 times per week  Discharge Recommendations:  Equipment Shower Chair, Bedside Commode and Grab Bars.   See \"Rehab Therapy-Acute\" Patient Summary Report for complete documentation.   "

## 2018-01-02 NOTE — CARE PLAN
Problem: Mobility  Goal: Risk for activity intolerance will decrease  Outcome: PROGRESSING AS EXPECTED  PT/OT following, up to chair X 1 assist.     Problem: Skin Integrity  Goal: Risk for impaired skin integrity will decrease  Outcome: PROGRESSING AS EXPECTED  Waffle mattress overlay in place, pt up to chair.

## 2018-01-03 ENCOUNTER — HOME CARE VISIT (OUTPATIENT)
Dept: HOSPICE | Facility: HOSPICE | Age: 83
End: 2018-01-03
Payer: MEDICARE

## 2018-01-03 PROCEDURE — A9270 NON-COVERED ITEM OR SERVICE: HCPCS | Performed by: HOSPITALIST

## 2018-01-03 PROCEDURE — 700102 HCHG RX REV CODE 250 W/ 637 OVERRIDE(OP): Performed by: INTERNAL MEDICINE

## 2018-01-03 PROCEDURE — A9270 NON-COVERED ITEM OR SERVICE: HCPCS | Performed by: INTERNAL MEDICINE

## 2018-01-03 PROCEDURE — 700102 HCHG RX REV CODE 250 W/ 637 OVERRIDE(OP): Performed by: HOSPITALIST

## 2018-01-03 PROCEDURE — 770006 HCHG ROOM/CARE - MED/SURG/GYN SEMI*

## 2018-01-03 PROCEDURE — 51798 US URINE CAPACITY MEASURE: CPT

## 2018-01-03 PROCEDURE — 99232 SBSQ HOSP IP/OBS MODERATE 35: CPT | Performed by: HOSPITALIST

## 2018-01-03 RX ORDER — CLOPIDOGREL BISULFATE 75 MG/1
75 TABLET ORAL EVERY MORNING
Qty: 1 TAB | Refills: 0
Start: 2018-01-04 | End: 2020-01-01

## 2018-01-03 RX ADMIN — OMEPRAZOLE 40 MG: 20 CAPSULE, DELAYED RELEASE ORAL at 20:51

## 2018-01-03 RX ADMIN — ATORVASTATIN CALCIUM 10 MG: 10 TABLET, FILM COATED ORAL at 20:51

## 2018-01-03 RX ADMIN — TERAZOSIN HYDROCHLORIDE ANHYDROUS 10 MG: 5 CAPSULE ORAL at 20:51

## 2018-01-03 RX ADMIN — CHOLECALCIFEROL TAB 25 MCG (1000 UNIT) 4000 UNITS: 25 TAB at 08:19

## 2018-01-03 RX ADMIN — RENAGEL 800 MG: 800 TABLET ORAL at 08:19

## 2018-01-03 RX ADMIN — TAMSULOSIN HYDROCHLORIDE 0.4 MG: 0.4 CAPSULE ORAL at 08:19

## 2018-01-03 RX ADMIN — OMEPRAZOLE 40 MG: 20 CAPSULE, DELAYED RELEASE ORAL at 08:20

## 2018-01-03 RX ADMIN — ISOSORBIDE DINITRATE 10 MG: 10 TABLET ORAL at 15:12

## 2018-01-03 RX ADMIN — CARVEDILOL 12.5 MG: 12.5 TABLET, FILM COATED ORAL at 18:04

## 2018-01-03 RX ADMIN — ISOSORBIDE DINITRATE 10 MG: 10 TABLET ORAL at 08:20

## 2018-01-03 RX ADMIN — CLOPIDOGREL 75 MG: 75 TABLET, FILM COATED ORAL at 08:19

## 2018-01-03 RX ADMIN — FINASTERIDE 5 MG: 5 TABLET, FILM COATED ORAL at 08:20

## 2018-01-03 RX ADMIN — ISOSORBIDE DINITRATE 10 MG: 10 TABLET ORAL at 20:51

## 2018-01-03 RX ADMIN — HYDRALAZINE HYDROCHLORIDE 25 MG: 25 TABLET, FILM COATED ORAL at 15:12

## 2018-01-03 RX ADMIN — CYANOCOBALAMIN TAB 500 MCG 1000 MCG: 500 TAB at 08:19

## 2018-01-03 RX ADMIN — CARVEDILOL 12.5 MG: 12.5 TABLET, FILM COATED ORAL at 08:19

## 2018-01-03 ASSESSMENT — ENCOUNTER SYMPTOMS
NAUSEA: 0
PND: 0
WEAKNESS: 0
SPEECH CHANGE: 1
FEVER: 0
SENSORY CHANGE: 1
SHORTNESS OF BREATH: 0
DIARRHEA: 0
HEARTBURN: 0
CHILLS: 0
FALLS: 0
CONSTIPATION: 0
HEADACHES: 0
TINGLING: 0
LOSS OF CONSCIOUSNESS: 0
ABDOMINAL PAIN: 0
BLURRED VISION: 0
PALPITATIONS: 0
CARDIOVASCULAR NEGATIVE: 1
TREMORS: 0
VOMITING: 0
SEIZURES: 0
NECK PAIN: 0
BACK PAIN: 0
FOCAL WEAKNESS: 1
MYALGIAS: 0
HEMOPTYSIS: 0
MUSCULOSKELETAL NEGATIVE: 1
COUGH: 0
DIZZINESS: 0
DOUBLE VISION: 0
BLOOD IN STOOL: 0
DEPRESSION: 0
EYES NEGATIVE: 1
RESPIRATORY NEGATIVE: 1

## 2018-01-03 ASSESSMENT — PAIN SCALES - GENERAL
PAINLEVEL_OUTOF10: 0

## 2018-01-03 NOTE — DISCHARGE PLANNING
AGUS received call from Althea from [facility]. The [facility] can assist/provide the additional [facility] transport. Althea notified daughter. Althea currently arranging pt's meds.  DME to be delivered to pt's home tomorrow morning.     AGUS faxed [facility] transport form's to [facility] requesting noon transport for tomorrow.

## 2018-01-03 NOTE — CARE PLAN
Problem: Venous Thromboembolism (VTW)/Deep Vein Thrombosis (DVT) Prevention:  Goal: Patient will participate in Venous Thrombosis (VTE)/Deep Vein Thrombosis (DVT)Prevention Measures  Outcome: PROGRESSING AS EXPECTED      Problem: Mobility  Goal: Risk for activity intolerance will decrease  Outcome: PROGRESSING AS EXPECTED

## 2018-01-03 NOTE — PROGRESS NOTES
Assumed care of patient at 0700.  Received report from night RN.  Pt alert and oriented x3, has no complaints of pain. Pt resting comfortably in bed. Bed alarm on.

## 2018-01-03 NOTE — PROGRESS NOTES
"Pt refusing all medications this evening, off by one day, fatigued, asking RN to let him sleep or have his daughter pick him up. Explained to pt SW is working on transportation and will update him in the morning. Educated pt about importance of taking medications this evening, pt still declined stated \"let me make my own decisions\". Pt denies pain, denies CP, fatigues easily when sitting at edge of bed or standing, unable to void x2. Bladder scan shows 258ml, offered pt Hytrin again and re-explained, pt refused, requesting to \"go on my own\". Providing emotional support and therapeutic communication. Pt reported \"it was a mistake coming into the hospital\", asked pt to further discuss his feeling and concerns, pt declined.   "

## 2018-01-03 NOTE — PROGRESS NOTES
Pt's IV came out of pt's arm.  Dr. Baird made aware.  Dr. Baird said to keep IV out as pt is being d/c'd tomorrow to hospice.

## 2018-01-03 NOTE — DISCHARGE SUMMARY
CHIEF COMPLAINT ON ADMISSION  No chief complaint on file.      CODE STATUS  DNR    HPI & HOSPITAL COURSE  Please see history and physical dictated by Dr. Maguire on 12/27/17 for further details.     This is a 84 y.o. male who was sent to the hospital from Acute rehab due worsening renal function and hyperkalemia.  He had recently been hospitalized for an acute stroke.  His hyperkalemia resolved and his renal function did improve however he did demonstrate acute blood loss anemia and therefore his aspirin was stopped.  The blood loss was likely multifactorial, due to hematuria and possibly uremic gastritis.  Patient also reported intermittent chest pain and right sided joint pain.      He declined any aggressive care including blood transfusion or cardiac work up and asked to be discharged to home on hospice    Therefore, he is discharged in guarded and stable condition with close outpatient follow-up.    SPECIFIC OUTPATIENT FOLLOW-UP  Hospice    DISCHARGE PROBLEM LIST  Active Problems:    Angina pectoris (CMS-HCC) POA: Yes      Overview: Stable exertional.  Had rest angina w anemia    ARNALDO (acute kidney injury) (CMS-HCC) POA: Yes    Anemia POA: Yes    CVA (cerebral vascular accident) (CMS-HCC) POA: Yes    Gastritis POA: Yes    HTN (hypertension) POA: Yes    Dyslipidemia POA: Yes    DM (diabetes mellitus) (CMS-HCC) POA: Yes    COPD (chronic obstructive pulmonary disease) (CMS-HCC) POA: Yes    Peripheral vascular disease (CMS-HCC) POA: Yes    Carotid artery stenosis POA: Yes    Subclavian artery stenosis, left (CMS-HCC) POA: Yes    Aortic valve stenosis POA: Yes    BPH (benign prostatic hyperplasia) POA: Yes    Chronic combined systolic and diastolic CHF (congestive heart failure) (CMS-HCC) POA: Yes    Lung cancer (CMS-HCC) POA: Yes    Thrombocytopenia (CMS-HCC) POA: Yes    Dysphagia due to recent cerebrovascular accident (CVA) POA: Yes    Mitral regurgitation POA: Yes    B12 deficiency POA: Yes    Gout POA: Yes     Uremic encephalopathy POA: Yes  Resolved Problems:    Urinary tract infection POA: Yes    Hyperkalemia POA: Yes    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 RADT None     Kirill Fabian D.O.  [address]  [address]  [facility] [address]  [phone]    Schedule an appointment as soon as possible for a visit in 1 week  Hospital follow-up appointment with PCP      MEDICATIONS ON DISCHARGE   [employer]   Home Medication Instructions SOM:[id number]    Printed on:01/03/18 1214   Medication Information                      allopurinol (ZYLOPRIM) 100 MG TABS  Take 100 mg by mouth every day.             atorvastatin (LIPITOR) 10 MG Tab  Take 10 mg by mouth every evening.             carvedilol (COREG) 12.5 MG Tab  Take 1 Tab by mouth 2 times a day, with meals.             Cholecalciferol (VITAMIN D) 2000 UNIT TABS  Take 4,000 Units by mouth every day.             clopidogrel (PLAVIX) 75 MG Tab  Take 1 Tab by mouth every morning.             cyanocobalamin (VITAMIN B12) 1000 MCG Tab  Take 1 Tab by mouth every day.             enalapril (VASOTEC) 10 MG Tab  Take 2 Tabs by mouth 2 times a day.             Ferrous Sulfate (IRON) 325 (65 FE) MG TABS  Take 1 Tab by mouth every morning.             finasteride (PROSCAR) 5 MG Tab  Take 5 mg by mouth every day.             tamsulosin (FLOMAX) 0.4 MG capsule  Take 0.4 mg by mouth ONE-HALF HOUR AFTER BREAKFAST.             terazosin (HYTRIN) 10 MG capsule  Take 10 mg by mouth every evening.                 Physical Exam   Constitutional: He is oriented to person, place, and time. He appears well-developed and well-nourished. No distress.   HENT:   Head: Normocephalic.   Mouth/Throat: Oropharynx is clear and moist. No oropharyngeal exudate.   Eyes: Pupils are equal, round, and  reactive to light. No scleral icterus.   Pale conjunctivae   Neck: No JVD present.   Cardiovascular: Normal rate and regular rhythm.  Exam reveals no gallop and no friction rub.    Murmur heard.  Pulmonary/Chest: Effort normal. No stridor. No respiratory distress. He has no wheezes. He has no rales.   Abdominal: Soft. Bowel sounds are normal. He exhibits no distension. There is no tenderness. There is no rebound and no guarding.   Musculoskeletal: He exhibits edema. He exhibits no tenderness or deformity.   Neurological: He is alert and oriented to person, place, and time.    R sided weakness   Skin: Skin is warm and dry. He is not diaphoretic.   Psychiatric: He has a normal mood and affect. His behavior is normal. Judgment and thought content normal.    DIET  Orders Placed This Encounter   Procedures   • DIET ORDER     Standing Status:   Standing     Number of Occurrences:   1     Order Specific Question:   Diet:     Answer:   Renal [8]     Order Specific Question:   Texture/Fiber modifications:     Answer:   Dysphagia 3(Mechanical Soft)specify fluid consistency(question 6) [3]     Comments:   please puree soup     Order Specific Question:   Consistency/Fluid modifications:     Answer:   Thin Liquids [3]     Comments:   NO STRAWS!     Order Specific Question:   Miscellaneous modifications:     Answer:   SLP - Deliver to Nursing Station [22]     Comments:   pt needs help with HOB positioning       ACTIVITY  As tolerated.        CONSULTATIONS  [facility]    PROCEDURES  DX-CHEST-PORTABLE (1 VIEW)   Final Result         1.  No acute cardiopulmonary disease.   2.  Bilateral pulmonary nodules, similar to prior study.   3.  No focal infiltrate      ECHOCARDIOGRAM COMP W/O CONT   Final Result      US-RENAL   Final Result      1.  Echogenic kidneys consistent with medical renal disease and/or senile nephrosclerosis.   2.  Minimal RIGHT perinephric fluid, nonspecific.   3.  No hydronephrosis.   4.  Limited evaluation  of the bladder.          LABORATORY  Lab Results   Component Value Date/Time    SODIUM 142 01/02/2018 05:03 AM    POTASSIUM 4.0 01/02/2018 05:03 AM    CHLORIDE 110 01/02/2018 05:03 AM    CO2 28 01/02/2018 05:03 AM    GLUCOSE 126 (H) 01/02/2018 05:03 AM    BUN 45 (H) 01/02/2018 05:03 AM    CREATININE 2.62 (H) 01/02/2018 05:03 AM    CREATININE 1.6 (H) 12/31/2007 09:30 AM        Lab Results   Component Value Date/Time    WBC 6.7 01/02/2018 05:03 AM    HEMOGLOBIN 6.9 (L) 01/02/2018 05:03 AM    HEMATOCRIT 21.7 (L) 01/02/2018 05:03 AM    PLATELETCT 89 (L) 01/02/2018 05:03 AM        Total time of the discharge process exceeds 36 minutes     Patient was not discharged yesterday because of logistical issues with transport, plan to d/c this morning.  He did disengage his bed alarm yesterday and had a mechanical ground level fall with no injury. He is without complaints today with a stable exam, very eager to get home.

## 2018-01-03 NOTE — DISCHARGE PLANNING
Received [facility]CS form from [facility]). Arranged patient's transport home tomorrow 1/04 at 1200 via [facility]. Lauren(AGUS) notified of transport time.      [facility] Auth#  [id number]

## 2018-01-03 NOTE — CARE PLAN
Problem: Nutritional:  Goal: Achieve adequate nutritional intake  Patient will consume >50% of meals   Outcome: PROGRESSING SLOWER THAN EXPECTED  See dietary note.    RD following.

## 2018-01-03 NOTE — PROGRESS NOTES
"Pt agreeable to straight cath after bladder scan of 313. Straight cath'd for 425ml straw hazy urine with mucus noted. Pt reported \"much better\" and is relieved.   "

## 2018-01-03 NOTE — PROGRESS NOTES
Ashley Mercedes Fall Risk Assessment:     Last Known Fall: No falls  Mobility: Use of assistive device/requires assist of two people  Medications: Cardiovascular or central nervous system meds  Mental Status/LOC/Awareness: Awake, alert, and oriented to date, place, and person  Toileting Needs: Diarrhea/frequency/urgency  Volume/Electrolyte Status: Use of IV fluids/tube feeds  Communication/Sensory: Visual (Glasses)/hearing deficit  Behavior: Appropriate behavior  Ashley Mercedes Fall Risk Total: 14  Fall Risk Level: MODERATE RISK    Universal Fall Precautions:  call light/belongings in reach, bed in low position and locked, siderails up x 2, use non-slip footwear    Fall Risk Level Interventions:    TRIAL (TELE 8, NEURO, MED SANA 5) Moderate Fall Risk Interventions  Place yellow fall risk ID band on patient: verified  Provide patient/family education based on risk assessment : verified  Educate patient/family to call staff for assistance when getting out of bed: verified  Place fall precaution signage outside patient door: verified  Utilize bed/chair fall alarm: verified     Patient Specific Interventions:     Medication: review medications with patient and family  Mental Status/LOC/Awareness: reorient patient, check on patient hourly and utilize bed/chair fall alarm  Toileting: provide frquent toileting  Volume/Electrolyte Status: ensure patient remains hydrated  Communication/Sensory: update plan of care on whiteboard  Behavioral: encourage patient to voice feelings and engage patient in daily activities  Mobility: utilize bed/chair fall alarm

## 2018-01-03 NOTE — PROGRESS NOTES
Pt reports pain 0/10, requesting morphine IV to sleep. Educated pt that morphine IV is intended for use if he is having pain, pt agreeable and verbalizes understanding.

## 2018-01-03 NOTE — CARE PLAN
Problem: Venous Thromboembolism (VTW)/Deep Vein Thrombosis (DVT) Prevention:  Goal: Patient will participate in Venous Thrombosis (VTE)/Deep Vein Thrombosis (DVT)Prevention Measures  Outcome: PROGRESSING AS EXPECTED  Pt agreeable to SCDs       Problem: Skin Integrity  Goal: Risk for impaired skin integrity will decrease  Outcome: PROGRESSING AS EXPECTED  Encouraging mobilization and turning every 2 hours. Educated about preventing skin break down and pt verbalizes understanding. Repositioned with pillows and draw sheet every 2 hours.Heels floated with pillows. Waffle mattress in use.       Problem: Psychosocial Needs:  Goal: Level of anxiety will decrease  Outcome: PROGRESSING AS EXPECTED  Updated pt of plan of care and discharge

## 2018-01-03 NOTE — PROGRESS NOTES
[facility] Nephrology Progress Note               Author: Kelvin Quintero Date & Time created: 1/3/2018  9:47 AM     Interval History:  84-year-old gentleman with CKD stage IV, secondary to renal vascular disease and diabetes who presented to [facility] with a left-sided infarction with some confusion and right-sided weakness.      The patient was acutely treated and sent to rehab, they had surveillance laboratories done today that showed that his potassium had   increased to 6.3 and he had significant worsening of his chronic kidney disease and was transferred to [facility] for further evaluation and treatment.     Reviewing his chart, the patient did have some low blood pressures in the low 100s.  He did not receive any contrast studies.  He was on ciprofloxacin, but denied any stigmata of skin changes or rash.  He reports decreased urine output during that time.  He has received no NSAIDs.       Daily Nephrology Summary  12/27/17 - seen in consultation after transfer from Rehab.  ARNALDO and Hyperkalemia treated with IVF/bicarb/kayexalate  12/28/17 - Renal function some better with current tx.  K now ok.  Fractional excretion non diagnostic.  Continue current tx  12/29/17 - BUN/Cr trending down.  K better.  Has significant CKD so renal function wont improve beyond Creat 2.5ish.   12/30/17 - BUN/Cr improving slowly.  K ok.   01/01/18 - Scr continues to trend in the right direction. Non-oliguric.  01/02/18 - No new overnight renal events. Feels fine. Scr is same.  01/03/18 - No new overnight renal events. Bladder scan of 313.Pt wanting to d/c on Hospice.       Review of Systems   Constitutional: Positive for malaise/fatigue.   Eyes: Negative.    Respiratory: Negative.    Cardiovascular: Negative.    Musculoskeletal: Negative.    Skin: Negative.        Physical Exam   Constitutional: No distress.   Eyes: No scleral icterus.   Neck: No JVD present.   Cardiovascular: Normal rate and regular rhythm.  Exam reveals no friction rub.     Pulmonary/Chest: Effort normal. No respiratory distress.   Abdominal: Soft. He exhibits no distension.   Skin: Skin is warm.       Labs:        Invalid input(s): [id number]  Recent Labs      17   1222  17   1841   TROPONINI  0.07*  0.13*     Recent Labs      18   0401  18   0503   SODIUM  142  142   POTASSIUM  4.1  4.0   CHLORIDE  109  110   CO2  28  28   BUN  54*  45*   CREATININE  2.61*  2.62*   CALCIUM  8.0*  7.8*     Recent Labs      18   0401  18   0503   GLUCOSE  107*  126*     Recent Labs      18   0401  18   0503   RBC  2.37*  2.26*   HEMOGLOBIN  7.1*  6.9*   HEMATOCRIT  22.3*  21.7*   PLATELETCT  82*  89*     Recent Labs      18   0401  18   0503   WBC  6.0  6.7   NEUTSPOLYS  76.30*  78.40*   LYMPHOCYTES  10.10*  8.70*   MONOCYTES  9.20  8.10   EOSINOPHILS  3.40  3.60   BASOPHILS  0.30  0.30           Hemodynamics:  Temp (24hrs), Av.5 °C (97.7 °F), Min:36.2 °C (97.2 °F), Max:37 °C (98.6 °F)  Temperature: 36.4 °C (97.5 °F)  Pulse  Av.4  Min: 55  Max: 91   Blood Pressure : 125/56     Respiratory:    Respiration: 14, Pulse Oximetry: 92 %     Work Of Breathing / Effort: Shallow  RUL Breath Sounds: Diminished, RML Breath Sounds: Diminished, RLL Breath Sounds: Diminished, MILES Breath Sounds: Diminished, LLL Breath Sounds: Diminished  Fluids:    Intake/Output Summary (Last 24 hours) at 18 0947  Last data filed at 18 0619   Gross per 24 hour   Intake             1451 ml   Output             1150 ml   Net              301 ml        GI/Nutrition:  Orders Placed This Encounter   Procedures   • DIET ORDER     Standing Status:   Standing     Number of Occurrences:   1     Order Specific Question:   Diet:     Answer:   Renal [8]     Order Specific Question:   Texture/Fiber modifications:     Answer:   Dysphagia 3(Mechanical Soft)specify fluid consistency(question 6) [3]     Comments:   please puree soup     Order Specific Question:    Consistency/Fluid modifications:     Answer:   Thin Liquids [3]     Comments:   NO STRAWS!     Order Specific Question:   Miscellaneous modifications:     Answer:   SLP - Deliver to Nursing Station [22]     Comments:   pt needs help with HOB positioning     Medical Decision Making, by Problem:  Active Hospital Problems    Diagnosis   • CVA (cerebral vascular accident) (CMS-HCC) [I63.9]   • Acute on Chronic blood loss anemia [D50.0]   • Cardiomyopathy (CMS-HCC) [I42.9]   • Lung cancer (CMS-HCC) [C34.90]   • Acute renal failure superimposed on stage 5 chronic kidney disease, not on chronic dialysis (CMS-HCC) [N17.9, N18.5]   • Urinary tract infection without hematuria [N39.0]   • BPH (benign prostatic hyperplasia) [N40.0]     IMPRESSION/PLAN    # ARNALDO/CKD  -- SCreatinine at baseline now. It has stabilized around baseline levels  -- baseline SCreat around 2.5 w eGFR ~25  -- non oliguric with improving numbers with current treatment  -- no indication for acute dialysis at this time  -- monitor chems/hgb routinely  -- continue current treatment    # Hyperkalemia  -- need to check stools for blood given prior hx of GI bleeding and was hyperkalemic with high BUN  -- K has improved  -- monitor daily    # CVA    # DM    This patient is appropriate for hospice/pallliative care and he wants Hospice.      Reviewed items::  Labs reviewed, Medications reviewed and Radiology images reviewed

## 2018-01-03 NOTE — PROGRESS NOTES
[facility] Hospitalist Progress Note    Date of Service: 1/3/2018    Chief Complaint  84 y.o. male admitted 2017 with ARNALDO / Uremia from acute rehab where patient was undergoing rehab after recent CVA    Interval Problem Update  Did well with trial without perez, no further hematuria, right shoulder and groin pain improved, patient eager to d/c with hospice, awaiting transportation arrangements    Consultants/Specialty  Nephrology   Palliative care    Disposition   hospice        Review of Systems   Constitutional: Negative for chills, fever and malaise/fatigue.   HENT: Positive for hearing loss. Negative for tinnitus.    Eyes: Negative for blurred vision and double vision.   Respiratory: Negative for cough, hemoptysis and shortness of breath.    Cardiovascular: Negative for chest pain, palpitations and PND.   Gastrointestinal: Negative for abdominal pain, blood in stool, constipation, diarrhea, heartburn, melena, nausea and vomiting.   Genitourinary:        Perez in place   Musculoskeletal: Negative for back pain, falls, joint pain, myalgias and neck pain.   Skin: Negative for rash.   Neurological: Positive for sensory change, speech change and focal weakness. Negative for dizziness, tingling, tremors, seizures, loss of consciousness, weakness and headaches.   Psychiatric/Behavioral: Negative for depression and suicidal ideas.      Physical Exam  Laboratory/Imaging   Hemodynamics  Temp (24hrs), Av.5 °C (97.7 °F), Min:36.2 °C (97.2 °F), Max:37 °C (98.6 °F)   Temperature: 36.4 °C (97.5 °F)  Pulse  Av.4  Min: 55  Max: 91    Blood Pressure : 125/56      Respiratory      Respiration: 14, Pulse Oximetry: 92 %     Work Of Breathing / Effort: Shallow  RUL Breath Sounds: Clear, RML Breath Sounds: Diminished, RLL Breath Sounds: Diminished, MILES Breath Sounds: Clear, LLL Breath Sounds: Diminished    Fluids    Intake/Output Summary (Last 24 hours) at 18 1125  Last data filed at 18 0619   Gross per 24 hour    Intake             1451 ml   Output             1150 ml   Net              301 ml       Nutrition  Orders Placed This Encounter   Procedures   • DIET ORDER     Standing Status:   Standing     Number of Occurrences:   1     Order Specific Question:   Diet:     Answer:   Renal [8]     Order Specific Question:   Texture/Fiber modifications:     Answer:   Dysphagia 3(Mechanical Soft)specify fluid consistency(question 6) [3]     Comments:   please puree soup     Order Specific Question:   Consistency/Fluid modifications:     Answer:   Thin Liquids [3]     Comments:   NO STRAWS!     Order Specific Question:   Miscellaneous modifications:     Answer:   SLP - Deliver to Nursing Station [22]     Comments:   pt needs help with HOB positioning     Physical Exam   Constitutional: He is oriented to person, place, and time. He appears well-developed and well-nourished. No distress.   HENT:   Head: Normocephalic.   Mouth/Throat: Oropharynx is clear and moist. No oropharyngeal exudate.   Eyes: Pupils are equal, round, and reactive to light. No scleral icterus.   Pale conjunctivae   Neck: No JVD present.   Cardiovascular: Normal rate and regular rhythm.  Exam reveals no gallop and no friction rub.    Murmur heard.  Pulmonary/Chest: Effort normal. No stridor. No respiratory distress. He has no wheezes. He has no rales.   Abdominal: Soft. Bowel sounds are normal. He exhibits no distension. There is no tenderness. There is no rebound and no guarding.   Musculoskeletal: He exhibits edema. He exhibits no tenderness or deformity.   Neurological: He is alert and oriented to person, place, and time.    R sided weakness   Skin: Skin is warm and dry. He is not diaphoretic.   Psychiatric: He has a normal mood and affect. His behavior is normal. Judgment and thought content normal.       Recent Labs      01/01/18   0401  01/02/18   0503   WBC  6.0  6.7   RBC  2.37*  2.26*   HEMOGLOBIN  7.1*  6.9*   HEMATOCRIT  22.3*  21.7*   MCV  94.1  96.0    MCH  30.0  30.5   MCHC  31.8*  31.8*   RDW  47.8  49.5   PLATELETCT  82*  89*   MPV  11.2  11.4     Recent Labs      01/01/18   0401  01/02/18   0503   SODIUM  142  142   POTASSIUM  4.1  4.0   CHLORIDE  109  110   CO2  28  28   GLUCOSE  107*  126*   BUN  54*  45*   CREATININE  2.61*  2.62*   CALCIUM  8.0*  7.8*                      Assessment/Plan     ARNALDO (acute kidney injury) (CMS-HCC)- (present on admission)   Assessment & Plan    Improved  CKD stage IV acute on chronic dysfunction   Owens removed per patient request, voiding well without it  No further hematuria            Angina pectoris (CMS-HCC)- (present on admission)   Assessment & Plan    Atypical chest pain suspect musculoskeletal component right shoulder  Minimally elevated trop likely due to decreased renal clearance  Long discussion with patient who declined further cardiac work up  Norco for shoulder pain effective        Gastritis- (present on admission)   Assessment & Plan    Likely due to uremic gastritis   Previous history of 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 AVM  Will continue PO PPI and sucralfate  Informal conversation with GI consultation for EGD needed, currently suspect blood loss is from hematuria  Patient not interested in further procedures at this time          CVA (cerebral vascular accident) (CMS-HCC)- (present on admission)   Assessment & Plan    Recent CVA, came over from acute rehab  R sided deficits stable  Continue plavix, asa stopped  Continue Atorvastatin           Anemia- (present on admission)   Assessment & Plan    Multiple etiologies including anemia of renal disease and acute blood loss from hematuria last night-   no evidence of obvious GI bleed  Continue Plavix  Patient is not interested in aggressive care or transfusion at this time          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)   Assessment & Plan    resolved        Gout- (present on admission)   Assessment & Plan    Allopurinol held with ARNALDO  No evidence of flair        B12  deficiency- (present on admission)   Assessment & Plan    Recently depressed levels  On PO supplementation          Mitral regurgitation- (present on admission)   Assessment & Plan    Recommend outpatient cardiology follow up        Dysphagia due to recent cerebrovascular accident (CVA)- (present on admission)   Assessment & Plan    Improved  SLP evaluation  Diet per SLP recommendations        Thrombocytopenia (CMS-HCC)- (present on admission)   Assessment & Plan    Asa stopped  plts stable            Lung cancer (CMS-HCC)- (present on admission)   Assessment & Plan    Left lower lobe adenocarcinoma  Patient scheduled to start radiation in January        Chronic combined systolic and diastolic CHF (congestive heart failure) (CMS-HCC)- (present on admission)   Assessment & Plan    ACE-I therapy held because of profound ARNALDO  No evidence of acute exacerbation  Continue carvedilol          BPH (benign prostatic hyperplasia)- (present on admission)   Assessment & Plan    Patient on therapy with terazosin and flomax and finasteride  Continue perez  Will need outpatient urology follow up           Aortic valve stenosis- (present on admission)   Assessment & Plan    This was reported as severe on TTE 07/2017 12/2017 no stenosis noted  TTE now reveals Mild AS        Subclavian artery stenosis, left (CMS-HCC)- (present on admission)   Assessment & Plan    90 %  Continue plavix / atorvastatin and risk factor modifications  Vascular surgery evaluation once acute issues resolve        Carotid artery stenosis- (present on admission)   Assessment & Plan    B/L   Continue plavix / atorvastatin  Risk factor modification  Vascular surgery evaluation once acute issues resolve if patient decides        Peripheral vascular disease (CMS-HCC)- (present on admission)   Assessment & Plan    Continue plavix / atorvastatin  Vascular surgery evaluation as outpatient if patient wants- currently not interested in further procedures        COPD  (chronic obstructive pulmonary disease) (CMS-HCC)- (present on admission)   Assessment & Plan    No evidence of acute exacerbation  Continue follow        DM (diabetes mellitus) (CMS-HCC)- (present on admission)   Assessment & Plan    Type II  Currently controlled off therapy  Monitor daily am blood sugars        Dyslipidemia- (present on admission)   Assessment & Plan    Continue atorvastatin        HTN (hypertension)- (present on admission)   Assessment & Plan    Following                Reviewed items::  Labs reviewed, Medications reviewed and Radiology images reviewed  Owens catheter::  No Owens  DVT prophylaxis pharmacological::  Contraindicated - High bleeding risk  DVT prophylaxis - mechanical:  SCDs  Ulcer Prophylaxis::  Yes

## 2018-01-03 NOTE — DIETARY
Nutrition Services: Brief Update    Patient with continued variable PO intake - refusing trays or eating minimally on a dysphagia 3 thin liquid diet. I have attempted to visit with Mr. Harden multiple times within the last few days and patient is asleep every time. Spoke with RN regarding pt's intake and RN stated patient prefers to drink things rather than eat. RN placed supplements per RD order. Added chocolate Boost Plus 3 times per day with meal trays. Will continue to monitor meal intake as well as nutrition oral supplement intake.    PLAN / RECOMMEND:   · Encourage PO intake  · Nutrition rep to see daily for meal and snack preferences   · Boost Plus added three times/day with meals  · Please record % of intake as meals and Boost are consumed    RD following.

## 2018-01-03 NOTE — PROGRESS NOTES
Ashley Mercedes Fall Risk Assessment:     Last Known Fall: No falls  Mobility: Use of assistive device/requires assist of two people  Medications: Cardiovascular or central nervous system meds  Mental Status/LOC/Awareness: Awake, alert, and oriented to date, place, and person  Toileting Needs: Diarrhea/frequency/urgency  Volume/Electrolyte Status: Use of IV fluids/tube feeds  Communication/Sensory: Visual (Glasses)/hearing deficit  Behavior: Appropriate behavior  Ashley Mercedes Fall Risk Total: 14  Fall Risk Level: MODERATE RISK    Universal Fall Precautions:  call light/belongings in reach, bed in low position and locked, wheelchairs and assistive devices out of sight, siderails up x 2, educate to call for assistance, educate on level of risk, use non-slip footwear, adequate lighting, clutter free and spill free environment    Fall Risk Level Interventions:    TRIAL (TELE 8, NEURO, MED SANA 5) Moderate Fall Risk Interventions  Place yellow fall risk ID band on patient: verified  Provide patient/family education based on risk assessment : verified  Educate patient/family to call staff for assistance when getting out of bed: verified  Place fall precaution signage outside patient door: verified  Utilize bed/chair fall alarm: verified     Patient Specific Interventions:     Medication: review medications with patient and family  Mental Status/LOC/Awareness: reorient patient, reinforce falls education, check on patient hourly, utilize bed/chair fall alarm and reinforce the use of call light  Toileting: provide frquent toileting  Volume/Electrolyte Status: ensure patient remains hydrated, advance diet as tolerated, monitor abnormal lab values and ensure IV fluids are appropriate  Communication/Sensory: update plan of care on whiteboard  Behavioral: encourage patient to voice feelings  Mobility: dangle prior to standing, utilize bed/chair fall alarm, ensure bed is locked and in lowest position and provide appropriate  assistive device

## 2018-01-04 ENCOUNTER — HOME CARE VISIT (OUTPATIENT)
Dept: HOSPICE | Facility: HOSPICE | Age: 83
End: 2018-01-04
Payer: MEDICARE

## 2018-01-04 VITALS — RESPIRATION RATE: 24 BRPM | DIASTOLIC BLOOD PRESSURE: 67 MMHG | HEART RATE: 69 BPM | SYSTOLIC BLOOD PRESSURE: 116 MMHG

## 2018-01-04 VITALS
BODY MASS INDEX: 25.7 KG/M2 | SYSTOLIC BLOOD PRESSURE: 113 MMHG | DIASTOLIC BLOOD PRESSURE: 52 MMHG | TEMPERATURE: 98.2 F | HEIGHT: 69 IN | RESPIRATION RATE: 17 BRPM | WEIGHT: 173.5 LBS | HEART RATE: 71 BPM | OXYGEN SATURATION: 90 %

## 2018-01-04 PROCEDURE — G0299 HHS/HOSPICE OF RN EA 15 MIN: HCPCS

## 2018-01-04 PROCEDURE — S9126 HOSPICE CARE, IN THE HOME, P: HCPCS

## 2018-01-04 PROCEDURE — 700102 HCHG RX REV CODE 250 W/ 637 OVERRIDE(OP): Performed by: HOSPITALIST

## 2018-01-04 PROCEDURE — 99231 SBSQ HOSP IP/OBS SF/LOW 25: CPT | Mod: GW | Performed by: HOSPITALIST

## 2018-01-04 PROCEDURE — A9270 NON-COVERED ITEM OR SERVICE: HCPCS | Performed by: HOSPITALIST

## 2018-01-04 PROCEDURE — 700102 HCHG RX REV CODE 250 W/ 637 OVERRIDE(OP): Performed by: INTERNAL MEDICINE

## 2018-01-04 PROCEDURE — 51798 US URINE CAPACITY MEASURE: CPT

## 2018-01-04 PROCEDURE — 665036 HSPC NOTICE OF ELECTION NOE

## 2018-01-04 PROCEDURE — A9270 NON-COVERED ITEM OR SERVICE: HCPCS | Performed by: INTERNAL MEDICINE

## 2018-01-04 RX ADMIN — FINASTERIDE 5 MG: 5 TABLET, FILM COATED ORAL at 08:50

## 2018-01-04 RX ADMIN — CYANOCOBALAMIN TAB 500 MCG 1000 MCG: 500 TAB at 08:50

## 2018-01-04 RX ADMIN — CLOPIDOGREL 75 MG: 75 TABLET, FILM COATED ORAL at 08:51

## 2018-01-04 RX ADMIN — TAMSULOSIN HYDROCHLORIDE 0.4 MG: 0.4 CAPSULE ORAL at 08:50

## 2018-01-04 RX ADMIN — CHOLECALCIFEROL TAB 25 MCG (1000 UNIT) 4000 UNITS: 25 TAB at 08:50

## 2018-01-04 RX ADMIN — OMEPRAZOLE 40 MG: 20 CAPSULE, DELAYED RELEASE ORAL at 08:50

## 2018-01-04 RX ADMIN — CARVEDILOL 12.5 MG: 12.5 TABLET, FILM COATED ORAL at 08:51

## 2018-01-04 SDOH — ECONOMIC STABILITY: HOUSING INSECURITY: UNSAFE APPLIANCES: 1

## 2018-01-04 SDOH — ECONOMIC STABILITY: GENERAL

## 2018-01-04 SDOH — ECONOMIC STABILITY: HOUSING INSECURITY: UNSAFE COOKING RANGE AREA: 1

## 2018-01-04 ASSESSMENT — ENCOUNTER SYMPTOMS
LAST BOWEL MOVEMENT: 64651
NAUSEA: 1
MUSCULOSKELETAL NEGATIVE: 1
CARDIOVASCULAR NEGATIVE: 1
SOMNOLENCE: 1
RESPIRATORY NEGATIVE: 1
SLEEP QUALITY: ADEQUATE
EYES NEGATIVE: 1
CONTUSION: 1
FATIGUES EASILY: 1
FATIGUE: 1

## 2018-01-04 ASSESSMENT — SOCIAL DETERMINANTS OF HEALTH (SDOH): ACTIVE STRESSOR - HEALTH CHANGES: 1

## 2018-01-04 ASSESSMENT — ACTIVITIES OF DAILY LIVING (ADL)
MONEY MANAGEMENT (EXPENSES/BILLS): NEEDS ASSISTANCE
DRESSING_REQUIRES_ASSISTANCE: 1
BATHING_REQUIRES_ASSISTANCE: 1

## 2018-01-04 ASSESSMENT — PAIN SCALES - GENERAL: PAINLEVEL_OUTOF10: 0

## 2018-01-04 NOTE — DISCHARGE INSTRUCTIONS
Discharge Instructions    Discharged to home by ambulance with escort. Discharged via ambulance, hospital escort: Refused.  Special equipment needed: Not Applicable    Be sure to schedule a follow-up appointment with your primary care doctor or any specialists as instructed.     Discharge Plan:   Influenza Vaccine Indication: Patient Refuses    I understand that a diet low in cholesterol, fat, and sodium is recommended for good health. Unless I have been given specific instructions below for another diet, I accept this instruction as my diet prescription.   Other diet: soft food with  Nectar thickened liquids    Special Instructions: None    · Is patient discharged on Warfarin / Coumadin?   No     · Is patient Post Blood Transfusion?  No    Depression / Suicide Risk    As you are discharged from this [facility] facility, it is important to learn how to keep safe from harming yourself.    Recognize the warning signs:  · Abrupt changes in personality, positive or negative- including increase in energy   · Giving away possessions  · Change in eating patterns- significant weight changes-  positive or negative  · Change in sleeping patterns- unable to sleep or sleeping all the time   · Unwillingness or inability to communicate  · Depression  · Unusual sadness, discouragement and loneliness  · Talk of wanting to die  · Neglect of personal appearance   · Rebelliousness- reckless behavior  · Withdrawal from people/activities they love  · Confusion- inability to concentrate     If you or a loved one observes any of these behaviors or has concerns about self-harm, here's what you can do:  · Talk about it- your feelings and reasons for harming yourself  · Remove any means that you might use to hurt yourself (examples: pills, rope, extension cords, firearm)  · Get professional help from the community (Mental Health, Substance Abuse, psychological counseling)  · Do not be alone:Call your Safe Contact- someone whom you trust  who will be there for you.  · Call your local CRISIS HOTLINE 377-4290 or 186-160-3438  · Call your local Children's Mobile Crisis Response Team [place] (854) 484-4645 or www.[facility]  · Call the toll free National Suicide Prevention Hotlines   · National Suicide Prevention Lifeline 093-687-WZDD (3025)  · National [employer] Line Network 800-SUICIDE (735-3822)

## 2018-01-04 NOTE — PROGRESS NOTES
Pt found on ground by CNA.   Pt states he was trying to stand up to urinate.  Pt admitted to turning off bed alarm.  Pt states he didn't hit his head, elbows. No injury noticed, no new bruises noticed.  Pt states he isn't in any pain.  Dr. Baird made aware, no new orders. Dtr Ekta at bedside now and made aware.  Bed alarm on.  Educated pt on the importance of keeping bed alarm on.

## 2018-01-04 NOTE — PROGRESS NOTES
Pt d/c'd home with hospice per md order. Pt alert and oriented x4.  No complaints of pain. Discussed d/c instructions with pt.  D/c instructions sent with pt via SKYLAR.  Skylar picked up pt via theo and brought pt and his belongings.

## 2018-01-04 NOTE — PROGRESS NOTES
I went to wheel out a discharged pt, and when I returned I found pt on the floor. Pt stated that he had unplugged his bed alarm to use the urinal. I immediately called the charge nurse and he came to assist me as well as the nurse.

## 2018-01-04 NOTE — CARE PLAN
Problem: Safety  Goal: Will remain free from injury  Outcome: PROGRESSING AS EXPECTED      Problem: Mobility  Goal: Risk for activity intolerance will decrease  Outcome: PROGRESSING SLOWER THAN EXPECTED

## 2018-01-04 NOTE — PROGRESS NOTES
Assumed care of patient at 0700.  Received report from night RN.  Pt alert and oriented x 4, has no complaints of pain. Pt resting comfortably in bed. Bed alarm on.

## 2018-01-04 NOTE — PROGRESS NOTES
Ashley Mercedes Fall Risk Assessment:     Last Known Fall: No falls  Mobility: Use of assistive device/requires assist of two people  Medications: Cardiovascular or central nervous system meds  Mental Status/LOC/Awareness: Awake, alert, and oriented to date, place, and person  Toileting Needs: Diarrhea/frequency/urgency  Volume/Electrolyte Status: Use of IV fluids/tube feeds  Communication/Sensory: Visual (Glasses)/hearing deficit  Behavior: Appropriate behavior  Ashley Mercedes Fall Risk Total: 14  Fall Risk Level: MODERATE RISK    Universal Fall Precautions:  call light/belongings in reach, bed in low position and locked, siderails up x 2    Fall Risk Level Interventions:    TRIAL (TELE 8, NEURO, MED SANA 5) Moderate Fall Risk Interventions  Place yellow fall risk ID band on patient: verified  Provide patient/family education based on risk assessment : verified  Educate patient/family to call staff for assistance when getting out of bed: verified  Place fall precaution signage outside patient door: verified  Utilize bed/chair fall alarm: verified     Patient Specific Interventions:     Medication: review medications with patient and family  Mental Status/LOC/Awareness: reinforce falls education, check on patient hourly, utilize bed/chair fall alarm and reinforce the use of call light  Toileting: provide frquent toileting  Volume/Electrolyte Status: ensure patient remains hydrated  Communication/Sensory: update plan of care on whiteboard  Behavioral: encourage patient to voice feelings and engage patient in daily activities  Mobility: schedule physical activity throughout the day

## 2018-01-04 NOTE — PROGRESS NOTES
[facility] Nephrology Progress Note               Author: Kelvin Quintero Date & Time created: 1/4/2018  11:26 AM     Interval History:  84-year-old gentleman with CKD stage IV, secondary to renal vascular disease and diabetes who presented to [facility] with a left-sided infarction with some confusion and right-sided weakness.      The patient was acutely treated and sent to rehab, they had surveillance laboratories done today that showed that his potassium had   increased to 6.3 and he had significant worsening of his chronic kidney disease and was transferred to [facility] for further evaluation and treatment.     Reviewing his chart, the patient did have some low blood pressures in the low 100s.  He did not receive any contrast studies.  He was on ciprofloxacin, but denied any stigmata of skin changes or rash.  He reports decreased urine output during that time.  He has received no NSAIDs.       Daily Nephrology Summary  12/27/17 - seen in consultation after transfer from Rehab.  ARNALDO and Hyperkalemia treated with IVF/bicarb/kayexalate  12/28/17 - Renal function some better with current tx.  K now ok.  Fractional excretion non diagnostic.  Continue current tx  12/29/17 - BUN/Cr trending down.  K better.  Has significant CKD so renal function wont improve beyond Creat 2.5ish.   12/30/17 - BUN/Cr improving slowly.  K ok.   01/01/18 - Scr continues to trend in the right direction. Non-oliguric.  01/02/18 - No new overnight renal events. Feels fine. Scr is same.  01/03/18 - No new overnight renal events. Bladder scan of 313.Pt wanting to d/c on Hospice.   01/04/18 - No new overnight changes. Scr same.    Review of Systems   Constitutional: Positive for malaise/fatigue.   Eyes: Negative.    Respiratory: Negative.    Cardiovascular: Negative.    Musculoskeletal: Negative.    Skin: Negative.        Physical Exam   Constitutional: No distress.   Eyes: No scleral icterus.   Neck: No JVD present.   Cardiovascular: Normal rate and  regular rhythm.  Exam reveals no friction rub.    Pulmonary/Chest: Effort normal. No respiratory distress.   Abdominal: Soft. He exhibits no distension.   Skin: Skin is warm.       Labs:        Invalid input(s): [id number]      Recent Labs      18   0503   SODIUM  142   POTASSIUM  4.0   CHLORIDE  110   CO2  28   BUN  45*   CREATININE  2.62*   CALCIUM  7.8*     Recent Labs      18   0503   GLUCOSE  126*     Recent Labs      18   0503   RBC  2.26*   HEMOGLOBIN  6.9*   HEMATOCRIT  21.7*   PLATELETCT  89*     Recent Labs      18   0503   WBC  6.7   NEUTSPOLYS  78.40*   LYMPHOCYTES  8.70*   MONOCYTES  8.10   EOSINOPHILS  3.60   BASOPHILS  0.30           Hemodynamics:  Temp (24hrs), Av.7 °C (98 °F), Min:36.4 °C (97.5 °F), Max:36.9 °C (98.4 °F)  Temperature: 36.8 °C (98.2 °F)  Pulse  Av.3  Min: 55  Max: 91   Blood Pressure : 113/52     Respiratory:    Respiration: 17, Pulse Oximetry: 90 %        RUL Breath Sounds: Clear, RML Breath Sounds: Diminished, RLL Breath Sounds: Diminished, MILES Breath Sounds: Clear, LLL Breath Sounds: Diminished  Fluids:    Intake/Output Summary (Last 24 hours) at 18 1126  Last data filed at 18 0500   Gross per 24 hour   Intake                0 ml   Output              200 ml   Net             -200 ml        GI/Nutrition:  Orders Placed This Encounter   Procedures   • DIET ORDER     Standing Status:   Standing     Number of Occurrences:   1     Order Specific Question:   Diet:     Answer:   Renal [8]     Order Specific Question:   Texture/Fiber modifications:     Answer:   Dysphagia 3(Mechanical Soft)specify fluid consistency(question 6) [3]     Comments:   please puree soup     Order Specific Question:   Consistency/Fluid modifications:     Answer:   Thin Liquids [3]     Comments:   NO STRAWS!     Order Specific Question:   Miscellaneous modifications:     Answer:   SLP - Deliver to Nursing Station [22]     Comments:   pt needs help with HOB  positioning     Medical Decision Making, by Problem:  Active Hospital Problems    Diagnosis   • CVA (cerebral vascular accident) (CMS-HCC) [I63.9]   • Acute on Chronic blood loss anemia [D50.0]   • Cardiomyopathy (CMS-HCC) [I42.9]   • Lung cancer (CMS-HCC) [C34.90]   • Acute renal failure superimposed on stage 5 chronic kidney disease, not on chronic dialysis (CMS-HCC) [N17.9, N18.5]   • Urinary tract infection without hematuria [N39.0]   • BPH (benign prostatic hyperplasia) [N40.0]     IMPRESSION/PLAN    # ARNALDO/CKD  -- SCreatinine at baseline now. It has stabilized around baseline levels  -- baseline SCreat around 2.5 w eGFR ~25  -- non oliguric with improving numbers with current treatment  -- no indication for acute dialysis at this time  -- monitor chems/hgb routinely  -- continue current treatment    # Hyperkalemia  -- need to check stools for blood given prior hx of GI bleeding and was hyperkalemic with high BUN  -- K has improved  -- monitor daily    # CVA    # DM    Will sign off case. Please reconsult as necessary      Reviewed items::  Labs reviewed, Medications reviewed and Radiology images reviewed

## 2018-01-04 NOTE — DISCHARGE PLANNING
SW notified bedside nurse that pt will d/c today at noon via [facility]. Pt has [facility] on file for [facility] transport.

## 2018-01-05 ENCOUNTER — HOME CARE VISIT (OUTPATIENT)
Dept: HOSPICE | Facility: HOSPICE | Age: 83
End: 2018-01-05
Payer: MEDICARE

## 2018-01-05 VITALS — SYSTOLIC BLOOD PRESSURE: 110 MMHG | RESPIRATION RATE: 20 BRPM | HEART RATE: 69 BPM | DIASTOLIC BLOOD PRESSURE: 58 MMHG

## 2018-01-05 PROCEDURE — G0299 HHS/HOSPICE OF RN EA 15 MIN: HCPCS

## 2018-01-05 PROCEDURE — S9126 HOSPICE CARE, IN THE HOME, P: HCPCS

## 2018-01-06 ENCOUNTER — HOME CARE VISIT (OUTPATIENT)
Dept: HOSPICE | Facility: HOSPICE | Age: 83
End: 2018-01-06
Payer: MEDICARE

## 2018-01-06 PROCEDURE — S9126 HOSPICE CARE, IN THE HOME, P: HCPCS

## 2018-01-06 NOTE — PROGRESS NOTES
DATE OF SERVICE:  12/27/2017    Patient was seen for followup visit.  As reported in his last progress notes,   patient was inpatient, not being discharged.  He expressed that he is not   making the gains that he would like to make.  The patient also revealed that   staff has some concerns about his overall functioning and might send him back   to acute.    The patient's rehab was talked about in context in terms of what he believes   he needs to accomplish as he hopefully will return home soon.       ____________________________________     SAY CHOI, PHD    JOSSELINE / DILLON    DD:  01/05/2018 13:42:23  DT:  01/05/2018 15:18:28    D#:  [id number]  Job#:  [id number]

## 2018-01-06 NOTE — PROGRESS NOTES
DATE OF SERVICE:  12/26/2017    The patient was seen for followup visit.  The patient is making gains in the   program by his account.  He is getting better as far as his strength,   endurance, balance, and coordination.  He reported he still is unhappy about   being in the hospital.  He said he would rather be home and the patient   admitted to getting irritable sometimes with being confined.    This session focused mostly on the patient's thoughts and feelings about his   program and how he can _____ in his personal goals.       ____________________________________     SAY CHOI, PHD    JOSSELINE / DLILON    DD:  01/05/2018 13:19:04  DT:  01/05/2018 14:52:10    D#:  [id number]  Job#:  [id number]

## 2018-01-07 ENCOUNTER — HOME CARE VISIT (OUTPATIENT)
Dept: HOSPICE | Facility: HOSPICE | Age: 83
End: 2018-01-07
Payer: MEDICARE

## 2018-01-08 ENCOUNTER — HOME CARE VISIT (OUTPATIENT)
Dept: HOSPICE | Facility: HOSPICE | Age: 83
End: 2018-01-08
Payer: MEDICARE

## 2018-01-08 VITALS — SYSTOLIC BLOOD PRESSURE: 110 MMHG | RESPIRATION RATE: 20 BRPM | HEART RATE: 64 BPM | DIASTOLIC BLOOD PRESSURE: 74 MMHG

## 2018-01-08 PROCEDURE — G0299 HHS/HOSPICE OF RN EA 15 MIN: HCPCS

## 2018-01-08 SDOH — ECONOMIC STABILITY: GENERAL

## 2018-01-08 SDOH — ECONOMIC STABILITY: HOUSING INSECURITY: UNSAFE APPLIANCES: 0

## 2018-01-08 SDOH — ECONOMIC STABILITY: HOUSING INSECURITY: UNSAFE COOKING RANGE AREA: 0

## 2018-01-08 ASSESSMENT — ENCOUNTER SYMPTOMS
BOWEL PATTERN NORMAL: 1
DESCRIPTION OF MEMORY LOSS: SHORT TERM
DYSPNEA ON EXERTION: 1
FATIGUES EASILY: 1
SLEEP QUALITY: FAIR
LAST BOWEL MOVEMENT: 64655
AGITATION: 1
DESCRIPTION OF MEMORY LOSS: IMMEDIATE
DRY SKIN: 1

## 2018-01-08 ASSESSMENT — SOCIAL DETERMINANTS OF HEALTH (SDOH): ACTIVE STRESSOR - HEALTH CHANGES: 1

## 2018-01-08 ASSESSMENT — ACTIVITIES OF DAILY LIVING (ADL): MONEY MANAGEMENT (EXPENSES/BILLS): NEEDS ASSISTANCE

## 2018-01-09 ENCOUNTER — HOME CARE VISIT (OUTPATIENT)
Dept: HOSPICE | Facility: HOSPICE | Age: 83
End: 2018-01-09
Payer: MEDICARE

## 2018-01-09 PROCEDURE — S9126 HOSPICE CARE, IN THE HOME, P: HCPCS

## 2018-01-09 PROCEDURE — G0155 HHCP-SVS OF CSW,EA 15 MIN: HCPCS

## 2018-01-09 ASSESSMENT — SOCIAL DETERMINANTS OF HEALTH (SDOH): ACTIVE STRESSOR - EXHAUSTION: 1

## 2018-01-10 PROCEDURE — S9126 HOSPICE CARE, IN THE HOME, P: HCPCS

## 2018-01-10 NOTE — DOCUMENTATION QUERY
DOCUMENTATION QUERY    PROVIDERS: Please select “Cosign w/ note”to reply to query.    To better represent the severity of illness of your patient, please review the following information and exercise your independent professional judgment in responding to this query.     History and Physical document the patient presented with ARNALDO on CKD V. CKD IV is also used throughout the chart and into the Discharge Summary.    Baseline S Creat is around 2.5 w eGFR ~25.    Please clarify the patient's CKD as    • Chronic Kidney Disease Stage I      • Chronic Kidney Disease Stage II     • Chronic Kidney Disease Stage III    • Chronic Kidney Disease Stage IV    • Chronic Kidney Disease Stage V     • End Stage Renal Disease  • Other explanation of clinical findings (please document)  • Unable to determine    The medical record reflects the following:   Clinical Findings  ARNALDO   CKD IV   CKD V   creatinine ~2.5   eGFR ~25   Treatment  reduce drugs with nephrogenic impact, IV fluids   Risk Factors     Location within medical record  History and Physical, Progress Notes, Discharge Summary and Consult     Thank you,   Janine Fox

## 2018-01-11 PROCEDURE — S9126 HOSPICE CARE, IN THE HOME, P: HCPCS

## 2018-01-12 ENCOUNTER — HOME CARE VISIT (OUTPATIENT)
Dept: HOSPICE | Facility: HOSPICE | Age: 83
End: 2018-01-12
Payer: MEDICARE

## 2018-01-12 VITALS
OXYGEN SATURATION: 89 % | HEART RATE: 60 BPM | RESPIRATION RATE: 18 BRPM | SYSTOLIC BLOOD PRESSURE: 136 MMHG | DIASTOLIC BLOOD PRESSURE: 66 MMHG

## 2018-01-12 PROCEDURE — A4554 DISPOSABLE UNDERPADS: HCPCS

## 2018-01-12 PROCEDURE — A4520 INCONTINENCE GARMENT ANYTYPE: HCPCS

## 2018-01-12 PROCEDURE — A4335 INCONTINENCE SUPPLY: HCPCS

## 2018-01-12 PROCEDURE — G0299 HHS/HOSPICE OF RN EA 15 MIN: HCPCS

## 2018-01-12 PROCEDURE — A6250 SKIN SEAL PROTECT MOISTURIZR: HCPCS

## 2018-01-12 PROCEDURE — G0156 HHCP-SVS OF AIDE,EA 15 MIN: HCPCS

## 2018-01-12 SDOH — ECONOMIC STABILITY: HOUSING INSECURITY: UNSAFE APPLIANCES: 0

## 2018-01-12 SDOH — ECONOMIC STABILITY: HOUSING INSECURITY: UNSAFE COOKING RANGE AREA: 0

## 2018-01-12 ASSESSMENT — ENCOUNTER SYMPTOMS
FORGETFULNESS: 1
LAST BOWEL MOVEMENT: 64658
SLEEP QUALITY: FAIR
CONSTIPATION: 1
STOOL FREQUENCY: LESS THAN DAILY
DEPRESSED MOOD: 1
DYSPNEA ON EXERTION: 1
FATIGUE: 1
FATIGUES EASILY: 1

## 2018-01-12 ASSESSMENT — SOCIAL DETERMINANTS OF HEALTH (SDOH): ACTIVE STRESSOR - HEALTH CHANGES: 1

## 2018-01-13 PROCEDURE — S9126 HOSPICE CARE, IN THE HOME, P: HCPCS

## 2018-01-14 PROCEDURE — S9126 HOSPICE CARE, IN THE HOME, P: HCPCS

## 2018-01-15 ENCOUNTER — HOME CARE VISIT (OUTPATIENT)
Dept: HOSPICE | Facility: HOSPICE | Age: 83
End: 2018-01-15
Payer: MEDICARE

## 2018-01-15 PROCEDURE — S9126 HOSPICE CARE, IN THE HOME, P: HCPCS

## 2018-01-15 PROCEDURE — G0156 HHCP-SVS OF AIDE,EA 15 MIN: HCPCS

## 2018-01-16 ENCOUNTER — HOME CARE VISIT (OUTPATIENT)
Dept: HOSPICE | Facility: HOSPICE | Age: 83
End: 2018-01-16
Payer: MEDICARE

## 2018-01-16 VITALS
SYSTOLIC BLOOD PRESSURE: 119 MMHG | RESPIRATION RATE: 20 BRPM | OXYGEN SATURATION: 91 % | DIASTOLIC BLOOD PRESSURE: 67 MMHG | HEART RATE: 56 BPM

## 2018-01-16 PROCEDURE — G0299 HHS/HOSPICE OF RN EA 15 MIN: HCPCS

## 2018-01-16 PROCEDURE — S9126 HOSPICE CARE, IN THE HOME, P: HCPCS

## 2018-01-16 SDOH — ECONOMIC STABILITY: HOUSING INSECURITY: UNSAFE COOKING RANGE AREA: 0

## 2018-01-16 SDOH — ECONOMIC STABILITY: GENERAL

## 2018-01-16 SDOH — ECONOMIC STABILITY: HOUSING INSECURITY: UNSAFE APPLIANCES: 0

## 2018-01-16 ASSESSMENT — ENCOUNTER SYMPTOMS
SOMNOLENCE: 1
FATIGUES EASILY: 1
DYSPNEA ACTIVITY LEVEL: AFTER AMBULATING MORE THAN 20 FT
DEPRESSED MOOD: 1
DYSPNEA ON EXERTION: 1
DRY SKIN: 1
SLEEP QUALITY: ADEQUATE
BOWEL PATTERN NORMAL: 1
LAST BOWEL MOVEMENT: 64664
STOOL FREQUENCY: LESS THAN DAILY
FATIGUE: 1
SHORTNESS OF BREATH: 1
FORGETFULNESS: 1

## 2018-01-16 ASSESSMENT — SOCIAL DETERMINANTS OF HEALTH (SDOH): ACTIVE STRESSOR - HEALTH CHANGES: 1

## 2018-01-16 ASSESSMENT — ACTIVITIES OF DAILY LIVING (ADL): MONEY MANAGEMENT (EXPENSES/BILLS): NEEDS ASSISTANCE

## 2018-01-16 NOTE — DOCUMENTATION QUERY
DOCUMENTATION QUERY     DR. THOMPSON, please review this Documentation Query and reply with an addendum. You co-signed only, which does not address the needed clarification from you to be able to code and complete this account.     To better represent the severity of illness of your patient, please review the following information and exercise your independent professional judgment in responding to this query.      History and Physical document the patient presented with ARNALDO on CKD V. CKD IV is also used throughout the chart and into the Discharge Summary.     Baseline S Creat is around 2.5 w eGFR ~25.     Please clarify the patient's CKD as     · Chronic Kidney Disease Stage I      · Chronic Kidney Disease Stage II     · Chronic Kidney Disease Stage III    · Chronic Kidney Disease Stage IV    · Chronic Kidney Disease Stage V     · End Stage Renal Disease  · Other explanation of clinical findings (please document)  · Unable to determine     The medical record reflects the following:   Clinical Findings  ARNALDO   CKD IV   CKD V   creatinine ~2.5   eGFR ~25   Treatment  reduce drugs with nephrogenic impact, IV fluids   Risk Factors     Location within medical record  History and Physical, Progress Notes, Discharge Summary and Consult      Thank you,   Janine Fox

## 2018-01-17 ENCOUNTER — HOME CARE VISIT (OUTPATIENT)
Dept: HOSPICE | Facility: HOSPICE | Age: 83
End: 2018-01-17
Payer: MEDICARE

## 2018-01-17 PROCEDURE — S9126 HOSPICE CARE, IN THE HOME, P: HCPCS

## 2018-01-17 PROCEDURE — G0156 HHCP-SVS OF AIDE,EA 15 MIN: HCPCS

## 2018-01-18 PROCEDURE — S9126 HOSPICE CARE, IN THE HOME, P: HCPCS

## 2018-01-19 ENCOUNTER — HOME CARE VISIT (OUTPATIENT)
Dept: HOSPICE | Facility: HOSPICE | Age: 83
End: 2018-01-19
Payer: MEDICARE

## 2018-01-19 VITALS
OXYGEN SATURATION: 93 % | HEART RATE: 55 BPM | RESPIRATION RATE: 18 BRPM | SYSTOLIC BLOOD PRESSURE: 143 MMHG | DIASTOLIC BLOOD PRESSURE: 77 MMHG

## 2018-01-19 PROCEDURE — S9126 HOSPICE CARE, IN THE HOME, P: HCPCS

## 2018-01-19 PROCEDURE — G0299 HHS/HOSPICE OF RN EA 15 MIN: HCPCS

## 2018-01-19 SDOH — ECONOMIC STABILITY: HOUSING INSECURITY: UNSAFE COOKING RANGE AREA: 0

## 2018-01-19 SDOH — ECONOMIC STABILITY: HOUSING INSECURITY: UNSAFE APPLIANCES: 0

## 2018-01-19 ASSESSMENT — ENCOUNTER SYMPTOMS
FATIGUE: 1
DRY SKIN: 1
FORGETFULNESS: 1
DEPRESSED MOOD: 1

## 2018-01-20 PROCEDURE — S9126 HOSPICE CARE, IN THE HOME, P: HCPCS

## 2018-01-21 ENCOUNTER — HOME CARE VISIT (OUTPATIENT)
Dept: HOSPICE | Facility: HOSPICE | Age: 83
End: 2018-01-21
Payer: MEDICARE

## 2018-01-21 PROCEDURE — S9126 HOSPICE CARE, IN THE HOME, P: HCPCS

## 2018-01-22 PROCEDURE — S9126 HOSPICE CARE, IN THE HOME, P: HCPCS

## 2018-01-23 ENCOUNTER — HOME CARE VISIT (OUTPATIENT)
Dept: HOSPICE | Facility: HOSPICE | Age: 83
End: 2018-01-23
Payer: MEDICARE

## 2018-01-23 VITALS
HEART RATE: 69 BPM | SYSTOLIC BLOOD PRESSURE: 127 MMHG | OXYGEN SATURATION: 92 % | DIASTOLIC BLOOD PRESSURE: 73 MMHG | RESPIRATION RATE: 18 BRPM

## 2018-01-23 PROCEDURE — G0299 HHS/HOSPICE OF RN EA 15 MIN: HCPCS

## 2018-01-23 PROCEDURE — S9126 HOSPICE CARE, IN THE HOME, P: HCPCS

## 2018-01-23 PROCEDURE — G0156 HHCP-SVS OF AIDE,EA 15 MIN: HCPCS

## 2018-01-23 SDOH — ECONOMIC STABILITY: HOUSING INSECURITY: UNSAFE COOKING RANGE AREA: 0

## 2018-01-23 SDOH — ECONOMIC STABILITY: HOUSING INSECURITY: UNSAFE APPLIANCES: 0

## 2018-01-23 ASSESSMENT — ENCOUNTER SYMPTOMS
DYSPNEA ACTIVITY LEVEL: AFTER AMBULATING MORE THAN 20 FT
FATIGUES EASILY: 1
DYSPNEA ON EXERTION: 1
CHEST TIGHTNESS: 1
SHORTNESS OF BREATH: 1
BOWEL PATTERN NORMAL: 1
LAST BOWEL MOVEMENT: 64671

## 2018-01-24 PROCEDURE — S9126 HOSPICE CARE, IN THE HOME, P: HCPCS

## 2018-01-25 ENCOUNTER — HOME CARE VISIT (OUTPATIENT)
Dept: HOSPICE | Facility: HOSPICE | Age: 83
End: 2018-01-25
Payer: MEDICARE

## 2018-01-25 PROCEDURE — S9126 HOSPICE CARE, IN THE HOME, P: HCPCS

## 2018-01-25 PROCEDURE — G0156 HHCP-SVS OF AIDE,EA 15 MIN: HCPCS

## 2018-01-26 ENCOUNTER — HOME CARE VISIT (OUTPATIENT)
Dept: HOSPICE | Facility: HOSPICE | Age: 83
End: 2018-01-26
Payer: MEDICARE

## 2018-01-26 VITALS
SYSTOLIC BLOOD PRESSURE: 131 MMHG | RESPIRATION RATE: 18 BRPM | OXYGEN SATURATION: 97 % | DIASTOLIC BLOOD PRESSURE: 71 MMHG | HEART RATE: 56 BPM

## 2018-01-26 PROCEDURE — S9126 HOSPICE CARE, IN THE HOME, P: HCPCS

## 2018-01-26 PROCEDURE — G0299 HHS/HOSPICE OF RN EA 15 MIN: HCPCS

## 2018-01-26 SDOH — ECONOMIC STABILITY: HOUSING INSECURITY: UNSAFE COOKING RANGE AREA: 0

## 2018-01-26 SDOH — ECONOMIC STABILITY: HOUSING INSECURITY: UNSAFE APPLIANCES: 0

## 2018-01-26 ASSESSMENT — SOCIAL DETERMINANTS OF HEALTH (SDOH)
ACTIVE STRESSOR - EXHAUSTION: 1
ACTIVE STRESSOR - HEALTH CHANGES: 1

## 2018-01-26 ASSESSMENT — ENCOUNTER SYMPTOMS
STOOL FREQUENCY: LESS THAN DAILY
LAST BOWEL MOVEMENT: 64674
BOWEL PATTERN NORMAL: 1
SLEEP QUALITY: POOR

## 2018-01-27 PROCEDURE — S9126 HOSPICE CARE, IN THE HOME, P: HCPCS

## 2018-01-28 PROCEDURE — S9126 HOSPICE CARE, IN THE HOME, P: HCPCS

## 2018-01-29 PROCEDURE — S9126 HOSPICE CARE, IN THE HOME, P: HCPCS

## 2018-01-30 ENCOUNTER — HOME CARE VISIT (OUTPATIENT)
Dept: HOSPICE | Facility: HOSPICE | Age: 83
End: 2018-01-30
Payer: MEDICARE

## 2018-01-30 VITALS
DIASTOLIC BLOOD PRESSURE: 87 MMHG | OXYGEN SATURATION: 94 % | SYSTOLIC BLOOD PRESSURE: 139 MMHG | RESPIRATION RATE: 18 BRPM | HEART RATE: 65 BPM

## 2018-01-30 PROCEDURE — G0156 HHCP-SVS OF AIDE,EA 15 MIN: HCPCS

## 2018-01-30 PROCEDURE — G0299 HHS/HOSPICE OF RN EA 15 MIN: HCPCS

## 2018-01-30 SDOH — ECONOMIC STABILITY: HOUSING INSECURITY: UNSAFE APPLIANCES: 0

## 2018-01-30 SDOH — ECONOMIC STABILITY: HOUSING INSECURITY: UNSAFE COOKING RANGE AREA: 0

## 2018-01-30 ASSESSMENT — ENCOUNTER SYMPTOMS
LAST BOWEL MOVEMENT: 64677
FATIGUES EASILY: 1
SOMNOLENCE: 1
DYSPNEA ON EXERTION: 1
STOOL FREQUENCY: LESS THAN DAILY
CHEST TIGHTNESS: 1
FORGETFULNESS: 1
BOWEL PATTERN NORMAL: 1
FATIGUE: 1

## 2018-02-01 ENCOUNTER — HOME CARE VISIT (OUTPATIENT)
Dept: HOSPICE | Facility: HOSPICE | Age: 83
End: 2018-02-01
Payer: MEDICARE

## 2018-02-01 PROCEDURE — S9126 HOSPICE CARE, IN THE HOME, P: HCPCS

## 2018-02-01 PROCEDURE — G0156 HHCP-SVS OF AIDE,EA 15 MIN: HCPCS

## 2018-02-02 ENCOUNTER — HOME CARE VISIT (OUTPATIENT)
Dept: HOSPICE | Facility: HOSPICE | Age: 83
End: 2018-02-02
Payer: MEDICARE

## 2018-02-02 VITALS
RESPIRATION RATE: 18 BRPM | DIASTOLIC BLOOD PRESSURE: 91 MMHG | HEART RATE: 66 BPM | OXYGEN SATURATION: 98 % | SYSTOLIC BLOOD PRESSURE: 133 MMHG

## 2018-02-02 PROCEDURE — S9126 HOSPICE CARE, IN THE HOME, P: HCPCS

## 2018-02-02 PROCEDURE — G0299 HHS/HOSPICE OF RN EA 15 MIN: HCPCS

## 2018-02-02 SDOH — ECONOMIC STABILITY: HOUSING INSECURITY: UNSAFE APPLIANCES: 0

## 2018-02-02 SDOH — ECONOMIC STABILITY: HOUSING INSECURITY: UNSAFE COOKING RANGE AREA: 0

## 2018-02-02 ASSESSMENT — ENCOUNTER SYMPTOMS
FATIGUES EASILY: 1
CHEST TIGHTNESS: 1
SLEEP QUALITY: FAIR
LAST BOWEL MOVEMENT: 64681
BOWEL PATTERN NORMAL: 1
SHORTNESS OF BREATH: 1
STOOL FREQUENCY: DAILY
FORGETFULNESS: 1
DYSPNEA ON EXERTION: 1

## 2018-02-02 ASSESSMENT — SOCIAL DETERMINANTS OF HEALTH (SDOH): ACTIVE STRESSOR - HEALTH CHANGES: 1

## 2018-02-03 PROCEDURE — S9126 HOSPICE CARE, IN THE HOME, P: HCPCS

## 2018-02-04 PROCEDURE — S9126 HOSPICE CARE, IN THE HOME, P: HCPCS

## 2018-02-05 PROCEDURE — S9126 HOSPICE CARE, IN THE HOME, P: HCPCS

## 2018-02-06 ENCOUNTER — HOME CARE VISIT (OUTPATIENT)
Dept: HOSPICE | Facility: HOSPICE | Age: 83
End: 2018-02-06
Payer: MEDICARE

## 2018-02-06 VITALS
OXYGEN SATURATION: 95 % | HEART RATE: 61 BPM | SYSTOLIC BLOOD PRESSURE: 128 MMHG | DIASTOLIC BLOOD PRESSURE: 65 MMHG | BODY MASS INDEX: 25.77 KG/M2 | RESPIRATION RATE: 18 BRPM | WEIGHT: 174.5 LBS

## 2018-02-06 PROCEDURE — G0299 HHS/HOSPICE OF RN EA 15 MIN: HCPCS

## 2018-02-06 PROCEDURE — G0156 HHCP-SVS OF AIDE,EA 15 MIN: HCPCS

## 2018-02-06 PROCEDURE — S9126 HOSPICE CARE, IN THE HOME, P: HCPCS

## 2018-02-06 SDOH — ECONOMIC STABILITY: HOUSING INSECURITY: UNSAFE APPLIANCES: 0

## 2018-02-06 SDOH — ECONOMIC STABILITY: HOUSING INSECURITY: UNSAFE COOKING RANGE AREA: 0

## 2018-02-06 SDOH — ECONOMIC STABILITY: GENERAL

## 2018-02-06 ASSESSMENT — ENCOUNTER SYMPTOMS
SLEEP QUALITY: ADEQUATE
COUGH CHARACTERISTICS: DRY
DESCRIPTION OF MEMORY LOSS: LONG TERM
COUGH: 1
COUGH CHARACTERISTICS: NON-PRODUCTIVE
FATIGUES EASILY: 1
STOOL FREQUENCY: LESS THAN DAILY
DYSPNEA ON EXERTION: 1
FORGETFULNESS: 1
CHEST TIGHTNESS: 1
BOWEL PATTERN NORMAL: 1
DESCRIPTION OF MEMORY LOSS: SHORT TERM
FATIGUE: 1
SHORTNESS OF BREATH: 1
DRY SKIN: 1
LAST BOWEL MOVEMENT: 64685

## 2018-02-06 ASSESSMENT — SOCIAL DETERMINANTS OF HEALTH (SDOH)
ACTIVE STRESSOR - HEALTH CHANGES: 1
ACTIVE STRESSOR - LOSS OF CONTROL: 1

## 2018-02-06 ASSESSMENT — ACTIVITIES OF DAILY LIVING (ADL): MONEY MANAGEMENT (EXPENSES/BILLS): NEEDS ASSISTANCE

## 2018-02-07 ENCOUNTER — HOME CARE VISIT (OUTPATIENT)
Dept: HOSPICE | Facility: HOSPICE | Age: 83
End: 2018-02-07
Payer: MEDICARE

## 2018-02-07 PROCEDURE — S9126 HOSPICE CARE, IN THE HOME, P: HCPCS

## 2018-02-08 ENCOUNTER — HOME CARE VISIT (OUTPATIENT)
Dept: HOSPICE | Facility: HOSPICE | Age: 83
End: 2018-02-08
Payer: MEDICARE

## 2018-02-08 PROCEDURE — S9126 HOSPICE CARE, IN THE HOME, P: HCPCS

## 2018-02-08 PROCEDURE — G0155 HHCP-SVS OF CSW,EA 15 MIN: HCPCS

## 2018-02-09 ENCOUNTER — APPOINTMENT (OUTPATIENT)
Dept: HOSPICE | Facility: HOSPICE | Age: 83
End: 2018-02-09
Payer: MEDICARE

## 2018-02-09 PROCEDURE — S9126 HOSPICE CARE, IN THE HOME, P: HCPCS

## 2018-02-10 PROCEDURE — S9126 HOSPICE CARE, IN THE HOME, P: HCPCS

## 2018-02-11 PROCEDURE — S9126 HOSPICE CARE, IN THE HOME, P: HCPCS

## 2018-02-12 ENCOUNTER — HOME CARE VISIT (OUTPATIENT)
Dept: HOSPICE | Facility: HOSPICE | Age: 83
End: 2018-02-12
Payer: MEDICARE

## 2018-02-12 PROCEDURE — S9126 HOSPICE CARE, IN THE HOME, P: HCPCS

## 2018-02-13 ENCOUNTER — APPOINTMENT (OUTPATIENT)
Dept: HOSPICE | Facility: HOSPICE | Age: 83
End: 2018-02-13
Payer: MEDICARE

## 2018-02-13 ENCOUNTER — HOME CARE VISIT (OUTPATIENT)
Dept: HOSPICE | Facility: HOSPICE | Age: 83
End: 2018-02-13
Payer: MEDICARE

## 2018-02-13 VITALS
HEART RATE: 49 BPM | RESPIRATION RATE: 20 BRPM | OXYGEN SATURATION: 93 % | SYSTOLIC BLOOD PRESSURE: 126 MMHG | DIASTOLIC BLOOD PRESSURE: 62 MMHG

## 2018-02-13 PROCEDURE — G0299 HHS/HOSPICE OF RN EA 15 MIN: HCPCS

## 2018-02-13 PROCEDURE — G0156 HHCP-SVS OF AIDE,EA 15 MIN: HCPCS

## 2018-02-13 PROCEDURE — S9126 HOSPICE CARE, IN THE HOME, P: HCPCS

## 2018-02-13 SDOH — ECONOMIC STABILITY: HOUSING INSECURITY: UNSAFE COOKING RANGE AREA: 0

## 2018-02-13 SDOH — ECONOMIC STABILITY: HOUSING INSECURITY: UNSAFE APPLIANCES: 0

## 2018-02-13 ASSESSMENT — ENCOUNTER SYMPTOMS
SPUTUM AMOUNT: SCANT
FATIGUES EASILY: 1
SPUTUM CONSISTENCY: THICK
SLEEP QUALITY: ADEQUATE
CHEST TIGHTNESS: 1
COUGH CHARACTERISTICS: PRODUCTIVE
BOWEL PATTERN NORMAL: 1
SPUTUM COLOR: WHITE
STOOL FREQUENCY: LESS THAN DAILY
DYSPNEA ACTIVITY LEVEL: AFTER AMBULATING 10 - 20 FT
SPUTUM PRODUCTION: 1
LAST BOWEL MOVEMENT: 64690
COUGH: 1
SHORTNESS OF BREATH: 1
DYSPNEA ON EXERTION: 1

## 2018-02-13 ASSESSMENT — SOCIAL DETERMINANTS OF HEALTH (SDOH): ACTIVE STRESSOR - HEALTH CHANGES: 1

## 2018-02-14 PROCEDURE — S9126 HOSPICE CARE, IN THE HOME, P: HCPCS

## 2018-02-15 ENCOUNTER — HOME CARE VISIT (OUTPATIENT)
Dept: HOSPICE | Facility: HOSPICE | Age: 83
End: 2018-02-15
Payer: MEDICARE

## 2018-02-15 PROCEDURE — S9126 HOSPICE CARE, IN THE HOME, P: HCPCS

## 2018-02-15 PROCEDURE — G0156 HHCP-SVS OF AIDE,EA 15 MIN: HCPCS

## 2018-02-16 PROCEDURE — S9126 HOSPICE CARE, IN THE HOME, P: HCPCS

## 2018-02-17 PROCEDURE — S9126 HOSPICE CARE, IN THE HOME, P: HCPCS

## 2018-02-18 PROCEDURE — S9126 HOSPICE CARE, IN THE HOME, P: HCPCS

## 2018-02-19 ENCOUNTER — HOME CARE VISIT (OUTPATIENT)
Dept: HOSPICE | Facility: HOSPICE | Age: 83
End: 2018-02-19
Payer: MEDICARE

## 2018-02-19 PROCEDURE — S9126 HOSPICE CARE, IN THE HOME, P: HCPCS

## 2018-02-19 PROCEDURE — G0156 HHCP-SVS OF AIDE,EA 15 MIN: HCPCS

## 2018-02-20 ENCOUNTER — HOME CARE VISIT (OUTPATIENT)
Dept: HOSPICE | Facility: HOSPICE | Age: 83
End: 2018-02-20
Payer: MEDICARE

## 2018-02-20 ENCOUNTER — APPOINTMENT (OUTPATIENT)
Dept: HOSPICE | Facility: HOSPICE | Age: 83
End: 2018-02-20
Payer: MEDICARE

## 2018-02-20 VITALS — SYSTOLIC BLOOD PRESSURE: 140 MMHG | OXYGEN SATURATION: 98 % | DIASTOLIC BLOOD PRESSURE: 95 MMHG | HEART RATE: 51 BPM

## 2018-02-20 PROCEDURE — G0299 HHS/HOSPICE OF RN EA 15 MIN: HCPCS

## 2018-02-20 PROCEDURE — S9126 HOSPICE CARE, IN THE HOME, P: HCPCS

## 2018-02-20 SDOH — ECONOMIC STABILITY: HOUSING INSECURITY: UNSAFE COOKING RANGE AREA: 0

## 2018-02-20 SDOH — ECONOMIC STABILITY: HOUSING INSECURITY: UNSAFE APPLIANCES: 0

## 2018-02-20 ASSESSMENT — ENCOUNTER SYMPTOMS
SPUTUM AMOUNT: MODERATE
SHORTNESS OF BREATH: 1
CHEST TIGHTNESS: 1
SPUTUM COLOR: WHITE
STOOL FREQUENCY: DAILY
LAST BOWEL MOVEMENT: 64698
DYSPNEA ACTIVITY LEVEL: AFTER AMBULATING 10 - 20 FT
COUGH CHARACTERISTICS: PRODUCTIVE
BOWEL PATTERN NORMAL: 1
SPUTUM PRODUCTION: 1
COUGH: 1

## 2018-02-21 ENCOUNTER — APPOINTMENT (OUTPATIENT)
Dept: HOSPICE | Facility: HOSPICE | Age: 83
End: 2018-02-21
Payer: MEDICARE

## 2018-02-21 ENCOUNTER — HOME CARE VISIT (OUTPATIENT)
Dept: HOSPICE | Facility: HOSPICE | Age: 83
End: 2018-02-21
Payer: MEDICARE

## 2018-02-21 PROCEDURE — S9126 HOSPICE CARE, IN THE HOME, P: HCPCS

## 2018-02-21 PROCEDURE — G0156 HHCP-SVS OF AIDE,EA 15 MIN: HCPCS

## 2018-02-22 ENCOUNTER — HOME CARE VISIT (OUTPATIENT)
Dept: HOSPICE | Facility: HOSPICE | Age: 83
End: 2018-02-22
Payer: MEDICARE

## 2018-02-22 PROCEDURE — G0299 HHS/HOSPICE OF RN EA 15 MIN: HCPCS

## 2018-02-22 PROCEDURE — S9126 HOSPICE CARE, IN THE HOME, P: HCPCS

## 2018-02-23 VITALS — RESPIRATION RATE: 24 BRPM | OXYGEN SATURATION: 99 % | HEART RATE: 56 BPM

## 2018-02-23 PROCEDURE — S9126 HOSPICE CARE, IN THE HOME, P: HCPCS

## 2018-02-24 PROCEDURE — S9126 HOSPICE CARE, IN THE HOME, P: HCPCS

## 2018-02-25 PROCEDURE — S9126 HOSPICE CARE, IN THE HOME, P: HCPCS

## 2018-02-26 ENCOUNTER — HOME CARE VISIT (OUTPATIENT)
Dept: HOSPICE | Facility: HOSPICE | Age: 83
End: 2018-02-26
Payer: MEDICARE

## 2018-02-26 ENCOUNTER — HOSPITAL ENCOUNTER (OUTPATIENT)
Facility: MEDICAL CENTER | Age: 83
End: 2018-02-26
Attending: INTERNAL MEDICINE
Payer: COMMERCIAL

## 2018-02-26 VITALS
SYSTOLIC BLOOD PRESSURE: 126 MMHG | DIASTOLIC BLOOD PRESSURE: 60 MMHG | HEART RATE: 53 BPM | RESPIRATION RATE: 18 BRPM | OXYGEN SATURATION: 97 %

## 2018-02-26 LAB
APPEARANCE UR: ABNORMAL
BACTERIA #/AREA URNS HPF: ABNORMAL /HPF
BILIRUB UR QL STRIP.AUTO: NEGATIVE
COLOR UR: YELLOW
CULTURE IF INDICATED INDCX: YES UA CULTURE
EPI CELLS #/AREA URNS HPF: ABNORMAL /HPF
GLUCOSE UR STRIP.AUTO-MCNC: NEGATIVE MG/DL
KETONES UR STRIP.AUTO-MCNC: NEGATIVE MG/DL
LEUKOCYTE ESTERASE UR QL STRIP.AUTO: ABNORMAL
MICRO URNS: ABNORMAL
NITRITE UR QL STRIP.AUTO: NEGATIVE
PH UR STRIP.AUTO: 5 [PH]
PROT UR QL STRIP: 100 MG/DL
RBC # URNS HPF: ABNORMAL /HPF
RBC UR QL AUTO: ABNORMAL
RENAL EPI CELLS #/AREA URNS HPF: NEGATIVE /HPF
SP GR UR STRIP.AUTO: 1.02
UROBILINOGEN UR STRIP.AUTO-MCNC: 0.2 MG/DL
WBC #/AREA URNS HPF: ABNORMAL /HPF

## 2018-02-26 PROCEDURE — S9126 HOSPICE CARE, IN THE HOME, P: HCPCS

## 2018-02-26 PROCEDURE — G0299 HHS/HOSPICE OF RN EA 15 MIN: HCPCS

## 2018-02-26 PROCEDURE — 87086 URINE CULTURE/COLONY COUNT: CPT

## 2018-02-26 PROCEDURE — 81001 URINALYSIS AUTO W/SCOPE: CPT

## 2018-02-26 SDOH — ECONOMIC STABILITY: HOUSING INSECURITY: UNSAFE APPLIANCES: 0

## 2018-02-26 SDOH — ECONOMIC STABILITY: HOUSING INSECURITY: UNSAFE COOKING RANGE AREA: 0

## 2018-02-26 ASSESSMENT — ENCOUNTER SYMPTOMS
COUGH: 1
BOWEL PATTERN NORMAL: 1
LAST BOWEL MOVEMENT: 64704
STOOL FREQUENCY: LESS THAN DAILY
COUGH CHARACTERISTICS: NON-PRODUCTIVE

## 2018-02-27 ENCOUNTER — APPOINTMENT (OUTPATIENT)
Dept: HOSPICE | Facility: HOSPICE | Age: 83
End: 2018-02-27
Payer: MEDICARE

## 2018-02-27 PROCEDURE — S9126 HOSPICE CARE, IN THE HOME, P: HCPCS

## 2018-02-28 LAB
BACTERIA UR CULT: NORMAL
SIGNIFICANT IND 70042: NORMAL
SITE SITE: NORMAL
SOURCE SOURCE: NORMAL

## 2018-02-28 PROCEDURE — S9126 HOSPICE CARE, IN THE HOME, P: HCPCS

## 2018-03-01 PROCEDURE — S9126 HOSPICE CARE, IN THE HOME, P: HCPCS

## 2018-03-02 ENCOUNTER — APPOINTMENT (OUTPATIENT)
Dept: HOSPICE | Facility: HOSPICE | Age: 83
End: 2018-03-02
Payer: MEDICARE

## 2018-03-02 ENCOUNTER — HOME CARE VISIT (OUTPATIENT)
Dept: HOSPICE | Facility: HOSPICE | Age: 83
End: 2018-03-02
Payer: MEDICARE

## 2018-03-02 PROCEDURE — S9126 HOSPICE CARE, IN THE HOME, P: HCPCS

## 2018-03-03 PROCEDURE — S9126 HOSPICE CARE, IN THE HOME, P: HCPCS

## 2018-03-04 PROCEDURE — S9126 HOSPICE CARE, IN THE HOME, P: HCPCS

## 2018-03-05 PROCEDURE — S9126 HOSPICE CARE, IN THE HOME, P: HCPCS

## 2018-03-06 ENCOUNTER — HOME CARE VISIT (OUTPATIENT)
Dept: HOSPICE | Facility: HOSPICE | Age: 83
End: 2018-03-06
Payer: MEDICARE

## 2018-03-06 ENCOUNTER — APPOINTMENT (OUTPATIENT)
Dept: HOSPICE | Facility: HOSPICE | Age: 83
End: 2018-03-06
Payer: MEDICARE

## 2018-03-06 VITALS
SYSTOLIC BLOOD PRESSURE: 145 MMHG | DIASTOLIC BLOOD PRESSURE: 80 MMHG | HEART RATE: 55 BPM | RESPIRATION RATE: 18 BRPM | OXYGEN SATURATION: 97 %

## 2018-03-06 PROCEDURE — S9126 HOSPICE CARE, IN THE HOME, P: HCPCS

## 2018-03-06 PROCEDURE — G0299 HHS/HOSPICE OF RN EA 15 MIN: HCPCS

## 2018-03-06 SDOH — ECONOMIC STABILITY: HOUSING INSECURITY: UNSAFE COOKING RANGE AREA: 0

## 2018-03-06 SDOH — ECONOMIC STABILITY: HOUSING INSECURITY: UNSAFE APPLIANCES: 0

## 2018-03-06 ASSESSMENT — ENCOUNTER SYMPTOMS
LAST BOWEL MOVEMENT: 64713
BOWEL PATTERN NORMAL: 1
STOOL FREQUENCY: DAILY
DYSPNEA ON EXERTION: 1
FATIGUES EASILY: 1

## 2018-03-07 PROCEDURE — S9126 HOSPICE CARE, IN THE HOME, P: HCPCS

## 2018-03-08 ENCOUNTER — APPOINTMENT (OUTPATIENT)
Dept: HOSPICE | Facility: HOSPICE | Age: 83
End: 2018-03-08
Payer: MEDICARE

## 2018-03-08 PROCEDURE — S9126 HOSPICE CARE, IN THE HOME, P: HCPCS

## 2018-03-09 ENCOUNTER — HOME CARE VISIT (OUTPATIENT)
Dept: HOSPICE | Facility: HOSPICE | Age: 83
End: 2018-03-09
Payer: MEDICARE

## 2018-03-09 VITALS
OXYGEN SATURATION: 94 % | DIASTOLIC BLOOD PRESSURE: 48 MMHG | RESPIRATION RATE: 18 BRPM | HEART RATE: 75 BPM | SYSTOLIC BLOOD PRESSURE: 101 MMHG

## 2018-03-09 PROCEDURE — S9126 HOSPICE CARE, IN THE HOME, P: HCPCS

## 2018-03-09 PROCEDURE — G0299 HHS/HOSPICE OF RN EA 15 MIN: HCPCS

## 2018-03-09 SDOH — ECONOMIC STABILITY: HOUSING INSECURITY: UNSAFE COOKING RANGE AREA: 0

## 2018-03-09 SDOH — ECONOMIC STABILITY: HOUSING INSECURITY: UNSAFE APPLIANCES: 0

## 2018-03-09 ASSESSMENT — ENCOUNTER SYMPTOMS
SLEEP QUALITY: ADEQUATE
STOOL FREQUENCY: DAILY
LAST BOWEL MOVEMENT: 64716
BOWEL PATTERN NORMAL: 1

## 2018-03-09 ASSESSMENT — SOCIAL DETERMINANTS OF HEALTH (SDOH): ACTIVE STRESSOR - HEALTH CHANGES: 1

## 2018-03-10 PROCEDURE — S9126 HOSPICE CARE, IN THE HOME, P: HCPCS

## 2018-03-11 PROCEDURE — S9126 HOSPICE CARE, IN THE HOME, P: HCPCS

## 2018-03-12 PROCEDURE — S9126 HOSPICE CARE, IN THE HOME, P: HCPCS

## 2018-03-13 ENCOUNTER — HOME CARE VISIT (OUTPATIENT)
Dept: HOSPICE | Facility: HOSPICE | Age: 83
End: 2018-03-13
Payer: MEDICARE

## 2018-03-13 VITALS
HEART RATE: 60 BPM | DIASTOLIC BLOOD PRESSURE: 82 MMHG | OXYGEN SATURATION: 97 % | SYSTOLIC BLOOD PRESSURE: 171 MMHG | RESPIRATION RATE: 18 BRPM

## 2018-03-13 PROCEDURE — G0299 HHS/HOSPICE OF RN EA 15 MIN: HCPCS

## 2018-03-13 PROCEDURE — S9126 HOSPICE CARE, IN THE HOME, P: HCPCS

## 2018-03-13 SDOH — ECONOMIC STABILITY: HOUSING INSECURITY: UNSAFE COOKING RANGE AREA: 0

## 2018-03-13 SDOH — ECONOMIC STABILITY: HOUSING INSECURITY: UNSAFE APPLIANCES: 0

## 2018-03-13 ASSESSMENT — ENCOUNTER SYMPTOMS
LAST BOWEL MOVEMENT: 64720
BOWEL PATTERN NORMAL: 1
STOOL FREQUENCY: LESS THAN DAILY

## 2018-03-14 ENCOUNTER — APPOINTMENT (OUTPATIENT)
Dept: HOSPICE | Facility: HOSPICE | Age: 83
End: 2018-03-14
Payer: MEDICARE

## 2018-03-14 PROCEDURE — S9126 HOSPICE CARE, IN THE HOME, P: HCPCS

## 2018-03-15 PROCEDURE — S9126 HOSPICE CARE, IN THE HOME, P: HCPCS

## 2018-03-16 ENCOUNTER — HOME CARE VISIT (OUTPATIENT)
Dept: HOSPICE | Facility: HOSPICE | Age: 83
End: 2018-03-16
Payer: MEDICARE

## 2018-03-16 ENCOUNTER — APPOINTMENT (OUTPATIENT)
Dept: HOSPICE | Facility: HOSPICE | Age: 83
End: 2018-03-16
Payer: MEDICARE

## 2018-03-16 PROCEDURE — S9126 HOSPICE CARE, IN THE HOME, P: HCPCS

## 2018-03-17 PROCEDURE — S9126 HOSPICE CARE, IN THE HOME, P: HCPCS

## 2018-03-18 PROCEDURE — S9126 HOSPICE CARE, IN THE HOME, P: HCPCS

## 2018-03-19 PROCEDURE — S9126 HOSPICE CARE, IN THE HOME, P: HCPCS

## 2018-03-20 ENCOUNTER — HOME CARE VISIT (OUTPATIENT)
Dept: HOSPICE | Facility: HOSPICE | Age: 83
End: 2018-03-20
Payer: MEDICARE

## 2018-03-20 VITALS
HEART RATE: 56 BPM | OXYGEN SATURATION: 96 % | SYSTOLIC BLOOD PRESSURE: 160 MMHG | RESPIRATION RATE: 18 BRPM | DIASTOLIC BLOOD PRESSURE: 100 MMHG

## 2018-03-20 PROCEDURE — G0299 HHS/HOSPICE OF RN EA 15 MIN: HCPCS

## 2018-03-20 PROCEDURE — S9126 HOSPICE CARE, IN THE HOME, P: HCPCS

## 2018-03-20 SDOH — ECONOMIC STABILITY: HOUSING INSECURITY: UNSAFE COOKING RANGE AREA: 0

## 2018-03-20 SDOH — ECONOMIC STABILITY: HOUSING INSECURITY: UNSAFE APPLIANCES: 0

## 2018-03-20 ASSESSMENT — ENCOUNTER SYMPTOMS
DIARRHEA: 1
FATIGUE: 1
LAST BOWEL MOVEMENT: 64726
SHORTNESS OF BREATH: 1
COUGH CHARACTERISTICS: DRY
STOOL FREQUENCY: MORE THAN TWICE DAILY
DYSPNEA ACTIVITY LEVEL: AFTER AMBULATING 10 - 20 FT
COUGH: 1
COUGH CHARACTERISTICS: NON-PRODUCTIVE

## 2018-03-21 PROCEDURE — S9126 HOSPICE CARE, IN THE HOME, P: HCPCS

## 2018-03-22 PROCEDURE — S9126 HOSPICE CARE, IN THE HOME, P: HCPCS

## 2018-03-23 ENCOUNTER — HOME CARE VISIT (OUTPATIENT)
Dept: HOSPICE | Facility: HOSPICE | Age: 83
End: 2018-03-23
Payer: MEDICARE

## 2018-03-23 VITALS
HEART RATE: 53 BPM | DIASTOLIC BLOOD PRESSURE: 90 MMHG | OXYGEN SATURATION: 93 % | RESPIRATION RATE: 18 BRPM | SYSTOLIC BLOOD PRESSURE: 140 MMHG

## 2018-03-23 PROCEDURE — G0299 HHS/HOSPICE OF RN EA 15 MIN: HCPCS

## 2018-03-23 PROCEDURE — S9126 HOSPICE CARE, IN THE HOME, P: HCPCS

## 2018-03-23 SDOH — ECONOMIC STABILITY: HOUSING INSECURITY: UNSAFE COOKING RANGE AREA: 0

## 2018-03-23 SDOH — ECONOMIC STABILITY: HOUSING INSECURITY: UNSAFE APPLIANCES: 0

## 2018-03-23 ASSESSMENT — ENCOUNTER SYMPTOMS
LAST BOWEL MOVEMENT: 64730
SHORTNESS OF BREATH: 1
BOWEL PATTERN NORMAL: 1
DYSPNEA ACTIVITY LEVEL: AFTER AMBULATING 10 - 20 FT
STOOL FREQUENCY: DAILY

## 2018-03-24 PROCEDURE — S9126 HOSPICE CARE, IN THE HOME, P: HCPCS

## 2018-03-25 PROCEDURE — S9126 HOSPICE CARE, IN THE HOME, P: HCPCS

## 2018-03-26 PROCEDURE — S9126 HOSPICE CARE, IN THE HOME, P: HCPCS

## 2018-03-27 ENCOUNTER — HOME CARE VISIT (OUTPATIENT)
Dept: HOSPICE | Facility: HOSPICE | Age: 83
End: 2018-03-27
Payer: MEDICARE

## 2018-03-27 ENCOUNTER — APPOINTMENT (OUTPATIENT)
Dept: HOSPICE | Facility: HOSPICE | Age: 83
End: 2018-03-27
Payer: MEDICARE

## 2018-03-27 VITALS
RESPIRATION RATE: 20 BRPM | DIASTOLIC BLOOD PRESSURE: 82 MMHG | SYSTOLIC BLOOD PRESSURE: 108 MMHG | HEART RATE: 59 BPM | OXYGEN SATURATION: 97 %

## 2018-03-27 PROCEDURE — G0299 HHS/HOSPICE OF RN EA 15 MIN: HCPCS

## 2018-03-27 PROCEDURE — S9126 HOSPICE CARE, IN THE HOME, P: HCPCS

## 2018-03-27 SDOH — ECONOMIC STABILITY: HOUSING INSECURITY: UNSAFE APPLIANCES: 0

## 2018-03-27 SDOH — ECONOMIC STABILITY: HOUSING INSECURITY: UNSAFE COOKING RANGE AREA: 0

## 2018-03-27 ASSESSMENT — ENCOUNTER SYMPTOMS
LAST BOWEL MOVEMENT: 64733
STOOL FREQUENCY: TWICE DAILY
DIARRHEA: 1

## 2018-03-28 PROCEDURE — S9126 HOSPICE CARE, IN THE HOME, P: HCPCS

## 2018-03-29 PROCEDURE — S9126 HOSPICE CARE, IN THE HOME, P: HCPCS

## 2018-03-30 ENCOUNTER — HOME CARE VISIT (OUTPATIENT)
Dept: HOSPICE | Facility: HOSPICE | Age: 83
End: 2018-03-30
Payer: MEDICARE

## 2018-03-30 ENCOUNTER — APPOINTMENT (OUTPATIENT)
Dept: HOSPICE | Facility: HOSPICE | Age: 83
End: 2018-03-30
Payer: MEDICARE

## 2018-03-30 PROCEDURE — S9126 HOSPICE CARE, IN THE HOME, P: HCPCS

## 2018-03-30 PROCEDURE — G0299 HHS/HOSPICE OF RN EA 15 MIN: HCPCS

## 2018-03-31 PROCEDURE — S9126 HOSPICE CARE, IN THE HOME, P: HCPCS

## 2018-04-03 ENCOUNTER — APPOINTMENT (OUTPATIENT)
Dept: HOSPICE | Facility: HOSPICE | Age: 83
End: 2018-04-03
Payer: MEDICARE

## 2018-04-03 ENCOUNTER — HOME CARE VISIT (OUTPATIENT)
Dept: HOSPICE | Facility: HOSPICE | Age: 83
End: 2018-04-03
Payer: MEDICARE

## 2018-04-03 VITALS
HEART RATE: 60 BPM | DIASTOLIC BLOOD PRESSURE: 73 MMHG | RESPIRATION RATE: 20 BRPM | OXYGEN SATURATION: 95 % | HEART RATE: 57 BPM | DIASTOLIC BLOOD PRESSURE: 94 MMHG | RESPIRATION RATE: 20 BRPM | SYSTOLIC BLOOD PRESSURE: 151 MMHG | OXYGEN SATURATION: 93 % | SYSTOLIC BLOOD PRESSURE: 171 MMHG

## 2018-04-03 PROCEDURE — S9126 HOSPICE CARE, IN THE HOME, P: HCPCS

## 2018-04-03 PROCEDURE — G0299 HHS/HOSPICE OF RN EA 15 MIN: HCPCS

## 2018-04-03 SDOH — ECONOMIC STABILITY: HOUSING INSECURITY: UNSAFE APPLIANCES: 0

## 2018-04-03 SDOH — ECONOMIC STABILITY: HOUSING INSECURITY: UNSAFE COOKING RANGE AREA: 0

## 2018-04-03 ASSESSMENT — ENCOUNTER SYMPTOMS
DYSPNEA ON EXERTION: 1
DESCRIPTION OF MEMORY LOSS: LONG TERM
CHEST TIGHTNESS: 1
STOOL FREQUENCY: LESS THAN DAILY
LAST BOWEL MOVEMENT: 64739
FATIGUES EASILY: 1
LAST BOWEL MOVEMENT: 64735
STOOL FREQUENCY: LESS THAN DAILY
DYSPNEA ACTIVITY LEVEL: WHILE SPEAKING
DESCRIPTION OF MEMORY LOSS: SHORT TERM
SHORTNESS OF BREATH: 1
FORGETFULNESS: 1
DYSPNEA ACTIVITY LEVEL: AT REST

## 2018-04-04 VITALS
SYSTOLIC BLOOD PRESSURE: 171 MMHG | DIASTOLIC BLOOD PRESSURE: 94 MMHG | RESPIRATION RATE: 20 BRPM | OXYGEN SATURATION: 93 % | HEART RATE: 60 BPM

## 2018-04-04 PROCEDURE — S9126 HOSPICE CARE, IN THE HOME, P: HCPCS

## 2018-04-04 SDOH — ECONOMIC STABILITY: HOUSING INSECURITY: UNSAFE APPLIANCES: 0

## 2018-04-04 SDOH — ECONOMIC STABILITY: HOUSING INSECURITY: UNSAFE COOKING RANGE AREA: 0

## 2018-04-05 ENCOUNTER — HOSPITAL ENCOUNTER (OUTPATIENT)
Facility: MEDICAL CENTER | Age: 83
End: 2018-04-05
Attending: INTERNAL MEDICINE
Payer: COMMERCIAL

## 2018-04-05 ENCOUNTER — OFFICE VISIT (OUTPATIENT)
Dept: MEDICAL GROUP | Facility: MEDICAL CENTER | Age: 83
End: 2018-04-05
Payer: MEDICARE

## 2018-04-05 VITALS
RESPIRATION RATE: 18 BRPM | TEMPERATURE: 97.2 F | SYSTOLIC BLOOD PRESSURE: 130 MMHG | WEIGHT: 168.21 LBS | BODY MASS INDEX: 24.91 KG/M2 | OXYGEN SATURATION: 98 % | HEIGHT: 69 IN | DIASTOLIC BLOOD PRESSURE: 60 MMHG | HEART RATE: 46 BPM

## 2018-04-05 DIAGNOSIS — I63.9 LEFT SIDED CEREBRAL HEMISPHERE CEREBROVASCULAR ACCIDENT (CVA) (HCC): ICD-10-CM

## 2018-04-05 DIAGNOSIS — I73.9 PERIPHERAL VASCULAR DISEASE (HCC): ICD-10-CM

## 2018-04-05 DIAGNOSIS — N40.1 BENIGN PROSTATIC HYPERPLASIA WITH URINARY RETENTION: ICD-10-CM

## 2018-04-05 DIAGNOSIS — I50.20 ACC/AHA STAGE C SYSTOLIC HEART FAILURE (HCC): ICD-10-CM

## 2018-04-05 DIAGNOSIS — I69.391 DYSPHAGIA DUE TO RECENT CEREBROVASCULAR ACCIDENT (CVA): ICD-10-CM

## 2018-04-05 DIAGNOSIS — Z76.89 ESTABLISHING CARE WITH NEW DOCTOR, ENCOUNTER FOR: ICD-10-CM

## 2018-04-05 DIAGNOSIS — N18.4 CKD (CHRONIC KIDNEY DISEASE) STAGE 4, GFR 15-29 ML/MIN (HCC): ICD-10-CM

## 2018-04-05 DIAGNOSIS — J43.1 PANLOBULAR EMPHYSEMA (HCC): ICD-10-CM

## 2018-04-05 DIAGNOSIS — D62 ACUTE BLOOD LOSS ANEMIA: ICD-10-CM

## 2018-04-05 DIAGNOSIS — C34.91 MALIGNANT NEOPLASM OF RIGHT LUNG, UNSPECIFIED PART OF LUNG (HCC): ICD-10-CM

## 2018-04-05 DIAGNOSIS — R33.8 BENIGN PROSTATIC HYPERPLASIA WITH URINARY RETENTION: ICD-10-CM

## 2018-04-05 PROBLEM — I34.0 MITRAL REGURGITATION: Status: RESOLVED | Noted: 2017-12-28 | Resolved: 2018-04-05

## 2018-04-05 PROBLEM — K29.70 GASTRITIS: Status: RESOLVED | Noted: 2017-12-28 | Resolved: 2018-04-05

## 2018-04-05 PROBLEM — N17.9 AKI (ACUTE KIDNEY INJURY) (HCC): Status: RESOLVED | Noted: 2017-12-27 | Resolved: 2018-04-05

## 2018-04-05 PROBLEM — N19 UREMIC ENCEPHALOPATHY: Status: RESOLVED | Noted: 2017-12-28 | Resolved: 2018-04-05

## 2018-04-05 PROBLEM — G93.49 UREMIC ENCEPHALOPATHY: Status: RESOLVED | Noted: 2017-12-28 | Resolved: 2018-04-05

## 2018-04-05 PROBLEM — N19 RENAL FAILURE: Status: RESOLVED | Noted: 2017-12-29 | Resolved: 2018-04-05

## 2018-04-05 PROBLEM — I50.42 CHRONIC COMBINED SYSTOLIC AND DIASTOLIC CHF (CONGESTIVE HEART FAILURE) (HCC): Status: RESOLVED | Noted: 2017-12-12 | Resolved: 2018-04-05

## 2018-04-05 PROBLEM — E53.8 B12 DEFICIENCY: Status: RESOLVED | Noted: 2017-12-28 | Resolved: 2018-04-05

## 2018-04-05 PROBLEM — E53.8 VITAMIN B12 DEFICIENCY: Status: RESOLVED | Noted: 2017-12-13 | Resolved: 2018-04-05

## 2018-04-05 PROBLEM — I35.0 SEVERE AORTIC STENOSIS: Status: RESOLVED | Noted: 2017-08-23 | Resolved: 2018-04-05

## 2018-04-05 LAB
APPEARANCE UR: NORMAL
BILIRUB UR STRIP-MCNC: NORMAL MG/DL
COLOR UR AUTO: YELLOW
GLUCOSE UR STRIP.AUTO-MCNC: NORMAL MG/DL
KETONES UR STRIP.AUTO-MCNC: NORMAL MG/DL
LEUKOCYTE ESTERASE UR QL STRIP.AUTO: NORMAL
NITRITE UR QL STRIP.AUTO: NORMAL
PH UR STRIP.AUTO: 5 [PH] (ref 5–8)
PROT UR QL STRIP: NORMAL MG/DL
RBC UR QL AUTO: NORMAL
SP GR UR STRIP.AUTO: 1.02
UROBILINOGEN UR STRIP-MCNC: 0.2 MG/DL

## 2018-04-05 PROCEDURE — 87086 URINE CULTURE/COLONY COUNT: CPT

## 2018-04-05 PROCEDURE — 99204 OFFICE O/P NEW MOD 45 MIN: CPT | Performed by: INTERNAL MEDICINE

## 2018-04-05 PROCEDURE — 81002 URINALYSIS NONAUTO W/O SCOPE: CPT | Performed by: INTERNAL MEDICINE

## 2018-04-05 PROCEDURE — S9126 HOSPICE CARE, IN THE HOME, P: HCPCS

## 2018-04-05 PROCEDURE — 87106 FUNGI IDENTIFICATION YEAST: CPT

## 2018-04-05 ASSESSMENT — PATIENT HEALTH QUESTIONNAIRE - PHQ9: CLINICAL INTERPRETATION OF PHQ2 SCORE: 0

## 2018-04-05 NOTE — PROGRESS NOTES
Subjective:   Lex Harden is a 84 y.o. male here today to establish care and        Chief complaint: Chronic shortness of breath    1. Establishing care with new doctor, encounter for    Previously followed by , patient has been on hospice since December 2017 following a hospitalization for stroke. He has multiple medical problems and issues see below.    2. Malignant neoplasm of right lung, unspecified part of lung (CMS-HCC)    Patient was diagnosed with a 2-1/2 cm right upper lobe lung mass in April 2017. PET/CT showed persistence of this mass, it is consistent with malignancy. Due to his stroke and hospitalization and subsequent admission to hospice workup has been deferred.    3. CKD (chronic kidney disease) stage 4, GFR 15-29 ml/min (CMS-HCC)    Patient's creatinine is approximately 2.6 with GFR 28 in January 2018.    4. Benign prostatic hyperplasia with urinary retention    Patient has distinct symptoms of prostatism, he has frequent urination including nocturia 2-3 times per night. At times he has urgency and is unable to void, with retention. He also has a history of recurrent bladder infections. He currently is on medications including [place] and, finasteride, and tamsulosin.    5. Acute blood loss anemia    Patient had hemoglobins around 7 when he was hospitalized due to presumed gastritis. He declined workup and has had no further bleeding that he is aware of.    6. Panlobular emphysema (CMS-HCC)    Patient has 80-pack-year smoking history, he has chronic dyspnea and takes morphine solution on almost a daily basis due to an exacerbation in his breathing. He also has a nebulizer at home.    7. Dysphagia due to recent cerebrovascular accident (CVA)    Dysphagia has resolved. He is not having any choking with eating, however he does have a decreased appetite with a 12 pound weight loss since December.    8. ACC/AHA stage C systolic heart failure (CMS-HCC)    Previously followed by Dr. Duffy  cardiology. He has mild valvular disease with mild AR and MR as well as an EF of about 50%.    9. Left sided cerebral hemisphere cerebrovascular accident (CVA) (CMS-HCC)    Patient had right upper and lower extremity weakness, with mild residual. He is however independent in his walking only using a wheelchair for prolonged distances. He lives alone and is closely monitored by his stepdaughter who visits him about 3 days per week. He also has hospice RN visiting twice weekly.    10. Peripheral vascular disease (CMS-HCC)    Patient has history of severe peripheral vascular disease followed by vascular surgery in the past. He did have claudication, however he is not moving around as much now due to his breathing.      Current medicines (including changes today)  Current Outpatient Prescriptions   Medication Sig Dispense Refill   • nitroglycerin (NITROSTAT) 0.4 MG SL Tab Place 0.4 mg under tongue. TAKE 1 TAB EVERY 5 MIN X 3 THEN CALL HOSPICE     • TRAZODONE HCL PO Take 25 mg by mouth at bedtime as needed (as needed for sleep).     • clopidogrel (PLAVIX) 75 MG Tab Take 1 Tab by mouth every morning. 1 Tab 0   • morphine (ROXANOL) 20 MG/ML Solution Take 5 mg by mouth every 2 hours as needed.     • sennosides-docusate sodium (SENOKOT-S) 8.6-50 MG tablet Take 1 Tab by mouth 2 times a day.     • bisacodyl (DULCOLAX) 10 MG Suppos Insert 10 mg in rectum as needed.     • carvedilol (COREG) 12.5 MG Tab Take 1 Tab by mouth 2 times a day, with meals. 180 Tab 3   • enalapril (VASOTEC) 10 MG Tab Take 2 Tabs by mouth 2 times a day. 180 Tab 3   • tamsulosin (FLOMAX) 0.4 MG capsule Take 0.4 mg by mouth ONE-HALF HOUR AFTER BREAKFAST.     • allopurinol (ZYLOPRIM) 100 MG TABS Take 100 mg by mouth every day.     • Cholecalciferol (VITAMIN D) 2000 UNIT TABS Take 4,000 Units by mouth every day.     • Ferrous Sulfate (IRON) 325 (65 FE) MG TABS Take 1 Tab by mouth every morning.     • ALBUTEROL INH Inhale 108 mcg by mouth. 1-2 puffs every 4 -6  "hours as needed     • lorazepam (LORAZEPAM INTENSOL) 2 MG/ML Conc Take 0.5 mg by mouth every four hours as needed (ANXIETY/ AGITATION).     • atropine 1 % Solution Take 2 Drops by mouth every four hours as needed.     • terazosin (HYTRIN) 10 MG capsule Take 10 mg by mouth every evening.     • finasteride (PROSCAR) 5 MG Tab Take 5 mg by mouth every day.       No current facility-administered medications for this visit.      He  has a past medical history of AVM (arteriovenous malformation) of colon; Back pain; Chickenpox; Chronic airway obstruction, not elsewhere classified; Congestive heart failure (CMS-HCC); Depression; Diabetes; Diphtheria; Emphysema of lung (CMS-HCC); Heart murmur; Hypercholesteremia; Hypertension; Infectious disease; Kidney disease; Other and unspecified angina pectoris; PVD (peripheral vascular disease) (CMS-HCC); Snoring; Stroke (CMS-HCC) (2015); Unspecified cataract; Urinary bladder disorder; and Urinary incontinence.    Social History     Social History   • Marital status: Single     Spouse name: N/A   • Number of children: N/A   • Years of education: N/A     Social History Main Topics   • Smoking status: Former Smoker     Packs/day: 2.00     Years: 40.00     Quit date: 1/1/1982   • Smokeless tobacco: Never Used      Comment: Hx smoking x 40 yrs. Quit 1983   • Alcohol use No   • Drug use: No   • Sexual activity: No     Other Topics Concern   • Not on file     Social History Narrative   • No narrative on file     Family History   Problem Relation Age of Onset   • Heart Disease Father    • Non-contributory Neg Hx      \"unknown\"       ROS       - Constitutional: Negative for fever, chills, 12 pound weight loss,     - HEENT: Negative for headaches, vision changes      - Respiratory: Negative for cough, Shortness of breath    - Cardiovascular: Negative for chest pain and bilateral lower extremity edema.     - Gastrointestinal: Negative for heartburn,  abdominal pain,  diarrhea, constipation     - " "Genitourinary: See history of present illness.    - Musculoskeletal: Negative for  back pain and joint pain.     - Skin: Negative for rash     - Neurological: Negative for dizziness,  tremors, positive mild right-sided weakness    - Psychiatric/Behavioral: Negative for depression and memory loss, positive insomnia              Objective:     Blood pressure 130/60, pulse (!) 46, temperature 36.2 °C (97.2 °F), resp. rate 18, height 1.753 m (5' 9\"), weight 76.3 kg (168 lb 3.4 oz), SpO2 98 %. Body mass index is 24.84 kg/m².   Accompanied by stepdaughter  Physical Exam:  Constitutional: Alert, no distress.  Skin: Warm, dry, good turgor, no rashes in visible areas.  Eye: Equal, round and reactive, conjunctiva clear, lids normal.  ENMT: Lips without lesions, good dentition, oropharynx clear. Hearing grossly intact.  Neck: No masses, no thyromegaly. No cervical or supraclavicular lymphadenopathy  Respiratory: Unlabored respiratory effort, lungs clear to auscultation, no wheezes, no rhonchi.  Cardiovascular: Regular rate and rhythm, without murmur, no edema.  Abdomen: Soft, non-tender, no masses, no hepatosplenomegaly.  Psych: Alert and oriented x3, normal affect and mood. Insight and judgment good            Assessment and Plan:   The following treatment plan was discussed    1. Establishing care with new doctor, encounter for    Patient is also followed by hospice    2. Malignant neoplasm of right lung, unspecified part of lung (CMS-HCC)    At this time no workup is going to be under gone due to patient's hospice status    3. CKD (chronic kidney disease) stage 4, GFR 15-29 ml/min (CMS-HCC)    Stable, avoid nephrotoxins    4. Benign prostatic hyperplasia with urinary retention    -Patient with some mild dysuria will check to make sure he does not have bladder infection, continue current medications as well.  - POCT Urinalysis    5. Acute blood loss anemia    History of, no reason to suspect ongoing blood loss at this " point    6. Panlobular emphysema (CMS-HCC)    With significant symptoms, continue when necessary morphine and nebulizers    7. Dysphagia due to recent cerebrovascular accident (CVA)        8. ACC/AHA stage C systolic heart failure (CMS-HCC)    Continue current medications     9. Left sided cerebral hemisphere cerebrovascular accident (CVA) (CMS-HCC)      Continue Plavix  10. Peripheral vascular disease (CMS-[facility])    Continue Plavix      Followup: Return in about 3 months (around 7/5/2018).

## 2018-04-05 NOTE — LETTER
Delivery Agent [facility]  Merlene Garcia M.D.  [address]  [place] [address]  Fax: [phone]   Authorization for Release/Disclosure of   Protected Health Information   Name: LEX HARDEN : 1933 SSN: [id number]   Address:   [address]   [address]  [place] [address] Phone:    [phone] (home)    I authorize the entity listed below to release/disclose the PHI below to:   [facility] hospital/Merlene Garcia M.D. and Merlene Garcia M.D.   Provider or Entity Name:     Address   City, State, Zip   Phone:    Fax:   Reason for request: continuity of care   Information to be released:    [  ] LAST COLONOSCOPY,  including any PATH REPORT and follow-up  [  ] LAST FIT/COLOGUARD RESULT [  ] LAST DEXA  [  ] LAST MAMMOGRAM  [  ] LAST PAP  [  ] LAST LABS [  ] RETINA EXAM REPORT  [  ] IMMUNIZATION RECORDS  [  ] Release all info      [  ] Check here and initial the line next to each item to release ALL health information INCLUDING  _____ Care and treatment for drug and / or alcohol abuse  _____ HIV testing, infection status, or AIDS  _____ Genetic Testing    DATES OF SERVICE OR TIME PERIOD TO BE DISCLOSED: _____________  I understand and acknowledge that:  * This Authorization may be revoked at any time by you in writing, except if your health information has already been used or disclosed.  * Your health information that will be used or disclosed as a result of you signing this authorization could be re-disclosed by the recipient. If this occurs, your re-disclosed health information may no longer be protected by State or Federal laws.  * You may refuse to sign this Authorization. Your refusal will not affect your ability to obtain treatment.  * This Authorization becomes effective upon signing and will  on (date) __________.      If no date is indicated, this Authorization will  one (1) year from the signature date.    Name: Lex Harden    Signature:   Date:     2018       PLEASE FAX  REQUESTED RECORDS BACK TO: [phone]

## 2018-04-06 ENCOUNTER — HOME CARE VISIT (OUTPATIENT)
Dept: HOSPICE | Facility: HOSPICE | Age: 83
End: 2018-04-06
Payer: MEDICARE

## 2018-04-06 ENCOUNTER — APPOINTMENT (OUTPATIENT)
Dept: HOSPICE | Facility: HOSPICE | Age: 83
End: 2018-04-06
Payer: MEDICARE

## 2018-04-06 DIAGNOSIS — R33.8 BENIGN PROSTATIC HYPERPLASIA WITH URINARY RETENTION: ICD-10-CM

## 2018-04-06 DIAGNOSIS — N40.1 BENIGN PROSTATIC HYPERPLASIA WITH URINARY RETENTION: ICD-10-CM

## 2018-04-06 PROCEDURE — G0299 HHS/HOSPICE OF RN EA 15 MIN: HCPCS

## 2018-04-06 PROCEDURE — S9126 HOSPICE CARE, IN THE HOME, P: HCPCS

## 2018-04-07 PROCEDURE — S9126 HOSPICE CARE, IN THE HOME, P: HCPCS

## 2018-04-08 PROCEDURE — S9126 HOSPICE CARE, IN THE HOME, P: HCPCS

## 2018-04-09 VITALS
RESPIRATION RATE: 20 BRPM | SYSTOLIC BLOOD PRESSURE: 140 MMHG | HEART RATE: 65 BPM | OXYGEN SATURATION: 94 % | DIASTOLIC BLOOD PRESSURE: 80 MMHG

## 2018-04-09 PROCEDURE — S9126 HOSPICE CARE, IN THE HOME, P: HCPCS

## 2018-04-09 SDOH — ECONOMIC STABILITY: HOUSING INSECURITY: UNSAFE COOKING RANGE AREA: 0

## 2018-04-09 SDOH — ECONOMIC STABILITY: HOUSING INSECURITY: UNSAFE APPLIANCES: 0

## 2018-04-09 ASSESSMENT — ENCOUNTER SYMPTOMS
CHEST TIGHTNESS: 1
LAST BOWEL MOVEMENT: 64744
DYSPNEA ACTIVITY LEVEL: AFTER AMBULATING 10 - 20 FT
SHORTNESS OF BREATH: 1
DIARRHEA: 1
STOOL FREQUENCY: DAILY

## 2018-04-10 ENCOUNTER — TELEPHONE (OUTPATIENT)
Dept: MEDICAL GROUP | Facility: MEDICAL CENTER | Age: 83
End: 2018-04-10

## 2018-04-10 ENCOUNTER — HOME CARE VISIT (OUTPATIENT)
Dept: HOSPICE | Facility: HOSPICE | Age: 83
End: 2018-04-10
Payer: MEDICARE

## 2018-04-10 VITALS
OXYGEN SATURATION: 96 % | SYSTOLIC BLOOD PRESSURE: 147 MMHG | RESPIRATION RATE: 30 BRPM | DIASTOLIC BLOOD PRESSURE: 90 MMHG | HEART RATE: 83 BPM

## 2018-04-10 DIAGNOSIS — N39.0 URINARY TRACT INFECTION WITHOUT HEMATURIA, SITE UNSPECIFIED: ICD-10-CM

## 2018-04-10 PROCEDURE — S9126 HOSPICE CARE, IN THE HOME, P: HCPCS

## 2018-04-10 PROCEDURE — G0299 HHS/HOSPICE OF RN EA 15 MIN: HCPCS

## 2018-04-10 SDOH — ECONOMIC STABILITY: HOUSING INSECURITY: UNSAFE APPLIANCES: 0

## 2018-04-10 SDOH — ECONOMIC STABILITY: HOUSING INSECURITY: UNSAFE COOKING RANGE AREA: 0

## 2018-04-10 ASSESSMENT — ENCOUNTER SYMPTOMS
CHEST TIGHTNESS: 1
DYSPNEA ACTIVITY LEVEL: AT REST
FATIGUES EASILY: 1
LAST BOWEL MOVEMENT: 64747
STOOL FREQUENCY: LESS THAN DAILY
SHORTNESS OF BREATH: 1
DIARRHEA: 1
DYSPNEA ON EXERTION: 1

## 2018-04-10 NOTE — TELEPHONE ENCOUNTER
The patient has < 100,000 cfu of presumed Candida in urine culture.     Please call him and see if he's still having pain when he urinates. If he is, please instruct him to go to the lab for repeat UA and I will place the order. If he's no longer symptomatic, no further workup will be required.     If the patient continues to demonstrate symptomatic yeast UTI, may consider tx with diflucan, though I think the risks outweight the benefits at the current time in this hospice patient with CKD.

## 2018-04-11 PROCEDURE — S9126 HOSPICE CARE, IN THE HOME, P: HCPCS

## 2018-04-11 NOTE — TELEPHONE ENCOUNTER
"Lab placed. May get done Friday. Please advise him that this needs to be a \"clean catch\" specimen.   "

## 2018-04-11 NOTE — TELEPHONE ENCOUNTER
"Phone Number Called: [phone] (home)     Message: Spoke with Pt and he did inform me that he is still experiencing pain. But he also stated that he is having a \"constant pain the groin\" as well. Did inform him of a repeat UA and voiced understanding. He will get it done Friday due to lack of transportation. Please advise.    Left Message for patient to call back: N\A        "

## 2018-04-12 LAB
BACTERIA UR CULT: ABNORMAL
SIGNIFICANT IND 70042: ABNORMAL
SITE SITE: ABNORMAL
SOURCE SOURCE: ABNORMAL

## 2018-04-12 PROCEDURE — S9126 HOSPICE CARE, IN THE HOME, P: HCPCS

## 2018-04-13 ENCOUNTER — HOME CARE VISIT (OUTPATIENT)
Dept: HOSPICE | Facility: HOSPICE | Age: 83
End: 2018-04-13
Payer: MEDICARE

## 2018-04-13 ENCOUNTER — HOSPITAL ENCOUNTER (OUTPATIENT)
Facility: MEDICAL CENTER | Age: 83
End: 2018-04-13
Attending: FAMILY MEDICINE
Payer: MEDICARE

## 2018-04-13 VITALS
SYSTOLIC BLOOD PRESSURE: 140 MMHG | DIASTOLIC BLOOD PRESSURE: 90 MMHG | RESPIRATION RATE: 24 BRPM | OXYGEN SATURATION: 95 % | HEART RATE: 55 BPM

## 2018-04-13 DIAGNOSIS — N39.0 URINARY TRACT INFECTION WITHOUT HEMATURIA, SITE UNSPECIFIED: ICD-10-CM

## 2018-04-13 LAB
APPEARANCE UR: ABNORMAL
BACTERIA #/AREA URNS HPF: ABNORMAL /HPF
BILIRUB UR QL STRIP.AUTO: NEGATIVE
COLOR UR: YELLOW
CULTURE IF INDICATED INDCX: YES UA CULTURE
EPI CELLS #/AREA URNS HPF: ABNORMAL /HPF
GLUCOSE UR STRIP.AUTO-MCNC: NEGATIVE MG/DL
HYALINE CASTS #/AREA URNS LPF: ABNORMAL /LPF
KETONES UR STRIP.AUTO-MCNC: NEGATIVE MG/DL
LEUKOCYTE ESTERASE UR QL STRIP.AUTO: ABNORMAL
MICRO URNS: ABNORMAL
NITRITE UR QL STRIP.AUTO: NEGATIVE
PH UR STRIP.AUTO: 6 [PH]
PROT UR QL STRIP: 100 MG/DL
RBC # URNS HPF: ABNORMAL /HPF
RBC UR QL AUTO: ABNORMAL
SP GR UR STRIP.AUTO: 1.01
UROBILINOGEN UR STRIP.AUTO-MCNC: NORMAL MG/DL
WBC #/AREA URNS HPF: ABNORMAL /HPF

## 2018-04-13 PROCEDURE — 81001 URINALYSIS AUTO W/SCOPE: CPT

## 2018-04-13 PROCEDURE — G0299 HHS/HOSPICE OF RN EA 15 MIN: HCPCS

## 2018-04-13 PROCEDURE — S9126 HOSPICE CARE, IN THE HOME, P: HCPCS

## 2018-04-13 SDOH — ECONOMIC STABILITY: HOUSING INSECURITY: UNSAFE APPLIANCES: 0

## 2018-04-13 SDOH — ECONOMIC STABILITY: HOUSING INSECURITY: UNSAFE COOKING RANGE AREA: 0

## 2018-04-13 ASSESSMENT — ENCOUNTER SYMPTOMS
DYSPNEA ON EXERTION: 1
HYPERTENSION: 1
STOOL FREQUENCY: LESS THAN DAILY
CHEST TIGHTNESS: 1
FATIGUES EASILY: 1
DIARRHEA: 1
LAST BOWEL MOVEMENT: 64751
SHORTNESS OF BREATH: 1
DYSPNEA ACTIVITY LEVEL: AT REST

## 2018-04-14 ENCOUNTER — HOME CARE VISIT (OUTPATIENT)
Dept: HOSPICE | Facility: HOSPICE | Age: 83
End: 2018-04-14
Payer: MEDICARE

## 2018-04-14 PROCEDURE — S9126 HOSPICE CARE, IN THE HOME, P: HCPCS

## 2018-04-15 PROCEDURE — S9126 HOSPICE CARE, IN THE HOME, P: HCPCS

## 2018-04-16 ENCOUNTER — TELEPHONE (OUTPATIENT)
Dept: MEDICAL GROUP | Facility: MEDICAL CENTER | Age: 83
End: 2018-04-16

## 2018-04-16 ENCOUNTER — HOME CARE VISIT (OUTPATIENT)
Dept: HOSPICE | Facility: HOSPICE | Age: 83
End: 2018-04-16
Payer: MEDICARE

## 2018-04-16 PROCEDURE — S9126 HOSPICE CARE, IN THE HOME, P: HCPCS

## 2018-04-17 ENCOUNTER — HOME CARE VISIT (OUTPATIENT)
Dept: HOSPICE | Facility: HOSPICE | Age: 83
End: 2018-04-17
Payer: MEDICARE

## 2018-04-17 VITALS
SYSTOLIC BLOOD PRESSURE: 188 MMHG | HEART RATE: 48 BPM | OXYGEN SATURATION: 93 % | RESPIRATION RATE: 20 BRPM | DIASTOLIC BLOOD PRESSURE: 82 MMHG

## 2018-04-17 PROCEDURE — G0299 HHS/HOSPICE OF RN EA 15 MIN: HCPCS

## 2018-04-17 PROCEDURE — S9126 HOSPICE CARE, IN THE HOME, P: HCPCS

## 2018-04-17 SDOH — ECONOMIC STABILITY: HOUSING INSECURITY: UNSAFE APPLIANCES: 0

## 2018-04-17 SDOH — ECONOMIC STABILITY: HOUSING INSECURITY: UNSAFE COOKING RANGE AREA: 0

## 2018-04-17 ASSESSMENT — ENCOUNTER SYMPTOMS
LAST BOWEL MOVEMENT: 64755
DYSPNEA ACTIVITY LEVEL: AT REST
BOWEL PATTERN NORMAL: 1
CHEST TIGHTNESS: 1
FATIGUES EASILY: 1
DYSPNEA ON EXERTION: 1
HYPERTENSION: 1
STOOL FREQUENCY: DAILY
SHORTNESS OF BREATH: 1

## 2018-04-18 ENCOUNTER — HOME CARE VISIT (OUTPATIENT)
Dept: HOSPICE | Facility: HOSPICE | Age: 83
End: 2018-04-18
Payer: MEDICARE

## 2018-04-18 ENCOUNTER — DOCUMENTATION (OUTPATIENT)
Dept: MEDICAL GROUP | Facility: MEDICAL CENTER | Age: 83
End: 2018-04-18

## 2018-04-18 PROCEDURE — S9126 HOSPICE CARE, IN THE HOME, P: HCPCS

## 2018-04-18 PROCEDURE — G0155 HHCP-SVS OF CSW,EA 15 MIN: HCPCS

## 2018-04-18 ASSESSMENT — SOCIAL DETERMINANTS OF HEALTH (SDOH): ACTIVE STRESSOR - HEALTH CHANGES: 1

## 2018-04-18 ASSESSMENT — ENCOUNTER SYMPTOMS: SLEEP QUALITY: ADEQUATE

## 2018-04-18 NOTE — PROGRESS NOTES
Dr. Garcia made aware of repeat UA and urine culture results on 4/16. Defer to her with regard to treatment considering the patient is on hospice. Dr. Garcia stated she would handle this issue.

## 2018-04-19 PROCEDURE — S9126 HOSPICE CARE, IN THE HOME, P: HCPCS

## 2018-04-20 ENCOUNTER — HOME CARE VISIT (OUTPATIENT)
Dept: HOSPICE | Facility: HOSPICE | Age: 83
End: 2018-04-20
Payer: MEDICARE

## 2018-04-20 VITALS
SYSTOLIC BLOOD PRESSURE: 124 MMHG | OXYGEN SATURATION: 90 % | RESPIRATION RATE: 20 BRPM | HEART RATE: 86 BPM | DIASTOLIC BLOOD PRESSURE: 98 MMHG

## 2018-04-20 PROCEDURE — S9126 HOSPICE CARE, IN THE HOME, P: HCPCS

## 2018-04-20 PROCEDURE — G0299 HHS/HOSPICE OF RN EA 15 MIN: HCPCS

## 2018-04-20 SDOH — ECONOMIC STABILITY: HOUSING INSECURITY: UNSAFE COOKING RANGE AREA: 0

## 2018-04-20 SDOH — ECONOMIC STABILITY: HOUSING INSECURITY: UNSAFE APPLIANCES: 0

## 2018-04-20 ASSESSMENT — ENCOUNTER SYMPTOMS
CHEST TIGHTNESS: 1
DYSPNEA ACTIVITY LEVEL: AT REST
LAST BOWEL MOVEMENT: 64757
BOWEL PATTERN NORMAL: 1
STOOL FREQUENCY: DAILY
SHORTNESS OF BREATH: 1

## 2018-04-21 PROCEDURE — S9126 HOSPICE CARE, IN THE HOME, P: HCPCS

## 2018-04-22 PROCEDURE — S9126 HOSPICE CARE, IN THE HOME, P: HCPCS

## 2018-04-23 PROCEDURE — S9126 HOSPICE CARE, IN THE HOME, P: HCPCS

## 2018-04-24 ENCOUNTER — HOME CARE VISIT (OUTPATIENT)
Dept: HOSPICE | Facility: HOSPICE | Age: 83
End: 2018-04-24
Payer: MEDICARE

## 2018-04-24 VITALS
HEART RATE: 57 BPM | RESPIRATION RATE: 20 BRPM | DIASTOLIC BLOOD PRESSURE: 67 MMHG | OXYGEN SATURATION: 92 % | SYSTOLIC BLOOD PRESSURE: 112 MMHG

## 2018-04-24 PROCEDURE — G0299 HHS/HOSPICE OF RN EA 15 MIN: HCPCS

## 2018-04-24 PROCEDURE — S9126 HOSPICE CARE, IN THE HOME, P: HCPCS

## 2018-04-24 ASSESSMENT — ENCOUNTER SYMPTOMS
CONTUSION: 1
STOOL FREQUENCY: DAILY
LAST BOWEL MOVEMENT: 64761
DRY SKIN: 1
SLEEP QUALITY: ADEQUATE
BOWEL PATTERN NORMAL: 1

## 2018-04-24 ASSESSMENT — SOCIAL DETERMINANTS OF HEALTH (SDOH): ACTIVE STRESSOR - HEALTH CHANGES: 1

## 2018-04-25 PROCEDURE — S9126 HOSPICE CARE, IN THE HOME, P: HCPCS

## 2018-04-26 PROCEDURE — S9126 HOSPICE CARE, IN THE HOME, P: HCPCS

## 2018-04-27 ENCOUNTER — HOME CARE VISIT (OUTPATIENT)
Dept: HOSPICE | Facility: HOSPICE | Age: 83
End: 2018-04-27
Payer: MEDICARE

## 2018-04-27 VITALS
DIASTOLIC BLOOD PRESSURE: 62 MMHG | OXYGEN SATURATION: 94 % | HEART RATE: 52 BPM | RESPIRATION RATE: 20 BRPM | SYSTOLIC BLOOD PRESSURE: 124 MMHG

## 2018-04-27 PROCEDURE — G0299 HHS/HOSPICE OF RN EA 15 MIN: HCPCS

## 2018-04-27 PROCEDURE — S9126 HOSPICE CARE, IN THE HOME, P: HCPCS

## 2018-04-27 SDOH — ECONOMIC STABILITY: HOUSING INSECURITY: UNSAFE COOKING RANGE AREA: 0

## 2018-04-27 SDOH — ECONOMIC STABILITY: HOUSING INSECURITY: UNSAFE APPLIANCES: 0

## 2018-04-27 ASSESSMENT — ENCOUNTER SYMPTOMS
CONTUSION: 1
DRY SKIN: 1
LAST BOWEL MOVEMENT: 64763
STOOL FREQUENCY: LESS THAN DAILY
BOWEL PATTERN NORMAL: 1
SLEEP QUALITY: POOR

## 2018-04-27 ASSESSMENT — SOCIAL DETERMINANTS OF HEALTH (SDOH): ACTIVE STRESSOR - HEALTH CHANGES: 1

## 2018-04-28 PROCEDURE — S9126 HOSPICE CARE, IN THE HOME, P: HCPCS

## 2018-04-29 PROCEDURE — S9126 HOSPICE CARE, IN THE HOME, P: HCPCS

## 2018-04-30 PROCEDURE — S9126 HOSPICE CARE, IN THE HOME, P: HCPCS

## 2018-05-01 ENCOUNTER — HOME CARE VISIT (OUTPATIENT)
Dept: HOSPICE | Facility: HOSPICE | Age: 83
End: 2018-05-01
Payer: MEDICARE

## 2018-05-01 VITALS
SYSTOLIC BLOOD PRESSURE: 143 MMHG | HEART RATE: 49 BPM | RESPIRATION RATE: 20 BRPM | OXYGEN SATURATION: 95 % | DIASTOLIC BLOOD PRESSURE: 93 MMHG

## 2018-05-01 PROCEDURE — G0299 HHS/HOSPICE OF RN EA 15 MIN: HCPCS

## 2018-05-01 PROCEDURE — S9126 HOSPICE CARE, IN THE HOME, P: HCPCS

## 2018-05-01 SDOH — ECONOMIC STABILITY: HOUSING INSECURITY: UNSAFE APPLIANCES: 0

## 2018-05-01 SDOH — ECONOMIC STABILITY: HOUSING INSECURITY: UNSAFE COOKING RANGE AREA: 0

## 2018-05-01 ASSESSMENT — ENCOUNTER SYMPTOMS
STOOL FREQUENCY: DAILY
BOWEL PATTERN NORMAL: 1
SLEEP QUALITY: FAIR
LAST BOWEL MOVEMENT: 64769

## 2018-05-01 ASSESSMENT — SOCIAL DETERMINANTS OF HEALTH (SDOH): ACTIVE STRESSOR - HEALTH CHANGES: 1

## 2018-05-02 PROCEDURE — S9126 HOSPICE CARE, IN THE HOME, P: HCPCS

## 2018-05-03 PROCEDURE — S9126 HOSPICE CARE, IN THE HOME, P: HCPCS

## 2018-05-04 ENCOUNTER — HOME CARE VISIT (OUTPATIENT)
Dept: HOSPICE | Facility: HOSPICE | Age: 83
End: 2018-05-04
Payer: MEDICARE

## 2018-05-04 PROCEDURE — S9126 HOSPICE CARE, IN THE HOME, P: HCPCS

## 2018-05-04 PROCEDURE — G0299 HHS/HOSPICE OF RN EA 15 MIN: HCPCS

## 2018-05-05 VITALS
HEART RATE: 51 BPM | RESPIRATION RATE: 20 BRPM | OXYGEN SATURATION: 96 % | SYSTOLIC BLOOD PRESSURE: 168 MMHG | DIASTOLIC BLOOD PRESSURE: 59 MMHG

## 2018-05-05 PROCEDURE — S9126 HOSPICE CARE, IN THE HOME, P: HCPCS

## 2018-05-05 SDOH — ECONOMIC STABILITY: HOUSING INSECURITY: UNSAFE COOKING RANGE AREA: 0

## 2018-05-05 SDOH — ECONOMIC STABILITY: HOUSING INSECURITY: UNSAFE APPLIANCES: 0

## 2018-05-05 ASSESSMENT — ENCOUNTER SYMPTOMS
DIARRHEA: 1
STOOL FREQUENCY: DAILY
DYSPNEA ACTIVITY LEVEL: AT REST
CHEST TIGHTNESS: 1
SHORTNESS OF BREATH: 1
LAST BOWEL MOVEMENT: 64772

## 2018-05-06 PROCEDURE — S9126 HOSPICE CARE, IN THE HOME, P: HCPCS

## 2018-05-07 PROCEDURE — S9126 HOSPICE CARE, IN THE HOME, P: HCPCS

## 2018-05-08 ENCOUNTER — HOME CARE VISIT (OUTPATIENT)
Dept: HOSPICE | Facility: HOSPICE | Age: 83
End: 2018-05-08
Payer: MEDICARE

## 2018-05-08 ENCOUNTER — HOSPITAL ENCOUNTER (OUTPATIENT)
Facility: MEDICAL CENTER | Age: 83
End: 2018-05-08
Attending: PHYSICIAN ASSISTANT
Payer: MEDICARE

## 2018-05-08 ENCOUNTER — APPOINTMENT (OUTPATIENT)
Dept: HOSPICE | Facility: HOSPICE | Age: 83
End: 2018-05-08
Payer: MEDICARE

## 2018-05-08 PROCEDURE — S9126 HOSPICE CARE, IN THE HOME, P: HCPCS

## 2018-05-08 PROCEDURE — 87086 URINE CULTURE/COLONY COUNT: CPT

## 2018-05-09 PROCEDURE — 87086 URINE CULTURE/COLONY COUNT: CPT

## 2018-05-09 PROCEDURE — S9126 HOSPICE CARE, IN THE HOME, P: HCPCS

## 2018-05-10 PROCEDURE — S9126 HOSPICE CARE, IN THE HOME, P: HCPCS

## 2018-05-11 ENCOUNTER — HOME CARE VISIT (OUTPATIENT)
Dept: HOSPICE | Facility: HOSPICE | Age: 83
End: 2018-05-11
Payer: MEDICARE

## 2018-05-11 VITALS
DIASTOLIC BLOOD PRESSURE: 88 MMHG | HEART RATE: 47 BPM | OXYGEN SATURATION: 96 % | RESPIRATION RATE: 16 BRPM | SYSTOLIC BLOOD PRESSURE: 159 MMHG

## 2018-05-11 LAB
BACTERIA UR CULT: NORMAL
SIGNIFICANT IND 70042: NORMAL
SITE SITE: NORMAL
SOURCE SOURCE: NORMAL

## 2018-05-11 PROCEDURE — G0299 HHS/HOSPICE OF RN EA 15 MIN: HCPCS

## 2018-05-11 PROCEDURE — S9126 HOSPICE CARE, IN THE HOME, P: HCPCS

## 2018-05-11 SDOH — ECONOMIC STABILITY: HOUSING INSECURITY: UNSAFE COOKING RANGE AREA: 0

## 2018-05-11 SDOH — ECONOMIC STABILITY: HOUSING INSECURITY: UNSAFE APPLIANCES: 0

## 2018-05-11 ASSESSMENT — ENCOUNTER SYMPTOMS
LOWER EXTREMITY EDEMA: 1
DRY SKIN: 1
DYSPNEA ACTIVITY LEVEL: AFTER AMBULATING MORE THAN 20 FT
FATIGUES EASILY: 1
LAST BOWEL MOVEMENT: 64779
SHORTNESS OF BREATH: 1
CONTUSION: 1
DYSPNEA ON EXERTION: 1
BOWEL PATTERN NORMAL: 1
CHEST TIGHTNESS: 1
STOOL FREQUENCY: DAILY

## 2018-05-12 PROCEDURE — S9126 HOSPICE CARE, IN THE HOME, P: HCPCS

## 2018-05-13 PROCEDURE — S9126 HOSPICE CARE, IN THE HOME, P: HCPCS

## 2018-05-14 PROCEDURE — S9126 HOSPICE CARE, IN THE HOME, P: HCPCS

## 2018-05-15 ENCOUNTER — HOME CARE VISIT (OUTPATIENT)
Dept: HOSPICE | Facility: HOSPICE | Age: 83
End: 2018-05-15
Payer: MEDICARE

## 2018-05-15 VITALS
RESPIRATION RATE: 20 BRPM | OXYGEN SATURATION: 94 % | HEART RATE: 59 BPM | DIASTOLIC BLOOD PRESSURE: 88 MMHG | SYSTOLIC BLOOD PRESSURE: 138 MMHG

## 2018-05-15 PROCEDURE — S9126 HOSPICE CARE, IN THE HOME, P: HCPCS

## 2018-05-15 PROCEDURE — G0299 HHS/HOSPICE OF RN EA 15 MIN: HCPCS

## 2018-05-15 SDOH — ECONOMIC STABILITY: HOUSING INSECURITY: UNSAFE APPLIANCES: 0

## 2018-05-15 SDOH — ECONOMIC STABILITY: HOUSING INSECURITY: UNSAFE COOKING RANGE AREA: 0

## 2018-05-15 ASSESSMENT — ENCOUNTER SYMPTOMS
FATIGUES EASILY: 1
CHEST TIGHTNESS: 1
DYSPNEA ON EXERTION: 1
DYSPNEA ACTIVITY LEVEL: AT REST
LAST BOWEL MOVEMENT: 64783
STOOL FREQUENCY: LESS THAN DAILY
SHORTNESS OF BREATH: 1
CONSTIPATION: 1

## 2018-05-16 PROCEDURE — S9126 HOSPICE CARE, IN THE HOME, P: HCPCS

## 2018-05-17 PROCEDURE — S9126 HOSPICE CARE, IN THE HOME, P: HCPCS

## 2018-05-17 NOTE — PROGRESS NOTES
Pt restless, somewhat agitated, c/o right leg/foot pain and describes it as sharp and shooting.  Morphine IV given    no difficulties

## 2018-05-18 ENCOUNTER — HOME CARE VISIT (OUTPATIENT)
Dept: HOSPICE | Facility: HOSPICE | Age: 83
End: 2018-05-18
Payer: MEDICARE

## 2018-05-18 VITALS
HEART RATE: 53 BPM | SYSTOLIC BLOOD PRESSURE: 130 MMHG | DIASTOLIC BLOOD PRESSURE: 95 MMHG | RESPIRATION RATE: 20 BRPM | OXYGEN SATURATION: 92 %

## 2018-05-18 PROCEDURE — G0299 HHS/HOSPICE OF RN EA 15 MIN: HCPCS

## 2018-05-18 PROCEDURE — S9126 HOSPICE CARE, IN THE HOME, P: HCPCS

## 2018-05-18 SDOH — ECONOMIC STABILITY: HOUSING INSECURITY: UNSAFE APPLIANCES: 0

## 2018-05-18 SDOH — ECONOMIC STABILITY: HOUSING INSECURITY: UNSAFE COOKING RANGE AREA: 0

## 2018-05-18 ASSESSMENT — ENCOUNTER SYMPTOMS
STOOL FREQUENCY: LESS THAN DAILY
LAST BOWEL MOVEMENT: 64786
DIARRHEA: 1

## 2018-05-19 PROCEDURE — S9126 HOSPICE CARE, IN THE HOME, P: HCPCS

## 2018-05-20 PROCEDURE — S9126 HOSPICE CARE, IN THE HOME, P: HCPCS

## 2018-05-21 PROCEDURE — S9126 HOSPICE CARE, IN THE HOME, P: HCPCS

## 2018-05-22 ENCOUNTER — HOME CARE VISIT (OUTPATIENT)
Dept: HOSPICE | Facility: HOSPICE | Age: 83
End: 2018-05-22
Payer: MEDICARE

## 2018-05-22 VITALS
OXYGEN SATURATION: 95 % | HEART RATE: 52 BPM | RESPIRATION RATE: 24 BRPM | DIASTOLIC BLOOD PRESSURE: 95 MMHG | SYSTOLIC BLOOD PRESSURE: 140 MMHG

## 2018-05-22 PROCEDURE — G0299 HHS/HOSPICE OF RN EA 15 MIN: HCPCS

## 2018-05-22 PROCEDURE — S9126 HOSPICE CARE, IN THE HOME, P: HCPCS

## 2018-05-22 SDOH — ECONOMIC STABILITY: HOUSING INSECURITY: UNSAFE APPLIANCES: 0

## 2018-05-22 SDOH — ECONOMIC STABILITY: HOUSING INSECURITY: UNSAFE COOKING RANGE AREA: 0

## 2018-05-22 ASSESSMENT — ENCOUNTER SYMPTOMS
STOOL FREQUENCY: DAILY
LOWER EXTREMITY EDEMA: 1
CHEST TIGHTNESS: 1
DYSPNEA ACTIVITY LEVEL: AT REST
FATIGUES EASILY: 1
BOWEL PATTERN NORMAL: 1
DYSPNEA ON EXERTION: 1
HYPERTENSION: 1
LAST BOWEL MOVEMENT: 64789
SHORTNESS OF BREATH: 1

## 2018-05-23 PROCEDURE — 6650990 HC HOSPICE AND HOME CARE RX REV CODE 0250

## 2018-05-23 PROCEDURE — S9126 HOSPICE CARE, IN THE HOME, P: HCPCS

## 2018-05-23 NOTE — TELEPHONE ENCOUNTER
Was the patient seen in the last year in this department? Yes     Does patient have an active prescription for medications requested? No     Received Request Via: Pharmacy     Future Appointments       Provider Department [place]    5/25/2018 10:00 AM Deborah Bloom R.N. [facility]     5/29/2018 VITALY Bloom R.N. [facility]     6/1/2018 [facility] Deborah Bloom R.N. [facility]     6/5/2018 [facility] Deborah Bloom R.N. [facility]     6/8/2018 [facility] Deborah Bloom R.N. [facility]     6/12/2018 [facility] Deborah Bloom R.N. [facility]     6/15/2018 VITALY Bloom R.N. [facility]     6/19/2018 [facility] Deborah Bloom R.N. [facility]     6/22/2018 [facility] Deborah Bloom R.N. [facility]     6/26/2018 [facility] Deborah Bloom R.N. [facility]     6/29/2018 [facility] Deborah Bloom R.N. [facility]     7/10/2018 10:00 AM Merlene Garcia M.D. [facility]

## 2018-05-24 PROCEDURE — S9126 HOSPICE CARE, IN THE HOME, P: HCPCS

## 2018-05-24 RX ORDER — ALLOPURINOL 100 MG/1
100 TABLET ORAL DAILY
Qty: 30 TAB | Refills: 3 | Status: SHIPPED | OUTPATIENT
Start: 2018-05-24 | End: 2018-08-14 | Stop reason: SDUPTHER

## 2018-05-25 ENCOUNTER — HOME CARE VISIT (OUTPATIENT)
Dept: HOSPICE | Facility: HOSPICE | Age: 83
End: 2018-05-25
Payer: MEDICARE

## 2018-05-25 VITALS
RESPIRATION RATE: 20 BRPM | SYSTOLIC BLOOD PRESSURE: 160 MMHG | HEART RATE: 55 BPM | OXYGEN SATURATION: 91 % | DIASTOLIC BLOOD PRESSURE: 76 MMHG

## 2018-05-25 PROCEDURE — S9126 HOSPICE CARE, IN THE HOME, P: HCPCS

## 2018-05-25 PROCEDURE — G0299 HHS/HOSPICE OF RN EA 15 MIN: HCPCS

## 2018-05-25 SDOH — ECONOMIC STABILITY: HOUSING INSECURITY: UNSAFE COOKING RANGE AREA: 0

## 2018-05-25 SDOH — ECONOMIC STABILITY: HOUSING INSECURITY: UNSAFE APPLIANCES: 0

## 2018-05-25 ASSESSMENT — ENCOUNTER SYMPTOMS
CHEST TIGHTNESS: 1
DIZZINESS: 1
LAST BOWEL MOVEMENT: 64792
BOWEL PATTERN NORMAL: 1
DYSPNEA ACTIVITY LEVEL: AFTER AMBULATING 10 - 20 FT
SHORTNESS OF BREATH: 1
SLEEP QUALITY: ADEQUATE
STOOL FREQUENCY: LESS THAN DAILY

## 2018-05-25 ASSESSMENT — SOCIAL DETERMINANTS OF HEALTH (SDOH): ACTIVE STRESSOR - HEALTH CHANGES: 1

## 2018-05-26 PROCEDURE — S9126 HOSPICE CARE, IN THE HOME, P: HCPCS

## 2018-05-27 PROCEDURE — S9126 HOSPICE CARE, IN THE HOME, P: HCPCS

## 2018-05-28 PROCEDURE — S9126 HOSPICE CARE, IN THE HOME, P: HCPCS

## 2018-05-29 ENCOUNTER — HOME CARE VISIT (OUTPATIENT)
Dept: HOSPICE | Facility: HOSPICE | Age: 83
End: 2018-05-29
Payer: MEDICARE

## 2018-05-29 VITALS
RESPIRATION RATE: 20 BRPM | HEART RATE: 61 BPM | SYSTOLIC BLOOD PRESSURE: 173 MMHG | DIASTOLIC BLOOD PRESSURE: 85 MMHG | OXYGEN SATURATION: 96 %

## 2018-05-29 PROCEDURE — G0299 HHS/HOSPICE OF RN EA 15 MIN: HCPCS

## 2018-05-29 PROCEDURE — S9126 HOSPICE CARE, IN THE HOME, P: HCPCS

## 2018-05-29 SDOH — ECONOMIC STABILITY: HOUSING INSECURITY: UNSAFE APPLIANCES: 0

## 2018-05-29 SDOH — ECONOMIC STABILITY: HOUSING INSECURITY: UNSAFE COOKING RANGE AREA: 0

## 2018-05-29 ASSESSMENT — ENCOUNTER SYMPTOMS
SHORTNESS OF BREATH: 1
DYSPNEA ACTIVITY LEVEL: AFTER AMBULATING 10 - 20 FT
CHEST TIGHTNESS: 1
STOOL FREQUENCY: LESS THAN DAILY
BOWEL PATTERN NORMAL: 1
LAST BOWEL MOVEMENT: 64797
DIZZINESS: 1
CONTUSION: 1

## 2018-05-30 PROCEDURE — S9126 HOSPICE CARE, IN THE HOME, P: HCPCS

## 2018-05-31 PROCEDURE — S9126 HOSPICE CARE, IN THE HOME, P: HCPCS

## 2018-06-01 ENCOUNTER — HOME CARE VISIT (OUTPATIENT)
Dept: HOSPICE | Facility: HOSPICE | Age: 83
End: 2018-06-01
Payer: MEDICARE

## 2018-06-01 PROCEDURE — G0299 HHS/HOSPICE OF RN EA 15 MIN: HCPCS

## 2018-06-01 PROCEDURE — S9126 HOSPICE CARE, IN THE HOME, P: HCPCS

## 2018-06-02 VITALS
RESPIRATION RATE: 20 BRPM | OXYGEN SATURATION: 97 % | DIASTOLIC BLOOD PRESSURE: 73 MMHG | HEART RATE: 46 BPM | SYSTOLIC BLOOD PRESSURE: 115 MMHG

## 2018-06-02 PROCEDURE — S9126 HOSPICE CARE, IN THE HOME, P: HCPCS

## 2018-06-02 SDOH — ECONOMIC STABILITY: HOUSING INSECURITY: UNSAFE COOKING RANGE AREA: 0

## 2018-06-02 SDOH — ECONOMIC STABILITY: HOUSING INSECURITY: UNSAFE APPLIANCES: 0

## 2018-06-02 ASSESSMENT — ENCOUNTER SYMPTOMS
ORTHOPNEA: 1
DYSPNEA ACTIVITY LEVEL: AFTER AMBULATING 10 - 20 FT
STOOL FREQUENCY: LESS THAN DAILY
CHEST TIGHTNESS: 1
BOWEL PATTERN NORMAL: 1
SHORTNESS OF BREATH: 1
LAST BOWEL MOVEMENT: 64799
FATIGUE: 1

## 2018-06-03 PROCEDURE — S9126 HOSPICE CARE, IN THE HOME, P: HCPCS

## 2018-06-04 PROCEDURE — S9126 HOSPICE CARE, IN THE HOME, P: HCPCS

## 2018-06-05 ENCOUNTER — HOME CARE VISIT (OUTPATIENT)
Dept: HOSPICE | Facility: HOSPICE | Age: 83
End: 2018-06-05
Payer: MEDICARE

## 2018-06-05 PROCEDURE — G0299 HHS/HOSPICE OF RN EA 15 MIN: HCPCS

## 2018-06-05 PROCEDURE — S9126 HOSPICE CARE, IN THE HOME, P: HCPCS

## 2018-06-06 VITALS
OXYGEN SATURATION: 93 % | DIASTOLIC BLOOD PRESSURE: 99 MMHG | HEART RATE: 63 BPM | SYSTOLIC BLOOD PRESSURE: 164 MMHG | RESPIRATION RATE: 20 BRPM

## 2018-06-06 PROCEDURE — S9126 HOSPICE CARE, IN THE HOME, P: HCPCS

## 2018-06-06 SDOH — ECONOMIC STABILITY: HOUSING INSECURITY: UNSAFE COOKING RANGE AREA: 0

## 2018-06-06 SDOH — ECONOMIC STABILITY: HOUSING INSECURITY: UNSAFE APPLIANCES: 0

## 2018-06-06 ASSESSMENT — ENCOUNTER SYMPTOMS
DEPRESSED MOOD: 1
DIZZINESS: 1
LAST BOWEL MOVEMENT: 64804
CHEST TIGHTNESS: 1
DYSPNEA ACTIVITY LEVEL: AFTER AMBULATING 10 - 20 FT
SHORTNESS OF BREATH: 1
LOWER EXTREMITY EDEMA: 1
ORTHOPNEA: 1
FATIGUE: 1
BOWEL PATTERN NORMAL: 1

## 2018-06-07 ENCOUNTER — HOME CARE VISIT (OUTPATIENT)
Dept: HOSPICE | Facility: HOSPICE | Age: 83
End: 2018-06-07
Payer: MEDICARE

## 2018-06-07 PROCEDURE — S9126 HOSPICE CARE, IN THE HOME, P: HCPCS

## 2018-06-08 ENCOUNTER — HOME CARE VISIT (OUTPATIENT)
Dept: HOSPICE | Facility: HOSPICE | Age: 83
End: 2018-06-08
Payer: MEDICARE

## 2018-06-08 VITALS
SYSTOLIC BLOOD PRESSURE: 151 MMHG | DIASTOLIC BLOOD PRESSURE: 73 MMHG | HEART RATE: 50 BPM | OXYGEN SATURATION: 95 % | RESPIRATION RATE: 18 BRPM

## 2018-06-08 PROCEDURE — G0299 HHS/HOSPICE OF RN EA 15 MIN: HCPCS

## 2018-06-08 PROCEDURE — S9126 HOSPICE CARE, IN THE HOME, P: HCPCS

## 2018-06-08 SDOH — ECONOMIC STABILITY: HOUSING INSECURITY: UNSAFE COOKING RANGE AREA: 0

## 2018-06-08 SDOH — ECONOMIC STABILITY: HOUSING INSECURITY: UNSAFE APPLIANCES: 0

## 2018-06-08 ASSESSMENT — ENCOUNTER SYMPTOMS
STOOL FREQUENCY: DAILY
BOWEL PATTERN NORMAL: 1
DYSPNEA ACTIVITY LEVEL: AT REST
SHORTNESS OF BREATH: 1
CHEST TIGHTNESS: 1
DYSPNEA ON EXERTION: 1
DIZZINESS: 1
LAST BOWEL MOVEMENT: 64806
LOWER EXTREMITY EDEMA: 1

## 2018-06-09 PROCEDURE — S9126 HOSPICE CARE, IN THE HOME, P: HCPCS

## 2018-06-10 PROCEDURE — S9126 HOSPICE CARE, IN THE HOME, P: HCPCS

## 2018-06-11 PROCEDURE — S9126 HOSPICE CARE, IN THE HOME, P: HCPCS

## 2018-06-12 ENCOUNTER — HOME CARE VISIT (OUTPATIENT)
Dept: HOSPICE | Facility: HOSPICE | Age: 83
End: 2018-06-12
Payer: MEDICARE

## 2018-06-12 VITALS — RESPIRATION RATE: 14 BRPM | HEART RATE: 50 BPM | SYSTOLIC BLOOD PRESSURE: 140 MMHG | DIASTOLIC BLOOD PRESSURE: 60 MMHG

## 2018-06-12 PROCEDURE — G0299 HHS/HOSPICE OF RN EA 15 MIN: HCPCS

## 2018-06-12 PROCEDURE — S9126 HOSPICE CARE, IN THE HOME, P: HCPCS

## 2018-06-12 RX ORDER — TERAZOSIN 10 MG/1
10 CAPSULE ORAL DAILY
Qty: 30 CAP | Refills: 0 | Status: SHIPPED | OUTPATIENT
Start: 2018-06-12 | End: 2018-06-14 | Stop reason: SDUPTHER

## 2018-06-12 SDOH — ECONOMIC STABILITY: HOUSING INSECURITY
HOME SAFETY: PATIENT HAS THROW RUGS, IS AWARE OF ADDED FALL RISK SECONDARY TO RUGS, WISHES TO HAVE THEM IN PLACE REGARDLESS OF RISK

## 2018-06-12 SDOH — ECONOMIC STABILITY: HOUSING INSECURITY: UNSAFE COOKING RANGE AREA: 0

## 2018-06-12 SDOH — ECONOMIC STABILITY: HOUSING INSECURITY: UNSAFE APPLIANCES: 0

## 2018-06-12 ASSESSMENT — ENCOUNTER SYMPTOMS
BOWEL PATTERN NORMAL: 1
DIZZINESS: 1
LAST BOWEL MOVEMENT: 64811
SLEEP QUALITY: ADEQUATE
STOOL FREQUENCY: DAILY
LOWER EXTREMITY EDEMA: 1
FORGETFULNESS: 1

## 2018-06-13 PROCEDURE — S9126 HOSPICE CARE, IN THE HOME, P: HCPCS

## 2018-06-14 PROCEDURE — S9126 HOSPICE CARE, IN THE HOME, P: HCPCS

## 2018-06-14 RX ORDER — TERAZOSIN 10 MG/1
10 CAPSULE ORAL DAILY
Qty: 90 CAP | Refills: 2 | Status: SHIPPED | OUTPATIENT
Start: 2018-06-14 | End: 2018-08-14

## 2018-06-15 ENCOUNTER — HOME CARE VISIT (OUTPATIENT)
Dept: HOSPICE | Facility: HOSPICE | Age: 83
End: 2018-06-15
Payer: MEDICARE

## 2018-06-15 VITALS
OXYGEN SATURATION: 97 % | HEART RATE: 52 BPM | SYSTOLIC BLOOD PRESSURE: 110 MMHG | DIASTOLIC BLOOD PRESSURE: 60 MMHG | RESPIRATION RATE: 20 BRPM | TEMPERATURE: 97.6 F

## 2018-06-15 PROCEDURE — G0299 HHS/HOSPICE OF RN EA 15 MIN: HCPCS

## 2018-06-15 PROCEDURE — S9126 HOSPICE CARE, IN THE HOME, P: HCPCS

## 2018-06-15 SDOH — ECONOMIC STABILITY: GENERAL

## 2018-06-15 SDOH — ECONOMIC STABILITY: HOUSING INSECURITY: UNSAFE COOKING RANGE AREA: 0

## 2018-06-15 SDOH — ECONOMIC STABILITY: HOUSING INSECURITY: UNSAFE APPLIANCES: 0

## 2018-06-15 ASSESSMENT — SOCIAL DETERMINANTS OF HEALTH (SDOH): ACTIVE STRESSOR - HEALTH CHANGES: 1

## 2018-06-15 ASSESSMENT — ENCOUNTER SYMPTOMS
BOWEL PATTERN NORMAL: 1
STOOL FREQUENCY: LESS THAN DAILY
LAST BOWEL MOVEMENT: 64814
FATIGUE: 1

## 2018-06-15 ASSESSMENT — ACTIVITIES OF DAILY LIVING (ADL): MONEY MANAGEMENT (EXPENSES/BILLS): INDEPENDENT

## 2018-06-16 PROCEDURE — S9126 HOSPICE CARE, IN THE HOME, P: HCPCS

## 2018-06-17 PROCEDURE — S9126 HOSPICE CARE, IN THE HOME, P: HCPCS

## 2018-06-18 PROCEDURE — S9126 HOSPICE CARE, IN THE HOME, P: HCPCS

## 2018-06-19 ENCOUNTER — HOME CARE VISIT (OUTPATIENT)
Dept: HOSPICE | Facility: HOSPICE | Age: 83
End: 2018-06-19
Payer: MEDICARE

## 2018-06-19 VITALS
RESPIRATION RATE: 20 BRPM | HEART RATE: 51 BPM | SYSTOLIC BLOOD PRESSURE: 170 MMHG | OXYGEN SATURATION: 94 % | DIASTOLIC BLOOD PRESSURE: 64 MMHG

## 2018-06-19 PROCEDURE — G0299 HHS/HOSPICE OF RN EA 15 MIN: HCPCS

## 2018-06-19 PROCEDURE — S9126 HOSPICE CARE, IN THE HOME, P: HCPCS

## 2018-06-19 SDOH — ECONOMIC STABILITY: HOUSING INSECURITY: UNSAFE APPLIANCES: 0

## 2018-06-19 SDOH — ECONOMIC STABILITY: HOUSING INSECURITY: UNSAFE COOKING RANGE AREA: 0

## 2018-06-19 ASSESSMENT — ENCOUNTER SYMPTOMS
FATIGUES EASILY: 1
LAST BOWEL MOVEMENT: 64817
STOOL FREQUENCY: LESS THAN DAILY
DIARRHEA: 1
SLEEP QUALITY: ADEQUATE

## 2018-06-19 ASSESSMENT — SOCIAL DETERMINANTS OF HEALTH (SDOH): ACTIVE STRESSOR - HEALTH CHANGES: 1

## 2018-06-20 PROCEDURE — S9126 HOSPICE CARE, IN THE HOME, P: HCPCS

## 2018-06-21 PROCEDURE — S9126 HOSPICE CARE, IN THE HOME, P: HCPCS

## 2018-06-22 ENCOUNTER — HOME CARE VISIT (OUTPATIENT)
Dept: HOSPICE | Facility: HOSPICE | Age: 83
End: 2018-06-22
Payer: MEDICARE

## 2018-06-22 VITALS
RESPIRATION RATE: 20 BRPM | DIASTOLIC BLOOD PRESSURE: 67 MMHG | OXYGEN SATURATION: 97 % | SYSTOLIC BLOOD PRESSURE: 152 MMHG | HEART RATE: 52 BPM

## 2018-06-22 PROCEDURE — S9126 HOSPICE CARE, IN THE HOME, P: HCPCS

## 2018-06-22 PROCEDURE — G0299 HHS/HOSPICE OF RN EA 15 MIN: HCPCS

## 2018-06-22 SDOH — ECONOMIC STABILITY: HOUSING INSECURITY: UNSAFE APPLIANCES: 0

## 2018-06-22 SDOH — ECONOMIC STABILITY: HOUSING INSECURITY: UNSAFE COOKING RANGE AREA: 0

## 2018-06-22 ASSESSMENT — ENCOUNTER SYMPTOMS
DIARRHEA: 1
LAST BOWEL MOVEMENT: 64820
STOOL FREQUENCY: MORE THAN TWICE DAILY

## 2018-06-23 PROCEDURE — S9126 HOSPICE CARE, IN THE HOME, P: HCPCS

## 2018-06-24 PROCEDURE — S9126 HOSPICE CARE, IN THE HOME, P: HCPCS

## 2018-06-25 PROCEDURE — S9126 HOSPICE CARE, IN THE HOME, P: HCPCS

## 2018-06-26 ENCOUNTER — HOME CARE VISIT (OUTPATIENT)
Dept: HOSPICE | Facility: HOSPICE | Age: 83
End: 2018-06-26
Payer: MEDICARE

## 2018-06-26 VITALS
HEART RATE: 45 BPM | SYSTOLIC BLOOD PRESSURE: 140 MMHG | DIASTOLIC BLOOD PRESSURE: 74 MMHG | RESPIRATION RATE: 20 BRPM | OXYGEN SATURATION: 95 %

## 2018-06-26 PROCEDURE — G0299 HHS/HOSPICE OF RN EA 15 MIN: HCPCS

## 2018-06-26 PROCEDURE — S9126 HOSPICE CARE, IN THE HOME, P: HCPCS

## 2018-06-26 SDOH — ECONOMIC STABILITY: HOUSING INSECURITY: UNSAFE APPLIANCES: 0

## 2018-06-26 SDOH — ECONOMIC STABILITY: HOUSING INSECURITY: UNSAFE COOKING RANGE AREA: 0

## 2018-06-26 ASSESSMENT — ENCOUNTER SYMPTOMS
STOOL FREQUENCY: MORE THAN TWICE DAILY
LAST BOWEL MOVEMENT: 64824
BOWEL PATTERN NORMAL: 1
CONTUSION: 1
FATIGUE: 1

## 2018-06-27 PROCEDURE — S9126 HOSPICE CARE, IN THE HOME, P: HCPCS

## 2018-06-28 PROCEDURE — S9126 HOSPICE CARE, IN THE HOME, P: HCPCS

## 2018-06-29 ENCOUNTER — HOME CARE VISIT (OUTPATIENT)
Dept: HOSPICE | Facility: HOSPICE | Age: 83
End: 2018-06-29
Payer: MEDICARE

## 2018-06-29 VITALS
DIASTOLIC BLOOD PRESSURE: 71 MMHG | RESPIRATION RATE: 18 BRPM | OXYGEN SATURATION: 97 % | SYSTOLIC BLOOD PRESSURE: 174 MMHG | HEART RATE: 47 BPM

## 2018-06-29 PROCEDURE — G0299 HHS/HOSPICE OF RN EA 15 MIN: HCPCS

## 2018-06-29 PROCEDURE — S9126 HOSPICE CARE, IN THE HOME, P: HCPCS

## 2018-06-29 SDOH — ECONOMIC STABILITY: HOUSING INSECURITY: UNSAFE APPLIANCES: 0

## 2018-06-29 SDOH — ECONOMIC STABILITY: HOUSING INSECURITY: UNSAFE COOKING RANGE AREA: 0

## 2018-06-29 ASSESSMENT — ENCOUNTER SYMPTOMS
BOWEL PATTERN NORMAL: 1
LAST BOWEL MOVEMENT: 64827
STOOL FREQUENCY: DAILY

## 2018-06-30 PROCEDURE — S9126 HOSPICE CARE, IN THE HOME, P: HCPCS

## 2018-07-01 PROCEDURE — S9126 HOSPICE CARE, IN THE HOME, P: HCPCS

## 2018-07-02 PROCEDURE — S9126 HOSPICE CARE, IN THE HOME, P: HCPCS

## 2018-07-03 ENCOUNTER — HOME CARE VISIT (OUTPATIENT)
Dept: HOSPICE | Facility: HOSPICE | Age: 83
End: 2018-07-03
Payer: MEDICARE

## 2018-07-03 PROCEDURE — S9126 HOSPICE CARE, IN THE HOME, P: HCPCS

## 2018-07-04 PROCEDURE — S9126 HOSPICE CARE, IN THE HOME, P: HCPCS

## 2018-07-05 PROCEDURE — S9126 HOSPICE CARE, IN THE HOME, P: HCPCS

## 2018-07-06 ENCOUNTER — HOME CARE VISIT (OUTPATIENT)
Dept: HOSPICE | Facility: HOSPICE | Age: 83
End: 2018-07-06
Payer: MEDICARE

## 2018-07-06 VITALS
OXYGEN SATURATION: 96 % | HEART RATE: 47 BPM | DIASTOLIC BLOOD PRESSURE: 66 MMHG | SYSTOLIC BLOOD PRESSURE: 145 MMHG | RESPIRATION RATE: 20 BRPM

## 2018-07-06 PROCEDURE — S9126 HOSPICE CARE, IN THE HOME, P: HCPCS

## 2018-07-06 PROCEDURE — G0299 HHS/HOSPICE OF RN EA 15 MIN: HCPCS

## 2018-07-06 SDOH — ECONOMIC STABILITY: HOUSING INSECURITY: UNSAFE COOKING RANGE AREA: 0

## 2018-07-06 SDOH — ECONOMIC STABILITY: HOUSING INSECURITY: UNSAFE APPLIANCES: 0

## 2018-07-06 ASSESSMENT — ENCOUNTER SYMPTOMS
STOOL FREQUENCY: LESS THAN DAILY
LAST BOWEL MOVEMENT: 64834
FATIGUE: 1
BOWEL PATTERN NORMAL: 1

## 2018-07-07 PROCEDURE — S9126 HOSPICE CARE, IN THE HOME, P: HCPCS

## 2018-07-08 PROCEDURE — S9126 HOSPICE CARE, IN THE HOME, P: HCPCS

## 2018-07-09 ENCOUNTER — TELEPHONE (OUTPATIENT)
Dept: HOSPITALIST | Facility: MEDICAL CENTER | Age: 83
End: 2018-07-09

## 2018-07-09 PROCEDURE — S9126 HOSPICE CARE, IN THE HOME, P: HCPCS

## 2018-07-09 RX ORDER — TERAZOSIN 10 MG/1
10 CAPSULE ORAL DAILY
Qty: 90 CAP | Refills: 1 | Status: SHIPPED | OUTPATIENT
Start: 2018-07-09 | End: 2019-01-31 | Stop reason: SDUPTHER

## 2018-07-09 NOTE — TELEPHONE ENCOUNTER
----- Message from Deborah Bloom R.N. sent at 7/6/2018  4:32 PM PDT -----  Regarding: REFILLTERAZOSIN   TERAZOSIN 10 MG PO Q DAILY.    PT WANTS 90 DAY SUPPLY THROUGH [facility].

## 2018-07-10 ENCOUNTER — HOME CARE VISIT (OUTPATIENT)
Dept: HOSPICE | Facility: HOSPICE | Age: 83
End: 2018-07-10
Payer: MEDICARE

## 2018-07-10 VITALS
HEART RATE: 57 BPM | SYSTOLIC BLOOD PRESSURE: 166 MMHG | RESPIRATION RATE: 18 BRPM | OXYGEN SATURATION: 96 % | DIASTOLIC BLOOD PRESSURE: 92 MMHG

## 2018-07-10 PROCEDURE — G0299 HHS/HOSPICE OF RN EA 15 MIN: HCPCS

## 2018-07-10 PROCEDURE — S9126 HOSPICE CARE, IN THE HOME, P: HCPCS

## 2018-07-10 SDOH — ECONOMIC STABILITY: HOUSING INSECURITY: UNSAFE COOKING RANGE AREA: 0

## 2018-07-10 SDOH — ECONOMIC STABILITY: GENERAL

## 2018-07-10 SDOH — ECONOMIC STABILITY: HOUSING INSECURITY: UNSAFE APPLIANCES: 0

## 2018-07-10 ASSESSMENT — ENCOUNTER SYMPTOMS
CONTUSION: 1
SLEEP QUALITY: FAIR
STOOL FREQUENCY: LESS THAN DAILY
LAST BOWEL MOVEMENT: 64837
BOWEL PATTERN NORMAL: 1

## 2018-07-10 ASSESSMENT — SOCIAL DETERMINANTS OF HEALTH (SDOH)
ACTIVE STRESSOR - EXHAUSTION: 1
ACTIVE STRESSOR - HEALTH CHANGES: 1

## 2018-07-10 ASSESSMENT — ACTIVITIES OF DAILY LIVING (ADL): MONEY MANAGEMENT (EXPENSES/BILLS): INDEPENDENT

## 2018-07-11 ENCOUNTER — PATIENT MESSAGE (OUTPATIENT)
Dept: HEALTH INFORMATION MANAGEMENT | Facility: OTHER | Age: 83
End: 2018-07-11

## 2018-07-11 PROCEDURE — S9126 HOSPICE CARE, IN THE HOME, P: HCPCS

## 2018-07-12 PROCEDURE — S9126 HOSPICE CARE, IN THE HOME, P: HCPCS

## 2018-07-13 ENCOUNTER — HOME CARE VISIT (OUTPATIENT)
Dept: HOSPICE | Facility: HOSPICE | Age: 83
End: 2018-07-13
Payer: MEDICARE

## 2018-07-13 VITALS
DIASTOLIC BLOOD PRESSURE: 83 MMHG | HEART RATE: 53 BPM | RESPIRATION RATE: 20 BRPM | SYSTOLIC BLOOD PRESSURE: 176 MMHG | OXYGEN SATURATION: 97 %

## 2018-07-13 PROCEDURE — S9126 HOSPICE CARE, IN THE HOME, P: HCPCS

## 2018-07-13 PROCEDURE — G0299 HHS/HOSPICE OF RN EA 15 MIN: HCPCS

## 2018-07-13 ASSESSMENT — ENCOUNTER SYMPTOMS
STOOL FREQUENCY: DAILY
LAST BOWEL MOVEMENT: 64842
BOWEL PATTERN NORMAL: 1

## 2018-07-14 PROCEDURE — S9126 HOSPICE CARE, IN THE HOME, P: HCPCS

## 2018-07-15 PROCEDURE — S9126 HOSPICE CARE, IN THE HOME, P: HCPCS

## 2018-07-16 PROCEDURE — S9126 HOSPICE CARE, IN THE HOME, P: HCPCS

## 2018-07-17 ENCOUNTER — HOME CARE VISIT (OUTPATIENT)
Dept: HOSPICE | Facility: HOSPICE | Age: 83
End: 2018-07-17
Payer: MEDICARE

## 2018-07-17 VITALS
RESPIRATION RATE: 18 BRPM | SYSTOLIC BLOOD PRESSURE: 159 MMHG | OXYGEN SATURATION: 97 % | DIASTOLIC BLOOD PRESSURE: 93 MMHG | HEART RATE: 51 BPM

## 2018-07-17 PROCEDURE — G0299 HHS/HOSPICE OF RN EA 15 MIN: HCPCS

## 2018-07-17 PROCEDURE — S9126 HOSPICE CARE, IN THE HOME, P: HCPCS

## 2018-07-17 SDOH — ECONOMIC STABILITY: HOUSING INSECURITY: UNSAFE APPLIANCES: 0

## 2018-07-17 SDOH — ECONOMIC STABILITY: HOUSING INSECURITY: UNSAFE COOKING RANGE AREA: 0

## 2018-07-17 ASSESSMENT — ENCOUNTER SYMPTOMS
LAST BOWEL MOVEMENT: 64845
STOOL FREQUENCY: LESS THAN DAILY
SLEEP QUALITY: ADEQUATE
BOWEL PATTERN NORMAL: 1

## 2018-07-17 ASSESSMENT — SOCIAL DETERMINANTS OF HEALTH (SDOH): ACTIVE STRESSOR - HEALTH CHANGES: 1

## 2018-07-18 PROCEDURE — S9126 HOSPICE CARE, IN THE HOME, P: HCPCS

## 2018-07-19 PROCEDURE — S9126 HOSPICE CARE, IN THE HOME, P: HCPCS

## 2018-07-20 ENCOUNTER — HOME CARE VISIT (OUTPATIENT)
Dept: HOSPICE | Facility: HOSPICE | Age: 83
End: 2018-07-20
Payer: MEDICARE

## 2018-07-20 VITALS
OXYGEN SATURATION: 97 % | HEART RATE: 52 BPM | SYSTOLIC BLOOD PRESSURE: 161 MMHG | DIASTOLIC BLOOD PRESSURE: 66 MMHG | RESPIRATION RATE: 18 BRPM

## 2018-07-20 DIAGNOSIS — I50.20 ACC/AHA STAGE C SYSTOLIC HEART FAILURE (HCC): ICD-10-CM

## 2018-07-20 DIAGNOSIS — E78.5 DYSLIPIDEMIA: ICD-10-CM

## 2018-07-20 DIAGNOSIS — I73.9 PAD (PERIPHERAL ARTERY DISEASE) (HCC): ICD-10-CM

## 2018-07-20 DIAGNOSIS — N18.30 CKD (CHRONIC KIDNEY DISEASE) STAGE 3, GFR 30-59 ML/MIN (HCC): ICD-10-CM

## 2018-07-20 PROCEDURE — S9126 HOSPICE CARE, IN THE HOME, P: HCPCS

## 2018-07-20 PROCEDURE — G0299 HHS/HOSPICE OF RN EA 15 MIN: HCPCS

## 2018-07-20 RX ORDER — CARVEDILOL 12.5 MG/1
12.5 TABLET ORAL 2 TIMES DAILY WITH MEALS
Qty: 180 TAB | Refills: 0 | Status: SHIPPED | OUTPATIENT
Start: 2018-07-20 | End: 2019-02-14

## 2018-07-20 SDOH — ECONOMIC STABILITY: HOUSING INSECURITY: UNSAFE COOKING RANGE AREA: 0

## 2018-07-20 SDOH — ECONOMIC STABILITY: HOUSING INSECURITY: UNSAFE APPLIANCES: 0

## 2018-07-20 ASSESSMENT — ENCOUNTER SYMPTOMS
LAST BOWEL MOVEMENT: 64848
STOOL FREQUENCY: TWICE DAILY
BOWEL PATTERN NORMAL: 1

## 2018-07-21 PROCEDURE — S9126 HOSPICE CARE, IN THE HOME, P: HCPCS

## 2018-07-22 PROCEDURE — S9126 HOSPICE CARE, IN THE HOME, P: HCPCS

## 2018-07-23 PROCEDURE — S9126 HOSPICE CARE, IN THE HOME, P: HCPCS

## 2018-07-24 PROCEDURE — S9126 HOSPICE CARE, IN THE HOME, P: HCPCS

## 2018-07-25 PROCEDURE — S9126 HOSPICE CARE, IN THE HOME, P: HCPCS

## 2018-07-26 PROCEDURE — S9126 HOSPICE CARE, IN THE HOME, P: HCPCS

## 2018-07-27 ENCOUNTER — HOME CARE VISIT (OUTPATIENT)
Dept: HOSPICE | Facility: HOSPICE | Age: 83
End: 2018-07-27
Payer: MEDICARE

## 2018-07-27 PROCEDURE — G0299 HHS/HOSPICE OF RN EA 15 MIN: HCPCS

## 2018-07-27 PROCEDURE — S9126 HOSPICE CARE, IN THE HOME, P: HCPCS

## 2018-07-28 PROCEDURE — S9126 HOSPICE CARE, IN THE HOME, P: HCPCS

## 2018-07-29 PROCEDURE — S9126 HOSPICE CARE, IN THE HOME, P: HCPCS

## 2018-07-30 VITALS
RESPIRATION RATE: 20 BRPM | OXYGEN SATURATION: 94 % | DIASTOLIC BLOOD PRESSURE: 62 MMHG | HEART RATE: 51 BPM | SYSTOLIC BLOOD PRESSURE: 161 MMHG

## 2018-07-30 PROCEDURE — S9126 HOSPICE CARE, IN THE HOME, P: HCPCS

## 2018-07-30 SDOH — ECONOMIC STABILITY: HOUSING INSECURITY: UNSAFE APPLIANCES: 0

## 2018-07-30 SDOH — ECONOMIC STABILITY: HOUSING INSECURITY: UNSAFE COOKING RANGE AREA: 0

## 2018-07-30 ASSESSMENT — ENCOUNTER SYMPTOMS
STOOL FREQUENCY: TWICE DAILY
BOWEL PATTERN NORMAL: 1
LAST BOWEL MOVEMENT: 64855
SOMNOLENCE: 1
FATIGUE: 1
DEPRESSED MOOD: 1

## 2018-07-31 PROCEDURE — S9126 HOSPICE CARE, IN THE HOME, P: HCPCS

## 2018-08-01 PROCEDURE — S9126 HOSPICE CARE, IN THE HOME, P: HCPCS

## 2018-08-02 PROCEDURE — S9126 HOSPICE CARE, IN THE HOME, P: HCPCS

## 2018-08-03 ENCOUNTER — HOME CARE VISIT (OUTPATIENT)
Dept: HOSPICE | Facility: HOSPICE | Age: 83
End: 2018-08-03
Payer: MEDICARE

## 2018-08-03 VITALS
OXYGEN SATURATION: 98 % | HEART RATE: 49 BPM | RESPIRATION RATE: 20 BRPM | SYSTOLIC BLOOD PRESSURE: 138 MMHG | DIASTOLIC BLOOD PRESSURE: 75 MMHG

## 2018-08-03 PROCEDURE — G0299 HHS/HOSPICE OF RN EA 15 MIN: HCPCS

## 2018-08-03 PROCEDURE — S9126 HOSPICE CARE, IN THE HOME, P: HCPCS

## 2018-08-03 SDOH — ECONOMIC STABILITY: HOUSING INSECURITY: UNSAFE APPLIANCES: 0

## 2018-08-03 SDOH — ECONOMIC STABILITY: HOUSING INSECURITY: UNSAFE COOKING RANGE AREA: 0

## 2018-08-03 ASSESSMENT — ENCOUNTER SYMPTOMS
STOOL FREQUENCY: LESS THAN DAILY
LAST BOWEL MOVEMENT: 64862
BOWEL PATTERN NORMAL: 1

## 2018-08-04 PROCEDURE — S9126 HOSPICE CARE, IN THE HOME, P: HCPCS

## 2018-08-05 PROCEDURE — S9126 HOSPICE CARE, IN THE HOME, P: HCPCS

## 2018-08-06 PROCEDURE — S9126 HOSPICE CARE, IN THE HOME, P: HCPCS

## 2018-08-07 ENCOUNTER — APPOINTMENT (OUTPATIENT)
Dept: HOSPICE | Facility: HOSPICE | Age: 83
End: 2018-08-07
Payer: MEDICARE

## 2018-08-07 ENCOUNTER — HOSPITAL ENCOUNTER (OUTPATIENT)
Facility: MEDICAL CENTER | Age: 83
End: 2018-08-07
Attending: INTERNAL MEDICINE
Payer: COMMERCIAL

## 2018-08-07 LAB
APPEARANCE UR: ABNORMAL
BACTERIA #/AREA URNS HPF: NEGATIVE /HPF
BILIRUB UR QL STRIP.AUTO: NEGATIVE
COLOR UR: YELLOW
EPI CELLS #/AREA URNS HPF: ABNORMAL /HPF
GLUCOSE UR STRIP.AUTO-MCNC: NEGATIVE MG/DL
HYALINE CASTS #/AREA URNS LPF: ABNORMAL /LPF
KETONES UR STRIP.AUTO-MCNC: NEGATIVE MG/DL
LEUKOCYTE ESTERASE UR QL STRIP.AUTO: ABNORMAL
MICRO URNS: ABNORMAL
NITRITE UR QL STRIP.AUTO: NEGATIVE
PH UR STRIP.AUTO: 5.5 [PH]
PROT UR QL STRIP: 100 MG/DL
RBC # URNS HPF: ABNORMAL /HPF
RBC UR QL AUTO: ABNORMAL
SP GR UR STRIP.AUTO: 1.01
UROBILINOGEN UR STRIP.AUTO-MCNC: 0.2 MG/DL
WBC #/AREA URNS HPF: ABNORMAL /HPF

## 2018-08-07 PROCEDURE — S9126 HOSPICE CARE, IN THE HOME, P: HCPCS

## 2018-08-07 PROCEDURE — 81001 URINALYSIS AUTO W/SCOPE: CPT

## 2018-08-07 PROCEDURE — 6650990 HC HOSPICE AND HOME CARE RX REV CODE 0250

## 2018-08-07 PROCEDURE — 87086 URINE CULTURE/COLONY COUNT: CPT

## 2018-08-08 PROCEDURE — S9126 HOSPICE CARE, IN THE HOME, P: HCPCS

## 2018-08-09 LAB
BACTERIA UR CULT: NORMAL
SIGNIFICANT IND 70042: NORMAL
SITE SITE: NORMAL
SOURCE SOURCE: NORMAL

## 2018-08-09 PROCEDURE — S9126 HOSPICE CARE, IN THE HOME, P: HCPCS

## 2018-08-10 ENCOUNTER — HOME CARE VISIT (OUTPATIENT)
Dept: HOSPICE | Facility: HOSPICE | Age: 83
End: 2018-08-10
Payer: MEDICARE

## 2018-08-10 VITALS
HEART RATE: 59 BPM | OXYGEN SATURATION: 96 % | DIASTOLIC BLOOD PRESSURE: 93 MMHG | SYSTOLIC BLOOD PRESSURE: 112 MMHG | RESPIRATION RATE: 18 BRPM

## 2018-08-10 PROCEDURE — G0299 HHS/HOSPICE OF RN EA 15 MIN: HCPCS

## 2018-08-10 PROCEDURE — S9126 HOSPICE CARE, IN THE HOME, P: HCPCS

## 2018-08-10 SDOH — ECONOMIC STABILITY: HOUSING INSECURITY: UNSAFE APPLIANCES: 0

## 2018-08-10 SDOH — ECONOMIC STABILITY: HOUSING INSECURITY: UNSAFE COOKING RANGE AREA: 0

## 2018-08-10 ASSESSMENT — ENCOUNTER SYMPTOMS
STOOL FREQUENCY: DAILY
LAST BOWEL MOVEMENT: 64870
BOWEL PATTERN NORMAL: 1

## 2018-08-11 PROCEDURE — S9126 HOSPICE CARE, IN THE HOME, P: HCPCS

## 2018-08-12 PROCEDURE — S9126 HOSPICE CARE, IN THE HOME, P: HCPCS

## 2018-08-13 PROCEDURE — S9126 HOSPICE CARE, IN THE HOME, P: HCPCS

## 2018-08-14 ENCOUNTER — APPOINTMENT (OUTPATIENT)
Dept: HOSPICE | Facility: HOSPICE | Age: 83
End: 2018-08-14
Payer: MEDICARE

## 2018-08-14 ENCOUNTER — OFFICE VISIT (OUTPATIENT)
Dept: MEDICAL GROUP | Facility: MEDICAL CENTER | Age: 83
End: 2018-08-14
Payer: MEDICARE

## 2018-08-14 VITALS
WEIGHT: 165.12 LBS | SYSTOLIC BLOOD PRESSURE: 132 MMHG | BODY MASS INDEX: 24.46 KG/M2 | DIASTOLIC BLOOD PRESSURE: 68 MMHG | HEIGHT: 69 IN | OXYGEN SATURATION: 95 % | HEART RATE: 54 BPM | RESPIRATION RATE: 20 BRPM | TEMPERATURE: 97.3 F

## 2018-08-14 DIAGNOSIS — I73.9 PERIPHERAL VASCULAR DISEASE (HCC): ICD-10-CM

## 2018-08-14 DIAGNOSIS — I10 ESSENTIAL HYPERTENSION: ICD-10-CM

## 2018-08-14 DIAGNOSIS — N18.4 CKD (CHRONIC KIDNEY DISEASE) STAGE 4, GFR 15-29 ML/MIN (HCC): ICD-10-CM

## 2018-08-14 DIAGNOSIS — I20.9 ANGINA PECTORIS (HCC): ICD-10-CM

## 2018-08-14 PROCEDURE — S9126 HOSPICE CARE, IN THE HOME, P: HCPCS

## 2018-08-14 PROCEDURE — 99214 OFFICE O/P EST MOD 30 MIN: CPT | Mod: GW | Performed by: INTERNAL MEDICINE

## 2018-08-14 RX ORDER — ALLOPURINOL 100 MG/1
100 TABLET ORAL DAILY
Qty: 90 TAB | Refills: 1 | Status: SHIPPED | OUTPATIENT
Start: 2018-08-14 | End: 2018-08-14 | Stop reason: SDUPTHER

## 2018-08-14 RX ORDER — ALLOPURINOL 100 MG/1
100 TABLET ORAL DAILY
Qty: 90 TAB | Refills: 1 | Status: SHIPPED | OUTPATIENT
Start: 2018-08-14 | End: 2019-03-20 | Stop reason: SDUPTHER

## 2018-08-14 RX ORDER — ENALAPRIL MALEATE 10 MG/1
20 TABLET ORAL 2 TIMES DAILY
Qty: 180 TAB | Refills: 3 | Status: SHIPPED | OUTPATIENT
Start: 2018-08-14 | End: 2020-01-01

## 2018-08-14 NOTE — PROGRESS NOTES
Chief Complaint   Patient presents with   • Results     urine   • Medication Refill     allopurinol,enalapril     3 month follow-up    Chief complaint: Prostatism  HISTORY OF PRESENT ILLNESS: Patient is a 85 y.o. male patient who presents today to discuss the evaluation and management of:    Patient has been on hospice since his hospitalization in January.  At that time he had multiple problems including a new CVA, severe anemia, chronic kidney disease with GFR in the 20s, and angina.  The patient is doing better, however he remains home bound due to weakness and dyspnea.  His daughter who accompanies him today feels that he would do better staying on hospice.      1. Essential hypertension    Patient is requesting a refill on his enalapril.  He has been taking 20 mg twice daily, however we note that this is actually higher than the recommended maximum.  His blood pressure has been well controlled with this, however we will try to cut back to 110 mg tablet twice a day and monitor.    2. CKD (chronic kidney disease) stage 4, GFR 15-29 ml/min ([facility])    Last GFR 23 in January 2018.    3. Peripheral vascular disease (CMS-[facility])    On Plavix, complaining of pain in his legs with walking short distances.    4. Angina pectoris (CMS-[facility])    Patient gets frequent episodes of chest tightness while watching TV in the evenings.  He used to take morphine for this, however now he just takes a hit of his inhaler and the feeling subsides.    5.  BPH    Patient is on tamsulosin, finasteride, and terazosin.  He chronically has nocturia 3-4 times per night.  He does have catheters at home and can self catheterize if he feels like he is unable to urinate.  He has been complaining of pain in his prostate area and increased dysuria.  He had a urinalysis done 8 7 which revealed multiple WBCs, RBCs.  Interestingly, culture was negative but patient was started by his hospice physician on Cipro 500 mg twice daily for 30 days.  He does feel  somewhat better since starting the antibiotics.  He is taking a probiotic along with it.        Patient Active Problem List    Diagnosis Date Noted   • Angina pectoris (CMS-HCC) 11/05/2014     Priority: High   • CKD (chronic kidney disease) stage 4, GFR 15-29 ml/min ([facility]) 12/12/2017     Priority: Medium   • Gout 12/28/2017     Priority: Low   • Lung cancer ([facility]) 12/12/2017     Priority: Low   • Thrombocytopenia ([facility]) 12/12/2017     Priority: Low   • BPH (benign prostatic hyperplasia) 04/28/2017     Priority: Low   • Carotid artery stenosis 11/05/2014     Priority: Low   • COPD (chronic obstructive pulmonary disease) ([facility]) 10/31/2014     Priority: Low   • Peripheral vascular disease (CMS-HCC) 10/31/2014     Priority: Low   • HTN (hypertension) 05/01/2011     Priority: Low   • Left sided cerebral hemisphere cerebrovascular accident (CVA) ([facility]) 12/13/2017   • ACC/AHA stage C systolic heart failure ([facility]) 08/23/2017   • Type 2 diabetes mellitus without complication ([facility]) 04/28/2017        Allergies:Nkda [no known drug allergy]    Current meds including changes today  Current Outpatient Prescriptions   Medication Sig Dispense Refill   • enalapril (VASOTEC) 10 MG Tab Take 2 Tabs by mouth 2 times a day. 180 Tab 3   • allopurinol (ZYLOPRIM) 100 MG Tab Take 1 Tab by mouth every day. 90 Tab 1   • carvedilol (COREG) 12.5 MG Tab Take 1 Tab by mouth 2 times a day, with meals. 180 Tab 0   • clopidogrel (PLAVIX) 75 MG Tab Take 1 Tab by mouth every morning. 1 Tab 0   • finasteride (PROSCAR) 5 MG Tab Take 5 mg by mouth every day.     • tamsulosin (FLOMAX) 0.4 MG capsule Take 0.4 mg by mouth ONE-HALF HOUR AFTER BREAKFAST.     • Ferrous Sulfate (IRON) 325 (65 FE) MG TABS Take 1 Tab by mouth every morning.     • ciprofloxacin (CIPRO) 500 MG Tab Take 500 mg by mouth 2 times a day. FOR 30 DAYS     • terazosin (HYTRIN) 10 MG capsule Take 1 Cap by mouth every day. 90 Cap 1   • nitroglycerin (NITROSTAT) 0.4 MG SL Tab Place 0.4 mg under  "tongue. TAKE 1 TAB EVERY 5 MIN X 3 THEN CALL HOSPICE     • ALBUTEROL INH Inhale 108 mcg by mouth. 1-2 puffs every 4 -6 hours as needed     • TRAZODONE HCL PO Take 25 mg by mouth at bedtime as needed (as needed for sleep).     • lorazepam (LORAZEPAM INTENSOL) 2 MG/ML Conc Take 0.5 mg by mouth every four hours as needed (ANXIETY/ AGITATION).     • morphine (ROXANOL) 20 MG/ML Solution Take 5 mg by mouth every 2 hours as needed.     • sennosides-docusate sodium (SENOKOT-S) 8.6-50 MG tablet Take 1 Tab by mouth 2 times a day.     • bisacodyl (DULCOLAX) 10 MG Suppos Insert 10 mg in rectum as needed.     • atropine 1 % Solution Take 2 Drops by mouth every four hours as needed.     • Cholecalciferol (VITAMIN D) 2000 UNIT TABS Take 4,000 Units by mouth every day.       No current facility-administered medications for this visit.      Social History   Substance Use Topics   • Smoking status: Former Smoker     Packs/day: 2.00     Years: 40.00     Quit date: 1/1/1982   • Smokeless tobacco: Never Used      Comment: Hx smoking x 40 yrs. Quit 1983   • Alcohol use No     Social History     Social History Narrative   • No narrative on file       Family History   Problem Relation Age of Onset   • Heart Disease Father    • Non-contributory Neg Hx         \"unknown\"       Review of Systems:  No chest pain, No shortness of breath, No dyspnea on exertion  Gastrointestinal ROS: No abdominal pain, No nausea, vomiting, diarrhea, or constipation        Exam:      Blood pressure 132/68, pulse (!) 54, temperature 36.3 °C (97.3 °F), resp. rate 20, height 1.753 m (5' 9\"), weight 74.9 kg (165 lb 2 oz), SpO2 95 %.  General:  Well nourished, well developed male in NAD affect and mood within normal limits  Head is grossly normal.  Neck: Supple without adenopathy  Pulmonary: Clear to ausculation.  Normal effort. No rales, rhonchi, or wheezing.  Cardiovascular: Bradycardic  rate and rhythm without murmur.   Extremities: no clubbing, cyanosis, or " edema.  Rectal exam prostate is mildly enlarged, firm, not boggy, and mildly tender no nodules palpated  Neuro: moves all extremities symmetrically    Please note that this dictation was created using voice recognition software. I have made every reasonable attempt to correct obvious errors, but I expect that there are errors of grammar and possibly content that I did not discover before finalizing the note.    Assessment/Plan:  1. Essential hypertension      - enalapril (VASOTEC) 10 MG Tab; Take 1 Tabs by mouth 2 times a day.  Dispense: 180 Tab; Refill: 3    2. CKD (chronic kidney disease) stage 4, GFR 15-29 ml/min ([facility])    -Stable, as patient is on hospice will not repeat labs at this time.    3. Peripheral vascular disease (CMS-[facility])    Stable, continue Plavix    4. Angina pectoris (CMS-HCC)      5.  BPH    Patient on multiple meds, continue.  He has adequate refills.  At this time there is no indication for him to see urology, will continue the Cipro for 1 month course in the event that he does have prostatitis.      Followup: Return in about 3 months (around 11/14/2018).

## 2018-08-15 PROCEDURE — S9126 HOSPICE CARE, IN THE HOME, P: HCPCS

## 2018-08-16 PROCEDURE — S9126 HOSPICE CARE, IN THE HOME, P: HCPCS

## 2018-08-17 ENCOUNTER — HOME CARE VISIT (OUTPATIENT)
Dept: HOSPICE | Facility: HOSPICE | Age: 83
End: 2018-08-17
Payer: MEDICARE

## 2018-08-17 VITALS
RESPIRATION RATE: 20 BRPM | OXYGEN SATURATION: 97 % | SYSTOLIC BLOOD PRESSURE: 148 MMHG | HEART RATE: 50 BPM | DIASTOLIC BLOOD PRESSURE: 77 MMHG

## 2018-08-17 PROCEDURE — S9126 HOSPICE CARE, IN THE HOME, P: HCPCS

## 2018-08-17 PROCEDURE — G0299 HHS/HOSPICE OF RN EA 15 MIN: HCPCS

## 2018-08-17 SDOH — ECONOMIC STABILITY: HOUSING INSECURITY: UNSAFE COOKING RANGE AREA: 0

## 2018-08-17 SDOH — ECONOMIC STABILITY: HOUSING INSECURITY: UNSAFE APPLIANCES: 0

## 2018-08-17 ASSESSMENT — ENCOUNTER SYMPTOMS
LAST BOWEL MOVEMENT: 64877
STOOL FREQUENCY: LESS THAN DAILY
BOWEL PATTERN NORMAL: 1

## 2018-08-18 PROCEDURE — S9126 HOSPICE CARE, IN THE HOME, P: HCPCS

## 2018-08-19 PROCEDURE — S9126 HOSPICE CARE, IN THE HOME, P: HCPCS

## 2018-08-20 PROCEDURE — S9126 HOSPICE CARE, IN THE HOME, P: HCPCS

## 2018-08-21 ENCOUNTER — APPOINTMENT (OUTPATIENT)
Dept: HOSPICE | Facility: HOSPICE | Age: 83
End: 2018-08-21
Payer: MEDICARE

## 2018-08-21 PROCEDURE — S9126 HOSPICE CARE, IN THE HOME, P: HCPCS

## 2018-08-22 PROCEDURE — S9126 HOSPICE CARE, IN THE HOME, P: HCPCS

## 2018-08-23 ENCOUNTER — HOME CARE VISIT (OUTPATIENT)
Dept: HOSPICE | Facility: HOSPICE | Age: 83
End: 2018-08-23
Payer: MEDICARE

## 2018-08-23 PROCEDURE — G0299 HHS/HOSPICE OF RN EA 15 MIN: HCPCS

## 2018-08-23 PROCEDURE — S9126 HOSPICE CARE, IN THE HOME, P: HCPCS

## 2018-08-24 ENCOUNTER — APPOINTMENT (OUTPATIENT)
Dept: HOSPICE | Facility: HOSPICE | Age: 83
End: 2018-08-24
Payer: MEDICARE

## 2018-08-24 PROCEDURE — S9126 HOSPICE CARE, IN THE HOME, P: HCPCS

## 2018-08-25 PROCEDURE — S9126 HOSPICE CARE, IN THE HOME, P: HCPCS

## 2018-08-26 VITALS
HEART RATE: 50 BPM | BODY MASS INDEX: 24.38 KG/M2 | RESPIRATION RATE: 18 BRPM | WEIGHT: 165.06 LBS | SYSTOLIC BLOOD PRESSURE: 167 MMHG | DIASTOLIC BLOOD PRESSURE: 71 MMHG

## 2018-08-26 PROCEDURE — S9126 HOSPICE CARE, IN THE HOME, P: HCPCS

## 2018-08-26 SDOH — ECONOMIC STABILITY: HOUSING INSECURITY: UNSAFE COOKING RANGE AREA: 0

## 2018-08-26 SDOH — ECONOMIC STABILITY: HOUSING INSECURITY: UNSAFE APPLIANCES: 0

## 2018-08-26 ASSESSMENT — ENCOUNTER SYMPTOMS
CONTUSION: 1
DESCRIPTION OF MEMORY LOSS: SHORT TERM
FORGETFULNESS: 1
BOWEL PATTERN NORMAL: 1
FATIGUES EASILY: 1
STOOL FREQUENCY: LESS THAN DAILY
LAST BOWEL MOVEMENT: 64882
SLEEP QUALITY: ADEQUATE

## 2018-08-26 ASSESSMENT — ACTIVITIES OF DAILY LIVING (ADL): MONEY MANAGEMENT (EXPENSES/BILLS): INDEPENDENT

## 2018-08-26 ASSESSMENT — SOCIAL DETERMINANTS OF HEALTH (SDOH): ACTIVE STRESSOR - NO STRESS FACTORS: 1

## 2018-08-27 ENCOUNTER — TELEPHONE (OUTPATIENT)
Dept: CARDIOLOGY | Facility: MEDICAL CENTER | Age: 83
End: 2018-08-27

## 2018-08-27 PROCEDURE — S9126 HOSPICE CARE, IN THE HOME, P: HCPCS

## 2018-08-27 NOTE — TELEPHONE ENCOUNTER
Spoke to pt and he wants to wait until the person who helps him with his appointments is back in town at the end of this week. He did state he received the letter we sent him about scheduling and will hold on to it until he is ready to schedule.

## 2018-08-28 ENCOUNTER — APPOINTMENT (OUTPATIENT)
Dept: HOSPICE | Facility: HOSPICE | Age: 83
End: 2018-08-28
Payer: MEDICARE

## 2018-08-28 PROCEDURE — S9126 HOSPICE CARE, IN THE HOME, P: HCPCS

## 2018-08-29 PROCEDURE — S9126 HOSPICE CARE, IN THE HOME, P: HCPCS

## 2018-08-30 ENCOUNTER — HOME CARE VISIT (OUTPATIENT)
Dept: HOSPICE | Facility: HOSPICE | Age: 83
End: 2018-08-30
Payer: MEDICARE

## 2018-08-30 VITALS
WEIGHT: 165.06 LBS | RESPIRATION RATE: 18 BRPM | SYSTOLIC BLOOD PRESSURE: 155 MMHG | BODY MASS INDEX: 24.38 KG/M2 | HEART RATE: 53 BPM | DIASTOLIC BLOOD PRESSURE: 76 MMHG

## 2018-08-30 PROCEDURE — S9126 HOSPICE CARE, IN THE HOME, P: HCPCS

## 2018-08-30 PROCEDURE — G0299 HHS/HOSPICE OF RN EA 15 MIN: HCPCS

## 2018-08-30 SDOH — ECONOMIC STABILITY: HOUSING INSECURITY: UNSAFE COOKING RANGE AREA: 0

## 2018-08-30 SDOH — ECONOMIC STABILITY: HOUSING INSECURITY: UNSAFE APPLIANCES: 0

## 2018-08-30 ASSESSMENT — ENCOUNTER SYMPTOMS
SLEEP QUALITY: ADEQUATE
CONTUSION: 1
BOWEL PATTERN NORMAL: 1
LAST BOWEL MOVEMENT: 64890
FATIGUES EASILY: 1
FORGETFULNESS: 1
DIZZINESS: 1

## 2018-08-30 ASSESSMENT — ACTIVITIES OF DAILY LIVING (ADL): MONEY MANAGEMENT (EXPENSES/BILLS): INDEPENDENT

## 2018-08-30 ASSESSMENT — SOCIAL DETERMINANTS OF HEALTH (SDOH): ACTIVE STRESSOR - NO STRESS FACTORS: 1

## 2018-08-31 ENCOUNTER — APPOINTMENT (OUTPATIENT)
Dept: HOSPICE | Facility: HOSPICE | Age: 83
End: 2018-08-31
Payer: MEDICARE

## 2018-08-31 PROCEDURE — S9126 HOSPICE CARE, IN THE HOME, P: HCPCS

## 2018-09-01 PROCEDURE — S9126 HOSPICE CARE, IN THE HOME, P: HCPCS

## 2018-09-02 ENCOUNTER — HOME CARE VISIT (OUTPATIENT)
Dept: HOSPICE | Facility: HOSPICE | Age: 83
End: 2018-09-02
Payer: MEDICARE

## 2018-09-02 PROCEDURE — S9126 HOSPICE CARE, IN THE HOME, P: HCPCS

## 2018-09-03 PROCEDURE — S9126 HOSPICE CARE, IN THE HOME, P: HCPCS

## 2018-09-04 PROCEDURE — S9126 HOSPICE CARE, IN THE HOME, P: HCPCS

## 2018-09-05 PROCEDURE — S9126 HOSPICE CARE, IN THE HOME, P: HCPCS

## 2018-09-06 ENCOUNTER — HOME CARE VISIT (OUTPATIENT)
Dept: HOSPICE | Facility: HOSPICE | Age: 83
End: 2018-09-06
Payer: MEDICARE

## 2018-09-06 VITALS — HEART RATE: 53 BPM | SYSTOLIC BLOOD PRESSURE: 107 MMHG | RESPIRATION RATE: 18 BRPM | DIASTOLIC BLOOD PRESSURE: 54 MMHG

## 2018-09-06 PROCEDURE — G0299 HHS/HOSPICE OF RN EA 15 MIN: HCPCS

## 2018-09-06 PROCEDURE — S9126 HOSPICE CARE, IN THE HOME, P: HCPCS

## 2018-09-06 SDOH — ECONOMIC STABILITY: HOUSING INSECURITY: UNSAFE APPLIANCES: 0

## 2018-09-06 SDOH — ECONOMIC STABILITY: HOUSING INSECURITY: UNSAFE COOKING RANGE AREA: 0

## 2018-09-06 ASSESSMENT — ACTIVITIES OF DAILY LIVING (ADL): MONEY MANAGEMENT (EXPENSES/BILLS): INDEPENDENT

## 2018-09-07 PROCEDURE — S9126 HOSPICE CARE, IN THE HOME, P: HCPCS

## 2018-09-08 PROCEDURE — S9126 HOSPICE CARE, IN THE HOME, P: HCPCS

## 2018-09-09 PROCEDURE — S9126 HOSPICE CARE, IN THE HOME, P: HCPCS

## 2018-09-09 ASSESSMENT — ENCOUNTER SYMPTOMS
DIZZINESS: 1
LAST BOWEL MOVEMENT: 64896
BOWEL PATTERN NORMAL: 1
STOOL FREQUENCY: LESS THAN DAILY
CONTUSION: 1

## 2018-09-10 PROCEDURE — S9126 HOSPICE CARE, IN THE HOME, P: HCPCS

## 2018-09-11 PROCEDURE — S9126 HOSPICE CARE, IN THE HOME, P: HCPCS

## 2018-09-12 PROCEDURE — S9126 HOSPICE CARE, IN THE HOME, P: HCPCS

## 2018-09-13 ENCOUNTER — HOME CARE VISIT (OUTPATIENT)
Dept: HOSPICE | Facility: HOSPICE | Age: 83
End: 2018-09-13
Payer: MEDICARE

## 2018-09-13 PROCEDURE — G0299 HHS/HOSPICE OF RN EA 15 MIN: HCPCS

## 2018-09-13 PROCEDURE — S9126 HOSPICE CARE, IN THE HOME, P: HCPCS

## 2018-09-14 PROCEDURE — S9126 HOSPICE CARE, IN THE HOME, P: HCPCS

## 2018-09-15 VITALS — RESPIRATION RATE: 18 BRPM | HEART RATE: 72 BPM | DIASTOLIC BLOOD PRESSURE: 80 MMHG | SYSTOLIC BLOOD PRESSURE: 148 MMHG

## 2018-09-15 PROCEDURE — S9126 HOSPICE CARE, IN THE HOME, P: HCPCS

## 2018-09-15 SDOH — ECONOMIC STABILITY: HOUSING INSECURITY: UNSAFE COOKING RANGE AREA: 0

## 2018-09-15 SDOH — ECONOMIC STABILITY: HOUSING INSECURITY: UNSAFE APPLIANCES: 0

## 2018-09-15 SDOH — ECONOMIC STABILITY: GENERAL

## 2018-09-15 ASSESSMENT — ENCOUNTER SYMPTOMS
FATIGUES EASILY: 1
SLEEP QUALITY: ADEQUATE
BOWEL PATTERN NORMAL: 1
LAST BOWEL MOVEMENT: 64904
DIZZINESS: 1
STOOL FREQUENCY: DAILY

## 2018-09-15 ASSESSMENT — ACTIVITIES OF DAILY LIVING (ADL): MONEY MANAGEMENT (EXPENSES/BILLS): INDEPENDENT

## 2018-09-15 ASSESSMENT — SOCIAL DETERMINANTS OF HEALTH (SDOH): ACTIVE STRESSOR - NO STRESS FACTORS: 1

## 2018-09-16 PROCEDURE — S9126 HOSPICE CARE, IN THE HOME, P: HCPCS

## 2018-09-17 PROCEDURE — S9126 HOSPICE CARE, IN THE HOME, P: HCPCS

## 2018-09-18 PROCEDURE — S9126 HOSPICE CARE, IN THE HOME, P: HCPCS

## 2018-09-19 PROCEDURE — S9126 HOSPICE CARE, IN THE HOME, P: HCPCS

## 2018-09-20 ENCOUNTER — HOME CARE VISIT (OUTPATIENT)
Dept: HOSPICE | Facility: HOSPICE | Age: 83
End: 2018-09-20
Payer: MEDICARE

## 2018-09-20 PROCEDURE — G0299 HHS/HOSPICE OF RN EA 15 MIN: HCPCS

## 2018-09-20 PROCEDURE — S9126 HOSPICE CARE, IN THE HOME, P: HCPCS

## 2018-09-21 PROCEDURE — S9126 HOSPICE CARE, IN THE HOME, P: HCPCS

## 2018-09-22 PROCEDURE — S9126 HOSPICE CARE, IN THE HOME, P: HCPCS

## 2018-09-23 VITALS
HEART RATE: 53 BPM | DIASTOLIC BLOOD PRESSURE: 76 MMHG | WEIGHT: 165.06 LBS | RESPIRATION RATE: 20 BRPM | SYSTOLIC BLOOD PRESSURE: 155 MMHG | BODY MASS INDEX: 24.38 KG/M2

## 2018-09-23 PROCEDURE — S9126 HOSPICE CARE, IN THE HOME, P: HCPCS

## 2018-09-23 SDOH — ECONOMIC STABILITY: HOUSING INSECURITY: UNSAFE APPLIANCES: 0

## 2018-09-23 SDOH — ECONOMIC STABILITY: HOUSING INSECURITY: UNSAFE COOKING RANGE AREA: 0

## 2018-09-23 ASSESSMENT — ENCOUNTER SYMPTOMS
SLEEP QUALITY: ADEQUATE
DIZZINESS: 1
FATIGUES EASILY: 1
LAST BOWEL MOVEMENT: 64910

## 2018-09-23 ASSESSMENT — SOCIAL DETERMINANTS OF HEALTH (SDOH): ACTIVE STRESSOR - NO STRESS FACTORS: 1

## 2018-09-24 PROCEDURE — S9126 HOSPICE CARE, IN THE HOME, P: HCPCS

## 2018-09-25 PROCEDURE — S9126 HOSPICE CARE, IN THE HOME, P: HCPCS

## 2018-09-26 ENCOUNTER — HOSPITAL ENCOUNTER (OUTPATIENT)
Dept: LAB | Facility: MEDICAL CENTER | Age: 83
End: 2018-09-26
Attending: PHYSICIAN ASSISTANT
Payer: MEDICARE

## 2018-09-26 PROCEDURE — 87086 URINE CULTURE/COLONY COUNT: CPT

## 2018-09-26 PROCEDURE — S9126 HOSPICE CARE, IN THE HOME, P: HCPCS

## 2018-09-27 ENCOUNTER — HOME CARE VISIT (OUTPATIENT)
Dept: HOSPICE | Facility: HOSPICE | Age: 83
End: 2018-09-27
Payer: MEDICARE

## 2018-09-27 VITALS — RESPIRATION RATE: 16 BRPM | SYSTOLIC BLOOD PRESSURE: 108 MMHG | HEART RATE: 58 BPM | DIASTOLIC BLOOD PRESSURE: 68 MMHG

## 2018-09-27 LAB
AMBIGUOUS DTTM AMBI4: NORMAL
SIGNIFICANT IND 70042: NORMAL
SITE SITE: NORMAL
SOURCE SOURCE: NORMAL

## 2018-09-27 PROCEDURE — S9126 HOSPICE CARE, IN THE HOME, P: HCPCS

## 2018-09-27 PROCEDURE — G0299 HHS/HOSPICE OF RN EA 15 MIN: HCPCS

## 2018-09-27 SDOH — ECONOMIC STABILITY: HOUSING INSECURITY: UNSAFE APPLIANCES: 0

## 2018-09-27 SDOH — ECONOMIC STABILITY: HOUSING INSECURITY: UNSAFE COOKING RANGE AREA: 0

## 2018-09-27 ASSESSMENT — ENCOUNTER SYMPTOMS
DIZZINESS: 1
DIARRHEA: 1
STOOL FREQUENCY: DAILY
FATIGUES EASILY: 1
LAST BOWEL MOVEMENT: 64918

## 2018-09-28 DIAGNOSIS — I10 ESSENTIAL HYPERTENSION: Primary | ICD-10-CM

## 2018-09-28 PROCEDURE — S9126 HOSPICE CARE, IN THE HOME, P: HCPCS

## 2018-09-28 RX ORDER — CARVEDILOL 12.5 MG/1
12.5 TABLET ORAL 2 TIMES DAILY WITH MEALS
Qty: 180 TAB | Refills: 0 | Status: SHIPPED | OUTPATIENT
Start: 2018-09-28 | End: 2019-02-14

## 2018-09-29 LAB
BACTERIA UR CULT: NORMAL
SIGNIFICANT IND 70042: NORMAL
SITE SITE: NORMAL
SOURCE SOURCE: NORMAL

## 2018-09-29 PROCEDURE — S9126 HOSPICE CARE, IN THE HOME, P: HCPCS

## 2018-09-30 PROCEDURE — S9126 HOSPICE CARE, IN THE HOME, P: HCPCS

## 2018-10-01 PROCEDURE — S9126 HOSPICE CARE, IN THE HOME, P: HCPCS

## 2018-10-02 PROCEDURE — S9126 HOSPICE CARE, IN THE HOME, P: HCPCS

## 2018-10-03 ENCOUNTER — HOME CARE VISIT (OUTPATIENT)
Dept: HOSPICE | Facility: HOSPICE | Age: 83
End: 2018-10-03
Payer: MEDICARE

## 2018-10-03 PROCEDURE — G0299 HHS/HOSPICE OF RN EA 15 MIN: HCPCS

## 2018-10-03 PROCEDURE — S9126 HOSPICE CARE, IN THE HOME, P: HCPCS

## 2018-10-04 ENCOUNTER — APPOINTMENT (OUTPATIENT)
Dept: HOSPICE | Facility: HOSPICE | Age: 83
End: 2018-10-04
Payer: MEDICARE

## 2018-10-04 ENCOUNTER — TELEPHONE (OUTPATIENT)
Dept: MEDICAL GROUP | Facility: MEDICAL CENTER | Age: 83
End: 2018-10-04

## 2018-10-04 PROCEDURE — S9126 HOSPICE CARE, IN THE HOME, P: HCPCS

## 2018-10-05 ENCOUNTER — HOME CARE VISIT (OUTPATIENT)
Dept: HOSPICE | Facility: HOSPICE | Age: 83
End: 2018-10-05
Payer: MEDICARE

## 2018-10-05 PROCEDURE — S9126 HOSPICE CARE, IN THE HOME, P: HCPCS

## 2018-10-05 NOTE — TELEPHONE ENCOUNTER
Phone Number Called: [phone] (home)     Message: I received a refill request from [facility] pharmacy for Pt Verapamil. Called Pt to verify if he indeed is needing a refill as med notes report he is no longer taking it. Pt confirmed he is not requesting it. Dr.Sheehan jaida MONTANEZ.    Left Message for patient to call back: N\A

## 2018-11-13 ENCOUNTER — OFFICE VISIT (OUTPATIENT)
Dept: MEDICAL GROUP | Facility: MEDICAL CENTER | Age: 83
End: 2018-11-13
Payer: MEDICARE

## 2018-11-13 VITALS
SYSTOLIC BLOOD PRESSURE: 154 MMHG | WEIGHT: 171 LBS | RESPIRATION RATE: 20 BRPM | HEART RATE: 54 BPM | DIASTOLIC BLOOD PRESSURE: 76 MMHG | BODY MASS INDEX: 25.33 KG/M2 | HEIGHT: 69 IN | OXYGEN SATURATION: 97 % | TEMPERATURE: 97 F

## 2018-11-13 DIAGNOSIS — N40.1 BENIGN PROSTATIC HYPERPLASIA WITH LOWER URINARY TRACT SYMPTOMS, SYMPTOM DETAILS UNSPECIFIED: ICD-10-CM

## 2018-11-13 DIAGNOSIS — N18.4 CKD (CHRONIC KIDNEY DISEASE) STAGE 4, GFR 15-29 ML/MIN (HCC): ICD-10-CM

## 2018-11-13 DIAGNOSIS — I63.9 LEFT SIDED CEREBRAL HEMISPHERE CEREBROVASCULAR ACCIDENT (CVA) (HCC): ICD-10-CM

## 2018-11-13 DIAGNOSIS — J43.1 PANLOBULAR EMPHYSEMA (HCC): ICD-10-CM

## 2018-11-13 DIAGNOSIS — I50.20 ACC/AHA STAGE C SYSTOLIC HEART FAILURE (HCC): ICD-10-CM

## 2018-11-13 PROBLEM — E11.9 TYPE 2 DIABETES MELLITUS WITHOUT COMPLICATION (HCC): Status: RESOLVED | Noted: 2017-04-28 | Resolved: 2018-11-13

## 2018-11-13 PROCEDURE — 99214 OFFICE O/P EST MOD 30 MIN: CPT | Performed by: INTERNAL MEDICINE

## 2018-11-14 NOTE — PROGRESS NOTES
"Chief Complaint   Patient presents with   • Possible Stroke     had a \"possible mini stroke\" 3 weeks ago, speech impaired       HISTORY OF PRESENT ILLNESS: Patient is a 85 y.o. male patient who presents today to discuss the evaluation and management of:    3-month follow-up for patient previously on hospice, he was discharged about 6 weeks ago for failure to decline.  He has been doing reasonably well, still lives independently in a small apartment.  His daughters check in on him frequently.  He had an episode of slurred speech following being woken up from a deep sleep about 3 weeks ago.  This did resolve.  2      1. Panlobular emphysema ([facility])    Patient has an 80+ pack year smoking history, however he is not currently using any inhalers or oxygen.  Room air O2 sat was 97%.  He denies cough or shortness of breath.    2. CKD (chronic kidney disease) stage 4, GFR 15-29 ml/min ([facility])    Last blood work was in January 2018, at that time GFR was 23.    3. Benign prostatic hyperplasia with lower urinary tract symptoms, symptom details unspecified    This is patient's main complaint, he has increased urinary frequency with nocturia 6-10 times per night.  He is on multiple medications for BPH and has seen the urologist.  He was treated with long-term oral antibiotics for possible prostatitis without change in his symptoms..    4. ACC/AHA stage C systolic heart failure ([facility])    History of reduced ejection fraction, last echo revealed EF 50%.  He has no edema.  Patient is on Coreg 12.5 mg twice daily, he has had some mild dizzy spells, and is bradycardic in the office today.    5. Left sided cerebral hemisphere cerebrovascular accident (CVA) ([facility])    Patient had CVA in November of last year.  He has no significant sequelae.  He continues to take Plavix.        Patient Active Problem List    Diagnosis Date Noted   • Angina pectoris (CMS-HCC) 11/05/2014     Priority: High   • CKD (chronic kidney disease) stage 4, GFR 15-29 " ml/min ([facility]) 12/12/2017     Priority: Medium   • Gout 12/28/2017     Priority: Low   • Lung cancer ([facility]) 12/12/2017     Priority: Low   • Thrombocytopenia ([facility]) 12/12/2017     Priority: Low   • BPH (benign prostatic hyperplasia) 04/28/2017     Priority: Low   • Carotid artery stenosis 11/05/2014     Priority: Low   • COPD (chronic obstructive pulmonary disease) ([facility]) 10/31/2014     Priority: Low   • Peripheral vascular disease (CMS-[facility]) 10/31/2014     Priority: Low   • HTN (hypertension) 05/01/2011     Priority: Low   • Left sided cerebral hemisphere cerebrovascular accident (CVA) ([facility]) 12/13/2017   • ACC/AHA stage C systolic heart failure ([facility]) 08/23/2017        Allergies:Nkda [no known drug allergy]    Current meds including changes today  Current Outpatient Prescriptions   Medication Sig Dispense Refill   • carvedilol (COREG) 12.5 MG Tab Take 1 Tab by mouth 2 times a day, with meals. Needs to be seen for further refills. Thank you 180 Tab 0   • enalapril (VASOTEC) 10 MG Tab Take 2 Tabs by mouth 2 times a day. 180 Tab 3   • allopurinol (ZYLOPRIM) 100 MG Tab Take 1 Tab by mouth every day. 90 Tab 1   • carvedilol (COREG) 12.5 MG Tab Take 1 Tab by mouth 2 times a day, with meals. (Patient not taking: Reported on 8/17/2018) 180 Tab 0   • terazosin (HYTRIN) 10 MG capsule Take 1 Cap by mouth every day. (Patient not taking: Reported on 8/17/2018) 90 Cap 1   • nitroglycerin (NITROSTAT) 0.4 MG SL Tab Place 0.4 mg under tongue. TAKE 1 TAB EVERY 5 MIN X 3 THEN CALL HOSPICE     • lorazepam (LORAZEPAM INTENSOL) 2 MG/ML Conc Take 0.5 mg by mouth every four hours as needed (ANXIETY/ AGITATION).     • clopidogrel (PLAVIX) 75 MG Tab Take 1 Tab by mouth every morning. 1 Tab 0   • morphine (ROXANOL) 20 MG/ML Solution Take 5 mg by mouth every 2 hours as needed.     • sennosides-docusate sodium (SENOKOT-S) 8.6-50 MG tablet Take 1 Tab by mouth 2 times a day.     • bisacodyl (DULCOLAX) 10 MG Suppos Insert 10 mg in rectum as needed.    "  • atropine 1 % Solution Take 2 Drops by mouth every four hours as needed.     • finasteride (PROSCAR) 5 MG Tab Take 5 mg by mouth every day.     • tamsulosin (FLOMAX) 0.4 MG capsule Take 0.4 mg by mouth ONE-HALF HOUR AFTER BREAKFAST.     • Cholecalciferol (VITAMIN D) 2000 UNIT TABS Take 4,000 Units by mouth every day.     • Ferrous Sulfate (IRON) 325 (65 FE) MG TABS Take 1 Tab by mouth every morning.       No current facility-administered medications for this visit.      Social History   Substance Use Topics   • Smoking status: Former Smoker     Packs/day: 2.00     Years: 40.00     Quit date: 1/1/1982   • Smokeless tobacco: Never Used      Comment: Hx smoking x 40 yrs. Quit 1983   • Alcohol use No     Social History     Social History Narrative   • No narrative on file       Family History   Problem Relation Age of Onset   • Heart Disease Father    • Non-contributory Neg Hx         \"unknown\"       Review of Systems:  No chest pain, No shortness of breath, No dyspnea on exertion  Gastrointestinal ROS: No abdominal pain, No nausea, vomiting, diarrhea, or constipation        Exam:      Blood pressure 154/76, pulse (!) 54, temperature 36.1 °C (97 °F), temperature source Temporal, resp. rate 20, height 1.753 m (5' 9\"), weight 77.6 kg (171 lb), SpO2 97 %.  General:  Well nourished, well developed male in NAD affect and mood within normal limits  Head is grossly normal.  Neck: Supple without adenopathy  Pulmonary: Clear to ausculation.  Normal effort. No rales, rhonchi, or wheezing.  Cardiovascular: Regular rate and rhythm without murmur.   Extremities: no clubbing, cyanosis, or edema.  Neuro: moves all extremities symmetrically    Please note that this dictation was created using voice recognition software. I have made every reasonable attempt to correct obvious errors, but I expect that there are errors of grammar and possibly content that I did not discover before finalizing the note.    Assessment/Plan:  1. " Panlobular emphysema (HCC)    Stable, no medication or oxygen indicated.      2. CKD (chronic kidney disease) stage 4, GFR 15-29 ml/min (HCC)    Discussed getting lab work with patient and his daughter, at this time we elected not to check labs as it would not likely change our plan.    3. Benign prostatic hyperplasia with lower urinary tract symptoms, symptom details unspecified    Continue current medications, followed by urology.    4. ACC/AHA stage C systolic heart failure (HCC)    Will decrease Coreg to 1/2 tablet twice daily and see if this helps his dizziness.    5. Left sided cerebral hemisphere cerebrovascular accident (CVA) (HCC)    Continues on Plavix daily.    Patient declines flu shot      Followup: Return in about 3 months (around 2/13/2019).

## 2018-12-18 ENCOUNTER — HOSPITAL ENCOUNTER (OUTPATIENT)
Dept: LAB | Facility: MEDICAL CENTER | Age: 83
End: 2018-12-18
Attending: PHYSICIAN ASSISTANT
Payer: MEDICARE

## 2018-12-18 PROCEDURE — 87086 URINE CULTURE/COLONY COUNT: CPT

## 2018-12-18 PROCEDURE — 87106 FUNGI IDENTIFICATION YEAST: CPT

## 2018-12-19 LAB
AMBIGUOUS DTTM AMBI4: NORMAL
SIGNIFICANT IND 70042: NORMAL
SITE SITE: NORMAL
SOURCE SOURCE: NORMAL

## 2018-12-24 LAB
BACTERIA UR CULT: ABNORMAL
BACTERIA UR CULT: ABNORMAL
SIGNIFICANT IND 70042: ABNORMAL
SITE SITE: ABNORMAL
SOURCE SOURCE: ABNORMAL

## 2019-01-01 ENCOUNTER — TELEPHONE (OUTPATIENT)
Dept: MEDICAL GROUP | Facility: MEDICAL CENTER | Age: 84
End: 2019-01-01

## 2019-01-01 ENCOUNTER — PATIENT OUTREACH (OUTPATIENT)
Dept: HEALTH INFORMATION MANAGEMENT | Facility: OTHER | Age: 84
End: 2019-01-01

## 2019-01-01 ENCOUNTER — HOSPITAL ENCOUNTER (OUTPATIENT)
Dept: LAB | Facility: MEDICAL CENTER | Age: 84
End: 2019-10-14
Attending: FAMILY MEDICINE
Payer: MEDICARE

## 2019-01-01 ENCOUNTER — OFFICE VISIT (OUTPATIENT)
Dept: MEDICAL GROUP | Facility: MEDICAL CENTER | Age: 84
End: 2019-01-01
Payer: MEDICARE

## 2019-01-01 VITALS
HEART RATE: 54 BPM | BODY MASS INDEX: 24.35 KG/M2 | DIASTOLIC BLOOD PRESSURE: 88 MMHG | SYSTOLIC BLOOD PRESSURE: 144 MMHG | WEIGHT: 164.4 LBS | TEMPERATURE: 98.1 F | OXYGEN SATURATION: 96 % | RESPIRATION RATE: 14 BRPM | HEIGHT: 69 IN

## 2019-01-01 DIAGNOSIS — R35.89 POLYURIA: ICD-10-CM

## 2019-01-01 DIAGNOSIS — N40.1 BENIGN PROSTATIC HYPERPLASIA WITH LOWER URINARY TRACT SYMPTOMS, SYMPTOM DETAILS UNSPECIFIED: ICD-10-CM

## 2019-01-01 DIAGNOSIS — R42 LIGHTHEADEDNESS: ICD-10-CM

## 2019-01-01 DIAGNOSIS — R35.1 NOCTURIA: ICD-10-CM

## 2019-01-01 DIAGNOSIS — N18.4 CKD (CHRONIC KIDNEY DISEASE) STAGE 4, GFR 15-29 ML/MIN (HCC): ICD-10-CM

## 2019-01-01 DIAGNOSIS — Z79.899 POLYPHARMACY: ICD-10-CM

## 2019-01-01 LAB
APPEARANCE UR: ABNORMAL
BACTERIA #/AREA URNS HPF: NEGATIVE /HPF
BACTERIA UR CULT: NORMAL
BILIRUB UR QL STRIP.AUTO: NEGATIVE
COLOR UR: YELLOW
EPI CELLS #/AREA URNS HPF: NEGATIVE /HPF
GLUCOSE UR STRIP.AUTO-MCNC: NEGATIVE MG/DL
HYALINE CASTS #/AREA URNS LPF: ABNORMAL /LPF
KETONES UR STRIP.AUTO-MCNC: NEGATIVE MG/DL
LEUKOCYTE ESTERASE UR QL STRIP.AUTO: ABNORMAL
MICRO URNS: ABNORMAL
NITRITE UR QL STRIP.AUTO: NEGATIVE
PH UR STRIP.AUTO: 6 [PH] (ref 5–8)
PROT UR QL STRIP: 100 MG/DL
RBC # URNS HPF: ABNORMAL /HPF
RBC UR QL AUTO: ABNORMAL
SIGNIFICANT IND 70042: NORMAL
SITE SITE: NORMAL
SOURCE SOURCE: NORMAL
SP GR UR STRIP.AUTO: 1.02
UROBILINOGEN UR STRIP.AUTO-MCNC: 0.2 MG/DL
WBC #/AREA URNS HPF: ABNORMAL /HPF

## 2019-01-01 PROCEDURE — 87086 URINE CULTURE/COLONY COUNT: CPT

## 2019-01-01 PROCEDURE — 99214 OFFICE O/P EST MOD 30 MIN: CPT | Performed by: FAMILY MEDICINE

## 2019-01-01 PROCEDURE — 81001 URINALYSIS AUTO W/SCOPE: CPT

## 2019-01-01 RX ORDER — ALLOPURINOL 100 MG/1
TABLET ORAL
Qty: 90 TAB | Refills: 0 | Status: SHIPPED
Start: 2019-01-01 | End: 2020-01-01

## 2019-01-01 RX ORDER — TERAZOSIN 10 MG/1
10 CAPSULE ORAL DAILY
Qty: 90 CAP | Refills: 2 | Status: SHIPPED
Start: 2019-01-01 | End: 2020-01-01

## 2019-01-01 RX ORDER — TERAZOSIN 10 MG/1
10 CAPSULE ORAL DAILY
Qty: 90 CAP | Refills: 2 | Status: SHIPPED | OUTPATIENT
Start: 2019-01-01 | End: 2019-01-01 | Stop reason: SDUPTHER

## 2019-01-01 RX ORDER — ALLOPURINOL 100 MG/1
TABLET ORAL
Qty: 90 TAB | Refills: 0 | Status: SHIPPED | OUTPATIENT
Start: 2019-01-01 | End: 2019-01-01 | Stop reason: SDUPTHER

## 2019-01-01 RX ORDER — ATORVASTATIN CALCIUM 10 MG/1
TABLET, FILM COATED ORAL
COMMUNITY
Start: 2019-01-01 | End: 2020-01-01

## 2019-01-30 ENCOUNTER — HOSPITAL ENCOUNTER (OUTPATIENT)
Dept: LAB | Facility: MEDICAL CENTER | Age: 84
End: 2019-01-30
Attending: UROLOGY
Payer: MEDICARE

## 2019-01-30 LAB — PSA SERPL-MCNC: 23.4 NG/ML (ref 0–4)

## 2019-01-30 PROCEDURE — 36415 COLL VENOUS BLD VENIPUNCTURE: CPT

## 2019-01-30 PROCEDURE — 84153 ASSAY OF PSA TOTAL: CPT

## 2019-01-31 RX ORDER — TERAZOSIN 10 MG/1
CAPSULE ORAL
Qty: 90 CAP | Refills: 2 | Status: SHIPPED | OUTPATIENT
Start: 2019-01-31 | End: 2019-01-01 | Stop reason: SDUPTHER

## 2019-02-13 ENCOUNTER — TELEPHONE (OUTPATIENT)
Dept: MEDICAL GROUP | Facility: MEDICAL CENTER | Age: 84
End: 2019-02-13

## 2019-02-13 NOTE — TELEPHONE ENCOUNTER
ESTABLISHED PATIENT PRE-VISIT PLANNING     Patient was NOT contacted to complete PVP.     Note: Patient will not be contacted if there is no indication to call.     1.  Reviewed notes from the last few office visits within the medical group: Yes    2.  If any orders were placed at last visit or intended to be done for this visit (i.e. 6 mos follow-up), do we have Results/Consult Notes?        •  Labs - Labs were not ordered at last office visit.   Note: If patient appointment is for lab review and patient did not complete labs, check with provider if OK to reschedule patient until labs completed.       •  Imaging - Imaging was not ordered at last office visit.       •  Referrals - No referrals were ordered at last office visit.    3. Is this appointment scheduled as a Hospital Follow-Up? No    4.  Immunizations were updated in Epic using WebIZ?: No WebIZ record       5.  Patient is due for the following Health Maintenance Topics:   Health Maintenance Due   Topic Date Due   • Annual Wellness Visit  07/21/1933   • DIABETES MONOFILAMENT / LE EXAM  01/21/1934   • RETINAL SCREENING  07/21/1951   • IMM HEP B VACCINE (1 of 3 - Risk 3-dose series) 07/21/1952   • IMM DTaP/Tdap/Td Vaccine (1 - Tdap) 07/21/1952   • IMM ZOSTER VACCINES (1 of 2) 07/21/1983   • IMM PNEUMOCOCCAL 65+ (ADULT) HIGH/HIGHEST RISK SERIES (1 of 2 - PCV13) 07/21/1998   • PFT SCREENING-FEV1 AND FEV/FVC RATIO / SPIROMETRY SHOULD BE PERFORMED ANNUALLY  12/03/2009   • URINE ACR / MICROALBUMIN  02/08/2018   • A1C SCREENING  06/11/2018   • IMM INFLUENZA (1) 09/01/2018   • FASTING LIPID PROFILE  12/12/2018   • SERUM CREATININE  01/02/2019       6. Orders for overdue Health Maintenance topics pended in Pre-Charting? YES    7.  AHA (MDX) form printed for Provider? YES    8.  Patient was NOT informed to arrive 15 min prior to their scheduled appointment and bring in their medication bottles.

## 2019-02-14 ENCOUNTER — OFFICE VISIT (OUTPATIENT)
Dept: MEDICAL GROUP | Facility: MEDICAL CENTER | Age: 84
End: 2019-02-14
Payer: MEDICARE

## 2019-02-14 VITALS
OXYGEN SATURATION: 96 % | HEART RATE: 68 BPM | WEIGHT: 168 LBS | TEMPERATURE: 97.5 F | DIASTOLIC BLOOD PRESSURE: 52 MMHG | HEIGHT: 69 IN | RESPIRATION RATE: 20 BRPM | BODY MASS INDEX: 24.88 KG/M2 | SYSTOLIC BLOOD PRESSURE: 140 MMHG

## 2019-02-14 DIAGNOSIS — D50.0 IRON DEFICIENCY ANEMIA DUE TO CHRONIC BLOOD LOSS: ICD-10-CM

## 2019-02-14 DIAGNOSIS — I63.9 LEFT SIDED CEREBRAL HEMISPHERE CEREBROVASCULAR ACCIDENT (CVA) (HCC): ICD-10-CM

## 2019-02-14 DIAGNOSIS — J43.1 PANLOBULAR EMPHYSEMA (HCC): ICD-10-CM

## 2019-02-14 DIAGNOSIS — C34.31 MALIGNANT NEOPLASM OF LOWER LOBE OF RIGHT LUNG (HCC): ICD-10-CM

## 2019-02-14 DIAGNOSIS — N18.4 CKD (CHRONIC KIDNEY DISEASE) STAGE 4, GFR 15-29 ML/MIN (HCC): ICD-10-CM

## 2019-02-14 DIAGNOSIS — I73.9 PERIPHERAL VASCULAR DISEASE (HCC): ICD-10-CM

## 2019-02-14 DIAGNOSIS — N40.1 BENIGN PROSTATIC HYPERPLASIA WITH LOWER URINARY TRACT SYMPTOMS, SYMPTOM DETAILS UNSPECIFIED: ICD-10-CM

## 2019-02-14 DIAGNOSIS — I10 ESSENTIAL HYPERTENSION: ICD-10-CM

## 2019-02-14 PROBLEM — D69.6 THROMBOCYTOPENIA (HCC): Status: RESOLVED | Noted: 2017-12-12 | Resolved: 2019-02-14

## 2019-02-14 PROBLEM — I50.20 ACC/AHA STAGE C SYSTOLIC HEART FAILURE (HCC): Status: RESOLVED | Noted: 2017-08-23 | Resolved: 2019-02-14

## 2019-02-14 PROCEDURE — 8041 PR SCP AHA: Performed by: INTERNAL MEDICINE

## 2019-02-14 PROCEDURE — 99214 OFFICE O/P EST MOD 30 MIN: CPT | Mod: 25 | Performed by: INTERNAL MEDICINE

## 2019-02-14 RX ORDER — OXYBUTYNIN CHLORIDE 10 MG/1
TABLET, EXTENDED RELEASE ORAL
COMMUNITY
Start: 2019-02-12 | End: 2020-01-01

## 2019-02-14 RX ORDER — CARVEDILOL 12.5 MG/1
12.5 TABLET ORAL 2 TIMES DAILY WITH MEALS
COMMUNITY
End: 2019-03-26

## 2019-02-14 ASSESSMENT — PATIENT HEALTH QUESTIONNAIRE - PHQ9: CLINICAL INTERPRETATION OF PHQ2 SCORE: 0

## 2019-02-14 NOTE — PROGRESS NOTES
Chief Complaint   Patient presents with   • Blood in Urine     sees urologist in 2 weeks,pt wants second opinion     Chief complaint: Multiple chronic medical problems, 3-month follow-up    HISTORY OF PRESENT ILLNESS: Patient is a 85 y.o. male patient who presents today to discuss the evaluation and management of:      Annual Health Assessment Questions:    1.  Are you currently engaging in any exercise or physical activity? No limited by claudication    2.  How would you describe your mood or emotional well-being today? good    3.  Have you had any falls in the last year? No    4.  Have you noticed any problems with your balance or had difficulty walking? No    5.  In the last six months have you experienced any leakage of urine? No    6. DPA/Advanced Directive: Patient has Advanced Directive on file.     1. Peripheral vascular disease (HCC)    Patient continues to complain of claudication, he is only unable to walk 100 feet or so around his apartment before developing pain and cramping in his thighs and calves.  He continues on Plavix 75 mg daily.    2. Panlobular emphysema ([facility])    Patient remains fairly asymptomatic despite 80-pack-year smoking history.  He does not use oxygen, and is not short of breath, although his activity is limited by his claudication.    3. CKD (chronic kidney disease) stage 4, GFR 15-29 ml/min ([facility])    Patient with GFR in the 20s, last checked January 2018.  Has not been checked due to his hospice status.  He is willing to have monitored currently.      4. Benign prostatic hyperplasia with lower urinary tract symptoms, symptom details unspecified    On multiple medications, has nocturia but his symptoms are stable.  He recently saw urology who did a PSA which was unchanged.  They are proposing doing some kind of scope and possible biopsy.    5. Essential hypertension    When I saw patient last we decreased his Coreg to half of a 12.5 mg tablet twice daily due to bradycardia his heart  rate is normal now, and his BP remains controlled    6. Iron deficiency anemia due to chronic blood loss    Patient with history of chronic GI blood loss, unclear source.  When hospitalized last year with his stroke, his hemoglobin was 7.  They declined workup at that time.    7. Left sided cerebral hemisphere cerebrovascular accident (CVA) ([facility])    Little over a year ago.  Patient has no significant neurologic sequela    8. Malignant neoplasm of lower lobe of right lung (HCC)    Patient had 2.8 cm right lower lobe mass found on CT scan April 2017.  It was followed by a PET CT scan 6 months later, it did not have significant growth or change.  Patient does have heavy smoking history, however has declined further workup and evaluation.  He is asymptomatic.        Patient Active Problem List    Diagnosis Date Noted   • CKD (chronic kidney disease) stage 4, GFR 15-29 ml/min ([facility]) 12/12/2017     Priority: Medium   • Gout 12/28/2017     Priority: Low   • Lung cancer ([facility]) 12/12/2017     Priority: Low   • BPH (benign prostatic hyperplasia) 04/28/2017     Priority: Low   • COPD (chronic obstructive pulmonary disease) ([facility]) 10/31/2014     Priority: Low   • Peripheral vascular disease ([facility]) 10/31/2014     Priority: Low   • HTN (hypertension) 05/01/2011     Priority: Low   • Iron deficiency anemia due to chronic blood loss 02/14/2019   • Left sided cerebral hemisphere cerebrovascular accident (CVA) ([facility]) 12/13/2017        Allergies:Nkda [no known drug allergy]    Current meds including changes today  Current Outpatient Prescriptions   Medication Sig Dispense Refill   • carvedilol (COREG) 12.5 MG Tab Take 12.5 mg by mouth 2 times a day, with meals. Take 1/2 tab bid     • oxybutynin SR (DITROPAN-XL) 10 MG CR tablet      • terazosin (HYTRIN) 10 MG capsule TAKE 1 CAPSULE BY MOUTH DAILY 90 Cap 2   • enalapril (VASOTEC) 10 MG Tab Take 2 Tabs by mouth 2 times a day. 180 Tab 3   • allopurinol (ZYLOPRIM) 100 MG Tab Take 1 Tab by  "mouth every day. 90 Tab 1   • nitroglycerin (NITROSTAT) 0.4 MG SL Tab Place 0.4 mg under tongue. TAKE 1 TAB EVERY 5 MIN X 3 THEN CALL HOSPICE     • lorazepam (LORAZEPAM INTENSOL) 2 MG/ML Conc Take 0.5 mg by mouth every four hours as needed (ANXIETY/ AGITATION).     • clopidogrel (PLAVIX) 75 MG Tab Take 1 Tab by mouth every morning. 1 Tab 0   • morphine (ROXANOL) 20 MG/ML Solution Take 5 mg by mouth every 2 hours as needed.     • sennosides-docusate sodium (SENOKOT-S) 8.6-50 MG tablet Take 1 Tab by mouth 2 times a day.     • bisacodyl (DULCOLAX) 10 MG Suppos Insert 10 mg in rectum as needed.     • atropine 1 % Solution Take 2 Drops by mouth every four hours as needed.     • finasteride (PROSCAR) 5 MG Tab Take 5 mg by mouth every day.     • tamsulosin (FLOMAX) 0.4 MG capsule Take 0.4 mg by mouth ONE-HALF HOUR AFTER BREAKFAST.     • Cholecalciferol (VITAMIN D) 2000 UNIT TABS Take 4,000 Units by mouth every day.     • Ferrous Sulfate (IRON) 325 (65 FE) MG TABS Take 1 Tab by mouth every morning.       No current facility-administered medications for this visit.      Social History   Substance Use Topics   • Smoking status: Former Smoker     Packs/day: 2.00     Years: 40.00     Quit date: 1/1/1982   • Smokeless tobacco: Never Used      Comment: Hx smoking x 40 yrs. Quit 1983   • Alcohol use No     Social History     Social History Narrative   • No narrative on file       Family History   Problem Relation Age of Onset   • Heart Disease Father    • Non-contributory Neg Hx         \"unknown\"       Review of Systems:  No chest pain, No shortness of breath, No dyspnea on exertion  Gastrointestinal ROS: No abdominal pain, No nausea, vomiting, diarrhea, or constipation        Exam:      Blood pressure 140/52, pulse 68, temperature 36.4 °C (97.5 °F), temperature source Temporal, resp. rate 20, height 1.753 m (5' 9\"), weight 76.2 kg (168 lb), SpO2 96 %.  General: Pleasant elderly male in NAD affect and mood within normal limits.  " Accompanied by daughter.  Appropriate, conversant, good memory  Head is grossly normal.  Neck: Supple without adenopathy  Pulmonary: Clear to ausculation.  Normal effort. No rales, rhonchi, or wheezing.  Cardiovascular: Regular rate and rhythm 2/6 to 3/6 systolic murmur (known aortic valve calcification)  Extremities: no clubbing, cyanosis, or edema.  Neuro: moves all extremities symmetrically    Please note that this dictation was created using voice recognition software. I have made every reasonable attempt to correct obvious errors, but I expect that there are errors of grammar and possibly content that I did not discover before finalizing the note.    Assessment/Plan:  1. Peripheral vascular disease (HCC)    Patient is on Plavix, he is able to do what he needs to do walking around his house.    2. Panlobular emphysema (HCC)    -Stable, asymptomatic not on inhalers    3. CKD (chronic kidney disease) stage 4, GFR 15-29 ml/min ([facility])      - Comp Metabolic Panel; Future    4. Benign prostatic hyperplasia with lower urinary tract symptoms, symptom details unspecified    -Discussed with patient and daughter that I do not feel there is any need for further urologic workup.  He would not want treatment even if he had prostate cancer diagnosed.  At this time, it certainly would not be something that would be life limiting, and treatment could result in significant morbidity.    5. Essential hypertension    -Stable, continue current medication  - Comp Metabolic Panel; Future    6. Iron deficiency anemia due to chronic blood loss      - CBC WITHOUT DIFFERENTIAL; Future    7. Left sided cerebral hemisphere cerebrovascular accident (CVA) (HCC)    -No further events    8. Malignant neoplasm of lower lobe of right lung (HCC)    -At this time, patient declines further evaluation/workup and I concur.  He would not be a candidate for surgery or chemotherapy.    Patient also declines all vaccines including influenza.    Followup:  Return in about 4 months (around 6/14/2019).

## 2019-02-28 ENCOUNTER — HOSPITAL ENCOUNTER (OUTPATIENT)
Dept: LAB | Facility: MEDICAL CENTER | Age: 84
End: 2019-02-28
Attending: UROLOGY
Payer: MEDICARE

## 2019-02-28 PROCEDURE — 87086 URINE CULTURE/COLONY COUNT: CPT

## 2019-03-01 LAB
AMBIGUOUS DTTM AMBI4: NORMAL
SIGNIFICANT IND 70042: NORMAL
SITE SITE: NORMAL
SOURCE SOURCE: NORMAL

## 2019-03-03 LAB
BACTERIA UR CULT: NORMAL
SIGNIFICANT IND 70042: NORMAL
SITE SITE: NORMAL
SOURCE SOURCE: NORMAL

## 2019-03-08 ENCOUNTER — HOSPITAL ENCOUNTER (OUTPATIENT)
Dept: LAB | Facility: MEDICAL CENTER | Age: 84
End: 2019-03-08
Attending: INTERNAL MEDICINE
Payer: MEDICARE

## 2019-03-08 ENCOUNTER — HOSPITAL ENCOUNTER (OUTPATIENT)
Dept: LAB | Facility: MEDICAL CENTER | Age: 84
End: 2019-03-08
Attending: UROLOGY
Payer: MEDICARE

## 2019-03-08 DIAGNOSIS — I10 ESSENTIAL HYPERTENSION: ICD-10-CM

## 2019-03-08 DIAGNOSIS — D50.0 IRON DEFICIENCY ANEMIA DUE TO CHRONIC BLOOD LOSS: ICD-10-CM

## 2019-03-08 DIAGNOSIS — N18.4 CKD (CHRONIC KIDNEY DISEASE) STAGE 4, GFR 15-29 ML/MIN (HCC): ICD-10-CM

## 2019-03-08 LAB
ALBUMIN SERPL BCP-MCNC: 3.7 G/DL (ref 3.2–4.9)
ALBUMIN/GLOB SERPL: 1.1 G/DL
ALP SERPL-CCNC: 47 U/L (ref 30–99)
ALT SERPL-CCNC: 8 U/L (ref 2–50)
ANION GAP SERPL CALC-SCNC: 6 MMOL/L (ref 0–11.9)
AST SERPL-CCNC: 10 U/L (ref 12–45)
BILIRUB SERPL-MCNC: 0.4 MG/DL (ref 0.1–1.5)
BUN SERPL-MCNC: 55 MG/DL (ref 8–22)
CALCIUM SERPL-MCNC: 9.2 MG/DL (ref 8.5–10.5)
CHLORIDE SERPL-SCNC: 109 MMOL/L (ref 96–112)
CO2 SERPL-SCNC: 22 MMOL/L (ref 20–33)
CREAT SERPL-MCNC: 2.87 MG/DL (ref 0.5–1.4)
ERYTHROCYTE [DISTWIDTH] IN BLOOD BY AUTOMATED COUNT: 53.2 FL (ref 35.9–50)
GLOBULIN SER CALC-MCNC: 3.3 G/DL (ref 1.9–3.5)
GLUCOSE SERPL-MCNC: 127 MG/DL (ref 65–99)
HCT VFR BLD AUTO: 31.4 % (ref 42–52)
HGB BLD-MCNC: 9.5 G/DL (ref 14–18)
MCH RBC QN AUTO: 29.4 PG (ref 27–33)
MCHC RBC AUTO-ENTMCNC: 30.3 G/DL (ref 33.7–35.3)
MCV RBC AUTO: 97.2 FL (ref 81.4–97.8)
PLATELET # BLD AUTO: 174 K/UL (ref 164–446)
PMV BLD AUTO: 11.5 FL (ref 9–12.9)
POTASSIUM SERPL-SCNC: 5 MMOL/L (ref 3.6–5.5)
PROT SERPL-MCNC: 7 G/DL (ref 6–8.2)
RBC # BLD AUTO: 3.23 M/UL (ref 4.7–6.1)
SODIUM SERPL-SCNC: 137 MMOL/L (ref 135–145)
WBC # BLD AUTO: 7.5 K/UL (ref 4.8–10.8)

## 2019-03-08 PROCEDURE — 85027 COMPLETE CBC AUTOMATED: CPT

## 2019-03-08 PROCEDURE — 80053 COMPREHEN METABOLIC PANEL: CPT

## 2019-03-08 PROCEDURE — 87086 URINE CULTURE/COLONY COUNT: CPT

## 2019-03-08 PROCEDURE — 36415 COLL VENOUS BLD VENIPUNCTURE: CPT

## 2019-03-10 LAB
BACTERIA UR CULT: NORMAL
SIGNIFICANT IND 70042: NORMAL
SITE SITE: NORMAL
SOURCE SOURCE: NORMAL

## 2019-03-21 RX ORDER — ALLOPURINOL 100 MG/1
100 TABLET ORAL DAILY
Qty: 90 TAB | Refills: 0 | Status: SHIPPED | OUTPATIENT
Start: 2019-03-21 | End: 2019-01-01 | Stop reason: SDUPTHER

## 2019-03-26 ENCOUNTER — OFFICE VISIT (OUTPATIENT)
Dept: MEDICAL GROUP | Facility: MEDICAL CENTER | Age: 84
End: 2019-03-26
Payer: MEDICARE

## 2019-03-26 VITALS
RESPIRATION RATE: 20 BRPM | WEIGHT: 171.3 LBS | DIASTOLIC BLOOD PRESSURE: 80 MMHG | TEMPERATURE: 97.7 F | HEIGHT: 69 IN | BODY MASS INDEX: 25.37 KG/M2 | HEART RATE: 50 BPM | SYSTOLIC BLOOD PRESSURE: 140 MMHG | OXYGEN SATURATION: 96 %

## 2019-03-26 DIAGNOSIS — R26.89 IMBALANCE: ICD-10-CM

## 2019-03-26 DIAGNOSIS — I10 ESSENTIAL HYPERTENSION: ICD-10-CM

## 2019-03-26 DIAGNOSIS — I63.9 LEFT SIDED CEREBRAL HEMISPHERE CEREBROVASCULAR ACCIDENT (CVA) (HCC): ICD-10-CM

## 2019-03-26 DIAGNOSIS — N18.4 CKD (CHRONIC KIDNEY DISEASE) STAGE 4, GFR 15-29 ML/MIN (HCC): ICD-10-CM

## 2019-03-26 PROBLEM — D50.0 IRON DEFICIENCY ANEMIA DUE TO CHRONIC BLOOD LOSS: Status: RESOLVED | Noted: 2019-02-14 | Resolved: 2019-03-26

## 2019-03-26 PROCEDURE — 99214 OFFICE O/P EST MOD 30 MIN: CPT | Performed by: INTERNAL MEDICINE

## 2019-03-26 RX ORDER — CARVEDILOL 12.5 MG/1
12.5 TABLET ORAL 2 TIMES DAILY WITH MEALS
COMMUNITY
End: 2019-08-28 | Stop reason: SDUPTHER

## 2019-03-26 NOTE — PROGRESS NOTES
"Chief Complaint   Patient presents with   • Dizziness     \"Dizzy spells\" pt thinks it might be medication side effect      Chief complaint: Dizziness/imbalance times 1-2 weeks, patient was last seen 6 weeks ago.    HISTORY OF PRESENT ILLNESS: Patient is a 85 y.o. male patient who presents today to discuss the evaluation and management of:          1. Imbalance    Patient is complaining of imbalance, he was trying to hang a picture in the middle of the night last week when he lost his balance and fell off to the right side.  He did not fall to the ground and was able to catch himself.  He notes that when he walks, he sometimes lists a little to the side.  Patient did have a stroke about a year ago, and has had some residual.  He denies vertigo/spinning dizziness/lightheadedness as an presyncope.  His blood pressure is not low.    2. CKD (chronic kidney disease) stage 4, GFR 15-29 ml/min ([facility])    Results for MAHIN CENTENO (MRN [id number]) as of 3/26/2019 14:07   Ref. Range 3/8/2019 08:27   Bun Latest Ref Range: 8 - 22 mg/dL 55 (H)   Creatinine Latest Ref Range: 0.50 - 1.40 mg/dL 2.87 (H)   GFR If  Latest Ref Range: >60 mL/min/1.73 m 2 25 (A)   GFR If Non  Latest Ref Range: >60 mL/min/1.73 m 2 21 (A)     Patient's GFR is stable since December 2017.  He has accompanying anemia with hemoglobin 9.5    3. Essential hypertension    Patient is currently taking enalapril 20 mg twice daily, and carvedilol 12.5 mg half tab twice daily.  His BP is 140/80 today.    4. Left sided cerebral hemisphere cerebrovascular accident (CVA) ([facility])            Patient Active Problem List    Diagnosis Date Noted   • CKD (chronic kidney disease) stage 4, GFR 15-29 ml/min ([facility]) 12/12/2017     Priority: Medium   • Gout 12/28/2017     Priority: Low   • Lung cancer ([facility]) 12/12/2017     Priority: Low   • BPH (benign prostatic hyperplasia) 04/28/2017     Priority: Low   • COPD (chronic obstructive pulmonary disease) ([facility]) " 10/31/2014     Priority: Low   • Peripheral vascular disease ([facility]) 10/31/2014     Priority: Low   • HTN (hypertension) 05/01/2011     Priority: Low   • Imbalance 03/26/2019   • Left sided cerebral hemisphere cerebrovascular accident (CVA) ([facility]) 12/13/2017        Allergies:Nkda [no known drug allergy]    Current meds including changes today  Current Outpatient Prescriptions   Medication Sig Dispense Refill   • carvedilol (COREG) 12.5 MG Tab Take 12.5 mg by mouth 2 times a day, with meals. 1/2 tab bid     • allopurinol (ZYLOPRIM) 100 MG Tab Take 1 Tab by mouth every day. 90 Tab 0   • oxybutynin SR (DITROPAN-XL) 10 MG CR tablet      • terazosin (HYTRIN) 10 MG capsule TAKE 1 CAPSULE BY MOUTH DAILY 90 Cap 2   • enalapril (VASOTEC) 10 MG Tab Take 2 Tabs by mouth 2 times a day. 180 Tab 3   • nitroglycerin (NITROSTAT) 0.4 MG SL Tab Place 0.4 mg under tongue. TAKE 1 TAB EVERY 5 MIN X 3 THEN CALL HOSPICE     • lorazepam (LORAZEPAM INTENSOL) 2 MG/ML Conc Take 0.5 mg by mouth every four hours as needed (ANXIETY/ AGITATION).     • clopidogrel (PLAVIX) 75 MG Tab Take 1 Tab by mouth every morning. 1 Tab 0   • morphine (ROXANOL) 20 MG/ML Solution Take 5 mg by mouth every 2 hours as needed.     • sennosides-docusate sodium (SENOKOT-S) 8.6-50 MG tablet Take 1 Tab by mouth 2 times a day.     • bisacodyl (DULCOLAX) 10 MG Suppos Insert 10 mg in rectum as needed.     • atropine 1 % Solution Take 2 Drops by mouth every four hours as needed.     • finasteride (PROSCAR) 5 MG Tab Take 5 mg by mouth every day.     • tamsulosin (FLOMAX) 0.4 MG capsule Take 0.4 mg by mouth ONE-HALF HOUR AFTER BREAKFAST.     • Cholecalciferol (VITAMIN D) 2000 UNIT TABS Take 4,000 Units by mouth every day.     • Ferrous Sulfate (IRON) 325 (65 FE) MG TABS Take 1 Tab by mouth every morning.       No current facility-administered medications for this visit.      Social History   Substance Use Topics   • Smoking status: Former Smoker     Packs/day: 2.00      "Years: 40.00     Quit date: 1/1/1982   • Smokeless tobacco: Never Used      Comment: Hx smoking x 40 yrs. Quit 1983   • Alcohol use No     Social History     Social History Narrative   • No narrative on file       Family History   Problem Relation Age of Onset   • Heart Disease Father    • Non-contributory Neg Hx         \"unknown\"       Review of Systems:  No chest pain, No shortness of breath, No dyspnea on exertion  Gastrointestinal ROS: No abdominal pain, No nausea, vomiting, diarrhea, or constipation        Exam:      Blood pressure 140/80, pulse (!) 50, temperature 36.5 °C (97.7 °F), temperature source Temporal, resp. rate 20, height 1.753 m (5' 9\"), weight 77.7 kg (171 lb 4.8 oz), SpO2 96 %.  General:  Well nourished, well developed male in NAD affect and mood within normal limits.  Elderly, mildly hard of hearing, alert and oriented.  Head is grossly normal.  Neck: Supple without adenopathy  Pulmonary: Clear to ausculation.  Normal effort. No rales, rhonchi, or wheezing.  Cardiovascular: Regular rate and rhythm without murmur.  Distant S1-S2  Extremities: no clubbing, cyanosis, or edema.  Neuro: moves all extremities symmetrically.  Patient is able to rise from the exam table and walk across the room without assistance.  He does not list to one side.  He denies any dizziness.    Please note that this dictation was created using voice recognition software. I have made every reasonable attempt to correct obvious errors, but I expect that there are errors of grammar and possibly content that I did not discover before finalizing the note.    Assessment/Plan:  1. Imbalance    I believe patient may have some mild imbalance as an old effect of his CVA.  He was offered formal PT evaluation and treatment, but he declines.  He was advised to be very careful when he changes position, and avoid going up on a ladder or doing anything that would increase his risk for he will let me know if he changes his mind and wants to " do PT.  They are going to call his ophthalmologist, and make sure that his vision is stable.  2. CKD (chronic kidney disease) stage 4, GFR 15-29 ml/min ([facility])    Patient is drinking little, do recommend that he try to increase his fluid intake during the day but not in the evenings as he has nocturia.    3. Essential hypertension    BP stable and controlled, continue current medication    4. Left sided cerebral hemisphere cerebrovascular accident (CVA) ([facility])    Patient is on Plavix 75 mg daily.      Note: The patient does not have diabetes, is 85, was on hospice, and has probable life limiting kidney disease.  Cholesterol monitoring, hemoglobin A1c, and retinal eye exam are not indicated.      Followup: Patient has pre-existing 3-month follow-up

## 2019-05-28 ENCOUNTER — TELEPHONE (OUTPATIENT)
Dept: MEDICAL GROUP | Facility: MEDICAL CENTER | Age: 84
End: 2019-05-28

## 2019-05-28 NOTE — TELEPHONE ENCOUNTER
ESTABLISHED PATIENT PRE-VISIT PLANNING     Patient was NOT contacted to complete PVP.     Note: Patient will not be contacted if there is no indication to call.     1.  Reviewed notes from the last few office visits within the medical group: Yes    2.  If any orders were placed at last visit or intended to be done for this visit (i.e. 6 mos follow-up), do we have Results/Consult Notes?        •  Labs - Labs were not ordered at last office visit.   Note: If patient appointment is for lab review and patient did not complete labs, check with provider if OK to reschedule patient until labs completed.       •  Imaging - Imaging was not ordered at last office visit.       •  Referrals - No referrals were ordered at last office visit.    3. Is this appointment scheduled as a Hospital Follow-Up? No      5.  Patient is due for the following Health Maintenance Topics:   Health Maintenance Due   Topic Date Due   • Annual Wellness Visit  07/21/1933   • DIABETES MONOFILAMENT / LE EXAM  01/21/1934   • RETINAL SCREENING  07/21/1951   • IMM HEP B VACCINE (1 of 3 - Risk 3-dose series) 07/21/1952   • IMM DTaP/Tdap/Td Vaccine (1 - Tdap) 07/21/1952   • IMM ZOSTER VACCINES (1 of 2) 07/21/1983   • IMM PNEUMOCOCCAL 65+ (ADULT) HIGH/HIGHEST RISK SERIES (1 of 2 - PCV13) 07/21/1998   • PFT SCREENING-FEV1 AND FEV/FVC RATIO / SPIROMETRY SHOULD BE PERFORMED ANNUALLY  12/03/2009   • URINE ACR / MICROALBUMIN  02/08/2018   • A1C SCREENING  06/11/2018   • FASTING LIPID PROFILE  12/12/2018       6. Orders for overdue Health Maintenance topics pended in Pre-Charting? NO    7.  AHA (MDX) form printed for Provider? No, already completed    8.  Patient was NOT informed to arrive 15 min prior to their scheduled appointment and bring in their medication bottles.

## 2019-06-04 ENCOUNTER — OFFICE VISIT (OUTPATIENT)
Dept: MEDICAL GROUP | Facility: MEDICAL CENTER | Age: 84
End: 2019-06-04
Payer: MEDICARE

## 2019-06-04 VITALS
HEIGHT: 69 IN | HEART RATE: 88 BPM | OXYGEN SATURATION: 98 % | WEIGHT: 173.5 LBS | DIASTOLIC BLOOD PRESSURE: 72 MMHG | BODY MASS INDEX: 25.7 KG/M2 | SYSTOLIC BLOOD PRESSURE: 124 MMHG | TEMPERATURE: 97.4 F

## 2019-06-04 DIAGNOSIS — N40.1 BENIGN PROSTATIC HYPERPLASIA WITH LOWER URINARY TRACT SYMPTOMS, SYMPTOM DETAILS UNSPECIFIED: ICD-10-CM

## 2019-06-04 DIAGNOSIS — I10 ESSENTIAL HYPERTENSION: ICD-10-CM

## 2019-06-04 DIAGNOSIS — N18.4 CKD (CHRONIC KIDNEY DISEASE) STAGE 4, GFR 15-29 ML/MIN (HCC): ICD-10-CM

## 2019-06-04 DIAGNOSIS — C34.31 MALIGNANT NEOPLASM OF LOWER LOBE OF RIGHT LUNG (HCC): ICD-10-CM

## 2019-06-04 DIAGNOSIS — I63.9 LEFT SIDED CEREBRAL HEMISPHERE CEREBROVASCULAR ACCIDENT (CVA) (HCC): ICD-10-CM

## 2019-06-04 PROCEDURE — 99214 OFFICE O/P EST MOD 30 MIN: CPT | Performed by: FAMILY MEDICINE

## 2019-06-04 NOTE — ASSESSMENT & PLAN NOTE
Chronic problem.  Blood pressures currently well controlled.  Patient is currently on enalapril 10 mg twice daily.

## 2019-06-04 NOTE — ASSESSMENT & PLAN NOTE
Patient had 2.8 cm right lower lobe mass found on CT scan April 2017.  It was followed by a PET CT scan 6 months later, it did not have significant growth or change.  Patient does have heavy smoking history, however has declined further workup and evaluation.  He is asymptomatic.

## 2019-06-04 NOTE — ASSESSMENT & PLAN NOTE
This is a chronic problem for this patient.  He is currently on terazosin, tamsulosin and finasteride.  He is seen by a urologist.  Oddly, he is also on oxybutynin.  He asks today if there are any medications he could stop taking.  He reports his urination is okay currently some days are better than others, sometimes he will urinate 7-10 times, other times he only urinates 3 times in a day.  Denies any blood in the urine currently.  He had a consultation with urology about 3 months ago regarding possible procedure to reduce the size of the prostate, they have not followed through with this yet.

## 2019-06-04 NOTE — PROGRESS NOTES
Subjective:     CC:  Diagnoses of Benign prostatic hyperplasia with lower urinary tract symptoms, symptom details unspecified, Essential hypertension, Left sided cerebral hemisphere cerebrovascular accident (CVA) (HCC), CKD (chronic kidney disease) stage 4, GFR 15-29 ml/min (HCC), and Malignant neoplasm of lower lobe of right lung (HCC) were pertinent to this visit.    HISTORY OF THE PRESENT ILLNESS: Patient is a 85 y.o. male who presents to the clinic today to establish care.  He was seen by Dr. Garcia previously.    BPH (benign prostatic hyperplasia)  This is a chronic problem for this patient.  He is currently on terazosin, tamsulosin and finasteride.  He is seen by a urologist.  Oddly, he is also on oxybutynin.  He asks today if there are any medications he could stop taking.  He reports his urination is okay currently some days are better than others, sometimes he will urinate 7-10 times, other times he only urinates 3 times in a day.  Denies any blood in the urine currently.  He had a consultation with urology about 3 months ago regarding possible procedure to reduce the size of the prostate, they have not followed through with this yet.    HTN (hypertension)  Chronic problem.  Blood pressures currently well controlled.  Patient is currently on enalapril 10 mg twice daily.    Left sided cerebral hemisphere cerebrovascular accident (CVA) (CMS-HCC) (HCC)  Chronic problem.  The patient is currently on Plavix.    CKD (chronic kidney disease) stage 4, GFR 15-29 ml/min (CMS-HCC) (HCC)  Chronic problem.  Labs are essentially stable, GFR is 21.    Lung cancer (CMS-HCC) (HCC)  Patient had 2.8 cm right lower lobe mass found on CT scan April 2017.  It was followed by a PET CT scan 6 months later, it did not have significant growth or change.  Patient does have heavy smoking history, however has declined further workup and evaluation.  He is asymptomatic.      Allergies: Nkda [no known drug allergy]    Current Outpatient  Prescriptions Ordered in Epic   Medication Sig Dispense Refill   • carvedilol (COREG) 12.5 MG Tab Take 12.5 mg by mouth 2 times a day, with meals. 1/2 tab bid     • allopurinol (ZYLOPRIM) 100 MG Tab Take 1 Tab by mouth every day. 90 Tab 0   • oxybutynin SR (DITROPAN-XL) 10 MG CR tablet      • terazosin (HYTRIN) 10 MG capsule TAKE 1 CAPSULE BY MOUTH DAILY 90 Cap 2   • enalapril (VASOTEC) 10 MG Tab Take 2 Tabs by mouth 2 times a day. 180 Tab 3   • lorazepam (LORAZEPAM INTENSOL) 2 MG/ML Conc Take 0.5 mg by mouth every four hours as needed (ANXIETY/ AGITATION).     • clopidogrel (PLAVIX) 75 MG Tab Take 1 Tab by mouth every morning. 1 Tab 0   • morphine (ROXANOL) 20 MG/ML Solution Take 5 mg by mouth every 2 hours as needed.     • sennosides-docusate sodium (SENOKOT-S) 8.6-50 MG tablet Take 1 Tab by mouth 2 times a day.     • bisacodyl (DULCOLAX) 10 MG Suppos Insert 10 mg in rectum as needed.     • atropine 1 % Solution Take 2 Drops by mouth every four hours as needed.     • finasteride (PROSCAR) 5 MG Tab Take 5 mg by mouth every day.     • tamsulosin (FLOMAX) 0.4 MG capsule Take 0.4 mg by mouth ONE-HALF HOUR AFTER BREAKFAST.     • Cholecalciferol (VITAMIN D) 2000 UNIT TABS Take 4,000 Units by mouth every day.     • Ferrous Sulfate (IRON) 325 (65 FE) MG TABS Take 1 Tab by mouth every morning.     • nitroglycerin (NITROSTAT) 0.4 MG SL Tab Place 0.4 mg under tongue. TAKE 1 TAB EVERY 5 MIN X 3 THEN CALL HOSPICE       No current Epic-ordered facility-administered medications on file.        Past Medical History:   Diagnosis Date   • AVM (arteriovenous malformation) of colon    • Back pain    • Chickenpox    • Chronic airway obstruction, not elsewhere classified    • Congestive heart failure (HCC)    • Depression    • Diabetes    • Diphtheria    • Emphysema of lung (HCC)    • Heart murmur    • Hypercholesteremia    • Hypertension    • Infectious disease    • Kidney disease    • Other and unspecified angina pectoris    • PVD  "(peripheral vascular disease) (HCC)    • Snoring    • Stroke (HCC) 2015   • Unspecified cataract     right eye    • Urinary bladder disorder    • Urinary incontinence        Past Surgical History:   Procedure Laterality Date   • COLONOSCOPY  6/17/2015    Procedure: COLONOSCOPY;  Surgeon: Eddie Levin M.D.;  Location: SURGERY [facility];  Service:    • GASTROSCOPY  6/17/2015    Procedure: GASTROSCOPY W/APC;  Surgeon: Eddie Levin M.D.;  Location: SURGERY [facility];  Service:    • GASTROSCOPY WITH APC  11/1/2014    Performed by Eddie Levin M.D. at ENDOSCOPY [facility]   • COLONOSCOPY WITH APC  11/1/2014    Performed by Eddie Levin M.D. at ENDOSCOPY [facility]   • GASTROSCOPY-ENDO  5/1/2011    Performed by HOLLY VASQUEZ at ENDOSCOPY [facility]   • COLONOSCOPY - ENDO  5/1/2011    Performed by HOLLY VASQUEZ at ENDOSCOPY [facility]   • COLONOSCOPY WITH APC  4/21/2011    Performed by EDMUND CHENG at SURGERY [facility]   • GASTROSCOPY-ENDO  4/21/2011    Performed by EDMUND CHENG at SURGERY [facility]   • COLONOSCOPY WITH APC  9/28/2010    Performed by EDMUND CHENG at [facility]   • GASTROSCOPY WITH APC  9/28/2010    Performed by EDMUND CHENG at [facility]   • AORTOFEMORAL BYPASS  1991    Both legs   • EYE SURGERY  1990    Vascular   • LAMINOTOMY  1990's       Social History   Substance Use Topics   • Smoking status: Former Smoker     Packs/day: 2.00     Years: 40.00     Quit date: 1/1/1982   • Smokeless tobacco: Never Used      Comment: Hx smoking x 40 yrs. Quit 1983   • Alcohol use No       Social History     Social History Narrative   • No narrative on file       Family History   Problem Relation Age of Onset   • Heart Disease Father    • Non-contributory Neg Hx         \"unknown\"       Health Maintenance: Completed    ROS:   Pulm: no sob, no cough  CV: no chest pain, no " "palpitations               Objective:     Exam: /72 (BP Location: Left arm, Patient Position: Sitting, BP Cuff Size: Adult long)   Pulse 88   Temp 36.3 °C (97.4 °F) (Temporal)   Ht 1.753 m (5' 9\")   Wt 78.7 kg (173 lb 8 oz)   SpO2 98%  Body mass index is 25.62 kg/m².    General: Normal appearing. No distress.  HEENT: conjunctiva clear, lids without ptosis, pupils equal  and reactive to light, ears normal shape and contour  Neck: Supple without masses. Thyroid is not enlarged.  Pulmonary: Clear to ausculation.  Normal effort. No rales, ronchi, or wheezing.  Cardiovascular: Regular rate and rhythm, no murmur. No LE edema  Abdomen: Soft, nontender, nondistended. No masses or hernias noted.  Neurologic: Grossly normal, no focal deficits  Lymph: No cervical or supraclavicular lymph nodes are palpable  Skin: Warm and dry.  No obvious lesions.  Musculoskeletal: Normal gait and station.  Psych: Normal mood and affect. Alert and oriented x3. Judgment and insight is normal.      Labs: 3/8/2019 labs reviewed and discussed with the patient.    Assessment & Plan:   85 y.o. male with the following -    1. Benign prostatic hyperplasia with lower urinary tract symptoms, symptom details unspecified  Chronic problem.  Currently on 3 different medications and being followed by urology.  I did advise the patient he could stop the oxybutynin, he has increased urination he can restart otherwise he can remain off of it.    2. Essential hypertension  Chronic problem, well-controlled.  I discussed with the patient if he is insistent on coming off more medications we could think about stopping the enalapril although this would likely increase his blood pressure which may worsen his kidney function.  He elects to stay on it for now.    3. Left sided cerebral hemisphere cerebrovascular accident (CVA) ([facility])  Chronic problem.  Currently on Plavix.    4. CKD (chronic kidney disease) stage 4, GFR 15-29 ml/min ([facility])  Chronic problem, " stable, GFR is currently 21.    5. Malignant neoplasm of lower lobe of right lung (HCC)  Chronic problem.  Patient declined further work-up.  Currently asymptomatic.    Return in about 6 months (around 12/4/2019).    Please note that this dictation was created using voice recognition software. I have made every reasonable attempt to correct obvious errors, but I expect that there are errors of grammar and possibly content that I did not discover before finalizing the note.

## 2019-06-19 ENCOUNTER — HOSPITAL ENCOUNTER (OUTPATIENT)
Dept: LAB | Facility: MEDICAL CENTER | Age: 84
End: 2019-06-19
Attending: NURSE PRACTITIONER
Payer: MEDICARE

## 2019-06-19 PROCEDURE — 87086 URINE CULTURE/COLONY COUNT: CPT

## 2019-06-22 LAB
BACTERIA UR CULT: NORMAL
SIGNIFICANT IND 70042: NORMAL
SITE SITE: NORMAL
SOURCE SOURCE: NORMAL

## 2019-07-09 ENCOUNTER — HOSPITAL ENCOUNTER (OUTPATIENT)
Dept: LAB | Facility: MEDICAL CENTER | Age: 84
End: 2019-07-09
Attending: UROLOGY
Payer: MEDICARE

## 2019-07-09 LAB — PSA SERPL-MCNC: 21.14 NG/ML (ref 0–4)

## 2019-07-09 PROCEDURE — 87086 URINE CULTURE/COLONY COUNT: CPT

## 2019-07-09 PROCEDURE — 84153 ASSAY OF PSA TOTAL: CPT

## 2019-07-11 LAB
BACTERIA UR CULT: NORMAL
SIGNIFICANT IND 70042: NORMAL
SITE SITE: NORMAL
SOURCE SOURCE: NORMAL

## 2019-07-22 ENCOUNTER — HOSPITAL ENCOUNTER (OUTPATIENT)
Dept: LAB | Facility: MEDICAL CENTER | Age: 84
End: 2019-07-22
Attending: UROLOGY
Payer: MEDICARE

## 2019-07-22 LAB
BUN SERPL-MCNC: 64 MG/DL (ref 8–22)
CREAT SERPL-MCNC: 3.18 MG/DL (ref 0.5–1.4)

## 2019-07-22 PROCEDURE — 82565 ASSAY OF CREATININE: CPT

## 2019-07-22 PROCEDURE — 36415 COLL VENOUS BLD VENIPUNCTURE: CPT

## 2019-07-22 PROCEDURE — 84520 ASSAY OF UREA NITROGEN: CPT

## 2019-07-23 ENCOUNTER — HOSPITAL ENCOUNTER (OUTPATIENT)
Dept: RADIOLOGY | Facility: MEDICAL CENTER | Age: 84
End: 2019-07-23
Attending: UROLOGY
Payer: MEDICARE

## 2019-08-29 RX ORDER — CARVEDILOL 12.5 MG/1
12.5 TABLET ORAL 2 TIMES DAILY WITH MEALS
Qty: 100 TAB | Refills: 2 | Status: SHIPPED
Start: 2019-08-29 | End: 2020-01-01

## 2019-09-03 ENCOUNTER — TELEPHONE (OUTPATIENT)
Dept: MEDICAL GROUP | Facility: MEDICAL CENTER | Age: 84
End: 2019-09-03

## 2019-09-03 NOTE — TELEPHONE ENCOUNTER
Pharmacy faxed wanted clarification on medication carvedilol 12.5 mg    Sig states  Take 1 tab by mouth 2 times a day, with meals. 1/2 Tab Bid.     They wanted to know which direction are you wanting 1 tab bid or 1/2 tab bid please advise so I can call pharmacy.

## 2019-09-04 NOTE — TELEPHONE ENCOUNTER
Called into pharmacy.     Can you please let the pharmacy know that it is 1/2 tab BID.   Thanks,   Dr. Hutchison

## 2019-10-08 NOTE — TELEPHONE ENCOUNTER
Patients nya Poolesa called in and stated that they have an appt with Dr Hutchison on 10/18 for medications. However they would like to have his urine checked as well for any abnormalities. They would like for Dr Hutchison to put a lab order in so he can have this done before his appointment.

## 2019-10-09 NOTE — TELEPHONE ENCOUNTER
ESTABLISHED PATIENT PRE-VISIT PLANNING     Patient was NOT contacted to complete PVP.     Note: Patient will not be contacted if there is no indication to call.     1.  Reviewed notes from the last few office visits within the medical group: Yes    2.  If any orders were placed at last visit or intended to be done for this visit (i.e. 6 mos follow-up), do we have Results/Consult Notes?        •  Labs - Labs were not ordered at last office visit.   Note: If patient appointment is for lab review and patient did not complete labs, check with provider if OK to reschedule patient until labs completed.       •  Imaging - Imaging was not ordered at last office visit.       •  Referrals - No referrals were ordered at last office visit.    3. Is this appointment scheduled as a Hospital Follow-Up? No    4.  Immunizations were updated in Epic using WebIZ?: No WebIZ record         5.  Patient is due for the following Health Maintenance Topics:   Health Maintenance Due   Topic Date Due   • Annual Wellness Visit  07/21/1933   • DIABETES MONOFILAMENT / LE EXAM  01/21/1934   • RETINAL SCREENING  07/21/1951   • PFT SCREENING-FEV1 AND FEV/FVC RATIO / SPIROMETRY SHOULD BE PERFORMED ANNUALLY  07/21/1951   • IMM HEP B VACCINE (1 of 3 - Risk 3-dose series) 07/21/1952   • IMM DTaP/Tdap/Td Vaccine (1 - Tdap) 07/21/1952   • IMM ZOSTER VACCINES (1 of 2) 07/21/1983   • IMM PNEUMOCOCCAL VACCINE: 65+ Years (1 of 2 - PCV13) 07/21/1998   • URINE ACR / MICROALBUMIN  02/08/2018   • A1C SCREENING  06/11/2018   • FASTING LIPID PROFILE  12/12/2018   • IMM INFLUENZA (1) 09/01/2019       6. Orders for overdue Health Maintenance topics pended in Pre-Charting? NO    7.  AHA (MDX) form printed for Provider? No, already completed    8.  Patient was NOT informed to arrive 15 min prior to their scheduled appointment and bring in their medication bottles.

## 2019-10-10 NOTE — TELEPHONE ENCOUNTER
Ignacio Gambino,   Can you call them to let them know that I have put the order in?  Thanks,   Dr. Hutchison

## 2019-10-10 NOTE — TELEPHONE ENCOUNTER
Spoke with patient and informed him that the urine test was ordered and that he could go [facility] any [facility] and have it done. Patient stated understanding.

## 2019-10-15 NOTE — PROGRESS NOTES
Outcome: Left Message / [facility] INTRO    Please transfer to Patient Outreach Team at [phone] when patient returns call.    [facility] Verified: yes    Attempt # 1

## 2019-10-16 NOTE — PROGRESS NOTES
[facility]  Verified email and added to [employer].  MBR has no questions or concerns at this time.  If MBR calls, please transfer to [facility] Call Back   [place] [phone].

## 2019-10-18 PROBLEM — R35.1 NOCTURIA: Status: ACTIVE | Noted: 2019-01-01

## 2019-10-18 PROBLEM — R42 LIGHTHEADEDNESS: Status: ACTIVE | Noted: 2019-01-01

## 2019-10-18 PROBLEM — Z79.899 POLYPHARMACY: Status: ACTIVE | Noted: 2019-01-01

## 2019-10-18 NOTE — ASSESSMENT & PLAN NOTE
Patient is on many medications.  He is interested in stopping whatever he can.  He would also like to go through all of them today so that he can understand why he takes each 1 of them.

## 2019-10-18 NOTE — ASSESSMENT & PLAN NOTE
New problem.  Has been going on for the past couple months.  Worst in the mornings.  Improves throughout the day.  He currently takes carvedilol, lisinopril, tamsulosin, terazosin and dutasteride.  He denies any chest pain, shortness of breath or palpitations.

## 2019-10-18 NOTE — PROGRESS NOTES
Subjective:     CC: Diagnoses of Nocturia, Lightheadedness, CKD (chronic kidney disease) stage 4, GFR 15-29 ml/min ([facility]), and Polypharmacy were pertinent to this visit.    HPI: Patient is a 86 y.o. male established patient with a past medical history of stage IV CKD, severe BPH, gout, hypertension, history of CVA, current lung cancer, and peripheral vascular disease who presents today with concerns regarding his medications, also having nocturia and some mild dizziness in the morning.      Nocturia  Chronic problem.  The patient states that he was get up quite frequently at night.  His symptoms are worse at night.  He does have a urologist although has not been seen for the past 6 months or so.  They did recently order a CT urogram, unfortunately, he was unable to get this done due to his stage IV chronic kidney disease and his GFR being quite low.  He denies any pain with urination.  Denies any fevers or chills.  Denies any flank pain.  UA was abnormal, however, urine culture showed only mixed skin yesica.  The patient is hesitant to do any sort of procedures.  He has been offered multiple procedures by urology but does not want to do them.    Lightheadedness  New problem.  Has been going on for the past couple months.  Worst in the mornings.  Improves throughout the day.  He currently takes carvedilol, lisinopril, tamsulosin, terazosin and dutasteride.  He denies any chest pain, shortness of breath or palpitations.    CKD (chronic kidney disease) stage 4, GFR 15-29 ml/min (CMS-HCC) (HCC)  Chronic problem.  Recent BMP showed worsening of kidney function, creatinine went from 2.87 to 3.18.  Mild worsening of GFR from 21 down to 19.  Patient has nephrologist, Dr. Barbosa.  However, he has not been seen there for couple years and has no desire to go back.    Polypharmacy  Patient is on many medications.  He is interested in stopping whatever he can.  He would also like to go through all of them today so that he can  understand why he takes each 1 of them.        Past Medical History:   Diagnosis Date   • AVM (arteriovenous malformation) of colon    • Back pain    • Chickenpox    • Chronic airway obstruction, not elsewhere classified    • Congestive heart failure ([facility])    • Depression    • Diabetes    • Diphtheria    • Emphysema of lung ([facility])    • Heart murmur    • Hypercholesteremia    • Hypertension    • Infectious disease    • Kidney disease    • Other and unspecified angina pectoris    • PVD (peripheral vascular disease) ([facility])    • Snoring    • Stroke ([facility])    • Unspecified cataract     right eye    • Urinary bladder disorder    • Urinary incontinence        Social History     Tobacco Use   • Smoking status: Former Smoker     Packs/day: 2.00     Years: 40.00     Pack years: 80.00     Last attempt to quit: 1982     Years since quittin.8   • Smokeless tobacco: Never Used   • Tobacco comment: Hx smoking x 40 yrs. Quit    Substance Use Topics   • Alcohol use: No   • Drug use: No       Current Outpatient Medications Ordered in Epic   Medication Sig Dispense Refill   • atorvastatin (LIPITOR) 10 MG Tab      • allopurinol (ZYLOPRIM) 100 MG Tab TAKE 1 TABLET BY MOUTH EVERYDAY 90 Tab 0   • carvedilol (COREG) 12.5 MG Tab Take 1 Tab by mouth 2 times a day, with meals. 1/2 tab bid 100 Tab 2   • oxybutynin SR (DITROPAN-XL) 10 MG CR tablet      • terazosin (HYTRIN) 10 MG capsule TAKE 1 CAPSULE BY MOUTH DAILY 90 Cap 2   • enalapril (VASOTEC) 10 MG Tab Take 2 Tabs by mouth 2 times a day. 180 Tab 3   • clopidogrel (PLAVIX) 75 MG Tab Take 1 Tab by mouth every morning. 1 Tab 0   • bisacodyl (DULCOLAX) 10 MG Suppos Insert 10 mg in rectum as needed.     • atropine 1 % Solution Take 2 Drops by mouth every four hours as needed.     • finasteride (PROSCAR) 5 MG Tab Take 5 mg by mouth every day.     • tamsulosin (FLOMAX) 0.4 MG capsule Take 0.4 mg by mouth ONE-HALF HOUR AFTER BREAKFAST.     • Cholecalciferol (VITAMIN D) 2000 UNIT  "TABS Take 4,000 Units by mouth every day.     • Ferrous Sulfate (IRON) 325 (65 FE) MG TABS Take 1 Tab by mouth every morning.     • nitroglycerin (NITROSTAT) 0.4 MG SL Tab Place 0.4 mg under tongue. TAKE 1 TAB EVERY 5 MIN X 3 THEN CALL HOSPICE     • lorazepam (LORAZEPAM INTENSOL) 2 MG/ML Conc Take 0.5 mg by mouth every four hours as needed (ANXIETY/ AGITATION).     • morphine (ROXANOL) 20 MG/ML Solution Take 5 mg by mouth every 2 hours as needed.     • sennosides-docusate sodium (SENOKOT-S) 8.6-50 MG tablet Take 1 Tab by mouth 2 times a day.       No current [facility]-ordered facility-administered medications on file.        Allergies:  Nkda [no known drug allergy]    Health Maintenance: Completed    ROS:  Pulm: no sob, no cough  CV: no chest pain, no palpitations      Objective:       Exam:  /88 (BP Location: Left arm, Patient Position: Sitting, BP Cuff Size: Adult)   Pulse (!) 54   Temp 36.7 °C (98.1 °F) (Temporal)   Resp 14   Ht 1.753 m (5' 9\")   Wt 74.6 kg (164 lb 6.4 oz)   SpO2 96%   BMI 24.28 kg/m²  Body mass index is 24.28 kg/m².    General: Normal appearing. No distress.  HEAD: NCAT  EYES: conjunctiva clear, lids without ptosis, pupils equal and reactive to light  EARS: ears normal shape and contour.  MOUTH: normal dentition   Neck:  Normal ROM  Pulmonary: Clear to auscultation bilaterally, no wheezes rales or rhonchi.  Normal effort. Normal respiratory rate.  Cardiovascular: Rhythm, slight systolic murmur.  Well perfused. No LE edema  Neurologic: Grossly normal, no focal deficits  Skin: Warm and dry.  No obvious lesions.  Musculoskeletal: Normal gait and station.   Psych: Normal mood and affect. Alert and oriented x3. Judgment and insight is normal.     Labs: 7/22/2019 and 10/14/2019 results reviewed and discussed with the patient, questions answered.    Assessment & Plan:     86 y.o. male with the following -     1. Nocturia  Chronic problem, uncontrolled.  Currently taking terazosin, tamsulosin " and finasteride.  He does have a urologist, unfortunately he has not seen them in a while.  His urinalysis was abnormal although urine culture was negative.  I discussed the patient that he will need to follow-up with his urologist as he may be a candidate for a procedure to lessen the size of his prostate.  He will think about this, he very much would like to avoid any further procedures.    2. Lightheadedness  New problem.  Mild, only occurs in the mornings.  Likely due to his blood pressure medicines as well as BPH meds.  However, I am concerned stopping his BPH meds or his cholesterol meds given his history of stroke and severe BPH.  I advised him to discuss this with urology.  Is getting a procedure may alleviate the symptoms and he could stop some of the medications.    3. CKD (chronic kidney disease) stage 4, GFR 15-29 ml/min (HCC)  Chronic problem, slightly worse than last check.  GFR is 19, down from 21.  Normal potassium.  I advised the patient that he needs to follow-up with his nephrologist, he has yet to decide if he wants to do that or not.    4. Polypharmacy  New problem.  The patient has many questions regarding all of his medications.  We discussed each 1 separately and what they are used for.  I did advise him that he could stop the oxybutynin, if he has no increase in symptoms he can remain off this medication.  Regarding his cardiac meds, I advised him to discuss this with cardiology.  The patient voiced understanding and relief after going through each 1 knowing with therefore.  Return in about 6 months (around 4/18/2020), or if symptoms worsen or fail to improve.    Please note that this dictation was created using voice recognition software. I have made every reasonable attempt to correct obvious errors, but I expect that there are errors of grammar and possibly content that I did not discover before finalizing the note.

## 2019-10-18 NOTE — ASSESSMENT & PLAN NOTE
Chronic problem.  The patient states that he was get up quite frequently at night.  His symptoms are worse at night.  He does have a urologist although has not been seen for the past 6 months or so.  They did recently order a CT urogram, unfortunately, he was unable to get this done due to his stage IV chronic kidney disease and his GFR being quite low.  He denies any pain with urination.  Denies any fevers or chills.  Denies any flank pain.  UA was abnormal, however, urine culture showed only mixed skin yesica.

## 2019-10-24 NOTE — TELEPHONE ENCOUNTER
1. Caller Name: Ekta (Pt's daughter)                                         Call Back Number: [phone] (home)       Patient approves a detailed voicemail message: yes    Pt's daughter called stating that she very concerned about pt hallucinating, requesting to speak to Dr. Hutchison on what she should do.  Please advise, thank you.

## 2019-10-24 NOTE — TELEPHONE ENCOUNTER
Was the patient seen in the last year in this department? Yes    Does patient have an active prescription for medications requested? No     Received Request Via: Pharmacy     Previous rx went to local [facility], pt would like it sent to mail order

## 2019-10-25 NOTE — TELEPHONE ENCOUNTER
Spoke with the patient's daughter. I let her know that I feel he should be seen immediately. His hallucinations could be caused by urinary retention or even UTI which needs to be treated immediately. She voiced understanding and will call her father now to bring him in.

## 2020-01-01 ENCOUNTER — HOME CARE VISIT (OUTPATIENT)
Dept: HOSPICE | Facility: HOSPICE | Age: 85
End: 2020-01-01
Payer: MEDICARE

## 2020-01-01 ENCOUNTER — OFFICE VISIT (OUTPATIENT)
Dept: MEDICAL GROUP | Facility: MEDICAL CENTER | Age: 85
End: 2020-01-01
Payer: MEDICARE

## 2020-01-01 ENCOUNTER — HOSPITAL ENCOUNTER (OUTPATIENT)
Dept: LAB | Facility: MEDICAL CENTER | Age: 85
End: 2020-02-24
Attending: FAMILY MEDICINE
Payer: MEDICARE

## 2020-01-01 ENCOUNTER — HOSPITAL ENCOUNTER (OUTPATIENT)
Facility: MEDICAL CENTER | Age: 85
End: 2020-08-20
Attending: INTERNAL MEDICINE
Payer: COMMERCIAL

## 2020-01-01 ENCOUNTER — HOSPITAL ENCOUNTER (OUTPATIENT)
Facility: MEDICAL CENTER | Age: 85
End: 2020-09-10
Payer: MEDICARE

## 2020-01-01 ENCOUNTER — HOSPITAL ENCOUNTER (OUTPATIENT)
Facility: MEDICAL CENTER | Age: 85
End: 2020-04-20
Attending: FAMILY MEDICINE
Payer: COMMERCIAL

## 2020-01-01 ENCOUNTER — HOSPICE ADMISSION (OUTPATIENT)
Dept: HOSPICE | Facility: HOSPICE | Age: 85
End: 2020-01-01
Payer: MEDICARE

## 2020-01-01 ENCOUNTER — HOSPITAL ENCOUNTER (OUTPATIENT)
Dept: RADIOLOGY | Facility: MEDICAL CENTER | Age: 85
End: 2020-03-12
Attending: FAMILY MEDICINE
Payer: MEDICARE

## 2020-01-01 ENCOUNTER — TELEPHONE (OUTPATIENT)
Dept: MEDICAL GROUP | Facility: MEDICAL CENTER | Age: 85
End: 2020-01-01

## 2020-01-01 ENCOUNTER — HOSPITAL ENCOUNTER (OUTPATIENT)
Dept: RADIOLOGY | Facility: MEDICAL CENTER | Age: 85
End: 2020-02-24
Attending: FAMILY MEDICINE
Payer: MEDICARE

## 2020-01-01 ENCOUNTER — HOSPITAL ENCOUNTER (OUTPATIENT)
Facility: MEDICAL CENTER | Age: 85
End: 2020-08-17
Attending: INTERNAL MEDICINE
Payer: COMMERCIAL

## 2020-01-01 ENCOUNTER — HOSPITAL ENCOUNTER (INPATIENT)
Facility: MEDICAL CENTER | Age: 85
LOS: 3 days | DRG: 951 | End: 2020-09-13
Attending: INTERNAL MEDICINE | Admitting: INTERNAL MEDICINE
Payer: COMMERCIAL

## 2020-01-01 ENCOUNTER — HOSPITAL ENCOUNTER (OUTPATIENT)
Facility: MEDICAL CENTER | Age: 85
End: 2020-07-16
Attending: INTERNAL MEDICINE
Payer: COMMERCIAL

## 2020-01-01 VITALS
OXYGEN SATURATION: 95 % | SYSTOLIC BLOOD PRESSURE: 132 MMHG | RESPIRATION RATE: 22 BRPM | TEMPERATURE: 97.4 F | BODY MASS INDEX: 24.99 KG/M2 | WEIGHT: 164.9 LBS | DIASTOLIC BLOOD PRESSURE: 90 MMHG | HEART RATE: 69 BPM | HEIGHT: 68 IN

## 2020-01-01 VITALS
HEART RATE: 64 BPM | RESPIRATION RATE: 16 BRPM | SYSTOLIC BLOOD PRESSURE: 138 MMHG | OXYGEN SATURATION: 95 % | BODY MASS INDEX: 23.95 KG/M2 | DIASTOLIC BLOOD PRESSURE: 60 MMHG | WEIGHT: 157.5 LBS

## 2020-01-01 VITALS
BODY MASS INDEX: 22.71 KG/M2 | RESPIRATION RATE: 20 BRPM | HEART RATE: 65 BPM | RESPIRATION RATE: 24 BRPM | SYSTOLIC BLOOD PRESSURE: 130 MMHG | HEART RATE: 57 BPM | WEIGHT: 149.38 LBS | DIASTOLIC BLOOD PRESSURE: 90 MMHG | OXYGEN SATURATION: 98 % | DIASTOLIC BLOOD PRESSURE: 90 MMHG | OXYGEN SATURATION: 94 % | SYSTOLIC BLOOD PRESSURE: 130 MMHG

## 2020-01-01 VITALS
RESPIRATION RATE: 14 BRPM | HEART RATE: 62 BPM | SYSTOLIC BLOOD PRESSURE: 138 MMHG | HEART RATE: 55 BPM | DIASTOLIC BLOOD PRESSURE: 80 MMHG | RESPIRATION RATE: 20 BRPM | OXYGEN SATURATION: 92 % | OXYGEN SATURATION: 98 % | TEMPERATURE: 97.7 F | BODY MASS INDEX: 24.34 KG/M2 | HEIGHT: 70 IN | WEIGHT: 170 LBS | DIASTOLIC BLOOD PRESSURE: 82 MMHG | SYSTOLIC BLOOD PRESSURE: 108 MMHG

## 2020-01-01 VITALS — HEART RATE: 53 BPM | DIASTOLIC BLOOD PRESSURE: 61 MMHG | RESPIRATION RATE: 22 BRPM | SYSTOLIC BLOOD PRESSURE: 142 MMHG

## 2020-01-01 VITALS — OXYGEN SATURATION: 93 % | SYSTOLIC BLOOD PRESSURE: 128 MMHG | HEART RATE: 52 BPM | DIASTOLIC BLOOD PRESSURE: 80 MMHG

## 2020-01-01 VITALS
HEART RATE: 56 BPM | RESPIRATION RATE: 16 BRPM | DIASTOLIC BLOOD PRESSURE: 80 MMHG | BODY MASS INDEX: 22.98 KG/M2 | WEIGHT: 151.13 LBS | SYSTOLIC BLOOD PRESSURE: 132 MMHG

## 2020-01-01 VITALS
RESPIRATION RATE: 20 BRPM | SYSTOLIC BLOOD PRESSURE: 130 MMHG | DIASTOLIC BLOOD PRESSURE: 60 MMHG | DIASTOLIC BLOOD PRESSURE: 60 MMHG | BODY MASS INDEX: 24.4 KG/M2 | OXYGEN SATURATION: 92 % | SYSTOLIC BLOOD PRESSURE: 120 MMHG | RESPIRATION RATE: 24 BRPM | HEART RATE: 50 BPM | HEART RATE: 60 BPM | WEIGHT: 160.5 LBS

## 2020-01-01 VITALS
SYSTOLIC BLOOD PRESSURE: 160 MMHG | WEIGHT: 152.5 LBS | OXYGEN SATURATION: 98 % | RESPIRATION RATE: 20 BRPM | DIASTOLIC BLOOD PRESSURE: 62 MMHG | HEART RATE: 52 BPM | BODY MASS INDEX: 23.19 KG/M2

## 2020-01-01 VITALS
SYSTOLIC BLOOD PRESSURE: 130 MMHG | DIASTOLIC BLOOD PRESSURE: 84 MMHG | HEART RATE: 54 BPM | RESPIRATION RATE: 20 BRPM | OXYGEN SATURATION: 98 %

## 2020-01-01 VITALS — DIASTOLIC BLOOD PRESSURE: 77 MMHG | RESPIRATION RATE: 28 BRPM | SYSTOLIC BLOOD PRESSURE: 124 MMHG | HEART RATE: 56 BPM

## 2020-01-01 VITALS
OXYGEN SATURATION: 96 % | RESPIRATION RATE: 16 BRPM | DIASTOLIC BLOOD PRESSURE: 70 MMHG | SYSTOLIC BLOOD PRESSURE: 144 MMHG | HEART RATE: 62 BPM

## 2020-01-01 VITALS
WEIGHT: 146.16 LBS | HEIGHT: 68 IN | BODY MASS INDEX: 22.15 KG/M2 | DIASTOLIC BLOOD PRESSURE: 73 MMHG | TEMPERATURE: 97.9 F | SYSTOLIC BLOOD PRESSURE: 147 MMHG

## 2020-01-01 VITALS
HEART RATE: 55 BPM | SYSTOLIC BLOOD PRESSURE: 140 MMHG | BODY MASS INDEX: 23.02 KG/M2 | DIASTOLIC BLOOD PRESSURE: 80 MMHG | OXYGEN SATURATION: 93 % | WEIGHT: 151.38 LBS

## 2020-01-01 VITALS — DIASTOLIC BLOOD PRESSURE: 60 MMHG | SYSTOLIC BLOOD PRESSURE: 110 MMHG | HEART RATE: 52 BPM | RESPIRATION RATE: 16 BRPM

## 2020-01-01 VITALS — HEART RATE: 52 BPM | RESPIRATION RATE: 20 BRPM | DIASTOLIC BLOOD PRESSURE: 66 MMHG | SYSTOLIC BLOOD PRESSURE: 130 MMHG

## 2020-01-01 VITALS
RESPIRATION RATE: 20 BRPM | OXYGEN SATURATION: 98 % | DIASTOLIC BLOOD PRESSURE: 98 MMHG | HEART RATE: 50 BPM | SYSTOLIC BLOOD PRESSURE: 123 MMHG

## 2020-01-01 VITALS
WEIGHT: 154.13 LBS | DIASTOLIC BLOOD PRESSURE: 76 MMHG | HEART RATE: 50 BPM | OXYGEN SATURATION: 96 % | SYSTOLIC BLOOD PRESSURE: 130 MMHG | BODY MASS INDEX: 23.43 KG/M2

## 2020-01-01 VITALS — SYSTOLIC BLOOD PRESSURE: 140 MMHG | DIASTOLIC BLOOD PRESSURE: 70 MMHG | HEART RATE: 60 BPM | RESPIRATION RATE: 20 BRPM

## 2020-01-01 VITALS
OXYGEN SATURATION: 93 % | RESPIRATION RATE: 24 BRPM | DIASTOLIC BLOOD PRESSURE: 60 MMHG | HEART RATE: 65 BPM | SYSTOLIC BLOOD PRESSURE: 130 MMHG

## 2020-01-01 VITALS
RESPIRATION RATE: 24 BRPM | HEART RATE: 61 BPM | OXYGEN SATURATION: 98 % | SYSTOLIC BLOOD PRESSURE: 140 MMHG | DIASTOLIC BLOOD PRESSURE: 70 MMHG

## 2020-01-01 VITALS — HEART RATE: 60 BPM | RESPIRATION RATE: 22 BRPM | SYSTOLIC BLOOD PRESSURE: 150 MMHG | DIASTOLIC BLOOD PRESSURE: 73 MMHG

## 2020-01-01 VITALS
HEART RATE: 58 BPM | OXYGEN SATURATION: 92 % | DIASTOLIC BLOOD PRESSURE: 70 MMHG | SYSTOLIC BLOOD PRESSURE: 120 MMHG | RESPIRATION RATE: 16 BRPM

## 2020-01-01 VITALS — SYSTOLIC BLOOD PRESSURE: 150 MMHG | RESPIRATION RATE: 20 BRPM | HEART RATE: 56 BPM | DIASTOLIC BLOOD PRESSURE: 80 MMHG

## 2020-01-01 VITALS — RESPIRATION RATE: 20 BRPM | DIASTOLIC BLOOD PRESSURE: 70 MMHG | HEART RATE: 60 BPM | SYSTOLIC BLOOD PRESSURE: 140 MMHG

## 2020-01-01 VITALS — HEART RATE: 64 BPM | DIASTOLIC BLOOD PRESSURE: 92 MMHG | OXYGEN SATURATION: 94 % | SYSTOLIC BLOOD PRESSURE: 136 MMHG

## 2020-01-01 VITALS
HEART RATE: 56 BPM | RESPIRATION RATE: 16 BRPM | OXYGEN SATURATION: 92 % | DIASTOLIC BLOOD PRESSURE: 90 MMHG | SYSTOLIC BLOOD PRESSURE: 128 MMHG

## 2020-01-01 VITALS
OXYGEN SATURATION: 92 % | BODY MASS INDEX: 22.54 KG/M2 | WEIGHT: 148.25 LBS | SYSTOLIC BLOOD PRESSURE: 122 MMHG | HEART RATE: 57 BPM | DIASTOLIC BLOOD PRESSURE: 58 MMHG

## 2020-01-01 VITALS
RESPIRATION RATE: 28 BRPM | OXYGEN SATURATION: 91 % | HEART RATE: 64 BPM | SYSTOLIC BLOOD PRESSURE: 120 MMHG | DIASTOLIC BLOOD PRESSURE: 70 MMHG

## 2020-01-01 VITALS
OXYGEN SATURATION: 93 % | SYSTOLIC BLOOD PRESSURE: 130 MMHG | TEMPERATURE: 97 F | DIASTOLIC BLOOD PRESSURE: 78 MMHG | WEIGHT: 164 LBS | HEART RATE: 84 BPM | HEIGHT: 68 IN | BODY MASS INDEX: 24.86 KG/M2

## 2020-01-01 DIAGNOSIS — R06.02 SOB (SHORTNESS OF BREATH): ICD-10-CM

## 2020-01-01 DIAGNOSIS — C34.91 MALIGNANT NEOPLASM OF RIGHT LUNG, UNSPECIFIED PART OF LUNG (HCC): ICD-10-CM

## 2020-01-01 DIAGNOSIS — C34.31 MALIGNANT NEOPLASM OF LOWER LOBE OF RIGHT LUNG (HCC): ICD-10-CM

## 2020-01-01 DIAGNOSIS — I63.9 LEFT SIDED CEREBRAL HEMISPHERE CEREBROVASCULAR ACCIDENT (CVA) (HCC): ICD-10-CM

## 2020-01-01 DIAGNOSIS — I73.9 PERIPHERAL VASCULAR DISEASE (HCC): ICD-10-CM

## 2020-01-01 DIAGNOSIS — J43.1 PANLOBULAR EMPHYSEMA (HCC): ICD-10-CM

## 2020-01-01 DIAGNOSIS — N18.4 CKD (CHRONIC KIDNEY DISEASE) STAGE 4, GFR 15-29 ML/MIN (HCC): ICD-10-CM

## 2020-01-01 DIAGNOSIS — G81.90 HEMIPLEGIA, UNSPECIFIED ETIOLOGY, UNSPECIFIED HEMIPLEGIA TYPE, UNSPECIFIED LATERALITY (HCC): ICD-10-CM

## 2020-01-01 DIAGNOSIS — I20.9 ANGINA PECTORIS (HCC): ICD-10-CM

## 2020-01-01 LAB
ALBUMIN SERPL BCP-MCNC: 3.7 G/DL (ref 3.2–4.9)
ALBUMIN/GLOB SERPL: 1.1 G/DL
ALP SERPL-CCNC: 67 U/L (ref 30–99)
ALT SERPL-CCNC: 20 U/L (ref 2–50)
ANION GAP SERPL CALC-SCNC: 8 MMOL/L (ref 0–11.9)
ANISOCYTOSIS BLD QL SMEAR: ABNORMAL
APPEARANCE UR: ABNORMAL
AST SERPL-CCNC: 20 U/L (ref 12–45)
BACTERIA #/AREA URNS HPF: NEGATIVE /HPF
BACTERIA UR CULT: ABNORMAL
BACTERIA UR CULT: NORMAL
BASOPHILS # BLD AUTO: 0.4 % (ref 0–1.8)
BASOPHILS # BLD: 0.02 K/UL (ref 0–0.12)
BILIRUB SERPL-MCNC: 1 MG/DL (ref 0.1–1.5)
BILIRUB UR QL STRIP.AUTO: NEGATIVE
BUN SERPL-MCNC: 61 MG/DL (ref 8–22)
CALCIUM SERPL-MCNC: 9.1 MG/DL (ref 8.5–10.5)
CHLORIDE SERPL-SCNC: 110 MMOL/L (ref 96–112)
CO2 SERPL-SCNC: 24 MMOL/L (ref 20–33)
COLOR UR: YELLOW
COMMENT 1642: NORMAL
COVID ORDER STATUS COVID19: NORMAL
CREAT SERPL-MCNC: 2.68 MG/DL (ref 0.5–1.4)
EOSINOPHIL # BLD AUTO: 0.09 K/UL (ref 0–0.51)
EOSINOPHIL NFR BLD: 1.8 % (ref 0–6.9)
EPI CELLS #/AREA URNS HPF: NEGATIVE /HPF
ERYTHROCYTE [DISTWIDTH] IN BLOOD BY AUTOMATED COUNT: 57.7 FL (ref 35.9–50)
GLOBULIN SER CALC-MCNC: 3.3 G/DL (ref 1.9–3.5)
GLUCOSE SERPL-MCNC: 128 MG/DL (ref 65–99)
GLUCOSE UR STRIP.AUTO-MCNC: NEGATIVE MG/DL
HCT VFR BLD AUTO: 35.2 % (ref 42–52)
HGB BLD-MCNC: 10.3 G/DL (ref 14–18)
HYALINE CASTS #/AREA URNS LPF: ABNORMAL /LPF
IMM GRANULOCYTES # BLD AUTO: 0.02 K/UL (ref 0–0.11)
IMM GRANULOCYTES NFR BLD AUTO: 0.4 % (ref 0–0.9)
KETONES UR STRIP.AUTO-MCNC: NEGATIVE MG/DL
LEUKOCYTE ESTERASE UR QL STRIP.AUTO: ABNORMAL
LYMPHOCYTES # BLD AUTO: 0.52 K/UL (ref 1–4.8)
LYMPHOCYTES NFR BLD: 10.2 % (ref 22–41)
MACROCYTES BLD QL SMEAR: ABNORMAL
MCH RBC QN AUTO: 29.9 PG (ref 27–33)
MCHC RBC AUTO-ENTMCNC: 29.3 G/DL (ref 33.7–35.3)
MCV RBC AUTO: 102.3 FL (ref 81.4–97.8)
MICRO URNS: ABNORMAL
MICROCYTES BLD QL SMEAR: ABNORMAL
MONOCYTES # BLD AUTO: 0.46 K/UL (ref 0–0.85)
MONOCYTES NFR BLD AUTO: 9 % (ref 0–13.4)
MORPHOLOGY BLD-IMP: NORMAL
NEUTROPHILS # BLD AUTO: 3.98 K/UL (ref 1.82–7.42)
NEUTROPHILS NFR BLD: 78.2 % (ref 44–72)
NITRITE UR QL STRIP.AUTO: NEGATIVE
NRBC # BLD AUTO: 0 K/UL
NRBC BLD-RTO: 0 /100 WBC
NT-PROBNP SERPL IA-MCNC: ABNORMAL PG/ML (ref 0–125)
OVALOCYTES BLD QL SMEAR: NORMAL
PH UR STRIP.AUTO: 5 [PH] (ref 5–8)
PH UR STRIP.AUTO: 5.5 [PH] (ref 5–8)
PH UR STRIP.AUTO: 5.5 [PH] (ref 5–8)
PLATELET # BLD AUTO: 141 K/UL (ref 164–446)
PLATELET BLD QL SMEAR: NORMAL
PMV BLD AUTO: 11.8 FL (ref 9–12.9)
POIKILOCYTOSIS BLD QL SMEAR: NORMAL
POTASSIUM SERPL-SCNC: 4.6 MMOL/L (ref 3.6–5.5)
PROT SERPL-MCNC: 7 G/DL (ref 6–8.2)
PROT UR QL STRIP: 30 MG/DL
PROT UR QL STRIP: 30 MG/DL
PROT UR QL STRIP: NEGATIVE MG/DL
RBC # BLD AUTO: 3.44 M/UL (ref 4.7–6.1)
RBC # URNS HPF: >150 /HPF
RBC # URNS HPF: >150 /HPF
RBC # URNS HPF: ABNORMAL /HPF
RBC BLD AUTO: PRESENT
RBC UR QL AUTO: ABNORMAL
SARS-COV-2 RNA RESP QL NAA+PROBE: NOTDETECTED
SCHISTOCYTES BLD QL SMEAR: NORMAL
SIGNIFICANT IND 70042: ABNORMAL
SIGNIFICANT IND 70042: ABNORMAL
SIGNIFICANT IND 70042: NORMAL
SITE SITE: ABNORMAL
SITE SITE: ABNORMAL
SITE SITE: NORMAL
SODIUM SERPL-SCNC: 142 MMOL/L (ref 135–145)
SOURCE SOURCE: ABNORMAL
SOURCE SOURCE: ABNORMAL
SOURCE SOURCE: NORMAL
SP GR UR STRIP.AUTO: 1.01
SP GR UR STRIP.AUTO: 1.01
SP GR UR STRIP.AUTO: 1.02
SPECIMEN SOURCE: NORMAL
UROBILINOGEN UR STRIP.AUTO-MCNC: 0.2 MG/DL
UROBILINOGEN UR STRIP.AUTO-MCNC: 0.2 MG/DL
UROBILINOGEN UR STRIP.AUTO-MCNC: 1 MG/DL
WBC # BLD AUTO: 5.1 K/UL (ref 4.8–10.8)
WBC #/AREA URNS HPF: ABNORMAL /HPF

## 2020-01-01 PROCEDURE — S9126 HOSPICE CARE, IN THE HOME, P: HCPCS

## 2020-01-01 PROCEDURE — G0299 HHS/HOSPICE OF RN EA 15 MIN: HCPCS

## 2020-01-01 PROCEDURE — T2044 HOSPICE RESPITE CARE: HCPCS

## 2020-01-01 PROCEDURE — 6650990 HC HOSPICE AND HOME CARE RX REV CODE 0250

## 2020-01-01 PROCEDURE — 99215 OFFICE O/P EST HI 40 MIN: CPT | Performed by: FAMILY MEDICINE

## 2020-01-01 PROCEDURE — 71046 X-RAY EXAM CHEST 2 VIEWS: CPT

## 2020-01-01 PROCEDURE — 700102 HCHG RX REV CODE 250 W/ 637 OVERRIDE(OP): Performed by: INTERNAL MEDICINE

## 2020-01-01 PROCEDURE — G0155 HHCP-SVS OF CSW,EA 15 MIN: HCPCS

## 2020-01-01 PROCEDURE — T2045 HOSPICE GENERAL CARE: HCPCS

## 2020-01-01 PROCEDURE — 8041 PR SCP AHA: Performed by: FAMILY MEDICINE

## 2020-01-01 PROCEDURE — 770001 HCHG ROOM/CARE - MED/SURG/GYN PRIV*

## 2020-01-01 PROCEDURE — 99214 OFFICE O/P EST MOD 30 MIN: CPT | Mod: 25 | Performed by: FAMILY MEDICINE

## 2020-01-01 PROCEDURE — 99214 OFFICE O/P EST MOD 30 MIN: CPT | Performed by: FAMILY MEDICINE

## 2020-01-01 PROCEDURE — 700101 HCHG RX REV CODE 250: Performed by: INTERNAL MEDICINE

## 2020-01-01 PROCEDURE — G0156 HHCP-SVS OF AIDE,EA 15 MIN: HCPCS

## 2020-01-01 PROCEDURE — A9270 NON-COVERED ITEM OR SERVICE: HCPCS | Performed by: INTERNAL MEDICINE

## 2020-01-01 PROCEDURE — 80053 COMPREHEN METABOLIC PANEL: CPT

## 2020-01-01 PROCEDURE — U0003 INFECTIOUS AGENT DETECTION BY NUCLEIC ACID (DNA OR RNA); SEVERE ACUTE RESPIRATORY SYNDROME CORONAVIRUS 2 (SARS-COV-2) (CORONAVIRUS DISEASE [COVID-19]), AMPLIFIED PROBE TECHNIQUE, MAKING USE OF HIGH THROUGHPUT TECHNOLOGIES AS DESCRIBED BY CMS-2020-01-R: HCPCS

## 2020-01-01 PROCEDURE — 36415 COLL VENOUS BLD VENIPUNCTURE: CPT

## 2020-01-01 PROCEDURE — 665036 HSPC NOTICE OF ELECTION NOE

## 2020-01-01 PROCEDURE — 83880 ASSAY OF NATRIURETIC PEPTIDE: CPT

## 2020-01-01 PROCEDURE — C9803 HOPD COVID-19 SPEC COLLECT: HCPCS | Performed by: INTERNAL MEDICINE

## 2020-01-01 PROCEDURE — 85025 COMPLETE CBC W/AUTO DIFF WBC: CPT

## 2020-01-01 PROCEDURE — 71250 CT THORAX DX C-: CPT

## 2020-01-01 PROCEDURE — 86480 TB TEST CELL IMMUN MEASURE: CPT

## 2020-01-01 PROCEDURE — 700111 HCHG RX REV CODE 636 W/ 250 OVERRIDE (IP): Performed by: INTERNAL MEDICINE

## 2020-01-01 RX ORDER — MORPHINE SULFATE 100 MG/5ML
5 SOLUTION ORAL
Status: DISCONTINUED | OUTPATIENT
Start: 2020-01-01 | End: 2020-01-01

## 2020-01-01 RX ORDER — CARVEDILOL 6.25 MG/1
6.25 TABLET ORAL 2 TIMES DAILY
Qty: 60 TAB | Refills: 0 | Status: SHIPPED | OUTPATIENT
Start: 2020-01-01

## 2020-01-01 RX ORDER — TERAZOSIN 5 MG/1
10 CAPSULE ORAL EVERY EVENING
Status: DISCONTINUED | OUTPATIENT
Start: 2020-01-01 | End: 2020-01-01 | Stop reason: HOSPADM

## 2020-01-01 RX ORDER — ENALAPRIL MALEATE 10 MG/1
20 TABLET ORAL 2 TIMES DAILY
Qty: 30 TAB | Refills: 1 | Status: SHIPPED | OUTPATIENT
Start: 2020-01-01 | End: 2020-01-01 | Stop reason: SDUPTHER

## 2020-01-01 RX ORDER — CEPHALEXIN 250 MG/1
500 CAPSULE ORAL DAILY
Status: DISCONTINUED | OUTPATIENT
Start: 2020-01-01 | End: 2020-01-01 | Stop reason: HOSPADM

## 2020-01-01 RX ORDER — IPRATROPIUM BROMIDE AND ALBUTEROL SULFATE 2.5; .5 MG/3ML; MG/3ML
3 SOLUTION RESPIRATORY (INHALATION)
Status: DISCONTINUED | OUTPATIENT
Start: 2020-01-01 | End: 2020-01-01 | Stop reason: HOSPADM

## 2020-01-01 RX ORDER — SENNOSIDES A AND B 8.6 MG/1
17.2 TABLET, FILM COATED ORAL 2 TIMES DAILY
Status: DISCONTINUED | OUTPATIENT
Start: 2020-01-01 | End: 2020-01-01 | Stop reason: HOSPADM

## 2020-01-01 RX ORDER — ATROPINE SULFATE 10 MG/ML
2-4 SOLUTION/ DROPS OPHTHALMIC
Status: DISCONTINUED | OUTPATIENT
Start: 2020-01-01 | End: 2020-01-01 | Stop reason: HOSPADM

## 2020-01-01 RX ORDER — BISACODYL 10 MG
10 SUPPOSITORY, RECTAL RECTAL ONCE
Status: COMPLETED | OUTPATIENT
Start: 2020-01-01 | End: 2020-01-01

## 2020-01-01 RX ORDER — SCOLOPAMINE TRANSDERMAL SYSTEM 1 MG/1
1 PATCH, EXTENDED RELEASE TRANSDERMAL
Status: DISCONTINUED | OUTPATIENT
Start: 2020-01-01 | End: 2020-01-01 | Stop reason: HOSPADM

## 2020-01-01 RX ORDER — HALOPERIDOL 5 MG/ML
1-2 INJECTION INTRAMUSCULAR EVERY 4 HOURS PRN
Status: DISCONTINUED | OUTPATIENT
Start: 2020-01-01 | End: 2020-01-01 | Stop reason: HOSPADM

## 2020-01-01 RX ORDER — TAMSULOSIN HYDROCHLORIDE 0.4 MG/1
0.4 CAPSULE ORAL
Status: DISCONTINUED | OUTPATIENT
Start: 2020-01-01 | End: 2020-01-01 | Stop reason: HOSPADM

## 2020-01-01 RX ORDER — ENALAPRIL MALEATE 10 MG/1
20 TABLET ORAL 2 TIMES DAILY
Qty: 360 TAB | Refills: 0 | Status: SHIPPED | OUTPATIENT
Start: 2020-01-01 | End: 2020-01-01 | Stop reason: SDUPTHER

## 2020-01-01 RX ORDER — TRAZODONE HYDROCHLORIDE 50 MG/1
25 TABLET ORAL
Status: DISCONTINUED | OUTPATIENT
Start: 2020-01-01 | End: 2020-01-01

## 2020-01-01 RX ORDER — POLYVINYL ALCOHOL 14 MG/ML
2 SOLUTION/ DROPS OPHTHALMIC PRN
Status: DISCONTINUED | OUTPATIENT
Start: 2020-01-01 | End: 2020-01-01 | Stop reason: HOSPADM

## 2020-01-01 RX ORDER — ACETAMINOPHEN 325 MG/1
650 TABLET ORAL EVERY 4 HOURS PRN
Status: DISCONTINUED | OUTPATIENT
Start: 2020-01-01 | End: 2020-01-01 | Stop reason: HOSPADM

## 2020-01-01 RX ORDER — ALLOPURINOL 100 MG/1
TABLET ORAL
Qty: 90 TAB | Refills: 0 | Status: SHIPPED | OUTPATIENT
Start: 2020-01-01

## 2020-01-01 RX ORDER — TIOTROPIUM BROMIDE INHALATION SPRAY 3.12 UG/1
1 SPRAY, METERED RESPIRATORY (INHALATION) DAILY
Qty: 1 INHALER | Refills: 2 | Status: SHIPPED
Start: 2020-01-01 | End: 2020-01-01

## 2020-01-01 RX ORDER — ENALAPRIL MALEATE 5 MG/1
20 TABLET ORAL TWICE DAILY
Status: DISCONTINUED | OUTPATIENT
Start: 2020-01-01 | End: 2020-01-01 | Stop reason: HOSPADM

## 2020-01-01 RX ORDER — MORPHINE SULFATE 100 MG/5ML
5-10 SOLUTION ORAL
Status: DISCONTINUED | OUTPATIENT
Start: 2020-01-01 | End: 2020-01-01 | Stop reason: HOSPADM

## 2020-01-01 RX ORDER — HALOPERIDOL 2 MG/ML
1 SOLUTION ORAL EVERY 4 HOURS PRN
Status: DISCONTINUED | OUTPATIENT
Start: 2020-01-01 | End: 2020-01-01

## 2020-01-01 RX ORDER — CARVEDILOL 6.25 MG/1
6.25 TABLET ORAL 2 TIMES DAILY WITH MEALS
Status: DISCONTINUED | OUTPATIENT
Start: 2020-01-01 | End: 2020-01-01 | Stop reason: HOSPADM

## 2020-01-01 RX ORDER — FINASTERIDE 5 MG/1
5 TABLET, FILM COATED ORAL DAILY
Status: DISCONTINUED | OUTPATIENT
Start: 2020-01-01 | End: 2020-01-01 | Stop reason: HOSPADM

## 2020-01-01 RX ORDER — ALLOPURINOL 100 MG/1
TABLET ORAL
Qty: 90 TAB | Refills: 0 | Status: SHIPPED | OUTPATIENT
Start: 2020-01-01 | End: 2020-01-01

## 2020-01-01 RX ORDER — CARVEDILOL 6.25 MG/1
6.25 TABLET ORAL 2 TIMES DAILY
Qty: 60 TAB | Refills: 0 | Status: SHIPPED | OUTPATIENT
Start: 2020-01-01 | End: 2020-01-01 | Stop reason: SDUPTHER

## 2020-01-01 RX ORDER — ACETAMINOPHEN 650 MG/1
650 SUPPOSITORY RECTAL EVERY 4 HOURS PRN
Status: DISCONTINUED | OUTPATIENT
Start: 2020-01-01 | End: 2020-01-01 | Stop reason: HOSPADM

## 2020-01-01 RX ORDER — HALOPERIDOL 2 MG/ML
1-2 SOLUTION ORAL EVERY 4 HOURS PRN
Status: DISCONTINUED | OUTPATIENT
Start: 2020-01-01 | End: 2020-01-01 | Stop reason: HOSPADM

## 2020-01-01 RX ORDER — TERAZOSIN 10 MG/1
CAPSULE ORAL
Qty: 90 CAP | Refills: 0 | Status: SHIPPED | OUTPATIENT
Start: 2020-01-01

## 2020-01-01 RX ORDER — MORPHINE SULFATE 100 MG/5ML
10 SOLUTION ORAL EVERY 4 HOURS
Status: DISCONTINUED | OUTPATIENT
Start: 2020-01-01 | End: 2020-01-01 | Stop reason: HOSPADM

## 2020-01-01 RX ORDER — ALBUTEROL SULFATE 90 UG/1
2 AEROSOL, METERED RESPIRATORY (INHALATION) EVERY 4 HOURS PRN
Qty: 1 INHALER | Refills: 1 | Status: SHIPPED
Start: 2020-01-01 | End: 2020-01-01

## 2020-01-01 RX ORDER — ENALAPRIL MALEATE 10 MG/1
20 TABLET ORAL 2 TIMES DAILY
Qty: 400 TAB | Refills: 0 | Status: SHIPPED | OUTPATIENT
Start: 2020-01-01 | End: 2020-09-16

## 2020-01-01 RX ORDER — BISACODYL 10 MG
10 SUPPOSITORY, RECTAL RECTAL
Status: DISCONTINUED | OUTPATIENT
Start: 2020-01-01 | End: 2020-01-01 | Stop reason: HOSPADM

## 2020-01-01 RX ORDER — TRAZODONE HYDROCHLORIDE 50 MG/1
50 TABLET ORAL
Status: DISCONTINUED | OUTPATIENT
Start: 2020-01-01 | End: 2020-01-01 | Stop reason: HOSPADM

## 2020-01-01 RX ADMIN — MORPHINE SULFATE 10 MG: 100 SOLUTION ORAL at 09:57

## 2020-01-01 RX ADMIN — MORPHINE SULFATE 10 MG: 100 SOLUTION ORAL at 17:48

## 2020-01-01 RX ADMIN — TERAZOSIN HYDROCHLORIDE 10 MG: 5 CAPSULE ORAL at 17:16

## 2020-01-01 RX ADMIN — MORPHINE SULFATE 10 MG: 100 SOLUTION ORAL at 14:26

## 2020-01-01 RX ADMIN — ATROPINE SULFATE 4 DROP: 10 SOLUTION/ DROPS OPHTHALMIC at 22:07

## 2020-01-01 RX ADMIN — MORPHINE SULFATE 10 MG: 100 SOLUTION ORAL at 15:31

## 2020-01-01 RX ADMIN — TRAZODONE HYDROCHLORIDE 50 MG: 50 TABLET ORAL at 20:52

## 2020-01-01 RX ADMIN — ENALAPRIL MALEATE 5 MG: 5 TABLET ORAL at 17:18

## 2020-01-01 RX ADMIN — MORPHINE SULFATE 10 MG: 100 SOLUTION ORAL at 12:53

## 2020-01-01 RX ADMIN — MORPHINE SULFATE 10 MG: 100 SOLUTION ORAL at 20:53

## 2020-01-01 RX ADMIN — MORPHINE SULFATE 10 MG: 100 SOLUTION ORAL at 12:10

## 2020-01-01 RX ADMIN — SCOLOPAMINE TRANSDERMAL SYSTEM 1 PATCH: 1 PATCH, EXTENDED RELEASE TRANSDERMAL at 14:27

## 2020-01-01 RX ADMIN — BISACODYL 10 MG: 10 SUPPOSITORY RECTAL at 14:56

## 2020-01-01 RX ADMIN — ATROPINE SULFATE 2 DROP: 10 SOLUTION/ DROPS OPHTHALMIC at 14:29

## 2020-01-01 RX ADMIN — MORPHINE SULFATE 10 MG: 100 SOLUTION ORAL at 21:42

## 2020-01-01 RX ADMIN — MORPHINE SULFATE 10 MG: 100 SOLUTION ORAL at 23:38

## 2020-01-01 RX ADMIN — MORPHINE SULFATE 10 MG: 100 SOLUTION ORAL at 04:19

## 2020-01-01 RX ADMIN — HALOPERIDOL LACTATE 2 MG: 5 INJECTION, SOLUTION INTRAMUSCULAR at 11:39

## 2020-01-01 RX ADMIN — ATROPINE SULFATE 4 DROP: 10 SOLUTION/ DROPS OPHTHALMIC at 17:46

## 2020-01-01 RX ADMIN — MORPHINE SULFATE 5 MG: 100 SOLUTION ORAL at 12:10

## 2020-01-01 RX ADMIN — HALOPERIDOL 2 MG: 2 SOLUTION ORAL at 15:43

## 2020-01-01 RX ADMIN — TAMSULOSIN HYDROCHLORIDE 0.4 MG: 0.4 CAPSULE ORAL at 09:34

## 2020-01-01 RX ADMIN — MORPHINE SULFATE 10 MG: 100 SOLUTION ORAL at 22:35

## 2020-01-01 RX ADMIN — CARVEDILOL 6.25 MG: 6.25 TABLET, FILM COATED ORAL at 17:14

## 2020-01-01 RX ADMIN — MORPHINE SULFATE 10 MG: 100 SOLUTION ORAL at 13:55

## 2020-01-01 RX ADMIN — HALOPERIDOL 2 MG: 2 SOLUTION ORAL at 21:26

## 2020-01-01 RX ADMIN — HALOPERIDOL 1 MG: 2 SOLUTION ORAL at 07:18

## 2020-01-01 RX ADMIN — MORPHINE SULFATE 10 MG: 100 SOLUTION ORAL at 17:13

## 2020-01-01 SDOH — ECONOMIC STABILITY: HOUSING INSECURITY: EVIDENCE OF SMOKING MATERIAL: 0

## 2020-01-01 ASSESSMENT — ENCOUNTER SYMPTOMS
BOWEL PATTERN NORMAL: 1
DESCRIPTION OF MEMORY LOSS: SHORT TERM
DYSPNEA ACTIVITY LEVEL: AT REST
CONTUSION: 1
LAST BOWEL MOVEMENT: 65471
CONSTIPATION: 1
SLEEP QUALITY: ADEQUATE
DYSPNEA ACTIVITY LEVEL: AT REST
STOOL FREQUENCY: LESS THAN DAILY
LAST BOWEL MOVEMENT: 65575
SOMNOLENCE: 1
SLEEP QUALITY: ADEQUATE
SLEEP QUALITY: ADEQUATE
SOMNOLENCE: 1
DYSPNEA ON EXERTION: 1
STOOL FREQUENCY: LESS THAN DAILY
FORGETFULNESS: 1
DYSPNEA ON EXERTION: 1
FATIGUE: 1
DYSPNEA ON EXERTION: 1
CONTUSION: 1
SHORTNESS OF BREATH: 1
DYSPNEA ON EXERTION: 1
DYSPNEA ACTIVITY LEVEL: AT REST
FORGETFULNESS: 1
LAST BOWEL MOVEMENT: 65602
DESCRIPTION OF MEMORY LOSS: SHORT TERM
CONTUSION: 1
DESCRIPTION OF MEMORY LOSS: SHORT TERM
DYSPNEA ACTIVITY LEVEL: AT REST
DYSPNEA ON EXERTION: 1
DYSPNEA ON EXERTION: 1
DESCRIPTION OF MEMORY LOSS: LONG TERM
SLEEP QUALITY: FAIR
DYSPNEA ACTIVITY LEVEL: WHILE SPEAKING
FORGETFULNESS: 1
DIARRHEA: 1
CONTUSION: 1
LAST BOWEL MOVEMENT: 65625
DYSPNEA ACTIVITY LEVEL: AT REST
STOOL FREQUENCY: LESS THAN DAILY
FATIGUES EASILY: 1
SLEEP QUALITY: ADEQUATE
CONSTIPATION: 1
CONTUSION: 1
FATIGUE: 1
DYSPNEA ON EXERTION: 1
FATIGUES EASILY: 1
SLEEP QUALITY: ADEQUATE
LAST BOWEL MOVEMENT: 65460
SLEEP QUALITY: ADEQUATE
SHORTNESS OF BREATH: 1
LAST BOWEL MOVEMENT: 65512
DYSPNEA ACTIVITY LEVEL: AT REST
LAST BOWEL MOVEMENT: 65610
SLEEP QUALITY: ADEQUATE
CONSTIPATION: 1
FATIGUES EASILY: 1
CONTUSION: 1
SHORTNESS OF BREATH: 1
SHORTNESS OF BREATH: 1
DYSPNEA ACTIVITY LEVEL: WHILE SPEAKING
FATIGUE: 1
SHORTNESS OF BREATH: 1
STOOL FREQUENCY: LESS THAN DAILY
STOOL FREQUENCY: LESS THAN DAILY
DESCRIPTION OF MEMORY LOSS: SHORT TERM
SHORTNESS OF BREATH: 1
STOOL FREQUENCY: LESS THAN DAILY
STOOL FREQUENCY: LESS THAN DAILY
SLEEP QUALITY: ADEQUATE
SLEEP QUALITY: FAIR
LAST BOWEL MOVEMENT: 65526
SLEEP QUALITY: FAIR
CONTUSION: 1
DESCRIPTION OF MEMORY LOSS: SHORT TERM
SHORTNESS OF BREATH: 1
DYSPNEA ACTIVITY LEVEL: WHILE SPEAKING
DYSPNEA ON EXERTION: 1
DESCRIPTION OF MEMORY LOSS: LONG TERM
STOOL FREQUENCY: LESS THAN DAILY
BOWEL PATTERN NORMAL: 1
DYSPNEA ACTIVITY LEVEL: WHILE SPEAKING
SLEEP QUALITY: ADEQUATE
DYSPNEA ACTIVITY LEVEL: WHILE SPEAKING
FATIGUES EASILY: 1
FATIGUES EASILY: 1
CHOKING: 1
FORGETFULNESS: 1
DYSPNEA ACTIVITY LEVEL: WHILE SPEAKING
SOMNOLENCE: 1
SLEEP QUALITY: FAIR
DYSPNEA ACTIVITY LEVEL: AT REST
DYSPNEA ON EXERTION: 1
DIARRHEA: 1
LAST BOWEL MOVEMENT: 65545
DEPRESSED MOOD: 1
LAST BOWEL MOVEMENT: 65628
FATIGUE: 1
DESCRIPTION OF MEMORY LOSS: SHORT TERM
STOOL FREQUENCY: LESS THAN DAILY
CONSTIPATION: 1
DEPRESSED MOOD: 1
FATIGUE: 1
DYSPNEA ACTIVITY LEVEL: WHILE SPEAKING
SOMNOLENCE: 1
DYSPNEA ON EXERTION: 1
FATIGUE: 1
STOOL FREQUENCY: LESS THAN DAILY
DESCRIPTION OF MEMORY LOSS: SHORT TERM
DYSPNEA ACTIVITY LEVEL: AT REST
DYSPNEA ACTIVITY LEVEL: WHILE SPEAKING
FORGETFULNESS: 1
DYSPNEA ACTIVITY LEVEL: WHILE SPEAKING
DYSPNEA ON EXERTION: 1
FATIGUE: 1
SHORTNESS OF BREATH: 1
STOOL FREQUENCY: LESS THAN DAILY
DESCRIPTION OF MEMORY LOSS: LONG TERM
CONSTIPATION: 1
DESCRIPTION OF MEMORY LOSS: SHORT TERM
LAST BOWEL MOVEMENT: 65541
SLEEP QUALITY: FAIR
DYSPNEA ACTIVITY LEVEL: WHILE SPEAKING
DIARRHEA: 1
DYSPNEA ON EXERTION: 1
SHORTNESS OF BREATH: 1
DESCRIPTION OF MEMORY LOSS: SHORT TERM
DIZZINESS: 1
FORGETFULNESS: 1
DESCRIPTION OF MEMORY LOSS: SHORT TERM
DESCRIPTION OF MEMORY LOSS: SHORT TERM
LAST BOWEL MOVEMENT: 65568
SLEEP QUALITY: ADEQUATE
STOOL FREQUENCY: LESS THAN DAILY
CONSTIPATION: 1
CONSTIPATION: 1
SHORTNESS OF BREATH: 1
FATIGUE: 1
DYSPNEA ACTIVITY LEVEL: WHILE SPEAKING
LAST BOWEL MOVEMENT: 65562
DYSPNEA ON EXERTION: 1
DYSPNEA ACTIVITY LEVEL: WHILE SPEAKING
CONTUSION: 1
DIARRHEA: 1
STOOL FREQUENCY: LESS THAN DAILY
DYSPNEA ON EXERTION: 1
DYSPNEA ACTIVITY LEVEL: AT REST
CONTUSION: 1
FATIGUE: 1
STOOL FREQUENCY: LESS THAN DAILY
BOWEL PATTERN NORMAL: 1
CONSTIPATION: 1
CHOKING: 1
BOWEL PATTERN NORMAL: 1
SOMNOLENCE: 1
SHORTNESS OF BREATH: 1
SLEEP QUALITY: FAIR
STOOL FREQUENCY: LESS THAN DAILY
SLEEP QUALITY: ADEQUATE
FATIGUES EASILY: 1
CONTUSION: 1
FATIGUES EASILY: 1
SLEEP QUALITY: ADEQUATE
LAST BOWEL MOVEMENT: 65589
SLEEP QUALITY: ADEQUATE
DYSPNEA ACTIVITY LEVEL: AT REST
BOWEL PATTERN NORMAL: 1
FATIGUE: 1
SOMNOLENCE: 1
STOOL FREQUENCY: LESS THAN DAILY
FATIGUE: 1
DYSPNEA ACTIVITY LEVEL: WHILE SPEAKING
CONSTIPATION: 1
DYSPNEA ACTIVITY LEVEL: AT REST
AGITATION: 1
DYSPNEA ACTIVITY LEVEL: WHILE SPEAKING
DYSPNEA ACTIVITY LEVEL: AT REST
SOMNOLENCE: 1
LAST BOWEL MOVEMENT: 65477
DYSPNEA ACTIVITY LEVEL: WHILE SPEAKING
CHOKING: 1
LAST BOWEL MOVEMENT: 65628
DYSPNEA ACTIVITY LEVEL: WHILE SPEAKING
SOMNOLENCE: 1
SLEEP QUALITY: FAIR
DYSPNEA ACTIVITY LEVEL: WHILE SPEAKING
SHORTNESS OF BREATH: 1
LAST BOWEL MOVEMENT: 65587
FATIGUE: 1
DIZZINESS: 1
FATIGUES EASILY: 1
DIARRHEA: 1
SHORTNESS OF BREATH: 1
SHORTNESS OF BREATH: 1
SLEEP QUALITY: FAIR
LAST BOWEL MOVEMENT: 65520
STOOL FREQUENCY: LESS THAN DAILY
SOMNOLENCE: 1
BOWEL PATTERN NORMAL: 1
SHORTNESS OF BREATH: 1
DYSPNEA ON EXERTION: 1
FATIGUES EASILY: 1
SHORTNESS OF BREATH: 1
SOMNOLENCE: 1
STOOL FREQUENCY: LESS THAN DAILY
DESCRIPTION OF MEMORY LOSS: SHORT TERM
DESCRIPTION OF MEMORY LOSS: SHORT TERM
DYSPNEA ACTIVITY LEVEL: AT REST
SOMNOLENCE: 1
LAST BOWEL MOVEMENT: 65621
LAST BOWEL MOVEMENT: 65487
CONSTIPATION: 1
SOMNOLENCE: 1
SOMNOLENCE: 1
DIZZINESS: 1
FATIGUES EASILY: 1
SLEEP QUALITY: FAIR
DESCRIPTION OF MEMORY LOSS: SHORT TERM
SHORTNESS OF BREATH: 1
FATIGUES EASILY: 1
BOWEL PATTERN NORMAL: 1
DYSPNEA ACTIVITY LEVEL: WHILE SPEAKING
STOOL FREQUENCY: LESS THAN DAILY
DESCRIPTION OF MEMORY LOSS: LONG TERM
LAST BOWEL MOVEMENT: 65617
STOOL FREQUENCY: LESS THAN DAILY
DYSPNEA ACTIVITY LEVEL: WHILE SPEAKING
DYSPNEA ACTIVITY LEVEL: AT REST
SOMNOLENCE: 1
CONTUSION: 1
CONTUSION: 1
STOOL FREQUENCY: LESS THAN DAILY
SHORTNESS OF BREATH: 1
DYSPNEA ON EXERTION: 1
DYSPNEA ACTIVITY LEVEL: AT REST
FORGETFULNESS: 1
CHOKING: 1
CONTUSION: 1
FORGETFULNESS: 1
DYSPNEA ON EXERTION: 1
FATIGUE: 1
DYSPNEA ACTIVITY LEVEL: WHILE SPEAKING
CONSTIPATION: 1
DYSPNEA ON EXERTION: 1
SLEEP QUALITY: ADEQUATE
FATIGUE: 1
SHORTNESS OF BREATH: 1
DYSPNEA ACTIVITY LEVEL: AT REST
DYSPNEA ACTIVITY LEVEL: AT REST
FATIGUES EASILY: 1
FATIGUES EASILY: 1
LAST BOWEL MOVEMENT: 65460
CONSTIPATION: 1
SHORTNESS OF BREATH: 1
STOOL FREQUENCY: LESS THAN DAILY
LAST BOWEL MOVEMENT: 65634
DIARRHEA: 1
FATIGUES EASILY: 1
CONTUSION: 1
CONTUSION: 1
FATIGUE: 1
SOMNOLENCE: 1
DESCRIPTION OF MEMORY LOSS: SHORT TERM
SOMNOLENCE: 1
DYSPNEA ACTIVITY LEVEL: WHILE SPEAKING
DEPRESSED MOOD: 1
BOWEL PATTERN NORMAL: 1
FORGETFULNESS: 1
DIARRHEA: 1
DYSPNEA ACTIVITY LEVEL: WHILE SPEAKING
FATIGUES EASILY: 1
DYSPNEA ON EXERTION: 1
DYSPNEA ACTIVITY LEVEL: AT REST
AGITATION: 1
FATIGUES EASILY: 1
CONTUSION: 1
FATIGUES EASILY: 1
LAST BOWEL MOVEMENT: 65628
DESCRIPTION OF MEMORY LOSS: SHORT TERM
SHORTNESS OF BREATH: 1
SHORTNESS OF BREATH: 1
STOOL FREQUENCY: LESS THAN DAILY
FATIGUES EASILY: 1
DYSPNEA ON EXERTION: 1
FATIGUE: 1
DYSPNEA ACTIVITY LEVEL: WHILE SPEAKING
FATIGUE: 1
DIZZINESS: 1
DYSPNEA ON EXERTION: 1
DESCRIPTION OF MEMORY LOSS: SHORT TERM
SOMNOLENCE: 1
CONTUSION: 1
SLEEP QUALITY: ADEQUATE
SLEEP QUALITY: ADEQUATE
CONTUSION: 1
DYSPNEA ON EXERTION: 1
DYSPNEA ACTIVITY LEVEL: WHILE SPEAKING
CONSTIPATION: 1
FATIGUE: 1
SHORTNESS OF BREATH: 1
FATIGUE: 1
SOMNOLENCE: 1
FATIGUES EASILY: 1
CONSTIPATION: 1
DYSPNEA ACTIVITY LEVEL: AT REST
BOWEL PATTERN NORMAL: 1
SLEEP QUALITY: ADEQUATE
DYSPNEA ACTIVITY LEVEL: AT REST
SOMNOLENCE: 1
DYSPNEA ACTIVITY LEVEL: WHILE SPEAKING
SLEEP QUALITY: ADEQUATE
DESCRIPTION OF MEMORY LOSS: SHORT TERM
LAST BOWEL MOVEMENT: 65533
LAST BOWEL MOVEMENT: 65554
LAST BOWEL MOVEMENT: 65489
DYSPNEA ACTIVITY LEVEL: WHILE SPEAKING
DYSPNEA ON EXERTION: 1
DYSPNEA ON EXERTION: 1
STOOL FREQUENCY: LESS THAN DAILY
SOMNOLENCE: 1
DIZZINESS: 1
SLEEP QUALITY: ADEQUATE
DESCRIPTION OF MEMORY LOSS: SHORT TERM
FATIGUES EASILY: 1
CHOKING: 1
DYSPNEA ACTIVITY LEVEL: AT REST
CHOKING: 1
FATIGUE: 1
FATIGUE: 1
DESCRIPTION OF MEMORY LOSS: SHORT TERM
BOWEL PATTERN NORMAL: 1
DYSPNEA ACTIVITY LEVEL: WHILE SPEAKING
SHORTNESS OF BREATH: 1
DESCRIPTION OF MEMORY LOSS: SHORT TERM
FATIGUE: 1
CONSTIPATION: 1
DESCRIPTION OF MEMORY LOSS: SHORT TERM
DYSPNEA ACTIVITY LEVEL: AT REST
DYSPNEA ACTIVITY LEVEL: WHILE SPEAKING
DYSPNEA ACTIVITY LEVEL: AT REST
FATIGUES EASILY: 1
SOMNOLENCE: 1
DIARRHEA: 1
STOOL FREQUENCY: LESS THAN DAILY
SOMNOLENCE: 1
FATIGUES EASILY: 1
SHORTNESS OF BREATH: 1
CONTUSION: 1
CONTUSION: 1
DYSPNEA ON EXERTION: 1
DESCRIPTION OF MEMORY LOSS: SHORT TERM
CONTUSION: 1
LAST BOWEL MOVEMENT: 65505
LAST BOWEL MOVEMENT: 65484
FORGETFULNESS: 1
SHORTNESS OF BREATH: 1
FATIGUES EASILY: 1
DESCRIPTION OF MEMORY LOSS: SHORT TERM
DYSPNEA ACTIVITY LEVEL: WHILE SPEAKING
CONTUSION: 1
DESCRIPTION OF MEMORY LOSS: SHORT TERM
SOMNOLENCE: 1
DYSPNEA ACTIVITY LEVEL: WHILE SPEAKING
CONSTIPATION: 1
DESCRIPTION OF MEMORY LOSS: SHORT TERM
SLEEP QUALITY: ADEQUATE
FORGETFULNESS: 1
DIARRHEA: 1
DYSPNEA ACTIVITY LEVEL: AT REST
SLEEP QUALITY: FAIR
BOWEL PATTERN NORMAL: 1
DYSPNEA ON EXERTION: 1
SOMNOLENCE: 1
FATIGUE: 1
FATIGUES EASILY: 1
STOOL FREQUENCY: LESS THAN DAILY
CHOKING: 1
FORGETFULNESS: 1
FATIGUES EASILY: 1
FORGETFULNESS: 1
DYSPNEA ACTIVITY LEVEL: AT REST
SLEEP QUALITY: ADEQUATE
FATIGUE: 1
SLEEP QUALITY: ADEQUATE
FATIGUE: 1
CONTUSION: 1
FATIGUES EASILY: 1
AGITATION: 1
SLEEP QUALITY: FAIR
DYSPNEA ON EXERTION: 1
FATIGUES EASILY: 1
FORGETFULNESS: 1
STOOL FREQUENCY: LESS THAN DAILY
STOOL FREQUENCY: LESS THAN DAILY
LAST BOWEL MOVEMENT: 65596
SHORTNESS OF BREATH: 1
DYSPNEA ON EXERTION: 1
FATIGUE: 1
CONTUSION: 1
DIZZINESS: 1
DESCRIPTION OF MEMORY LOSS: SHORT TERM
FATIGUE: 1
CONTUSION: 1
DYSPNEA ACTIVITY LEVEL: AT REST
FATIGUES EASILY: 1
CHOKING: 1

## 2020-01-01 ASSESSMENT — PAIN SCALES - WONG BAKER
WONGBAKER_NUMERICALRESPONSE: DOESN'T HURT AT ALL
WONGBAKER_NUMERICALRESPONSE: HURTS A LITTLE MORE
WONGBAKER_NUMERICALRESPONSE: DOESN'T HURT AT ALL

## 2020-01-01 ASSESSMENT — SOCIAL DETERMINANTS OF HEALTH (SDOH)
ACTIVE STRESSOR - HEALTH CHANGES: 1
ACTIVE STRESSOR - NO STRESS FACTORS: 1
ACTIVE STRESSOR - NO STRESS FACTORS: 1
ACTIVE STRESSOR - HEALTH CHANGES: 1
ACTIVE STRESSOR - NO STRESS FACTORS: 1
ACTIVE STRESSOR - HEALTH CHANGES: 1
ACTIVE STRESSOR - HEALTH CHANGES: 1
ACTIVE STRESSOR - NO STRESS FACTORS: 1
ACTIVE STRESSOR - NO STRESS FACTORS: 1
ACTIVE STRESSOR - HEALTH CHANGES: 1
ACTIVE STRESSOR - NO STRESS FACTORS: 1
ACTIVE STRESSOR - HEALTH CHANGES: 1
ACTIVE STRESSOR - NO STRESS FACTORS: 1
ACTIVE STRESSOR - HEALTH CHANGES: 1
ACTIVE STRESSOR - NO STRESS FACTORS: 1
ACTIVE STRESSOR - HEALTH CHANGES: 1
ACTIVE STRESSOR - HEALTH CHANGES: 1
ACTIVE STRESSOR - NO STRESS FACTORS: 1
ACTIVE STRESSOR - HEALTH CHANGES: 1
ACTIVE STRESSOR - NO STRESS FACTORS: 1
ACTIVE STRESSOR - HEALTH CHANGES: 1

## 2020-01-01 ASSESSMENT — ACTIVITIES OF DAILY LIVING (ADL)
MONEY MANAGEMENT (EXPENSES/BILLS): INDEPENDENT
MONEY MANAGEMENT (EXPENSES/BILLS): TOTALLY DEPENDENT
MONEY MANAGEMENT (EXPENSES/BILLS): INDEPENDENT

## 2020-01-01 ASSESSMENT — FIBROSIS 4 INDEX
FIB4 SCORE: 2.76
FIB4 SCORE: 2.73
FIB4 SCORE: 2.76
FIB4 SCORE: 2.73
FIB4 SCORE: 2.76
FIB4 SCORE: 2.76
FIB4 SCORE: 2.73
FIB4 SCORE: 2.76

## 2020-01-01 ASSESSMENT — COGNITIVE AND FUNCTIONAL STATUS - GENERAL
MOVING FROM LYING ON BACK TO SITTING ON SIDE OF FLAT BED: A LITTLE
PERSONAL GROOMING: A LITTLE
CLIMB 3 TO 5 STEPS WITH RAILING: A LITTLE
SUGGESTED CMS G CODE MODIFIER MOBILITY: CK
MOBILITY SCORE: 18
EATING MEALS: A LITTLE
SUGGESTED CMS G CODE MODIFIER DAILY ACTIVITY: CK
DAILY ACTIVITIY SCORE: 18
STANDING UP FROM CHAIR USING ARMS: A LITTLE
DRESSING REGULAR UPPER BODY CLOTHING: A LITTLE
HELP NEEDED FOR BATHING: A LITTLE
TURNING FROM BACK TO SIDE WHILE IN FLAT BAD: A LITTLE
MOVING TO AND FROM BED TO CHAIR: A LITTLE
TOILETING: A LITTLE
DRESSING REGULAR LOWER BODY CLOTHING: A LITTLE
WALKING IN HOSPITAL ROOM: A LITTLE

## 2020-01-01 ASSESSMENT — PATIENT HEALTH QUESTIONNAIRE - PHQ9
CLINICAL INTERPRETATION OF PHQ2 SCORE: 0
CLINICAL INTERPRETATION OF PHQ2 SCORE: 0

## 2020-01-18 NOTE — TELEPHONE ENCOUNTER
ESTABLISHED PATIENT PRE-VISIT PLANNING     Patient was NOT contacted to complete PVP.    1.  Reviewed notes from the last few office visits within the medical group: Yes    2.  If any orders were placed at last visit or intended to be done for this visit (i.e. 6 mos follow-up), do we have Results/Consult Notes?        •  Labs - Labs were not ordered at last office visit.       •  Imaging - Imaging was not ordered at last office visit.       •  Referrals - No referrals were ordered at last office visit.    3. Is this appointment scheduled as a Hospital Follow-Up? No    4.  Immunizations were updated in Epic using WebIZ?: No WebIZ record       •  Web Iz Recommendations:     5.  Patient is due for the following Health Maintenance Topics:   Health Maintenance Due   Topic Date Due   • Annual Wellness Visit  07/21/1933   • DIABETES MONOFILAMENT / LE EXAM  01/21/1934   • IMM DTaP/Tdap/Td Vaccine (1 - Tdap) 07/21/1944   • RETINAL SCREENING  07/21/1951   • IMM HEP B VACCINE (1 of 3 - Risk 3-dose series) 07/21/1952   • IMM ZOSTER VACCINES (1 of 2) 07/21/1983   • IMM PNEUMOCOCCAL VACCINE: 65+ Years (1 of 2 - PCV13) 07/21/1998   • ANNUAL PFT SCREENING-FEV1 AND FEV/FVC RATIO / SPIROMETRY  12/03/2009   • URINE ACR / MICROALBUMIN  02/08/2018   • A1C SCREENING  06/11/2018   • FASTING LIPID PROFILE  12/12/2018   • IMM INFLUENZA (1) 09/01/2019       6. Orders for overdue Health Maintenance topics pended in Pre-Charting? NO    7.  AHA (MDX) form printed for Provider? YES    8.  Patient was NOT informed to arrive 15 min prior to their scheduled appointment and bring in their medication bottles.

## 2020-01-20 PROBLEM — G81.90 HEMIPLEGIA (HCC): Status: ACTIVE | Noted: 2020-01-01

## 2020-01-20 NOTE — ASSESSMENT & PLAN NOTE
New problem.  The patient reports that he has had intermittent chest pain at rest.  He reports that his chest pressure.  It has awoken him from sleep at night.  He states it can become somewhat severe.  He has not tried using his nitro for this.  He has not seen his cardiologist in 2 years.  Unsure if it is caused by exertion as patient states he never exerts himself.  He does feel that it may be worse when laying flat.

## 2020-01-20 NOTE — PROGRESS NOTES
Annual Health Assessment Questions:    1.  Are you currently engaging in any exercise or physical activity? No    2.  How would you describe your mood or emotional well-being today? fair    3.  Have you had any falls in the last year? No    4.  Have you noticed any problems with your balance or had difficulty walking? Yes    5.  In the last six months have you experienced any leakage of urine? Yes    6. DPA/Advanced Directive: Patient has Advanced Directive on file.

## 2020-01-20 NOTE — ASSESSMENT & PLAN NOTE
Chronic problem.  Last labs done July 2019 with a GFR of 19.  The patient is continuing to urinate normally.  He reports that he would never do dialysis in the case of kidney failure.

## 2020-01-20 NOTE — ASSESSMENT & PLAN NOTE
Patient had a mass found on CT scan April 2017.  It was followed by a PET CT scan 6 months later, it did not have significant growth or change.  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 Patient does have heavy smoking history, however has declined further workup and evaluation.

## 2020-01-20 NOTE — ASSESSMENT & PLAN NOTE
Chronic problem.  Patient continues to have claudication, however, this is not an issue as he does not walk much.  Generally only ambulates around his apartment.  Currently on Plavix and atorvastatin.

## 2020-01-20 NOTE — ASSESSMENT & PLAN NOTE
Chronic problem.  The patient had a stroke with some mild right-sided deficits.  However, able to ambulate without issue.  Functional on the right.

## 2020-01-20 NOTE — PROGRESS NOTES
Subjective:     CC: Diagnoses of Angina pectoris (CMS-HCC), Panlobular emphysema (HCC), Malignant neoplasm of lower lobe of right lung (HCC), Hemiplegia, unspecified etiology, unspecified hemiplegia type, unspecified laterality (HCC), Peripheral vascular disease (CMS-HCC), and CKD (chronic kidney disease) stage 4, GFR 15-29 ml/min (HCC) were pertinent to this visit.    HPI: Patient is a 86 y.o. male established patient who presents today with concern regarding angina.  The patient is brought in by his daughter.  He would like limited intervention.    Angina pectoris (CMS-HCC)  New problem.  The patient reports that he has had intermittent chest pain at rest.  He reports that his chest pressure.  It has awoken him from sleep at night.  He states it can become somewhat severe.  He has not tried using his nitro for this.  He has not seen his cardiologist in 2 years.  Unsure if it is caused by exertion as patient states he never exerts himself.  He does feel that it may be worse when laying flat.    COPD (chronic obstructive pulmonary disease) (CMS-HCC) (HCC)  Patient remains fairly asymptomatic despite 80-pack-year smoking history.  He does not use oxygen    Lung cancer (CMS-HCC) (HCC)  Patient had a mass found on CT scan April 2017.  It was followed by a PET CT scan 6 months later, it did not have significant growth or change.  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 Patient does have heavy smoking history, however has declined further workup and evaluation.    Hemiplegia (HCC)  Chronic problem.  The patient had a stroke with some mild right-sided deficits.  However, able to ambulate without issue.  Functional on the right.    Peripheral vascular disease (CMS-HCC)  Chronic problem.  Patient continues to have claudication, however, this is not an issue as he does not walk much.  Generally only ambulates around his apartment.  Currently on Plavix and atorvastatin.    CKD (chronic kidney disease) stage 4, GFR 15-29  ml/min (CMS-[facility]) ([facility])  Chronic problem.  Last labs done 2019 with a GFR of 19.  The patient is continuing to urinate normally.  He reports that he would never do dialysis in the case of kidney failure.        Past Medical History:   Diagnosis Date   • AVM (arteriovenous malformation) of colon    • Back pain    • Chickenpox    • Chronic airway obstruction, not elsewhere classified    • Congestive heart failure ([facility])    • Depression    • Diabetes    • Diphtheria    • Emphysema of lung ([facility])    • Heart murmur    • Hypercholesteremia    • Hypertension    • Infectious disease    • Kidney disease    • Other and unspecified angina pectoris    • PVD (peripheral vascular disease) ([facility])    • Snoring    • Stroke ([facility])    • Unspecified cataract     right eye    • Urinary bladder disorder    • Urinary incontinence        Social History     Tobacco Use   • Smoking status: Former Smoker     Packs/day: 2.00     Years: 40.00     Pack years: 80.00     Last attempt to quit: 1982     Years since quittin.0   • Smokeless tobacco: Never Used   • Tobacco comment: Hx smoking x 40 yrs. Quit    Substance Use Topics   • Alcohol use: No   • Drug use: No       Current Outpatient Medications Ordered in Epic   Medication Sig Dispense Refill   • allopurinol (ZYLOPRIM) 100 MG Tab TAKE 1 TABLET BY MOUTH EVERY DAY 90 Tab 0   • terazosin (HYTRIN) 10 MG capsule Take 1 Cap by mouth every day. 90 Cap 2   • atorvastatin (LIPITOR) 10 MG Tab      • carvedilol (COREG) 12.5 MG Tab Take 1 Tab by mouth 2 times a day, with meals. 1/2 tab bid 100 Tab 2   • oxybutynin SR (DITROPAN-XL) 10 MG CR tablet      • enalapril (VASOTEC) 10 MG Tab Take 2 Tabs by mouth 2 times a day. 180 Tab 3   • nitroglycerin (NITROSTAT) 0.4 MG SL Tab Place 0.4 mg under tongue. TAKE 1 TAB EVERY 5 MIN X 3 THEN CALL HOSPICE     • lorazepam (LORAZEPAM INTENSOL) 2 MG/ML Conc Take 0.5 mg by mouth every four hours as needed (ANXIETY/ AGITATION).     • clopidogrel (PLAVIX) 75  "MG Tab Take 1 Tab by mouth every morning. 1 Tab 0   • morphine (ROXANOL) 20 MG/ML Solution Take 5 mg by mouth every 2 hours as needed.     • sennosides-docusate sodium (SENOKOT-S) 8.6-50 MG tablet Take 1 Tab by mouth 2 times a day.     • bisacodyl (DULCOLAX) 10 MG Suppos Insert 10 mg in rectum as needed.     • atropine 1 % Solution Take 2 Drops by mouth every four hours as needed.     • finasteride (PROSCAR) 5 MG Tab Take 5 mg by mouth every day.     • tamsulosin (FLOMAX) 0.4 MG capsule Take 0.4 mg by mouth ONE-HALF HOUR AFTER BREAKFAST.     • Cholecalciferol (VITAMIN D) 2000 UNIT TABS Take 4,000 Units by mouth every day.     • Ferrous Sulfate (IRON) 325 (65 FE) MG TABS Take 1 Tab by mouth every morning.       No current [facility]-ordered facility-administered medications on file.        Allergies:  Nkda [no known drug allergy]    Health Maintenance: Completed    ROS:  ENT: no sore throat, no hearing loss, no bloody nose  Pulm: + sob, no cough  CV: + chest pain, no palpitations  GI: no nausea/vomiting, no diarrhea        Objective:       Exam:  /82 (BP Location: Right arm, Patient Position: Sitting, BP Cuff Size: Adult)   Pulse 62   Temp 36.5 °C (97.7 °F) (Temporal)   Resp 14   Ht 1.778 m (5' 10\")   Wt 77.1 kg (170 lb)   SpO2 98%   BMI 24.39 kg/m²  Body mass index is 24.39 kg/m².    General: Normal appearing. No distress.  HEAD: NCAT  EYES: conjunctiva clear, lids without ptosis, pupils equal and reactive to light  EARS: ears normal shape and contour.  MOUTH: normal dentition   Neck:  Normal ROM  Pulmonary: Clear to auscultation bilaterally, no wheezes rales or rhonchi.  Normal effort. Normal respiratory rate.  Cardiovascular: Regular rate and rhythm, no murmurs rubs or gallops.  Well perfused. No LE edema  Neurologic: Grossly normal, no focal deficits  Skin: Warm and dry.  No obvious lesions.  Musculoskeletal: Normal gait and station.   Psych: Normal mood and affect. Alert and oriented x3. Judgment and " insight is normal.      Labs: 7/22/2019 results reviewed and discussed with the patient, questions answered.    Assessment & Plan:     86 y.o. male with the following -     1. Angina pectoris (CMS-[facility])  New problem.  The patient reports chest pressure on and off for the past few weeks.  EKG done in office today, interpreted by myself.  It showed sinus rhythm with a left bundle branch block.  This is unchanged since his last EKG in 2017.  Long discussion today on whether or not the patient would like to move forward with further work-up for ischemia.  We discussed that a nuclear medicine stress test would be helpful, however, the patient has severe stage IV chronic kidney disease with a GFR of 19.  He states that he would never go on dialysis and thus would not want to take the chance withthe contrast load needed to do a heart cath if his stress test was abnormal.  As such, I advised the patient to use his nitro as needed for chest pain and we will avoid further work-up unless he changes his mind.  He declines to see the cardiologist.    2. Panlobular emphysema (HCC)  Chronic problem.  The patient does note some shortness of breath with any exertion.  However, he declines to use his supplemental oxygen at home.  He states he generally does just fine.  He declines further treatment for this.    3. Malignant neoplasm of lower lobe of right lung (HCC)  Chronic problem.  The patient declines any treatment for his cancer.  Last PET scan done in 2017.    4. Hemiplegia, unspecified etiology, unspecified hemiplegia type, unspecified laterality (HCC)  Chronic problem, quite mild.  Discussed physical therapy today to help with his imbalance.  The patient declines.    5. Peripheral vascular disease (CMS-HCC)  Chronic problem.  Tolerable given that the patient generally only walks around his apartment.  He is hesitant to start any more medications.  Currently on Plavix and statin.    6. CKD (chronic kidney disease) stage 4, GFR  15-29 ml/min (HCC)  Chronic problem.  Last labs done in July 2019.  The patient is quite adamant that he would never go on dialysis.  Plan to repeat labs in July 2020.    Return in about 6 months (around 7/20/2020).    Please note that this dictation was created using voice recognition software. I have made every reasonable attempt to correct obvious errors, but I expect that there are errors of grammar and possibly content that I did not discover before finalizing the note.

## 2020-02-13 NOTE — TELEPHONE ENCOUNTER
Patients step daughter called in stating that the patient is having trouble breathing this morning. They wanted to try and come in today however Dr Hutchison doesn't have any openings and I recommended ER. Patients daughter stated that he doesn't want to go to urgent care or ER. Patients daughter asked if Dr Hutchison would send an inhaler to patients pharmacy.

## 2020-02-14 NOTE — TELEPHONE ENCOUNTER
Called and notified patient. He stated understanding and stated he was going to call his daughter.

## 2020-02-25 NOTE — PROGRESS NOTES
Subjective:     CC: Diagnoses of SOB (shortness of breath) and CKD (chronic kidney disease) stage 4, GFR 15-29 ml/min ([facility]) were pertinent to this visit.    HPI: Patient is a 86 y.o. male established patient who presents today with concerns regarding SOB.       SOB (shortness of breath)  New problem. Has been going on for quite some time. The patient feels that it is worsening. Very SOB with exerting himself at all, ie walking around his apt. No Orthopnea. He does have known adenocarcinoma of the lung.  He has elected not to move forward with treatment.  Last CT scan was 2017.  The patient had a long smoking history.  He does have a history of COPD although not on any breathing medications.  The patient does state that he would not want to move forward with treatment of his cancer still.  The patient was on hospice but graduated out after a year.  The patient also states that he has been having some issues with aspirating thin fluids.  This is been becoming slightly more frequent over the past couple weeks.  He denies any fevers or chills.  No significant cough.      Past Medical History:   Diagnosis Date   • AVM (arteriovenous malformation) of colon    • Back pain    • Chickenpox    • Chronic airway obstruction, not elsewhere classified    • Congestive heart failure ([facility])    • Depression    • Diabetes    • Diphtheria    • Emphysema of lung ([facility])    • Heart murmur    • Hypercholesteremia    • Hypertension    • Infectious disease    • Kidney disease    • Other and unspecified angina pectoris    • PVD (peripheral vascular disease) ([facility])    • Snoring    • Stroke ([facility])    • Unspecified cataract     right eye    • Urinary bladder disorder    • Urinary incontinence        Social History     Tobacco Use   • Smoking status: Former Smoker     Packs/day: 2.00     Years: 40.00     Pack years: 80.00     Last attempt to quit: 1982     Years since quittin.1   • Smokeless tobacco: Never Used   • Tobacco  comment: Hx smoking x 40 yrs. Quit 1983   Substance Use Topics   • Alcohol use: No   • Drug use: No       Current Outpatient Medications Ordered in Epic   Medication Sig Dispense Refill   • albuterol 108 (90 Base) MCG/ACT Aero Soln inhalation aerosol Inhale 2 Puffs by mouth every four hours as needed for Shortness of Breath. 1 Inhaler 1   • allopurinol (ZYLOPRIM) 100 MG Tab TAKE 1 TABLET BY MOUTH EVERY DAY 90 Tab 0   • terazosin (HYTRIN) 10 MG capsule Take 1 Cap by mouth every day. 90 Cap 2   • atorvastatin (LIPITOR) 10 MG Tab      • carvedilol (COREG) 12.5 MG Tab Take 1 Tab by mouth 2 times a day, with meals. 1/2 tab bid 100 Tab 2   • enalapril (VASOTEC) 10 MG Tab Take 2 Tabs by mouth 2 times a day. 180 Tab 3   • nitroglycerin (NITROSTAT) 0.4 MG SL Tab Place 0.4 mg under tongue. TAKE 1 TAB EVERY 5 MIN X 3 THEN CALL HOSPICE     • lorazepam (LORAZEPAM INTENSOL) 2 MG/ML Conc Take 0.5 mg by mouth every four hours as needed (ANXIETY/ AGITATION).     • clopidogrel (PLAVIX) 75 MG Tab Take 1 Tab by mouth every morning. 1 Tab 0   • sennosides-docusate sodium (SENOKOT-S) 8.6-50 MG tablet Take 1 Tab by mouth 2 times a day.     • bisacodyl (DULCOLAX) 10 MG Suppos Insert 10 mg in rectum as needed.     • finasteride (PROSCAR) 5 MG Tab Take 5 mg by mouth every day.     • tamsulosin (FLOMAX) 0.4 MG capsule Take 0.4 mg by mouth ONE-HALF HOUR AFTER BREAKFAST.     • Cholecalciferol (VITAMIN D) 2000 UNIT TABS Take 4,000 Units by mouth every day.     • Ferrous Sulfate (IRON) 325 (65 FE) MG TABS Take 1 Tab by mouth every morning.     • oxybutynin SR (DITROPAN-XL) 10 MG CR tablet      • morphine (ROXANOL) 20 MG/ML Solution Take 5 mg by mouth every 2 hours as needed.     • atropine 1 % Solution Take 2 Drops by mouth every four hours as needed.       No current Epic-ordered facility-administered medications on file.        Allergies:  Nkda [no known drug allergy]    Health Maintenance: Completed    ROS:  CV: no chest pain, no  "palpitations  GI: no nausea/vomiting, no diarrhea        Objective:       Exam:  /90 (BP Location: Left arm, Patient Position: Sitting, BP Cuff Size: Adult)   Pulse 69   Temp 36.3 °C (97.4 °F) (Temporal)   Ht 1.727 m (5' 8\")   Wt 74.8 kg (164 lb 14.5 oz)   SpO2 95%   BMI 25.07 kg/m²  Body mass index is 25.07 kg/m².      General: Normal appearing. No distress.  HEAD: NCAT  EYES: conjunctiva clear, lids without ptosis, pupils equal  and reactive to light  EARS: ears normal shape and contour.  MOUTH: oropharynx is without erythema, edema or exudates.   Neck: Supple without masses. Thyroid is not enlarged. Normal ROM  Pulmonary: Crackles in the right lower lobe.  Normal effort. No rales, ronchi, or wheezing.  Cardiovascular: Regular rate and rhythm, no murmur. No LE edema  Neurologic: Grossly normal, no focal deficits  Lymph: No cervical or supraclavicular lymph nodes are palpable  Skin: Warm and dry.  No obvious lesions.  Musculoskeletal: Normal gait and station.   Psych: Normal mood and affect. Alert and oriented x3. Judgment and insight is normal.      Assessment & Plan:     86 y.o. male with the following -     1. SOB (shortness of breath)  New problem. The differential is quite diverse. The patient has a h/o COPD as well as adenocarcinoma of the lung.   PFT done today showed reduced FVC at 49 and FEV1 53%, FEV/FVC:107% Making it unlikely to be due to COPD exacerbation.   Crackles heard on exam, however, patient denies orthopnea and has no LE edema.   Order placed for CBC, BNP and CXR  CXR showed: new right lung base mass and small R pleural effusion associated with possible consolidation.   Also with possible edema.   BNP was also severely elevated at 22,[phone]    Long discussion had with patient and his daughter after imaging/labs returned over the phone. The patient is not sure if he wants to move forward with further imaging ie echocardiogram and CT chest which would likely be helpful.     We also " discussed that his symptoms may be due to the progression of his lung cancer and further imaging/testing may not be helpful given that the patient does not want to move forward with treatment.   They would like to come back back for a follow up appt later this week after discussing goals of care together.     - CBC WITH DIFFERENTIAL; Future  - Comp Metabolic Panel; Future  - albuterol 108 (90 Base) MCG/ACT Aero Soln inhalation aerosol; Inhale 2 Puffs by mouth every four hours as needed for Shortness of Breath.  Dispense: 1 Inhaler; Refill: 1  - proBrain Natriuretic Peptide, NT; Future  - DX-CHEST-2 VIEWS; Future    2. CKD (chronic kidney disease) stage 4, GFR 15-29 ml/min (HCC)  Chronic problem. GFR stable.   - Comp Metabolic Panel; Future      No follow-ups on file.    Please note that this dictation was created using voice recognition software. I have made every reasonable attempt to correct obvious errors, but I expect that there are errors of grammar and possibly content that I did not discover before finalizing the note.

## 2020-03-19 NOTE — ASSESSMENT & PLAN NOTE
Chronic problem.  The patient continues to have shortness of breath.  Normal oxygenation saturation.  No significant cough.  No improvement with albuterol.  Requests refill of spiriva which he has had in the past that he felt was quite helpful.

## 2020-03-20 NOTE — ASSESSMENT & PLAN NOTE
Chronic problem.  It appears patient may have some worsening of his disease.  Likely metastasis seen on CT.

## 2020-03-20 NOTE — PROGRESS NOTES
Subjective:     CC: Diagnoses of SOB (shortness of breath), Peripheral vascular disease (CMS-HCC), Malignant neoplasm of lower lobe of right lung (HCC), Left sided cerebral hemisphere cerebrovascular accident (CVA) (HCC), CKD (chronic kidney disease) stage 4, GFR 15-29 ml/min (HCC), and Panlobular emphysema (HCC) were pertinent to this visit.    HPI: Patient is a 86 y.o. male established patient who presents today to follow-up on CT scan.      SOB (shortness of breath)  Chronic problem.  The patient continues to have shortness of breath.  Normal oxygenation saturation.  No significant cough.  No improvement with albuterol.  Requests refill of spiriva which he has had in the past that he felt was quite helpful.  CT did show moderate right and small to moderate left pleural effusions with bibasilar atelectasis as well as groundglass opacities.  He has a 3 x 2 cm opacity in the right lower lobe which likely represents malignancy.  He also has a 10 mm lytic lesion within the manubrium which is likely osseous metastasis.      Lung cancer (CMS-HCC) (HCC)  Chronic problem.  It appears patient may have some worsening of his disease.  Likely metastasis seen on CT.        Past Medical History:   Diagnosis Date   • AVM (arteriovenous malformation) of colon    • Back pain    • Chickenpox    • Chronic airway obstruction, not elsewhere classified    • Congestive heart failure (HCC)    • Depression    • Diabetes    • Diphtheria    • Emphysema of lung (HCC)    • Heart murmur    • Hypercholesteremia    • Hypertension    • Infectious disease    • Kidney disease    • Other and unspecified angina pectoris    • PVD (peripheral vascular disease) (HCC)    • Snoring    • Stroke (HCC) 2015   • Unspecified cataract     right eye    • Urinary bladder disorder    • Urinary incontinence        Social History     Tobacco Use   • Smoking status: Former Smoker     Packs/day: 2.00     Years: 40.00     Pack years: 80.00     Last attempt to quit:  1982     Years since quittin.2   • Smokeless tobacco: Never Used   • Tobacco comment: Hx smoking x 40 yrs. Quit    Substance Use Topics   • Alcohol use: No   • Drug use: No       Current Outpatient Medications Ordered in Epic   Medication Sig Dispense Refill   • Tiotropium Bromide Monohydrate (SPIRIVA RESPIMAT) 2.5 MCG/ACT Aero Soln Inhale 1 Puff by mouth every day. 1 Inhaler 2   • albuterol 108 (90 Base) MCG/ACT Aero Soln inhalation aerosol Inhale 2 Puffs by mouth every four hours as needed for Shortness of Breath. 1 Inhaler 1   • allopurinol (ZYLOPRIM) 100 MG Tab TAKE 1 TABLET BY MOUTH EVERY DAY 90 Tab 0   • terazosin (HYTRIN) 10 MG capsule Take 1 Cap by mouth every day. 90 Cap 2   • atorvastatin (LIPITOR) 10 MG Tab      • carvedilol (COREG) 12.5 MG Tab Take 1 Tab by mouth 2 times a day, with meals. 1/2 tab bid 100 Tab 2   • enalapril (VASOTEC) 10 MG Tab Take 2 Tabs by mouth 2 times a day. 180 Tab 3   • nitroglycerin (NITROSTAT) 0.4 MG SL Tab Place 0.4 mg under tongue. TAKE 1 TAB EVERY 5 MIN X 3 THEN CALL HOSPICE     • lorazepam (LORAZEPAM INTENSOL) 2 MG/ML Conc Take 0.5 mg by mouth every four hours as needed (ANXIETY/ AGITATION).     • clopidogrel (PLAVIX) 75 MG Tab Take 1 Tab by mouth every morning. 1 Tab 0   • morphine (ROXANOL) 20 MG/ML Solution Take 5 mg by mouth every 2 hours as needed.     • sennosides-docusate sodium (SENOKOT-S) 8.6-50 MG tablet Take 1 Tab by mouth 2 times a day.     • bisacodyl (DULCOLAX) 10 MG Suppos Insert 10 mg in rectum as needed.     • atropine 1 % Solution Take 2 Drops by mouth every four hours as needed.     • finasteride (PROSCAR) 5 MG Tab Take 5 mg by mouth every day.     • tamsulosin (FLOMAX) 0.4 MG capsule Take 0.4 mg by mouth ONE-HALF HOUR AFTER BREAKFAST.     • Cholecalciferol (VITAMIN D) 2000 UNIT TABS Take 4,000 Units by mouth every day.     • Ferrous Sulfate (IRON) 325 (65 FE) MG TABS Take 1 Tab by mouth every morning.       No current Epic-ordered  "facility-administered medications on file.        Allergies:  Nkda [no known drug allergy]    Health Maintenance: Completed    ROS:  GI: no nausea/vomiting, no diarrhea  : no dysuria      Objective:       Exam:  /78 (BP Location: Right arm, Patient Position: Sitting, BP Cuff Size: Adult long)   Pulse 84   Temp 36.1 °C (97 °F) (Temporal)   Ht 1.727 m (5' 8\")   Wt 74.4 kg (164 lb)   SpO2 93%   BMI 24.94 kg/m²  Body mass index is 24.94 kg/m².    General: Normal appearing. No distress.  HEAD: NCAT  EYES: conjunctiva clear, lids without ptosis, pupils equal and reactive to light  EARS: ears normal shape and contour.  MOUTH: normal dentition   Neck:  Normal ROM  Pulmonary: Normal effort. Normal respiratory rate.  Cardiovascular: Well perfused. No LE edema  Neurologic: Grossly normal, no focal deficits  Skin: Warm and dry.  No obvious lesions.  Musculoskeletal: Normal gait and station.   Psych: Normal mood and affect. Alert and oriented x3. Judgment and insight is normal.    Labs: 2/24/20 Results reviewed and discussed with the patient, questions answered.    Assessment & Plan:     86 y.o. male with the following -     1. SOB (shortness of breath)  Chronic problem, uncontrolled.  The patient reports some mild worsening.  Likely due to a combination of malignancy, bilateral pleural effusions and bilateral groundglass opacities.  We discussed the consideration of thoracentesis, however, the patient is not interested in treatment.  I did give him a refill on his Spiriva as he felt this was helpful in the past.  He declines any referral to oncology or surgery.  As such, I have started a referral to hospice.  - Tiotropium Bromide Monohydrate (SPIRIVA RESPIMAT) 2.5 MCG/ACT Aero Soln; Inhale 1 Puff by mouth every day.  Dispense: 1 Inhaler; Refill: 2  - REFERRAL TO HOSPICE    2. Peripheral vascular disease (CMS-HCC)  - REFERRAL TO HOSPICE    3. Malignant neoplasm of lower lobe of right lung (HCC)    - REFERRAL TO " HOSPICE    4. Left sided cerebral hemisphere cerebrovascular accident (CVA) (HCC)  - REFERRAL TO HOSPICE    5. CKD (chronic kidney disease) stage 4, GFR 15-29 ml/min (HCC)  - REFERRAL TO HOSPICE    6. Panlobular emphysema (HCC)  - REFERRAL TO HOSPICE      No follow-ups on file.    Please note that this dictation was created using voice recognition software. I have made every reasonable attempt to correct obvious errors, but I expect that there are errors of grammar and possibly content that I did not discover before finalizing the note.

## 2020-06-12 NOTE — TELEPHONE ENCOUNTER
Received request via: Patient    Was the patient seen in the last year in this department? Yes    Does the patient have an active prescription (recently filled or refills available) for medication(s) requested? Refill sent yesterday for wrong quantity of medication.

## 2020-06-17 NOTE — TELEPHONE ENCOUNTER
Received request via: Pharmacy    Was the patient seen in the last year in this department? Yes    Does the patient have an active prescription (recently filled or refills available) for medication(s) requested? 100 day request

## 2020-09-10 NOTE — PROGRESS NOTES
Patient is a direct admit from home for Respite Care with Hospice.   Dr Gan is accepting the patient and will place orders into Epic.

## 2020-09-11 NOTE — CARE PLAN
Problem: Safety  Goal: Will remain free from falls  Outcome: PROGRESSING AS EXPECTED  Note: Call light and personal belongings within reach. Bed in the lowest and locked position. Bed alarm in use.      Problem: Skin Integrity  Goal: Risk for impaired skin integrity will decrease  Outcome: PROGRESSING AS EXPECTED  Note: Q2 turns as tolerated by pt, silicone nasal cannula in use.

## 2020-09-11 NOTE — PROGRESS NOTES
Pt remains agitated, with continued attempts to get out of bed. Pt redirected, reoriented, to no relief.

## 2020-09-11 NOTE — PROGRESS NOTES
Pt oriented to self and birthday, calm, sitting up in bed, eating breakfast with assistance, able to take oral pills.

## 2020-09-11 NOTE — PROGRESS NOTES
Pt to unit as direct admit for Respite Care with Hospice. Pt lethargic, opens eyes to voice, does not follow commands. Words are garbled. VSS. Bed locked and in low. Pt instructed to use call light for needs.

## 2020-09-11 NOTE — PROGRESS NOTES
Received report from Juliet DORADO. Assumed pt care. Pt resting in bed supine, respirations even and unlabored. VSS. Assessment completed. Pt responsive to voice, in and out of being able to follow commands, however, very lethargic. Bed in the lowest and locked position. Bed alarm in use. Call light within reach, use of call light for assistance is encouraged.

## 2020-09-11 NOTE — PROGRESS NOTES
0745 Received report from Tylor DORADO, POC reviewed. Pt lethargic, disoriented, responds to voice. In and out of following commands. Pt restless, agitated, moving extremities, multiple attempts to get out of bed. Redirected and reoriented pt. Repositioned pt, changed bedding, medicated for anxiety PRN, see MAR. Pt reporting pain, unable to verbalize location, denied pain medication when offered. Pt intermittently refuses pills and oral intake.     0900 Spoke with Deborah doll RN regarding pt's status. Notified of increasing agitation and restlessness, with little relief from PRN Haldol.

## 2020-09-11 NOTE — PROGRESS NOTES
1200 Hospice RN Dayron at bedside. Pt with improved mental status, following commands, answering simple questions.     1230 Pt becoming increasingly restless, uncomfortably and reporting pain. Pt fluctuates between resting and grabbing at the air and hallucinating. Medicated for anxiety with PRN 1mg Haldol IV with no relief. Medicated for back pain with PRN PO 5 mg Morphine with no relief. New orders from received and followed. See MAR.

## 2020-09-11 NOTE — PROGRESS NOTES
Per Hospice RN Deborah, Dr. Gan does not want to escalate care for cloudy urine. Updated ESTRADA Cabrera regarding pt's unrelieved agitation and anxiety, including continued attempts to get out of bed and altered mental status with no improvement.

## 2020-09-12 NOTE — PROGRESS NOTES
0700Received report form night RN. Pt appears to be resting comfortably. Pt does not awaken to voice. Owens is patent and draining. Daughter Ekta called and updated. Oral care and repositioning provided q2h or as patient tolerated. Sitter at bedside.     1000 Pt given bed bath. Linens changed and new T-shirt place. Lotion applied. Oral care attempted. Pt declines PO. Owens care provided. Pt tolerated well. All extremities elevated off mattress. Buttock has no areas of redness. Behind ears with nasal cannula intact.     1400 pt increase in oral secretions. Call hospice for PRN orders. Pt having difficulty clearing cough.     1420 Spoke with Dr. Gan new orders received and carried out.     1645 Ekta called for updates. She will come in tomorrow to visit.     1745 Pt increase in secretions attempts made for oral suctioning. Increases pt agitation. PRN given see MAR for secretions. Scopolamine patch remains in place. Adjusted pt's position for comfort. Pt continues to open eyes only at this time. Not following commands or speaking.

## 2020-09-12 NOTE — CARE PLAN
Problem: Safety  Goal: Will remain free from injury  Outcome: PROGRESSING AS EXPECTED   Bed naa low and locked position. Bed alarm in place.   Problem: Pain Management  Goal: Pain level will decrease to patient's comfort goal  Outcome: PROGRESSING AS EXPECTED   Pt medication given per schedule and PRN  Problem: Respiratory:  Goal: Respiratory status will improve  Outcome: PROGRESSING AS EXPECTED   Pt remains on 2LNC. Respirations a[[ear even and unlabored. At times increase in cough and WOB medicated PRN for comfort.   Problem: Urinary Elimination:  Goal: Ability to reestablish a normal urinary elimination pattern will improve  Outcome: PROGRESSING AS EXPECTED   Pt has patent perez with sediment in.   Problem: Skin Integrity  Goal: Risk for impaired skin integrity will decrease  Outcome: PROGRESSING AS EXPECTED   Turned q2h using pillows as tolerated. Lotion applied. All extremities floated off mattress.   Problem: Mobility  Goal: Risk for activity intolerance will decrease  Outcome: PROGRESSING AS EXPECTED

## 2020-09-13 NOTE — PROGRESS NOTES
0148: tried calling daughter(ekta) at [phone] with no answer.    0150: Called an alternate phone number at [phone]. Notified Ekta successfully. Comforted  family throughout the phone call. 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 Patient does not wish to see the patient at  this time. Will call [facility].

## 2020-09-13 NOTE — DISCHARGE SUMMARY
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  Adenocarcinoma of the lung, unknown stage although evidence of bone metastasis on CT chest  2. CKD stage IV  3. H/O COPD  4. CHF  5. CVA  6. Obstructive uropathy - long H/O self cath  7. Chronic UTI - on Keflex prophylaxis      History of Presenting Illness and Hospital Course  Lex Harden is an 88 yo M with hospice admitting diagnosis of Adenocarcinoma of the lung, unknown stage although evidence of bone metastasis on CT chest. Medical comorbidities include CKD stage IV with hyperkalemia. Patient is declining both therapy for lung cancer and hemodialysis. He also has CHF, mild aortic valve stenosis, DM, COPD, and residual deficits from CVA (right sided weakness, dysarthria and dysphagia). Mr. Harden was on hospice service two years ago for ASCVD and was then discharged from service in October 2018 due to plateau in his disease process. He was referred back to [facility] due to a recent decline in function and increasing SOB with known adenocarcinoma of the lung. CXR showed \"new right lung base mass and small R pleural effusion associated with possible consolidation\". He then had a CT chest which showed 2. 3.6 x 2.2 cm mass-like opacity in the right lower lobe which could represent atelectasis, pneumonia or malignancy. 10 mm lytic lesion within the manubrium is suspicious for osseous metastasis.      Mr. Harden's PPS is 40-50%. He is mainly bed/chair bound, and no longer leaves the house. Per RNCM Deborah Bloom and patient's step daughter and primary caregiver Rhiannon, patient has had a significant decline during the last two weeks. He has had symptoms of a possible TIA with increased slurring of speech, drooling, increased lethargy, increased difficulty with ambulation, and worsened short term memory. He has new onset of facial asymmetry with right sided droop, drooling, and increased " right arm/hand weakness. He is no longer able to independently bath and dress on his own and the family is requesting assistance from a C.N.A. through hospice. Patient now dependent upon FWW for assist with ambulation, and requires a shower chair for bathing. Rhiannon states continued sun-downing symptoms with increasing hallucinations in the afternoon. He frequently does not remember Rhiannon or where he is; often thinking he's in the hospital and wants to be discharged. Rhiannon gives him comfort medications intermittently, and states when she gives him Trazodone it does seem to help him sleep at night and settle down. She is also using morphine for SOB with successful relief of symptoms. He refuses supplemental oxygen despite severe SOB with conversation or minimal exertion. He continues with hallucinations that are controlled with Haldol PRN. Due to need for ongoing self-cath he is on prophylactic daily Keflex. Rhiannon notes ongoing dysphagia with liquids with frequent coughing with intake. He has frequent falls, most recent being last night where he fell while getting out of the bed trying to get to the bathroom. No injury related falls reported.  Weight: previously 165.9 lb (5/2018), 5/7/20 -151.2#, 151.2# (7/2/20), 148# (9/2/20) .LMAC: previously 25 cm (10/2018), 5/7/20 23 CM, 23 CM (7/2/20), 22 CM (9/2/20)   Virtual PE: VS Per RNCM: /70, RR24, HR 60. Gen: pleasant, minimally conversant, speech is slurred with drooling evident. Skin pale. Denies pain. Ext: BLE ankle edema L 2+, R 1+.   Areas of concern/decline: Patient has had a sudden decline in the past two weeks with worsening functionality; possible TIA. He has increasing right sided weakness, lethargy, and confusion. He frequently has diffculty remembering who his primary caregiver/step-daughter Rhiannon is at times. He requires maximal assist with ambulation, bathing, and dressing. He has new onset right sided facial droop with drooling. His appetite has  decreased, and he has had a 3-lb weight loss since his admission in May 2020. His LMAC has decreased from 23 CM (5/7/20) to 22 CM (9/3/20). He continues to be a high aspiration and fall risk.   He has had increasing agitation and his step daughter is no longer able to manage his care. His symptoms were managed with Haldol soln and OMS routine and he was comfortable at the time of his passing.His family will be entered into [facility].     Death Date: 09/13/20   Death Time: 0049      Pronounced By (MD): ([facility] RN notified)  Pronounced By (RN1): Gray Fernandes  Pronounced By (RN2): Marie Field

## 2020-09-13 NOTE — PROGRESS NOTES
Received report from ESTRADA Roland in ICU. Assumed care of pt. Transferred pt to bed via slide board. Pt nonverbal, groans, gurgling in throat. Eye opening during movement. No further needs at this time. Bed locked and in lowest position. Call light within reach. Will continue to monitor.

## 2020-09-13 NOTE — PROGRESS NOTES
Spoke with Hospice RN on phone, family cannot be reached. Will try to reach out to family.    0110: spoke with  Mar Hackett. Patient not a candidate for autopsy.

## 2020-09-13 NOTE — PROGRESS NOTES
Received report from Moon RN, assumed care, patient nonverbal, audible gurgling sounds present, patient medicated per MAR, repositioning q2 hours or as tolerated, perez present, patent and draining. Safety precautions in place, will continue to monitor.

## 2020-09-13 NOTE — PROGRESS NOTES
Received report and assumed care of pt. Pt appears to be resting comfortably at this time. Pt does not awaken to voice, opens eyes with movement.

## 2020-09-13 NOTE — PROGRESS NOTES
Donor network [employer] notified of patient's death, spoke with [employer].   Patient not a candidate for donation.   Case number [id number]

## 2020-09-14 ENCOUNTER — HOME CARE VISIT (OUTPATIENT)
Dept: HOSPICE | Facility: HOSPICE | Age: 85
End: 2020-09-14
Payer: MEDICARE

## 2020-09-14 LAB
GAMMA INTERFERON BACKGROUND BLD IA-ACNC: 0.04 IU/ML
M TB IFN-G BLD-IMP: NEGATIVE
M TB IFN-G CD4+ BCKGRND COR BLD-ACNC: 0 IU/ML
MITOGEN IGNF BCKGRD COR BLD-ACNC: 7.18 IU/ML
QFT TB2 - NIL TBQ2: 0 IU/ML

## 2021-08-20 NOTE — PROGRESS NOTES
Assumed care of pt at 0700.   Pt is A&Ox3, disoriented to event.   Pt denies n/v, n/t, and pain at this time.   Pt has R sided weakness and facial droop, which is baseline.   Medication given per MAR floated in applesauce.   Owens in place per MD order for urinary retention.   Plan of care discussed.   All questions answered at this time.    Warm/Dry

## 2023-05-18 NOTE — REHAB-OT IDT TEAM NOTE
Occupational Therapy   Activities of Daily Living  Eating Initial:  5 - Standby Prompting/Supervision or Set-up  Eating Current:  5 - Standby Prompting/Supervision or Set-up   Eating Description:  Increased time, Modified diet, Supervision for safety, Verbal cueing  Grooming Initial:  5 - Standby Prompting/Supervision or Set-up  Grooming Current:  5 - Standby Prompting/Supervision or Set-up   Grooming Description:  Increased time, Supervision for safety, Set-up of equipment (Seated at sinkside)  Bathing Initial:  0 - Not tested, patient refused  Bathing Current:  5 - Standby Prompting/Supervision or Set-up   Bathing Description:  Grab bar, Tub bench, Long handled bath tool, Hand held shower, Increased time, Supervision for safety, Set-up of equipment, Initial preparation for task  Upper Body Dressing Initial:  3 - Moderate Assistance  Upper Body Dressing Current:  6 - Modified Independent   Upper Body Dressing Description:  Increased time (Don/doff pull over shirt)  Lower Body Dressing Initial:  3 - Moderate Assistance  Lower Body Dressing Current:  5 - Standby Prompting/Supervsion or Set-up   Lower Body Dressing Description:  5 - Standby Prompting/Supervsion or Set-up  Toileting Initial:  3 - Moderate Assistance  Toileting Current:  5 - Standby Prompting/Supervision or Set-up   Toileting Description:  Increased time, Supervision for safety  Toilet Transfer Initial:  4 - Minimal Assistance  Toilet Transfer Current:  4 - Minimal Assistance   Toilet Transfer Description:  4 - Minimal Assistance  Tub / Shower Transfer Initial:  0 - Not tested, patient refused  Tub / Shower Transfer Current:  5 - Standby Prompting/Supervision or Set-up   Tub / Shower Transfer Description:  Increased time, Supervision for safety, Grab bar (SPT to/from manual wc and shower chair via grab bar)  IADL:  TBD    Family Training/Education:  TBD   DME/DC Recommendations:  TBD     Strengths:  Independent PLOF, Motivated for self care and  independence, Pleasant and cooperative and Willingly participates in therapeutic activities  Barriers:  Generalized weakness, Hemiplegia, Impulsive, Limited mobility and Poor balance     # of short term goals set= 3    # of short term goals met= 1          Occupational Therapy Goals           Problem: Dressing     Dates: Start: 12/14/17       Goal: STG-Within one week, patient will dress LB     Dates: Start: 12/18/17       Description: 1) Individualized Goal:  Mod Indep  2) Interventions:  OT Self Care/ADL, OT Neuro Re-Ed/Balance, OT Therapeutic Activity, OT Evaluation and OT Therapeutic Exercise       Note:     Goal Note filed on 12/22/17 1125 by Casey Saldivar MS,OTR/L    Goal: STG-Within one week, patient will dress LB  Outcome: NOT MET  Pt at Supervision secondary right hemiparesis, impaired standing balance,   impulsivity                  Problem: IADL's     Dates: Start: 12/14/17       Goal: STG-Within one week, patient will access kitchen area     Dates: Start: 12/14/17       Description: 1) Individualized Goal:  Min assist w/ AE PRN  2) Interventions:  OT Self Care/ADL, OT Neuro Re-Ed/Balance, OT Therapeutic Activity, OT Evaluation and OT Therapeutic Exercise       Note:     Goal Note filed on 12/22/17 1125 by Casey Saldivar MS,OTR/L    Goal: STG-Within one week, patient will access kitchen area  Outcome: NOT MET  Activity to be attempted                  Problem: OT Long Term Goals     Dates: Start: 12/14/17       Goal: LTG-By discharge, patient will complete basic self care tasks     Dates: Start: 12/14/17       Description: 1) Individualized Goal:  Mod Indep w/ AE PRN  2) Interventions:  OT Self Care/ADL, OT Neuro Re-Ed/Balance, OT Therapeutic Activity, OT Evaluation and OT Therapeutic Exercise             Goal: LTG-By discharge, patient will perform bathroom transfers     Dates: Start: 12/14/17       Description: 1) Individualized Goal:  Mod Indep w/ AE PRN  2) Interventions:  OT Self Care/ADL, OT Neuro  Re-Ed/Balance, OT Therapeutic Activity, OT Evaluation and OT Therapeutic Exercise                   Section completed by:  Casey Saldivar MS,OTR/L      Hypothyroid Acute cholangitis

## 2023-06-02 NOTE — PROGRESS NOTES
----- Message from Mikael Griffin DO sent at 6/2/2023  1:45 PM CDT -----  Please notify the patient that his prostate MRI revealed a suspicious lesion within his prostate.  I can discuss this result further at his upcoming appointment and management moving forward.   Report called to Liam DORADO at 1615 hours.

## 2023-11-20 NOTE — PROGRESS NOTES
"0511 - pt refuses to have his vital signs taken. Pt said, \"go out, im trying to sleep.\" went back after half an hour and informed pt that he has blood pressure medications and BP needs to be checked before taking it. He said, \"good morning and goodbye. I will sleep now.\" informed charge RN. Will endorse to oncoming shift.   " no

## 2024-02-09 NOTE — PROGRESS NOTES
[facility] Nephrology Progress Note               Author: Kostas Garcia Date & Time created: 12/31/2017  6:28 AM     Interval History:  84-year-old gentleman with CKD stage IV, secondary to renal vascular disease and diabetes who presented to [facility] with a left-sided infarction with some confusion and right-sided weakness.      The patient was acutely treated and sent to rehab, they had surveillance laboratories done today that showed that his potassium had   increased to 6.3 and he had significant worsening of his chronic kidney disease and was transferred to [facility] for further evaluation and treatment.     Reviewing his chart, the patient did have some low blood pressures in the low 100s.  He did not receive any contrast studies.  He was on ciprofloxacin, but denied any stigmata of skin changes or rash.  He reports decreased urine output during that time.  He has received no NSAIDs.       Daily Nephrology Summary  12/27/17 - seen in consultation after transfer from Rehab.  ARNALDO and Hyperkalemia treated with IVF/bicarb/kayexalate  12/28/17 - Renal function some better with current tx.  K now ok.  Fractional excretion non diagnostic.  Continue current tx  12/29/17 - BUN/Cr trending down.  K better.  Has significant CKD so renal function wont improve beyond Creat 2.5ish.   12/30/17 - BUN/Cr improving slowly.  K ok.       Review of Systems   Constitutional: Positive for malaise/fatigue.   Respiratory: Negative.    Cardiovascular: Negative.    Skin: Negative.          Physical Exam   Constitutional: No distress.   Eyes: No scleral icterus.   Neck: No JVD present.   Cardiovascular: Normal rate.  Exam reveals no friction rub.    Pulmonary/Chest: Effort normal. No respiratory distress.   Abdominal: Soft. He exhibits no distension.       Labs:        Invalid input(s): [id number]  Recent Labs      12/31/17   0203   TROPONINI  0.03     Recent Labs      12/28/17   0816  12/29/17   0334  12/30/17   0146  12/31/17   0203    SODIUM  138  142  141  139   POTASSIUM  4.9  4.5  4.3  4.0   CHLORIDE  109  109  109  106   CO2  20  22  25  26   BUN  123*  104*  88*  66*   CREATININE  4.70*  4.31*  3.46*  2.94*   MAGNESIUM  2.7*  2.2   --    --    PHOSPHORUS  6.1*  4.5  3.6   --    CALCIUM  9.1  8.5  8.4*  7.9*     Recent Labs      17   ALTSGPT  5  7   --    --    ASTSGOT    14   --    --    ALKPHOSPHAT  44  37   --    --    TBILIRUBIN  0.4  0.4   --    --    GLUCOSE  107*  103*  103*  101*     Recent Labs      17   RBC  2.74*  2.74*  2.61*  2.43*  2.42*   HEMOGLOBIN  8.4*  8.4*  8.0*  7.4*  7.5*   HEMATOCRIT  24.8*  24.8*  23.7*  21.8*  22.3*   PLATELETCT  83*  83*  79*  83*  82*   PROTHROMBTM  15.3*   --    --    --    INR  1.24*   --    --    --    IRON   --   35*   --    --    FERRITIN   --   232.4   --    --    TOTIRONBC   --   214*   --    --      Recent Labs      17   0203   WBC  7.3  7.3  5.5  5.2  5.4   NEUTSPOLYS  80.90*  76.50*   --   68.60   LYMPHOCYTES  10.40*  10.90*   --   16.90*   MONOCYTES  7.00  8.90   --   10.40   EOSINOPHILS  1.00  2.40   --   3.00   BASOPHILS  0.30  0.40   --   0.20   ASTSGOT  9*  14   --    --    ALTSGPT  5  7   --    --    ALKPHOSPHAT  44  37   --    --    TBILIRUBIN  0.4  0.4   --    --            Hemodynamics:  Temp (24hrs), Av.4 °C (97.5 °F), Min:36.3 °C (97.4 °F), Max:36.5 °C (97.7 °F)  Temperature: 36.4 °C (97.5 °F)  Pulse  Av  Min: 55  Max: 72   Blood Pressure : 122/61     Respiratory:    Respiration: 16, Pulse Oximetry: 97 %, O2 Daily Delivery Respiratory : Silicone Nasal Cannula     Work Of Breathing / Effort: Mild  RUL Breath Sounds: Clear, RML Breath Sounds: Clear, RLL Breath Sounds: Diminished, MILES Breath Sounds: Clear, LLL Breath Sounds: Diminished  Fluids:    Intake/Output Summary (Last 24  hours) at 12/31/17 0628  Last data filed at 12/31/17 0400   Gross per 24 hour   Intake              480 ml   Output             1275 ml   Net             -795 ml     Weight: 78.7 kg (173 lb 8 oz)  GI/Nutrition:  Orders Placed This Encounter   Procedures   • DIET ORDER     Standing Status:   Standing     Number of Occurrences:   1     Order Specific Question:   Diet:     Answer:   Renal [8]     Order Specific Question:   Texture/Fiber modifications:     Answer:   Dysphagia 3(Mechanical Soft)specify fluid consistency(question 6) [3]     Comments:   please puree soup     Order Specific Question:   Consistency/Fluid modifications:     Answer:   Nectar Thick [2]     Comments:   NO STRAWS!     Order Specific Question:   Miscellaneous modifications:     Answer:   SLP - Deliver to Nursing Station [22]     Comments:   pt needs help with HOB positioning     Medical Decision Making, by Problem:  Active Hospital Problems    Diagnosis   • CVA (cerebral vascular accident) (CMS-[facility]) [I63.9]   • Acute on Chronic blood loss anemia [D50.0]   • Cardiomyopathy (CMS-HCC) [I42.9]   • Lung cancer (CMS-HCC) [C34.90]   • Acute renal failure superimposed on stage 5 chronic kidney disease, not on chronic dialysis (CMS-[facility]) [N17.9, N18.5]   • Urinary tract infection without hematuria [N39.0]   • BPH (benign prostatic hyperplasia) [N40.0]     IMPRESSION/PLAN    # ARNALDO/CKD  -- SCreatinine trending down toward baseline now  -- baseline SCreat around 2.5 w eGFR ~25  -- non oliguric with improving numbers with current treatment  -- no indication for acute dialysis at this time  -- fractional excretion of sodium was non diagnostic  -- monitor chems/hgb routinely  -- continue current treatment    # Hyperkalemia  -- need to check stools for blood given prior hx of GI bleeding and was hyperkalemic with high BUN  -- K better  -- monitor daily    # CVA    # DM    This patient is appropriate for hospice/pallliative care.    Quality-Core Measures     - hold PO meds and continue SSI while in hospital  - monitor BS and adjust insulin as needed

## 2024-03-24 NOTE — THERAPY
Physical Therapy Initial Plan of Care Note    1) Assessment: Patient is 84 y.o. 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  Additional factors influencing patient status / progress (ie: cognitive factors, co-morbidities, social support, etc): Impaired safety awareness, impulsivity, impaired mobility, transfers, gait, coordination, balance, fall risk medical issues that include hypertension, diabetes mellitus, peripheral vascular disease, carotid artery and subclavian artery stenosis, congestive heart failure, cardiomyopathy, lung cancer, dyslipidemia.  Long term and short term goals have been discussed with patient and they are in agreement.  2) Plan: Recommend Physical Therapy  minutes per day 5-7 days per week for 2 weeks for the following treatments:  PT E Stim Attended, PT Gait Training, PT Therapeutic Exercises, PT Neuro Re-Ed/Balance and PT Therapeutic Activity.  3) Goals:  Please refer to care plan for goals.    PAST SURGICAL HISTORY:  No significant past surgical history